# Patient Record
Sex: MALE | Race: BLACK OR AFRICAN AMERICAN | NOT HISPANIC OR LATINO | Employment: FULL TIME | URBAN - METROPOLITAN AREA
[De-identification: names, ages, dates, MRNs, and addresses within clinical notes are randomized per-mention and may not be internally consistent; named-entity substitution may affect disease eponyms.]

---

## 2017-01-16 ENCOUNTER — HOSPITAL ENCOUNTER (OUTPATIENT)
Facility: HOSPITAL | Age: 52
Discharge: HOME | End: 2017-01-17
Attending: EMERGENCY MEDICINE | Admitting: HOSPITALIST
Payer: COMMERCIAL

## 2017-01-16 DIAGNOSIS — I50.43 ACUTE ON CHRONIC COMBINED SYSTOLIC AND DIASTOLIC CONGESTIVE HEART FAILURE: Primary | ICD-10-CM

## 2017-01-16 PROBLEM — I34.0 NON-RHEUMATIC MITRAL REGURGITATION: Status: ACTIVE | Noted: 2017-01-16

## 2017-01-16 LAB
ALBUMIN SERPL BCP-MCNC: 3.5 G/DL
ALP SERPL-CCNC: 90 U/L
ALT SERPL W/O P-5'-P-CCNC: 83 U/L
ANION GAP SERPL CALC-SCNC: 3 MMOL/L
ANION GAP SERPL CALC-SCNC: 5 MMOL/L
AST SERPL-CCNC: 77 U/L
BASOPHILS # BLD AUTO: 0.06 K/UL
BASOPHILS NFR BLD: 1.2 %
BILIRUB SERPL-MCNC: 0.7 MG/DL
BNP SERPL-MCNC: 556 PG/ML
BUN SERPL-MCNC: 19 MG/DL
BUN SERPL-MCNC: 20 MG/DL
CALCIUM SERPL-MCNC: 8.2 MG/DL
CALCIUM SERPL-MCNC: 9 MG/DL
CHLORIDE SERPL-SCNC: 110 MMOL/L
CHLORIDE SERPL-SCNC: 113 MMOL/L
CO2 SERPL-SCNC: 20 MMOL/L
CO2 SERPL-SCNC: 25 MMOL/L
CREAT SERPL-MCNC: 1.3 MG/DL
CREAT SERPL-MCNC: 1.4 MG/DL
DIFFERENTIAL METHOD: ABNORMAL
EOSINOPHIL # BLD AUTO: 0.3 K/UL
EOSINOPHIL NFR BLD: 5.8 %
ERYTHROCYTE [DISTWIDTH] IN BLOOD BY AUTOMATED COUNT: 13.8 %
EST. GFR  (AFRICAN AMERICAN): >60 ML/MIN/1.73 M^2
EST. GFR  (AFRICAN AMERICAN): >60 ML/MIN/1.73 M^2
EST. GFR  (NON AFRICAN AMERICAN): 57.4 ML/MIN/1.73 M^2
EST. GFR  (NON AFRICAN AMERICAN): >60 ML/MIN/1.73 M^2
GLUCOSE SERPL-MCNC: 145 MG/DL
GLUCOSE SERPL-MCNC: 152 MG/DL
HCT VFR BLD AUTO: 39.1 %
HGB BLD-MCNC: 13.5 G/DL
LYMPHOCYTES # BLD AUTO: 0.9 K/UL
LYMPHOCYTES NFR BLD: 16.5 %
MCH RBC QN AUTO: 30.6 PG
MCHC RBC AUTO-ENTMCNC: 34.5 %
MCV RBC AUTO: 89 FL
MONOCYTES # BLD AUTO: 0.4 K/UL
MONOCYTES NFR BLD: 8.3 %
NEUTROPHILS # BLD AUTO: 3.6 K/UL
NEUTROPHILS NFR BLD: 68 %
PLATELET # BLD AUTO: 158 K/UL
PMV BLD AUTO: 11.4 FL
POCT GLUCOSE: 136 MG/DL (ref 70–110)
POCT GLUCOSE: 151 MG/DL (ref 70–110)
POTASSIUM SERPL-SCNC: 5.6 MMOL/L
POTASSIUM SERPL-SCNC: 5.9 MMOL/L
PROT SERPL-MCNC: 6.8 G/DL
RBC # BLD AUTO: 4.41 M/UL
SODIUM SERPL-SCNC: 136 MMOL/L
SODIUM SERPL-SCNC: 140 MMOL/L
TROPONIN I SERPL DL<=0.01 NG/ML-MCNC: 0.02 NG/ML
WBC # BLD AUTO: 5.21 K/UL

## 2017-01-16 PROCEDURE — 80053 COMPREHEN METABOLIC PANEL: CPT

## 2017-01-16 PROCEDURE — 93005 ELECTROCARDIOGRAM TRACING: CPT

## 2017-01-16 PROCEDURE — 63600175 PHARM REV CODE 636 W HCPCS: Performed by: EMERGENCY MEDICINE

## 2017-01-16 PROCEDURE — 99285 EMERGENCY DEPT VISIT HI MDM: CPT | Mod: 25

## 2017-01-16 PROCEDURE — 82962 GLUCOSE BLOOD TEST: CPT

## 2017-01-16 PROCEDURE — G0378 HOSPITAL OBSERVATION PER HR: HCPCS

## 2017-01-16 PROCEDURE — 99220 PR INITIAL OBSERVATION CARE,LEVL III: CPT | Mod: ,,, | Performed by: HOSPITALIST

## 2017-01-16 PROCEDURE — 96374 THER/PROPH/DIAG INJ IV PUSH: CPT

## 2017-01-16 PROCEDURE — 84484 ASSAY OF TROPONIN QUANT: CPT

## 2017-01-16 PROCEDURE — 85025 COMPLETE CBC W/AUTO DIFF WBC: CPT

## 2017-01-16 PROCEDURE — 83880 ASSAY OF NATRIURETIC PEPTIDE: CPT

## 2017-01-16 PROCEDURE — 36415 COLL VENOUS BLD VENIPUNCTURE: CPT

## 2017-01-16 PROCEDURE — 83036 HEMOGLOBIN GLYCOSYLATED A1C: CPT

## 2017-01-16 PROCEDURE — 80048 BASIC METABOLIC PNL TOTAL CA: CPT

## 2017-01-16 PROCEDURE — 93010 ELECTROCARDIOGRAM REPORT: CPT | Mod: ,,, | Performed by: INTERNAL MEDICINE

## 2017-01-16 PROCEDURE — 25000003 PHARM REV CODE 250: Performed by: HOSPITALIST

## 2017-01-16 PROCEDURE — 99284 EMERGENCY DEPT VISIT MOD MDM: CPT | Mod: ,,, | Performed by: EMERGENCY MEDICINE

## 2017-01-16 RX ORDER — IBUPROFEN 200 MG
24 TABLET ORAL
Status: DISCONTINUED | OUTPATIENT
Start: 2017-01-16 | End: 2017-01-17 | Stop reason: HOSPADM

## 2017-01-16 RX ORDER — FUROSEMIDE 10 MG/ML
40 INJECTION INTRAMUSCULAR; INTRAVENOUS 2 TIMES DAILY
Status: DISCONTINUED | OUTPATIENT
Start: 2017-01-17 | End: 2017-01-17 | Stop reason: HOSPADM

## 2017-01-16 RX ORDER — INSULIN ASPART 100 [IU]/ML
0-5 INJECTION, SOLUTION INTRAVENOUS; SUBCUTANEOUS
Status: DISCONTINUED | OUTPATIENT
Start: 2017-01-16 | End: 2017-01-17 | Stop reason: HOSPADM

## 2017-01-16 RX ORDER — FOLIC ACID 1 MG/1
1 TABLET ORAL DAILY
Status: DISCONTINUED | OUTPATIENT
Start: 2017-01-17 | End: 2017-01-17 | Stop reason: HOSPADM

## 2017-01-16 RX ORDER — SIMVASTATIN 10 MG/1
10 TABLET, FILM COATED ORAL NIGHTLY
Status: DISCONTINUED | OUTPATIENT
Start: 2017-01-16 | End: 2017-01-17 | Stop reason: HOSPADM

## 2017-01-16 RX ORDER — IBUPROFEN 200 MG
16 TABLET ORAL
Status: DISCONTINUED | OUTPATIENT
Start: 2017-01-16 | End: 2017-01-17 | Stop reason: HOSPADM

## 2017-01-16 RX ORDER — GLUCAGON 1 MG
1 KIT INJECTION
Status: DISCONTINUED | OUTPATIENT
Start: 2017-01-16 | End: 2017-01-17 | Stop reason: HOSPADM

## 2017-01-16 RX ORDER — LEVOTHYROXINE SODIUM 75 UG/1
75 TABLET ORAL
Status: DISCONTINUED | OUTPATIENT
Start: 2017-01-17 | End: 2017-01-16

## 2017-01-16 RX ORDER — FUROSEMIDE 10 MG/ML
80 INJECTION INTRAMUSCULAR; INTRAVENOUS
Status: COMPLETED | OUTPATIENT
Start: 2017-01-16 | End: 2017-01-16

## 2017-01-16 RX ORDER — METOPROLOL SUCCINATE 50 MG/1
50 TABLET, EXTENDED RELEASE ORAL DAILY
Status: DISCONTINUED | OUTPATIENT
Start: 2017-01-17 | End: 2017-01-17 | Stop reason: HOSPADM

## 2017-01-16 RX ADMIN — SODIUM POLYSTYRENE SULFONATE 15 G: 15 SUSPENSION ORAL; RECTAL at 08:01

## 2017-01-16 RX ADMIN — FUROSEMIDE 80 MG: 10 INJECTION, SOLUTION INTRAMUSCULAR; INTRAVENOUS at 02:01

## 2017-01-16 NOTE — IP AVS SNAPSHOT
[facility]  [address]  [facility] [address]  Phone: [phone]           Patient Discharge Instructions     Our goal is to set you up for success. This packet includes information on your condition, medications, and your home care. It will help you to care for yourself so you don't get sicker and need to go back to the hospital.     Please ask your nurse if you have any questions.        There are many details to remember when preparing to leave the hospital. Here is what you will need to do:    1. Take your medicine. If you are prescribed medications, review your Medication List in the following pages. You may have new medications to  at the pharmacy and others that you'll need to stop taking. Review the instructions for how and when to take your medications. Talk with your doctor or nurses if you are unsure of what to do.     2. Go to your follow-up appointments. Specific follow-up information is listed in the following pages. Your may be contacted by a transition nurse or clinical provider about future appointments. Be sure we have all of the phone numbers to reach you, if needed. Please contact your provider's office if you are unable to make an appointment.     3. Watch for warning signs. Your doctor or nurse will give you detailed warning signs to watch for and when to call for assistance. These instructions may also include educational information about your condition. If you experience any of warning signs to your health, call your doctor.               [facility]  Unless otherwise directed by your provider, please contact [facility], our nurse care line that is available for 24/7 assistance.     [phone] (toll-free)    Registered nurses in the [facility] provide clinical advisement, health education, appointment booking, and other advisory services.                    ** Verify the list of medication(s) below is accurate and up  to date. Carry this with you in case of emergency. If your medications have changed, please notify your healthcare provider.             Medication List      START taking these medications        Additional Info                      furosemide 40 MG tablet   Commonly known as:  LASIX   Quantity:  30 tablet   Refills:  3   Dose:  40 mg    Instructions:  Take 1 tablet (40 mg total) by mouth once daily.     Begin Date    AM    Noon    PM    Bedtime         CHANGE how you take these medications        Additional Info                      losartan 25 MG tablet   Commonly known as:  COZAAR   Quantity:  30 tablet   Refills:  3   Dose:  25 mg   What changed:    - medication strength  - how much to take    Instructions:  Take 1 tablet (25 mg total) by mouth once daily.     Begin Date    AM    Noon    PM    Bedtime         CONTINUE taking these medications        Additional Info                      cyanocobalamin 1,000 mcg/mL injection   Quantity:  18 mL   Refills:  0    Instructions:  Inject 1 mL (1000 mcg total) into the muscle once daily on 9/7, 9/8, and 9/9. Then inject 1 mL into the muscle weekly on 9/16, 9/23, 9/30. Then inject 1 mL into the muscle once a month starting on 10/7.     Begin Date    AM    Noon    PM    Bedtime       folic acid 1 MG tablet   Commonly known as:  FOLVITE   Quantity:  30 tablet   Refills:  11   Dose:  1 mg    Last time this was given:  1 mg on 1/17/2017 10:07 AM   Instructions:  Take 1 tablet (1 mg total) by mouth once daily.     Begin Date    AM    Noon    PM    Bedtime       METFORMIN ORAL   Refills:  0    Instructions:  Take by mouth.     Begin Date    AM    Noon    PM    Bedtime       metoprolol succinate 50 MG 24 hr tablet   Commonly known as:  TOPROL XL   Quantity:  30 tablet   Refills:  0   Dose:  50 mg    Last time this was given:  50 mg on 1/17/2017 10:06 AM   Instructions:  Take 1 tablet (50 mg total) by mouth once daily.     Begin Date    AM    Noon    PM    Bedtime        simvastatin 10 MG tablet   Commonly known as:  ZOCOR   Quantity:  30 tablet   Refills:  11   Dose:  10 mg    Last time this was given:  10 mg on 1/17/2017 12:33 AM   Instructions:  Take 1 tablet (10 mg total) by mouth every evening.     Begin Date    AM    Noon    PM    Bedtime       SYNTHROID ORAL   Refills:  0    Last time this was given:  125 mcg on 1/17/2017  6:40 AM   Instructions:  Take by mouth.     Begin Date    AM    Noon    PM    Bedtime            Where to Get Your Medications      These medications were sent to [facility]/pharmacy #[id number] - [place] - [address]  [address] [facility] [address]     Phone:  [phone]     furosemide 40 MG tablet    losartan 25 MG tablet                  Please bring to all follow up appointments:    1. A copy of your discharge instructions.  2. All medicines you are currently taking in their original bottles.  3. Identification and insurance card.    Please arrive 15 minutes ahead of scheduled appointment time.    Please call 24 hours in advance if you must reschedule your appointment and/or time.        Follow-up Information     Follow up with [facility] On 1/23/2017.    Why:   Post/ 11:15am  [facility] / will fax discharge summary to [phone]     Contact information:    [address] [id number] [phone]        Referrals     Future Orders    Ambulatory Referral to Cardiology         Discharge Instructions     Future Orders    Diet general     Questions:    Total calories:      Fat restriction, if any:      Protein restriction, if any:      Na restriction, if any:      Fluid restriction:      Additional restrictions:          Primary Diagnosis     Your primary diagnosis was:  Heart Failure      Admission Information     Date & Time Provider Department CSN    1/16/2017 11:06 AM Lisandro Herndon MD [facility] [id number]      Care Providers     Provider Role Specialty Primary  "office phone    Lisandro Herndon MD Attending Provider Hospitalist [phone]    Milton Lopez MD Team Attending  Hospitalist [phone]    Lisandro Herndon MD Team Attending  Hospitalist [phone]      Your Vitals Were     BP Pulse Temp Resp Height Weight    110/79 (BP Location: Right arm, Patient Position: Lying, BP Method: Automatic) 79 97.8 °F (36.6 °C) (Oral) 18 5' 11" (1.803 m) 79.4 kg (175 lb)    SpO2 BMI             98% 24.41 kg/m2         Recent Lab Values        1/16/2017 1/17/2017                        8:25 PM  4:58 AM          A1C 6.4 (H) 6.2          Comment for A1C at  8:25 PM on 1/16/2017:  According to ADA guidelines, hemoglobin A1C <7.0% represents  optimal control in non-pregnant diabetic patients.  Different  metrics may apply to specific populations.   Standards of Medical Care in Diabetes - 2016.  For the purpose of screening for the presence of diabetes:  <5.7%     Consistent with the absence of diabetes  5.7-6.4%  Consistent with increasing risk for diabetes   (prediabetes)  >or=6.5%  Consistent with diabetes  Currently no consensus exists for use of hemoglobin A1C  for diagnosis of diabetes for children.      Comment for A1C at  4:58 AM on 1/17/2017:  According to ADA guidelines, hemoglobin A1C <7.0% represents  optimal control in non-pregnant diabetic patients.  Different  metrics may apply to specific populations.   Standards of Medical Care in Diabetes - 2016.  For the purpose of screening for the presence of diabetes:  <5.7%     Consistent with the absence of diabetes  5.7-6.4%  Consistent with increasing risk for diabetes   (prediabetes)  >or=6.5%  Consistent with diabetes  Currently no consensus exists for use of hemoglobin A1C  for diagnosis of diabetes for children.        Allergies as of 1/17/2017     No Known Allergies      Advance Directives     An advance directive is a document which, in the event you are no longer able to make decisions for yourself, tells your healthcare " team what kind of treatment you do or do not want to receive, or who you would like to make those decisions for you.  If you do not currently have an advance directive, [facility] encourages you to create one.  For more information call:  [phone]), [phone] ([phone]),  or log on to [email or web address].        Smoking Cessation     If you would like to quit smoking:   You may be eligible for free services if you are a [place] resident and started smoking cigarettes before September 1, 1988.  Call the Smoking Cessation [employer] (SCT) toll free at (720) 584-6023 or (399) 496-7995.   Call 9-794-QUIT-NOW if you do not meet the above criteria.            Language Assistance Services     ATTENTION: Language assistance services are available, free of charge. Please call [phone].      ATENCIÓN: Si habla español, tiene a simon disposición servicios gratuitos de asistencia lingüística. Llame al [phone].     [facility] Ý: N?u b?n nói Ti?ng Vi?t, có các d?ch v? h? tr? ngôn ng? mi?n phí dành cho b?n. G?i s? [phone].        Heart Failure Education       Heart Failure: Being Active  You have a condition called heart failure. Being active doesnt mean that you have to wear yourself out. Even a little movement each day helps to strengthen your heart. If you cant get out to exercise, you can do simple stretching and strengthening exercises at home. These are good ways to keep you well-conditioned and prevent you and your heart from becoming excessively weak.    Ideas to get you started  · Add a little movement to things you do now. Walk to mail letters. Park your car at the far end of the parking lot and walk to the store. Walk up a flight of stairs instead of taking the elevator.  · Choose activities you enjoy. You might walk, swim, or ride an exercise bike. Things like gardening and washing the car count, too. Other possibilities include: washing dishes, walking the dog, walking around the  mall, and doing aerobic activities with friends.  · Join a group exercise program at a [facility], a senior center, or a community center. Or look into a hospital cardiac rehabilitation program. Ask your doctor if you qualify.  Tips to keep you going  · Get up and get dressed each day. Go to a coffee shop and read a newspaper or go somewhere that you'll be in the presence of other active people. Youll feel more like being active.  · Make a plan. Choose one or more activities that you enjoy and that you can easily do. Then plan to do at least one each day. You might write your plan on a calendar.  · Go with a friend or a group if you like company. This can help you feel supported and stay motivated, too.  · Plan social events that you enjoy. This will keep you mentally engaged as well as physically motivated to do things you find pleasure in.  For your safety  · Talk with your healthcare provider before starting an exercise program.  · Exercise indoors when its too hot or too cold outside, or when the air quality is poor. Try walking at a shopping mall.  · Wear socks and sturdy shoes to maintain your balance and prevent falls.  · Start slowly. Do a few minutes several times a day at first. Increase your time and speed little by little.  · Stop and rest whenever you feel tired or get short of breath.  · Dont push yourself on days when you dont feel well.  © 8330-5577 [employer]. [address] [place] [address], [place] [address]. All rights reserved. This information is not intended as a substitute for professional medical care. Always follow your healthcare professional's instructions.              Heart Failure: Evaluating Your Heart  You have a condition called heart failure. To evaluate your condition, your doctor will examine you, ask questions, and do some tests. Along with looking for signs of heart failure, the doctor looks for any other health problems that may have led to heart failure. The  results of your evaluation will help your doctor form a treatment plan.  Health history and physical exam  Your visit will start with a health history. Tell the doctor about any symptoms youve noticed and about all medicines you take. Then youll have a physical exam. This includes listening to your heartbeat and breathing. Youll also be checked for swelling (edema) in your legs and neck. When you have fluid buildup or fluid in the lungs, it may be called congestive heart failure.  Diagnosing heart failure     During an echocardiogram, sound waves bounce off the heart. These are converted into a picture on the screen.   The following may be done to help your doctor form a diagnosis:  · X-rays show the size and shape of your heart. These pictures can also show fluid in your lungs.  · An electrocardiogram (ECG or EKG) shows the pattern of your heartbeat. Small pads (electrodes) are placed on your chest, arms, and legs. Wires connect the pads to the ECG machine, which records your hearts electrical signals. This can give the doctor information about heart function.  · An echocardiogram uses ultrasound waves to show the structure and movement of your heart muscle. This shows how well the heart pumps. It also shows the thickness of the heart walls, and if the heart is enlarged. It is one of the most useful, non-invasive tests as it provides information about the heart's general function. This helps your doctor make treatment decisions.  · Lab tests evaluate small amounts of blood or urine for signs of problems. A BNP lab test can help diagnose and evaluate heart failure. BNP stands for B-type natriuretic peptide. The ventricles secrete more BNP when heart failure worsens. Lab tests can also provide information about metabolic dysfunction or heart dysfunction.  Your treatment plan  Based on the results of your evaluation and tests, your doctor will develop a treatment plan. This plan is designed to relieve some of your  heart failure symptoms and help make you more comfortable. Your treatment plan may include:  · Medicine to help your heart work better and improve your quality of life  · Changes in what you eat and drink to help prevent fluid from backing up in your body  · Daily monitoring of your weight and heart failure symptoms to see how well your treatment plan is working  · Exercise to help you stay healthy  · Help with quitting smoking  · Emotional and psychological support to help adjust to the changes  · Referrals to other specialists to make sure you are being treated comprehensively  © 3384-1019 [employer]. [address] [place] [address], [place] [address]. All rights reserved. This information is not intended as a substitute for professional medical care. Always follow your healthcare professional's instructions.              Heart Failure: Making Changes to Your Diet  You have a condition called heart failure. When you have heart failure, excess fluid is more likely to build up in your body because your heart isn't working well. This makes the heart work harder to pump blood. Fluid buildup causes symptoms such as shortness of breath and swelling (edema). This is often referred to as congestive heart failure or CHF. Controlling the amount of salt (sodium) you eat may help stop fluid from building up. Your doctor may also tell you to reduce the amount of fluid you drink.  Reading food labels    Your healthcare provider will tell you how much sodium you can eat each day. Read food labels to keep track. Keep in mind that certain foods are high in salt. These include canned, frozen, and processed foods. Check the amount of sodium in each serving. Watch out for high-sodium ingredients. These include MSG (monosodium glutamate), baking soda, and sodium phosphate.   Eating less salt  Give yourself time to get used to eating less salt. It may take a little while. Here are some tips to help:  · Take the saltshaker off the  table. Replace it with salt-free herb mixes and spices.  · Eat fresh or plain frozen vegetables. These have much less salt than canned vegetables.  · Choose low-sodium snacks like sodium-free pretzels, crackers, or air-popped popcorn.  · Dont add salt to your food when youre cooking. Instead, season your foods with pepper, lemon, garlic, or onion.  · When you eat out, ask that your food be cooked without added salt.  · Avoid eating fried foods as these often have a great deal of salt.  If youre told to limit fluids  You may need to limit how much fluid you have to help prevent swelling. This includes anything that is liquid at room temperature, such as ice cream and soup. If your doctor tells you to limit fluid, try these tips:  · Measure drinks in a measuring cup before you drink them. This will help you meet daily goals.  · Chill drinks to make them more refreshing.  · Suck on frozen lemon wedges to quench thirst.  · Only drink when youre thirsty.  · Chew sugarless gum or suck on hard candy to keep your mouth moist.  · Weigh yourself daily to know if your body's fluid content is rising.  My sodium goal  Your healthcare provider may give you a sodium goal to meet each day. This includes sodium found in food as well as salt that you add. My goal is to eat no more than ___________ mg of sodium per day.     When to call your doctor  Call your doctor right away if you have any symptoms of worsening heart failure. These can include:  · Sudden weight gain  · Increased swelling of your legs or ankles  · Trouble breathing when youre resting or at night  · Increase in the number of pillows you have to sleep on  · Chest pain, pressure, discomfort, or pain in the jaw, neck, or back   © 2743-7439 [employer]. [address] [place] [address], [place] [address]. All rights reserved. This information is not intended as a substitute for professional medical care. Always follow your healthcare professional's  instructions.              Heart Failure: Medicines to Help Your Heart    You have a condition called heart failure (also known as congestive heart failure, or CHF). Your doctor will likely prescribe medicines for heart failure and any underlying health problems you have. Most heart failure patients take one or more types of medicinen. Your healthcare provider will work to find the combination of medicines that works best for you.  Heart failure medicines  Here are the most common heart failure medicines:  · ACE inhibitors lower blood pressure and decrease strain on the heart. This makes it easier for the heart to pump. Angiotensin receptor blockers have similar effects. These are prescribed for some patients instead of ACE inhibitors.  · Beta-blockers relieve stress on the heart. They also improve symptoms. They may also improve the heart's pumping action over time.  · Diuretics (also called water pills) help rid your body of excess water. This can help rid your body of swelling (edema). Having less fluid to pump means your heart doesnt have to work as hard. Some diuretics make your body lose a mineral called potassium. Your doctor will tell you if you need to take supplements or eat more foods high in potassium.  · Digoxin helps your heart pump with more strength. This helps your heart pump more blood with each beat. So, more oxygen-rich blood travels to the rest of the body.  · Aldosterone antagonists help alter hormones and decrease strain on the heart.  · Hydralazine and nitrates are two separate medicines used together to treat heart failure. They may come in one combination pill. They lower blood pressure and decrease how hard the heart has to pump.  Medicines for related conditions  Controlling other heart problems helps keep heart failure under control, too. Depending on other heart problems you have, medicines may be prescribed to:  · Lower blood pressure (antihypertensives).  · Lower cholesterol  levels (statins).  · Prevent blood clots (anticoagulants or aspirin).  · Keep the heartbeat steady (antiarrhythmics).  © 3209-4352 [employer]. [address] [place] [address], [place] [address]. All rights reserved. This information is not intended as a substitute for professional medical care. Always follow your healthcare professional's instructions.              Heart Failure: Procedures That May Help    The heart is a muscle that pumps oxygen-rich blood to all parts of the body. When you have heart failure, the heart is not able to pump as well as it should. Blood and fluid may back up into the lungs (congestive heart failure), and some parts of the body dont get enough oxygen-rich blood to work normally. These problems lead to the symptoms of heart failure.     Certain procedures may help the heart pump better in some cases of heart failure. Some procedures are done to treat health problems that may have caused the heart failure such as coronary artery disease or heart rhythm problems. For more serious heart failure, other options are available.  Treating artery and valve problems  If you have coronary artery disease or valve disease, procedures may be done to improve blood flow. This helps the heart pump better, which can improve heart failure symptoms. First, your doctor may do a cardiac catheterization to help detect clogged blood vessels or valve damage. During this procedure, a  thin tube (catheter) in inserted into a blood vessel and guided to the heart. There a dye is injected and a special type of X-ray (angiogram) is taken of the blood vessels. Procedures to open a blocked artery or fix damaged valves can also be done using catheterization.  · Angioplasty uses a balloon-tipped instrument at the end of the catheter. The balloon is inflated to widen the narrowed artery. In many cases, a stent is expanded to further support the narrowed artery. A stent is a metal mesh tube.  · Valve surgery repairs  or replacement of faulty valves can also be done during catheterization so blood can flow properly through the chambers of the heart.  Bypass surgery is another option to help treat blocked arteries. It uses a healthy blood vessel from elsewhere in the body. The healthy blood vessel is attached above and below the blocked area so that blood can flow around the blocked artery.  Treating heart rhythm problems  A device may be placed in the chest to help a weak heart maintain a healthy, heartbeat so the heart can pump more effectively:  · Pacemaker. A pacemaker is an implanted device that regulates your heartbeat electronically. It monitors your heart's rhythm and generates a painless electric impulse that helps the heart beat in a regular rhythm. A pacemaker is programmed to meet your specific heart rhythm needs.  · Biventricular pacing/cardiac resynchronization therapy. A type of pacemaker that paces both pumping chambers of the heart at the same time to coordinate contractions and to improve the heart's function. Some people with heart failure are candidates for this therapy.  · Implantable cardioverter defibrillator. A device similar to a pacemaker that senses when the heart is beating too fast and delivers an electrical shock to convert the fast rhythm to a normal rhythm. This can be a life saving device.  In severe cases  In more serious cases of heart failure when other treatments no longer work, other options may include:  · Ventricular assist devices (VADs). These are mechanical devices used to take over the pumping function for one or both of the heart's ventricles, or pumping chambers. A VAD may be necessary when heart failure progresses to the point that medicines and other treatments no longer help. In some cases, a VAD may be used as a bridge to transplant.  · Heart transplant. This is replacing the diseased heart with a healthy one from a donor. This is an option for a few people who are very sick. A  heart transplant is very serious and not an option for all patients. Your doctor can tell you more.  © 6898-6074 [employer]. [address] [place] [address], [place] [address]. All rights reserved. This information is not intended as a substitute for professional medical care. Always follow your healthcare professional's instructions.              Heart Failure: Tracking Your Weight  You have a condition called heart failure. When you have heart failure, a sudden weight gain or a steady rise in weight is a warning sign that your body is retaining too much water and salt. This could mean your heart failure is getting worse. If left untreated, it can cause problems for your lungs and result in shortness of breath. Weighing yourself each day is the best way to know if youre retaining water. If your weight goes up quickly, call your doctor. You will be given instructions on how to get rid of the excess water. You will likely need medicines and to avoid salt. This will help your heart work better.  Call your doctor if you gain more than 2 pounds in 1 day, more than 5 pounds in 1 week, or whatever weight gain you were told to report by your doctor. This is often a sign of worsening heart failure and needs to be evaluated and treated. Your doctor will tell you what to do next.   Tips for weighing yourself    · Weigh yourself at the same time each morning, wearing the same clothes. Weigh yourself after urinating and before eating.  · Use the same scale each day. Make sure the numbers are easy to read. Put the scale on a flat, hard surface -- not on a rug or carpet.  · Do not stop weighing yourself. If you forget one day, weigh again the next morning.  How to use your weight chart  · Keep your weight chart near the scale. Write your weight on the chart as soon as you get off the scale.  · Fill in the month and the start date on the chart. Then write down your weight each day. Your chart will look like this:    · If  you miss a day, leave the space blank. Weigh yourself the next day and write your weight in the next space.  · Take your weight chart with you when you go to see your doctor.  © 1504-2031 [employer]. [address] [place] [address], [place] [address]. All rights reserved. This information is not intended as a substitute for professional medical care. Always follow your healthcare professional's instructions.              Heart Failure: Warning Signs of a Flare-Up  You have a condition called heart failure. Once you have heart failure, flare-ups can happen. Below are signs that can mean your heart failure is getting worse. If you notice any of these warning signs, call your healthcare provider.  Swelling    · Your feet, ankles, or lower legs get puffier.  · You notice skin changes on your lower legs.  · Your shoes feel too tight.  · Your clothes are tighter in the waist.  · You have trouble getting rings on or off your fingers.  Shortness of breath  · You have to breathe harder even when youre doing your normal activities or when youre resting.  · You are short of breath walking up stairs or even short distances.  · You wake up at night short of breath or coughing.  · You need to use more pillows or sit up to sleep.  · You wake up tired or restless.  Other warning signs  · You feel weaker, dizzy, or more tired.  · You have chest pain or changes in your heartbeat.  · You have a cough that wont go away.  · You cant remember things or dont feel like eating.  Tracking your weight  Gaining weight is often the first warning sign that heart failure is getting worse. Gaining even a few pounds can be a sign that your body is retaining excess water and salt. Weighing yourself each day in the morning after you urinate and before you eat, is the best way to know if you're retaining water. Get a scale that is easy to read and make sure you wear the same clothes and use the same scale every time you weigh. Your  healthcare provider will show you how to track your weight. Call your doctor if you gain more than 2 pounds in 1 day, 5 pounds in 1 week, or whatever weight gain you were told to report by your doctor. This is often a sign of worsening heart failure and needs to be evaluated and treated before it compromises your breathing. Your doctor will tell you what to do next.    © 1518-9634 [employer]. [address] [place] [address], [place] [address]. All rights reserved. This information is not intended as a substitute for professional medical care. Always follow your healthcare professional's instructions.              Diabetes Discharge Instructions                                   [facility] Sign-Up     Activating your [facility] account is as easy as 1-2-3!     1) Visit [email or web address], select Sign Up Now, enter this activation code and your date of birth, then select Next.  [email or web address]  Expires: 3/3/2017  4:12 PM      2) Create a username and password to use when you visit [facility] in the future and select a security question in case you lose your password and select Next.    3) Enter your e-mail address and click Sign Up!    Additional Information  If you have questions, please e-mail [email or web address] or call [phone] to talk to our [facility] staff. Remember, [facility] is NOT to be used for urgent needs. For medical emergencies, dial 911.          [facility] complies with applicable Federal civil rights laws and does not discriminate on the basis of race, color, national origin, age, disability, or sex.

## 2017-01-16 NOTE — PROVIDER PROGRESS NOTES - EMERGENCY DEPT.
Encounter Date: 1/16/2017    ED Physician Progress Notes       SCRIBE NOTE: I, Soo Matos, am scribing for, and in the presence of,  Dr. Rodriguez.  Physician Statement: I, Dr. Rodriguez, personally performed the services described in this documentation as scribed by Soo Matos in my presence, and it is both accurate and complete.      EKG - STEMI Decision  Initial Reading: No STEMI present.

## 2017-01-16 NOTE — CONSULTS
"Cardiology Consult Note    Requesting Provider: Artemio Rodriguez MD  Admission Date:  1/16/2017  Hospital Day:  0    Reason for Consult: SOB    HPI:   Ashish Mckeon is a 51 y.o. male with a history significant for cardiomyopathy with LVEF 40%, moderate to severe MR, HTN, DM2, and hypothyroidism who presents with progressive JEROME associated with orthopnea, PND, and cough for 2-3 weeks. He was seen in the ED in 6/2016 for cough and echo showed EF 40% and MR. Cardiology was consulted and he had no signs or symptoms of volume overload. He was on an ACE-I for HTN to which his cough was attributed. He was changed to ARB and discharged with cardiology f/u which never happened.    ROS:    Constitution: Negative for fatigue. Negative for fever or chills. Negative for weight loss or gain.   HENT: Negative for sore throat or headaches. Negative for rhinorrhea.  Eyes: Negative for blurred or double vision.   Cardiovascular: Negative for chest pain. Positive for dyspnea on exertion, orthopnea, and PND. Negative for irregular heartbeat, leg swelling, near-syncope.   Pulmonary: Negative for SOB. Negative for cough.   Gastrointestinal: Negative for abdominal pain. Negative for nausea/ vomiting. Negative for diarrhea.   : Negative for dysuria.   Neurological: Negative for focal weakness or sensory changes  Psychiatric/Behavioral: Negative for anxiety or depression.  Skin: Negative for rash or unusual lesions. Negative for jaundice.  Musculoskeletal: Negative for joint pain or swelling. Negative for muscle weakness.    PMH:     Past Medical History   Diagnosis Date    Diabetes mellitus type II     Essential hypertension, benign     Hyperlipidemia     Hypothyroidism     Undiagnosed cardiac murmurs      Past Surgical History   Procedure Laterality Date    Thyroid surgery      Colon surgery       "lower intestines removed"        Social History:     Social History   Substance Use Topics    Smoking status: Current Some Day Smoker " "    Types: Cigars    Smokeless tobacco: Not on file      Comment: cigars "every other week or so"    Alcohol use 3.5 oz/week     7 Standard drinks or equivalent per week        Family History:     Family History   Problem Relation Age of Onset    Cancer Father        Allergies:     Review of patient's allergies indicates:  No Known Allergies     Medications:     No current facility-administered medications on file prior to encounter.      Current Outpatient Prescriptions on File Prior to Encounter   Medication Sig    LEVOTHYROXINE SODIUM (SYNTHROID ORAL) Take by mouth.    losartan (COZAAR) 50 MG tablet Take 1 tablet (50 mg total) by mouth once daily.    METFORMIN HCL (METFORMIN ORAL) Take by mouth.    metoprolol succinate (TOPROL XL) 50 MG 24 hr tablet Take 1 tablet (50 mg total) by mouth once daily.    cyanocobalamin 1,000 mcg/mL injection Inject 1 mL (1000 mcg total) into the muscle once daily on 9/7, 9/8, and 9/9. Then inject 1 mL into the muscle weekly on 9/16, 9/23, 9/30. Then inject 1 mL into the muscle once a month starting on 10/7.    folic acid (FOLVITE) 1 MG tablet Take 1 tablet (1 mg total) by mouth once daily.    simvastatin (ZOCOR) 10 MG tablet Take 1 tablet (10 mg total) by mouth every evening.        Physical Exam:     Vitals:  Temp:  [97.8 °F (36.6 °C)]   Pulse:  [87]   Resp:  [18]   BP: (115)/(72)   SpO2:  [97 %]  I/O's:  No intake or output data in the 24 hours ending 01/16/17 1509   General: NAD, conversant  HEENT: Sclera anicteric EOMI, OP clear  Neck: JVD to midneck, +HJR  CV: RRR, split S2, 3/6 HSM murmur at apex  Pulm: Bibasilar rales  GI: Abdomen soft, NTND, +BS  Extremities: 1+ BLE edema, warm with palpable pulses  Skin: No ecchymosis, erythema, or ulcers  Psych: AOx3, appropriate affect  Neuro: CNII-XII intact, strength 5/5 throughout    Labs:       Recent Labs  Lab 01/16/17  1143      K 5.6*   *   CO2 20*   BUN 19   CREATININE 1.3   ANIONGAP 3*       Recent Labs  Lab " 01/16/17  1143   AST 77*   ALT 83*   ALKPHOS 90   BILITOT 0.7   ALBUMIN 3.5       Recent Labs  Lab 01/16/17  1143   CALCIUM 8.2*     Lab Results   Component Value Date     (H) 01/16/2017     (H) 06/09/2016     Lab Results   Component Value Date    CHOL 152 09/05/2013    HDL 22 (L) 09/05/2013    TRIG 403 (H) 09/05/2013     No results found for: HGBA1C  Lab Results   Component Value Date    TSH 15.233 (H) 03/11/2014      Recent Labs  Lab 01/16/17  1143   WBC 5.21   HGB 13.5*   HCT 39.1*      GRAN 68.0  3.6     No results for input(s): PTT, INR in the last 168 hours.  No results for input(s): PHART, PON0GBI, PO2ART, OFG0TUQ, G6PSFLQR in the last 168 hours.  No results for input(s): LACTATE in the last 168 hours.    Recent Labs  Lab 01/16/17  1143   TROPONINI 0.020           Micro:  Blood Cultures: No results found for: LABBLOO  Urine Cultures:   Lab Results   Component Value Date    LABURIN No significant growth 09/06/2013     Pertinent Imaging/Tests:  Echo 6/9/16:  1 - Mildly to moderately depressed left ventricular systolic function (EF 40-45%).     2 - Severe left atrial enlargement.     3 - Moderately depressed right ventricular systolic function .     4 - Pulmonary hypertension. The estimated PA systolic pressure is 66 mmHg.     5 - Trivial aortic regurgitation.     6 - The mitral valve is thickened with evidence of anterior leaflet prolapse.     7 - Moderate to severe mitral regurgitation.     8 - Moderate tricuspid regurgitation.     9 - Intermediate central venous pressure.     10 - Cannot rule out mitral and aortic valve vegetation ( see text).     EKG: NSR, possible anterolateral infarct - no singificant change from prior    CXR: Mild cardiomegaly and pulmonary vascular congestion.  No significant air space consolidation or pleural effusion identified     Assessment & Recommendations:    51 y.o. male with PMH of cardiomyopathy with LVEF 40%, moderate to severe MR, HTN, DM2, and  hypothyroidism who presents with acute decompensated heart failure with mild volume overload.    - admit to IM, observation status  - diuresis with Lasix IV 40 mg BID, strict I/Os, daily weights, low Na/fluid restricted diet  - cont Toprol XL and Losartan  - check TSH/fT4  - obtain 2D echo with CFD to reassess LV function and severity of MR    Greysno Alejandre MD  Cardiology Fellow, PGY-6

## 2017-01-16 NOTE — ED PROVIDER NOTES
"Encounter Date: 1/16/2017    SCRIBE #1 NOTE: I, Soo Matos, am scribing for, and in the presence of, Dr. Rodriguez.       History     Chief Complaint   Patient presents with    URI     wheezing sound through nose at night, dry cough for 1 week, hx ?leaky valve     Review of patient's allergies indicates:  No Known Allergies  HPI Comments: Time seen by provider: 11:31 AM    This is a 51 y.o. Male with history of HTN, HLD, DM type II, hypothyroidism who presents with complaint of cough for a couple of weeks. He reports associated chest heaviness and mild SOB. Symptoms worsen with deep breaths and when laying flat at night. He denies leg swelling or tightness of socks. The chest pressure doesn't worse with exertion, but he does become fatigued quickly. He reports history of heart valve regurgitation. He is on lopressor and metoprolol.     The history is provided by the patient.     Past Medical History   Diagnosis Date    Diabetes mellitus type II     Essential hypertension, benign     Hyperlipidemia     Hypothyroidism     Undiagnosed cardiac murmurs      No past medical history pertinent negatives.  Past Surgical History   Procedure Laterality Date    Thyroid surgery      Colon surgery       "lower intestines removed"     Family History   Problem Relation Age of Onset    Cancer Father      Social History   Substance Use Topics    Smoking status: Current Some Day Smoker     Types: Cigars    Smokeless tobacco: None      Comment: cigars "every other week or so"    Alcohol use 3.5 oz/week     7 Standard drinks or equivalent per week     Review of Systems   Constitutional: Positive for fatigue. Negative for chills and fever.   HENT: Negative for trouble swallowing.    Eyes: Negative for visual disturbance.   Respiratory: Positive for cough, shortness of breath (mild) and wheezing.    Cardiovascular: Positive for chest pain (heaviness). Negative for leg swelling.   Gastrointestinal: Negative for vomiting. "   Genitourinary: Negative for hematuria.   Musculoskeletal: Negative for neck stiffness.   Skin: Negative for rash.   Neurological: Negative for syncope.       Physical Exam   Initial Vitals   BP Pulse Resp Temp SpO2   01/16/17 0847 01/16/17 0847 01/16/17 0847 01/16/17 0847 01/16/17 0847   115/72 87 18 97.8 °F (36.6 °C) 97 %     Physical Exam    Nursing note and vitals reviewed.  Constitutional: He appears well-developed and well-nourished. He is not diaphoretic. No distress.   HENT:   Head: Normocephalic and atraumatic.   Mouth/Throat: Oropharynx is clear and moist.   Eyes: Conjunctivae are normal.   Neck: Normal range of motion. Neck supple.   Cardiovascular: Normal rate and regular rhythm.   Pulmonary/Chest:   Coarse breath sounds at both bases   Abdominal: Soft. He exhibits no distension. There is no tenderness.   Musculoskeletal: Normal range of motion. He exhibits no edema.   Neurological: He is alert and oriented to person, place, and time. He has normal strength. No cranial nerve deficit or sensory deficit.   Skin: Skin is warm and dry. No rash noted.         ED Course   Procedures  Labs Reviewed   CBC W/ AUTO DIFFERENTIAL - Abnormal; Notable for the following:        Result Value    RBC 4.41 (*)     Hemoglobin 13.5 (*)     Hematocrit 39.1 (*)     Lymph # 0.9 (*)     Lymph% 16.5 (*)     All other components within normal limits   COMPREHENSIVE METABOLIC PANEL - Abnormal; Notable for the following:     Potassium 5.6 (*)     Chloride 113 (*)     CO2 20 (*)     Glucose 145 (*)     Calcium 8.2 (*)     AST 77 (*)     ALT 83 (*)     Anion Gap 3 (*)     All other components within normal limits   B-TYPE NATRIURETIC PEPTIDE - Abnormal; Notable for the following:      (*)     All other components within normal limits   TROPONIN I     EKG Readings: (Independently Interpreted)   Normal sinus rhythm, first degree AV block, poor R-wave progression in the precordial leads. This is similar to previous EKG done in  June 2016. No acute ischemia noted today.       X-Rays:   Independently Interpreted Readings:   Chest X-Ray: Fluid overload.     Medical Decision Making:   History:   Old Medical Records: I decided to obtain old medical records.  Old Records Summarized: other records.       <> Summary of Records: History of CHF with EF of 40-55%. He has multiple visits to the ED for cough and SOB that are similar to today.  Initial Assessment:   Patient with cough symptoms for a couple of weeks and chest heaviness when he takes a deep breath. Also worse at night when he lays down. He describes it somewhat as a chest pull, but symptoms could be interpreted as fluid overload as well. Don't think this is ACS. Will do a CHF workup, including x-ray, to see if signs of fluid overload or if this is just how he describes a viral URI.    3:51 PM  Cardiology was consulted and saw the patient. He will be admitted to internal medicine for CHF.  Independently Interpreted Test(s):   I have ordered and independently interpreted X-rays - see prior notes.  I have ordered and independently interpreted EKG Reading(s) - see prior notes  Clinical Tests:   Lab Tests: Ordered and Reviewed  Radiological Study: Ordered and Reviewed  Medical Tests: Ordered and Reviewed  Other:   I have discussed this case with another health care provider.            Scribe Attestation:   Scribe #1: I performed the above scribed service and the documentation accurately describes the services I performed. I attest to the accuracy of the note.    Attending Attestation:           Physician Attestation for Scribe:  Physician Attestation Statement for Scribe #1: I, Dr. Rodriguez, reviewed documentation, as scribed by Soo Matos in my presence, and it is both accurate and complete.                 ED Course     Clinical Impression:   The encounter diagnosis was Acute on chronic combined systolic and diastolic congestive heart failure.    Disposition:   Disposition: Admitted        Artemio Rodriguez MD  01/17/17 3404

## 2017-01-16 NOTE — ED TRIAGE NOTES
"Pt reports cough with "chest heaviness" x "a few weeks".   Cough is dry, no productive sputum, worse at night when lying down. Reports SOB with walking/talking, getting "tired real quick".   Last BM this AM, loose, denies any blood in stool.    Denies any subjective fever. Denies sick contacts. Denies N/V. Denies lightheadedness.    Ambulatory to room without assist.  "

## 2017-01-16 NOTE — IP AVS SNAPSHOT
[address]  [place] [address]  Phone: [phone]           I have received a copy of my After Visit Summary and discharge instructions from [facility].    INSTRUCTIONS RECEIVED AND UNDERSTOOD BY:                     Patient/Patient Representative: ________________________________________________________________     Date/Time: ________________________________________________________________                     Instructions Given By: ________________________________________________________________     Date/Time: ________________________________________________________________

## 2017-01-17 VITALS
BODY MASS INDEX: 24.5 KG/M2 | WEIGHT: 175 LBS | OXYGEN SATURATION: 98 % | HEIGHT: 71 IN | TEMPERATURE: 98 F | SYSTOLIC BLOOD PRESSURE: 110 MMHG | HEART RATE: 79 BPM | DIASTOLIC BLOOD PRESSURE: 79 MMHG | RESPIRATION RATE: 18 BRPM

## 2017-01-17 PROBLEM — D63.8 ANEMIA OF CHRONIC DISEASE: Status: ACTIVE | Noted: 2017-01-17

## 2017-01-17 LAB
ALBUMIN SERPL BCP-MCNC: 3.2 G/DL
ALP SERPL-CCNC: 82 U/L
ALT SERPL W/O P-5'-P-CCNC: 69 U/L
ANION GAP SERPL CALC-SCNC: 5 MMOL/L
AORTIC VALVE REGURGITATION: ABNORMAL
AST SERPL-CCNC: 45 U/L
BASOPHILS # BLD AUTO: 0.02 K/UL
BASOPHILS NFR BLD: 0.4 %
BILIRUB SERPL-MCNC: 1.1 MG/DL
BUN SERPL-MCNC: 17 MG/DL
CALCIUM SERPL-MCNC: 8 MG/DL
CHLORIDE SERPL-SCNC: 111 MMOL/L
CO2 SERPL-SCNC: 24 MMOL/L
CREAT SERPL-MCNC: 1.3 MG/DL
DIASTOLIC DYSFUNCTION: YES
DIFFERENTIAL METHOD: ABNORMAL
EOSINOPHIL # BLD AUTO: 0.4 K/UL
EOSINOPHIL NFR BLD: 8.1 %
ERYTHROCYTE [DISTWIDTH] IN BLOOD BY AUTOMATED COUNT: 13.9 %
EST. GFR  (AFRICAN AMERICAN): >60 ML/MIN/1.73 M^2
EST. GFR  (NON AFRICAN AMERICAN): >60 ML/MIN/1.73 M^2
ESTIMATED AVG GLUCOSE: 131 MG/DL
ESTIMATED AVG GLUCOSE: 137 MG/DL
ESTIMATED PA SYSTOLIC PRESSURE: 30
GLUCOSE SERPL-MCNC: 156 MG/DL
HBA1C MFR BLD HPLC: 6.2 %
HBA1C MFR BLD HPLC: 6.4 %
HCT VFR BLD AUTO: 37.3 %
HGB BLD-MCNC: 12.8 G/DL
LYMPHOCYTES # BLD AUTO: 0.7 K/UL
LYMPHOCYTES NFR BLD: 14.3 %
MAGNESIUM SERPL-MCNC: 1.9 MG/DL
MCH RBC QN AUTO: 30.2 PG
MCHC RBC AUTO-ENTMCNC: 34.3 %
MCV RBC AUTO: 88 FL
MITRAL VALVE REGURGITATION: ABNORMAL
MONOCYTES # BLD AUTO: 0.5 K/UL
MONOCYTES NFR BLD: 10.5 %
NEUTROPHILS # BLD AUTO: 3.4 K/UL
NEUTROPHILS NFR BLD: 66.5 %
PHOSPHATE SERPL-MCNC: 2.6 MG/DL
PLATELET # BLD AUTO: 133 K/UL
PMV BLD AUTO: 11.8 FL
POCT GLUCOSE: 152 MG/DL (ref 70–110)
POCT GLUCOSE: 179 MG/DL (ref 70–110)
POTASSIUM SERPL-SCNC: 3.9 MMOL/L
PROT SERPL-MCNC: 6 G/DL
RBC # BLD AUTO: 4.24 M/UL
RETIRED EF AND QEF - SEE NOTES: 35 (ref 55–65)
SODIUM SERPL-SCNC: 140 MMOL/L
T4 FREE SERPL-MCNC: 0.73 NG/DL
TRICUSPID VALVE REGURGITATION: ABNORMAL
TSH SERPL DL<=0.005 MIU/L-ACNC: 4.6 UIU/ML
WBC # BLD AUTO: 5.05 K/UL

## 2017-01-17 PROCEDURE — 84100 ASSAY OF PHOSPHORUS: CPT

## 2017-01-17 PROCEDURE — 82962 GLUCOSE BLOOD TEST: CPT

## 2017-01-17 PROCEDURE — 83735 ASSAY OF MAGNESIUM: CPT

## 2017-01-17 PROCEDURE — 25000003 PHARM REV CODE 250: Performed by: PHYSICIAN ASSISTANT

## 2017-01-17 PROCEDURE — 25000003 PHARM REV CODE 250: Performed by: HOSPITALIST

## 2017-01-17 PROCEDURE — 84439 ASSAY OF FREE THYROXINE: CPT

## 2017-01-17 PROCEDURE — 36415 COLL VENOUS BLD VENIPUNCTURE: CPT

## 2017-01-17 PROCEDURE — 80053 COMPREHEN METABOLIC PANEL: CPT

## 2017-01-17 PROCEDURE — 99217 PR OBSERVATION CARE DISCHARGE: CPT | Mod: ,,, | Performed by: HOSPITALIST

## 2017-01-17 PROCEDURE — 93306 TTE W/DOPPLER COMPLETE: CPT

## 2017-01-17 PROCEDURE — 63600175 PHARM REV CODE 636 W HCPCS: Performed by: HOSPITALIST

## 2017-01-17 PROCEDURE — 85025 COMPLETE CBC W/AUTO DIFF WBC: CPT

## 2017-01-17 PROCEDURE — 93306 TTE W/DOPPLER COMPLETE: CPT | Mod: 26,,, | Performed by: INTERNAL MEDICINE

## 2017-01-17 PROCEDURE — 83036 HEMOGLOBIN GLYCOSYLATED A1C: CPT

## 2017-01-17 PROCEDURE — 84443 ASSAY THYROID STIM HORMONE: CPT

## 2017-01-17 PROCEDURE — G0378 HOSPITAL OBSERVATION PER HR: HCPCS

## 2017-01-17 RX ORDER — LOSARTAN POTASSIUM 25 MG/1
25 TABLET ORAL DAILY
Qty: 30 TABLET | Refills: 3 | Status: ON HOLD | OUTPATIENT
Start: 2017-01-17 | End: 2018-07-21

## 2017-01-17 RX ORDER — FUROSEMIDE 40 MG/1
40 TABLET ORAL DAILY
Qty: 30 TABLET | Refills: 3 | Status: ON HOLD | OUTPATIENT
Start: 2017-01-17 | End: 2018-07-21

## 2017-01-17 RX ORDER — LISINOPRIL 5 MG/1
5 TABLET ORAL DAILY
Qty: 90 TABLET | Refills: 3 | Status: SHIPPED | OUTPATIENT
Start: 2017-01-17 | End: 2017-01-17 | Stop reason: HOSPADM

## 2017-01-17 RX ADMIN — FUROSEMIDE 40 MG: 10 INJECTION, SOLUTION INTRAVENOUS at 10:01

## 2017-01-17 RX ADMIN — SODIUM POLYSTYRENE SULFONATE 30 G: 15 SUSPENSION ORAL; RECTAL at 12:01

## 2017-01-17 RX ADMIN — FOLIC ACID 1 MG: 1 TABLET ORAL at 10:01

## 2017-01-17 RX ADMIN — METOPROLOL SUCCINATE 50 MG: 50 TABLET, EXTENDED RELEASE ORAL at 10:01

## 2017-01-17 RX ADMIN — SIMVASTATIN 10 MG: 10 TABLET, FILM COATED ORAL at 12:01

## 2017-01-17 RX ADMIN — LEVOTHYROXINE SODIUM 125 MCG: 25 TABLET ORAL at 06:01

## 2017-01-17 NOTE — PLAN OF CARE
[facility]  [address] / [place] [address]    Pharmacy  [facility]   Charly Castellanos    Payor: [employer] / Plan: [employer] HMOX / Product Type: HMO /        01/17/17 1401   Discharge Assessment   Assessment Type Discharge Planning Assessment   Confirmed/corrected address and phone number on facesheet? Yes   Assessment information obtained from? Patient   Expected Length of Stay (days) 2   Communicated expected length of stay with patient/caregiver yes   Prior to hospitilization cognitive status: Alert/Oriented   Prior to hospitalization functional status: Independent   Current cognitive status: Alert/Oriented   Current Functional Status: Independent   Arrived From home or self-care   Lives With spouse   Able to Return to Prior Arrangements yes   Is patient able to care for self after discharge? Yes   Who are your caregiver(s) and their phone number(s)? Laura Beaver  significant other [phone]   Patient's perception of discharge disposition home or selfcare   Patient currently receives home health services? No   Patient currently receives any other outside agency services? No   Equipment Currently Used at Home none   Does the patient have transportation to healthcare appointments? Yes   Transportation Available family or friend will provide   On Dialysis? No   Discharge Plan A Home with family   Patient/Family In Agreement With Plan yes

## 2017-01-17 NOTE — DISCHARGE SUMMARY
[facility]  Discharge Summary      Admit Date: 1/16/2017    Discharge Date and Time: 1/17/17    Attending Physician: Lisandro Herndon MD     Reason for Admission: Acute systolic and diastolic heart failure    Procedures Performed: * No surgery found *    Hospital Course   Mr. Ashish Mckeon is a 51 y.o. with history of HTN, HLD, DM type II, hypothyroidism who presents with complaint of cough for a couple of weeks. He reports associated chest heaviness and mild SOB. Symptoms worsen with deep breaths and when laying flat at night. He denies leg swelling or tightness of socks. The chest pressure doesn't worse with exertion, but he does become fatigued quickly. He reports history of heart valve regurgitation. He is on lopressor and Losartan.     # Acute on chronic combined systolic and diastolic heart failure   - proBNP: 556  - repeat TTE with color flow doppler   - Lasix IV BID 40mg, sending home on lasix 40 mg daily  - intake and output  - daily weights  - CXR: pulm edema   - cont beta blocker  - hold ARB due to hyperkalemia; will send home on losartan 25 mg daily; patient likely needs to be on hydralazine and imdur, however patient's BP will not tolerate that currently; needs to be re-evaluated when he sees cardiology      Diabetes mellitus type 2  - POCT q 4hrs  - Insulin sliding scale  - Hemoglobin A1c- 6.2      Essential HTN  Cont Toprol XL  - hold ARB due to hyperkalemia  - BP well controlled       Hyperkalemia- resolved  Most likely iatrogenic from ARB use  - IV Lasix given in the ER  - repeat BMP      Hypothyroidism  Repeat TSH  No dosage documented, will start at last dose of 125mcg in the morning      Anemia of chronic disease  Thrombocytopenia  - monitor       DVT/GI ppx   - lovenox SQ  - Cardiac diet with low salt           HIGH RISK CONDITION(S):  CHF exacerbation      Discharge Disposition- today pending 2d echo results; will need cardiology follow up;        Consults:  cardiology    Significant Diagnostic Studies:   2d echo with CFD:    CONCLUSIONS     1 - Trivial aortic regurgitation.     2 - Moderately depressed left ventricular systolic function (EF 35-40%).     3 - Quantitatively measured LV function is 37%.     4 - Left ventricular diastolic dysfunction.     5 - Moderate mitral regurgitation.     6 - Biatrial enlargement.     7 - Right ventricular enlargement with hypertrophy, with moderately depressed systolic function.     8 - Mild pulmonic regurgitation.     9 - The estimated PA systolic pressure is 30 mmHg.     10 - Trivial tricuspid regurgitation.     11 - Intermediate central venous pressure.     Final Diagnoses:    Principal Problem: Acute on chronic combined systolic and diastolic congestive heart failure   Secondary Diagnoses: Hyperkalemia    Discharged Condition: good    Disposition: Home or Self Care    Follow Up/Patient Instructions:     Medications:  Reconciled Home Medications:   Current Discharge Medication List      START taking these medications    Details   furosemide (LASIX) 40 MG tablet Take 1 tablet (40 mg total) by mouth once daily.  Qty: 30 tablet, Refills: 3         CONTINUE these medications which have CHANGED    Details   losartan (COZAAR) 25 MG tablet Take 1 tablet (25 mg total) by mouth once daily.  Qty: 30 tablet, Refills: 3         CONTINUE these medications which have NOT CHANGED    Details   LEVOTHYROXINE SODIUM (SYNTHROID ORAL) Take by mouth.      METFORMIN HCL (METFORMIN ORAL) Take by mouth.      metoprolol succinate (TOPROL XL) 50 MG 24 hr tablet Take 1 tablet (50 mg total) by mouth once daily.  Qty: 30 tablet, Refills: 0      cyanocobalamin 1,000 mcg/mL injection Inject 1 mL (1000 mcg total) into the muscle once daily on 9/7, 9/8, and 9/9. Then inject 1 mL into the muscle weekly on 9/16, 9/23, 9/30. Then inject 1 mL into the muscle once a month starting on 10/7.  Qty: 18 mL, Refills: 0      folic acid (FOLVITE) 1 MG tablet Take 1 tablet (1  mg total) by mouth once daily.  Qty: 30 tablet, Refills: 11      simvastatin (ZOCOR) 10 MG tablet Take 1 tablet (10 mg total) by mouth every evening.  Qty: 30 tablet, Refills: 11             Discharge Procedure Orders  Ambulatory Referral to Cardiology   Referral Priority: Routine Referral Type: Consultation   Referral Reason: Specialty Services Required    Requested Specialty: Cardiology    Number of Visits Requested: 1      Diet general       Follow-up Information     Follow up with [facility].    Why:  Dr. Mast    Contact information:    [address] [place] [address] [phone]

## 2017-01-17 NOTE — PROGRESS NOTES
Progress Note  Hospital Medicine    Admit Date: 1/16/2017    SUBJECTIVE:     Follow-up For: Acute on chronic combined systolic and diastolic congestive heart failure      HPI/Interval history (See H&P for complete P,F,SHx) : patient states that he is doing much better today after getting lasix; SOB has resolved; pending 2d echo; needs cardiology follow up;     Review of Systems:  Constitutional- Negative for Fever, Negative for weakness  Neuro- Negative for Confusion, Negative for hallucinations  CV- Negative for Chest Pain, Negative for palpitations  Resp- Negative for Cough, Negative for SOB  GI- Negative for nausea, nomiting, Negative for diarrhea  Extrem- Negative for Pain, Negative for Swelling    OBJECTIVE:       Intake/Output Summary (Last 24 hours) at 01/17/17 1503  Last data filed at 01/17/17 1000   Gross per 24 hour   Intake              120 ml   Output              450 ml   Net             -330 ml       Vital Signs Range (Last 24H):  Temp:  [96.8 °F (36 °C)-98.5 °F (36.9 °C)]   Pulse:  [72-84]   Resp:  [16-18]   BP: (110-131)/(76-86)   SpO2:  [96 %-99 %]     Physical Exam:  General: Well developed, well nourished male in NAD  HEENT: Conjunctiva clear; Oropharynx clear  Neck: No JVD noted, Supple  CV: Normal S1, S2 with no murmurs,gallops,rubs  Resp: Lungs CTA Bilaterally, Unlabored  Abdomen: NTND, BS normoactive x4 quads, soft  Extrem: No cyanosis, clubbing, edema.  Skin: No rashes, lesions, ulcers  Neuro: motor strength in tact, CN 2-12 intact  PSYCH: Oriented x3    Medications:  Medication list was reviewed and changes noted under Assessment/Plan.    Laboratory:  Recent Labs      01/16/17   1143  01/17/17   0458   WBC  5.21  5.05   RBC  4.41*  4.24*   HGB  13.5*  12.8*   HCT  39.1*  37.3*   PLT  158  133*   MCV  89  88   MCH  30.6  30.2   MCHC  34.5  34.3       Recent Labs      01/16/17   2211  01/17/17   0458   GLU  152*  156*   NA  140  140   K  5.9*  3.9   CL  110  111*   CO2  25  24   BUN  20  17    CREATININE  1.4  1.3   MG   --   1.9   PHOS   --   2.6*       Diagnostic Results:    CXR-     Mild cardiomegaly and pulmonary vascular congestion.  No significant air space consolidation or pleural effusion identified    ASSESSMENT/PLAN:     Active Hospital Problems    Anemia of chronic disease      *Acute on chronic combined systolic and diastolic congestive heart failure      Non-rheumatic mitral regurgitation      Thrombocytopenia      Hypothyroidism      Diabetes mellitus, type II        Plan:     # Acute on chronic combined systolic and diastolic heart failure   - proBNP: 556  - repeat TTE with color flow doppler   - Lasix IV BID 40mg, sending home on lasix 40 mg daily  - intake and output  - daily weights  - CXR: pulm edema   - cont beta blocker  - hold ARB due to hyperkalemia; will send home on lisinopril 5 mg daily      Diabetes mellitus type 2  - POCT q 4hrs  - Insulin sliding scale  - Hemoglobin A1c- 6.2     Essential HTN  Cont Toprol XL  - hold ARB due to hyperkalemia  - BP well controlled      Hyperkalemia- resolved  Most likely iatrogenic from ARB use  - IV Lasix given in the ER  - repeat BMP     Hypothyroidism  Repeat TSH  No dosage documented, will start at last dose of 125mcg in the morning     Anemia of chronic disease  Thrombocytopenia  - monitor      DVT/GI ppx   - lovenox SQ  - Cardiac diet with low salt         HIGH RISK CONDITION(S):  CHF exacerbation     Discharge Disposition- today pending 2d echo results; will need cardiology follow up;     Time spent in care of patient (Greater than 1/2 spent in coordinating and counseling patient care):  30 minutes

## 2017-01-17 NOTE — PLAN OF CARE
Problem: Patient Care Overview  Goal: Plan of Care Review  Pt with no ls/s of hypo or hyperglycemia this hospital stay. Pt with no falls or injuries this hospital stay.

## 2017-01-17 NOTE — PLAN OF CARE
01/17/17 1627   Final Note   Assessment Type Final Discharge Note   Discharge Disposition Home   Discharge planning education complete? Yes   Hospital Follow Up  Appt(s) scheduled? Yes   Discharge/Hospital Encounter Summary to (non-[facility]) PCP Yes   Referral to / orders for Home Health Complete? No   Any social issues identified prior to discharge? No   Did you assess the readiness or willingness of the family or caregiver to support self management of care? Yes   Right Care Referral Info   Post Acute Recommendation No Care     Follow-up With  Details  Why  Contact Info   [facility]  On 1/23/2017  Dr. Post/ 11:15am [facility] follow up / will fax discharge summary to [phone]   [address] [place] [address]  [phone]

## 2017-01-17 NOTE — H&P
"HISTORY & PHYSICAL  Hospital Medicine    Team: [facility]    PRESENTING HISTORY     Chief Complaint/Reason for Admission:  CHF exacerbation     History of Present Illness:  Mr. Ashish Mckeon is a 51 y.o.  with history of HTN, HLD, DM type II, hypothyroidism who presents with complaint of cough for a couple of weeks. He reports associated chest heaviness and mild SOB. Symptoms worsen with deep breaths and when laying flat at night. He denies leg swelling or tightness of socks. The chest pressure doesn't worse with exertion, but he does become fatigued quickly. He reports history of heart valve regurgitation. He is on lopressor and Losartan.   Review of Systems:  Constitutional: Positive for fatigue. Negative for chills and fever.   HENT: Negative for trouble swallowing.   Eyes: Negative for visual disturbance.   Respiratory: Positive for cough, shortness of breath (mild) and wheezing.   Cardiovascular: Positive for chest pain (heaviness). Negative for leg swelling.   Gastrointestinal: Negative for vomiting.   Genitourinary: Negative for hematuria.   Musculoskeletal: Negative for neck stiffness.   Skin: Negative for rash.   Neurological: Negative for syncope.      PAST HISTORY:     Past Medical History   Diagnosis Date    Diabetes mellitus type II     Essential hypertension, benign     Hyperlipidemia     Hypothyroidism     Undiagnosed cardiac murmurs        Past Surgical History   Procedure Laterality Date    Thyroid surgery      Colon surgery       "lower intestines removed"       Family History   Problem Relation Age of Onset    Cancer Father        Social History     Social History    Marital status:      Spouse name: N/A    Number of children: N/A    Years of education: N/A     Social History Main Topics    Smoking status: Current Some Day Smoker     Types: Cigars    Smokeless tobacco: None      Comment: cigars "every other week or so"    Alcohol use 3.5 oz/week     7 Standard drinks or " equivalent per week    Drug use: Yes     Special: Marijuana    Sexual activity: Yes     Partners: Female     Other Topics Concern    None     Social History Narrative       MEDICATIONS & ALLERGIES:     No current facility-administered medications on file prior to encounter.      Current Outpatient Prescriptions on File Prior to Encounter   Medication Sig Dispense Refill    LEVOTHYROXINE SODIUM (SYNTHROID ORAL) Take by mouth.      losartan (COZAAR) 50 MG tablet Take 1 tablet (50 mg total) by mouth once daily. 30 tablet 0    METFORMIN HCL (METFORMIN ORAL) Take by mouth.      metoprolol succinate (TOPROL XL) 50 MG 24 hr tablet Take 1 tablet (50 mg total) by mouth once daily. 30 tablet 0    cyanocobalamin 1,000 mcg/mL injection Inject 1 mL (1000 mcg total) into the muscle once daily on 9/7, 9/8, and 9/9. Then inject 1 mL into the muscle weekly on 9/16, 9/23, 9/30. Then inject 1 mL into the muscle once a month starting on 10/7. 18 mL 0    folic acid (FOLVITE) 1 MG tablet Take 1 tablet (1 mg total) by mouth once daily. 30 tablet 11    simvastatin (ZOCOR) 10 MG tablet Take 1 tablet (10 mg total) by mouth every evening. 30 tablet 11   Physical exam:   General: NAD, conversant  HEENT: Sclera anicteric EOMI, OP clear  Neck: JVD to midneck, +HJR  CV: RRR, split S2, 3/6 HSM murmur at apex  Pulm: Bibasilar rales  GI: Abdomen soft, NTND, +BS  Extremities: 1+ BLE edema, warm with palpable pulses  Skin: No ecchymosis, erythema, or ulcers  Psych: AOx3, appropriate affect  Neuro: CNII-XII intact, strength 5/5 throughout    Review of patient's allergies indicates:  No Known Allergies    OBJECTIVE:     Vital Signs:  Temp:  [97.7 °F (36.5 °C)-97.8 °F (36.6 °C)] 97.7 °F (36.5 °C)  Pulse:  [83-87] 83  Resp:  [18] 18  SpO2:  [97 %-99 %] 99 %  BP: (115-127)/(72-86) 127/86  Body mass index is 31.52 kg/(m^2).          Laboratory  Lab Results   Component Value Date    WBC 5.21 01/16/2017    HGB 13.5 (L) 01/16/2017    HCT 39.1 (L)  01/16/2017    MCV 89 01/16/2017     01/16/2017       Recent Labs  Lab 01/16/17  1143   *      K 5.6*   *   CO2 20*   BUN 19   CREATININE 1.3   CALCIUM 8.2*     No results found for: INR, PROTIME  No results found for: HGBA1C  No results for input(s): POCTGLUCOSE in the last 72 hours.    Diagnostic Results:  CXR: pulmonary edema     ASSESSMENT & PLAN:     Current Problems List:  Active Hospital Problems    Diagnosis  POA    Acute on chronic combined systolic and diastolic congestive heart failure [I50.43]  Yes    Non-rheumatic mitral regurgitation [I34.0]  Yes      Resolved Hospital Problems    Diagnosis Date Resolved POA   No resolved problems to display.   CHF Exacerbation  - proBNP: 556  - repeat TTE with color flow doppler   - Lasix IV BID 40mg   - intake and output  - daily weights  - CXR: pulm edema   - cont beta blocker  - hold ARB due to hyperkalemia    Diabetes mellitus  - POCT q 4hrs  - Insulin sliding scale  - Hemoglobin A1c    HTN  Cont Toprol XL  - hold ARB due to hyperkalemia  - BP well controlled     Hyperkalemia  Most likely iatrogenic from ARB use  - IV Lasix given in the ER  - repeat BMP    Hypothyroidism  Repeat TSH  No dosage documented, will start at last dose of 125mcg in the morning     DVT/GI ppx   - lovenox SQ  - Cardiac diet with low salt       HIGH RISK CONDITION(S):  CHF exacerbation     Problem Assessment & Treatment Plan:            Signing Physician:  Milton Lopez MD

## 2017-01-17 NOTE — PROGRESS NOTES
Order to d/c home today. Telemetry d/c. Saline lock removed. Discharge instructions given to pt. Pt verbalizes understanding. Pt discharged from OBS unit ambulating. All personal belongings taken home by pt.

## 2018-07-16 ENCOUNTER — HOSPITAL ENCOUNTER (INPATIENT)
Facility: HOSPITAL | Age: 53
LOS: 5 days | Discharge: HOME | DRG: 286 | End: 2018-07-21
Attending: EMERGENCY MEDICINE | Admitting: HOSPITALIST
Payer: MEDICAID

## 2018-07-16 DIAGNOSIS — E11.29 TYPE 2 DIABETES MELLITUS WITH MICROALBUMINURIA, WITHOUT LONG-TERM CURRENT USE OF INSULIN: Chronic | ICD-10-CM

## 2018-07-16 DIAGNOSIS — I47.29 NSVT (NONSUSTAINED VENTRICULAR TACHYCARDIA): ICD-10-CM

## 2018-07-16 DIAGNOSIS — I34.0 NON-RHEUMATIC MITRAL REGURGITATION: ICD-10-CM

## 2018-07-16 DIAGNOSIS — D63.8 ANEMIA OF CHRONIC DISEASE: ICD-10-CM

## 2018-07-16 DIAGNOSIS — R06.02 SOB (SHORTNESS OF BREATH): ICD-10-CM

## 2018-07-16 DIAGNOSIS — I50.9 ACUTE ON CHRONIC CONGESTIVE HEART FAILURE, UNSPECIFIED HEART FAILURE TYPE: Primary | ICD-10-CM

## 2018-07-16 DIAGNOSIS — R80.8 OTHER PROTEINURIA: ICD-10-CM

## 2018-07-16 DIAGNOSIS — D53.1 MEGALOBLASTIC ANEMIA DUE TO VITAMIN B12 DEFICIENCY: ICD-10-CM

## 2018-07-16 DIAGNOSIS — I50.20 HEART FAILURE WITH REDUCED EJECTION FRACTION: ICD-10-CM

## 2018-07-16 DIAGNOSIS — N17.9 AKI (ACUTE KIDNEY INJURY): ICD-10-CM

## 2018-07-16 DIAGNOSIS — F19.90 SUBSTANCE USE DISORDER: ICD-10-CM

## 2018-07-16 DIAGNOSIS — I10 ESSENTIAL (PRIMARY) HYPERTENSION: ICD-10-CM

## 2018-07-16 DIAGNOSIS — R80.9 TYPE 2 DIABETES MELLITUS WITH MICROALBUMINURIA, WITHOUT LONG-TERM CURRENT USE OF INSULIN: Chronic | ICD-10-CM

## 2018-07-16 DIAGNOSIS — R06.02 SHORTNESS OF BREATH: ICD-10-CM

## 2018-07-16 DIAGNOSIS — E89.0 H/O THYROIDECTOMY: ICD-10-CM

## 2018-07-16 DIAGNOSIS — I10 ESSENTIAL HYPERTENSION: ICD-10-CM

## 2018-07-16 DIAGNOSIS — I50.43 ACUTE ON CHRONIC COMBINED SYSTOLIC AND DIASTOLIC CONGESTIVE HEART FAILURE: ICD-10-CM

## 2018-07-16 DIAGNOSIS — E89.0 POSTOPERATIVE HYPOTHYROIDISM: Chronic | ICD-10-CM

## 2018-07-16 LAB
ALBUMIN SERPL BCP-MCNC: 2.9 G/DL
ALP SERPL-CCNC: 150 U/L
ALT SERPL W/O P-5'-P-CCNC: 74 U/L
ANION GAP SERPL CALC-SCNC: 8 MMOL/L
AST SERPL-CCNC: 61 U/L
BASOPHILS # BLD AUTO: 0.06 K/UL
BASOPHILS NFR BLD: 1.1 %
BILIRUB SERPL-MCNC: 0.9 MG/DL
BNP SERPL-MCNC: 2790 PG/ML
BUN SERPL-MCNC: 33 MG/DL
CALCIUM SERPL-MCNC: 8.3 MG/DL
CHLORIDE SERPL-SCNC: 112 MMOL/L
CO2 SERPL-SCNC: 16 MMOL/L
CREAT SERPL-MCNC: 2.3 MG/DL
DIFFERENTIAL METHOD: ABNORMAL
EOSINOPHIL # BLD AUTO: 0.2 K/UL
EOSINOPHIL NFR BLD: 4.6 %
ERYTHROCYTE [DISTWIDTH] IN BLOOD BY AUTOMATED COUNT: 15 %
EST. GFR  (AFRICAN AMERICAN): 35.9 ML/MIN/1.73 M^2
EST. GFR  (NON AFRICAN AMERICAN): 31 ML/MIN/1.73 M^2
ESTIMATED AVG GLUCOSE: 206 MG/DL
GLUCOSE SERPL-MCNC: 337 MG/DL
HBA1C MFR BLD HPLC: 8.8 %
HCT VFR BLD AUTO: 41.8 %
HGB BLD-MCNC: 13.6 G/DL
IMM GRANULOCYTES # BLD AUTO: 0.01 K/UL
IMM GRANULOCYTES NFR BLD AUTO: 0.2 %
INR PPP: 1.2
LYMPHOCYTES # BLD AUTO: 0.6 K/UL
LYMPHOCYTES NFR BLD: 11.6 %
MAGNESIUM SERPL-MCNC: 1.8 MG/DL
MAGNESIUM SERPL-MCNC: 2 MG/DL
MCH RBC QN AUTO: 27.4 PG
MCHC RBC AUTO-ENTMCNC: 32.5 G/DL
MCV RBC AUTO: 84 FL
MONOCYTES # BLD AUTO: 0.8 K/UL
MONOCYTES NFR BLD: 14.5 %
NEUTROPHILS # BLD AUTO: 3.6 K/UL
NEUTROPHILS NFR BLD: 68 %
NRBC BLD-RTO: 0 /100 WBC
PLATELET # BLD AUTO: 155 K/UL
PMV BLD AUTO: 12.3 FL
POCT GLUCOSE: 231 MG/DL (ref 70–110)
POTASSIUM SERPL-SCNC: 5.2 MMOL/L
PROT SERPL-MCNC: 6.4 G/DL
PROTHROMBIN TIME: 11.9 SEC
RBC # BLD AUTO: 4.97 M/UL
SODIUM SERPL-SCNC: 136 MMOL/L
TROPONIN I SERPL DL<=0.01 NG/ML-MCNC: 0.04 NG/ML
TROPONIN I SERPL DL<=0.01 NG/ML-MCNC: 0.05 NG/ML
TSH SERPL DL<=0.005 MIU/L-ACNC: 3.93 UIU/ML
WBC # BLD AUTO: 5.25 K/UL

## 2018-07-16 PROCEDURE — 83036 HEMOGLOBIN GLYCOSYLATED A1C: CPT

## 2018-07-16 PROCEDURE — 99223 1ST HOSP IP/OBS HIGH 75: CPT | Mod: ,,, | Performed by: NURSE PRACTITIONER

## 2018-07-16 PROCEDURE — 63600175 PHARM REV CODE 636 W HCPCS: Performed by: EMERGENCY MEDICINE

## 2018-07-16 PROCEDURE — 84443 ASSAY THYROID STIM HORMONE: CPT

## 2018-07-16 PROCEDURE — A4216 STERILE WATER/SALINE, 10 ML: HCPCS | Performed by: NURSE PRACTITIONER

## 2018-07-16 PROCEDURE — 36415 COLL VENOUS BLD VENIPUNCTURE: CPT

## 2018-07-16 PROCEDURE — 99285 EMERGENCY DEPT VISIT HI MDM: CPT | Mod: ,,, | Performed by: EMERGENCY MEDICINE

## 2018-07-16 PROCEDURE — 84484 ASSAY OF TROPONIN QUANT: CPT

## 2018-07-16 PROCEDURE — 83735 ASSAY OF MAGNESIUM: CPT | Mod: 91

## 2018-07-16 PROCEDURE — 85025 COMPLETE CBC W/AUTO DIFF WBC: CPT

## 2018-07-16 PROCEDURE — 25000003 PHARM REV CODE 250: Performed by: NURSE PRACTITIONER

## 2018-07-16 PROCEDURE — 80053 COMPREHEN METABOLIC PANEL: CPT

## 2018-07-16 PROCEDURE — 25000003 PHARM REV CODE 250: Performed by: EMERGENCY MEDICINE

## 2018-07-16 PROCEDURE — 63600175 PHARM REV CODE 636 W HCPCS: Performed by: NURSE PRACTITIONER

## 2018-07-16 PROCEDURE — 99233 SBSQ HOSP IP/OBS HIGH 50: CPT | Mod: ,,, | Performed by: INTERNAL MEDICINE

## 2018-07-16 PROCEDURE — 85610 PROTHROMBIN TIME: CPT

## 2018-07-16 PROCEDURE — 83880 ASSAY OF NATRIURETIC PEPTIDE: CPT

## 2018-07-16 PROCEDURE — 96374 THER/PROPH/DIAG INJ IV PUSH: CPT

## 2018-07-16 PROCEDURE — 99285 EMERGENCY DEPT VISIT HI MDM: CPT | Mod: 25

## 2018-07-16 PROCEDURE — 93005 ELECTROCARDIOGRAM TRACING: CPT

## 2018-07-16 PROCEDURE — 84484 ASSAY OF TROPONIN QUANT: CPT | Mod: 91

## 2018-07-16 PROCEDURE — 20600001 HC STEP DOWN PRIVATE ROOM

## 2018-07-16 PROCEDURE — 93010 ELECTROCARDIOGRAM REPORT: CPT | Mod: ,,, | Performed by: INTERNAL MEDICINE

## 2018-07-16 PROCEDURE — 83735 ASSAY OF MAGNESIUM: CPT

## 2018-07-16 RX ORDER — SODIUM CHLORIDE 0.9 % (FLUSH) 0.9 %
3 SYRINGE (ML) INJECTION EVERY 8 HOURS
Status: DISCONTINUED | OUTPATIENT
Start: 2018-07-16 | End: 2018-07-21 | Stop reason: HOSPADM

## 2018-07-16 RX ORDER — INSULIN ASPART 100 [IU]/ML
1-10 INJECTION, SOLUTION INTRAVENOUS; SUBCUTANEOUS
Status: DISCONTINUED | OUTPATIENT
Start: 2018-07-16 | End: 2018-07-21 | Stop reason: HOSPADM

## 2018-07-16 RX ORDER — NAPROXEN SODIUM 220 MG
220 TABLET ORAL
Status: ON HOLD | COMMUNITY
End: 2018-07-21 | Stop reason: HOSPADM

## 2018-07-16 RX ORDER — FUROSEMIDE 10 MG/ML
80 INJECTION INTRAMUSCULAR; INTRAVENOUS
Status: COMPLETED | OUTPATIENT
Start: 2018-07-16 | End: 2018-07-16

## 2018-07-16 RX ORDER — AMOXICILLIN 250 MG
1 CAPSULE ORAL 2 TIMES DAILY
Status: DISCONTINUED | OUTPATIENT
Start: 2018-07-16 | End: 2018-07-21 | Stop reason: HOSPADM

## 2018-07-16 RX ORDER — ASPIRIN 325 MG
325 TABLET ORAL
Status: COMPLETED | OUTPATIENT
Start: 2018-07-16 | End: 2018-07-16

## 2018-07-16 RX ORDER — METOPROLOL SUCCINATE 50 MG/1
50 TABLET, EXTENDED RELEASE ORAL DAILY
Status: DISCONTINUED | OUTPATIENT
Start: 2018-07-17 | End: 2018-07-18

## 2018-07-16 RX ORDER — IBUPROFEN 200 MG
24 TABLET ORAL
Status: DISCONTINUED | OUTPATIENT
Start: 2018-07-16 | End: 2018-07-21 | Stop reason: HOSPADM

## 2018-07-16 RX ORDER — GLUCAGON 1 MG
1 KIT INJECTION
Status: DISCONTINUED | OUTPATIENT
Start: 2018-07-16 | End: 2018-07-21 | Stop reason: HOSPADM

## 2018-07-16 RX ORDER — FUROSEMIDE 10 MG/ML
80 INJECTION INTRAMUSCULAR; INTRAVENOUS 3 TIMES DAILY
Status: DISCONTINUED | OUTPATIENT
Start: 2018-07-16 | End: 2018-07-19

## 2018-07-16 RX ORDER — IBUPROFEN 200 MG
16 TABLET ORAL
Status: DISCONTINUED | OUTPATIENT
Start: 2018-07-16 | End: 2018-07-21 | Stop reason: HOSPADM

## 2018-07-16 RX ADMIN — SODIUM CHLORIDE, PRESERVATIVE FREE 3 ML: 5 INJECTION INTRAVENOUS at 09:07

## 2018-07-16 RX ADMIN — FUROSEMIDE 80 MG: 10 INJECTION, SOLUTION INTRAMUSCULAR; INTRAVENOUS at 12:07

## 2018-07-16 RX ADMIN — ASPIRIN 325 MG ORAL TABLET 325 MG: 325 PILL ORAL at 01:07

## 2018-07-16 RX ADMIN — INSULIN ASPART 2 UNITS: 100 INJECTION, SOLUTION INTRAVENOUS; SUBCUTANEOUS at 09:07

## 2018-07-16 RX ADMIN — FUROSEMIDE 80 MG: 10 INJECTION, SOLUTION INTRAMUSCULAR; INTRAVENOUS at 09:07

## 2018-07-16 RX ADMIN — STANDARDIZED SENNA CONCENTRATE AND DOCUSATE SODIUM 1 TABLET: 8.6; 5 TABLET, FILM COATED ORAL at 09:07

## 2018-07-16 RX ADMIN — FUROSEMIDE 80 MG: 10 INJECTION, SOLUTION INTRAMUSCULAR; INTRAVENOUS at 04:07

## 2018-07-16 NOTE — ASSESSMENT & PLAN NOTE
53 y.o M with a PMhx of HFrEF (35-40%), T2DM, HTN, and hypothyroidism that presents today with a 1 month history of JEROME, orthopnea and PND with associated worsening BL LE edema over the weekend. Clinical, imaging and lab findings consistent with ADHF. Patient currently hemodynamically stable without evidence of cardiogenic shock. Suggest admission for IM-C/K with telemetry for repeat echo and IV diuresis. Patient with known moderate MR that may be worsening and contributing to right sided failure / overload. Elevated LFTs likely 2/2 to hepatic congestion, KEYANNA likely cardiorenal and suspect improvement with volume optimization. Mildly elevated troponin likely in setting of ADHF and renal dysfunction.    Plan  - Admit to IM-C / J for IV diuresis  - Repeat 2D echo w/ CFD  - Would refer to HTS upon discharge for outpatient follow-up and if EF < 35%, would refer to EP for outpatient ICD placement  - Hold ARB in setting of KEYANNA on CKD    Patient seen and discussed with Dr. Sweeney

## 2018-07-16 NOTE — SUBJECTIVE & OBJECTIVE
"Past Medical History:   Diagnosis Date    Diabetes mellitus type II     Essential hypertension, benign     Hyperlipidemia     Hypothyroidism     Undiagnosed cardiac murmurs        Past Surgical History:   Procedure Laterality Date    COLON SURGERY      "lower intestines removed"    THYROID SURGERY         Review of patient's allergies indicates:  No Known Allergies    No current facility-administered medications on file prior to encounter.      Current Outpatient Prescriptions on File Prior to Encounter   Medication Sig    LEVOTHYROXINE SODIUM (SYNTHROID ORAL) Take by mouth.    losartan (COZAAR) 25 MG tablet Take 1 tablet (25 mg total) by mouth once daily.    METFORMIN HCL (METFORMIN ORAL) Take by mouth.    metoprolol succinate (TOPROL XL) 50 MG 24 hr tablet Take 1 tablet (50 mg total) by mouth once daily.    cyanocobalamin 1,000 mcg/mL injection Inject 1 mL (1000 mcg total) into the muscle once daily on 9/7, 9/8, and 9/9. Then inject 1 mL into the muscle weekly on 9/16, 9/23, 9/30. Then inject 1 mL into the muscle once a month starting on 10/7.    folic acid (FOLVITE) 1 MG tablet Take 1 tablet (1 mg total) by mouth once daily.    furosemide (LASIX) 40 MG tablet Take 1 tablet (40 mg total) by mouth once daily.    simvastatin (ZOCOR) 10 MG tablet Take 1 tablet (10 mg total) by mouth every evening.     Family History     Problem Relation (Age of Onset)    Cancer Father        Social History Main Topics    Smoking status: Current Some Day Smoker     Types: Cigars    Smokeless tobacco: Not on file      Comment: cigars "every other week or so"    Alcohol use 3.6 oz/week     6 Cans of beer per week    Drug use: Yes     Types: Marijuana    Sexual activity: Yes     Partners: Female     Review of Systems   Constitution: Negative for chills and fever.   HENT: Negative for hoarse voice and sore throat.    Cardiovascular: Positive for dyspnea on exertion, leg swelling, orthopnea and paroxysmal nocturnal " dyspnea. Negative for chest pain, claudication, cyanosis, irregular heartbeat, near-syncope, palpitations and syncope.   Respiratory: Negative for cough, hemoptysis and shortness of breath.    Musculoskeletal: Negative for back pain, joint pain and joint swelling.   Gastrointestinal: Negative for abdominal pain, constipation, diarrhea, hematochezia, melena, nausea and vomiting.   Genitourinary: Negative for dysuria, hematuria and incomplete emptying.   Neurological: Negative for dizziness, headaches and light-headedness.   Psychiatric/Behavioral: Negative for altered mental status, depression and suicidal ideas. The patient is not nervous/anxious.      Objective:     Vital Signs (Most Recent):  Temp: 97.8 °F (36.6 °C) (07/16/18 1051)  Pulse: 79 (07/16/18 1327)  Resp: (!) 24 (07/16/18 1228)  BP: (!) 123/94 (07/16/18 1327)  SpO2: 99 % (07/16/18 1327) Vital Signs (24h Range):  Temp:  [97.8 °F (36.6 °C)] 97.8 °F (36.6 °C)  Pulse:  [76-86] 79  Resp:  [20-24] 24  SpO2:  [99 %-100 %] 99 %  BP: (105-125)/(75-94) 123/94     Weight: 81.2 kg (179 lb 0.2 oz)  Body mass index is 24.97 kg/m².    SpO2: 99 %       No intake or output data in the 24 hours ending 07/16/18 1435    Lines/Drains/Airways     Peripheral Intravenous Line                 Peripheral IV - Single Lumen 07/16/18 1137 Left Forearm less than 1 day                Physical Exam   Constitutional: He is oriented to person, place, and time. He appears well-developed and well-nourished. No distress.   HENT:   Head: Normocephalic and atraumatic.   Mouth/Throat: Oropharynx is clear and moist. No oropharyngeal exudate.   Eyes: Conjunctivae and EOM are normal. Pupils are equal, round, and reactive to light. Right eye exhibits no discharge. Left eye exhibits no discharge. No scleral icterus.   Neck: Normal range of motion. Neck supple. JVD present. No tracheal deviation present. No thyromegaly present.   Cardiovascular: Normal rate, regular rhythm and intact distal pulses.   Exam reveals gallop (+S3, S4). Exam reveals no friction rub.    Murmur (Systolic murmur in apex) heard.  Pulmonary/Chest: Effort normal. No respiratory distress. He has no wheezes. He has rales (Diffuse rales throughout BL lung fields).   Abdominal: Soft. Bowel sounds are normal. He exhibits no distension and no mass. There is no tenderness. There is no rebound and no guarding.   Musculoskeletal: Normal range of motion. He exhibits edema (2+ BL LE edema, warm and well perfused).   Lymphadenopathy:     He has no cervical adenopathy.   Neurological: He is alert and oriented to person, place, and time. No cranial nerve deficit.   Skin: Skin is warm and dry. He is not diaphoretic.   Psychiatric: He has a normal mood and affect. His behavior is normal. Judgment and thought content normal.   Nursing note and vitals reviewed.      Significant Labs:   CMP   Recent Labs  Lab 07/16/18  1137      K 5.2*   *   CO2 16*   *   BUN 33*   CREATININE 2.3*   CALCIUM 8.3*   PROT 6.4   ALBUMIN 2.9*   BILITOT 0.9   ALKPHOS 150*   AST 61*   ALT 74*   ANIONGAP 8   ESTGFRAFRICA 35.9*   EGFRNONAA 31.0*   , INR   Recent Labs  Lab 07/16/18  1137   INR 1.2    and Troponin   Recent Labs  Lab 07/16/18  1137   TROPONINI 0.047*       Significant Imaging: EKG: NSR with PVCs and X-Ray: CXR:  1. Coarse interstitial attenuation, suggesting congestive change/early edema, no convincing large focal consolidation.  There may be a small left pleural effusion.  2. Nodular focus projected over the right upper lung zone, could reflect granuloma although nonspecific.  Correlation advised.  Consider PA and lateral radiograph for further evaluation.

## 2018-07-16 NOTE — PROVIDER PROGRESS NOTES - EMERGENCY DEPT.
Encounter Date: 7/16/2018    ED Physician Progress Notes         EKG - STEMI Decision  Initial Reading: No STEMI present.  Response: No Action Needed.

## 2018-07-16 NOTE — PLAN OF CARE
Problem: Patient Care Overview  Goal: Plan of Care Review  Outcome: Ongoing (interventions implemented as appropriate)  Plan of care reviewed and updated. Pt AA+O. Pt's pain is managed with the medication ordered at this time. Pt's VS are as charted.  No falls this shift. Pt is oriented to room and call system. Will continue to [facility].

## 2018-07-16 NOTE — HPI
53 y.o M with a PMhx of HFrEF (35-40%), T2DM, HTN, and hypothyroidism that presents today with a 1 month history of JEROME, orthopnea and PND with associated worsening BL LE edema over the weekend.    Patient was initially diagnosed with HFrEF in 2015 and most recently presented with ADHF in Jan 2017 and required 1 day of IV Lasix with improvement of sx. He reports his sx have been getting worse over the last month and reports he has not been following a heart failure diet but has been compliant with his medications.     He denies chest pain, palpitations, lightheadedness, syncope.   Does not follow regularly with a cardiologist.

## 2018-07-16 NOTE — ED NOTES
Patient identifiers verified and correct for Ashish Mckeon.    LOC: The patient is awake, alert and aware of environment with an appropriate affect, the patient is oriented x 3 and speaking appropriately.  APPEARANCE: Patient resting comfortably and in no acute distress, patient is clean and well groomed, patient's clothing is properly fastened.  SKIN: The skin is warm and dry, color consistent with ethnicity, patient has normal skin turgor and moist mucus membranes, skin intact, no breakdown or bruising noted.  MUSCULOSKELETAL: Patient moving all extremities spontaneously, no obvious swelling or deformities noted.  RESPIRATORY: Airway is open and patent, respirations are spontaneous, patient has a normal effort and increased rate, no accessory muscle use noted, bilateral breath sounds clear  CARDIAC: Patient has a normal rate, 3+ pitting edema to BLE, capillary refill < 3 seconds.  ABDOMEN: Soft and non tender to palpation, no distention noted, active bowel sounds present in all four quadrants.  NEUROLOGIC: PERRL, 3mm bilaterally, eyes open spontaneously, behavior appropriate to situation, follows commands, facial expression symmetrical, bilateral hand grasp equal and even, purposeful motor response noted, normal sensation in all extremities when touched with a finger.

## 2018-07-16 NOTE — ED NOTES
"Patient reporting increased SOB for 3 weeks. Patient reports it's worse at night, and this past week, "it just stays." Patient reports increased swelling to BLE for the past week. Reports he feels more tired.  Patient reports hx of chf. Denies chest pain.  Reports productive cough with clear sputum.   "

## 2018-07-16 NOTE — ED PROVIDER NOTES
"Encounter Date: 7/16/2018       History     Chief Complaint   Patient presents with    Shortness of Breath     hx chf     HPI   Patient is a 53 y.o. male with past medical history of CHF (last EF 35-40% in 1/2017), DM, HTN, HLD, hypothyroidism presenting with SOB. Patient reports that for the last 3 weeks he has noticed worsening orthopnea, SOB with exertion and swelling of bilateral lower extremities. He also reports a productive cough during this time. Symptoms have gradually been worsening over the last 3 weeks. Patient denies any associated chest pain, hemoptysis, dominal pain, nausea/vomiting during this time. Reports he has been taking all his medications as prescribed including lasix 40mg daily. Denies any recent changes in diet, drug use, tobacco use. Drinks about a 6 pack of beer weekly.     Review of patient's allergies indicates:  No Known Allergies  Past Medical History:   Diagnosis Date    Diabetes mellitus type II     Essential hypertension, benign     Hyperlipidemia     Hypothyroidism     Undiagnosed cardiac murmurs      Past Surgical History:   Procedure Laterality Date    COLON SURGERY      "lower intestines removed"    THYROID SURGERY       Family History   Problem Relation Age of Onset    Cancer Father      Social History   Substance Use Topics    Smoking status: Current Some Day Smoker     Types: Cigars    Smokeless tobacco: Not on file      Comment: cigars "every other week or so"    Alcohol use 3.6 oz/week     6 Cans of beer per week     Review of Systems   Constitutional: Negative for fatigue, fever and unexpected weight change.   HENT: Negative for rhinorrhea, sinus pain and sore throat.    Respiratory: Positive for cough and shortness of breath. Negative for chest tightness.    Cardiovascular: Positive for leg swelling. Negative for chest pain and palpitations.   Gastrointestinal: Positive for constipation. Negative for abdominal pain and nausea.   Genitourinary: Negative for " dysuria.   Musculoskeletal: Negative for back pain, gait problem and neck pain.   Skin: Negative for pallor, rash and wound.   Neurological: Negative for seizures, syncope and weakness.   Hematological: Does not bruise/bleed easily.   All other systems reviewed and are negative.      Physical Exam     Initial Vitals [07/16/18 1051]   BP Pulse Resp Temp SpO2   105/75 86 20 97.8 °F (36.6 °C) 99 %      MAP       --         Physical Exam    Nursing note and vitals reviewed.  Constitutional: He appears well-developed and well-nourished. No distress.   HENT:   Head: Normocephalic and atraumatic.   Mouth/Throat: Oropharynx is clear and moist.   Eyes: Conjunctivae and EOM are normal. Pupils are equal, round, and reactive to light.   Neck: Normal range of motion. Neck supple.   Cardiovascular: Normal rate, regular rhythm, normal heart sounds and intact distal pulses.   Pulmonary/Chest: No respiratory distress. He has decreased breath sounds in the right lower field and the left lower field. He has no wheezes. He has rhonchi in the right lower field and the left lower field.   Abdominal: Soft. Normal appearance and bowel sounds are normal. He exhibits no distension. There is no tenderness. There is no rigidity and no guarding.   Vertical midline incision, well healed   Musculoskeletal: Normal range of motion.   Bilateral 2+ pitting edema extending to knees   Neurological: He is alert and oriented to person, place, and time. He has normal strength.         ED Course   Procedures  Labs Reviewed   CBC W/ AUTO DIFFERENTIAL - Abnormal; Notable for the following:        Result Value    Hemoglobin 13.6 (*)     RDW 15.0 (*)     Lymph # 0.6 (*)     Lymph% 11.6 (*)     All other components within normal limits   COMPREHENSIVE METABOLIC PANEL - Abnormal; Notable for the following:     Potassium 5.2 (*)     Chloride 112 (*)     CO2 16 (*)     Glucose 337 (*)     BUN, Bld 33 (*)     Creatinine 2.3 (*)     Calcium 8.3 (*)     Albumin 2.9  (*)     Alkaline Phosphatase 150 (*)     AST 61 (*)     ALT 74 (*)     eGFR if  35.9 (*)     eGFR if non  31.0 (*)     All other components within normal limits   TROPONIN I - Abnormal; Notable for the following:     Troponin I 0.047 (*)     All other components within normal limits   B-TYPE NATRIURETIC PEPTIDE - Abnormal; Notable for the following:     BNP 2,790 (*)     All other components within normal limits   PROTIME-INR   TSH   MAGNESIUM     EKG Readings: (Independently Interpreted)   Initial Reading: No STEMI. Previous EKG: Compared with most recent EKG Previous EKG Date: 1/16/2017. Rhythm: Normal Sinus Rhythm. Heart Rate: 84 bpm.   No changes when compared to prior exam       Imaging Results          X-Ray Chest AP Portable (Final result)  Result time 07/16/18 12:52:09    Final result by Chacorta Belcher MD (07/16/18 12:52:09)                 Impression:      1. Coarse interstitial attenuation, suggesting congestive change/early edema, no convincing large focal consolidation.  There may be a small left pleural effusion.  2. Nodular focus projected over the right upper lung zone, could reflect granuloma although nonspecific.  Correlation advised.  Consider PA and lateral radiograph for further evaluation.      Electronically signed by: Chacorta Belcher MD  Date:    07/16/2018  Time:    12:52             Narrative:    EXAMINATION:  XR CHEST AP PORTABLE    CLINICAL HISTORY:  CHF;    TECHNIQUE:  Single frontal view of the chest was performed.    COMPARISON:  01/16/2017    FINDINGS:  The cardiomediastinal silhouette is enlarged, similar in appearance to the previous examination..  There there is slight obscuration of the left costophrenic angle, could reflect mild effusion..  The trachea is midline.  The lungs are symmetrically expanded bilaterally with increased interstitial and parenchymal attenuation bilaterally, suggesting congestive change/early edema.  There is a rounded  calcific appearing focus projected over the right upper lung zone, could reflect granuloma or nonspecific nodule, not confidently identified on the previous exam, could reflect artifact..  No large focal consolidation seen.  There is no pneumothorax.  The osseous structures are remarkable for degenerative change noting dextroscoliotic curvature of the midthoracic spine.  There is remote left rib injury..                              X-Rays:   Independently Interpreted Readings:   Chest X-Ray: Cardiomegaly present.  Increased vascular markings consistent with CHF are present.     Medical Decision Making:   Initial Assessment:   HO2 MDM  Patient is a 53 y.o. Male with history of CHF (last EF 35-40%), hypothyroidism, HLD, HTN presenting with 3 weeks of worsening orthopnea, SOB with exertion, productive cough and bilateral lower extremity edema. VSS on arrival, blood pressures low 100s/60s. Exam significant for decreased breath sounds with intermittent crackles in bilateral lower lobes, 2+ pitting edema.   Bedside ultrasound demonstrates grossly reduced EF w/ no RV dilation or septal bowing, 2-3 B-lines per field on bilateral lung views.   Differential Diagnosis:   Differential includes but is not not limited to CHF exacerbation, cardiogenic shock, hypothyroidism, ACS, dependent edema, bronchitis, pneumonia, pneumothorax. I have considered but do not suspect PE at this time due to nature of symptoms/worsening SOB with exertion and lying flat which is more consistent with CHF. Will order CBC, CMP, troponin, BNP, CXR and TSH and give 80mg IV Lasix x1. Dispo pending results, anticipate patient will require admission for diuresis.   Kyra Lozano MD  [facility], -2  12:05 PM, 7/16/18  ED Management:  \[facility] [place] [facility]\"" Update  Labs pertinent for elevated troponin 0.047, BNP 2,790 , Cr 2.3 (last baseline on file 1.3) and LFTs slightly elevated as shown above. Patient was given 325mg ASA x1. Per discussion  with cardiology as shown below, patient will be admitted to inpatient medicine for CHF exacerbation requiring diuresis. Attending is Dr. Bassett. Discussed plan of care with patient, he verbalized understanding.   Kyra Lozano MD  [facility], HO-2  1:46 PM, 7/16/18      Other:   I have discussed this case with another health care provider.       <> Summary of the Discussion: Discussed case with cardiology fellow on call due to concern for potential development of cardiogenic shock. They will come to the ED to evaluate patient, believe that he is stable at this time and recommend admission to medicine inpatient.     Additional MDM:   Heart Score:    History:          Slightly suspicious.  ECG:             Normal  Age:               45-65 years  Risk factors: >= 3 risk factors or history of atherosclerotic disease  Troponin:       >2x normal limit  Final Score: 5               Attending Attestation:   Physician Attestation Statement for Resident:  As the supervising MD   Physician Attestation Statement: I have personally seen and examined this patient.   I agree with the above history. -:   As the supervising MD I agree with the above PE.    As the supervising MD I agree with the above treatment, course, plan, and disposition.  I have reviewed and agree with the residents interpretation of the following: lab data, x-rays and EKG.  I have reviewed the following: old records at this facility.            Attending ED Notes:   53y M with history of CHF significantly worsened symptoms. Does report compliance with medications. Clinically volume overloaded. Diuresis initiated in the ED. Lab work concerning for acute decompensated heart failure. Cardiology consulted. Patient stable for admission to floor.              Clinical Impression:   The primary encounter diagnosis was Acute on chronic congestive heart failure, unspecified heart failure type. Diagnoses of SOB (shortness of breath),  Shortness of breath, KEYANNA (acute kidney injury), and Heart failure with reduced ejection fraction were also pertinent to this visit.                             Kyra Lozano MD  Resident  07/16/18 3269       Erika Baker MD  07/16/18 [id number]

## 2018-07-16 NOTE — CONSULTS
"[facility]  Cardiology  Consult Note    Patient Name: Ashish Mckeon  MRN: [id number]  Admission Date: 7/16/2018  Hospital Length of Stay: 0 days  Code Status: Prior   Attending Provider: Erika Baker MD   Consulting Provider: Jessica Henson MD  Primary Care Physician: [facility]  Principal Problem:<principal problem not specified>    Patient information was obtained from patient and past medical records.     Inpatient consult to Cardiology  Consult performed by: JESSICA HENSON  Consult ordered by: AURE ROSALES  Reason for consult: ADHF, concern for cardiogenic shock        Subjective:     Chief Complaint:  ADHF     HPI:   53 y.o M with a PMhx of HFrEF (35-40%), T2DM, HTN, and hypothyroidism that presents today with a 1 month history of JEROME, orthopnea and PND with associated worsening BL LE edema over the weekend.    Patient was initially diagnosed with HFrEF in 2015 and most recently presented with ADHF in Jan 2017 and required 1 day of IV Lasix with improvement of sx. He reports his sx have been getting worse over the last month and reports he has not been following a heart failure diet but has been compliant with his medications.     He denies chest pain, palpitations, lightheadedness, syncope.   Does not follow regularly with a cardiologist.     Past Medical History:   Diagnosis Date    Diabetes mellitus type II     Essential hypertension, benign     Hyperlipidemia     Hypothyroidism     Undiagnosed cardiac murmurs        Past Surgical History:   Procedure Laterality Date    COLON SURGERY      "lower intestines removed"    THYROID SURGERY         Review of patient's allergies indicates:  No Known Allergies    No current facility-administered medications on file prior to encounter.      Current Outpatient Prescriptions on File Prior to Encounter   Medication Sig    LEVOTHYROXINE SODIUM (SYNTHROID ORAL) Take by mouth.    losartan (COZAAR) 25 MG tablet Take 1 " "tablet (25 mg total) by mouth once daily.    METFORMIN HCL (METFORMIN ORAL) Take by mouth.    metoprolol succinate (TOPROL XL) 50 MG 24 hr tablet Take 1 tablet (50 mg total) by mouth once daily.    cyanocobalamin 1,000 mcg/mL injection Inject 1 mL (1000 mcg total) into the muscle once daily on 9/7, 9/8, and 9/9. Then inject 1 mL into the muscle weekly on 9/16, 9/23, 9/30. Then inject 1 mL into the muscle once a month starting on 10/7.    folic acid (FOLVITE) 1 MG tablet Take 1 tablet (1 mg total) by mouth once daily.    furosemide (LASIX) 40 MG tablet Take 1 tablet (40 mg total) by mouth once daily.    simvastatin (ZOCOR) 10 MG tablet Take 1 tablet (10 mg total) by mouth every evening.     Family History     Problem Relation (Age of Onset)    Cancer Father        Social History Main Topics    Smoking status: Current Some Day Smoker     Types: Cigars    Smokeless tobacco: Not on file      Comment: cigars "every other week or so"    Alcohol use 3.6 oz/week     6 Cans of beer per week    Drug use: Yes     Types: Marijuana    Sexual activity: Yes     Partners: Female     Review of Systems   Constitution: Negative for chills and fever.   HENT: Negative for hoarse voice and sore throat.    Cardiovascular: Positive for dyspnea on exertion, leg swelling, orthopnea and paroxysmal nocturnal dyspnea. Negative for chest pain, claudication, cyanosis, irregular heartbeat, near-syncope, palpitations and syncope.   Respiratory: Negative for cough, hemoptysis and shortness of breath.    Musculoskeletal: Negative for back pain, joint pain and joint swelling.   Gastrointestinal: Negative for abdominal pain, constipation, diarrhea, hematochezia, melena, nausea and vomiting.   Genitourinary: Negative for dysuria, hematuria and incomplete emptying.   Neurological: Negative for dizziness, headaches and light-headedness.   Psychiatric/Behavioral: Negative for altered mental status, depression and suicidal ideas. The patient is " not nervous/anxious.      Objective:     Vital Signs (Most Recent):  Temp: 97.8 °F (36.6 °C) (07/16/18 1051)  Pulse: 79 (07/16/18 1327)  Resp: (!) 24 (07/16/18 1228)  BP: (!) 123/94 (07/16/18 1327)  SpO2: 99 % (07/16/18 1327) Vital Signs (24h Range):  Temp:  [97.8 °F (36.6 °C)] 97.8 °F (36.6 °C)  Pulse:  [76-86] 79  Resp:  [20-24] 24  SpO2:  [99 %-100 %] 99 %  BP: (105-125)/(75-94) 123/94     Weight: 81.2 kg (179 lb 0.2 oz)  Body mass index is 24.97 kg/m².    SpO2: 99 %       No intake or output data in the 24 hours ending 07/16/18 1435    Lines/Drains/Airways     Peripheral Intravenous Line                 Peripheral IV - Single Lumen 07/16/18 1137 Left Forearm less than 1 day                Physical Exam   Constitutional: He is oriented to person, place, and time. He appears well-developed and well-nourished. No distress.   HENT:   Head: Normocephalic and atraumatic.   Mouth/Throat: Oropharynx is clear and moist. No oropharyngeal exudate.   Eyes: Conjunctivae and EOM are normal. Pupils are equal, round, and reactive to light. Right eye exhibits no discharge. Left eye exhibits no discharge. No scleral icterus.   Neck: Normal range of motion. Neck supple. JVD present. No tracheal deviation present. No thyromegaly present.   Cardiovascular: Normal rate, regular rhythm and intact distal pulses.  Exam reveals gallop (+S3, S4). Exam reveals no friction rub.    Murmur (Systolic murmur in apex) heard.  Pulmonary/Chest: Effort normal. No respiratory distress. He has no wheezes. He has rales (Diffuse rales throughout BL lung fields).   Abdominal: Soft. Bowel sounds are normal. He exhibits no distension and no mass. There is no tenderness. There is no rebound and no guarding.   Musculoskeletal: Normal range of motion. He exhibits edema (2+ BL LE edema, warm and well perfused).   Lymphadenopathy:     He has no cervical adenopathy.   Neurological: He is alert and oriented to person, place, and time. No cranial nerve deficit.    Skin: Skin is warm and dry. He is not diaphoretic.   Psychiatric: He has a normal mood and affect. His behavior is normal. Judgment and thought content normal.   Nursing note and vitals reviewed.      Significant Labs:   CMP   Recent Labs  Lab 07/16/18  1137      K 5.2*   *   CO2 16*   *   BUN 33*   CREATININE 2.3*   CALCIUM 8.3*   PROT 6.4   ALBUMIN 2.9*   BILITOT 0.9   ALKPHOS 150*   AST 61*   ALT 74*   ANIONGAP 8   ESTGFRAFRICA 35.9*   EGFRNONAA 31.0*   , INR   Recent Labs  Lab 07/16/18  1137   INR 1.2    and Troponin   Recent Labs  Lab 07/16/18  1137   TROPONINI 0.047*       Significant Imaging: EKG: NSR with PVCs and X-Ray: CXR:  1. Coarse interstitial attenuation, suggesting congestive change/early edema, no convincing large focal consolidation.  There may be a small left pleural effusion.  2. Nodular focus projected over the right upper lung zone, could reflect granuloma although nonspecific.  Correlation advised.  Consider PA and lateral radiograph for further evaluation.    Assessment and Plan:     Acute on chronic congestive heart failure    53 y.o M with a PMhx of HFrEF (35-40%), T2DM, HTN, and hypothyroidism that presents today with a 1 month history of JEROME, orthopnea and PND with associated worsening BL LE edema over the weekend. Clinical, imaging and lab findings consistent with ADHF. Patient currently hemodynamically stable without evidence of cardiogenic shock. Suggest admission for IM-C/K with telemetry for repeat echo and IV diuresis. Patient with known moderate MR that may be worsening and contributing to right sided failure / overload. Elevated LFTs likely 2/2 to hepatic congestion, KEYANNA likely cardiorenal and suspect improvement with volume optimization. Mildly elevated troponin likely in setting of ADHF and renal dysfunction.    Plan  - Admit to IM-C / J for IV diuresis  - Repeat 2D echo w/ CFD  - Would refer to [facility] upon discharge for outpatient follow-up and if EF < 35%,  would refer to EP for outpatient ICD placement  - Hold ARB in setting of KEYANNA on CKD    Patient seen and discussed with Dr. Sweeney            VTE Risk Mitigation     None          Thank you for your consult. I will sign off. Please contact us if you have any additional questions.    Carlos Hyatt MD  Cardiology   [facility]

## 2018-07-17 ENCOUNTER — DOCUMENTATION ONLY (OUTPATIENT)
Dept: CARDIOLOGY | Facility: CLINIC | Age: 53
End: 2018-07-17

## 2018-07-17 PROBLEM — N17.9 AKI (ACUTE KIDNEY INJURY): Status: ACTIVE | Noted: 2018-07-17

## 2018-07-17 PROBLEM — I10 ESSENTIAL HYPERTENSION: Status: ACTIVE | Noted: 2018-07-17

## 2018-07-17 PROBLEM — F19.90 SUBSTANCE USE DISORDER: Status: ACTIVE | Noted: 2018-07-17

## 2018-07-17 LAB
ALBUMIN SERPL BCP-MCNC: 2.7 G/DL
ALP SERPL-CCNC: 146 U/L
ALT SERPL W/O P-5'-P-CCNC: 69 U/L
ANION GAP SERPL CALC-SCNC: 9 MMOL/L
AORTIC VALVE REGURGITATION: ABNORMAL
AST SERPL-CCNC: 46 U/L
BILIRUB SERPL-MCNC: 1 MG/DL
BUN SERPL-MCNC: 33 MG/DL
CALCIUM SERPL-MCNC: 8.4 MG/DL
CHLORIDE SERPL-SCNC: 107 MMOL/L
CHOLEST SERPL-MCNC: 116 MG/DL
CHOLEST/HDLC SERPL: 4.6 {RATIO}
CO2 SERPL-SCNC: 22 MMOL/L
CREAT SERPL-MCNC: 1.8 MG/DL
EST. GFR  (AFRICAN AMERICAN): 48.3 ML/MIN/1.73 M^2
EST. GFR  (NON AFRICAN AMERICAN): 41.7 ML/MIN/1.73 M^2
ESTIMATED PA SYSTOLIC PRESSURE: 62.17
GLOBAL PERICARDIAL EFFUSION: ABNORMAL
GLUCOSE SERPL-MCNC: 183 MG/DL
HDLC SERPL-MCNC: 25 MG/DL
HDLC SERPL: 21.6 %
LDLC SERPL CALC-MCNC: 75.4 MG/DL
MITRAL VALVE MOBILITY: ABNORMAL
MITRAL VALVE REGURGITATION: ABNORMAL
NONHDLC SERPL-MCNC: 91 MG/DL
POCT GLUCOSE: 176 MG/DL (ref 70–110)
POCT GLUCOSE: 206 MG/DL (ref 70–110)
POCT GLUCOSE: 221 MG/DL (ref 70–110)
POCT GLUCOSE: 238 MG/DL (ref 70–110)
POTASSIUM SERPL-SCNC: 4 MMOL/L
PROT SERPL-MCNC: 6 G/DL
RETIRED EF AND QEF - SEE NOTES: 25 (ref 55–65)
SODIUM SERPL-SCNC: 138 MMOL/L
TRICUSPID VALVE REGURGITATION: ABNORMAL
TRIGL SERPL-MCNC: 78 MG/DL
TROPONIN I SERPL DL<=0.01 NG/ML-MCNC: 0.04 NG/ML
TROPONIN I SERPL DL<=0.01 NG/ML-MCNC: 0.06 NG/ML

## 2018-07-17 PROCEDURE — 63600175 PHARM REV CODE 636 W HCPCS: Performed by: NURSE PRACTITIONER

## 2018-07-17 PROCEDURE — 93306 TTE W/DOPPLER COMPLETE: CPT | Mod: 26,,, | Performed by: INTERNAL MEDICINE

## 2018-07-17 PROCEDURE — 80053 COMPREHEN METABOLIC PANEL: CPT

## 2018-07-17 PROCEDURE — 25000003 PHARM REV CODE 250: Performed by: HOSPITALIST

## 2018-07-17 PROCEDURE — 63600175 PHARM REV CODE 636 W HCPCS: Performed by: HOSPITALIST

## 2018-07-17 PROCEDURE — 84484 ASSAY OF TROPONIN QUANT: CPT

## 2018-07-17 PROCEDURE — 20600001 HC STEP DOWN PRIVATE ROOM

## 2018-07-17 PROCEDURE — 80061 LIPID PANEL: CPT

## 2018-07-17 PROCEDURE — 36415 COLL VENOUS BLD VENIPUNCTURE: CPT

## 2018-07-17 PROCEDURE — A4216 STERILE WATER/SALINE, 10 ML: HCPCS | Performed by: NURSE PRACTITIONER

## 2018-07-17 PROCEDURE — 25000003 PHARM REV CODE 250: Performed by: NURSE PRACTITIONER

## 2018-07-17 PROCEDURE — C8929 TTE W OR WO FOL WCON,DOPPLER: HCPCS

## 2018-07-17 PROCEDURE — 99232 SBSQ HOSP IP/OBS MODERATE 35: CPT | Mod: ,,, | Performed by: NURSE PRACTITIONER

## 2018-07-17 RX ORDER — SODIUM CHLORIDE 0.9 % (FLUSH) 0.9 %
3 SYRINGE (ML) INJECTION
Status: DISCONTINUED | OUTPATIENT
Start: 2018-07-17 | End: 2018-07-21 | Stop reason: HOSPADM

## 2018-07-17 RX ORDER — ACETAMINOPHEN 325 MG/1
650 TABLET ORAL EVERY 4 HOURS PRN
Status: DISCONTINUED | OUTPATIENT
Start: 2018-07-17 | End: 2018-07-21 | Stop reason: HOSPADM

## 2018-07-17 RX ORDER — ENOXAPARIN SODIUM 100 MG/ML
40 INJECTION SUBCUTANEOUS EVERY 24 HOURS
Status: DISCONTINUED | OUTPATIENT
Start: 2018-07-17 | End: 2018-07-21 | Stop reason: HOSPADM

## 2018-07-17 RX ORDER — BISACODYL 10 MG
10 SUPPOSITORY, RECTAL RECTAL DAILY PRN
Status: DISCONTINUED | OUTPATIENT
Start: 2018-07-17 | End: 2018-07-21 | Stop reason: HOSPADM

## 2018-07-17 RX ORDER — RAMELTEON 8 MG/1
8 TABLET ORAL NIGHTLY PRN
Status: DISCONTINUED | OUTPATIENT
Start: 2018-07-17 | End: 2018-07-21 | Stop reason: HOSPADM

## 2018-07-17 RX ORDER — HYDRALAZINE HYDROCHLORIDE 10 MG/1
10 TABLET, FILM COATED ORAL EVERY 8 HOURS
Status: DISCONTINUED | OUTPATIENT
Start: 2018-07-17 | End: 2018-07-21

## 2018-07-17 RX ORDER — POLYETHYLENE GLYCOL 3350 17 G/17G
17 POWDER, FOR SOLUTION ORAL 2 TIMES DAILY PRN
Status: DISCONTINUED | OUTPATIENT
Start: 2018-07-17 | End: 2018-07-21 | Stop reason: HOSPADM

## 2018-07-17 RX ORDER — ONDANSETRON 8 MG/1
8 TABLET, ORALLY DISINTEGRATING ORAL EVERY 8 HOURS PRN
Status: DISCONTINUED | OUTPATIENT
Start: 2018-07-17 | End: 2018-07-21 | Stop reason: HOSPADM

## 2018-07-17 RX ORDER — TRAMADOL HYDROCHLORIDE 50 MG/1
50 TABLET ORAL EVERY 6 HOURS PRN
Status: DISCONTINUED | OUTPATIENT
Start: 2018-07-17 | End: 2018-07-21 | Stop reason: HOSPADM

## 2018-07-17 RX ORDER — ONDANSETRON 2 MG/ML
4 INJECTION INTRAMUSCULAR; INTRAVENOUS EVERY 8 HOURS PRN
Status: DISCONTINUED | OUTPATIENT
Start: 2018-07-17 | End: 2018-07-21 | Stop reason: HOSPADM

## 2018-07-17 RX ORDER — ENOXAPARIN SODIUM 100 MG/ML
30 INJECTION SUBCUTANEOUS EVERY 24 HOURS
Status: DISCONTINUED | OUTPATIENT
Start: 2018-07-17 | End: 2018-07-17

## 2018-07-17 RX ADMIN — SODIUM CHLORIDE, PRESERVATIVE FREE 3 ML: 5 INJECTION INTRAVENOUS at 02:07

## 2018-07-17 RX ADMIN — ENOXAPARIN SODIUM 40 MG: 100 INJECTION SUBCUTANEOUS at 05:07

## 2018-07-17 RX ADMIN — HYDRALAZINE HYDROCHLORIDE 10 MG: 10 TABLET, FILM COATED ORAL at 10:07

## 2018-07-17 RX ADMIN — FUROSEMIDE 80 MG: 10 INJECTION, SOLUTION INTRAMUSCULAR; INTRAVENOUS at 07:07

## 2018-07-17 RX ADMIN — LEVOTHYROXINE SODIUM 175 MCG: 100 TABLET ORAL at 10:07

## 2018-07-17 RX ADMIN — STANDARDIZED SENNA CONCENTRATE AND DOCUSATE SODIUM 1 TABLET: 8.6; 5 TABLET, FILM COATED ORAL at 08:07

## 2018-07-17 RX ADMIN — STANDARDIZED SENNA CONCENTRATE AND DOCUSATE SODIUM 1 TABLET: 8.6; 5 TABLET, FILM COATED ORAL at 07:07

## 2018-07-17 RX ADMIN — INSULIN ASPART 2 UNITS: 100 INJECTION, SOLUTION INTRAVENOUS; SUBCUTANEOUS at 09:07

## 2018-07-17 RX ADMIN — METOPROLOL SUCCINATE 50 MG: 50 TABLET, EXTENDED RELEASE ORAL at 08:07

## 2018-07-17 RX ADMIN — MAGNESIUM SULFATE HEPTAHYDRATE 1 G: 500 INJECTION, SOLUTION INTRAMUSCULAR; INTRAVENOUS at 08:07

## 2018-07-17 RX ADMIN — INSULIN ASPART 4 UNITS: 100 INJECTION, SOLUTION INTRAVENOUS; SUBCUTANEOUS at 12:07

## 2018-07-17 RX ADMIN — FUROSEMIDE 80 MG: 10 INJECTION, SOLUTION INTRAMUSCULAR; INTRAVENOUS at 02:07

## 2018-07-17 RX ADMIN — INSULIN ASPART 4 UNITS: 100 INJECTION, SOLUTION INTRAVENOUS; SUBCUTANEOUS at 05:07

## 2018-07-17 RX ADMIN — FUROSEMIDE 80 MG: 10 INJECTION, SOLUTION INTRAMUSCULAR; INTRAVENOUS at 08:07

## 2018-07-17 RX ADMIN — SODIUM CHLORIDE, PRESERVATIVE FREE 3 ML: 5 INJECTION INTRAVENOUS at 06:07

## 2018-07-17 RX ADMIN — INSULIN ASPART 2 UNITS: 100 INJECTION, SOLUTION INTRAVENOUS; SUBCUTANEOUS at 08:07

## 2018-07-17 NOTE — HPI
Mr. Mckeon is a 53 year old male with past medical history of HFrEF (35-40% unknown type/ thinks he had stress test in distant past), Type II DM on oral hypoglycemics, HTN, bowel resection d/t obstruction, and surgical thyroidectomy at [facility] for h/o hyperthyroidism s/p iodine/ radiation, and intermittent drinker/ intermittent THC usage.  He presented to ED with 3 week history of progressive shortness of breath, exertional dyspnea, worsening PND and several days of increasing lower extremity edema.  He does not follow a salt restricted diet, however he does take his medications as prescribed although he ran out of losartan recently. He denies chest pain, lightheadedness, dizziness, and palpitations.  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 He follows at [facility] and has not seen a cardiologist that he can recall.     In ED: CBC stable, chemistry with SCr of 2.3 and elevated alk phos/AST/ALT, troponin positive at 0.037, BNP ~2800, HgA1C 8.8/206, TSH 3.929, EKG with SR with 1st degree AVD and occasional PVCs, and CXR with possible early edema and small left plerual effusion. The patient was admitted to the Hospital Medicine Service for further evaluation and management.

## 2018-07-17 NOTE — ASSESSMENT & PLAN NOTE
- presents with acute systolic heart failure, BNP 2790 (previous 556) last known EF 1/17/18 echo EF 35%, RAP 8, Mod MR, + DD, + wma, GHK, PAP 30, cxr with pulm edema, ECG SR with first degree AVB.  - does not follow low sodium diet, takes meds as prescribed

## 2018-07-17 NOTE — CONSULTS
[facility]  Adult Nutrition  Education Note    Diet Education: Low Sodium  Time Spent: 15 min  Learners: Pt      Nutrition Education provided with handouts: Heart Failure Nutrition Therapy, Meal Planning Using the Plate Method      Comments: Provided and explained handout detailing common high sodium foods and strategies for reducing sodium intake including label reading, choosing reduced sodium foods, and cooking tips. Advised pt to monitor carbohydrate portion sizes and to pair high carbohydrate foods with foods high in protein/fiber to aid in BG control. Pt voiced understanding. All questions and concerns answered.      Follow-Up: 1x weekly    Please re-consult as needed.

## 2018-07-17 NOTE — H&P
"[facility] Hospital Medicine  History & Physical    Patient Name: Ashish Mckeon  MRN: [id number]  Admission Date: 7/16/2018  Attending Physician: Artemio Bassett MD   Primary Care Provider: [facility] Medicine Team: [facility] MED [facility] Kristisa Alem Russell NP     Patient information was obtained from patient, past medical records and ER records.     Subjective:     Principal Problem:Acute on chronic combined systolic and diastolic congestive heart failure    Chief Complaint:   Chief Complaint   Patient presents with    Shortness of Breath     hx chf        HPI: 54 y/o M with a PMhx of HFrEF (35-40% unknown type/ thinks he had stress test in past but cannot recall where or when and no notes in care everywhere tab), Type II DM oral hypoglycemics, HTN, bowel resection d/t obstruction, and surgical hypothyroidism from removal of his thyroid at [facility] for h/o hyperthyroidism s/p iodine/ radiation, intermittent drinker/ intermittent THC usage.  Patient presents to ED with 3 week h/o progressive sob, exertional dyspnea, worsening PND and several days of increasing lower extremity edema.  He does not follow a salt restricted diet, however he does take his meds as prescribed.  He follows at [facility] and has not seen a cardiologist that he can recall.   He denies chest pain, lightheadedness, dizziness, and palpitations.  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   He does admit to running out of his losartan several days ago.    Admitting to hospital medicine for further evaluation and treatment of systolic heart failure exacerbation.  He does not have insurance.      Past Medical History:   Diagnosis Date    Diabetes mellitus type II     Essential hypertension, benign     Hyperlipidemia     Hypothyroidism     Undiagnosed cardiac murmurs        Past Surgical History:   Procedure Laterality Date    COLON SURGERY      "lower " "intestines removed"    THYROID SURGERY         Review of patient's allergies indicates:   Allergen Reactions    Lisinopril Other (See Comments)     acei cough         No current facility-administered medications on file prior to encounter.      Current Outpatient Prescriptions on File Prior to Encounter   Medication Sig    furosemide (LASIX) 40 MG tablet Take 1 tablet (40 mg total) by mouth once daily.    LEVOTHYROXINE SODIUM (SYNTHROID ORAL) Take 175 mcg by mouth daily 2 hours after breakfast.     losartan (COZAAR) 25 MG tablet Take 1 tablet (25 mg total) by mouth once daily.    METFORMIN HCL (METFORMIN ORAL) Take 500 mg by mouth 2 (two) times daily. Patient taking daily    metoprolol succinate (TOPROL XL) 50 MG 24 hr tablet Take 1 tablet (50 mg total) by mouth once daily.    folic acid (FOLVITE) 1 MG tablet Take 1 tablet (1 mg total) by mouth once daily.    simvastatin (ZOCOR) 10 MG tablet Take 1 tablet (10 mg total) by mouth every evening.    [DISCONTINUED] cyanocobalamin 1,000 mcg/mL injection Inject 1 mL (1000 mcg total) into the muscle once daily on 9/7, 9/8, and 9/9. Then inject 1 mL into the muscle weekly on 9/16, 9/23, 9/30. Then inject 1 mL into the muscle once a month starting on 10/7.     Family History     Problem Relation (Age of Onset)    Cancer Father        Social History Main Topics    Smoking status: Current Some Day Smoker     Types: Cigars    Smokeless tobacco: Not on file      Comment: cigars "every other week or so"    Alcohol use 3.6 oz/week     6 Cans of beer per week    Drug use: Yes     Types: Marijuana    Sexual activity: Yes     Partners: Female     Review of Systems   Constitutional: Positive for activity change, appetite change and unexpected weight change. Negative for chills, fatigue and fever.   HENT: Negative for congestion, rhinorrhea, sinus pressure, sore throat and trouble swallowing.    Eyes: Negative for visual disturbance.   Respiratory: Positive for cough and " shortness of breath. Negative for chest tightness and wheezing.    Cardiovascular: Positive for leg swelling. Negative for chest pain and palpitations.   Gastrointestinal: Positive for abdominal distention and constipation. Negative for abdominal pain, blood in stool, diarrhea, nausea and vomiting.   Endocrine: Negative for cold intolerance and heat intolerance.   Genitourinary: Negative for dysuria, frequency, hematuria and urgency.   Musculoskeletal: Negative for arthralgias, back pain, myalgias and neck pain.   Skin: Negative for color change, pallor and rash.   Allergic/Immunologic: Negative for immunocompromised state.   Neurological: Negative for dizziness, tremors, syncope, weakness, light-headedness, numbness and headaches.   Hematological: Does not bruise/bleed easily.   Psychiatric/Behavioral: Negative for agitation, confusion and sleep disturbance. The patient is not nervous/anxious.      Objective:     Vital Signs (Most Recent):  Temp: 96 °F (35.6 °C) (07/16/18 1547)  Pulse: 81 (07/16/18 1917)  Resp: (!) 21 (07/16/18 1800)  BP: (!) 149/115 (07/16/18 1800)  SpO2: (!) 92 % (07/16/18 1800) Vital Signs (24h Range):  Temp:  [96 °F (35.6 °C)-97.8 °F (36.6 °C)] 96 °F (35.6 °C)  Pulse:  [67-86] 81  Resp:  [15-24] 21  SpO2:  [92 %-100 %] 92 %  BP: (105-149)/() 149/115     Weight: 81.2 kg (179 lb 0.2 oz)  Body mass index is 29.79 kg/m².    Physical Exam   Constitutional: He is oriented to person, place, and time. He appears well-developed and well-nourished. No distress.   HENT:   Head: Normocephalic and atraumatic.   Right Ear: External ear normal.   Left Ear: External ear normal.   Nose: Nose normal.   Eyes: Conjunctivae and EOM are normal. No scleral icterus.   Neck: Normal range of motion. Neck supple. Hepatojugular reflux and JVD (20cm) present. No tracheal deviation present. No thyromegaly present.   Cardiovascular: Normal rate, regular rhythm and intact distal pulses.  Exam reveals gallop and S3. Exam  reveals no friction rub.    Murmur heard.   No systolic murmur is present    Diastolic (mitral inflow murmer best heard anterior apex) murmur is present   Pulmonary/Chest: Effort normal and breath sounds normal. No respiratory distress. He has no wheezes. He has no rales.   Abdominal: Soft. Bowel sounds are normal. He exhibits no distension and no mass. There is no tenderness. There is no rebound and no guarding.   Musculoskeletal: Normal range of motion. He exhibits +3 pitting edema ble up to mid thigh. He exhibits no tenderness.   Neurological: He is alert and oriented to person, place, and time. No cranial nerve deficit or sensory deficit. He exhibits normal muscle tone. Coordination normal.   Skin: Skin is warm and dry. No rash noted. No erythema.   Psychiatric: He has a normal mood and affect. His behavior is normal. Judgment and thought content normal.   Nursing note and vitals reviewed.        CRANIAL NERVES     CN III, IV, VI   Extraocular motions are normal.        Significant Labs:   CBC:   Recent Labs  Lab 07/16/18  1137   WBC 5.25   HGB 13.6*   HCT 41.8        CMP:   Recent Labs  Lab 07/16/18  1137      K 5.2*   *   CO2 16*   *   BUN 33*   CREATININE 2.3*   CALCIUM 8.3*   PROT 6.4   ALBUMIN 2.9*   BILITOT 0.9   ALKPHOS 150*   AST 61*   ALT 74*   ANIONGAP 8   EGFRNONAA 31.0*     Cardiac Markers:   Recent Labs  Lab 07/16/18  1137   BNP 2,790*     Troponin:   Recent Labs  Lab 07/16/18  1137   TROPONINI 0.047*     TSH:   Recent Labs  Lab 07/16/18  1137   TSH 3.929       Significant Imaging: CXR: I have reviewed all pertinent results/findings within the past 24 hours and my personal findings are:  see below       Chest xray:  1. Coarse interstitial attenuation, suggesting congestive change/early edema, no convincing large focal consolidation.  There may be a small left pleural effusion.  2. Nodular focus projected over the right upper lung zone, could reflect granuloma although  nonspecific.  Correlation advised.  Consider PA and lateral radiograph for further evaluation.    Assessment/Plan:     * Acute on chronic combined systolic and diastolic congestive heart failure    - presents with acute systolic heart failure, BNP 2790 (previous 556) last known EF 1/17/18 echo EF 35%, RAP 8, Mod MR, + DD, + wma, GHK, PAP 30, cxr with pulm edema, ECG SR with first degree AVB.  - does not follow low sodium diet, takes meds as prescribed  - hold losartan, which he had run out of recently, as he has KEYANNA with volume overload, use hydralazine po for afterload reduction as b/p allows.  - physical exam findings c/w volume overload  - does not have insurance and follows with [facility], if able try to get in with Tara Kirby at [facility] former Heart transplant PA with [facility]          Hypothyroidism    - surgical hypothyroidism, on synthroid for life.  - Tsh 3.9          Diabetes mellitus, type II    - diabetes not controlled  -hgba1c 8.8/206  - SSI/ Mod scale in house  - diabetic low sodium diet          Non-rheumatic mitral regurgitation    - probably functional MR, will recheck echo               VTE Risk Mitigation         Ordered     Place [place] hose  Until discontinued      07/16/18 1537     Place sequential compression device  Until discontinued      07/16/18 1537     IP VTE HIGH RISK PATIENT  Once      07/16/18 1537     Place sequential compression device  Until discontinued      07/16/18 1505             Kristi Russell NP  Department of Hospital Medicine   [facility]  Spectra:  [phone]  Pager: [phone]

## 2018-07-17 NOTE — HOSPITAL COURSE
Mr. Mckeon was admitted 7/16 due to Systolic heart failure exacerbation. 2D echo with EF 25-30%, ROBERTO, RV enlargement with moderately depressed systolic function, pulmHTN with PASP 62, trivial AR, MV mildly sclerotic with evidence of tethered posterior leaflet, severe MR, moderate TR, small pericardial effusion, global hypokinesis, and RAP 8. MR and EF have worsened since January 2017 study. Home toprol continued and increased, no ACEi/ARB due to KEYANNA vs KEYANNA on CKD (baseline appears 1.3-1.5); renal US and urine studies c/w medical renal disease. Diuresis initiated with IVP lasix, weight down 25 lbs, transitioned to PO 7/20, 7/21 BNP still elevated at 1294 with v waves noted in jugular, will send home on lasix bid for several more days to lose another 3-5 lbs, once he's down to 150 idania, he was instructed to decrease to daily. Troponin trend essentially flat .042 >> 0.047 >. 0.037 >> 0.055 with no c/o chest pain, PET stress 7/19 to risk stratify, negative for stress induced perfusion defects, however coronary flow reserve is also signficantly reduced at 1.34, findings possibly consistent with multivessel CAD. Interventional Cardiology consulted, underwent LHC 7/20 with findings of normal coronaries. Also noted over hospital course episodes of NSVT, asymptomatic, EP consulted, recommend optimization of medical therapy and repeat 2D echo in 3 months to determine candidacy for ICD, life vest placed upon discharge.    Disposition plans: home with family,  outpt follow up with [facility] as this is his current pcp, however encouraged to see appt with [facility] if possible for heart failure management and need for ICD/ transplant monitoring.  He will need a 3 month 2D echo with CFD, with whomever he follows up.

## 2018-07-17 NOTE — NURSING TRANSFER
Nursing Transfer Note      7/17/2018     Transfer From: echo    Transfer via wheelchair    Transfer with cardiac monitoring    Transported by transport tech

## 2018-07-17 NOTE — PROGRESS NOTES
Patient identified by 2 identifiers. Denies previous reactions to blood transfusions and allergies reviewed.  Procedure explained & consent obtained.  18 g IV in place to Rt AC, flushed w/ 10cc NS pre & post contrast administration.  3cc Optison administered, echo images obtained.  Pt tolerated procedure well.  .

## 2018-07-17 NOTE — ASSESSMENT & PLAN NOTE
-presents with acute systolic heart failure, BNP 2790 (previous 556), CXR with pulm edema, ECG SR with first degree AVB  -last known EF 1/17/17 echo EF 35%, RAP 8, moderate MR, + DD, +WMA, GHK and PAP 30  -repeat 2D echo with EF 25-30%, ROBERTO, RV enlargement with moderately depressed systolic function, pulmHTN with PASP 62, trivial AR, MV mildly sclerotic with evidence of tethered posterior leaflet, severe MR, moderate TR, small pericardial effusion, global hypokinesis, and RAP 8; MR and EF have worsened since January 2017 study   -continue home toprol   -no ACEi/ARB due to KEYANNA vs KEYANNA on CKD (baseline appears 1.3-1.5)  -hydralazine as tolerated for afterload reduction >>monitor and escalate therapy as needed   -diuresis initiated with IVP lasix, currently net negative 1.2 liters >> continue   -troponin trend essentially flat .042 >> 0.047 >. 0.037 >> 0.055 and he denies chest pain, will plan for PET stress this admission to risk stratify  -continue tele monitoring  -strict I&Os and daily weights  -outpt follow up with [facility] at discharge

## 2018-07-17 NOTE — SUBJECTIVE & OBJECTIVE
"Past Medical History:   Diagnosis Date    Diabetes mellitus type II     Essential hypertension, benign     Hyperlipidemia     Hypothyroidism     Undiagnosed cardiac murmurs        Past Surgical History:   Procedure Laterality Date    COLON SURGERY      "lower intestines removed"    THYROID SURGERY         Review of patient's allergies indicates:   Allergen Reactions    Lisinopril Other (See Comments)     acei cough         No current facility-administered medications on file prior to encounter.      Current Outpatient Prescriptions on File Prior to Encounter   Medication Sig    furosemide (LASIX) 40 MG tablet Take 1 tablet (40 mg total) by mouth once daily.    LEVOTHYROXINE SODIUM (SYNTHROID ORAL) Take 175 mcg by mouth daily 2 hours after breakfast.     losartan (COZAAR) 25 MG tablet Take 1 tablet (25 mg total) by mouth once daily.    METFORMIN HCL (METFORMIN ORAL) Take 500 mg by mouth 2 (two) times daily. Patient taking daily    metoprolol succinate (TOPROL XL) 50 MG 24 hr tablet Take 1 tablet (50 mg total) by mouth once daily.    folic acid (FOLVITE) 1 MG tablet Take 1 tablet (1 mg total) by mouth once daily.    simvastatin (ZOCOR) 10 MG tablet Take 1 tablet (10 mg total) by mouth every evening.    [DISCONTINUED] cyanocobalamin 1,000 mcg/mL injection Inject 1 mL (1000 mcg total) into the muscle once daily on 9/7, 9/8, and 9/9. Then inject 1 mL into the muscle weekly on 9/16, 9/23, 9/30. Then inject 1 mL into the muscle once a month starting on 10/7.     Family History     Problem Relation (Age of Onset)    Cancer Father        Social History Main Topics    Smoking status: Current Some Day Smoker     Types: Cigars    Smokeless tobacco: Not on file      Comment: cigars "every other week or so"    Alcohol use 3.6 oz/week     6 Cans of beer per week    Drug use: Yes     Types: Marijuana    Sexual activity: Yes     Partners: Female     Review of Systems   Constitutional: Positive for activity " change, appetite change and unexpected weight change. Negative for chills, fatigue and fever.   HENT: Negative for congestion, rhinorrhea, sinus pressure, sore throat and trouble swallowing.    Eyes: Negative for visual disturbance.   Respiratory: Positive for cough and shortness of breath. Negative for chest tightness and wheezing.    Cardiovascular: Positive for leg swelling. Negative for chest pain and palpitations.   Gastrointestinal: Positive for abdominal distention and constipation. Negative for abdominal pain, blood in stool, diarrhea, nausea and vomiting.   Endocrine: Negative for cold intolerance and heat intolerance.   Genitourinary: Negative for dysuria, frequency, hematuria and urgency.   Musculoskeletal: Negative for arthralgias, back pain, myalgias and neck pain.   Skin: Negative for color change, pallor and rash.   Allergic/Immunologic: Negative for immunocompromised state.   Neurological: Negative for dizziness, tremors, syncope, weakness, light-headedness, numbness and headaches.   Hematological: Does not bruise/bleed easily.   Psychiatric/Behavioral: Negative for agitation, confusion and sleep disturbance. The patient is not nervous/anxious.      Objective:     Vital Signs (Most Recent):  Temp: 96 °F (35.6 °C) (07/16/18 1547)  Pulse: 81 (07/16/18 1917)  Resp: (!) 21 (07/16/18 1800)  BP: (!) 149/115 (07/16/18 1800)  SpO2: (!) 92 % (07/16/18 1800) Vital Signs (24h Range):  Temp:  [96 °F (35.6 °C)-97.8 °F (36.6 °C)] 96 °F (35.6 °C)  Pulse:  [67-86] 81  Resp:  [15-24] 21  SpO2:  [92 %-100 %] 92 %  BP: (105-149)/() 149/115     Weight: 81.2 kg (179 lb 0.2 oz)  Body mass index is 29.79 kg/m².    Physical Exam   Constitutional: He is oriented to person, place, and time. He appears well-developed and well-nourished. No distress.   HENT:   Head: Normocephalic and atraumatic.   Right Ear: External ear normal.   Left Ear: External ear normal.   Nose: Nose normal.   Eyes: Conjunctivae and EOM are normal.  No scleral icterus.   Neck: Normal range of motion. Neck supple. Hepatojugular reflux and JVD (20cm) present. No tracheal deviation present. No thyromegaly present.   Cardiovascular: Normal rate, regular rhythm and intact distal pulses.  Exam reveals gallop and S3. Exam reveals no friction rub.    Murmur heard.   No systolic murmur is present    Diastolic (mitral inflow murmer best heard anterior apex) murmur is present   Pulmonary/Chest: Effort normal and breath sounds normal. No respiratory distress. He has no wheezes. He has no rales.   Abdominal: Soft. Bowel sounds are normal. He exhibits no distension and no mass. There is no tenderness. There is no rebound and no guarding.   Musculoskeletal: Normal range of motion. He exhibits edema. He exhibits no tenderness.   Neurological: He is alert and oriented to person, place, and time. No cranial nerve deficit or sensory deficit. He exhibits normal muscle tone. Coordination normal.   Skin: Skin is warm and dry. No rash noted. No erythema.   Psychiatric: He has a normal mood and affect. His behavior is normal. Judgment and thought content normal.   Nursing note and vitals reviewed.        CRANIAL NERVES     CN III, IV, VI   Extraocular motions are normal.        Significant Labs:   CBC:   Recent Labs  Lab 07/16/18  1137   WBC 5.25   HGB 13.6*   HCT 41.8        CMP:   Recent Labs  Lab 07/16/18  1137      K 5.2*   *   CO2 16*   *   BUN 33*   CREATININE 2.3*   CALCIUM 8.3*   PROT 6.4   ALBUMIN 2.9*   BILITOT 0.9   ALKPHOS 150*   AST 61*   ALT 74*   ANIONGAP 8   EGFRNONAA 31.0*     Cardiac Markers:   Recent Labs  Lab 07/16/18  1137   BNP 2,790*     Troponin:   Recent Labs  Lab 07/16/18  1137   TROPONINI 0.047*     TSH:   Recent Labs  Lab 07/16/18  1137   TSH 3.929       Significant Imaging: CXR: I have reviewed all pertinent results/findings within the past 24 hours and my personal findings are:  see below       Chest xray:  1. Coarse interstitial  attenuation, suggesting congestive change/early edema, no convincing large focal consolidation.  There may be a small left pleural effusion.  2. Nodular focus projected over the right upper lung zone, could reflect granuloma although nonspecific.  Correlation advised.  Consider PA and lateral radiograph for further evaluation.

## 2018-07-17 NOTE — ASSESSMENT & PLAN NOTE
-presents with acute systolic heart failure, BNP 2790 (previous 556), CXR with pulm edema, ECG SR with first degree AVB  -last known EF 1/17/17 echo EF 35%, RAP 8, moderate MR, + DD, +WMA, GHK and PAP 30  -repeat 2D echo with EF 25-30%, ROBERTO, RV enlargement with moderately depressed systolic function, pulmHTN with PASP 62, trivial AR, MV mildly sclerotic with evidence of tethered posterior leaflet, severe MR, moderate TR, small pericardial effusion, global hypokinesis, and RAP 8; MR and EF have worsened since January 2017 study   -continue home toprol   -no ACEi/ARB due to KEYANNA vs KEYANNA on CKD (baseline appears 1.3-1.5)  -hydralazine as tolerated for afterload reduction >>monitor and escalate therapy as needed   -diuresis initiated with IVP lasix >> continue   -currently net negative 1.6 liters (terriley in accurate), weight down 17 lbs   -troponin trend essentially flat .042 >> 0.047 >. 0.037 >> 0.055 and he denies chest pain, will plan for PET stress this admission to risk stratify  -episodes of NSVT on tele without hemodynamic compromise, continue to monitor, consider EP consult for AICD after PET stress   -continue tele monitoring  -strict I&Os and daily weights  -outpt follow up with [facility] at discharge

## 2018-07-17 NOTE — PROGRESS NOTES
[facility] Hospital Medicine  Progress Note    Patient Name: Ashish Mckeon  MRN: [id number]  Patient Class: IP- Inpatient   Admission Date: 7/16/2018  Length of Stay: 1 days  Attending Physician: Dana Skelton MD  Primary Care Provider: [facility] Medicine Team: [facility] [place] Stanley NP    Subjective:     Principal Problem:Acute on chronic combined systolic and diastolic congestive heart failure    HPI:  Mr. Mckeon is a 53 year old male with past medical history of HFrEF (35-40% unknown type/ thinks he had stress test in distant past), Type II DM on oral hypoglycemics, HTN, bowel resection d/t obstruction, and surgical thyroidectomy at [facility] for h/o hyperthyroidism s/p iodine/ radiation, and intermittent drinker/ intermittent THC usage.  He presented to ED with 3 week history of progressive shortness of breath, exertional dyspnea, worsening PND and several days of increasing lower extremity edema.  He does not follow a salt restricted diet, however he does take his medications as prescribed although he ran out of losartan recently. He denies chest pain, lightheadedness, dizziness, and palpitations.  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 He follows at [facility] and has not seen a cardiologist that he can recall.     In ED: CBC stable, chemistry with SCr of 2.3 and elevated alk phos/AST/ALT, troponin positive at 0.037, BNP ~2800, HgA1C 8.8/206, TSH 3.929, EKG with SR with 1st degree AVD and occasional PVCs, and CXR with possible early edema and small left plerual effusion. The patient was admitted to the Hospital Medicine Service for further evaluation and management.     Hospital Course:  Mr. Mckeon was admitted 7/16 due to heart failure exacerbation. 2D echo with EF 25-30%, ROBERTO, RV enlargement with moderately depressed systolic function, pulmHTN with PASP 62, trivial AR, MV mildly sclerotic with evidence  of tethered posterior leaflet, severe MR, moderate TR, small pericardial effusion, global hypokinesis, and RAP 8. MR and EF have worsened since January 2017 study.   Home toprol continued, no ACEi/ARB due to KEYANNA vs KEYANNA on CKD (baseline appears 1.3-1.5). Diuresis initiated with IVP lasix, currently net negative 1.2 liters. Troponin trend essentially flat .042 >> 0.047 >. 0.037 >> 0.055 and he denies chest pain, will plan for PET stress this admission to risk stratify.    Disposition plans: home with family, outpt follow up with [facility] at [place].    Interval History: Resting on bedside, family in room. He reports feeling slightly better than at time of admission. He denies needs or complaints, understands need for diuresis and stress test. He denies chest pain, palpitations, or shortness of breath. Denies any acute events or distress overnight.     Review of Systems   Constitutional: Positive for activity change, appetite change and unexpected weight change. Negative for chills, fatigue and fever.   HENT: Negative for congestion, rhinorrhea, sinus pain, sinus pressure, sneezing, sore throat and trouble swallowing.    Respiratory: Positive for cough and shortness of breath (improved). Negative for chest tightness and wheezing.    Cardiovascular: Positive for leg swelling (slight improvement ). Negative for chest pain and palpitations.   Gastrointestinal: Positive for abdominal distention (slight improvement ) and constipation. Negative for abdominal pain, diarrhea, nausea and vomiting.   Genitourinary: Negative for decreased urine volume, dysuria, frequency, hematuria and urgency.   Musculoskeletal: Negative for arthralgias, back pain, myalgias and neck pain.   Neurological: Negative for dizziness, syncope, weakness, light-headedness, numbness and headaches.     Objective:     Vital Signs (Most Recent):  Temp: 97.5 °F (36.4 °C) (07/16/18 2030)  Pulse: 76 (07/17/18 1513)  Resp: (!) 22 (07/17/18 1300)  BP: (!)  120/92 (07/17/18 1300)  SpO2: (!) 94 % (07/17/18 1300) Vital Signs (24h Range):  Temp:  [97.5 °F (36.4 °C)] 97.5 °F (36.4 °C)  Pulse:  [67-85] 76  Resp:  [15-24] 22  SpO2:  [91 %-96 %] 94 %  BP: (101-149)/() 120/92     Weight: 76 kg (167 lb 8.8 oz)  Body mass index is 27.88 kg/m².    Intake/Output Summary (Last 24 hours) at 07/17/18 1631  Last data filed at 07/17/18 1500   Gross per 24 hour   Intake             1140 ml   Output             1650 ml   Net             -510 ml      Physical Exam   Constitutional: He is oriented to person, place, and time. He appears well-developed and well-nourished. No distress.   HENT:   Head: Normocephalic and atraumatic.   Mouth/Throat: No oropharyngeal exudate.   Eyes: Pupils are equal, round, and reactive to light. No scleral icterus.   Neck: Normal range of motion. Neck supple. Hepatojugular reflux and JVD (20cm) present. No tracheal deviation present. No thyromegaly present.   Cardiovascular: Normal rate, regular rhythm and intact distal pulses.  Exam reveals gallop and S3. Exam reveals no friction rub.    Murmur heard.   No systolic murmur is present    Diastolic (mitral inflow murmer best heard anterior apex) murmur is present   Pulmonary/Chest: Effort normal and breath sounds normal. No respiratory distress. He has no wheezes. He has no rales.   Abdominal: Soft. Bowel sounds are normal. He exhibits distension. There is no tenderness.   Musculoskeletal: Normal range of motion. He exhibits edema. He exhibits no tenderness.   Neurological: He is alert and oriented to person, place, and time. Coordination normal.   Skin: Skin is warm and dry. Capillary refill takes 2 to 3 seconds. No rash noted. He is not diaphoretic. No erythema.   Psychiatric: He has a normal mood and affect. His behavior is normal. Judgment and thought content normal.   Nursing note and vitals reviewed.    Significant Labs:   CBC:   Recent Labs  Lab 07/16/18  1137   WBC 5.25   HGB 13.6*   HCT 41.8   PLT  155     CMP:   Recent Labs  Lab 07/16/18  1137 07/17/18  0529    138   K 5.2* 4.0   * 107   CO2 16* 22*   * 183*   BUN 33* 33*   CREATININE 2.3* 1.8*   CALCIUM 8.3* 8.4*   PROT 6.4 6.0   ALBUMIN 2.9* 2.7*   BILITOT 0.9 1.0   ALKPHOS 150* 146*   AST 61* 46*   ALT 74* 69*   ANIONGAP 8 9   EGFRNONAA 31.0* 41.7*     Cardiac Markers:   Recent Labs  Lab 07/16/18  1137   BNP 2,790*     Magnesium:   Recent Labs  Lab 07/16/18  1137 07/16/18  2020   MG 2.0 1.8     TSH:   Recent Labs  Lab 07/16/18  1137   TSH 3.929     All pertinent labs within the past 24 hours have been reviewed.    Significant Imaging: I have reviewed all pertinent imaging results/findings within the past 24 hours.    Assessment/Plan:      * Acute on chronic combined systolic and diastolic congestive heart failure    -presents with acute systolic heart failure, BNP 2790 (previous 556), CXR with pulm edema, ECG SR with first degree AVB  -last known EF 1/17/17 echo EF 35%, RAP 8, moderate MR, + DD, +WMA, GHK and PAP 30  -repeat 2D echo with EF 25-30%, ROBERTO, RV enlargement with moderately depressed systolic function, pulmHTN with PASP 62, trivial AR, MV mildly sclerotic with evidence of tethered posterior leaflet, severe MR, moderate TR, small pericardial effusion, global hypokinesis, and RAP 8; MR and EF have worsened since January 2017 study   -continue home toprol   -no ACEi/ARB due to KEYANNA vs KEYANNA on CKD (baseline appears 1.3-1.5)  -hydralazine as tolerated for afterload reduction >>monitor and escalate therapy as needed   -diuresis initiated with IVP lasix, currently net negative 1.2 liters >> continue   -troponin trend essentially flat .042 >> 0.047 >. 0.037 >> 0.055 and he denies chest pain, will plan for PET stress this admission to risk stratify  -episode of NSVT on tele overnight without hemodynamic compromise, continue to monitor, consider EP consult for AICD after PET stress   -continue tele monitoring  -strict I&Os and daily  weights  -outpt follow up with [facility] at discharge         KEYANNA (acute kidney injury)    -admission SCr 2.3 >> now 1.8  -baseline from chart review, although data limited appears 1.3-1.5  -monitor with diuresis   -if does not improve will send urine studies and obtain renal US         Essential hypertension    -BP stable  -monitor with toprol and hydralazine  -adjust therapies as needed        Non-rheumatic mitral regurgitation    -see above for echo results          Diabetes mellitus, type II    -diabetes not controlled  -HgA1C 8.8/206  -SSI/ Mod scale in house  -diabetic low sodium diet  -monitor         Hypothyroidism    -surgical hypothyroidism, on synthroid for life  -TSH 3.9        Megaloblastic anemia due to vitamin B12 deficiency    -CBC stable   -monitor         Substance use disorder    -encouraged cessation from ETOH and THC          VTE Risk Mitigation         Ordered     enoxaparin injection 40 mg  Daily      07/17/18 1016     IP VTE HIGH RISK PATIENT  Once      07/17/18 0904     Place sequential compression device  Until discontinued      07/16/18 9252          Robyn Stanley, DNP, AG-ACNP, BC  Department of Hospital Medicine  [facility]  Pager [phone]  [facility] [id number]

## 2018-07-17 NOTE — NURSING TRANSFER
Nursing Transfer Note      7/17/2018     Transfer To: echo    Transfer via wheelchair    Transfer with cardiac monitoring    Transported by transport tech

## 2018-07-17 NOTE — PLAN OF CARE
Problem: Patient Care Overview  Goal: Plan of Care Review  Outcome: Ongoing (interventions implemented as appropriate)  Pt remained stable throughout the day. No acute distress noted. Pt remained free from injury. VSS. Pt denies any chest pain or SOB. Lasix IVP TID. 2d echo done today. Pt understands plan of care. Will continue to monitor.

## 2018-07-17 NOTE — NURSING
Notified MD Messer of pt having 7 beats of V tach.  Pt asymptomatic.  Denies chest pain, SOB, palpitations.  Ordered to give 1 gm IV magnesium. Will continue to monitor.

## 2018-07-17 NOTE — SUBJECTIVE & OBJECTIVE
Interval History: Resting on bedside, family in room. He reports feeling slightly better than at time of admission. He denies needs or complaints, understands need for diuresis and stress test. He denies chest pain, palpitations, or shortness of breath. Denies any acute events or distress overnight.     Review of Systems   Constitutional: Positive for activity change, appetite change and unexpected weight change. Negative for chills, fatigue and fever.   HENT: Negative for congestion, rhinorrhea, sinus pain, sinus pressure, sneezing, sore throat and trouble swallowing.    Respiratory: Positive for cough and shortness of breath (improved). Negative for chest tightness and wheezing.    Cardiovascular: Positive for leg swelling (slight improvement ). Negative for chest pain and palpitations.   Gastrointestinal: Positive for abdominal distention (slight improvement ) and constipation. Negative for abdominal pain, diarrhea, nausea and vomiting.   Genitourinary: Negative for decreased urine volume, dysuria, frequency, hematuria and urgency.   Musculoskeletal: Negative for arthralgias, back pain, myalgias and neck pain.   Neurological: Negative for dizziness, syncope, weakness, light-headedness, numbness and headaches.     Objective:     Vital Signs (Most Recent):  Temp: 97.5 °F (36.4 °C) (07/16/18 2030)  Pulse: 76 (07/17/18 1513)  Resp: (!) 22 (07/17/18 1300)  BP: (!) 120/92 (07/17/18 1300)  SpO2: (!) 94 % (07/17/18 1300) Vital Signs (24h Range):  Temp:  [97.5 °F (36.4 °C)] 97.5 °F (36.4 °C)  Pulse:  [67-85] 76  Resp:  [15-24] 22  SpO2:  [91 %-96 %] 94 %  BP: (101-149)/() 120/92     Weight: 76 kg (167 lb 8.8 oz)  Body mass index is 27.88 kg/m².    Intake/Output Summary (Last 24 hours) at 07/17/18 1631  Last data filed at 07/17/18 1500   Gross per 24 hour   Intake             1140 ml   Output             1650 ml   Net             -510 ml      Physical Exam   Constitutional: He is oriented to person, place, and time.  He appears well-developed and well-nourished. No distress.   HENT:   Head: Normocephalic and atraumatic.   Mouth/Throat: No oropharyngeal exudate.   Eyes: Pupils are equal, round, and reactive to light. No scleral icterus.   Neck: Normal range of motion. Neck supple. Hepatojugular reflux and JVD (20cm) present. No tracheal deviation present. No thyromegaly present.   Cardiovascular: Normal rate, regular rhythm and intact distal pulses.  Exam reveals gallop and S3. Exam reveals no friction rub.    Murmur heard.   No systolic murmur is present    Diastolic (mitral inflow murmer best heard anterior apex) murmur is present   Pulmonary/Chest: Effort normal and breath sounds normal. No respiratory distress. He has no wheezes. He has no rales.   Abdominal: Soft. Bowel sounds are normal. He exhibits distension. There is no tenderness.   Musculoskeletal: Normal range of motion. He exhibits edema. He exhibits no tenderness.   Neurological: He is alert and oriented to person, place, and time. Coordination normal.   Skin: Skin is warm and dry. Capillary refill takes 2 to 3 seconds. No rash noted. He is not diaphoretic. No erythema.   Psychiatric: He has a normal mood and affect. His behavior is normal. Judgment and thought content normal.   Nursing note and vitals reviewed.    Significant Labs:   CBC:   Recent Labs  Lab 07/16/18  1137   WBC 5.25   HGB 13.6*   HCT 41.8        CMP:   Recent Labs  Lab 07/16/18  1137 07/17/18  0529    138   K 5.2* 4.0   * 107   CO2 16* 22*   * 183*   BUN 33* 33*   CREATININE 2.3* 1.8*   CALCIUM 8.3* 8.4*   PROT 6.4 6.0   ALBUMIN 2.9* 2.7*   BILITOT 0.9 1.0   ALKPHOS 150* 146*   AST 61* 46*   ALT 74* 69*   ANIONGAP 8 9   EGFRNONAA 31.0* 41.7*     Cardiac Markers:   Recent Labs  Lab 07/16/18  1137   BNP 2,790*     Magnesium:   Recent Labs  Lab 07/16/18  1137 07/16/18  2020   MG 2.0 1.8     TSH:   Recent Labs  Lab 07/16/18  1137   TSH 3.929     All pertinent labs within the  past 24 hours have been reviewed.    Significant Imaging: I have reviewed all pertinent imaging results/findings within the past 24 hours.

## 2018-07-17 NOTE — ASSESSMENT & PLAN NOTE
-admission SCr 2.3 >> now 1.8  -baseline from chart review, although data limited appears 1.3-1.5  -monitor with diuresis   -if does not improve will send urine studies and obtain renal US

## 2018-07-17 NOTE — ASSESSMENT & PLAN NOTE
-diabetes not controlled  -HgA1C 8.8/206  -SSI/ Mod scale in house  -diabetic low sodium diet  -monitor

## 2018-07-17 NOTE — PLAN OF CARE
07/17/18 0937   Discharge Assessment   Assessment Type Discharge Planning Assessment   Confirmed/corrected address and phone number on facesheet? No   Assessment information obtained from? Medical Record   Expected Length of Stay (days) 2   Communicated expected length of stay with patient/caregiver no   Prior to hospitilization cognitive status: Alert/Oriented   Prior to hospitalization functional status: Independent   Current cognitive status: Alert/Oriented   Current Functional Status: Independent   Lives With spouse   Able to Return to Prior Arrangements unable to determine at this time (comments)   Is patient able to care for self after discharge? Unable to determine at this time (comments)   Readmission Within The Last 30 Days no previous admission in last 30 days   Patient currently being followed by outpatient case management? No   Equipment Currently Used at Home none   Do you have any problems affording any of your prescribed medications? TBD   Discharge Plan A Home with family   Admitted with CHF. Pt off the floor for testing when CM rounded for DC needs assessment. Info obtained from EMR. Lives with wife and is independent in his ADLS.  No DC needs anticipated.

## 2018-07-17 NOTE — ASSESSMENT & PLAN NOTE
- presents with acute systolic heart failure, BNP 2790 (previous 556) last known EF 1/17/18 echo EF 35%, RAP 8, Mod MR, + DD, + wma, GHK, PAP 30, cxr with pulm edema, ECG SR with first degree AVB.  - does not follow low sodium diet, takes meds as prescribed  - physical exam findings c/w volume overload  - does not have insurance and follows with [facility], if able try to get in with Tara Kirby at [facility] former Heart transplant PA with [facility]

## 2018-07-17 NOTE — ASSESSMENT & PLAN NOTE
- diabetes not controlled  -hgba1c 8.8/206  - SSI/ Mod scale in house  - diabetic low sodium diet

## 2018-07-18 PROBLEM — I47.29 NSVT (NONSUSTAINED VENTRICULAR TACHYCARDIA): Status: ACTIVE | Noted: 2018-07-18

## 2018-07-18 LAB
ALBUMIN SERPL BCP-MCNC: 2.6 G/DL
ALP SERPL-CCNC: 141 U/L
ALT SERPL W/O P-5'-P-CCNC: 60 U/L
ANION GAP SERPL CALC-SCNC: 10 MMOL/L
AST SERPL-CCNC: 35 U/L
BASOPHILS # BLD AUTO: 0.08 K/UL
BASOPHILS NFR BLD: 1.9 %
BILIRUB SERPL-MCNC: 1.3 MG/DL
BUN SERPL-MCNC: 38 MG/DL
CALCIUM SERPL-MCNC: 8.7 MG/DL
CHLORIDE SERPL-SCNC: 102 MMOL/L
CO2 SERPL-SCNC: 25 MMOL/L
CREAT SERPL-MCNC: 1.8 MG/DL
DIFFERENTIAL METHOD: ABNORMAL
EOSINOPHIL # BLD AUTO: 0.4 K/UL
EOSINOPHIL NFR BLD: 9.3 %
ERYTHROCYTE [DISTWIDTH] IN BLOOD BY AUTOMATED COUNT: 14.8 %
EST. GFR  (AFRICAN AMERICAN): 48.3 ML/MIN/1.73 M^2
EST. GFR  (NON AFRICAN AMERICAN): 41.7 ML/MIN/1.73 M^2
GLUCOSE SERPL-MCNC: 145 MG/DL
HCT VFR BLD AUTO: 41.3 %
HGB BLD-MCNC: 13.8 G/DL
IMM GRANULOCYTES # BLD AUTO: 0.01 K/UL
IMM GRANULOCYTES NFR BLD AUTO: 0.2 %
LYMPHOCYTES # BLD AUTO: 0.7 K/UL
LYMPHOCYTES NFR BLD: 17.1 %
MAGNESIUM SERPL-MCNC: 1.6 MG/DL
MCH RBC QN AUTO: 27.4 PG
MCHC RBC AUTO-ENTMCNC: 33.4 G/DL
MCV RBC AUTO: 82 FL
MONOCYTES # BLD AUTO: 0.6 K/UL
MONOCYTES NFR BLD: 13.4 %
NEUTROPHILS # BLD AUTO: 2.5 K/UL
NEUTROPHILS NFR BLD: 58.1 %
NRBC BLD-RTO: 0 /100 WBC
PLATELET # BLD AUTO: 149 K/UL
PMV BLD AUTO: 12.7 FL
POCT GLUCOSE: 147 MG/DL (ref 70–110)
POCT GLUCOSE: 206 MG/DL (ref 70–110)
POCT GLUCOSE: 209 MG/DL (ref 70–110)
POCT GLUCOSE: 210 MG/DL (ref 70–110)
POTASSIUM SERPL-SCNC: 4.2 MMOL/L
PROT SERPL-MCNC: 6.1 G/DL
RBC # BLD AUTO: 5.04 M/UL
SODIUM SERPL-SCNC: 137 MMOL/L
WBC # BLD AUTO: 4.32 K/UL

## 2018-07-18 PROCEDURE — 63600175 PHARM REV CODE 636 W HCPCS: Performed by: NURSE PRACTITIONER

## 2018-07-18 PROCEDURE — 85025 COMPLETE CBC W/AUTO DIFF WBC: CPT

## 2018-07-18 PROCEDURE — 20600001 HC STEP DOWN PRIVATE ROOM

## 2018-07-18 PROCEDURE — 25000003 PHARM REV CODE 250: Performed by: NURSE PRACTITIONER

## 2018-07-18 PROCEDURE — 83735 ASSAY OF MAGNESIUM: CPT

## 2018-07-18 PROCEDURE — 99232 SBSQ HOSP IP/OBS MODERATE 35: CPT | Mod: ,,, | Performed by: NURSE PRACTITIONER

## 2018-07-18 PROCEDURE — 80053 COMPREHEN METABOLIC PANEL: CPT

## 2018-07-18 PROCEDURE — A4216 STERILE WATER/SALINE, 10 ML: HCPCS | Performed by: NURSE PRACTITIONER

## 2018-07-18 PROCEDURE — 36415 COLL VENOUS BLD VENIPUNCTURE: CPT

## 2018-07-18 RX ORDER — MAGNESIUM SULFATE HEPTAHYDRATE 40 MG/ML
2 INJECTION, SOLUTION INTRAVENOUS
Status: COMPLETED | OUTPATIENT
Start: 2018-07-18 | End: 2018-07-18

## 2018-07-18 RX ORDER — METOPROLOL TARTRATE 25 MG/1
50 TABLET, FILM COATED ORAL 3 TIMES DAILY
Status: DISCONTINUED | OUTPATIENT
Start: 2018-07-18 | End: 2018-07-21

## 2018-07-18 RX ADMIN — FUROSEMIDE 80 MG: 10 INJECTION, SOLUTION INTRAMUSCULAR; INTRAVENOUS at 08:07

## 2018-07-18 RX ADMIN — HYDRALAZINE HYDROCHLORIDE 10 MG: 10 TABLET, FILM COATED ORAL at 08:07

## 2018-07-18 RX ADMIN — LEVOTHYROXINE SODIUM 175 MCG: 100 TABLET ORAL at 08:07

## 2018-07-18 RX ADMIN — INSULIN ASPART 4 UNITS: 100 INJECTION, SOLUTION INTRAVENOUS; SUBCUTANEOUS at 12:07

## 2018-07-18 RX ADMIN — INSULIN ASPART 4 UNITS: 100 INJECTION, SOLUTION INTRAVENOUS; SUBCUTANEOUS at 05:07

## 2018-07-18 RX ADMIN — HYPROMELLOSE 2910 1 DROP: 5 SOLUTION OPHTHALMIC at 12:07

## 2018-07-18 RX ADMIN — INSULIN ASPART 2 UNITS: 100 INJECTION, SOLUTION INTRAVENOUS; SUBCUTANEOUS at 08:07

## 2018-07-18 RX ADMIN — FUROSEMIDE 80 MG: 10 INJECTION, SOLUTION INTRAMUSCULAR; INTRAVENOUS at 02:07

## 2018-07-18 RX ADMIN — ENOXAPARIN SODIUM 40 MG: 100 INJECTION SUBCUTANEOUS at 05:07

## 2018-07-18 RX ADMIN — INSULIN DETEMIR 5 UNITS: 100 INJECTION, SOLUTION SUBCUTANEOUS at 08:07

## 2018-07-18 RX ADMIN — HYPROMELLOSE 2910 1 DROP: 5 SOLUTION OPHTHALMIC at 02:07

## 2018-07-18 RX ADMIN — HYDRALAZINE HYDROCHLORIDE 10 MG: 10 TABLET, FILM COATED ORAL at 05:07

## 2018-07-18 RX ADMIN — MAGNESIUM SULFATE IN WATER 2 G: 40 INJECTION, SOLUTION INTRAVENOUS at 12:07

## 2018-07-18 RX ADMIN — SODIUM CHLORIDE, PRESERVATIVE FREE 3 ML: 5 INJECTION INTRAVENOUS at 02:07

## 2018-07-18 RX ADMIN — METOPROLOL SUCCINATE 50 MG: 50 TABLET, EXTENDED RELEASE ORAL at 08:07

## 2018-07-18 RX ADMIN — MAGNESIUM SULFATE IN WATER 2 G: 40 INJECTION, SOLUTION INTRAVENOUS at 10:07

## 2018-07-18 RX ADMIN — METOPROLOL TARTRATE 50 MG: 25 TABLET ORAL at 08:07

## 2018-07-18 RX ADMIN — STANDARDIZED SENNA CONCENTRATE AND DOCUSATE SODIUM 1 TABLET: 8.6; 5 TABLET, FILM COATED ORAL at 08:07

## 2018-07-18 RX ADMIN — HYDRALAZINE HYDROCHLORIDE 10 MG: 10 TABLET, FILM COATED ORAL at 02:07

## 2018-07-18 NOTE — ASSESSMENT & PLAN NOTE
-diabetes not controlled  -HgA1C 8.8/206  -SSI/ Mod scale in house  -initiating detemir 5 units qHS  -diabetic low sodium diet  -monitor

## 2018-07-18 NOTE — SUBJECTIVE & OBJECTIVE
Interval History: Ambulating in room, reports feeling much better, was able to sleep last night without shortness of breath. He denies chest pain, palpitations, or shortness of breath. Denies any acute events or distress overnight.     Review of Systems   Constitutional: Positive for activity change, appetite change and unexpected weight change. Negative for chills, fatigue and fever.   HENT: Negative for congestion, rhinorrhea, sinus pain, sinus pressure, sneezing, sore throat and trouble swallowing.    Respiratory: Positive for cough and shortness of breath (improved). Negative for chest tightness and wheezing.    Cardiovascular: Positive for leg swelling (improving ). Negative for chest pain and palpitations.   Gastrointestinal: Positive for abdominal distention (much improved). Negative for abdominal pain, constipation, diarrhea, nausea and vomiting.   Genitourinary: Negative for decreased urine volume, dysuria, frequency, hematuria and urgency.   Musculoskeletal: Negative for arthralgias, back pain, myalgias and neck pain.   Neurological: Negative for dizziness, syncope, weakness, light-headedness, numbness and headaches.     Objective:     Vital Signs (Most Recent):  Temp: 98.2 °F (36.8 °C) (07/17/18 2015)  Pulse: 75 (07/18/18 1100)  Resp: 13 (07/18/18 1100)  BP: 122/89 (07/18/18 1100)  SpO2: 98 % (07/18/18 1100) Vital Signs (24h Range):  Temp:  [98.2 °F (36.8 °C)] 98.2 °F (36.8 °C)  Pulse:  [57-94] 75  Resp:  [13-26] 13  SpO2:  [91 %-98 %] 98 %  BP: (111-125)/(77-96) 122/89     Weight: 73.7 kg (162 lb 7.7 oz)  Body mass index is 27.04 kg/m².    Intake/Output Summary (Last 24 hours) at 07/18/18 1504  Last data filed at 07/18/18 0600   Gross per 24 hour   Intake              720 ml   Output             1825 ml   Net            -1105 ml      Physical Exam   Constitutional: He is oriented to person, place, and time. He appears well-developed and well-nourished. No distress.   HENT:   Head: Normocephalic and  atraumatic.   Mouth/Throat: No oropharyngeal exudate.   Eyes: Pupils are equal, round, and reactive to light. No scleral icterus.   Neck: Normal range of motion. Neck supple. Hepatojugular reflux and JVD (20cm) present. No tracheal deviation present. No thyromegaly present.   Cardiovascular: Normal rate, regular rhythm and intact distal pulses.  Exam reveals gallop and S3. Exam reveals no friction rub.    Murmur heard.   No systolic murmur is present    Diastolic (mitral inflow murmer best heard anterior apex) murmur is present   Pulmonary/Chest: Effort normal and breath sounds normal. No respiratory distress. He has no wheezes. He has no rales.   Abdominal: Soft. Bowel sounds are normal. He exhibits distension (improving ). There is no tenderness.   Musculoskeletal: Normal range of motion. He exhibits edema (1+ pitting edema from feet to knees, much improved since admission). He exhibits no tenderness.   Neurological: He is alert and oriented to person, place, and time. Coordination normal.   Skin: Skin is warm and dry. Capillary refill takes 2 to 3 seconds. No rash noted. He is not diaphoretic. No erythema.   Psychiatric: He has a normal mood and affect. His behavior is normal. Judgment and thought content normal.   Nursing note and vitals reviewed.    Significant Labs:   CBC:   Recent Labs  Lab 07/18/18  0536   WBC 4.32   HGB 13.8*   HCT 41.3   *     CMP:   Recent Labs  Lab 07/17/18  0529 07/18/18  0536    137   K 4.0 4.2    102   CO2 22* 25   * 145*   BUN 33* 38*   CREATININE 1.8* 1.8*   CALCIUM 8.4* 8.7   PROT 6.0 6.1   ALBUMIN 2.7* 2.6*   BILITOT 1.0 1.3*   ALKPHOS 146* 141*   AST 46* 35   ALT 69* 60*   ANIONGAP 9 10   EGFRNONAA 41.7* 41.7*     Magnesium:   Recent Labs  Lab 07/16/18 2020 07/18/18  0536   MG 1.8 1.6     All pertinent labs within the past 24 hours have been reviewed.    Significant Imaging: I have reviewed all pertinent imaging results/findings within the past 24  hours.

## 2018-07-18 NOTE — PLAN OF CARE
Problem: Patient Care Overview  Goal: Plan of Care Review  Outcome: Ongoing (interventions implemented as appropriate)  Patient remained stable. VS stable. Blood sugar in the low 200's, received insulin. Mg level was 1.6 and he received 4G of Mg. Going to be NPO tonight for PET stress in the AM. Understands plan of care. Pt has 1500 fluid restriction.

## 2018-07-18 NOTE — PROGRESS NOTES
[facility] Hospital Medicine  Progress Note    Patient Name: Ashish Mckeon  MRN: [id number]  Patient Class: IP- Inpatient   Admission Date: 7/16/2018  Length of Stay: 2 days  Attending Physician: Dana Skelton MD  Primary Care Provider: [facility] Medicine Team: [facility] MED OMAR Stanley NP    Subjective:     Principal Problem:Acute on chronic combined systolic and diastolic congestive heart failure    HPI:  Mr. Mckeon is a 53 year old male with past medical history of HFrEF (35-40% unknown type/ thinks he had stress test in distant past), Type II DM on oral hypoglycemics, HTN, bowel resection d/t obstruction, and surgical thyroidectomy at [facility] for h/o hyperthyroidism s/p iodine/ radiation, and intermittent drinker/ intermittent THC usage.  He presented to ED with 3 week history of progressive shortness of breath, exertional dyspnea, worsening PND and several days of increasing lower extremity edema.  He does not follow a salt restricted diet, however he does take his medications as prescribed although he ran out of losartan recently. He denies chest pain, lightheadedness, dizziness, and palpitations.  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 He follows at [facility] and has not seen a cardiologist that he can recall.     In ED: CBC stable, chemistry with SCr of 2.3 and elevated alk phos/AST/ALT, troponin positive at 0.037, BNP ~2800, HgA1C 8.8/206, TSH 3.929, EKG with SR with 1st degree AVD and occasional PVCs, and CXR with possible early edema and small left plerual effusion. The patient was admitted to the Hospital Medicine Service for further evaluation and management.     Hospital Course:  Mr. Mckeon was admitted 7/16 due to heart failure exacerbation. 2D echo with EF 25-30%, ROBERTO, RV enlargement with moderately depressed systolic function, pulmHTN with PASP 62, trivial AR, MV mildly sclerotic with evidence  of tethered posterior leaflet, severe MR, moderate TR, small pericardial effusion, global hypokinesis, and RAP 8. MR and EF have worsened since January 2017 study. Home toprol continued, no ACEi/ARB due to KEYANNA vs KEYANNA on CKD (baseline appears 1.3-1.5). Diuresis initiated with IVP lasix, currently net negative 1.6 liters (likely inaccurate), weight down 17 lbs. Troponin trend essentially flat .042 >> 0.047 >. 0.037 >> 0.055 and he denies chest pain, will plan for PET stress this admission to risk stratify. Having episodes of NSVT, after PET stress will likely need EP consult to assess for ICD candidacy.     Disposition plans: home with family, outpt follow up with [facility] [place].    Interval History: Ambulating in room, reports feeling much better, was able to sleep last night without shortness of breath. He denies chest pain, palpitations, or shortness of breath. Denies any acute events or distress overnight.     Review of Systems   Constitutional: Positive for activity change, appetite change and unexpected weight change. Negative for chills, fatigue and fever.   HENT: Negative for congestion, rhinorrhea, sinus pain, sinus pressure, sneezing, sore throat and trouble swallowing.    Respiratory: Positive for cough and shortness of breath (improved). Negative for chest tightness and wheezing.    Cardiovascular: Positive for leg swelling (improving ). Negative for chest pain and palpitations.   Gastrointestinal: Positive for abdominal distention (much improved). Negative for abdominal pain, constipation, diarrhea, nausea and vomiting.   Genitourinary: Negative for decreased urine volume, dysuria, frequency, hematuria and urgency.   Musculoskeletal: Negative for arthralgias, back pain, myalgias and neck pain.   Neurological: Negative for dizziness, syncope, weakness, light-headedness, numbness and headaches.     Objective:     Vital Signs (Most Recent):  Temp: 98.2 °F (36.8 °C) (07/17/18 2015)  Pulse: 75  (07/18/18 1100)  Resp: 13 (07/18/18 1100)  BP: 122/89 (07/18/18 1100)  SpO2: 98 % (07/18/18 1100) Vital Signs (24h Range):  Temp:  [98.2 °F (36.8 °C)] 98.2 °F (36.8 °C)  Pulse:  [57-94] 75  Resp:  [13-26] 13  SpO2:  [91 %-98 %] 98 %  BP: (111-125)/(77-96) 122/89     Weight: 73.7 kg (162 lb 7.7 oz)  Body mass index is 27.04 kg/m².    Intake/Output Summary (Last 24 hours) at 07/18/18 1504  Last data filed at 07/18/18 0600   Gross per 24 hour   Intake              720 ml   Output             1825 ml   Net            -1105 ml      Physical Exam   Constitutional: He is oriented to person, place, and time. He appears well-developed and well-nourished. No distress.   HENT:   Head: Normocephalic and atraumatic.   Mouth/Throat: No oropharyngeal exudate.   Eyes: Pupils are equal, round, and reactive to light. No scleral icterus.   Neck: Normal range of motion. Neck supple. Hepatojugular reflux and JVD (20cm) present. No tracheal deviation present. No thyromegaly present.   Cardiovascular: Normal rate, regular rhythm and intact distal pulses.  Exam reveals gallop and S3. Exam reveals no friction rub.    Murmur heard.   No systolic murmur is present    Diastolic (mitral inflow murmer best heard anterior apex) murmur is present   Pulmonary/Chest: Effort normal and breath sounds normal. No respiratory distress. He has no wheezes. He has no rales.   Abdominal: Soft. Bowel sounds are normal. He exhibits distension (improving ). There is no tenderness.   Musculoskeletal: Normal range of motion. He exhibits edema (1+ pitting edema from feet to knees, much improved since admission). He exhibits no tenderness.   Neurological: He is alert and oriented to person, place, and time. Coordination normal.   Skin: Skin is warm and dry. Capillary refill takes 2 to 3 seconds. No rash noted. He is not diaphoretic. No erythema.   Psychiatric: He has a normal mood and affect. His behavior is normal. Judgment and thought content normal.   Nursing  note and vitals reviewed.    Significant Labs:   CBC:   Recent Labs  Lab 07/18/18  0536   WBC 4.32   HGB 13.8*   HCT 41.3   *     CMP:   Recent Labs  Lab 07/17/18  0529 07/18/18  0536    137   K 4.0 4.2    102   CO2 22* 25   * 145*   BUN 33* 38*   CREATININE 1.8* 1.8*   CALCIUM 8.4* 8.7   PROT 6.0 6.1   ALBUMIN 2.7* 2.6*   BILITOT 1.0 1.3*   ALKPHOS 146* 141*   AST 46* 35   ALT 69* 60*   ANIONGAP 9 10   EGFRNONAA 41.7* 41.7*     Magnesium:   Recent Labs  Lab 07/16/18 2020 07/18/18  0536   MG 1.8 1.6     All pertinent labs within the past 24 hours have been reviewed.    Significant Imaging: I have reviewed all pertinent imaging results/findings within the past 24 hours.    Assessment/Plan:      * Acute on chronic combined systolic and diastolic congestive heart failure    -presents with acute systolic heart failure, BNP 2790 (previous 556), CXR with pulm edema, ECG SR with first degree AVB  -last known EF 1/17/17 echo EF 35%, RAP 8, moderate MR, + DD, +WMA, GHK and PAP 30  -repeat 2D echo with EF 25-30%, ROBERTO, RV enlargement with moderately depressed systolic function, pulmHTN with PASP 62, trivial AR, MV mildly sclerotic with evidence of tethered posterior leaflet, severe MR, moderate TR, small pericardial effusion, global hypokinesis, and RAP 8; MR and EF have worsened since January 2017 study   -continue home toprol   -no ACEi/ARB due to KEYANNA vs KEYANNA on CKD (baseline appears 1.3-1.5)  -hydralazine as tolerated for afterload reduction >>monitor and escalate therapy as needed   -diuresis initiated with IVP lasix >> continue   -currently net negative 1.6 liters (miky in accurate), weight down 17 lbs   -troponin trend essentially flat .042 >> 0.047 >. 0.037 >> 0.055 and he denies chest pain, will plan for PET stress this admission to risk stratify  -episodes of NSVT on tele without hemodynamic compromise, continue to monitor, consider EP consult for AICD after PET stress   -continue tele  monitoring  -strict I&Os and daily weights  -outpt follow up with [facility] at discharge         NSVT (nonsustained ventricular tachycardia)    -see above  -continue tele monitoring         KEYANNA (acute kidney injury)    -admission SCr 2.3 >> now 1.8  -baseline from chart review, although data limited appears 1.3-1.5  -monitor with diuresis   -if does not improve will send urine studies and obtain renal US         Essential hypertension    -BP stable  -monitor with toprol and hydralazine  -adjust therapies as needed        Non-rheumatic mitral regurgitation    -see above for echo results        Diabetes mellitus, type II    -diabetes not controlled  -HgA1C 8.8/206  -SSI/ Mod scale in house  -initiating detemir 5 units qHS  -diabetic low sodium diet  -monitor         Hypothyroidism    -surgical hypothyroidism, on synthroid for life  -TSH 3.9        Megaloblastic anemia due to vitamin B12 deficiency    -CBC stable   -monitor         Substance use disorder    -encouraged cessation from ETOH and THC          VTE Risk Mitigation         Ordered     enoxaparin injection 40 mg  Daily      07/17/18 1016     IP VTE HIGH RISK PATIENT  Once      07/17/18 0904     Place sequential compression device  Until discontinued      07/16/18 1537          Robyn Stanley, DNP, AG-ACNP, BC  Department of Hospital Medicine  [facility]  Pager [phone]  [place] [id number]

## 2018-07-18 NOTE — PROGRESS NOTES
11 beat run of VT. Pt asymptomatic, denies any chest pain or palpitations. VSS. Notified Merissa Stanley NP. No further orders given.

## 2018-07-19 ENCOUNTER — CLINICAL SUPPORT (OUTPATIENT)
Dept: CARDIOLOGY | Facility: CLINIC | Age: 53
DRG: 286 | End: 2018-07-19
Attending: EMERGENCY MEDICINE
Payer: MEDICAID

## 2018-07-19 LAB
ALBUMIN SERPL BCP-MCNC: 2.9 G/DL
ALP SERPL-CCNC: 144 U/L
ALT SERPL W/O P-5'-P-CCNC: 50 U/L
ANION GAP SERPL CALC-SCNC: 7 MMOL/L
AST SERPL-CCNC: 29 U/L
BASOPHILS # BLD AUTO: 0.08 K/UL
BASOPHILS NFR BLD: 1.8 %
BILIRUB SERPL-MCNC: 1.4 MG/DL
BUN SERPL-MCNC: 41 MG/DL
CALCIUM SERPL-MCNC: 9.1 MG/DL
CHLORIDE SERPL-SCNC: 95 MMOL/L
CO2 SERPL-SCNC: 32 MMOL/L
CREAT SERPL-MCNC: 2 MG/DL
DIASTOLIC DYSFUNCTION: NO
DIFFERENTIAL METHOD: ABNORMAL
EOSINOPHIL # BLD AUTO: 0.5 K/UL
EOSINOPHIL NFR BLD: 10.4 %
ERYTHROCYTE [DISTWIDTH] IN BLOOD BY AUTOMATED COUNT: 14.6 %
EST. GFR  (AFRICAN AMERICAN): 42.5 ML/MIN/1.73 M^2
EST. GFR  (NON AFRICAN AMERICAN): 36.7 ML/MIN/1.73 M^2
GLUCOSE SERPL-MCNC: 105 MG/DL
HCT VFR BLD AUTO: 43.7 %
HGB BLD-MCNC: 14.4 G/DL
IMM GRANULOCYTES # BLD AUTO: 0.01 K/UL
IMM GRANULOCYTES NFR BLD AUTO: 0.2 %
LYMPHOCYTES # BLD AUTO: 0.7 K/UL
LYMPHOCYTES NFR BLD: 16.1 %
MAGNESIUM SERPL-MCNC: 2.2 MG/DL
MCH RBC QN AUTO: 27 PG
MCHC RBC AUTO-ENTMCNC: 33 G/DL
MCV RBC AUTO: 82 FL
MONOCYTES # BLD AUTO: 0.7 K/UL
MONOCYTES NFR BLD: 16.6 %
NEUTROPHILS # BLD AUTO: 2.4 K/UL
NEUTROPHILS NFR BLD: 54.9 %
NRBC BLD-RTO: 0 /100 WBC
PLATELET # BLD AUTO: 156 K/UL
PMV BLD AUTO: 11.9 FL
POCT GLUCOSE: 275 MG/DL (ref 70–110)
POCT GLUCOSE: 82 MG/DL (ref 70–110)
POCT GLUCOSE: 88 MG/DL (ref 70–110)
POTASSIUM SERPL-SCNC: 4.9 MMOL/L
PROT SERPL-MCNC: 6.5 G/DL
RBC # BLD AUTO: 5.33 M/UL
SODIUM SERPL-SCNC: 134 MMOL/L
WBC # BLD AUTO: 4.41 K/UL

## 2018-07-19 PROCEDURE — A9555 RB82 RUBIDIUM: HCPCS

## 2018-07-19 PROCEDURE — 63600175 PHARM REV CODE 636 W HCPCS: Performed by: NURSE PRACTITIONER

## 2018-07-19 PROCEDURE — 78492 MYOCRD IMG PET MLT RST&STRS: CPT | Mod: ,,, | Performed by: INTERNAL MEDICINE

## 2018-07-19 PROCEDURE — 99232 SBSQ HOSP IP/OBS MODERATE 35: CPT | Mod: ,,, | Performed by: NURSE PRACTITIONER

## 2018-07-19 PROCEDURE — 25000003 PHARM REV CODE 250: Performed by: NURSE PRACTITIONER

## 2018-07-19 PROCEDURE — 85025 COMPLETE CBC W/AUTO DIFF WBC: CPT

## 2018-07-19 PROCEDURE — 93018 CV STRESS TEST I&R ONLY: CPT | Mod: ,,, | Performed by: INTERNAL MEDICINE

## 2018-07-19 PROCEDURE — 20600001 HC STEP DOWN PRIVATE ROOM

## 2018-07-19 PROCEDURE — 93016 CV STRESS TEST SUPVJ ONLY: CPT | Mod: ,,, | Performed by: INTERNAL MEDICINE

## 2018-07-19 PROCEDURE — A4216 STERILE WATER/SALINE, 10 ML: HCPCS | Performed by: NURSE PRACTITIONER

## 2018-07-19 PROCEDURE — 83735 ASSAY OF MAGNESIUM: CPT

## 2018-07-19 PROCEDURE — 80053 COMPREHEN METABOLIC PANEL: CPT

## 2018-07-19 PROCEDURE — 36415 COLL VENOUS BLD VENIPUNCTURE: CPT

## 2018-07-19 RX ORDER — FUROSEMIDE 40 MG/1
40 TABLET ORAL DAILY
Status: DISCONTINUED | OUTPATIENT
Start: 2018-07-20 | End: 2018-07-21 | Stop reason: HOSPADM

## 2018-07-19 RX ADMIN — METOPROLOL TARTRATE 50 MG: 25 TABLET ORAL at 04:07

## 2018-07-19 RX ADMIN — LEVOTHYROXINE SODIUM 175 MCG: 100 TABLET ORAL at 09:07

## 2018-07-19 RX ADMIN — INSULIN DETEMIR 5 UNITS: 100 INJECTION, SOLUTION SUBCUTANEOUS at 08:07

## 2018-07-19 RX ADMIN — METOPROLOL TARTRATE 50 MG: 25 TABLET ORAL at 08:07

## 2018-07-19 RX ADMIN — HYDRALAZINE HYDROCHLORIDE 10 MG: 10 TABLET, FILM COATED ORAL at 05:07

## 2018-07-19 RX ADMIN — METOPROLOL TARTRATE 50 MG: 25 TABLET ORAL at 09:07

## 2018-07-19 RX ADMIN — SODIUM CHLORIDE, PRESERVATIVE FREE 3 ML: 5 INJECTION INTRAVENOUS at 04:07

## 2018-07-19 RX ADMIN — STANDARDIZED SENNA CONCENTRATE AND DOCUSATE SODIUM 1 TABLET: 8.6; 5 TABLET, FILM COATED ORAL at 09:07

## 2018-07-19 RX ADMIN — HYDRALAZINE HYDROCHLORIDE 10 MG: 10 TABLET, FILM COATED ORAL at 08:07

## 2018-07-19 RX ADMIN — ENOXAPARIN SODIUM 40 MG: 100 INJECTION SUBCUTANEOUS at 06:07

## 2018-07-19 RX ADMIN — INSULIN ASPART 3 UNITS: 100 INJECTION, SOLUTION INTRAVENOUS; SUBCUTANEOUS at 08:07

## 2018-07-19 NOTE — HOSPITAL COURSE
In ED, CXR showed mild left sided pleural effusion. BNP 2790, last  on 1/16/17. EKG showed NSR with 1st degree AVB. Trop highest measurement was 0.055. He was IV diuresed with good response. Net negative 2.2L since admission. ECHO 7/17/18 showed EF 25-30% with ROBERTO and severe MR.     PET stress showed no evidence of a discrete hemodynamically significant coronary stenosis on relative perfusion images. However, there is resting flow heterogeneity that does not improve after Dipyridamole in addition to reduced coronary flow reserve. Absolute myocardial stress flow is severely reduced in all quadrants   with a whole heart stress flow of 0.64 cc/min/gm . Coronary flow reserve is also signficantly reduced at 1.34. These findings may be consistent with multivessel coronary artery disease. There is abnormal wall motion at rest showing moderate global hypokinesis of the left ventricle. There is abnormal wall motion at stress showing moderate global hypokinesis of the left ventricle.     On telemetry, patient was having NSVT with highest run at 14 beats. He was asymptomatic during these episodes. EP consulted for evaluated of NSVT. Currently he feels back to his baseline after diuresis.

## 2018-07-19 NOTE — NURSING TRANSFER
Nursing Transfer Note      7/19/2018     Transfer To: Cardiac Pet Stress Test    Transfer via wheelchair    Transfer with cardiac monitoring    Transported by Annette Meredith    Medicines sent: No    Chart send with patient: No

## 2018-07-19 NOTE — ASSESSMENT & PLAN NOTE
-admission SCr 2.3 >> now 1.8-2.0  -baseline from chart review, although data limited appears 1.3-1.5  -monitor with diuresis   -if does not improve will send urine studies and obtain renal US

## 2018-07-19 NOTE — PROGRESS NOTES
Plan of care discussed with patient. Patient is free of fall/trauma/injury. Denies CP, SOB, or pain/discomfort. NPO at midnight for PET stress test.  IV lasix given per MAR.  All questions addressed. Will continue to monitor.

## 2018-07-19 NOTE — HPI
"Mr. Mckeon is a 53 year old male with a PMH significant for DMII (non-insulin), Hyperthyroidism (thyroidectomy), HTN, alcohol and Marijuana abuse, chronic systolic and diastolic CHF that presents with 3 weeks of dyspnea. No known prior history of CAD. Had a stress test "years ago" but reportedly was negative. 1/17/17 an ECHO showed EF 35-40% with moderate MR that was presumed to be non-ischemic. He has been managed by primary care for the past 2 years with cozaar, metoprolol but has not been followed by a cardiologist. Approximately 3 weeks ago he began having dyspnea on exertion that has become progressively worse. At baseline he works as a . He then developed new onset orthopnea, paroxysmal nocturnal dyspnea, and lower extremity edema. He then was unable to walk to work from his care without having to stop to rest. Ran out of cozaar 1 week prior to presentation. Denies syncope, palpitations, chest pain, cough, fever, chills, dysuria. No known family history of CHF or CAD. Notes he had an "arrhythmia" as a child but never underwent work up. Notes compliance with other medications. Drinks 6 beers a week. Uses marijuana about once a month. Denies smoking history.       "

## 2018-07-19 NOTE — ASSESSMENT & PLAN NOTE
-Asymptomatic runs of NSVT in setting of new onset reduced EF. No known prior history of CAD but PET stress test suggests possibility of 3 vessel disease.   -Recommend coronary angiogram to help differentiate ischemic vs. Non-ischemic cardiomyopathy  - ICD implantation not recommended at this time  - If angiogram is not significant and does not require revascularization, consider LifeVest prior to discharge and optimization of heart failure medications.   - Consider addition of Coreg prior to discharge for CHF, PVCs and NSVT.   - Recommend follow up ECHO in 3 months  - Final recommendation to follow tomorrow. Continue to monitor on telemetry. EP will continue to follow.

## 2018-07-19 NOTE — PROGRESS NOTES
[facility] Hospital Medicine  Progress Note    Patient Name: Ashish Mckeon  MRN: [id number]  Patient Class: IP- Inpatient   Admission Date: 7/16/2018  Length of Stay: 3 days  Attending Physician: Dana Skelton MD  Primary Care Provider: [facility] Medicine Team: [facility]OSP MED OMAR Stanley NP    Subjective:     Principal Problem:Acute on chronic combined systolic and diastolic congestive heart failure    HPI:  Mr. Mckeon is a 53 year old male with past medical history of HFrEF (35-40% unknown type/ thinks he had stress test in distant past), Type II DM on oral hypoglycemics, HTN, bowel resection d/t obstruction, and surgical thyroidectomy at [facility] for h/o hyperthyroidism s/p iodine/ radiation, and intermittent drinker/ intermittent THC usage.  He presented to ED with 3 week history of progressive shortness of breath, exertional dyspnea, worsening PND and several days of increasing lower extremity edema.  He does not follow a salt restricted diet, however he does take his medications as prescribed although he ran out of losartan recently. He denies chest pain, lightheadedness, dizziness, and palpitations.  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 He follows at [facility] and has not seen a cardiologist that he can recall.     In ED: CBC stable, chemistry with SCr of 2.3 and elevated alk phos/AST/ALT, troponin positive at 0.037, BNP ~2800, HgA1C 8.8/206, TSH 3.929, EKG with SR with 1st degree AVD and occasional PVCs, and CXR with possible early edema and small left plerual effusion. The patient was admitted to the Hospital Medicine Service for further evaluation and management.     Hospital Course:  Mr. Mckeon was admitted 7/16 due to heart failure exacerbation. 2D echo with EF 25-30%, ROBERTO, RV enlargement with moderately depressed systolic function, pulmHTN with PASP 62, trivial AR, MV mildly sclerotic with evidence  "of tethered posterior leaflet, severe MR, moderate TR, small pericardial effusion, global hypokinesis, and RAP 8. MR and EF have worsened since January 2017 study. Home toprol continued, no ACEi/ARB due to KEYANNA vs KEYANNA on CKD (baseline appears 1.3-1.5). Diuresis initiated with IVP lasix, I&Os have been inaccurate, weight down 25 lbs. Troponin trend essentially flat .042 >> 0.047 >. 0.037 >> 0.055 and he denies chest pain, PET stress 7/19  this admission to risk stratify, negative for stress induced perfusion defects . Having episodes of NSVT, asymptomatic, EP consulted, pending evaluation and recommendations.     Disposition plans: home with family, outpt follow up with [facility] [place].    Interval History: Resting in bed, has returned from stress test. Denies needs or complaints, reports admission symptoms "are gone". He denies chest pain, palpitations, or shortness of breath. Denies any acute events or distress overnight.     Review of Systems   Constitutional: Negative for activity change, appetite change, chills, fatigue, fever and unexpected weight change.   HENT: Negative for congestion, rhinorrhea, sinus pain, sinus pressure, sneezing, sore throat and trouble swallowing.    Respiratory: Negative for cough, chest tightness, shortness of breath and wheezing.    Cardiovascular: Positive for leg swelling (nearly resolved). Negative for chest pain and palpitations.   Gastrointestinal: Negative for abdominal distention, abdominal pain, constipation, diarrhea, nausea and vomiting.   Genitourinary: Negative for decreased urine volume, dysuria, frequency, hematuria and urgency.   Musculoskeletal: Negative for arthralgias, back pain, myalgias and neck pain.   Neurological: Negative for dizziness, syncope, weakness, light-headedness, numbness and headaches.     Objective:     Vital Signs (Most Recent):  Temp: 98 °F (36.7 °C) (07/19/18 1200)  Pulse: 71 (07/19/18 1200)  Resp: 16 (07/19/18 1200)  BP: 104/71 " (07/19/18 1200)  SpO2: (!) 92 % (07/19/18 1200) Vital Signs (24h Range):  Temp:  [98 °F (36.7 °C)] 98 °F (36.7 °C)  Pulse:  [69-85] 71  Resp:  [16-18] 16  SpO2:  [92 %-97 %] 92 %  BP: (104-137)/() 104/71     Weight: 70.1 kg (154 lb 8.7 oz)  Body mass index is 25.72 kg/m².    Intake/Output Summary (Last 24 hours) at 07/19/18 1350  Last data filed at 07/19/18 0600   Gross per 24 hour   Intake              670 ml   Output             1550 ml   Net             -880 ml      Physical Exam   Constitutional: He is oriented to person, place, and time. He appears well-developed and well-nourished. No distress.   HENT:   Head: Normocephalic and atraumatic.   Mouth/Throat: No oropharyngeal exudate.   Eyes: Pupils are equal, round, and reactive to light. No scleral icterus.   Neck: Normal range of motion. Neck supple. Hepatojugular reflux present. No JVD present. No tracheal deviation present. No thyromegaly present.   Cardiovascular: Normal rate, regular rhythm and intact distal pulses.  Exam reveals gallop and S3. Exam reveals no friction rub.    Murmur heard.   No systolic murmur is present    Diastolic (mitral inflow murmer best heard anterior apex) murmur is present   Pulmonary/Chest: Effort normal and breath sounds normal. No respiratory distress. He has no wheezes. He has no rales.   Abdominal: Soft. Bowel sounds are normal. He exhibits no distension. There is no tenderness.   Musculoskeletal: Normal range of motion. He exhibits edema (1+ pitting edema from feet to knees, much improved since admission). He exhibits no tenderness.   Neurological: He is alert and oriented to person, place, and time. Coordination normal.   Skin: Skin is warm and dry. Capillary refill takes 2 to 3 seconds. No rash noted. He is not diaphoretic. No erythema.   Psychiatric: He has a normal mood and affect. His behavior is normal. Judgment and thought content normal.   Nursing note and vitals reviewed.    Significant Labs:   CBC:     Recent  Labs  Lab 07/18/18  0536 07/19/18  0538   WBC 4.32 4.41   HGB 13.8* 14.4   HCT 41.3 43.7   * 156     CMP:     Recent Labs  Lab 07/18/18  0536 07/19/18  0538    134*   K 4.2 4.9    95   CO2 25 32*   * 105   BUN 38* 41*   CREATININE 1.8* 2.0*   CALCIUM 8.7 9.1   PROT 6.1 6.5   ALBUMIN 2.6* 2.9*   BILITOT 1.3* 1.4*   ALKPHOS 141* 144*   AST 35 29   ALT 60* 50*   ANIONGAP 10 7*   EGFRNONAA 41.7* 36.7*     Magnesium:     Recent Labs  Lab 07/18/18  0536 07/19/18  0538   MG 1.6 2.2     All pertinent labs within the past 24 hours have been reviewed.    Significant Imaging: I have reviewed all pertinent imaging results/findings within the past 24 hours.    Assessment/Plan:      * Acute on chronic combined systolic and diastolic congestive heart failure    -presents with acute systolic heart failure, BNP 2790 (previous 556), CXR with pulm edema, ECG SR with first degree AVB  -last known EF 1/17/17 echo EF 35%, RAP 8, moderate MR, + DD, +WMA, GHK and PAP 30  -repeat 2D echo with EF 25-30%, ROBERTO, RV enlargement with moderately depressed systolic function, pulmHTN with PASP 62, trivial AR, MV mildly sclerotic with evidence of tethered posterior leaflet, severe MR, moderate TR, small pericardial effusion, global hypokinesis, and RAP 8; MR and EF have worsened since January 2017 study   -continue home toprol   -no ACEi/ARB due to KEYANNA vs KEYANNA on CKD (baseline appears 1.3-1.5)  -hydralazine as tolerated for afterload reduction >>monitor and escalate therapy as needed   -diuresis initiated with IVP lasix >> to PO 7/19  -I&Os are inaccurate, weight down 25 lbs   -troponin trend essentially flat .042 >> 0.047 >. 0.037 >> 0.055 and he denies chest pain   -Pet stress 7/19 negative for stress induced perfusion defects  -episodes of NSVT on tele without hemodynamic compromise, continue to monitor, EP consulted, pending evaluation and recs  -continue tele monitoring  -strict I&Os and daily weights  -outpt follow up  with [facility] at discharge         NSVT (nonsustained ventricular tachycardia)    -see above  -continue tele monitoring         KEYANNA (acute kidney injury)    -admission SCr 2.3 >> now 1.8-2.0  -baseline from chart review, although data limited appears 1.3-1.5  -monitor with diuresis   -if does not improve will send urine studies and obtain renal US         Essential hypertension    -BP stable  -monitor with toprol and hydralazine  -adjust therapies as needed        Non-rheumatic mitral regurgitation    -see above for echo results        Diabetes mellitus, type II    -diabetes not controlled  -HgA1C 8.8/206  -SSI/ Mod scale in house  -initiating detemir 5 units qHS  -diabetic low sodium diet  -monitor         Hypothyroidism    -surgical hypothyroidism, on synthroid for life  -TSH 3.9        Megaloblastic anemia due to vitamin B12 deficiency    -CBC stable   -monitor         Substance use disorder    -encouraged cessation from ETOH and THC          VTE Risk Mitigation         Ordered     enoxaparin injection 40 mg  Daily      07/17/18 1016     IP VTE HIGH RISK PATIENT  Once      07/17/18 0904     Place sequential compression device  Until discontinued      07/16/18 2191          Robyn Stanley, DNP, AG-ACNP, BC  Department of Hospital Medicine  [facility]  Pager [phone]  [facility] [id number]

## 2018-07-19 NOTE — CONSULTS
"[facility]  Cardiac Electrophysiology  Consult Note    Admission Date: 7/16/2018  Code Status: Full Code   Attending Provider: aDna Skelton MD  Consulting Provider: Miguelito Hansen MD  Principal Problem:Acute on chronic combined systolic and diastolic congestive heart failure    Inpatient consult to Electrophysiology  Consult performed by: MIGUELITO HANSEN  Consult ordered by: SAMMIE DE LA TORRE  Reason for consult: NSVT        Subjective:     Chief Complaint:  dypsnea      HPI:   Mr. Mckeon is a 53 year old male with a PMH significant for DMII (non-insulin), Hyperthyroidism (thyroidectomy), HTN, alcohol and Marijuana abuse, chronic systolic and diastolic CHF that presents with 3 weeks of dyspnea. No known prior history of CAD. Had a stress test "years ago" but reportedly was negative. 1/17/17 an ECHO showed EF 35-40% with moderate MR that was presumed to be non-ischemic. He has been managed by primary care for the past 2 years with cozaar, metoprolol but has not been followed by a cardiologist. Approximately 3 weeks ago he began having dyspnea on exertion that has become progressively worse. At baseline he works as a . He then developed new onset orthopnea, paroxysmal nocturnal dyspnea, and lower extremity edema. He then was unable to walk to work from his care without having to stop to rest. Ran out of cozaar 1 week prior to presentation. Denies syncope, palpitations, chest pain, cough, fever, chills, dysuria. No known family history of CHF or CAD. Notes he had an "arrhythmia" as a child but never underwent work up. Notes compliance with other medications. Drinks 6 beers a week. Uses marijuana about once a month. Denies smoking history.         Past Medical History:   Diagnosis Date    Diabetes mellitus type II     Essential hypertension, benign     Hyperlipidemia     Hypothyroidism     Undiagnosed cardiac murmurs        Past Surgical History:   Procedure Laterality Date    COLON " "SURGERY      "lower intestines removed"    THYROID SURGERY         Review of patient's allergies indicates:   Allergen Reactions    Lisinopril Other (See Comments)     acei cough         No current facility-administered medications on file prior to encounter.      Current Outpatient Prescriptions on File Prior to Encounter   Medication Sig    furosemide (LASIX) 40 MG tablet Take 1 tablet (40 mg total) by mouth once daily.    LEVOTHYROXINE SODIUM (SYNTHROID ORAL) Take 175 mcg by mouth daily 2 hours after breakfast.     losartan (COZAAR) 25 MG tablet Take 1 tablet (25 mg total) by mouth once daily.    METFORMIN HCL (METFORMIN ORAL) Take 500 mg by mouth 2 (two) times daily. Patient taking daily    metoprolol succinate (TOPROL XL) 50 MG 24 hr tablet Take 1 tablet (50 mg total) by mouth once daily.    folic acid (FOLVITE) 1 MG tablet Take 1 tablet (1 mg total) by mouth once daily.    simvastatin (ZOCOR) 10 MG tablet Take 1 tablet (10 mg total) by mouth every evening.     Family History     Problem Relation (Age of Onset)    Cancer Father        Social History Main Topics    Smoking status: Current Some Day Smoker     Types: Cigars    Smokeless tobacco: Not on file      Comment: cigars "every other week or so"    Alcohol use 3.6 oz/week     6 Cans of beer per week    Drug use: Yes     Types: Marijuana    Sexual activity: Yes     Partners: Female     Review of Systems   Constitution: Negative for chills.   HENT: Negative for congestion.    Eyes: Negative for blurred vision.   Cardiovascular: Positive for leg swelling, orthopnea and paroxysmal nocturnal dyspnea. Negative for chest pain, near-syncope, palpitations and syncope.   Respiratory: Positive for shortness of breath. Negative for cough.    Endocrine: Negative for polyuria.   Skin: Negative for itching and rash.   Musculoskeletal: Negative for back pain.   Gastrointestinal: Negative for abdominal pain, constipation, diarrhea, nausea and vomiting. "   Genitourinary: Negative for dysuria.   Neurological: Negative for dizziness, headaches and light-headedness.   Psychiatric/Behavioral: Negative for altered mental status.     Objective:     Vital Signs (Most Recent):  Temp: 98.7 °F (37.1 °C) (07/19/18 1619)  Pulse: 71 (07/19/18 1619)  Resp: 18 (07/19/18 1619)  BP: 99/66 (07/19/18 1619)  SpO2: 95 % (07/19/18 1619) Vital Signs (24h Range):  Temp:  [98 °F (36.7 °C)-98.7 °F (37.1 °C)] 98.7 °F (37.1 °C)  Pulse:  [69-85] 71  Resp:  [16-18] 18  SpO2:  [92 %-97 %] 95 %  BP: ()/(66-88) 99/66       Weight: 70.1 kg (154 lb 8.7 oz)  Body mass index is 25.72 kg/m².    SpO2: 95 %  O2 Device (Oxygen Therapy): room air    Physical Exam   Constitutional: He is oriented to person, place, and time. He appears well-developed and well-nourished.   HENT:   Head: Normocephalic and atraumatic.   Eyes: Conjunctivae are normal. Pupils are equal, round, and reactive to light.   Neck: Normal range of motion. Neck supple.   Cardiovascular: Normal rate, regular rhythm, S1 normal, S2 normal and normal heart sounds.  Exam reveals no gallop and no friction rub.    No murmur heard.  Pulses:       Radial pulses are 2+ on the right side, and 2+ on the left side.   Pulmonary/Chest: Effort normal and breath sounds normal. No respiratory distress. He has no wheezes. He has no rales. He exhibits no tenderness.   Abdominal: Soft. Bowel sounds are normal. He exhibits no distension and no mass. There is no tenderness. There is no rebound and no guarding.   Musculoskeletal: He exhibits no edema.   Neurological: He is alert and oriented to person, place, and time.   Skin: Skin is warm and dry.   Psychiatric: He has a normal mood and affect.       Significant Labs:   CMP:   Recent Labs  Lab 07/18/18  0536 07/19/18  0538    134*   K 4.2 4.9    95   CO2 25 32*   * 105   BUN 38* 41*   CREATININE 1.8* 2.0*   CALCIUM 8.7 9.1   PROT 6.1 6.5   ALBUMIN 2.6* 2.9*   BILITOT 1.3* 1.4*   ALKPHOS  141* 144*   AST 35 29   ALT 60* 50*   ANIONGAP 10 7*   ESTGFRAFRICA 48.3* 42.5*   EGFRNONAA 41.7* 36.7*   , CBC:   Recent Labs  Lab 07/18/18  0536 07/19/18  0538   WBC 4.32 4.41   HGB 13.8* 14.4   HCT 41.3 43.7   * 156   , INR: No results for input(s): INR, PROTIME in the last 48 hours., Lipid Panel No results for input(s): CHOL, HDL, LDLCALC, TRIG, CHOLHDL in the last 48 hours., Troponin No results for input(s): TROPONINI in the last 48 hours. and All pertinent lab results from the last 24 hours have been reviewed.    Significant Imaging: Echocardiogram:   2D echo with color flow doppler:   Results for orders placed or performed during the hospital encounter of 07/16/18   2D echo with color flow doppler   Result Value Ref Range    EF 25 (A) 55 - 65    Mitral Valve Regurgitation SEVERE (A)     Aortic Valve Regurgitation TRIVIAL     Est. PA Systolic Pressure 62.17 (A)     Pericardial Effusion SMALL (A)     Mitral Valve Mobility TETHERED POSTERIOR LEAFLET     Tricuspid Valve Regurgitation MODERATE (A)    , EKG: NSR with 1st degree AVB,   Stress Test: 1. There is no evidence of a discrete hemodynamically significant coronary stenosis on relative perfusion images.  2. Although no clinically significant stress defect is seen, there is resting flow heterogeneity that does not improve after Dipyridamole in addition to reduced coronary flow reserve. Absolute myocardial stress flow is severely reduced in all quadrants   with a whole heart stress flow of 0.64 cc/min/gm . Coronary flow reserve is also signficantly reduced at 1.34. These findings may be consistent with multivessel coronary artery disease.  3. There is abnormal wall motion at rest showing moderate global hypokinesis of the left ventricle. There is abnormal wall motion at stress showing moderate global hypokinesis of the left ventricle.   4. There is resting LV dysfunction with a reduced ejection fraction of 34 %. There is stress induced LV dysfunction  with a reduced ejection fraction of 42 %.  (normal is >= 51%)  5. The ventricular volumes are normal at rest and stress.   6. The extracardiac distribution of radioactivity is normal.   7. There was no previous study available to compare.             Assessment and Plan:     NSVT (nonsustained ventricular tachycardia)    -Asymptomatic runs of NSVT in setting of new onset reduced EF. No known prior history of CAD but PET stress test suggests possibility of 3 vessel disease.   -Recommend coronary angiogram to help differentiate ischemic vs. Non-ischemic cardiomyopathy  - ICD implantation not recommended at this time  - If angiogram is not significant and does not require revascularization, consider LifeVest prior to discharge and optimization of heart failure medications.   - Consider addition of Coreg prior to discharge for CHF, PVCs and NSVT.   - Recommend follow up ECHO in 3 months  - Final recommendation to follow tomorrow. Continue to monitor on telemetry. EP will continue to follow.               Thank you for your consult. I will follow-up with patient. Please contact us if you have any additional questions.    Patient seen and discussed with Dr. Jagjit Quach MD.     Miguelito Winston MD  Cardiac Electrophysiology  [facility]

## 2018-07-19 NOTE — NURSING
Patient removed telemetry and VISI and took a shower and did not cover PIV dressing.  Dressing needs to be changed.  Educated patient to inform staff of request to take a shower prior to taking a shower so that we may obtain approval.  BS = 82.  Patient verbalized understanding of all instructions.

## 2018-07-19 NOTE — SUBJECTIVE & OBJECTIVE
"Interval History: Resting in bed, has returned from stress test. Denies needs or complaints, reports admission symptoms "are gone". He denies chest pain, palpitations, or shortness of breath. Denies any acute events or distress overnight.     Review of Systems   Constitutional: Negative for activity change, appetite change, chills, fatigue, fever and unexpected weight change.   HENT: Negative for congestion, rhinorrhea, sinus pain, sinus pressure, sneezing, sore throat and trouble swallowing.    Respiratory: Negative for cough, chest tightness, shortness of breath and wheezing.    Cardiovascular: Positive for leg swelling (nearly resolved). Negative for chest pain and palpitations.   Gastrointestinal: Negative for abdominal distention, abdominal pain, constipation, diarrhea, nausea and vomiting.   Genitourinary: Negative for decreased urine volume, dysuria, frequency, hematuria and urgency.   Musculoskeletal: Negative for arthralgias, back pain, myalgias and neck pain.   Neurological: Negative for dizziness, syncope, weakness, light-headedness, numbness and headaches.     Objective:     Vital Signs (Most Recent):  Temp: 98 °F (36.7 °C) (07/19/18 1200)  Pulse: 71 (07/19/18 1200)  Resp: 16 (07/19/18 1200)  BP: 104/71 (07/19/18 1200)  SpO2: (!) 92 % (07/19/18 1200) Vital Signs (24h Range):  Temp:  [98 °F (36.7 °C)] 98 °F (36.7 °C)  Pulse:  [69-85] 71  Resp:  [16-18] 16  SpO2:  [92 %-97 %] 92 %  BP: (104-137)/() 104/71     Weight: 70.1 kg (154 lb 8.7 oz)  Body mass index is 25.72 kg/m².    Intake/Output Summary (Last 24 hours) at 07/19/18 1350  Last data filed at 07/19/18 0600   Gross per 24 hour   Intake              670 ml   Output             1550 ml   Net             -880 ml      Physical Exam   Constitutional: He is oriented to person, place, and time. He appears well-developed and well-nourished. No distress.   HENT:   Head: Normocephalic and atraumatic.   Mouth/Throat: No oropharyngeal exudate.   Eyes: " Pupils are equal, round, and reactive to light. No scleral icterus.   Neck: Normal range of motion. Neck supple. Hepatojugular reflux present. No JVD present. No tracheal deviation present. No thyromegaly present.   Cardiovascular: Normal rate, regular rhythm and intact distal pulses.  Exam reveals gallop and S3. Exam reveals no friction rub.    Murmur heard.   No systolic murmur is present    Diastolic (mitral inflow murmer best heard anterior apex) murmur is present   Pulmonary/Chest: Effort normal and breath sounds normal. No respiratory distress. He has no wheezes. He has no rales.   Abdominal: Soft. Bowel sounds are normal. He exhibits no distension. There is no tenderness.   Musculoskeletal: Normal range of motion. He exhibits edema (1+ pitting edema from feet to knees, much improved since admission). He exhibits no tenderness.   Neurological: He is alert and oriented to person, place, and time. Coordination normal.   Skin: Skin is warm and dry. Capillary refill takes 2 to 3 seconds. No rash noted. He is not diaphoretic. No erythema.   Psychiatric: He has a normal mood and affect. His behavior is normal. Judgment and thought content normal.   Nursing note and vitals reviewed.    Significant Labs:   CBC:     Recent Labs  Lab 07/18/18  0536 07/19/18  0538   WBC 4.32 4.41   HGB 13.8* 14.4   HCT 41.3 43.7   * 156     CMP:     Recent Labs  Lab 07/18/18  0536 07/19/18  0538    134*   K 4.2 4.9    95   CO2 25 32*   * 105   BUN 38* 41*   CREATININE 1.8* 2.0*   CALCIUM 8.7 9.1   PROT 6.1 6.5   ALBUMIN 2.6* 2.9*   BILITOT 1.3* 1.4*   ALKPHOS 141* 144*   AST 35 29   ALT 60* 50*   ANIONGAP 10 7*   EGFRNONAA 41.7* 36.7*     Magnesium:     Recent Labs  Lab 07/18/18  0536 07/19/18  0538   MG 1.6 2.2     All pertinent labs within the past 24 hours have been reviewed.    Significant Imaging: I have reviewed all pertinent imaging results/findings within the past 24 hours.

## 2018-07-19 NOTE — NURSING
Patient consumed dinner meal prior to blood glucose monitoring.  Educated patient to call prior to eating all meals.  Patient verbalized understanding of all instructions.

## 2018-07-19 NOTE — ASSESSMENT & PLAN NOTE
-presents with acute systolic heart failure, BNP 2790 (previous 556), CXR with pulm edema, ECG SR with first degree AVB  -last known EF 1/17/17 echo EF 35%, RAP 8, moderate MR, + DD, +WMA, GHK and PAP 30  -repeat 2D echo with EF 25-30%, ROBERTO, RV enlargement with moderately depressed systolic function, pulmHTN with PASP 62, trivial AR, MV mildly sclerotic with evidence of tethered posterior leaflet, severe MR, moderate TR, small pericardial effusion, global hypokinesis, and RAP 8; MR and EF have worsened since January 2017 study   -continue home toprol   -no ACEi/ARB due to KEYANNA vs KEYANNA on CKD (baseline appears 1.3-1.5)  -hydralazine as tolerated for afterload reduction >>monitor and escalate therapy as needed   -diuresis initiated with IVP lasix >> to PO 7/19  -I&Os are inaccurate, weight down 25 lbs   -troponin trend essentially flat .042 >> 0.047 >. 0.037 >> 0.055 and he denies chest pain   -Pet stress 7/19 negative for stress induced perfusion defects  -episodes of NSVT on tele without hemodynamic compromise, continue to monitor, EP consulted, pending evaluation and recs  -continue tele monitoring  -strict I&Os and daily weights  -outpt follow up with [facility] at discharge

## 2018-07-19 NOTE — SUBJECTIVE & OBJECTIVE
"Past Medical History:   Diagnosis Date    Diabetes mellitus type II     Essential hypertension, benign     Hyperlipidemia     Hypothyroidism     Undiagnosed cardiac murmurs        Past Surgical History:   Procedure Laterality Date    COLON SURGERY      "lower intestines removed"    THYROID SURGERY         Review of patient's allergies indicates:   Allergen Reactions    Lisinopril Other (See Comments)     acei cough         No current facility-administered medications on file prior to encounter.      Current Outpatient Prescriptions on File Prior to Encounter   Medication Sig    furosemide (LASIX) 40 MG tablet Take 1 tablet (40 mg total) by mouth once daily.    LEVOTHYROXINE SODIUM (SYNTHROID ORAL) Take 175 mcg by mouth daily 2 hours after breakfast.     losartan (COZAAR) 25 MG tablet Take 1 tablet (25 mg total) by mouth once daily.    METFORMIN HCL (METFORMIN ORAL) Take 500 mg by mouth 2 (two) times daily. Patient taking daily    metoprolol succinate (TOPROL XL) 50 MG 24 hr tablet Take 1 tablet (50 mg total) by mouth once daily.    folic acid (FOLVITE) 1 MG tablet Take 1 tablet (1 mg total) by mouth once daily.    simvastatin (ZOCOR) 10 MG tablet Take 1 tablet (10 mg total) by mouth every evening.     Family History     Problem Relation (Age of Onset)    Cancer Father        Social History Main Topics    Smoking status: Current Some Day Smoker     Types: Cigars    Smokeless tobacco: Not on file      Comment: cigars "every other week or so"    Alcohol use 3.6 oz/week     6 Cans of beer per week    Drug use: Yes     Types: Marijuana    Sexual activity: Yes     Partners: Female     Review of Systems   Constitution: Negative for chills.   HENT: Negative for congestion.    Eyes: Negative for blurred vision.   Cardiovascular: Positive for leg swelling, orthopnea and paroxysmal nocturnal dyspnea. Negative for chest pain, near-syncope, palpitations and syncope.   Respiratory: Positive for shortness of " breath. Negative for cough.    Endocrine: Negative for polyuria.   Skin: Negative for itching and rash.   Musculoskeletal: Negative for back pain.   Gastrointestinal: Negative for abdominal pain, constipation, diarrhea, nausea and vomiting.   Genitourinary: Negative for dysuria.   Neurological: Negative for dizziness, headaches and light-headedness.   Psychiatric/Behavioral: Negative for altered mental status.     Objective:     Vital Signs (Most Recent):  Temp: 98.7 °F (37.1 °C) (07/19/18 1619)  Pulse: 71 (07/19/18 1619)  Resp: 18 (07/19/18 1619)  BP: 99/66 (07/19/18 1619)  SpO2: 95 % (07/19/18 1619) Vital Signs (24h Range):  Temp:  [98 °F (36.7 °C)-98.7 °F (37.1 °C)] 98.7 °F (37.1 °C)  Pulse:  [69-85] 71  Resp:  [16-18] 18  SpO2:  [92 %-97 %] 95 %  BP: ()/(66-88) 99/66       Weight: 70.1 kg (154 lb 8.7 oz)  Body mass index is 25.72 kg/m².    SpO2: 95 %  O2 Device (Oxygen Therapy): room air    Physical Exam   Constitutional: He is oriented to person, place, and time. He appears well-developed and well-nourished.   HENT:   Head: Normocephalic and atraumatic.   Eyes: Conjunctivae are normal. Pupils are equal, round, and reactive to light.   Neck: Normal range of motion. Neck supple.   Cardiovascular: Normal rate, regular rhythm, S1 normal, S2 normal and normal heart sounds.  Exam reveals no gallop and no friction rub.    No murmur heard.  Pulses:       Radial pulses are 2+ on the right side, and 2+ on the left side.   Pulmonary/Chest: Effort normal and breath sounds normal. No respiratory distress. He has no wheezes. He has no rales. He exhibits no tenderness.   Abdominal: Soft. Bowel sounds are normal. He exhibits no distension and no mass. There is no tenderness. There is no rebound and no guarding.   Musculoskeletal: He exhibits no edema.   Neurological: He is alert and oriented to person, place, and time.   Skin: Skin is warm and dry.   Psychiatric: He has a normal mood and affect.       Significant Labs:    CMP:   Recent Labs  Lab 07/18/18  0536 07/19/18  0538    134*   K 4.2 4.9    95   CO2 25 32*   * 105   BUN 38* 41*   CREATININE 1.8* 2.0*   CALCIUM 8.7 9.1   PROT 6.1 6.5   ALBUMIN 2.6* 2.9*   BILITOT 1.3* 1.4*   ALKPHOS 141* 144*   AST 35 29   ALT 60* 50*   ANIONGAP 10 7*   ESTGFRAFRICA 48.3* 42.5*   EGFRNONAA 41.7* 36.7*   , CBC:   Recent Labs  Lab 07/18/18  0536 07/19/18  0538   WBC 4.32 4.41   HGB 13.8* 14.4   HCT 41.3 43.7   * 156   , INR: No results for input(s): INR, PROTIME in the last 48 hours., Lipid Panel No results for input(s): CHOL, HDL, LDLCALC, TRIG, CHOLHDL in the last 48 hours., Troponin No results for input(s): TROPONINI in the last 48 hours. and All pertinent lab results from the last 24 hours have been reviewed.    Significant Imaging: Echocardiogram:   2D echo with color flow doppler:   Results for orders placed or performed during the hospital encounter of 07/16/18   2D echo with color flow doppler   Result Value Ref Range    EF 25 (A) 55 - 65    Mitral Valve Regurgitation SEVERE (A)     Aortic Valve Regurgitation TRIVIAL     Est. PA Systolic Pressure 62.17 (A)     Pericardial Effusion SMALL (A)     Mitral Valve Mobility TETHERED POSTERIOR LEAFLET     Tricuspid Valve Regurgitation MODERATE (A)    , EKG: NSR with 1st degree AVB,   Stress Test: 1. There is no evidence of a discrete hemodynamically significant coronary stenosis on relative perfusion images.  2. Although no clinically significant stress defect is seen, there is resting flow heterogeneity that does not improve after Dipyridamole in addition to reduced coronary flow reserve. Absolute myocardial stress flow is severely reduced in all quadrants   with a whole heart stress flow of 0.64 cc/min/gm . Coronary flow reserve is also signficantly reduced at 1.34. These findings may be consistent with multivessel coronary artery disease.  3. There is abnormal wall motion at rest showing moderate global hypokinesis  of the left ventricle. There is abnormal wall motion at stress showing moderate global hypokinesis of the left ventricle.   4. There is resting LV dysfunction with a reduced ejection fraction of 34 %. There is stress induced LV dysfunction with a reduced ejection fraction of 42 %.  (normal is >= 51%)  5. The ventricular volumes are normal at rest and stress.   6. The extracardiac distribution of radioactivity is normal.   7. There was no previous study available to compare.

## 2018-07-20 PROBLEM — I10 ESSENTIAL (PRIMARY) HYPERTENSION: Status: ACTIVE | Noted: 2018-07-20

## 2018-07-20 PROBLEM — I50.9 ACUTE ON CHRONIC CONGESTIVE HEART FAILURE: Status: ACTIVE | Noted: 2018-07-20

## 2018-07-20 LAB
ABO + RH BLD: NORMAL
ALBUMIN SERPL BCP-MCNC: 2.5 G/DL
ALP SERPL-CCNC: 131 U/L
ALT SERPL W/O P-5'-P-CCNC: 35 U/L
ANION GAP SERPL CALC-SCNC: 9 MMOL/L
AST SERPL-CCNC: 21 U/L
BASOPHILS # BLD AUTO: 0.08 K/UL
BASOPHILS NFR BLD: 1.9 %
BILIRUB SERPL-MCNC: 0.7 MG/DL
BILIRUB UR QL STRIP: NEGATIVE
BLD GP AB SCN CELLS X3 SERPL QL: NORMAL
BUN SERPL-MCNC: 36 MG/DL
CALCIUM SERPL-MCNC: 8.2 MG/DL
CHLORIDE SERPL-SCNC: 100 MMOL/L
CHLORIDE UR-SCNC: 93 MMOL/L
CLARITY UR REFRACT.AUTO: CLEAR
CO2 SERPL-SCNC: 25 MMOL/L
COLOR UR AUTO: YELLOW
CREAT SERPL-MCNC: 1.6 MG/DL
CREAT UR-MCNC: 43 MG/DL
CREAT UR-MCNC: 43 MG/DL
DIFFERENTIAL METHOD: ABNORMAL
EOSINOPHIL # BLD AUTO: 0.4 K/UL
EOSINOPHIL NFR BLD: 10.6 %
EOSINOPHIL URNS QL WRIGHT STN: NORMAL
ERYTHROCYTE [DISTWIDTH] IN BLOOD BY AUTOMATED COUNT: 14.6 %
EST. GFR  (AFRICAN AMERICAN): 55.6 ML/MIN/1.73 M^2
EST. GFR  (NON AFRICAN AMERICAN): 48.1 ML/MIN/1.73 M^2
GLUCOSE SERPL-MCNC: 220 MG/DL
GLUCOSE UR QL STRIP: NEGATIVE
HCT VFR BLD AUTO: 40.6 %
HGB BLD-MCNC: 13.3 G/DL
HGB UR QL STRIP: NEGATIVE
IMM GRANULOCYTES # BLD AUTO: 0.01 K/UL
IMM GRANULOCYTES NFR BLD AUTO: 0.2 %
KETONES UR QL STRIP: NEGATIVE
LEUKOCYTE ESTERASE UR QL STRIP: NEGATIVE
LYMPHOCYTES # BLD AUTO: 0.8 K/UL
LYMPHOCYTES NFR BLD: 18 %
MAGNESIUM SERPL-MCNC: 2.2 MG/DL
MCH RBC QN AUTO: 26.9 PG
MCHC RBC AUTO-ENTMCNC: 32.8 G/DL
MCV RBC AUTO: 82 FL
MONOCYTES # BLD AUTO: 0.8 K/UL
MONOCYTES NFR BLD: 18.2 %
NEUTROPHILS # BLD AUTO: 2.1 K/UL
NEUTROPHILS NFR BLD: 51.1 %
NITRITE UR QL STRIP: NEGATIVE
NRBC BLD-RTO: 0 /100 WBC
PH UR STRIP: 6 [PH] (ref 5–8)
PLATELET # BLD AUTO: 165 K/UL
PMV BLD AUTO: 12.5 FL
POCT GLUCOSE: 172 MG/DL (ref 70–110)
POCT GLUCOSE: 176 MG/DL (ref 70–110)
POCT GLUCOSE: 245 MG/DL (ref 70–110)
POTASSIUM SERPL-SCNC: 4.9 MMOL/L
POTASSIUM UR-SCNC: 27 MMOL/L
PROT SERPL-MCNC: 5.9 G/DL
PROT UR QL STRIP: NEGATIVE
PROT UR-MCNC: 21 MG/DL
PROT/CREAT RATIO, UR: 0.49
RBC # BLD AUTO: 4.95 M/UL
SODIUM SERPL-SCNC: 134 MMOL/L
SODIUM UR-SCNC: 88 MMOL/L
SP GR UR STRIP: 1.01 (ref 1–1.03)
URN SPEC COLLECT METH UR: NORMAL
UROBILINOGEN UR STRIP-ACNC: NEGATIVE EU/DL
UUN UR-MCNC: 336 MG/DL
WBC # BLD AUTO: 4.17 K/UL

## 2018-07-20 PROCEDURE — 25000003 PHARM REV CODE 250: Performed by: NURSE PRACTITIONER

## 2018-07-20 PROCEDURE — 99232 SBSQ HOSP IP/OBS MODERATE 35: CPT | Mod: 25,,, | Performed by: INTERNAL MEDICINE

## 2018-07-20 PROCEDURE — 85025 COMPLETE CBC W/AUTO DIFF WBC: CPT

## 2018-07-20 PROCEDURE — 99152 MOD SED SAME PHYS/QHP 5/>YRS: CPT

## 2018-07-20 PROCEDURE — 93458 L HRT ARTERY/VENTRICLE ANGIO: CPT | Mod: 26,,, | Performed by: INTERNAL MEDICINE

## 2018-07-20 PROCEDURE — 4A023N7 MEASUREMENT OF CARDIAC SAMPLING AND PRESSURE, LEFT HEART, PERCUTANEOUS APPROACH: ICD-10-PCS | Performed by: INTERNAL MEDICINE

## 2018-07-20 PROCEDURE — 25000003 PHARM REV CODE 250

## 2018-07-20 PROCEDURE — 86850 RBC ANTIBODY SCREEN: CPT

## 2018-07-20 PROCEDURE — 94761 N-INVAS EAR/PLS OXIMETRY MLT: CPT

## 2018-07-20 PROCEDURE — 99232 SBSQ HOSP IP/OBS MODERATE 35: CPT | Mod: ,,, | Performed by: NURSE PRACTITIONER

## 2018-07-20 PROCEDURE — 27000221 HC OXYGEN, UP TO 24 HOURS

## 2018-07-20 PROCEDURE — 84300 ASSAY OF URINE SODIUM: CPT

## 2018-07-20 PROCEDURE — 84156 ASSAY OF PROTEIN URINE: CPT

## 2018-07-20 PROCEDURE — 84133 ASSAY OF URINE POTASSIUM: CPT

## 2018-07-20 PROCEDURE — 99152 MOD SED SAME PHYS/QHP 5/>YRS: CPT | Mod: ,,, | Performed by: INTERNAL MEDICINE

## 2018-07-20 PROCEDURE — 83735 ASSAY OF MAGNESIUM: CPT

## 2018-07-20 PROCEDURE — 82436 ASSAY OF URINE CHLORIDE: CPT

## 2018-07-20 PROCEDURE — 84165 PROTEIN E-PHORESIS SERUM: CPT

## 2018-07-20 PROCEDURE — 20600001 HC STEP DOWN PRIVATE ROOM

## 2018-07-20 PROCEDURE — 81003 URINALYSIS AUTO W/O SCOPE: CPT

## 2018-07-20 PROCEDURE — 63600175 PHARM REV CODE 636 W HCPCS

## 2018-07-20 PROCEDURE — 93458 L HRT ARTERY/VENTRICLE ANGIO: CPT

## 2018-07-20 PROCEDURE — B2111ZZ FLUOROSCOPY OF MULTIPLE CORONARY ARTERIES USING LOW OSMOLAR CONTRAST: ICD-10-PCS | Performed by: INTERNAL MEDICINE

## 2018-07-20 PROCEDURE — 63600175 PHARM REV CODE 636 W HCPCS: Performed by: NURSE PRACTITIONER

## 2018-07-20 PROCEDURE — 25000003 PHARM REV CODE 250: Performed by: STUDENT IN AN ORGANIZED HEALTH CARE EDUCATION/TRAINING PROGRAM

## 2018-07-20 PROCEDURE — 84540 ASSAY OF URINE/UREA-N: CPT

## 2018-07-20 PROCEDURE — 87205 SMEAR GRAM STAIN: CPT

## 2018-07-20 PROCEDURE — 84165 PROTEIN E-PHORESIS SERUM: CPT | Mod: 26,,, | Performed by: PATHOLOGY

## 2018-07-20 PROCEDURE — A4216 STERILE WATER/SALINE, 10 ML: HCPCS | Performed by: NURSE PRACTITIONER

## 2018-07-20 PROCEDURE — 36415 COLL VENOUS BLD VENIPUNCTURE: CPT

## 2018-07-20 PROCEDURE — 80053 COMPREHEN METABOLIC PANEL: CPT

## 2018-07-20 RX ORDER — DIPHENHYDRAMINE HCL 50 MG
50 CAPSULE ORAL ONCE
Status: COMPLETED | OUTPATIENT
Start: 2018-07-20 | End: 2018-07-20

## 2018-07-20 RX ORDER — SODIUM CHLORIDE 9 MG/ML
3 INJECTION, SOLUTION INTRAVENOUS CONTINUOUS
Status: DISCONTINUED | OUTPATIENT
Start: 2018-07-20 | End: 2018-07-20

## 2018-07-20 RX ADMIN — HYDRALAZINE HYDROCHLORIDE 10 MG: 10 TABLET, FILM COATED ORAL at 05:07

## 2018-07-20 RX ADMIN — DIPHENHYDRAMINE HYDROCHLORIDE 50 MG: 50 CAPSULE ORAL at 12:07

## 2018-07-20 RX ADMIN — FUROSEMIDE 40 MG: 40 TABLET ORAL at 09:07

## 2018-07-20 RX ADMIN — SODIUM CHLORIDE 1.5 ML/KG/HR: 0.9 INJECTION, SOLUTION INTRAVENOUS at 03:07

## 2018-07-20 RX ADMIN — METOPROLOL TARTRATE 50 MG: 25 TABLET ORAL at 03:07

## 2018-07-20 RX ADMIN — LEVOTHYROXINE SODIUM 175 MCG: 100 TABLET ORAL at 09:07

## 2018-07-20 RX ADMIN — ENOXAPARIN SODIUM 40 MG: 100 INJECTION SUBCUTANEOUS at 04:07

## 2018-07-20 RX ADMIN — STANDARDIZED SENNA CONCENTRATE AND DOCUSATE SODIUM 1 TABLET: 8.6; 5 TABLET, FILM COATED ORAL at 09:07

## 2018-07-20 RX ADMIN — INSULIN ASPART 2 UNITS: 100 INJECTION, SOLUTION INTRAVENOUS; SUBCUTANEOUS at 04:07

## 2018-07-20 RX ADMIN — SODIUM CHLORIDE, PRESERVATIVE FREE 3 ML: 5 INJECTION INTRAVENOUS at 06:07

## 2018-07-20 RX ADMIN — INSULIN DETEMIR 5 UNITS: 100 INJECTION, SOLUTION SUBCUTANEOUS at 09:07

## 2018-07-20 RX ADMIN — METOPROLOL TARTRATE 50 MG: 25 TABLET ORAL at 09:07

## 2018-07-20 RX ADMIN — SODIUM CHLORIDE 3 ML/KG/HR: 0.9 INJECTION, SOLUTION INTRAVENOUS at 12:07

## 2018-07-20 RX ADMIN — INSULIN ASPART 2 UNITS: 100 INJECTION, SOLUTION INTRAVENOUS; SUBCUTANEOUS at 09:07

## 2018-07-20 NOTE — ASSESSMENT & PLAN NOTE
-presents with acute systolic heart failure, BNP 2790 (previous 556), CXR with pulm edema, ECG SR with first degree AVB  -last known EF 1/17/17 echo EF 35%, RAP 8, moderate MR, + DD, +WMA, GHK and PAP 30  -repeat 2D echo with EF 25-30%, ROBERTO, RV enlargement with moderately depressed systolic function, pulmHTN with PASP 62, trivial AR, MV mildly sclerotic with evidence of tethered posterior leaflet, severe MR, moderate TR, small pericardial effusion, global hypokinesis, and RAP 8; MR and EF have worsened since January 2017 study   -continue home toprol   -hydralazine as tolerated for afterload reduction >>monitor and escalate therapy as needed   -no ACEi/ARB due to KEYANNA vs KEYANNA on CKD (baseline appears 1.3-1.5)  -diuresis initiated with IVP lasix >> to PO 7/20, monitor for toleration and dose adjustment needs  -I&Os are inaccurate, weight down 25 lbs   -troponin trend essentially flat .042 >> 0.047 >. 0.037 >> 0.055 and he denies chest pain   -Pet stress 7/19 negative for stress induced perfusion defects however coronary flow reserve signficantly reduced at 1.34, may be consistent with multivessel CAD  -Interventional Cardiology consulted, underwent LHC 7/20 with findings of normal coronaries  -episodes of NSVT on tele without hemodynamic compromise, continue to monitor  -EP consulted recommend optimization of medical therapy and repeat 2D echo in 3 months to determine candidacy for ICD, consider life vest on discharge   -Lifevest Rep notified, pending approval to get vest  -continue tele monitoring  -strict I&Os and daily weights  -outpt follow up with [facility] at discharge

## 2018-07-20 NOTE — PLAN OF CARE
Problem: Patient Care Overview  Goal: Plan of Care Review  Outcome: Ongoing (interventions implemented as appropriate)  Reviewed plan of care with patient.  Patient is NPO for [facility] scheduled for 1400 hours.  US Retroperitoneal complete kidney ordered.  Patient clipped/prepped for procedure.  Urinalysis collected, pending results.  Blood glucose monitoring will be completed 4 times daily before meals and at bedtime.  Patient has remained free of falls/trauma/injury by using appropriate lighting, nonskid socks, and by keeping area free of debris.  Patient verbalized understanding of all instructions.

## 2018-07-20 NOTE — ASSESSMENT & PLAN NOTE
Cardiomyopathy with reduced EF and diastolic dysfunction. Responding to diuresis, but interval worsening LV function since last year, with progression of MR. Has not had ischemic workup to date. A coronary angiography is reasonable to rule out disease.    - plan for coronary angiography today  - careful use of contrast given renal insufficiency, recommend nephrology evaluation given lack of improvement to date  - needs to have optimized HF regimen (ARB, aldactone, cardio-protective BB)  - Target SBP <130  - counseled on medication compliance, and dietary complaince

## 2018-07-20 NOTE — PROGRESS NOTES
07/20/18 1500 07/20/18 1502   Vital Signs   Temp 96.4 °F (35.8 °C)  (Returned from Cath Lab, 15 min x 1) --    Temp src Oral --    Pulse 67 --    Heart Rate Source Intermittent --    Resp 16 --    SpO2 (!) 85 % (!) 92 %   Pulse Oximetry Type Intermittent --    Flow (L/min) --  2   O2 Device (Oxygen Therapy) room air nasal cannula   /72 --    MAP (mmHg) 82 --    BP Location Left arm --    BP Method Automatic --    Patient Position Lying --    Placed patient on 2L nasal cannula.  Patient is sedated from [facility] and a mouth breather.   Will titrate oxygen.

## 2018-07-20 NOTE — NURSING
Cath lab @ [phone], called to inform patient procedure will be at approximately 1400 hours.  Orders for aspirin and plavis to follow.  Will administer Benadryl when team calls.  NaCl+ infusing at 210.9 ml/hr.  Patient shaved right/left groin and right radial sites.  Cath lab, OTIS Stanley, DAGO, and team informed patient ate at 0400 hours and consumed small portion of his breakfast.

## 2018-07-20 NOTE — NURSING
Patient eating breakfast.  Removed tray and told patient to not eat for the remainder of the day or until further notice.  OTIS Stanley NP, notified.

## 2018-07-20 NOTE — ASSESSMENT & PLAN NOTE
Asymptomatic runs of NSVT in setting of new onset reduced EF (25-30%). No known prior history of CAD but PET stress test suggests possibility of 3 vessel disease   Plan for [facility] today - if revascularization is warranted, that will be the initial course of action, followed by repeat 2D echo in 3 months  If there is no significant CAD requiring revascularization, the patient will requite optimization of medical therapy and repeat 2D echo in 3 months to determine candidacy for ICD  Consider life vest on discharge

## 2018-07-20 NOTE — PROGRESS NOTES
"[facility]  Cardiac Electrophysiology  Progress Note    Admission Date: 7/16/2018  Code Status: Full Code   Attending Physician: Dana Skelton MD   Expected Discharge Date: 7/23/2018  Principal Problem:Acute on chronic combined systolic and diastolic congestive heart failure    Subjective:     Interval History: patient reports feeling "like a new man" today. He denies chest pain, dyspnea, dizziness or palpitations. Reports no symptoms or concerns at this time.    Review of Systems   Constitution: Negative for chills, diaphoresis, fever and night sweats.   HENT: Negative.    Eyes: Negative for blurred vision and photophobia.   Cardiovascular: Negative for chest pain, claudication, cyanosis, dyspnea on exertion, irregular heartbeat, leg swelling, near-syncope, orthopnea, palpitations, paroxysmal nocturnal dyspnea and syncope.   Respiratory: Negative for cough, hemoptysis, shortness of breath and wheezing.    Skin: Negative for color change and rash.   Musculoskeletal: Negative for back pain and falls.   Gastrointestinal: Negative for abdominal pain, constipation, diarrhea, nausea and vomiting.   Genitourinary: Negative for dysuria and hematuria.   Neurological: Negative for focal weakness, numbness and seizures.   Psychiatric/Behavioral: Negative for altered mental status. The patient is not nervous/anxious.      Objective:     Vital Signs (Most Recent):  Temp: 97.3 °F (36.3 °C) (07/20/18 1211)  Pulse: 68 (07/20/18 1211)  Resp: 20 (07/20/18 1211)  BP: 97/67 (07/20/18 1211)  SpO2: (!) 93 % (07/20/18 1211) Vital Signs (24h Range):  Temp:  [97.3 °F (36.3 °C)-98.7 °F (37.1 °C)] 97.3 °F (36.3 °C)  Pulse:  [68-79] 68  Resp:  [16-21] 20  SpO2:  [93 %-96 %] 93 %  BP: ()/(66-78) 97/67     Weight: 70.3 kg (154 lb 15.7 oz)  Body mass index is 25.79 kg/m².     SpO2: (!) 93 %  O2 Device (Oxygen Therapy): room air    Physical Exam   Constitutional: He is oriented to person, place, and time. He appears " well-developed and well-nourished. No distress.   HENT:   Head: Normocephalic and atraumatic.   Eyes: EOM are normal. Pupils are equal, round, and reactive to light.   Neck: Normal range of motion. No JVD present.   Cardiovascular: Normal rate, regular rhythm and intact distal pulses.  Exam reveals no gallop and no friction rub.    Murmur (Grade II/VI systolic murmur loudest at the apex) heard.  Pulmonary/Chest: Effort normal and breath sounds normal. No respiratory distress. He has no wheezes. He has no rales.   Abdominal: Soft. Bowel sounds are normal. He exhibits no distension. There is no tenderness.   Musculoskeletal: Normal range of motion. He exhibits no edema.   Neurological: He is alert and oriented to person, place, and time.   Skin: Skin is warm. No rash noted. He is not diaphoretic.   Psychiatric: He has a normal mood and affect. His behavior is normal.       Significant Labs:     Recent Results (from the past 24 hour(s))   POCT glucose    Collection Time: 07/19/18  8:45 PM   Result Value Ref Range    POCT Glucose 275 (H) 70 - 110 mg/dL   Magnesium    Collection Time: 07/20/18  7:09 AM   Result Value Ref Range    Magnesium 2.2 1.6 - 2.6 mg/dL   CBC auto differential    Collection Time: 07/20/18  7:09 AM   Result Value Ref Range    WBC 4.17 3.90 - 12.70 K/uL    RBC 4.95 4.60 - 6.20 M/uL    Hemoglobin 13.3 (L) 14.0 - 18.0 g/dL    Hematocrit 40.6 40.0 - 54.0 %    MCV 82 82 - 98 fL    MCH 26.9 (L) 27.0 - 31.0 pg    MCHC 32.8 32.0 - 36.0 g/dL    RDW 14.6 (H) 11.5 - 14.5 %    Platelets 165 150 - 350 K/uL    MPV 12.5 9.2 - 12.9 fL    Immature Granulocytes 0.2 0.0 - 0.5 %    Gran # (ANC) 2.1 1.8 - 7.7 K/uL    Immature Grans (Abs) 0.01 0.00 - 0.04 K/uL    Lymph # 0.8 (L) 1.0 - 4.8 K/uL    Mono # 0.8 0.3 - 1.0 K/uL    Eos # 0.4 0.0 - 0.5 K/uL    Baso # 0.08 0.00 - 0.20 K/uL    nRBC 0 0 /100 WBC    Gran% 51.1 38.0 - 73.0 %    Lymph% 18.0 18.0 - 48.0 %    Mono% 18.2 (H) 4.0 - 15.0 %    Eosinophil% 10.6 (H) 0.0 - 8.0  %    Basophil% 1.9 0.0 - 1.9 %    Differential Method Automated    Comprehensive metabolic panel    Collection Time: 07/20/18  7:09 AM   Result Value Ref Range    Sodium 134 (L) 136 - 145 mmol/L    Potassium 4.9 3.5 - 5.1 mmol/L    Chloride 100 95 - 110 mmol/L    CO2 25 23 - 29 mmol/L    Glucose 220 (H) 70 - 110 mg/dL    BUN, Bld 36 (H) 6 - 20 mg/dL    Creatinine 1.6 (H) 0.5 - 1.4 mg/dL    Calcium 8.2 (L) 8.7 - 10.5 mg/dL    Total Protein 5.9 (L) 6.0 - 8.4 g/dL    Albumin 2.5 (L) 3.5 - 5.2 g/dL    Total Bilirubin 0.7 0.1 - 1.0 mg/dL    Alkaline Phosphatase 131 55 - 135 U/L    AST 21 10 - 40 U/L    ALT 35 10 - 44 U/L    Anion Gap 9 8 - 16 mmol/L    eGFR if African American 55.6 (A) >60 mL/min/1.73 m^2    eGFR if non  48.1 (A) >60 mL/min/1.73 m^2   Protein electrophoresis, serum    Collection Time: 07/20/18 10:32 AM   Result Value Ref Range    Protein, Serum 7.1 6.0 - 8.4 g/dL   POCT glucose    Collection Time: 07/20/18 12:16 PM   Result Value Ref Range    POCT Glucose 172 (H) 70 - 110 mg/dL   Type & Screen    Collection Time: 07/20/18 12:29 PM   Result Value Ref Range    Group & Rh A POS     Indirect Kimi NEG      Assessment and Plan:     NSVT (nonsustained ventricular tachycardia)    Asymptomatic runs of NSVT in setting of new onset reduced EF (25-30%). No known prior history of CAD but PET stress test suggests possibility of 3 vessel disease   Plan for [facility] today - if revascularization is warranted, that will be the initial course of action, followed by repeat 2D echo in 3 months  If there is no significant CAD requiring revascularization, the patient will requite optimization of medical therapy and repeat 2D echo in 3 months to determine candidacy for ICD  Consider life vest on discharge            Eddie Sheets MD  Cardiac Electrophysiology  [facility]

## 2018-07-20 NOTE — PROGRESS NOTES
[facility] Hospital Medicine  Progress Note    Patient Name: Ashish Mckeon  MRN: [id number]  Patient Class: IP- Inpatient   Admission Date: 7/16/2018  Length of Stay: 4 days  Attending Physician: Dana Skelton MD  Primary Care Provider: [facility] Medicine Team: [facility] HOSP MED OMAR Stanley NP    Subjective:     Principal Problem:Acute on chronic combined systolic and diastolic congestive heart failure    HPI:  Mr. Mckeon is a 53 year old male with past medical history of HFrEF (35-40% unknown type/ thinks he had stress test in distant past), Type II DM on oral hypoglycemics, HTN, bowel resection d/t obstruction, and surgical thyroidectomy at [facility] for h/o hyperthyroidism s/p iodine/ radiation, and intermittent drinker/ intermittent THC usage.  He presented to ED with 3 week history of progressive shortness of breath, exertional dyspnea, worsening PND and several days of increasing lower extremity edema.  He does not follow a salt restricted diet, however he does take his medications as prescribed although he ran out of losartan recently. He denies chest pain, lightheadedness, dizziness, and palpitations.  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 He follows at [facility] and has not seen a cardiologist that he can recall.     In ED: CBC stable, chemistry with SCr of 2.3 and elevated alk phos/AST/ALT, troponin positive at 0.037, BNP ~2800, HgA1C 8.8/206, TSH 3.929, EKG with SR with 1st degree AVD and occasional PVCs, and CXR with possible early edema and small left plerual effusion. The patient was admitted to the Hospital Medicine Service for further evaluation and management.     Hospital Course:  Mr. Mckeon was admitted 7/16 due to heart failure exacerbation. 2D echo with EF 25-30%, ROBERTO, RV enlargement with moderately depressed systolic function, pulmHTN with PASP 62, trivial AR, MV mildly sclerotic with evidence  "of tethered posterior leaflet, severe MR, moderate TR, small pericardial effusion, global hypokinesis, and RAP 8. MR and EF have worsened since January 2017 study. Home toprol continued, no ACEi/ARB due to KEYANNA vs KEYANNA on CKD (baseline appears 1.3-1.5); renal US and urine studies pending. Diuresis initiated with IVP lasix, I&Os have been inaccurate, weight down 25 lbs, transitioned to PO 7/20. Troponin trend essentially flat .042 >> 0.047 >. 0.037 >> 0.055 and he denies chest pain, PET stress 7/19  this admission to risk stratify, negative for stress induced perfusion defects, however coronary flow reserve is also signficantly reduced at 1.34,  findings possibly consistent with multivessel CAD. Interventional Cardiology consulted, underwent [facility] 7/20 with findings of normal coronaries. Also noted over hospital course episodes of NSVT, asymptomatic, EP consulted, recommend optimization of medical therapy and repeat 2D echo in 3 months to determine candidacy for ICD, consider life vest on discharge ([employer] Rep notified, pending approval to get vest)    Disposition plans: home with family potentially in AM if remains euvolemic on PO lasix and life vest is arranged, outpt follow up with [facility] for further monitoring and management with 3 month 2D echo.    Interval History: Resting in bed, has returned from [facility] a bit drowsy.  Denies needs or complaints, reports admission symptoms "are gone". He denies chest pain, palpitations, or shortness of breath. Denies any acute events or distress overnight.     Review of Systems   Constitutional: Negative for activity change, appetite change, chills, fatigue, fever and unexpected weight change.   HENT: Negative for congestion, rhinorrhea, sinus pain, sinus pressure, sneezing, sore throat and trouble swallowing.    Respiratory: Negative for cough, chest tightness, shortness of breath and wheezing.    Cardiovascular: Negative for chest pain, palpitations and leg " swelling.   Gastrointestinal: Negative for abdominal distention, abdominal pain, constipation, diarrhea, nausea and vomiting.   Genitourinary: Negative for decreased urine volume, dysuria, frequency, hematuria and urgency.   Musculoskeletal: Negative for arthralgias, back pain, myalgias and neck pain.   Neurological: Negative for dizziness, syncope, weakness, light-headedness, numbness and headaches.     Objective:     Vital Signs (Most Recent):  Temp: 98.1 °F (36.7 °C) (07/20/18 1615)  Pulse: 72 (07/20/18 1645)  Resp: 16 (07/20/18 1645)  BP: 101/72 (30 min x 2) (07/20/18 1645)  SpO2: (!) 92 % (07/20/18 1645) Vital Signs (24h Range):  Temp:  [96.4 °F (35.8 °C)-98.1 °F (36.7 °C)] 98.1 °F (36.7 °C)  Pulse:  [67-79] 72  Resp:  [15-21] 16  SpO2:  [84 %-99 %] 92 %  BP: ()/(67-80) 101/72     Weight: 70.3 kg (154 lb 15.7 oz)  Body mass index is 25.79 kg/m².    Intake/Output Summary (Last 24 hours) at 07/20/18 1704  Last data filed at 07/20/18 1308   Gross per 24 hour   Intake              600 ml   Output              525 ml   Net               75 ml      Physical Exam   Constitutional: He is oriented to person, place, and time. He appears well-developed and well-nourished. No distress.   HENT:   Head: Normocephalic and atraumatic.   Mouth/Throat: No oropharyngeal exudate.   Eyes: Pupils are equal, round, and reactive to light. No scleral icterus.   Neck: Normal range of motion. Neck supple. Hepatojugular reflux present. No JVD present. No tracheal deviation present. No thyromegaly present.   Cardiovascular: Normal rate, regular rhythm and intact distal pulses.  Exam reveals gallop and S3. Exam reveals no friction rub.    Murmur heard.   No systolic murmur is present    Diastolic (mitral inflow murmer best heard anterior apex) murmur is present   Pulmonary/Chest: Effort normal and breath sounds normal. No respiratory distress. He has no wheezes. He has no rales.   Abdominal: Soft. Bowel sounds are normal. He exhibits  no distension. There is no tenderness.   Musculoskeletal: Normal range of motion. He exhibits no edema or tenderness.   Neurological: He is alert and oriented to person, place, and time. Coordination normal.   Skin: Skin is warm and dry. Capillary refill takes 2 to 3 seconds. No rash noted. He is not diaphoretic. No erythema.   Psychiatric: He has a normal mood and affect. His behavior is normal. Judgment and thought content normal.   Nursing note and vitals reviewed.    Significant Labs:   CBC:     Recent Labs  Lab 07/19/18  0538 07/20/18  0709   WBC 4.41 4.17   HGB 14.4 13.3*   HCT 43.7 40.6    165     CMP:     Recent Labs  Lab 07/19/18 0538 07/20/18  0709   * 134*   K 4.9 4.9   CL 95 100   CO2 32* 25    220*   BUN 41* 36*   CREATININE 2.0* 1.6*   CALCIUM 9.1 8.2*   PROT 6.5 5.9*   ALBUMIN 2.9* 2.5*   BILITOT 1.4* 0.7   ALKPHOS 144* 131   AST 29 21   ALT 50* 35   ANIONGAP 7* 9   EGFRNONAA 36.7* 48.1*     Magnesium:     Recent Labs  Lab 07/19/18 0538 07/20/18  0709   MG 2.2 2.2     All pertinent labs within the past 24 hours have been reviewed.    Significant Imaging: I have reviewed all pertinent imaging results/findings within the past 24 hours.    Assessment/Plan:      * Acute on chronic combined systolic and diastolic congestive heart failure    -presents with acute systolic heart failure, BNP 2790 (previous 556), CXR with pulm edema, ECG SR with first degree AVB  -last known EF 1/17/17 echo EF 35%, RAP 8, moderate MR, + DD, +WMA, GHK and PAP 30  -repeat 2D echo with EF 25-30%, ROBERTO, RV enlargement with moderately depressed systolic function, pulmHTN with PASP 62, trivial AR, MV mildly sclerotic with evidence of tethered posterior leaflet, severe MR, moderate TR, small pericardial effusion, global hypokinesis, and RAP 8; MR and EF have worsened since January 2017 study   -continue home toprol   -hydralazine as tolerated for afterload reduction >>monitor and escalate therapy as needed   -no  ACEi/ARB due to KEYANNA vs KEYANNA on CKD (baseline appears 1.3-1.5)  -diuresis initiated with IVP lasix >> to PO 7/20, monitor for toleration and dose adjustment needs  -I&Os are inaccurate, weight down 25 lbs   -troponin trend essentially flat .042 >> 0.047 >. 0.037 >> 0.055 and he denies chest pain   -Pet stress 7/19 negative for stress induced perfusion defects however coronary flow reserve signficantly reduced at 1.34, may be consistent with multivessel CAD  -Interventional Cardiology consulted, underwent [facility] 7/20 with findings of normal coronaries  -episodes of NSVT on tele without hemodynamic compromise, continue to monitor  -EP consulted recommend optimization of medical therapy and repeat 2D echo in 3 months to determine candidacy for ICD, consider life vest on discharge   -[employer] Rep notified, pending approval to get vest  -continue tele monitoring  -strict I&Os and daily weights  -outpt follow up with [facility] at discharge         NSVT (nonsustained ventricular tachycardia)    -see above  -continue tele monitoring         KEYANNA (acute kidney injury)    -admission SCr 2.3 >> now 1.6-2.0  -baseline from chart review, although data limited appears 1.3-1.5  -renal US and urine studies pending  -continue to monitor, elevation in AM expected due to [facility] today         Essential hypertension    -BP stable  -monitor with toprol and hydralazine  -adjust therapies as needed        Non-rheumatic mitral regurgitation    -see above for echo results        Diabetes mellitus, type II    -diabetes not controlled  -HgA1C 8.8/206  -SSI/ Mod scale in house  -initiating detemir 5 units qHS >> escalate as appropriate   -diabetic low sodium diet  -transition from metformin at DC to more appropriate PO medication versus insulin therapies   -monitor         Hypothyroidism    -surgical hypothyroidism, on synthroid for life  -TSH 3.9        Megaloblastic anemia due to vitamin B12 deficiency    -CBC stable   -monitor          Substance use disorder    -encouraged cessation from ETOH and THC          VTE Risk Mitigation         Ordered     enoxaparin injection 40 mg  Daily      07/17/18 1016     IP VTE HIGH RISK PATIENT  Once      07/17/18 0904     Place sequential compression device  Until discontinued      07/16/18 1534          Robyn Stanley, WU, AG-ACNP, BC  Department of Hospital Medicine  [facility]  Pager [phone]  [facility] [id number]

## 2018-07-20 NOTE — SUBJECTIVE & OBJECTIVE
"Past Medical History:   Diagnosis Date    Diabetes mellitus type II     Essential hypertension, benign     Hyperlipidemia     Hypothyroidism     Undiagnosed cardiac murmurs        Past Surgical History:   Procedure Laterality Date    COLON SURGERY      "lower intestines removed"    THYROID SURGERY         Review of patient's allergies indicates:   Allergen Reactions    Lisinopril Other (See Comments)     acei cough         PTA Medications   Medication Sig    furosemide (LASIX) 40 MG tablet Take 1 tablet (40 mg total) by mouth once daily.    LEVOTHYROXINE SODIUM (SYNTHROID ORAL) Take 175 mcg by mouth daily 2 hours after breakfast.     losartan (COZAAR) 25 MG tablet Take 1 tablet (25 mg total) by mouth once daily.    METFORMIN HCL (METFORMIN ORAL) Take 500 mg by mouth 2 (two) times daily. Patient taking daily    metoprolol succinate (TOPROL XL) 50 MG 24 hr tablet Take 1 tablet (50 mg total) by mouth once daily.    naproxen sodium (ANAPROX) 220 MG tablet Take 220 mg by mouth as needed.    folic acid (FOLVITE) 1 MG tablet Take 1 tablet (1 mg total) by mouth once daily.    simvastatin (ZOCOR) 10 MG tablet Take 1 tablet (10 mg total) by mouth every evening.     Family History     Problem Relation (Age of Onset)    Cancer Father        Social History Main Topics    Smoking status: Current Some Day Smoker     Types: Cigars    Smokeless tobacco: Not on file      Comment: cigars "every other week or so"    Alcohol use 3.6 oz/week     6 Cans of beer per week    Drug use: Yes     Types: Marijuana    Sexual activity: Yes     Partners: Female     Review of Systems   Constitution: Negative for chills, fever, weakness and weight gain.   HENT: Negative for congestion.    Eyes: Negative for visual disturbance.   Cardiovascular: Positive for dyspnea on exertion, leg swelling and orthopnea. Negative for chest pain, claudication, palpitations and syncope.   Respiratory: Positive for shortness of breath. Negative for " cough and snoring.    Hematologic/Lymphatic: Does not bruise/bleed easily.   Skin: Negative for rash.   Musculoskeletal: Negative for muscle cramps and myalgias.   Gastrointestinal: Negative for bloating, abdominal pain, constipation, diarrhea and melena.   Genitourinary: Negative for bladder incontinence.   Neurological: Negative for excessive daytime sleepiness and focal weakness.   Psychiatric/Behavioral: Negative for depression and suicidal ideas.     Objective:     Vital Signs (Most Recent):  Temp: 98.7 °F (37.1 °C) (07/19/18 1619)  Pulse: 74 (07/20/18 0500)  Resp: 20 (07/20/18 0500)  BP: 106/77 (07/20/18 0500)  SpO2: 96 % (07/20/18 0500) Vital Signs (24h Range):  Temp:  [98 °F (36.7 °C)-98.7 °F (37.1 °C)] 98.7 °F (37.1 °C)  Pulse:  [68-79] 74  Resp:  [16-21] 20  SpO2:  [92 %-97 %] 96 %  BP: ()/(66-78) 106/77     Weight: 70.1 kg (154 lb 8.7 oz)  Body mass index is 25.72 kg/m².    SpO2: 96 %  O2 Device (Oxygen Therapy): room air      Intake/Output Summary (Last 24 hours) at 07/20/18 0554  Last data filed at 07/19/18 2200   Gross per 24 hour   Intake             1080 ml   Output             1100 ml   Net              -20 ml       Lines/Drains/Airways     Peripheral Intravenous Line                 Peripheral IV - Single Lumen 07/16/18 1137 Left Forearm 3 days                Physical Exam   Constitutional: He is oriented to person, place, and time. He appears well-developed and well-nourished. No distress.   HENT:   Head: Normocephalic and atraumatic.   Mouth/Throat: Oropharynx is clear and moist.   Eyes: Conjunctivae and EOM are normal. Pupils are equal, round, and reactive to light. No scleral icterus.   Neck: Normal range of motion. Neck supple. No JVD present.   Cardiovascular: Normal rate, regular rhythm and intact distal pulses.  PMI is not displaced.    Murmur heard.   Low-pitched blowing plateau holosystolic murmur is present  at the lower left sternal border, apex  Pulses:       Radial pulses are 2+  on the right side, and 2+ on the left side.        Femoral pulses are 2+ on the right side, and 2+ on the left side.       Dorsalis pedis pulses are 2+ on the right side, and 2+ on the left side.        Posterior tibial pulses are 2+ on the right side, and 2+ on the left side.   Good Hilario's test on the right.   Pulmonary/Chest: Effort normal. No respiratory distress. He has rales.   Symmetrical expansion   Abdominal: Soft. Bowel sounds are normal. There is no hepatosplenomegaly. There is no tenderness.   Musculoskeletal: Normal range of motion. He exhibits edema.   Neurological: He is alert and oriented to person, place, and time. No cranial nerve deficit.   Skin: Skin is warm and dry. No rash noted. He is not diaphoretic.   Psychiatric: He has a normal mood and affect. Judgment and thought content normal.       Significant Labs:   BMP:   Recent Labs  Lab 07/19/18  0538      *   K 4.9   CL 95   CO2 32*   BUN 41*   CREATININE 2.0*   CALCIUM 9.1   MG 2.2   , CBC   Recent Labs  Lab 07/19/18  0538   WBC 4.41   HGB 14.4   HCT 43.7        Trop 0.04 - 0.05    Significant Imaging: Echocardiogram:   2D echo with color flow doppler:   Results for orders placed or performed during the hospital encounter of 07/16/18   2D echo with color flow doppler   Result Value Ref Range    EF 25 (A) 55 - 65    Mitral Valve Regurgitation SEVERE (A)     Aortic Valve Regurgitation TRIVIAL     Est. PA Systolic Pressure 62.17 (A)     Pericardial Effusion SMALL (A)     Mitral Valve Mobility TETHERED POSTERIOR LEAFLET     Tricuspid Valve Regurgitation MODERATE (A)         Stress Test:   1. There is no evidence of a discrete hemodynamically significant coronary stenosis on relative perfusion images.  2. Although no clinically significant stress defect is seen, there is resting flow heterogeneity that does not improve after Dipyridamole in addition to reduced coronary flow reserve. Absolute myocardial stress flow is severely  reduced in all quadrants   with a whole heart stress flow of 0.64 cc/min/gm . Coronary flow reserve is also signficantly reduced at 1.34. These findings may be consistent with multivessel coronary artery disease.  3. There is abnormal wall motion at rest showing moderate global hypokinesis of the left ventricle. There is abnormal wall motion at stress showing moderate global hypokinesis of the left ventricle.   4. There is resting LV dysfunction with a reduced ejection fraction of 34 %. There is stress induced LV dysfunction with a reduced ejection fraction of 42 %.  (normal is >= 51%)  5. The ventricular volumes are normal at rest and stress.   6. The extracardiac distribution of radioactivity is normal.   7. There was no previous study available to compare.    EKG: Low voltage limb leads, 1st degree heart block, 84bpm, R. Axis deviation, poor r-wave progression in V3, PVC.

## 2018-07-20 NOTE — ASSESSMENT & PLAN NOTE
Poorly controlled (HgbA1c = 8.8) up from 6.4.    - recommend diabetic education, optimization of oral regimen

## 2018-07-20 NOTE — HPI
We are consulted to evaluate the coronary disease on a 54 y/o M with a PMhx of combined systolic and diastolic HF (Echo 01/2017 with EF 35-40%, moderate MR), Type II DM, HTN, bowel resection d/t obstruction, surgical hypothyroidism folate/b12 deficiency.  Patient had complaints of 3 weeks of worsening JEROME, PND, and more recently lower extremity edema. He has not measured his weight, he does not fluid restrict, does not exercise, and has not refilled his losartan after running out a few days ago.      He denies chest pain, lightheadedness, dizziness, and palpitations. Currently he feels much better after diuresis, and states he is ready to leave, has limited insight into his medical conditions.

## 2018-07-20 NOTE — ASSESSMENT & PLAN NOTE
-admission SCr 2.3 >> now 1.6-2.0  -baseline from chart review, although data limited appears 1.3-1.5  -renal US and urine studies pending  -continue to monitor, elevation in AM expected due to C today

## 2018-07-20 NOTE — SUBJECTIVE & OBJECTIVE
"Interval History: patient reports feeling "like a new man" today. He denies chest pain, dyspnea, dizziness or palpitations. Reports no symptoms or concerns at this time.    Review of Systems   Constitution: Negative for chills, diaphoresis, fever and night sweats.   HENT: Negative.    Eyes: Negative for blurred vision and photophobia.   Cardiovascular: Negative for chest pain, claudication, cyanosis, dyspnea on exertion, irregular heartbeat, leg swelling, near-syncope, orthopnea, palpitations, paroxysmal nocturnal dyspnea and syncope.   Respiratory: Negative for cough, hemoptysis, shortness of breath and wheezing.    Skin: Negative for color change and rash.   Musculoskeletal: Negative for back pain and falls.   Gastrointestinal: Negative for abdominal pain, constipation, diarrhea, nausea and vomiting.   Genitourinary: Negative for dysuria and hematuria.   Neurological: Negative for focal weakness, numbness and seizures.   Psychiatric/Behavioral: Negative for altered mental status. The patient is not nervous/anxious.      Objective:     Vital Signs (Most Recent):  Temp: 97.3 °F (36.3 °C) (07/20/18 1211)  Pulse: 68 (07/20/18 1211)  Resp: 20 (07/20/18 1211)  BP: 97/67 (07/20/18 1211)  SpO2: (!) 93 % (07/20/18 1211) Vital Signs (24h Range):  Temp:  [97.3 °F (36.3 °C)-98.7 °F (37.1 °C)] 97.3 °F (36.3 °C)  Pulse:  [68-79] 68  Resp:  [16-21] 20  SpO2:  [93 %-96 %] 93 %  BP: ()/(66-78) 97/67     Weight: 70.3 kg (154 lb 15.7 oz)  Body mass index is 25.79 kg/m².     SpO2: (!) 93 %  O2 Device (Oxygen Therapy): room air    Physical Exam   Constitutional: He is oriented to person, place, and time. He appears well-developed and well-nourished. No distress.   HENT:   Head: Normocephalic and atraumatic.   Eyes: EOM are normal. Pupils are equal, round, and reactive to light.   Neck: Normal range of motion. No JVD present.   Cardiovascular: Normal rate, regular rhythm and intact distal pulses.  Exam reveals no gallop and no " friction rub.    Murmur (Grade II/VI systolic murmur loudest at the apex) heard.  Pulmonary/Chest: Effort normal and breath sounds normal. No respiratory distress. He has no wheezes. He has no rales.   Abdominal: Soft. Bowel sounds are normal. He exhibits no distension. There is no tenderness.   Musculoskeletal: Normal range of motion. He exhibits no edema.   Neurological: He is alert and oriented to person, place, and time.   Skin: Skin is warm. No rash noted. He is not diaphoretic.   Psychiatric: He has a normal mood and affect. His behavior is normal.       Significant Labs:     Recent Results (from the past 24 hour(s))   POCT glucose    Collection Time: 07/19/18  8:45 PM   Result Value Ref Range    POCT Glucose 275 (H) 70 - 110 mg/dL   Magnesium    Collection Time: 07/20/18  7:09 AM   Result Value Ref Range    Magnesium 2.2 1.6 - 2.6 mg/dL   CBC auto differential    Collection Time: 07/20/18  7:09 AM   Result Value Ref Range    WBC 4.17 3.90 - 12.70 K/uL    RBC 4.95 4.60 - 6.20 M/uL    Hemoglobin 13.3 (L) 14.0 - 18.0 g/dL    Hematocrit 40.6 40.0 - 54.0 %    MCV 82 82 - 98 fL    MCH 26.9 (L) 27.0 - 31.0 pg    MCHC 32.8 32.0 - 36.0 g/dL    RDW 14.6 (H) 11.5 - 14.5 %    Platelets 165 150 - 350 K/uL    MPV 12.5 9.2 - 12.9 fL    Immature Granulocytes 0.2 0.0 - 0.5 %    Gran # (ANC) 2.1 1.8 - 7.7 K/uL    Immature Grans (Abs) 0.01 0.00 - 0.04 K/uL    Lymph # 0.8 (L) 1.0 - 4.8 K/uL    Mono # 0.8 0.3 - 1.0 K/uL    Eos # 0.4 0.0 - 0.5 K/uL    Baso # 0.08 0.00 - 0.20 K/uL    nRBC 0 0 /100 WBC    Gran% 51.1 38.0 - 73.0 %    Lymph% 18.0 18.0 - 48.0 %    Mono% 18.2 (H) 4.0 - 15.0 %    Eosinophil% 10.6 (H) 0.0 - 8.0 %    Basophil% 1.9 0.0 - 1.9 %    Differential Method Automated    Comprehensive metabolic panel    Collection Time: 07/20/18  7:09 AM   Result Value Ref Range    Sodium 134 (L) 136 - 145 mmol/L    Potassium 4.9 3.5 - 5.1 mmol/L    Chloride 100 95 - 110 mmol/L    CO2 25 23 - 29 mmol/L    Glucose 220 (H) 70 - 110  mg/dL    BUN, Bld 36 (H) 6 - 20 mg/dL    Creatinine 1.6 (H) 0.5 - 1.4 mg/dL    Calcium 8.2 (L) 8.7 - 10.5 mg/dL    Total Protein 5.9 (L) 6.0 - 8.4 g/dL    Albumin 2.5 (L) 3.5 - 5.2 g/dL    Total Bilirubin 0.7 0.1 - 1.0 mg/dL    Alkaline Phosphatase 131 55 - 135 U/L    AST 21 10 - 40 U/L    ALT 35 10 - 44 U/L    Anion Gap 9 8 - 16 mmol/L    eGFR if African American 55.6 (A) >60 mL/min/1.73 m^2    eGFR if non  48.1 (A) >60 mL/min/1.73 m^2   Protein electrophoresis, serum    Collection Time: 07/20/18 10:32 AM   Result Value Ref Range    Protein, Serum 7.1 6.0 - 8.4 g/dL   POCT glucose    Collection Time: 07/20/18 12:16 PM   Result Value Ref Range    POCT Glucose 172 (H) 70 - 110 mg/dL   Type & Screen    Collection Time: 07/20/18 12:29 PM   Result Value Ref Range    Group & Rh A POS     Indirect Kimi NEG

## 2018-07-20 NOTE — NURSING
Patient educated to stop eating for the remainder of the day until procedure is completed or further notice.

## 2018-07-20 NOTE — SUBJECTIVE & OBJECTIVE
"Interval History: Resting in bed, has returned from [facility] a bit drowsy.  Denies needs or complaints, reports admission symptoms "are gone". He denies chest pain, palpitations, or shortness of breath. Denies any acute events or distress overnight.     Review of Systems   Constitutional: Negative for activity change, appetite change, chills, fatigue, fever and unexpected weight change.   HENT: Negative for congestion, rhinorrhea, sinus pain, sinus pressure, sneezing, sore throat and trouble swallowing.    Respiratory: Negative for cough, chest tightness, shortness of breath and wheezing.    Cardiovascular: Negative for chest pain, palpitations and leg swelling.   Gastrointestinal: Negative for abdominal distention, abdominal pain, constipation, diarrhea, nausea and vomiting.   Genitourinary: Negative for decreased urine volume, dysuria, frequency, hematuria and urgency.   Musculoskeletal: Negative for arthralgias, back pain, myalgias and neck pain.   Neurological: Negative for dizziness, syncope, weakness, light-headedness, numbness and headaches.     Objective:     Vital Signs (Most Recent):  Temp: 98.1 °F (36.7 °C) (07/20/18 1615)  Pulse: 72 (07/20/18 1645)  Resp: 16 (07/20/18 1645)  BP: 101/72 (30 min x 2) (07/20/18 1645)  SpO2: (!) 92 % (07/20/18 1645) Vital Signs (24h Range):  Temp:  [96.4 °F (35.8 °C)-98.1 °F (36.7 °C)] 98.1 °F (36.7 °C)  Pulse:  [67-79] 72  Resp:  [15-21] 16  SpO2:  [84 %-99 %] 92 %  BP: ()/(67-80) 101/72     Weight: 70.3 kg (154 lb 15.7 oz)  Body mass index is 25.79 kg/m².    Intake/Output Summary (Last 24 hours) at 07/20/18 1704  Last data filed at 07/20/18 1308   Gross per 24 hour   Intake              600 ml   Output              525 ml   Net               75 ml      Physical Exam   Constitutional: He is oriented to person, place, and time. He appears well-developed and well-nourished. No distress.   HENT:   Head: Normocephalic and atraumatic.   Mouth/Throat: No oropharyngeal exudate. "   Eyes: Pupils are equal, round, and reactive to light. No scleral icterus.   Neck: Normal range of motion. Neck supple. Hepatojugular reflux present. No JVD present. No tracheal deviation present. No thyromegaly present.   Cardiovascular: Normal rate, regular rhythm and intact distal pulses.  Exam reveals gallop and S3. Exam reveals no friction rub.    Murmur heard.   No systolic murmur is present    Diastolic (mitral inflow murmer best heard anterior apex) murmur is present   Pulmonary/Chest: Effort normal and breath sounds normal. No respiratory distress. He has no wheezes. He has no rales.   Abdominal: Soft. Bowel sounds are normal. He exhibits no distension. There is no tenderness.   Musculoskeletal: Normal range of motion. He exhibits no edema or tenderness.   Neurological: He is alert and oriented to person, place, and time. Coordination normal.   Skin: Skin is warm and dry. Capillary refill takes 2 to 3 seconds. No rash noted. He is not diaphoretic. No erythema.   Psychiatric: He has a normal mood and affect. His behavior is normal. Judgment and thought content normal.   Nursing note and vitals reviewed.    Significant Labs:   CBC:     Recent Labs  Lab 07/19/18  0538 07/20/18  0709   WBC 4.41 4.17   HGB 14.4 13.3*   HCT 43.7 40.6    165     CMP:     Recent Labs  Lab 07/19/18  0538 07/20/18  0709   * 134*   K 4.9 4.9   CL 95 100   CO2 32* 25    220*   BUN 41* 36*   CREATININE 2.0* 1.6*   CALCIUM 9.1 8.2*   PROT 6.5 5.9*   ALBUMIN 2.9* 2.5*   BILITOT 1.4* 0.7   ALKPHOS 144* 131   AST 29 21   ALT 50* 35   ANIONGAP 7* 9   EGFRNONAA 36.7* 48.1*     Magnesium:     Recent Labs  Lab 07/19/18  0538 07/20/18  0709   MG 2.2 2.2     All pertinent labs within the past 24 hours have been reviewed.    Significant Imaging: I have reviewed all pertinent imaging results/findings within the past 24 hours.

## 2018-07-20 NOTE — NURSING
"Patient is alert and oriented x 4.  Patient states, "I feel more alert and I feel much better."  SPO2 = 95% on room air.  VASC Band is intact.  No signs of bleeding, swelling, or discoloration noted.   "

## 2018-07-20 NOTE — ASSESSMENT & PLAN NOTE
-diabetes not controlled  -HgA1C 8.8/206  -SSI/ Mod scale in house  -initiating detemir 5 units qHS >> escalate as appropriate   -diabetic low sodium diet  -transition from metformin at DC to more appropriate PO medication versus insulin therapies   -monitor

## 2018-07-20 NOTE — NURSING
VSS.  Withdrew 2 ml of air from Vasc Band.  Site is intact.  No signs of bleeding, swelling, or discoloration.

## 2018-07-20 NOTE — NURSING
Patient is more alert and oriented.  NaCl+ Bolus @ 105.5 ml/hr x 4 hours.  Stop NaCl+ gtt at 1930 hours.

## 2018-07-20 NOTE — NURSING
Patient is sedated, but arousable.  Patient is a mouth breather and SPO2 drops without oxygen to 83-87%.  Patient denies chest pain, and SOB.  Right VASC Band intact, syringe attached.  No signs of bleeding, swelling, or discoloration noted.

## 2018-07-20 NOTE — PLAN OF CARE
Problem: Patient Care Overview  Goal: Plan of Care Review  Plan of care discussed with patient. Patient is free of fall/trauma/injury. Denies CP, SOB, or pain/discomfort.  All questions addressed. Will continue to monitor.

## 2018-07-20 NOTE — PROCEDURES
"    Post Cath Note  Referring Physician: Dana Skelton MD  Procedure: Coronary angiography (Left)       Access: Right radial    LVEDP 20 mmHg    Normal coronaries, high LVEDP      See full report for further details    Intervention:     Closure device: Radial band    Post Cath Exam:   BP 97/67 (BP Location: Left arm, Patient Position: Lying)   Pulse 68   Temp 97.3 °F (36.3 °C) (Oral)   Resp 20   Ht 5' 5" (1.651 m)   Wt 70.3 kg (154 lb 15.7 oz)   SpO2 (!) 93%   BMI 25.79 kg/m²   No unusual pain, hematoma, thrill or bruit at vascular access site.  Distal pulse present without signs of ischemia.    Recommendations:   - Routine post-cath care  - IVF at 1.5cc/kg for 4 hrs  - Continue medical management, Risk factor reduction, Follow-up with outpatient cardiologist      Signed:  Delmy Brooks MD  Cardiology Fellow  Pager [phone]          "

## 2018-07-21 VITALS
RESPIRATION RATE: 16 BRPM | DIASTOLIC BLOOD PRESSURE: 65 MMHG | HEIGHT: 65 IN | SYSTOLIC BLOOD PRESSURE: 95 MMHG | WEIGHT: 155 LBS | OXYGEN SATURATION: 94 % | TEMPERATURE: 97 F | HEART RATE: 67 BPM | BODY MASS INDEX: 25.83 KG/M2

## 2018-07-21 PROBLEM — E89.0 H/O THYROIDECTOMY: Status: ACTIVE | Noted: 2018-07-21

## 2018-07-21 PROBLEM — R80.8 OTHER PROTEINURIA: Status: ACTIVE | Noted: 2018-07-21

## 2018-07-21 LAB
ALBUMIN SERPL BCP-MCNC: 2.9 G/DL
ALP SERPL-CCNC: 132 U/L
ALT SERPL W/O P-5'-P-CCNC: 35 U/L
ANION GAP SERPL CALC-SCNC: 7 MMOL/L
AST SERPL-CCNC: 24 U/L
BASOPHILS # BLD AUTO: 0.07 K/UL
BASOPHILS NFR BLD: 1.8 %
BILIRUB SERPL-MCNC: 0.8 MG/DL
BNP SERPL-MCNC: 1294 PG/ML
BUN SERPL-MCNC: 32 MG/DL
CALCIUM SERPL-MCNC: 8.4 MG/DL
CHLORIDE SERPL-SCNC: 101 MMOL/L
CO2 SERPL-SCNC: 26 MMOL/L
CREAT SERPL-MCNC: 1.7 MG/DL
DIFFERENTIAL METHOD: ABNORMAL
EOSINOPHIL # BLD AUTO: 0.5 K/UL
EOSINOPHIL NFR BLD: 11.8 %
ERYTHROCYTE [DISTWIDTH] IN BLOOD BY AUTOMATED COUNT: 14.8 %
EST. GFR  (AFRICAN AMERICAN): 51.7 ML/MIN/1.73 M^2
EST. GFR  (NON AFRICAN AMERICAN): 44.7 ML/MIN/1.73 M^2
GLUCOSE SERPL-MCNC: 114 MG/DL
HCT VFR BLD AUTO: 45.1 %
HGB BLD-MCNC: 14.1 G/DL
IMM GRANULOCYTES # BLD AUTO: 0.01 K/UL
IMM GRANULOCYTES NFR BLD AUTO: 0.3 %
LYMPHOCYTES # BLD AUTO: 0.8 K/UL
LYMPHOCYTES NFR BLD: 20.7 %
MAGNESIUM SERPL-MCNC: 2.2 MG/DL
MCH RBC QN AUTO: 26.3 PG
MCHC RBC AUTO-ENTMCNC: 31.3 G/DL
MCV RBC AUTO: 84 FL
MONOCYTES # BLD AUTO: 0.7 K/UL
MONOCYTES NFR BLD: 18.9 %
NEUTROPHILS # BLD AUTO: 1.8 K/UL
NEUTROPHILS NFR BLD: 46.5 %
NRBC BLD-RTO: 0 /100 WBC
PLATELET # BLD AUTO: 168 K/UL
PMV BLD AUTO: 12.3 FL
POTASSIUM SERPL-SCNC: 4.6 MMOL/L
PROT SERPL-MCNC: 6.6 G/DL
RBC # BLD AUTO: 5.37 M/UL
SODIUM SERPL-SCNC: 134 MMOL/L
WBC # BLD AUTO: 3.91 K/UL

## 2018-07-21 PROCEDURE — A4216 STERILE WATER/SALINE, 10 ML: HCPCS | Performed by: NURSE PRACTITIONER

## 2018-07-21 PROCEDURE — 36415 COLL VENOUS BLD VENIPUNCTURE: CPT

## 2018-07-21 PROCEDURE — 83880 ASSAY OF NATRIURETIC PEPTIDE: CPT

## 2018-07-21 PROCEDURE — 99239 HOSP IP/OBS DSCHRG MGMT >30: CPT | Mod: ,,, | Performed by: NURSE PRACTITIONER

## 2018-07-21 PROCEDURE — 25000003 PHARM REV CODE 250: Performed by: NURSE PRACTITIONER

## 2018-07-21 PROCEDURE — 99233 SBSQ HOSP IP/OBS HIGH 50: CPT | Mod: ,,, | Performed by: INTERNAL MEDICINE

## 2018-07-21 PROCEDURE — 83735 ASSAY OF MAGNESIUM: CPT

## 2018-07-21 PROCEDURE — 85025 COMPLETE CBC W/AUTO DIFF WBC: CPT

## 2018-07-21 PROCEDURE — 80053 COMPREHEN METABOLIC PANEL: CPT

## 2018-07-21 RX ORDER — FUROSEMIDE 40 MG/1
40 TABLET ORAL 2 TIMES DAILY
Qty: 180 TABLET | Refills: 0 | Status: ON HOLD | OUTPATIENT
Start: 2018-07-21 | End: 2018-12-24 | Stop reason: SDUPTHER

## 2018-07-21 RX ORDER — FUROSEMIDE 40 MG/1
40 TABLET ORAL DAILY
Qty: 90 TABLET | Refills: 0 | Status: SHIPPED | OUTPATIENT
Start: 2018-07-21 | End: 2018-07-21

## 2018-07-21 RX ORDER — GLYBURIDE 5 MG/1
10 TABLET ORAL
Qty: 180 TABLET | Refills: 0 | Status: ON HOLD | OUTPATIENT
Start: 2018-07-21 | End: 2018-11-30 | Stop reason: HOSPADM

## 2018-07-21 RX ORDER — LOSARTAN POTASSIUM 25 MG/1
25 TABLET ORAL DAILY
Status: DISCONTINUED | OUTPATIENT
Start: 2018-07-21 | End: 2018-07-21 | Stop reason: HOSPADM

## 2018-07-21 RX ORDER — METOPROLOL SUCCINATE 100 MG/1
TABLET, EXTENDED RELEASE ORAL
Qty: 60 TABLET | Refills: 11 | Status: ON HOLD | OUTPATIENT
Start: 2018-07-21 | End: 2018-11-30 | Stop reason: HOSPADM

## 2018-07-21 RX ORDER — LOSARTAN POTASSIUM 25 MG/1
25 TABLET ORAL DAILY
Qty: 90 TABLET | Refills: 0 | Status: ON HOLD | OUTPATIENT
Start: 2018-07-21 | End: 2018-11-30 | Stop reason: HOSPADM

## 2018-07-21 RX ORDER — METOPROLOL SUCCINATE 50 MG/1
50 TABLET, EXTENDED RELEASE ORAL DAILY
Status: DISCONTINUED | OUTPATIENT
Start: 2018-07-22 | End: 2018-07-21 | Stop reason: HOSPADM

## 2018-07-21 RX ORDER — ACETAMINOPHEN 325 MG/1
650 TABLET ORAL
Refills: 0 | COMMUNITY
Start: 2018-07-21

## 2018-07-21 RX ORDER — SIMVASTATIN 10 MG/1
10 TABLET, FILM COATED ORAL NIGHTLY
Qty: 90 TABLET | Refills: 0 | Status: ON HOLD | OUTPATIENT
Start: 2018-07-21 | End: 2018-11-30 | Stop reason: HOSPADM

## 2018-07-21 RX ADMIN — FUROSEMIDE 40 MG: 40 TABLET ORAL at 08:07

## 2018-07-21 RX ADMIN — METOPROLOL TARTRATE 50 MG: 25 TABLET ORAL at 08:07

## 2018-07-21 RX ADMIN — SODIUM CHLORIDE, PRESERVATIVE FREE 3 ML: 5 INJECTION INTRAVENOUS at 06:07

## 2018-07-21 RX ADMIN — LEVOTHYROXINE SODIUM 175 MCG: 100 TABLET ORAL at 10:07

## 2018-07-21 RX ADMIN — STANDARDIZED SENNA CONCENTRATE AND DOCUSATE SODIUM 1 TABLET: 8.6; 5 TABLET, FILM COATED ORAL at 08:07

## 2018-07-21 RX ADMIN — LOSARTAN POTASSIUM 25 MG: 25 TABLET, FILM COATED ORAL at 10:07

## 2018-07-21 NOTE — SUBJECTIVE & OBJECTIVE
Interval History:     HF noted on previous echo, current echo shows functional MR that is severe.  Telemetry: NSR 60's-70's  8beat NSVT     Review of Systems   Constitution: Negative for chills, diaphoresis, fever and night sweats.   HENT: Negative.    Eyes: Negative for blurred vision and photophobia.   Cardiovascular: Negative for chest pain, claudication, cyanosis, dyspnea on exertion, irregular heartbeat, leg swelling, near-syncope, orthopnea, palpitations, paroxysmal nocturnal dyspnea and syncope.   Respiratory: Negative for cough, hemoptysis, shortness of breath and wheezing.    Skin: Negative for color change and rash.   Musculoskeletal: Negative for back pain and falls.   Gastrointestinal: Negative for abdominal pain, constipation, diarrhea, nausea and vomiting.   Genitourinary: Negative for dysuria and hematuria.   Neurological: Negative for focal weakness, numbness and seizures.   Psychiatric/Behavioral: Negative for altered mental status. The patient is not nervous/anxious.      Objective:     Vital Signs (Most Recent):  Temp: 97.4 °F (36.3 °C) (07/21/18 0800)  Pulse: 71 (07/21/18 0800)  Resp: 16 (07/21/18 0800)  BP: 102/74 (07/21/18 0800)  SpO2: 99 % (07/21/18 0800) Vital Signs (24h Range):  Temp:  [96.4 °F (35.8 °C)-98.2 °F (36.8 °C)] 97.4 °F (36.3 °C)  Pulse:  [65-78] 71  Resp:  [15-20] 16  SpO2:  [84 %-99 %] 99 %  BP: ()/(67-86) 102/74     Weight: 70.3 kg (154 lb 15.7 oz)  Body mass index is 25.79 kg/m².     SpO2: 99 %  O2 Device (Oxygen Therapy): room air    Physical Exam   Constitutional: He is oriented to person, place, and time. He appears well-developed and well-nourished. No distress.   HENT:   Head: Normocephalic and atraumatic.   Eyes: EOM are normal. Pupils are equal, round, and reactive to light.   Neck: Normal range of motion. No JVD present.   Cardiovascular: Normal rate, regular rhythm and intact distal pulses.  Exam reveals no gallop and no friction rub.    Murmur (Grade II/VI  systolic murmur loudest at the apex) heard.  Pulmonary/Chest: Effort normal and breath sounds normal. No respiratory distress. He has no wheezes. He has no rales.   Abdominal: Soft. Bowel sounds are normal. He exhibits no distension. There is no tenderness.   Musculoskeletal: Normal range of motion. He exhibits no edema.   Neurological: He is alert and oriented to person, place, and time.   Skin: Skin is warm. No rash noted. He is not diaphoretic.   Psychiatric: He has a normal mood and affect. His behavior is normal.       Significant Labs:     Recent Results (from the past 24 hour(s))   POCT glucose    Collection Time: 07/20/18 12:16 PM   Result Value Ref Range    POCT Glucose 172 (H) 70 - 110 mg/dL   Type & Screen    Collection Time: 07/20/18 12:29 PM   Result Value Ref Range    Group & Rh A POS     Indirect Kimi NEG    Urinalysis    Collection Time: 07/20/18 12:30 PM   Result Value Ref Range    Specimen UA Urine, Clean Catch     Color, UA Yellow Yellow, Straw, Shaista    Appearance, UA Clear Clear    pH, UA 6.0 5.0 - 8.0    Specific Gravity, UA 1.010 1.005 - 1.030    Protein, UA Negative Negative    Glucose, UA Negative Negative    Ketones, UA Negative Negative    Bilirubin (UA) Negative Negative    Occult Blood UA Negative Negative    Nitrite, UA Negative Negative    Urobilinogen, UA Negative <2.0 EU/dL    Leukocytes, UA Negative Negative   Escamilla's Stain, Urine Random    Collection Time: 07/20/18 12:30 PM   Result Value Ref Range    Escamilla's Stain, Ur No eosinophils seen No eosinophils seen   Protein / creatinine ratio, urine    Collection Time: 07/20/18 12:31 PM   Result Value Ref Range    Protein, Urine Random 21 (H) 0 - 15 mg/dL    Creatinine, Random Ur 43.0 23.0 - 375.0 mg/dL    Prot/Creat Ratio, Ur 0.49 (H) 0.00 - 0.20   Chloride, urine, random    Collection Time: 07/20/18 12:31 PM   Result Value Ref Range    Chloride, Rand Ur 93 25 - 200 mmol/L   Creatinine, urine, random    Collection Time: 07/20/18  12:31 PM   Result Value Ref Range    Creatinine, Random Ur 43.0 23.0 - 375.0 mg/dL   Sodium, urine, random    Collection Time: 07/20/18 12:31 PM   Result Value Ref Range    Sodium Urine Random 88 20 - 250 mmol/L   Urea nitrogen, urine, random    Collection Time: 07/20/18 12:31 PM   Result Value Ref Range    Urine Urea Nitrogen, Random 336 140 - 1050 mg/dL   Potassium, urine, random    Collection Time: 07/20/18 12:31 PM   Result Value Ref Range    Potassium Urine Random 27 15 - 95 mmol/L   POCT glucose    Collection Time: 07/20/18  4:46 PM   Result Value Ref Range    POCT Glucose 176 (H) 70 - 110 mg/dL   POCT glucose    Collection Time: 07/20/18  9:25 PM   Result Value Ref Range    POCT Glucose 245 (H) 70 - 110 mg/dL   Comprehensive metabolic panel    Collection Time: 07/21/18  6:57 AM   Result Value Ref Range    Sodium 134 (L) 136 - 145 mmol/L    Potassium 4.6 3.5 - 5.1 mmol/L    Chloride 101 95 - 110 mmol/L    CO2 26 23 - 29 mmol/L    Glucose 114 (H) 70 - 110 mg/dL    BUN, Bld 32 (H) 6 - 20 mg/dL    Creatinine 1.7 (H) 0.5 - 1.4 mg/dL    Calcium 8.4 (L) 8.7 - 10.5 mg/dL    Total Protein 6.6 6.0 - 8.4 g/dL    Albumin 2.9 (L) 3.5 - 5.2 g/dL    Total Bilirubin 0.8 0.1 - 1.0 mg/dL    Alkaline Phosphatase 132 55 - 135 U/L    AST 24 10 - 40 U/L    ALT 35 10 - 44 U/L    Anion Gap 7 (L) 8 - 16 mmol/L    eGFR if African American 51.7 (A) >60 mL/min/1.73 m^2    eGFR if non  44.7 (A) >60 mL/min/1.73 m^2   Magnesium    Collection Time: 07/21/18  6:57 AM   Result Value Ref Range    Magnesium 2.2 1.6 - 2.6 mg/dL   CBC auto differential    Collection Time: 07/21/18  6:57 AM   Result Value Ref Range    WBC 3.91 3.90 - 12.70 K/uL    RBC 5.37 4.60 - 6.20 M/uL    Hemoglobin 14.1 14.0 - 18.0 g/dL    Hematocrit 45.1 40.0 - 54.0 %    MCV 84 82 - 98 fL    MCH 26.3 (L) 27.0 - 31.0 pg    MCHC 31.3 (L) 32.0 - 36.0 g/dL    RDW 14.8 (H) 11.5 - 14.5 %    Platelets 168 150 - 350 K/uL    MPV 12.3 9.2 - 12.9 fL    Immature  Granulocytes 0.3 0.0 - 0.5 %    Gran # (ANC) 1.8 1.8 - 7.7 K/uL    Immature Grans (Abs) 0.01 0.00 - 0.04 K/uL    Lymph # 0.8 (L) 1.0 - 4.8 K/uL    Mono # 0.7 0.3 - 1.0 K/uL    Eos # 0.5 0.0 - 0.5 K/uL    Baso # 0.07 0.00 - 0.20 K/uL    nRBC 0 0 /100 WBC    Gran% 46.5 38.0 - 73.0 %    Lymph% 20.7 18.0 - 48.0 %    Mono% 18.9 (H) 4.0 - 15.0 %    Eosinophil% 11.8 (H) 0.0 - 8.0 %    Basophil% 1.8 0.0 - 1.9 %    Differential Method Automated    Brain natriuretic peptide    Collection Time: 07/21/18  6:57 AM   Result Value Ref Range    BNP 1,294 (H) 0 - 99 pg/mL

## 2018-07-21 NOTE — PROGRESS NOTES
[facility]  Cardiac Electrophysiology  Progress Note    Admission Date: 7/16/2018  Code Status: Full Code   Attending Physician: Dana Skelton MD   Expected Discharge Date: 7/21/2018  Principal Problem:Acute on chronic combined systolic and diastolic congestive heart failure    Subjective:     Interval History:     HF noted on previous echo, current echo shows functional MR that is severe.  Telemetry: NSR 60's-70's  8beat NSVT     Review of Systems   Constitution: Negative for chills, diaphoresis, fever and night sweats.   HENT: Negative.    Eyes: Negative for blurred vision and photophobia.   Cardiovascular: Negative for chest pain, claudication, cyanosis, dyspnea on exertion, irregular heartbeat, leg swelling, near-syncope, orthopnea, palpitations, paroxysmal nocturnal dyspnea and syncope.   Respiratory: Negative for cough, hemoptysis, shortness of breath and wheezing.    Skin: Negative for color change and rash.   Musculoskeletal: Negative for back pain and falls.   Gastrointestinal: Negative for abdominal pain, constipation, diarrhea, nausea and vomiting.   Genitourinary: Negative for dysuria and hematuria.   Neurological: Negative for focal weakness, numbness and seizures.   Psychiatric/Behavioral: Negative for altered mental status. The patient is not nervous/anxious.      Objective:     Vital Signs (Most Recent):  Temp: 97.4 °F (36.3 °C) (07/21/18 0800)  Pulse: 71 (07/21/18 0800)  Resp: 16 (07/21/18 0800)  BP: 102/74 (07/21/18 0800)  SpO2: 99 % (07/21/18 0800) Vital Signs (24h Range):  Temp:  [96.4 °F (35.8 °C)-98.2 °F (36.8 °C)] 97.4 °F (36.3 °C)  Pulse:  [65-78] 71  Resp:  [15-20] 16  SpO2:  [84 %-99 %] 99 %  BP: ()/(67-86) 102/74     Weight: 70.3 kg (154 lb 15.7 oz)  Body mass index is 25.79 kg/m².     SpO2: 99 %  O2 Device (Oxygen Therapy): room air    Physical Exam   Constitutional: He is oriented to person, place, and time. He appears well-developed and well-nourished. No  distress.   HENT:   Head: Normocephalic and atraumatic.   Eyes: EOM are normal. Pupils are equal, round, and reactive to light.   Neck: Normal range of motion. No JVD present.   Cardiovascular: Normal rate, regular rhythm and intact distal pulses.  Exam reveals no gallop and no friction rub.    Murmur (Grade II/VI systolic murmur loudest at the apex) heard.  Pulmonary/Chest: Effort normal and breath sounds normal. No respiratory distress. He has no wheezes. He has no rales.   Abdominal: Soft. Bowel sounds are normal. He exhibits no distension. There is no tenderness.   Musculoskeletal: Normal range of motion. He exhibits no edema.   Neurological: He is alert and oriented to person, place, and time.   Skin: Skin is warm. No rash noted. He is not diaphoretic.   Psychiatric: He has a normal mood and affect. His behavior is normal.       Significant Labs:     Recent Results (from the past 24 hour(s))   POCT glucose    Collection Time: 07/20/18 12:16 PM   Result Value Ref Range    POCT Glucose 172 (H) 70 - 110 mg/dL   Type & Screen    Collection Time: 07/20/18 12:29 PM   Result Value Ref Range    Group & Rh A POS     Indirect Kimi NEG    Urinalysis    Collection Time: 07/20/18 12:30 PM   Result Value Ref Range    Specimen UA Urine, Clean Catch     Color, UA Yellow Yellow, Straw, Shaista    Appearance, UA Clear Clear    pH, UA 6.0 5.0 - 8.0    Specific Gravity, UA 1.010 1.005 - 1.030    Protein, UA Negative Negative    Glucose, UA Negative Negative    Ketones, UA Negative Negative    Bilirubin (UA) Negative Negative    Occult Blood UA Negative Negative    Nitrite, UA Negative Negative    Urobilinogen, UA Negative <2.0 EU/dL    Leukocytes, UA Negative Negative   Escamilla's Stain, Urine Random    Collection Time: 07/20/18 12:30 PM   Result Value Ref Range    Escamilla's Stain, Ur No eosinophils seen No eosinophils seen   Protein / creatinine ratio, urine    Collection Time: 07/20/18 12:31 PM   Result Value Ref Range    Protein,  Urine Random 21 (H) 0 - 15 mg/dL    Creatinine, Random Ur 43.0 23.0 - 375.0 mg/dL    Prot/Creat Ratio, Ur 0.49 (H) 0.00 - 0.20   Chloride, urine, random    Collection Time: 07/20/18 12:31 PM   Result Value Ref Range    Chloride, Rand Ur 93 25 - 200 mmol/L   Creatinine, urine, random    Collection Time: 07/20/18 12:31 PM   Result Value Ref Range    Creatinine, Random Ur 43.0 23.0 - 375.0 mg/dL   Sodium, urine, random    Collection Time: 07/20/18 12:31 PM   Result Value Ref Range    Sodium Urine Random 88 20 - 250 mmol/L   Urea nitrogen, urine, random    Collection Time: 07/20/18 12:31 PM   Result Value Ref Range    Urine Urea Nitrogen, Random 336 140 - 1050 mg/dL   Potassium, urine, random    Collection Time: 07/20/18 12:31 PM   Result Value Ref Range    Potassium Urine Random 27 15 - 95 mmol/L   POCT glucose    Collection Time: 07/20/18  4:46 PM   Result Value Ref Range    POCT Glucose 176 (H) 70 - 110 mg/dL   POCT glucose    Collection Time: 07/20/18  9:25 PM   Result Value Ref Range    POCT Glucose 245 (H) 70 - 110 mg/dL   Comprehensive metabolic panel    Collection Time: 07/21/18  6:57 AM   Result Value Ref Range    Sodium 134 (L) 136 - 145 mmol/L    Potassium 4.6 3.5 - 5.1 mmol/L    Chloride 101 95 - 110 mmol/L    CO2 26 23 - 29 mmol/L    Glucose 114 (H) 70 - 110 mg/dL    BUN, Bld 32 (H) 6 - 20 mg/dL    Creatinine 1.7 (H) 0.5 - 1.4 mg/dL    Calcium 8.4 (L) 8.7 - 10.5 mg/dL    Total Protein 6.6 6.0 - 8.4 g/dL    Albumin 2.9 (L) 3.5 - 5.2 g/dL    Total Bilirubin 0.8 0.1 - 1.0 mg/dL    Alkaline Phosphatase 132 55 - 135 U/L    AST 24 10 - 40 U/L    ALT 35 10 - 44 U/L    Anion Gap 7 (L) 8 - 16 mmol/L    eGFR if African American 51.7 (A) >60 mL/min/1.73 m^2    eGFR if non  44.7 (A) >60 mL/min/1.73 m^2   Magnesium    Collection Time: 07/21/18  6:57 AM   Result Value Ref Range    Magnesium 2.2 1.6 - 2.6 mg/dL   CBC auto differential    Collection Time: 07/21/18  6:57 AM   Result Value Ref Range    WBC  3.91 3.90 - 12.70 K/uL    RBC 5.37 4.60 - 6.20 M/uL    Hemoglobin 14.1 14.0 - 18.0 g/dL    Hematocrit 45.1 40.0 - 54.0 %    MCV 84 82 - 98 fL    MCH 26.3 (L) 27.0 - 31.0 pg    MCHC 31.3 (L) 32.0 - 36.0 g/dL    RDW 14.8 (H) 11.5 - 14.5 %    Platelets 168 150 - 350 K/uL    MPV 12.3 9.2 - 12.9 fL    Immature Granulocytes 0.3 0.0 - 0.5 %    Gran # (ANC) 1.8 1.8 - 7.7 K/uL    Immature Grans (Abs) 0.01 0.00 - 0.04 K/uL    Lymph # 0.8 (L) 1.0 - 4.8 K/uL    Mono # 0.7 0.3 - 1.0 K/uL    Eos # 0.5 0.0 - 0.5 K/uL    Baso # 0.07 0.00 - 0.20 K/uL    nRBC 0 0 /100 WBC    Gran% 46.5 38.0 - 73.0 %    Lymph% 20.7 18.0 - 48.0 %    Mono% 18.9 (H) 4.0 - 15.0 %    Eosinophil% 11.8 (H) 0.0 - 8.0 %    Basophil% 1.8 0.0 - 1.9 %    Differential Method Automated    Brain natriuretic peptide    Collection Time: 07/21/18  6:57 AM   Result Value Ref Range    BNP 1,294 (H) 0 - 99 pg/mL     Assessment and Plan:     NSVT (nonsustained ventricular tachycardia)    Asymptomatic runs of NSVT in setting of  reduced EF (25-30%). Cardiac Cath with non-obstructive CAD.  Recommend maximizing HF therapy with toprol, ACE/ARB, and Aldactone, thereafter echocardiogram in 3 months and if EF <35 refer to EP clinic then we can proceed with ICD placement.  Currently recommend changing to Toprol 150mg daily, once renal function stabilizes restart ARB and thereafter can add aldactone. Alternatively can replace ARB with   Hydralazine and isosorbide dinitrate TID. Needs follow up in cardiology clinic for uptitration of HF therapy, and reassess EF in 3 months after maximizing HF therapy.     His MR is functional and may improve with HF therapy  Agree with [facility] on discharge      Please ensure that he has Outpatient Cardiology Follow up, if he prefers he can follow up with me in the general cardiology clinic             Randy Aguero MD  Cardiac Electrophysiology  [facility]

## 2018-07-21 NOTE — ASSESSMENT & PLAN NOTE
Asymptomatic runs of NSVT in setting of  reduced EF (25-30%). Cardiac Cath with non-obstructive CAD.  Recommend maximizing HF therapy with toprol, ACE/ARB, and Aldactone, thereafter echocardiogram in 3 months and if EF <35 refer to EP clinic then we can proceed with ICD placement.  Currently recommend changing to Toprol 150mg daily, once renal function stabilizes restart ARB and thereafter can add aldactone. Alternatively can replace ARB with   Hydralazine and isosorbide dinitrate TID. Needs follow up in cardiology clinic for uptitration of HF therapy, and reassess EF in 3 months after maximizing HF therapy.     His MR is functional and may improve with HF therapy  Agree with [facility] on discharge      Please ensure that he has Outpatient Cardiology Follow up, if he prefers he can follow up with me in the general cardiology clinic

## 2018-07-23 LAB
ALBUMIN SERPL ELPH-MCNC: 3.6 G/DL
ALPHA1 GLOB SERPL ELPH-MCNC: 0.38 G/DL
ALPHA2 GLOB SERPL ELPH-MCNC: 0.65 G/DL
B-GLOBULIN SERPL ELPH-MCNC: 0.99 G/DL
GAMMA GLOB SERPL ELPH-MCNC: 1.48 G/DL
PATHOLOGIST INTERPRETATION SPE: NORMAL
PROT SERPL-MCNC: 7.1 G/DL

## 2018-07-23 NOTE — DISCHARGE SUMMARY
[facility] Hospital Medicine  Discharge Summary      Patient Name: Ashish Mckeon  MRN: [id number]  Admission Date: 7/16/2018  Hospital Length of Stay: 5 days  Discharge Date and Time: 7/21/2018  2:28 PM  Attending Physician: Dr. Dana Skelton   Discharging Provider: Kristi Russell NP  Primary Care Provider: [facility] Medicine Team: [facility] MED J Kristi Russell NP    HPI:   Mr. Mckeon is a 53 year old male with past medical history of HFrEF (35-40% unknown type/ thinks he had stress test in distant past), Type II DM on oral hypoglycemics, HTN, bowel resection d/t obstruction, and surgical thyroidectomy at [facility] for h/o hyperthyroidism s/p iodine/ radiation, and intermittent drinker/ intermittent THC usage.  He presented to ED with 3 week history of progressive shortness of breath, exertional dyspnea, worsening PND and several days of increasing lower extremity edema.  He does not follow a salt restricted diet, however he does take his medications as prescribed although he ran out of losartan recently. He denies chest pain, lightheadedness, dizziness, and palpitations.  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 He follows at [facility] and has not seen a cardiologist that he can recall.     In ED: CBC stable, chemistry with SCr of 2.3 and elevated alk phos/AST/ALT, troponin positive at 0.037, BNP ~2800, HgA1C 8.8/206, TSH 3.929, EKG with SR with 1st degree AVD and occasional PVCs, and CXR with possible early edema and small left plerual effusion. The patient was admitted to the Hospital Medicine Service for further evaluation and management.     Procedure(s) (LRB):  Coronary angiography (Left)      Hospital Course:   Mr. Mckeon was admitted 7/16 due to Systolic heart failure exacerbation. 2D echo with EF 25-30%, ROBERTO, RV enlargement with moderately depressed systolic function, pulmHTN with PASP 62, trivial AR, MV  mildly sclerotic with evidence of tethered posterior leaflet, severe MR, moderate TR, small pericardial effusion, global hypokinesis, and RAP 8. MR and EF have worsened since January 2017 study. Home toprol continued and increased, no ACEi/ARB due to KEYANNA vs KEYANNA on CKD (baseline appears 1.3-1.5); renal US and urine studies c/w medical renal disease. Diuresis initiated with IVP lasix, weight down 25 lbs, transitioned to PO 7/20, 7/21 BNP still elevated at 1294 with v waves noted in jugular, will send home on lasix bid for several more days to lose another 3-5 lbs, once he's down to 150 idania, he was instructed to decrease to daily. Troponin trend essentially flat .042 >> 0.047 >. 0.037 >> 0.055 with no c/o chest pain, PET stress 7/19 to risk stratify, negative for stress induced perfusion defects, however coronary flow reserve is also signficantly reduced at 1.34, findings possibly consistent with multivessel CAD. Interventional Cardiology consulted, underwent LHC 7/20 with findings of normal coronaries. Also noted over hospital course episodes of NSVT, asymptomatic, EP consulted, recommend optimization of medical therapy and repeat 2D echo in 3 months to determine candidacy for ICD, life vest placed upon discharge.    Disposition plans: home with family,  outpt follow up with [facility] as this is his current pcp, however encouraged to see appt with [facility] if possible for heart failure management and need for ICD/ transplant monitoring.  He will need a 3 month 2D echo with CFD, with whomever he follows up.     Consults:   Consults         Status Ordering Provider     Inpatient consult to Electrophysiology  Once     Provider:  (Not yet assigned)    Completed SAMMIE DE LA TORRE     Inpatient consult to Interventional Cardiology  Once     Provider:  (Not yet assigned)    Completed SAMMIE DE LA TORRE     Inpatient consult to Registered Dietitian/Nutritionist  Once     Provider:  (Not  yet assigned)    Completed SALVADOR ABBOTT      Review of Systems   Constitutional: Negative for activity change, appetite change, chills, fatigue, fever and unexpected weight change.   HENT: Negative for congestion, rhinorrhea, sinus pain, sinus pressure, sneezing, sore throat and trouble swallowing.    Respiratory: Negative for cough, chest tightness, shortness of breath and wheezing.    Cardiovascular: Negative for chest pain, palpitations and leg swelling.   Gastrointestinal: Negative for abdominal distention, abdominal pain, constipation, diarrhea, nausea and vomiting.   Genitourinary: Negative for decreased urine volume, dysuria, frequency, hematuria and urgency.   Musculoskeletal: Negative for arthralgias, back pain, myalgias and neck pain.   Neurological: Negative for dizziness, syncope, weakness, light-headedness, numbness and headaches.     Physical Exam   Constitutional: He is oriented to person, place, and time. He appears well-developed and well-nourished. No distress.   HENT:   Head: Normocephalic and atraumatic.   Mouth/Throat: No oropharyngeal exudate.   Eyes: Conjunctiva clear. No scleral icterus.   Neck: Normal range of motion. Neck supple. V waves present. No JVD present.   Cardiovascular: Normal rate, regular rhythm and intact distal pulses.  Exam reveals gallop and S3. Exam reveals no friction rub.    Murmur heard.   No systolic murmur is present    Diastolic (mitral inflow murmer best heard anterior apex) murmur is present   Pulmonary/Chest: Effort normal and breath sounds normal. No respiratory distress. He has no wheezes. He has no rales.   Abdominal: Soft. Bowel sounds are normal. He exhibits no distension. There is no tenderness.   Musculoskeletal: Normal range of motion. He exhibits no edema or tenderness.   Neurological: He is alert and oriented to person, place, and time. Coordination normal.   Skin: Skin is warm and dry. Capillary refill takes < 3 seconds. No rash noted. He is not  diaphoretic. No erythema.   Psychiatric: He has a normal mood and affect. His behavior is normal. Judgment and thought content normal.     * Acute on chronic combined systolic and diastolic congestive heart failure    - presents with acute systolic heart failure, BNP 2790 (previous 556), CXR with pulm edema, ECG SR with first degree AVB  -last known EF 1/17/17 echo EF 35%, RAP 8, moderate MR, + DD, +WMA, GHK and PAP 30  -repeat 2D echo with EF 25-30%, ROBERTO, RV enlargement with moderately depressed systolic function, pulmHTN with PASP 62, trivial AR, MV mildly sclerotic with evidence of tethered posterior leaflet, severe MR, moderate TR, small pericardial effusion, global hypokinesis, and RAP 8; MR and EF have worsened since January 2017 study   -continue home toprol increased to gdmt  -hydralazine temporarily substituted his losartan for afterload reduction in setting of KEYANNA   -no ACEi/ARB due to KEYANNA vs KEYANNA on CKD (baseline appears 1.3-1.5)  - CR 1.6-1.7 prior to discharge so he was transitioned back to his losartan at discharge with f/u with [facility]  -diuresis initiated with IVP lasix >> to PO 7/20, BNP on 7/21 1294 still elevated with V waves on exam, he feels at his baseline and is able to be without sob, however he was instructed to take bid for next several days to get down another 3-5 lbs.  Then decrease to once daily  -I&Os inaccurate, weight down 25 lbs   -troponin trend essentially flat .042 >> 0.047 >. 0.037 >> 0.055 and he denies chest pain   - Pet stress 7/19 negative for stress induced perfusion defects however coronary flow reserve signficantly reduced at 1.34, may be consistent with multivessel CAD  - Interventional Cardiology consulted, underwent LHC 7/20 with findings of normal coronaries  - episodes of NSVT on tele without hemodynamic compromise  - EP consulted recommend optimization of medical therapy and repeat 2D echo in 3 months to determine candidacy for ICD, life vest placed on  discharge  - strict I&Os and daily weights  - outpt follow up with [facility] at discharge         Hypothyroidism    -surgical hypothyroidism, on synthroid for life  -TSH 3.9        Diabetes mellitus, type II    -diabetes not controlled  -HgA1C 8.8/206  -SSI/ Mod scale in house  -initiating detemir 5 units qHS >> he needs more education on insulin administration not to mention he's not had another trial of po diabetes meds with proper diet.    -diabetic low sodium diet  -transition from metformin at DC to glyburide 10mg as he said he's not ready for insulin just yet   -monitor         H/O thyroidectomy    - synthroid for life          Other proteinuria    - resume arb and control diabetes  - f/u with nephrologist          NSVT (nonsustained ventricular tachycardia)    -see above  -continue tele monitoring   - s/p lifevest        Substance use disorder    -encouraged cessation from ETOH and THC        Essential hypertension    - BP stable 's  -adjusting therapies as tolerated        KEYANNA (acute kidney injury)    - admission SCr 2.3 >> now 1.6-2.0  - baseline from chart review, although data limited appears 1.3-1.5  - renal US and urine studies c/w medical renal disease and proteinuria  - needs f/u with PCP and nephrologist         Non-rheumatic mitral regurgitation    -see above for echo results  - tethering appears to be functional MR, will continue to diurese to see improvement        Megaloblastic anemia due to vitamin B12 deficiency    -CBC stable   -monitor           Final Active Diagnoses:    Diagnosis Date Noted POA    PRINCIPAL PROBLEM:  Acute on chronic combined systolic and diastolic congestive heart failure [I50.43] 01/16/2017 Yes    Hypothyroidism [E03.9]  Yes     Chronic    Diabetes mellitus, type II [E11.9]  Yes     Chronic    Other proteinuria [R80.8] 07/21/2018 Yes    H/O thyroidectomy [E89.0] 07/21/2018 Yes    NSVT (nonsustained ventricular tachycardia) [I47.2] 07/18/2018 Yes     KEYANNA (acute kidney injury) [N17.9] 07/17/2018 Yes    Essential hypertension [I10] 07/17/2018 Unknown    Substance use disorder [F19.90] 07/17/2018 Yes    Non-rheumatic mitral regurgitation [I34.0] 01/16/2017 Yes    Megaloblastic anemia due to vitamin B12 deficiency [D53.1] 09/06/2013 Yes      Problems Resolved During this Admission:    Diagnosis Date Noted Date Resolved POA       Discharged Condition: good    Disposition: Home or Self Care    Follow Up:  Follow-up Information     [facility].    Specialties:  Behavioral Health, Psychiatry  Why:  Needs to be scheduled, needs endocrine, nephrology and cardiology f/u   Contact information:  [address] [place] [address]  [phone]             [facility].    Why:  ask for appt with Tara Kirby.   Contact information:  [address] [facility] [address]  [phone]                 Patient Instructions:     Diet diabetic     Diet Cardiac     Notify your health care provider if you experience any of the following:  temperature >100.4     Notify your health care provider if you experience any of the following:  persistent nausea and vomiting or diarrhea     Notify your health care provider if you experience any of the following:  severe uncontrolled pain     Notify your health care provider if you experience any of the following:  redness, tenderness, or signs of infection (pain, swelling, redness, odor or green/yellow discharge around incision site)     Notify your health care provider if you experience any of the following:  difficulty breathing or increased cough     Notify your health care provider if you experience any of the following:  severe persistent headache     Notify your health care provider if you experience any of the following:  worsening rash     Notify your health care provider if you experience any of the following:  persistent dizziness, light-headedness, or visual disturbances     Notify your health care  provider if you experience any of the following:  increased confusion or weakness     Activity as tolerated         Significant Diagnostic Studies: Labs: All labs within the past 24 hours have been reviewed    Pending Diagnostic Studies:     None         Medications:  Reconciled Home Medications:      Medication List      START taking these medications    acetaminophen 325 MG tablet  Commonly known as:  TYLENOL  Take 2 tablets (650 mg total) by mouth every 6 to 8 hours as needed.     glyBURIDE 5 MG tablet  Commonly known as:  DIABETA  Take 2 tablets (10 mg total) by mouth daily with breakfast.        CHANGE how you take these medications    furosemide 40 MG tablet  Commonly known as:  LASIX  Take 1 tablet (40 mg total) by mouth 2 (two) times daily.  What changed:  when to take this     metoprolol succinate 100 MG 24 hr tablet  Commonly known as:  TOPROL-XL  150mg daily, take one and one half tab daily  What changed:  · medication strength  · how much to take  · how to take this  · when to take this  · additional instructions        CONTINUE taking these medications    losartan 25 MG tablet  Commonly known as:  COZAAR  Take 1 tablet (25 mg total) by mouth once daily.     simvastatin 10 MG tablet  Commonly known as:  ZOCOR  Take 1 tablet (10 mg total) by mouth every evening.     SYNTHROID ORAL  Take 175 mcg by mouth daily 2 hours after breakfast.        STOP taking these medications    folic acid 1 MG tablet  Commonly known as:  FOLVITE     METFORMIN ORAL     naproxen sodium 220 MG tablet  Commonly known as:  ANAPROX            Indwelling Lines/Drains at time of discharge:   Lines/Drains/Airways          No matching active lines, drains, or airways          Time spent on the discharge of patient: 40 minutes  Patient was seen and examined on the date of discharge and determined to be suitable for discharge.         Kristi Russell NP  Department of Hospital Medicine  [facility]  Spectra:   [phone]  Pager: [phone]

## 2018-07-23 NOTE — PLAN OF CARE
FU scheduled with DOC on 7/25/18 at 0930. Called and left message on pt's phone. Faxed records to DOC.

## 2018-07-23 NOTE — ASSESSMENT & PLAN NOTE
- presents with acute systolic heart failure, BNP 2790 (previous 556), CXR with pulm edema, ECG SR with first degree AVB  -last known EF 1/17/17 echo EF 35%, RAP 8, moderate MR, + DD, +WMA, GHK and PAP 30  -repeat 2D echo with EF 25-30%, ROBERTO, RV enlargement with moderately depressed systolic function, pulmHTN with PASP 62, trivial AR, MV mildly sclerotic with evidence of tethered posterior leaflet, severe MR, moderate TR, small pericardial effusion, global hypokinesis, and RAP 8; MR and EF have worsened since January 2017 study   -continue home toprol increased to gdmt  -hydralazine temporarily substituted his losartan for afterload reduction in setting of KEYANNA   -no ACEi/ARB due to KEYANNA vs KEYANNA on CKD (baseline appears 1.3-1.5)  - CR 1.6-1.7 prior to discharge so he was transitioned back to his losartan at discharge with f/u with [facility]  -diuresis initiated with IVP lasix >> to PO 7/20, BNP on 7/21 1294 still elevated with V waves on exam, he feels at his baseline and is able to be without sob, however he was instructed to take bid for next several days to get down another 3-5 lbs.  Then decrease to once daily  -I&Os inaccurate, weight down 25 lbs   -troponin trend essentially flat .042 >> 0.047 >. 0.037 >> 0.055 and he denies chest pain   - Pet stress 7/19 negative for stress induced perfusion defects however coronary flow reserve signficantly reduced at 1.34, may be consistent with multivessel CAD  - Interventional Cardiology consulted, underwent LHC 7/20 with findings of normal coronaries  - episodes of NSVT on tele without hemodynamic compromise  - EP consulted recommend optimization of medical therapy and repeat 2D echo in 3 months to determine candidacy for ICD, life vest placed on discharge  - strict I&Os and daily weights  - outpt follow up with [facility] at discharge

## 2018-07-23 NOTE — ASSESSMENT & PLAN NOTE
-see above for echo results  - tethering appears to be functional MR, will continue to diurese to see improvement

## 2018-07-23 NOTE — ASSESSMENT & PLAN NOTE
- admission SCr 2.3 >> now 1.6-2.0  - baseline from chart review, although data limited appears 1.3-1.5  - renal US and urine studies c/w medical renal disease and proteinuria  - needs f/u with PCP and nephrologist

## 2018-07-23 NOTE — ASSESSMENT & PLAN NOTE
-diabetes not controlled  -HgA1C 8.8/206  -SSI/ Mod scale in house  -initiating detemir 5 units qHS >> he needs more education on insulin administration not to mention he's not had another trial of po diabetes meds with proper diet.    -diabetic low sodium diet  -transition from metformin at DC to glyburide 10mg as he said he's not ready for insulin just yet   -monitor

## 2018-07-24 ENCOUNTER — PATIENT OUTREACH (OUTPATIENT)
Dept: ADMINISTRATIVE | Facility: CLINIC | Age: 53
End: 2018-07-24

## 2018-07-24 NOTE — PATIENT INSTRUCTIONS

## 2018-11-12 ENCOUNTER — HOSPITAL ENCOUNTER (EMERGENCY)
Facility: HOSPITAL | Age: 53
Discharge: HOME | End: 2018-11-12
Attending: EMERGENCY MEDICINE

## 2018-11-12 VITALS
BODY MASS INDEX: 26.63 KG/M2 | HEART RATE: 92 BPM | RESPIRATION RATE: 18 BRPM | WEIGHT: 156 LBS | TEMPERATURE: 98 F | HEIGHT: 64 IN | DIASTOLIC BLOOD PRESSURE: 73 MMHG | SYSTOLIC BLOOD PRESSURE: 105 MMHG

## 2018-11-12 DIAGNOSIS — J32.9 SINUSITIS, UNSPECIFIED CHRONICITY, UNSPECIFIED LOCATION: Primary | ICD-10-CM

## 2018-11-12 PROCEDURE — 99284 EMERGENCY DEPT VISIT MOD MDM: CPT

## 2018-11-12 PROCEDURE — 99284 EMERGENCY DEPT VISIT MOD MDM: CPT | Mod: ,,, | Performed by: EMERGENCY MEDICINE

## 2018-11-12 RX ORDER — AZITHROMYCIN 250 MG/1
250 TABLET, FILM COATED ORAL DAILY
Qty: 6 TABLET | Refills: 0 | Status: SHIPPED | OUTPATIENT
Start: 2018-11-12 | End: 2018-11-12 | Stop reason: SDUPTHER

## 2018-11-12 RX ORDER — AZITHROMYCIN 250 MG/1
250 TABLET, FILM COATED ORAL DAILY
Qty: 6 TABLET | Refills: 0 | Status: ON HOLD | OUTPATIENT
Start: 2018-11-12 | End: 2018-11-30 | Stop reason: HOSPADM

## 2018-11-12 RX ORDER — HYDROCODONE BITARTRATE AND HOMATROPINE METHYLBROMIDE ORAL SOLUTION 5; 1.5 MG/5ML; MG/5ML
5 LIQUID ORAL EVERY 4 HOURS PRN
Qty: 120 ML | Refills: 0 | Status: ON HOLD | OUTPATIENT
Start: 2018-11-12 | End: 2018-12-23

## 2018-11-13 NOTE — ED PROVIDER NOTES
"Encounter Date: 11/12/2018    SCRIBE #1 NOTE: I, Viviane Norman, am scribing for, and in the presence of,  Dr. Valencia. I have scribed the entire note.       History     Chief Complaint   Patient presents with    Nasal Congestion     pt reports runny nose, congestion, and coughing x 2 weeks     Time seen by provider: 9:56 PM    This is a 53 y.o. male with medical conditions including undiagnosed cardiac murmur, CHF, DM, HLD, an hypothyroidism, who presents with complaint of nasal congestion associated with profuse cough and rhinorrhea with green mucous for approximately 3 weeks. Patient reports using a number of OTC medications over the past few weeks which did not alleviate sx. Patient reports sx worsen at night. Patient also reports recent hospitalization for fluid retention secondary to CHF. Patient denies recent sick contacts. Patient also denies fever, chills, n/v/d, bowel or urinary incontinents, sore throat, difficulty swallowing, extremity numbness, or extremity weakness.      The history is provided by the patient.     Review of patient's allergies indicates:   Allergen Reactions    Lisinopril Other (See Comments)     acei cough       Past Medical History:   Diagnosis Date    Diabetes mellitus type II     Essential hypertension, benign     Hyperlipidemia     Hypothyroidism     Undiagnosed cardiac murmurs      Past Surgical History:   Procedure Laterality Date    COLON SURGERY      "lower intestines removed"    THYROID SURGERY       Family History   Problem Relation Age of Onset    Cancer Father      Social History     Tobacco Use    Smoking status: Current Some Day Smoker     Types: Cigars    Tobacco comment: cigars "every other week or so"   Substance Use Topics    Alcohol use: Yes     Alcohol/week: 3.6 oz     Types: 6 Cans of beer per week    Drug use: Yes     Types: Marijuana     Review of Systems   Constitutional: Negative for chills and fever.   HENT: Positive for congestion and " rhinorrhea. Negative for facial swelling, sore throat and trouble swallowing.    Eyes: Negative for visual disturbance.   Respiratory: Positive for cough. Negative for shortness of breath.    Cardiovascular: Negative for chest pain.   Gastrointestinal: Negative for abdominal pain, nausea and vomiting.   Genitourinary: Negative for dysuria and flank pain.   Musculoskeletal: Negative for back pain and gait problem.   Neurological: Negative for weakness and numbness.   Psychiatric/Behavioral: Negative for behavioral problems and confusion.       Physical Exam     Initial Vitals [11/12/18 2122]   BP Pulse Resp Temp SpO2   105/73 92 18 97.7 °F (36.5 °C) --      MAP       --         Physical Exam    Nursing note and vitals reviewed.  HENT:   Head: Normocephalic and atraumatic.   Mouth/Throat: Oropharynx is clear and moist. No oropharyngeal exudate, posterior oropharyngeal edema or posterior oropharyngeal erythema.   Nasal congestion   Neck: Neck supple.   Cardiovascular: Intact distal pulses. Exam reveals no gallop and no friction rub.    2/6 murmur. Split s2   Pulmonary/Chest: Breath sounds normal. No respiratory distress.   Abdominal: Soft. He exhibits no distension. There is no tenderness.   Musculoskeletal: Normal range of motion. He exhibits no edema.   Neurological: He is alert and oriented to person, place, and time. He has normal strength. No cranial nerve deficit or sensory deficit.   Skin: Skin is warm and dry.         ED Course   Procedures  Labs Reviewed - No data to display       Imaging Results    None          Medical Decision Making:   History:   Old Medical Records: I decided to obtain old medical records.  Initial Assessment:   53 y.o.male with 3 week hx of congestion and sinusitis sx not getting better with OTC medications. Will give z pack and hycodan cough syrup for sx control.            Scribe Attestation:   Scribe #1: I performed the above scribed service and the documentation accurately describes  the services I performed. I attest to the accuracy of the note.               Clinical Impression:   The encounter diagnosis was Sinusitis, unspecified chronicity, unspecified location.      Disposition:   Disposition: Discharged  Condition: Stable                        Junior Valencia MD  11/13/18 0344

## 2018-11-26 ENCOUNTER — HOSPITAL ENCOUNTER (INPATIENT)
Facility: HOSPITAL | Age: 53
LOS: 4 days | Discharge: HOME | DRG: 280 | End: 2018-11-30
Attending: EMERGENCY MEDICINE | Admitting: INTERNAL MEDICINE

## 2018-11-26 DIAGNOSIS — I50.43 ACUTE ON CHRONIC COMBINED SYSTOLIC AND DIASTOLIC CONGESTIVE HEART FAILURE: ICD-10-CM

## 2018-11-26 DIAGNOSIS — N17.9 AKI (ACUTE KIDNEY INJURY): ICD-10-CM

## 2018-11-26 DIAGNOSIS — I50.9 CHF EXACERBATION: Primary | ICD-10-CM

## 2018-11-26 DIAGNOSIS — I47.29 NSVT (NONSUSTAINED VENTRICULAR TACHYCARDIA): ICD-10-CM

## 2018-11-26 DIAGNOSIS — R91.8 LUNG MASS: ICD-10-CM

## 2018-11-26 DIAGNOSIS — E87.5 HYPERKALEMIA: ICD-10-CM

## 2018-11-26 DIAGNOSIS — I50.9 CHF (CONGESTIVE HEART FAILURE): ICD-10-CM

## 2018-11-26 LAB
ALBUMIN SERPL BCP-MCNC: 3.2 G/DL
ALP SERPL-CCNC: 172 U/L
ALT SERPL W/O P-5'-P-CCNC: 128 U/L
ANION GAP SERPL CALC-SCNC: 14 MMOL/L
ANION GAP SERPL CALC-SCNC: 9 MMOL/L
ASCENDING AORTA: 3.19 CM
AST SERPL-CCNC: 158 U/L
AV INDEX (PROSTH): 0.69
AV MEAN GRADIENT: 1.05 MMHG
AV PEAK GRADIENT: 1.96 MMHG
AV VALVE AREA: 2.23 CM2
BACTERIA #/AREA URNS AUTO: ABNORMAL /HPF
BASOPHILS # BLD AUTO: 0.11 K/UL
BASOPHILS NFR BLD: 2.1 %
BILIRUB SERPL-MCNC: 1.7 MG/DL
BILIRUB UR QL STRIP: NEGATIVE
BNP SERPL-MCNC: 2276 PG/ML
BSA FOR ECHO PROCEDURE: 1.81 M2
BUN SERPL-MCNC: 44 MG/DL (ref 6–30)
BUN SERPL-MCNC: 50 MG/DL
BUN SERPL-MCNC: 50 MG/DL
CALCIUM SERPL-MCNC: 8.6 MG/DL
CALCIUM SERPL-MCNC: 9 MG/DL
CCP AB SER IA-ACNC: <0.5 U/ML
CHLORIDE SERPL-SCNC: 102 MMOL/L
CHLORIDE SERPL-SCNC: 99 MMOL/L
CHLORIDE SERPL-SCNC: 99 MMOL/L (ref 95–110)
CHOLEST SERPL-MCNC: 130 MG/DL
CHOLEST/HDLC SERPL: 4.8 {RATIO}
CLARITY UR REFRACT.AUTO: CLEAR
CO2 SERPL-SCNC: 21 MMOL/L
CO2 SERPL-SCNC: 28 MMOL/L
COLOR UR AUTO: YELLOW
CREAT SERPL-MCNC: 2.5 MG/DL (ref 0.5–1.4)
CREAT SERPL-MCNC: 2.7 MG/DL
CREAT SERPL-MCNC: 2.8 MG/DL
CRP SERPL-MCNC: 17.8 MG/L
CV ECHO LV RWT: 0.22 CM
DIFFERENTIAL METHOD: ABNORMAL
DOP CALC AO PEAK VEL: 0.7 M/S
DOP CALC AO VTI: 10.86 CM
DOP CALC LVOT AREA: 3.23 CM2
DOP CALC LVOT DIAMETER: 2.03 CM
DOP CALC LVOT STROKE VOLUME: 24.23 CM3
DOP CALCLVOT PEAK VEL VTI: 7.49 CM
E WAVE DECELERATION TIME: 125.38 MSEC
E/A RATIO: 4.89
E/E' RATIO: 19.57
ECHO LV POSTERIOR WALL: 0.65 CM (ref 0.6–1.1)
EOSINOPHIL # BLD AUTO: 0.2 K/UL
EOSINOPHIL NFR BLD: 4 %
ERYTHROCYTE [DISTWIDTH] IN BLOOD BY AUTOMATED COUNT: 15.9 %
ERYTHROCYTE [SEDIMENTATION RATE] IN BLOOD BY WESTERGREN METHOD: <2 MM/HR
EST. GFR  (AFRICAN AMERICAN): 28.3 ML/MIN/1.73 M^2
EST. GFR  (AFRICAN AMERICAN): 29.6 ML/MIN/1.73 M^2
EST. GFR  (NON AFRICAN AMERICAN): 24.5 ML/MIN/1.73 M^2
EST. GFR  (NON AFRICAN AMERICAN): 25.6 ML/MIN/1.73 M^2
ESTIMATED AVG GLUCOSE: 169 MG/DL
FRACTIONAL SHORTENING: 21 % (ref 28–44)
GLUCOSE SERPL-MCNC: 162 MG/DL
GLUCOSE SERPL-MCNC: 185 MG/DL (ref 70–110)
GLUCOSE SERPL-MCNC: 195 MG/DL
GLUCOSE UR QL STRIP: NEGATIVE
HBA1C MFR BLD HPLC: 7.5 %
HCT VFR BLD AUTO: 45.7 %
HCT VFR BLD CALC: 48 %PCV (ref 36–54)
HDLC SERPL-MCNC: 27 MG/DL
HDLC SERPL: 20.8 %
HGB BLD-MCNC: 14.6 G/DL
HGB UR QL STRIP: NEGATIVE
HYALINE CASTS UR QL AUTO: 15 /LPF
IMM GRANULOCYTES # BLD AUTO: 0.01 K/UL
IMM GRANULOCYTES NFR BLD AUTO: 0.2 %
INTERVENTRICULAR SEPTUM: 0.82 CM (ref 0.6–1.1)
KETONES UR QL STRIP: NEGATIVE
LA MAJOR: 7.31 CM
LA MINOR: 6.4 CM
LA WIDTH: 5.62 CM
LDLC SERPL CALC-MCNC: 82 MG/DL
LEFT ATRIUM SIZE: 4.66 CM
LEFT ATRIUM VOLUME INDEX: 83.9 ML/M2
LEFT ATRIUM VOLUME: 151.93 CM3
LEFT INTERNAL DIMENSION IN SYSTOLE: 4.55 CM (ref 2.1–4)
LEFT VENTRICLE DIASTOLIC VOLUME INDEX: 91.83 ML/M2
LEFT VENTRICLE DIASTOLIC VOLUME: 166.22 ML
LEFT VENTRICLE MASS INDEX: 87 G/M2
LEFT VENTRICLE SYSTOLIC VOLUME INDEX: 52.5 ML/M2
LEFT VENTRICLE SYSTOLIC VOLUME: 94.98 ML
LEFT VENTRICULAR INTERNAL DIMENSION IN DIASTOLE: 5.79 CM (ref 3.5–6)
LEFT VENTRICULAR MASS: 157.48 G
LEUKOCYTE ESTERASE UR QL STRIP: NEGATIVE
LIPASE SERPL-CCNC: 68 U/L
LV LATERAL E/E' RATIO: 17.13
LV SEPTAL E/E' RATIO: 22.83
LYMPHOCYTES # BLD AUTO: 0.9 K/UL
LYMPHOCYTES NFR BLD: 16.8 %
MCH RBC QN AUTO: 26.4 PG
MCHC RBC AUTO-ENTMCNC: 31.9 G/DL
MCV RBC AUTO: 83 FL
MICROSCOPIC COMMENT: ABNORMAL
MONOCYTES # BLD AUTO: 0.9 K/UL
MONOCYTES NFR BLD: 17.7 %
MV PEAK A VEL: 0.28 M/S
MV PEAK E VEL: 1.37 M/S
NEUTROPHILS # BLD AUTO: 3.1 K/UL
NEUTROPHILS NFR BLD: 59.2 %
NITRITE UR QL STRIP: NEGATIVE
NONHDLC SERPL-MCNC: 103 MG/DL
NRBC BLD-RTO: 0 /100 WBC
PH UR STRIP: 6 [PH] (ref 5–8)
PISA TR MAX VEL: 3.04 M/S
PLATELET # BLD AUTO: 168 K/UL
PMV BLD AUTO: 12.3 FL
POC IONIZED CALCIUM: 1.01 MMOL/L (ref 1.06–1.42)
POC TCO2 (MEASURED): 28 MMOL/L (ref 23–29)
POCT GLUCOSE: 171 MG/DL (ref 70–110)
POCT GLUCOSE: 275 MG/DL (ref 70–110)
POTASSIUM BLD-SCNC: 4.1 MMOL/L (ref 3.5–5.1)
POTASSIUM SERPL-SCNC: 4.2 MMOL/L
POTASSIUM SERPL-SCNC: 5.4 MMOL/L
PROT SERPL-MCNC: 7.6 G/DL
PROT UR QL STRIP: ABNORMAL
RA MAJOR: 6.42 CM
RA PRESSURE: 15 MMHG
RA WIDTH: 4.46 CM
RBC # BLD AUTO: 5.54 M/UL
RBC #/AREA URNS AUTO: 0 /HPF (ref 0–4)
RHEUMATOID FACT SERPL-ACNC: <10 IU/ML
RIGHT VENTRICULAR END-DIASTOLIC DIMENSION: 5.45 CM
SAMPLE: ABNORMAL
SINUS: 2.82 CM
SODIUM BLD-SCNC: 139 MMOL/L (ref 136–145)
SODIUM SERPL-SCNC: 136 MMOL/L
SODIUM SERPL-SCNC: 137 MMOL/L
SP GR UR STRIP: 1.01 (ref 1–1.03)
STJ: 2.44 CM
TDI LATERAL: 0.08
TDI SEPTAL: 0.06
TDI: 0.07
TR MAX PG: 36.97 MMHG
TRICUSPID ANNULAR PLANE SYSTOLIC EXCURSION: 1.37 CM
TRIGL SERPL-MCNC: 105 MG/DL
TROPONIN I SERPL DL<=0.01 NG/ML-MCNC: 0.06 NG/ML
TROPONIN I SERPL DL<=0.01 NG/ML-MCNC: 0.08 NG/ML
TSH SERPL DL<=0.005 MIU/L-ACNC: 2.66 UIU/ML
TV REST PULMONARY ARTERY PRESSURE: 51.97 MMHG
URN SPEC COLLECT METH UR: ABNORMAL
WBC # BLD AUTO: 5.24 K/UL
WBC #/AREA URNS AUTO: 1 /HPF (ref 0–5)

## 2018-11-26 PROCEDURE — 80053 COMPREHEN METABOLIC PANEL: CPT

## 2018-11-26 PROCEDURE — 25000003 PHARM REV CODE 250: Performed by: PHYSICIAN ASSISTANT

## 2018-11-26 PROCEDURE — 20600001 HC STEP DOWN PRIVATE ROOM

## 2018-11-26 PROCEDURE — 87340 HEPATITIS B SURFACE AG IA: CPT

## 2018-11-26 PROCEDURE — 83880 ASSAY OF NATRIURETIC PEPTIDE: CPT

## 2018-11-26 PROCEDURE — 86200 CCP ANTIBODY: CPT

## 2018-11-26 PROCEDURE — 96376 TX/PRO/DX INJ SAME DRUG ADON: CPT

## 2018-11-26 PROCEDURE — 84443 ASSAY THYROID STIM HORMONE: CPT

## 2018-11-26 PROCEDURE — 81001 URINALYSIS AUTO W/SCOPE: CPT

## 2018-11-26 PROCEDURE — 96375 TX/PRO/DX INJ NEW DRUG ADDON: CPT

## 2018-11-26 PROCEDURE — 86803 HEPATITIS C AB TEST: CPT

## 2018-11-26 PROCEDURE — 80048 BASIC METABOLIC PNL TOTAL CA: CPT

## 2018-11-26 PROCEDURE — 83690 ASSAY OF LIPASE: CPT

## 2018-11-26 PROCEDURE — 93005 ELECTROCARDIOGRAM TRACING: CPT

## 2018-11-26 PROCEDURE — 63600175 PHARM REV CODE 636 W HCPCS: Performed by: PHYSICIAN ASSISTANT

## 2018-11-26 PROCEDURE — 99285 EMERGENCY DEPT VISIT HI MDM: CPT | Mod: ,,, | Performed by: EMERGENCY MEDICINE

## 2018-11-26 PROCEDURE — 25000003 PHARM REV CODE 250: Performed by: INTERNAL MEDICINE

## 2018-11-26 PROCEDURE — 63600175 PHARM REV CODE 636 W HCPCS: Performed by: INTERNAL MEDICINE

## 2018-11-26 PROCEDURE — 93010 ELECTROCARDIOGRAM REPORT: CPT | Mod: ,,, | Performed by: INTERNAL MEDICINE

## 2018-11-26 PROCEDURE — 84484 ASSAY OF TROPONIN QUANT: CPT

## 2018-11-26 PROCEDURE — 96372 THER/PROPH/DIAG INJ SC/IM: CPT | Mod: 59

## 2018-11-26 PROCEDURE — 36415 COLL VENOUS BLD VENIPUNCTURE: CPT

## 2018-11-26 PROCEDURE — 84484 ASSAY OF TROPONIN QUANT: CPT | Mod: 91

## 2018-11-26 PROCEDURE — 85652 RBC SED RATE AUTOMATED: CPT

## 2018-11-26 PROCEDURE — 83036 HEMOGLOBIN GLYCOSYLATED A1C: CPT

## 2018-11-26 PROCEDURE — 25000003 PHARM REV CODE 250: Performed by: EMERGENCY MEDICINE

## 2018-11-26 PROCEDURE — 86431 RHEUMATOID FACTOR QUANT: CPT

## 2018-11-26 PROCEDURE — 80061 LIPID PANEL: CPT

## 2018-11-26 PROCEDURE — 99223 1ST HOSP IP/OBS HIGH 75: CPT | Mod: ,,, | Performed by: INTERNAL MEDICINE

## 2018-11-26 PROCEDURE — 82962 GLUCOSE BLOOD TEST: CPT

## 2018-11-26 PROCEDURE — 99285 EMERGENCY DEPT VISIT HI MDM: CPT | Mod: 25

## 2018-11-26 PROCEDURE — 96374 THER/PROPH/DIAG INJ IV PUSH: CPT

## 2018-11-26 PROCEDURE — 85025 COMPLETE CBC W/AUTO DIFF WBC: CPT

## 2018-11-26 PROCEDURE — 86140 C-REACTIVE PROTEIN: CPT

## 2018-11-26 RX ORDER — SODIUM CHLORIDE 0.9 % (FLUSH) 0.9 %
5 SYRINGE (ML) INJECTION
Status: DISCONTINUED | OUTPATIENT
Start: 2018-11-26 | End: 2018-11-30 | Stop reason: HOSPADM

## 2018-11-26 RX ORDER — IBUPROFEN 200 MG
24 TABLET ORAL
Status: DISCONTINUED | OUTPATIENT
Start: 2018-11-26 | End: 2018-11-30 | Stop reason: HOSPADM

## 2018-11-26 RX ORDER — CALCIUM CARBONATE 200(500)MG
500 TABLET,CHEWABLE ORAL DAILY PRN
Status: DISCONTINUED | OUTPATIENT
Start: 2018-11-26 | End: 2018-11-26

## 2018-11-26 RX ORDER — GLUCAGON 1 MG
1 KIT INJECTION
Status: DISCONTINUED | OUTPATIENT
Start: 2018-11-26 | End: 2018-11-30 | Stop reason: HOSPADM

## 2018-11-26 RX ORDER — FUROSEMIDE 10 MG/ML
20 INJECTION INTRAMUSCULAR; INTRAVENOUS
Status: DISCONTINUED | OUTPATIENT
Start: 2018-11-26 | End: 2018-11-26

## 2018-11-26 RX ORDER — INSULIN ASPART 100 [IU]/ML
0-5 INJECTION, SOLUTION INTRAVENOUS; SUBCUTANEOUS
Status: DISCONTINUED | OUTPATIENT
Start: 2018-11-26 | End: 2018-11-30 | Stop reason: HOSPADM

## 2018-11-26 RX ORDER — HEPARIN SODIUM 5000 [USP'U]/ML
5000 INJECTION, SOLUTION INTRAVENOUS; SUBCUTANEOUS EVERY 8 HOURS
Status: DISCONTINUED | OUTPATIENT
Start: 2018-11-26 | End: 2018-11-30 | Stop reason: HOSPADM

## 2018-11-26 RX ORDER — IBUPROFEN 200 MG
16 TABLET ORAL
Status: DISCONTINUED | OUTPATIENT
Start: 2018-11-26 | End: 2018-11-30 | Stop reason: HOSPADM

## 2018-11-26 RX ORDER — ONDANSETRON 2 MG/ML
4 INJECTION INTRAMUSCULAR; INTRAVENOUS EVERY 8 HOURS PRN
Status: DISCONTINUED | OUTPATIENT
Start: 2018-11-26 | End: 2018-11-30 | Stop reason: HOSPADM

## 2018-11-26 RX ORDER — ACETAMINOPHEN 325 MG/1
650 TABLET ORAL EVERY 8 HOURS PRN
Status: DISCONTINUED | OUTPATIENT
Start: 2018-11-26 | End: 2018-11-30 | Stop reason: HOSPADM

## 2018-11-26 RX ORDER — SIMVASTATIN 10 MG/1
10 TABLET, FILM COATED ORAL NIGHTLY
Status: DISCONTINUED | OUTPATIENT
Start: 2018-11-26 | End: 2018-11-26

## 2018-11-26 RX ORDER — PANTOPRAZOLE SODIUM 40 MG/1
40 TABLET, DELAYED RELEASE ORAL DAILY
Status: DISCONTINUED | OUTPATIENT
Start: 2018-11-26 | End: 2018-11-30 | Stop reason: HOSPADM

## 2018-11-26 RX ORDER — FUROSEMIDE 10 MG/ML
60 INJECTION INTRAMUSCULAR; INTRAVENOUS
Status: COMPLETED | OUTPATIENT
Start: 2018-11-26 | End: 2018-11-26

## 2018-11-26 RX ORDER — ASPIRIN 325 MG
325 TABLET ORAL
Status: COMPLETED | OUTPATIENT
Start: 2018-11-26 | End: 2018-11-26

## 2018-11-26 RX ORDER — CALCIUM CARBONATE 200(500)MG
500 TABLET,CHEWABLE ORAL 3 TIMES DAILY PRN
Status: DISCONTINUED | OUTPATIENT
Start: 2018-11-26 | End: 2018-11-30 | Stop reason: HOSPADM

## 2018-11-26 RX ORDER — ATORVASTATIN CALCIUM 20 MG/1
80 TABLET, FILM COATED ORAL NIGHTLY
Status: DISCONTINUED | OUTPATIENT
Start: 2018-11-26 | End: 2018-11-30 | Stop reason: HOSPADM

## 2018-11-26 RX ORDER — ACETAMINOPHEN 325 MG/1
650 TABLET ORAL EVERY 4 HOURS PRN
Status: DISCONTINUED | OUTPATIENT
Start: 2018-11-26 | End: 2018-11-30 | Stop reason: HOSPADM

## 2018-11-26 RX ORDER — NAPROXEN SODIUM 220 MG/1
81 TABLET, FILM COATED ORAL DAILY
Status: DISCONTINUED | OUTPATIENT
Start: 2018-11-27 | End: 2018-11-30 | Stop reason: HOSPADM

## 2018-11-26 RX ORDER — LOSARTAN POTASSIUM 25 MG/1
25 TABLET ORAL DAILY
Status: DISCONTINUED | OUTPATIENT
Start: 2018-11-26 | End: 2018-11-26

## 2018-11-26 RX ADMIN — CALCIUM CARBONATE (ANTACID) CHEW TAB 500 MG 500 MG: 500 CHEW TAB at 11:11

## 2018-11-26 RX ADMIN — METOPROLOL SUCCINATE 150 MG: 100 TABLET, EXTENDED RELEASE ORAL at 02:11

## 2018-11-26 RX ADMIN — LEVOTHYROXINE SODIUM 175 MCG: 25 TABLET ORAL at 02:11

## 2018-11-26 RX ADMIN — ATORVASTATIN CALCIUM 80 MG: 20 TABLET, FILM COATED ORAL at 09:11

## 2018-11-26 RX ADMIN — FUROSEMIDE 60 MG: 10 INJECTION, SOLUTION INTRAMUSCULAR; INTRAVENOUS at 11:11

## 2018-11-26 RX ADMIN — ALUMINUM HYDROXIDE, MAGNESIUM HYDROXIDE, AND SIMETHICONE 50 ML: 200; 200; 20 SUSPENSION ORAL at 09:11

## 2018-11-26 RX ADMIN — INSULIN ASPART 1 UNITS: 100 INJECTION, SOLUTION INTRAVENOUS; SUBCUTANEOUS at 11:11

## 2018-11-26 RX ADMIN — HEPARIN SODIUM 5000 UNITS: 5000 INJECTION, SOLUTION INTRAVENOUS; SUBCUTANEOUS at 09:11

## 2018-11-26 RX ADMIN — HUMAN ALBUMIN MICROSPHERES AND PERFLUTREN 3 ML: 10; .22 INJECTION, SOLUTION INTRAVENOUS at 04:11

## 2018-11-26 RX ADMIN — LOSARTAN POTASSIUM 25 MG: 25 TABLET, FILM COATED ORAL at 02:11

## 2018-11-26 RX ADMIN — FUROSEMIDE 10 MG/HR: 10 INJECTION, SOLUTION INTRAMUSCULAR; INTRAVENOUS at 03:11

## 2018-11-26 RX ADMIN — PANTOPRAZOLE SODIUM 40 MG: 40 TABLET, DELAYED RELEASE ORAL at 11:11

## 2018-11-26 RX ADMIN — HEPARIN SODIUM 5000 UNITS: 5000 INJECTION, SOLUTION INTRAVENOUS; SUBCUTANEOUS at 02:11

## 2018-11-26 RX ADMIN — ASPIRIN 325 MG ORAL TABLET 325 MG: 325 PILL ORAL at 11:11

## 2018-11-26 NOTE — ED NOTES
Lab called regarding a partially hemolyzed BMP resulting in a critical K+ 7.1.  Medicine team and ED MD notified.  ISTAT and repeat BMP ordered.

## 2018-11-26 NOTE — HPI
Mr. Mckeon is a 53 year old with NICM (LVEF 25%), severe MR, HTN who presents to ER with abdominal burning and pain. He was admitted in July with ADHF, diuresed and discharged on GDMT. He has not followed up with a cardiologist but maintains that he has taken his meds. He stated that he had some gumbo during thanksgiving and was thus a little less compliant. He does not weigh himself every day. He presented on 11/26 with ADHF and was placed on lasix drip. Cardiology has been reconsulted to re-evaluate his fluid status. He states he is breathing better and his LE edema has improved significantly.

## 2018-11-26 NOTE — ED PROVIDER NOTES
"Encounter Date: 11/26/2018       History     Chief Complaint   Patient presents with    Abdominal Pain     think i got acid reflux, strong cardiac hx     Mr Mckeon is a 54 yo  male patient with PMHx of CHF, DM, HLD, an hypothyroidism that presents to the ED with epigastric burning, nausea and reflux symptoms x 3 days. Pt is currently asymptomatic. Pt localizes the pain to his epigastric region and describes it as "burning". Currently pain is 0/10. Denies radiation. Symptoms are exacerbated when he lies flat at night. Pt denies any fevers, chills, body aches, vomiting, diarrhea, or headaches. Pt reports some chronic shortness of breath on exertion but attributes it to his CHF.           Review of patient's allergies indicates:   Allergen Reactions    Lisinopril Other (See Comments)     acei cough       Past Medical History:   Diagnosis Date    Diabetes mellitus type II     Essential hypertension, benign     Hyperlipidemia     Hypothyroidism     Undiagnosed cardiac murmurs      Past Surgical History:   Procedure Laterality Date    COLON SURGERY      "lower intestines removed"    THYROID SURGERY       Family History   Problem Relation Age of Onset    Cancer Father      Social History     Tobacco Use    Smoking status: Current Some Day Smoker     Types: Cigars    Tobacco comment: cigars "every other week or so"   Substance Use Topics    Alcohol use: Yes     Alcohol/week: 3.6 oz     Types: 6 Cans of beer per week    Drug use: Yes     Types: Marijuana     Review of Systems   Constitutional: Negative for chills and fever.   HENT: Negative for congestion, rhinorrhea, sinus pressure, sinus pain and sore throat.    Respiratory: Positive for cough and shortness of breath (on exertion). Negative for chest tightness and wheezing.    Cardiovascular: Negative for chest pain, palpitations and leg swelling.   Gastrointestinal: Positive for abdominal pain (epigastric) and nausea. Negative for constipation, " diarrhea and vomiting.   Genitourinary: Negative for difficulty urinating, dysuria, flank pain and frequency.   Musculoskeletal: Negative for arthralgias, back pain, myalgias, neck pain and neck stiffness.   Skin: Negative for pallor and rash.   Neurological: Negative for dizziness, syncope, weakness, light-headedness, numbness and headaches.       Physical Exam     Initial Vitals   BP Pulse Resp Temp SpO2   11/26/18 0919 11/26/18 0919 11/26/18 0919 11/26/18 0919 11/26/18 0941   134/87 92 20 98.4 °F (36.9 °C) 100 %      MAP       --                Physical Exam    Constitutional: He appears well-developed and well-nourished. He is not diaphoretic. He is cooperative. He does not appear ill. No distress.   HENT:   Head: Normocephalic and atraumatic.   Eyes: Conjunctivae, EOM and lids are normal.   Neck: Normal range of motion. Neck supple. JVD present.   Cardiovascular: Normal rate, regular rhythm and normal pulses. Exam reveals no gallop and no friction rub.    Murmur heard.  2+ edema BLE   Pulmonary/Chest: Effort normal. No tachypnea. No respiratory distress. He has no wheezes. He has no rhonchi. He has no rales.   Abdominal: Soft. He exhibits no distension. There is no tenderness. There is no rigidity, no rebound, no guarding and no CVA tenderness.   Musculoskeletal: Normal range of motion.   Neurological: He is alert and oriented to person, place, and time.   Skin: Skin is warm, dry and intact. No rash noted.   Psychiatric: He has a normal mood and affect. His speech is normal and behavior is normal.         ED Course   Procedures  Labs Reviewed   COMPREHENSIVE METABOLIC PANEL - Abnormal; Notable for the following components:       Result Value    Potassium 5.4 (*)     CO2 21 (*)     Glucose 162 (*)     BUN, Bld 50 (*)     Creatinine 2.8 (*)     Albumin 3.2 (*)     Total Bilirubin 1.7 (*)     Alkaline Phosphatase 172 (*)      (*)      (*)     eGFR if  28.3 (*)     eGFR if non   American 24.5 (*)     All other components within normal limits   CBC W/ AUTO DIFFERENTIAL - Abnormal; Notable for the following components:    MCH 26.4 (*)     MCHC 31.9 (*)     RDW 15.9 (*)     Lymph # 0.9 (*)     Lymph% 16.8 (*)     Mono% 17.7 (*)     Basophil% 2.1 (*)     All other components within normal limits   B-TYPE NATRIURETIC PEPTIDE - Abnormal; Notable for the following components:    BNP 2,276 (*)     All other components within normal limits   LIPASE - Abnormal; Notable for the following components:    Lipase 68 (*)     All other components within normal limits   TROPONIN I - Abnormal; Notable for the following components:    Troponin I 0.080 (*)     All other components within normal limits   URINALYSIS, REFLEX TO URINE CULTURE - Abnormal; Notable for the following components:    Protein, UA 1+ (*)     All other components within normal limits    Narrative:     Preferred Collection Type->Urine, Clean Catch   TROPONIN I - Abnormal; Notable for the following components:    Troponin I 0.061 (*)     All other components within normal limits   URINALYSIS MICROSCOPIC - Abnormal; Notable for the following components:    Hyaline Casts, UA 15 (*)     All other components within normal limits    Narrative:     Preferred Collection Type->Urine, Clean Catch   LIPID PANEL - Abnormal; Notable for the following components:    HDL 27 (*)     All other components within normal limits    Narrative:     ADD-ON LIPID #[id number]; TSH #[id number] PER KYRA SUE MD 14:11    11/26/2018    POCT GLUCOSE - Abnormal; Notable for the following components:    POCT Glucose 171 (*)     All other components within normal limits   TSH   LIPID PANEL   BASIC METABOLIC PANEL   HEMOGLOBIN A1C   TSH     EKG Readings: (Independently Interpreted)   NSR rate 83. Prolonged MI (1st degree AV block). Few PVCs noted. No STEMI.       Imaging Results          CT Chest Without Contrast (In process)  Result time 11/26/18 14:24:32                X-Ray Chest PA And Lateral (Final result)  Result time 11/26/18 10:13:25    Final result by Greyson Lindsey MD (11/26/18 10:13:25)                 Impression:      1. Nodular opacity in the right upper lung zone, having developed since the 2017 examination and more conspicuous on this study than on 07/16/2018.  Further evaluation by means of thoracic CT may be warranted.  2. Continued demonstration of cardiomegaly.      Electronically signed by: Greyson Lindsey MD  Date:    11/26/2018  Time:    10:13             Narrative:    EXAMINATION:  XR CHEST PA AND LATERAL    TECHNIQUE:  Two views of the chest were obtained, with PA and lateral projections submitted.    COMPARISON:  Comparison is made to the most recent prior chest radiograph, dated 07/16/2018, as well as to the examinations of 01/16/2017 and 06/09/2016.    FINDINGS:  There is a fairly well circumscribed nodular opacity in the right upper lung zone seen on the PA projection, projected in the 2nd anterior interspace and measuring 1.6 cm in diameter.  This is not convincingly demonstrated on the lateral projection.  It was not seen on the 2016 or 2017 examinations, and is more conspicuous on the current study than was the case on 07/16/2018.  I am not convinced that it is calcified, and the possibility of neoplasm cannot be excluded.  Further more definitive evaluation by means of thoracic CT may be warranted depending on the clinical situation.    The heart is enlarged, though the degree of cardiomegaly and the appearance of the cardiomediastinal silhouette demonstrate no significant detrimental change since the examinations referenced above.  Pulmonary vascularity is normal, without interval engorgement.  Other than the nodular opacity referenced above, the lung zones appear essentially clear, and are free of significant airspace consolidation or volume loss.  No pleural fluid of any substantial volume is seen on either side.  No pneumothorax.                                  Medical Decision Making:   History:   Old Medical Records: I decided to obtain old medical records.  Initial Assessment:   Mr Mckeon is a 54 yo  male patient with PMHx of CHF, DM, HLD, an hypothyroidism that presents to the ED with epigastric burning, nausea and reflux symptoms x 3 days.  Differential Diagnosis:   Reflux  CHF exacerbation  ACS    Clinical Tests:   Lab Tests: Ordered and Reviewed  Radiological Study: Ordered and Reviewed  Medical Tests: Ordered and Reviewed  ED Management:  Pt is hemodynamically stable and in no apparent pain or distress. Troponin elevated at 0.080. BNP 2,276 Cardiology consulted and will evaluate patient in ED. Cardiology believes that the elevated troponin is most likely due to patient's CHF and CKD and does not feel the need to trend troponin. Recommends 60 mg Lasix IV and will evaluate bedside. Cardiology recommends admitting patient to IMC or IMJ for further management and evaluation of CHF exacerbation. Pt admitted to inpatient hospital medicine.                      Clinical Impression:   Diagnoses of Epigastric pain, Cough, CHF exacerbation, and CHF (congestive heart failure) were pertinent to this visit.      Disposition:   Disposition: Admitted  Condition: Stable                        Corey Marrufo PA-C  11/26/18 1411

## 2018-11-26 NOTE — ED NOTES
LOC: The patient is awake, alert, and aware of environment. The patient is oriented x 3 and speaking appropriately.   APPEARANCE: No acute distress noted.   PSYCHOSOCIAL: Patient is calm and cooperative.   SKIN: The skin is warm, dry.   RESPIRATORY: Airway is open and patent. Bilateral chest rise and fall. Respirations are spontaneous, even and unlabored. Normal effort and rate noted. No accessory muscle use noted.   CARDIAC: Patient has a normal rate and rhythm. Mid sternal burning in chest for 1 week. Pt reports sob with the pain.   ABDOMEN: Soft and non tender to palpation. No distention noted.   URINARY:  Voids independently.   NEUROLOGIC: Eyes open spontaneously. Speech clear. Tolerating saliva secretions well. Able to follow commands, demonstrating ability to actively and appropriately communicate within context of current conversation. Symmetrical facial muscles. Moving all extremities well. Movement is purposeful.   MUSCULOSKELETAL: No obvious deformities noted.

## 2018-11-26 NOTE — ED NOTES
"Patient resting in stretcher and is in no acute distress at this time. Patient states that his "stomach feels calm right now and not in pain." Respirations even and unlabored. Patient updated on plan of care and voices no needs at this time. Stretcher low and locked with both side rails up and call bell within patient's reach.   Patient instructed to collect urine sample but he states that he does not have the urge to urinate at this time.  "

## 2018-11-26 NOTE — ED NOTES
Bed: [facility]  Expected date:   Expected time:   Means of arrival:   Comments:  Room 15

## 2018-11-26 NOTE — CONSULTS
"[facility]  Cardiology  Consult Note    Patient Name: Ashish Mckeon  MRN: [id number]  Admission Date: 11/26/2018  Hospital Length of Stay: 0 days  Code Status: Prior   Attending Provider: Charan Greenfield MD   Consulting Provider: Stalin Gamble MD  Primary Care Physician: [facility]  Principal Problem:<principal problem not specified>    Patient information was obtained from patient.     Inpatient consult to Cardiology  Consult performed by: Stalin Gamble MD  Consult ordered by: Corey Marrufo PA-C        Subjective:     Chief Complaint:  Abdominal bloating     HPI:   Mr. Mckeon is a 53 year old with NICM (LVEF 25%), severe MR, HTN who presents to ER with abdominal burning and pain. He was admitted in July with ADHF, diuresed and discharged on GDMT. He has not followed up with a cardiologist but maintains that he has taken his meds. Tries to stick to low sodium diet. Has had more abdominal bloating of late, does get SOB with exertion but no CP. Cardiology consulted for elevated troponin. On my visit, he has no complaints.    Past Medical History:   Diagnosis Date    Diabetes mellitus type II     Essential hypertension, benign     Hyperlipidemia     Hypothyroidism     Undiagnosed cardiac murmurs        Past Surgical History:   Procedure Laterality Date    COLON SURGERY      "lower intestines removed"    THYROID SURGERY         Review of patient's allergies indicates:   Allergen Reactions    Lisinopril Other (See Comments)     acei cough         No current facility-administered medications on file prior to encounter.      Current Outpatient Medications on File Prior to Encounter   Medication Sig    azithromycin (Z-JOSÉ MIGUEL) 250 MG tablet Take 1 tablet (250 mg total) by mouth once daily. Take first 2 tablets together, then 1 every day until finished.    furosemide (LASIX) 40 MG tablet Take 1 tablet (40 mg total) by mouth 2 (two) times daily.    glyBURIDE " "(DIABETA) 5 MG tablet Take 2 tablets (10 mg total) by mouth daily with breakfast.    LEVOTHYROXINE SODIUM (SYNTHROID ORAL) Take 175 mcg by mouth daily 2 hours after breakfast.     losartan (COZAAR) 25 MG tablet Take 1 tablet (25 mg total) by mouth once daily.    acetaminophen (TYLENOL) 325 MG tablet Take 2 tablets (650 mg total) by mouth every 6 to 8 hours as needed.    hydrocodone-homatropine 5-1.5 mg/5 ml (HYCODAN) 5-1.5 mg/5 mL Syrp Take 5 mLs by mouth every 4 (four) hours as needed (cough).    metoprolol succinate (TOPROL-XL) 100 MG 24 hr tablet 150mg daily, take one and one half tab daily    simvastatin (ZOCOR) 10 MG tablet Take 1 tablet (10 mg total) by mouth every evening.     Family History     Problem Relation (Age of Onset)    Cancer Father        Tobacco Use    Smoking status: Current Some Day Smoker     Types: Cigars    Tobacco comment: cigars "every other week or so"   Substance and Sexual Activity    Alcohol use: Yes     Alcohol/week: 3.6 oz     Types: 6 Cans of beer per week    Drug use: Yes     Types: Marijuana    Sexual activity: Yes     Partners: Female     Review of Systems   Constitution: Negative for chills, fever and malaise/fatigue.   Cardiovascular: Positive for dyspnea on exertion and orthopnea. Negative for chest pain and paroxysmal nocturnal dyspnea.   Respiratory: Positive for cough and shortness of breath. Negative for wheezing.    Gastrointestinal: Positive for abdominal pain. Negative for constipation, diarrhea, melena and nausea.   Genitourinary: Negative for dysuria and hematuria.     Objective:     Vital Signs (Most Recent):  Temp: 98.4 °F (36.9 °C) (11/26/18 0919)  Pulse: 80 (11/26/18 1019)  Resp: 17 (11/26/18 1019)  BP: (!) 134/92 (11/26/18 1020)  SpO2: 96 % (11/26/18 1019) Vital Signs (24h Range):  Temp:  [98.4 °F (36.9 °C)] 98.4 °F (36.9 °C)  Pulse:  [80-92] 80  Resp:  [17-21] 17  SpO2:  [96 %-100 %] 96 %  BP: (132-141)/(87-98) 134/92     Weight: 72.6 kg (160 " lb)  Body mass index is 27.46 kg/m².    SpO2: 96 %       No intake or output data in the 24 hours ending 11/26/18 1126    Lines/Drains/Airways     Peripheral Intravenous Line                 Peripheral IV - Single Lumen 11/26/18 0933 Right Antecubital less than 1 day                Physical Exam  Gen: no acute distress, alert & oriented x 3  Neck: 14cm JVD, no carotid bruits  CV: regular rate and rhythm, normal S1/2, HSM 3/6 at apex  Resp: clear to auscultation bilaterally, normal effort  GI: soft, nontender nondistended, normal bowel sounds  Ext: warm well perfused, +1 edema, no clubbing or cyanosis    Significant Labs:   All pertinent lab results from the last 24 hours have been reviewed. and   Recent Lab Results       11/26/18  0934        Immature Granulocytes 0.2     Immature Grans (Abs) 0.01  Comment:  Mild elevation in immature granulocytes is non specific and   can be seen in a variety of conditions including stress response,   acute inflammation, trauma and pregnancy. Correlation with other   laboratory and clinical findings is essential.       Albumin 3.2     Alkaline Phosphatase 172          Anion Gap 14       Comment:  *Result may be interfered by visible hemolysis     Baso # 0.11     Basophil% 2.1     Total Bilirubin 1.7  Comment:  For infants and newborns, interpretation of results should be based  on gestational age, weight and in agreement with clinical  observations.  Premature Infant recommended reference ranges:  Up to 24 hours.............<8.0 mg/dL  Up to 48 hours............<12.0 mg/dL  3-5 days..................<15.0 mg/dL  6-29 days.................<15.0 mg/dL       BNP 2,276  Comment:  Values of less than 100 pg/ml are consistent with non-CHF populations.     BUN, Bld 50     Calcium 9.0     Chloride 102     CO2 21     Creatinine 2.8     Differential Method Automated     eGFR if  28.3     eGFR if non  24.5  Comment:  Calculation used to obtain the  estimated glomerular filtration  rate (eGFR) is the CKD-EPI equation.        Eos # 0.2     Eosinophil% 4.0     Glucose 162     Gran # (ANC) 3.1     Gran% 59.2     Hematocrit 45.7     Hemoglobin 14.6     Lipase 68     Lymph # 0.9     Lymph% 16.8     MCH 26.4     MCHC 31.9     MCV 83     Mono # 0.9     Mono% 17.7     MPV 12.3     nRBC 0     Platelets 168     Potassium 5.4     Total Protein 7.6     RBC 5.54     RDW 15.9     Sodium 137     Troponin I 0.080  Comment:  The reference interval for Troponin I represents the 99th percentile   cutoff   for our facility and is consistent with 3rd generation assay   performance.       WBC 5.24           Significant Imaging: Echocardiogram:   2D echo with color flow doppler:   Results for orders placed or performed during the hospital encounter of 07/16/18   2D echo with color flow doppler   Result Value Ref Range    QEF 25 (A) 55 - 65    Mitral Valve Regurgitation SEVERE (A)     Aortic Valve Regurgitation TRIVIAL     Est. PA Systolic Pressure 62.17 (A)     Pericardial Effusion SMALL (A)     Mitral Valve Mobility TETHERED POSTERIOR LEAFLET     Tricuspid Valve Regurgitation MODERATE (A)      EKG - NSR with low voltage and PVC    PET -- normal    LHC -- nonobstructive    Assessment and Plan:     Acute on chronic combined systolic and diastolic congestive heart failure    53 year old man with NICM, severe MR and HTN who presents with ADHF and evidence of multi-organ system dysfunction. SImilar presentation in July. He is hemodynamically stable but has evidence of volume overload on exam. Likely 2/2 noncompliance and lack of follow up.    --admit to comanagement team  --diurese with lasix IV gtt 10mg/hr  --continue BB, hold ARB during kidney injury  --optimize GDMT, ?entresto once KEYANNA resolves  --will need repeat TTE prior to DC and f/u with EP for ?ICD  --needs cardiology follow up at DC and needs aggressive CHF education         VTE Risk Mitigation (From admission, onward)    None           Thank you for your consult. I will follow-up with patient. Please contact us if you have any additional questions.    Stalin Gamble MD  Cardiology   [facility]

## 2018-11-26 NOTE — SUBJECTIVE & OBJECTIVE
"Past Medical History:   Diagnosis Date    Diabetes mellitus type II     Essential hypertension, benign     Hyperlipidemia     Hypothyroidism     Undiagnosed cardiac murmurs        Past Surgical History:   Procedure Laterality Date    COLON SURGERY      "lower intestines removed"    THYROID SURGERY         Review of patient's allergies indicates:   Allergen Reactions    Lisinopril Other (See Comments)     acei cough         No current facility-administered medications on file prior to encounter.      Current Outpatient Medications on File Prior to Encounter   Medication Sig    azithromycin (Z-JOSÉ MIGUEL) 250 MG tablet Take 1 tablet (250 mg total) by mouth once daily. Take first 2 tablets together, then 1 every day until finished.    furosemide (LASIX) 40 MG tablet Take 1 tablet (40 mg total) by mouth 2 (two) times daily.    glyBURIDE (DIABETA) 5 MG tablet Take 2 tablets (10 mg total) by mouth daily with breakfast.    LEVOTHYROXINE SODIUM (SYNTHROID ORAL) Take 175 mcg by mouth daily 2 hours after breakfast.     losartan (COZAAR) 25 MG tablet Take 1 tablet (25 mg total) by mouth once daily.    acetaminophen (TYLENOL) 325 MG tablet Take 2 tablets (650 mg total) by mouth every 6 to 8 hours as needed.    hydrocodone-homatropine 5-1.5 mg/5 ml (HYCODAN) 5-1.5 mg/5 mL Syrp Take 5 mLs by mouth every 4 (four) hours as needed (cough).    metoprolol succinate (TOPROL-XL) 100 MG 24 hr tablet 150mg daily, take one and one half tab daily    simvastatin (ZOCOR) 10 MG tablet Take 1 tablet (10 mg total) by mouth every evening.     Family History     Problem Relation (Age of Onset)    Cancer Father        Tobacco Use    Smoking status: Current Some Day Smoker     Types: Cigars    Tobacco comment: cigars "every other week or so"   Substance and Sexual Activity    Alcohol use: Yes     Alcohol/week: 3.6 oz     Types: 6 Cans of beer per week    Drug use: Yes     Types: Marijuana    Sexual activity: Yes     Partners: Female "     Review of Systems   Constitution: Negative for chills, fever and malaise/fatigue.   Cardiovascular: Positive for dyspnea on exertion and orthopnea. Negative for chest pain and paroxysmal nocturnal dyspnea.   Respiratory: Positive for cough and shortness of breath. Negative for wheezing.    Gastrointestinal: Positive for abdominal pain. Negative for constipation, diarrhea, melena and nausea.   Genitourinary: Negative for dysuria and hematuria.     Objective:     Vital Signs (Most Recent):  Temp: 98.4 °F (36.9 °C) (11/26/18 0919)  Pulse: 80 (11/26/18 1019)  Resp: 17 (11/26/18 1019)  BP: (!) 134/92 (11/26/18 1020)  SpO2: 96 % (11/26/18 1019) Vital Signs (24h Range):  Temp:  [98.4 °F (36.9 °C)] 98.4 °F (36.9 °C)  Pulse:  [80-92] 80  Resp:  [17-21] 17  SpO2:  [96 %-100 %] 96 %  BP: (132-141)/(87-98) 134/92     Weight: 72.6 kg (160 lb)  Body mass index is 27.46 kg/m².    SpO2: 96 %       No intake or output data in the 24 hours ending 11/26/18 1126    Lines/Drains/Airways     Peripheral Intravenous Line                 Peripheral IV - Single Lumen 11/26/18 0933 Right Antecubital less than 1 day                Physical Exam  Gen: no acute distress, alert & oriented x 3  Neck: 14cm JVD, no carotid bruits  CV: regular rate and rhythm, normal S1/2, HSM 3/6 at apex  Resp: clear to auscultation bilaterally, normal effort  GI: soft, nontender nondistended, normal bowel sounds  Ext: warm well perfused, +1 edema, no clubbing or cyanosis    Significant Labs:   All pertinent lab results from the last 24 hours have been reviewed. and   Recent Lab Results       11/26/18  0934        Immature Granulocytes 0.2     Immature Grans (Abs) 0.01  Comment:  Mild elevation in immature granulocytes is non specific and   can be seen in a variety of conditions including stress response,   acute inflammation, trauma and pregnancy. Correlation with other   laboratory and clinical findings is essential.       Albumin 3.2     Alkaline Phosphatase  172          Anion Gap 14       Comment:  *Result may be interfered by visible hemolysis     Baso # 0.11     Basophil% 2.1     Total Bilirubin 1.7  Comment:  For infants and newborns, interpretation of results should be based  on gestational age, weight and in agreement with clinical  observations.  Premature Infant recommended reference ranges:  Up to 24 hours.............<8.0 mg/dL  Up to 48 hours............<12.0 mg/dL  3-5 days..................<15.0 mg/dL  6-29 days.................<15.0 mg/dL       BNP 2,276  Comment:  Values of less than 100 pg/ml are consistent with non-CHF populations.     BUN, Bld 50     Calcium 9.0     Chloride 102     CO2 21     Creatinine 2.8     Differential Method Automated     eGFR if  28.3     eGFR if non  24.5  Comment:  Calculation used to obtain the estimated glomerular filtration  rate (eGFR) is the CKD-EPI equation.        Eos # 0.2     Eosinophil% 4.0     Glucose 162     Gran # (ANC) 3.1     Gran% 59.2     Hematocrit 45.7     Hemoglobin 14.6     Lipase 68     Lymph # 0.9     Lymph% 16.8     MCH 26.4     MCHC 31.9     MCV 83     Mono # 0.9     Mono% 17.7     MPV 12.3     nRBC 0     Platelets 168     Potassium 5.4     Total Protein 7.6     RBC 5.54     RDW 15.9     Sodium 137     Troponin I 0.080  Comment:  The reference interval for Troponin I represents the 99th percentile   cutoff   for our facility and is consistent with 3rd generation assay   performance.       WBC 5.24           Significant Imaging: Echocardiogram:   2D echo with color flow doppler:   Results for orders placed or performed during the hospital encounter of 07/16/18   2D echo with color flow doppler   Result Value Ref Range    QEF 25 (A) 55 - 65    Mitral Valve Regurgitation SEVERE (A)     Aortic Valve Regurgitation TRIVIAL     Est. PA Systolic Pressure 62.17 (A)     Pericardial Effusion SMALL (A)     Mitral Valve Mobility TETHERED POSTERIOR LEAFLET     Tricuspid  Valve Regurgitation MODERATE (A)      EKG - NSR with low voltage and PVC    PET -- normal    LHC -- nonobstructive

## 2018-11-26 NOTE — ED TRIAGE NOTES
Pt presents to the ED with midsternal burning in epigastric region for the past week. Pt reports sob with the pain but pt is always sob. Cardiac hx.

## 2018-11-26 NOTE — ASSESSMENT & PLAN NOTE
53 year old man with NICM, severe MR and HTN who presents with ADHF and evidence of multi-organ system dysfunction. SImilar presentation in July. He is hemodynamically stable but has evidence of volume overload on exam. Likely 2/2 noncompliance and lack of follow up.    --admit to comanagement team  --diurese with lasix IV gtt 10mg/hr  --continue BB, hold ARB during kidney injury  --optimize GDMT, ?entresto once KEYANNA resolves  --will need repeat TTE prior to DC and f/u with EP for ?ICD  --needs cardiology follow up at DC and needs aggressive CHF education

## 2018-11-26 NOTE — ED NOTES
Pt back from ECHO and spouse bedside.  Pt on telemetry in bed with bed locked, lowered and 2 side rails raised.  No complaints at this time and will continue to monitor

## 2018-11-26 NOTE — PROGRESS NOTES
Two patient identifiers verified. Consent obtained. No prior blood transfusion reaction noted. # 20 g IV in place to RAC. 3ml of Optison administered for 2D imaging, patient tolerated well. Imaging completed. IV flushed.

## 2018-11-27 LAB
ALBUMIN SERPL BCP-MCNC: 2.5 G/DL
ALP SERPL-CCNC: 136 U/L
ALT SERPL W/O P-5'-P-CCNC: 150 U/L
ANION GAP SERPL CALC-SCNC: 8 MMOL/L
AST SERPL-CCNC: 165 U/L
BASOPHILS # BLD AUTO: 0.04 K/UL
BASOPHILS NFR BLD: 0.8 %
BILIRUB SERPL-MCNC: 1.4 MG/DL
BUN SERPL-MCNC: 49 MG/DL
CALCIUM SERPL-MCNC: 8.7 MG/DL
CHLORIDE SERPL-SCNC: 101 MMOL/L
CO2 SERPL-SCNC: 28 MMOL/L
CREAT SERPL-MCNC: 2.4 MG/DL
DIFFERENTIAL METHOD: ABNORMAL
EOSINOPHIL # BLD AUTO: 0.2 K/UL
EOSINOPHIL NFR BLD: 4.5 %
ERYTHROCYTE [DISTWIDTH] IN BLOOD BY AUTOMATED COUNT: 15 %
EST. GFR  (AFRICAN AMERICAN): 34.1 ML/MIN/1.73 M^2
EST. GFR  (NON AFRICAN AMERICAN): 29.5 ML/MIN/1.73 M^2
GLUCOSE SERPL-MCNC: 173 MG/DL
HBV SURFACE AG SERPL QL IA: NEGATIVE
HCT VFR BLD AUTO: 40.6 %
HCV AB SERPL QL IA: NEGATIVE
HGB BLD-MCNC: 13.6 G/DL
IMM GRANULOCYTES # BLD AUTO: 0.01 K/UL
IMM GRANULOCYTES NFR BLD AUTO: 0.2 %
LYMPHOCYTES # BLD AUTO: 0.5 K/UL
LYMPHOCYTES NFR BLD: 10.7 %
MAGNESIUM SERPL-MCNC: 1.8 MG/DL
MCH RBC QN AUTO: 26.6 PG
MCHC RBC AUTO-ENTMCNC: 33.5 G/DL
MCV RBC AUTO: 79 FL
MONOCYTES # BLD AUTO: 0.8 K/UL
MONOCYTES NFR BLD: 16.2 %
NEUTROPHILS # BLD AUTO: 3.3 K/UL
NEUTROPHILS NFR BLD: 67.6 %
NRBC BLD-RTO: 0 /100 WBC
PLATELET # BLD AUTO: 152 K/UL
PMV BLD AUTO: 12.3 FL
POCT GLUCOSE: 137 MG/DL (ref 70–110)
POCT GLUCOSE: 185 MG/DL (ref 70–110)
POCT GLUCOSE: 185 MG/DL (ref 70–110)
POTASSIUM SERPL-SCNC: 3.8 MMOL/L
PROT SERPL-MCNC: 6 G/DL
RBC # BLD AUTO: 5.12 M/UL
SODIUM SERPL-SCNC: 137 MMOL/L
WBC # BLD AUTO: 4.94 K/UL

## 2018-11-27 PROCEDURE — 83735 ASSAY OF MAGNESIUM: CPT

## 2018-11-27 PROCEDURE — 25000003 PHARM REV CODE 250: Performed by: INTERNAL MEDICINE

## 2018-11-27 PROCEDURE — 25000003 PHARM REV CODE 250: Performed by: HOSPITALIST

## 2018-11-27 PROCEDURE — 63600175 PHARM REV CODE 636 W HCPCS: Performed by: INTERNAL MEDICINE

## 2018-11-27 PROCEDURE — 99233 SBSQ HOSP IP/OBS HIGH 50: CPT | Mod: ,,, | Performed by: HOSPITALIST

## 2018-11-27 PROCEDURE — 80053 COMPREHEN METABOLIC PANEL: CPT

## 2018-11-27 PROCEDURE — 36415 COLL VENOUS BLD VENIPUNCTURE: CPT

## 2018-11-27 PROCEDURE — 25000003 PHARM REV CODE 250: Performed by: PHYSICIAN ASSISTANT

## 2018-11-27 PROCEDURE — 20600001 HC STEP DOWN PRIVATE ROOM

## 2018-11-27 PROCEDURE — 85025 COMPLETE CBC W/AUTO DIFF WBC: CPT

## 2018-11-27 PROCEDURE — 99233 SBSQ HOSP IP/OBS HIGH 50: CPT | Mod: ,,, | Performed by: INTERNAL MEDICINE

## 2018-11-27 RX ADMIN — LEVOTHYROXINE SODIUM 175 MCG: 25 TABLET ORAL at 09:11

## 2018-11-27 RX ADMIN — ASPIRIN 81 MG CHEWABLE TABLET 81 MG: 81 TABLET CHEWABLE at 09:11

## 2018-11-27 RX ADMIN — HEPARIN SODIUM 5000 UNITS: 5000 INJECTION, SOLUTION INTRAVENOUS; SUBCUTANEOUS at 05:11

## 2018-11-27 RX ADMIN — METOPROLOL SUCCINATE 150 MG: 100 TABLET, EXTENDED RELEASE ORAL at 09:11

## 2018-11-27 RX ADMIN — ATORVASTATIN CALCIUM 80 MG: 20 TABLET, FILM COATED ORAL at 08:11

## 2018-11-27 RX ADMIN — FUROSEMIDE 10 MG/HR: 10 INJECTION, SOLUTION INTRAMUSCULAR; INTRAVENOUS at 09:11

## 2018-11-27 RX ADMIN — POTASSIUM BICARBONATE 50 MEQ: 25 TABLET, EFFERVESCENT ORAL at 04:11

## 2018-11-27 RX ADMIN — HEPARIN SODIUM 5000 UNITS: 5000 INJECTION, SOLUTION INTRAVENOUS; SUBCUTANEOUS at 09:11

## 2018-11-27 RX ADMIN — HEPARIN SODIUM 5000 UNITS: 5000 INJECTION, SOLUTION INTRAVENOUS; SUBCUTANEOUS at 02:11

## 2018-11-27 RX ADMIN — PANTOPRAZOLE SODIUM 40 MG: 40 TABLET, DELAYED RELEASE ORAL at 09:11

## 2018-11-27 NOTE — ASSESSMENT & PLAN NOTE
CT in 3 months, outpatient pulmonaology follow up in 3 months  No indication at this time for immediate tissue sampling given the precarious location of the nodule  Pt also denies any s/s of malignancy

## 2018-11-27 NOTE — PLAN OF CARE
Problem: Patient Care Overview  Goal: Individualization & Mutuality  Outcome: Ongoing (interventions implemented as appropriate)  Plan of care discussed with patient. Patient is free of fall/trauma/injury. Denies CP, SOB, or pain/discomfort. Lasix gtt continued per order. K replaced per order. NPO at midnight for Abd US in AM. All questions addressed. Will continue to monitor.

## 2018-11-27 NOTE — SUBJECTIVE & OBJECTIVE
"Past Medical History:   Diagnosis Date    Diabetes mellitus type II     Essential hypertension, benign     Hyperlipidemia     Hypothyroidism     Undiagnosed cardiac murmurs        Past Surgical History:   Procedure Laterality Date    COLON SURGERY      "lower intestines removed"    THYROID SURGERY         Review of patient's allergies indicates:   Allergen Reactions    Lisinopril Other (See Comments)     acei cough         Family History     Problem Relation (Age of Onset)    Cancer Father        Tobacco Use    Smoking status: Current Some Day Smoker     Types: Cigars    Tobacco comment: cigars "every other week or so"   Substance and Sexual Activity    Alcohol use: Yes     Alcohol/week: 3.6 oz     Types: 6 Cans of beer per week    Drug use: Yes     Types: Marijuana    Sexual activity: Yes     Partners: Female         Review of Systems   Constitutional: Negative for appetite change, chills, fatigue, fever and unexpected weight change.   HENT: Negative for sneezing.    Respiratory: Positive for cough. Negative for chest tightness and shortness of breath.    Cardiovascular: Negative for chest pain and leg swelling.   Gastrointestinal: Negative for abdominal distention, abdominal pain, nausea and vomiting.   Musculoskeletal: Negative for back pain.   Neurological: Negative for weakness, light-headedness and headaches.   Psychiatric/Behavioral: Negative for agitation and behavioral problems.     Objective:     Vital Signs (Most Recent):  Temp: 98.4 °F (36.9 °C) (11/27/18 0758)  Pulse: 76 (11/27/18 0758)  Resp: 17 (11/27/18 0758)  BP: 100/73 (11/27/18 0758)  SpO2: 96 % (11/27/18 0758) Vital Signs (24h Range):  Temp:  [97.4 °F (36.3 °C)-98.5 °F (36.9 °C)] 98.4 °F (36.9 °C)  Pulse:  [60-92] 76  Resp:  [16-21] 17  SpO2:  [92 %-100 %] 96 %  BP: (100-141)/(73-98) 100/73     Weight: 75.8 kg (167 lb 1.7 oz)  Body mass index is 23.31 kg/m².      Intake/Output Summary (Last 24 hours) at 11/27/2018 0908  Last data " filed at 11/27/2018 0600  Gross per 24 hour   Intake 530 ml   Output 2150 ml   Net -1620 ml       Physical Exam   Constitutional: He is oriented to person, place, and time. He appears well-developed and well-nourished. He is active and cooperative. No distress.   HENT:   Head: Normocephalic and atraumatic.   Cardiovascular: Normal rate and regular rhythm.   Murmur heard.   Systolic murmur is present with a grade of 3/6.  Pulmonary/Chest: Effort normal and breath sounds normal. No respiratory distress. He has no wheezes.   Abdominal: Soft. Bowel sounds are normal.   Musculoskeletal: He exhibits no edema or tenderness.   Neurological: He is alert and oriented to person, place, and time.   Skin: Skin is warm. He is not diaphoretic.   Psychiatric: He has a normal mood and affect. His behavior is normal.   Nursing note and vitals reviewed.      Vents:       Lines/Drains/Airways     Peripheral Intravenous Line                 Peripheral IV - Single Lumen 11/26/18 0933 Right Antecubital less than 1 day                Significant Labs:    CBC/Anemia Profile:  Recent Labs   Lab 11/26/18  0934 11/26/18  1518 11/27/18  0424   WBC 5.24  --  4.94   HGB 14.6  --  13.6*   HCT 45.7 48 40.6     --  152   MCV 83  --  79*   RDW 15.9*  --  15.0*        Chemistries:  Recent Labs   Lab 11/26/18  0934 11/26/18  1511 11/27/18  0424    136 137   K 5.4* 4.2 3.8    99 101   CO2 21* 28 28   BUN 50* 50* 49*   CREATININE 2.8* 2.7* 2.4*   CALCIUM 9.0 8.6* 8.7   ALBUMIN 3.2*  --  2.5*   PROT 7.6  --  6.0   BILITOT 1.7*  --  1.4*   ALKPHOS 172*  --  136*   *  --  150*   *  --  165*   MG  --   --  1.8       Recent Lab Results       11/27/18  0424   11/26/18  2307   11/26/18  2124   11/26/18  1612   11/26/18  1518        Immature Granulocytes 0.2             Immature Grans (Abs) 0.01  Comment:  Mild elevation in immature granulocytes is non specific and   can be seen in a variety of conditions including stress  response,   acute inflammation, trauma and pregnancy. Correlation with other   laboratory and clinical findings is essential.               Albumin 2.5             Alkaline Phosphatase 136                          Anion Gap 8             Ascending aorta       3.19       Ao peak shelia       0.70       Ao VTI       10.86       Appearance, UA                            AV valve area       2.23       AV mean gradient       1.05       AV peak gradient       1.96       Bacteria, UA               Baso # 0.04             Basophil% 0.8             Bilirubin (UA)               Total Bilirubin 1.4  Comment:  For infants and newborns, interpretation of results should be based  on gestational age, weight and in agreement with clinical  observations.  Premature Infant recommended reference ranges:  Up to 24 hours.............<8.0 mg/dL  Up to 48 hours............<12.0 mg/dL  3-5 days..................<15.0 mg/dL  6-29 days.................<15.0 mg/dL               BNP               BSA       1.81       BUN, Bld 49             Calcium 8.7             CCP Antibodies     <0.5         Chloride 101             Cholesterol               CO2 28             Color, UA               Creatinine 2.4             CRP     17.8         Left Ventricle Relative Wall Thickness       0.22       Differential Method Automated             AV index (prosthetic)       0.69       E/A ratio       4.89       E/E' ratio       19.57       eGFR if  34.1             eGFR if non  29.5  Comment:  Calculation used to obtain the estimated glomerular filtration  rate (eGFR) is the CKD-EPI equation.                Eos # 0.2             Eosinophil% 4.5             Estimated Avg Glucose               E wave decelartion time       125.38       FS       21       Glucose 173             Glucose, UA               Gran # (ANC) 3.3             Gran% 67.6             HDL               HDL/Chol Ratio               Hematocrit 40.6              Hemoglobin 13.6             Hemoglobin A1C               Hyaline Casts, UA               IVS       0.82       Ketones, UA               LA WIDTH       5.62       Left Atrium Major Axis       7.31       Left Atrium Minor Axis       6.40       LA size       4.66       LA volume       151.93       LA Volume Index       83.9       LVOT area       3.23       LDL Cholesterol               Leukocytes, UA               Lipase               LV LATERAL E/E' RATIO       17.13       LV SEPTAL E/E' RATIO       22.83       LV Diastolic Volume       166.22       LV Diastolic Volume Index       91.83       LVIDD       5.79       LVIDS       4.55       LV mass       157.48       LV Mass Index       87.0       LVOT diameter       2.03       LVOT stroke volume       24.23       LVOT peak VTI       7.49       LV Systolic Volume       94.98       LV Systolic Volume Index       52.5       Lymph # 0.5             Lymph% 10.7             Magnesium 1.8             MCH 26.6             MCHC 33.5             MCV 79             Mean e'       0.07       Microscopic Comment               Mono # 0.8             Mono% 16.2             MPV 12.3             MV Peak A Demarcus       0.28       MV Peak E [place]arcus       1.37       Nitrite, UA               Non-HDL Cholesterol               nRBC 0             Occult Blood UA               pH, UA               Platelets 152             POC BUN         44     POC Chloride         99     POC Creatinine         2.5     POC Glucose         185     POC Hematocrit         48     POC Ionized Calcium         1.01     POC Potassium         4.1     POC Sodium         139     POC TCO2 (MEASURED)         28     POCT Glucose   275           Potassium 3.8             Total Protein 6.0             Protein, UA               PW       0.65       Right Atrial Pressure (from IVC)       15       RA Major Axis       6.42       RA Width       4.46       RBC 5.12             RBC, UA               RDW 15.0             Rheumatoid  Factor     <10.0         RVDD       5.45       Sample         BESS     Sed Rate     <2         Sinus       2.82       Sodium 137             Specific [place], UA               Specimen UA               STJ       2.44       TAPSE       1.37       TDI SEPTAL       0.06       TDI LATERAL       0.08       Total Cholesterol/HDL Ratio               Triglycerides               Triscuspid Valve Regurgitation Peak Gradient       36.97       TR Max [place]       3.04       Troponin I               TSH               TV rest pulmonary artery pressure       51.97       WBC, UA               WBC 4.94                              11/26/18  1511   11/26/18  1337   11/26/18  1325   11/26/18  1243   11/26/18  0934        Immature Granulocytes         0.2     Immature Grans (Abs)         0.01  Comment:  Mild elevation in immature granulocytes is non specific and   can be seen in a variety of conditions including stress response,   acute inflammation, trauma and pregnancy. Correlation with other   laboratory and clinical findings is essential.       Albumin         3.2     Alkaline Phosphatase         172     ALT         128     Anion Gap 9       14     Ascending aorta               Ao peak shelia               Ao VTI               Appearance, UA       Clear       AST         158  Comment:  *Result may be interfered by visible hemolysis     AV valve area               AV mean gradient               AV peak gradient               Bacteria, UA       Rare       Baso #         0.11     Basophil%         2.1     Bilirubin (UA)       Negative       Total Bilirubin         1.7  Comment:  For infants and newborns, interpretation of results should be based  on gestational age, weight and in agreement with clinical  observations.  Premature Infant recommended reference ranges:  Up to 24 hours.............<8.0 mg/dL  Up to 48 hours............<12.0 mg/dL  3-5 days..................<15.0 mg/dL  6-29 days.................<15.0 mg/dL       BNP          2,276  Comment:  Values of less than 100 pg/ml are consistent with non-CHF populations.     BSA               BUN, Bld 50       50     Calcium 8.6       9.0     CCP Antibodies               Chloride 99       102     Cholesterol         130  Comment:  The National Cholesterol Education Program (NCEP) has set the  following guidelines (reference ranges) for Cholesterol:  Optimal.....................<200 mg/dL  Borderline High.............200-239 mg/dL  High........................> or = 240 mg/dL       CO2 28       21     Color, UA       Yellow       Creatinine 2.7       2.8     CRP               Left Ventricle Relative Wall Thickness               Differential Method         Automated     AV index (prosthetic)               E/A ratio               E/E' ratio               eGFR if  29.6       28.3     eGFR if non  25.6  Comment:  Calculation used to obtain the estimated glomerular filtration  rate (eGFR) is the CKD-EPI equation.          24.5  Comment:  Calculation used to obtain the estimated glomerular filtration  rate (eGFR) is the CKD-EPI equation.        Eos #         0.2     Eosinophil%         4.0     Estimated Avg Glucose 169             E wave decelartion time               FS               Glucose 195       162     Glucose, UA       Negative       Gran # (ANC)         3.1     Gran%         59.2     HDL         27  Comment:  The National Cholesterol Education Program (NCEP) has set the  following guidelines (reference values) for HDL Cholesterol:  Low...............<40 mg/dL  Optimal...........>60 mg/dL       HDL/Chol Ratio         20.8     Hematocrit         45.7     Hemoglobin         14.6     Hemoglobin A1C 7.5  Comment:  ADA Screening Guidelines:  5.7-6.4%  Consistent with prediabetes  >or=6.5%  Consistent with diabetes  High levels of fetal hemoglobin interfere with the HbA1C  assay. Heterozygous hemoglobin variants (HbS, HgC, etc)do  not significantly interfere with this  assay.   However, presence of multiple variants may affect accuracy.               Hyaline Casts, UA       15       IVS               Ketones, UA       Negative       LA WIDTH               Left Atrium Major Axis               Left Atrium Minor Axis               LA size               LA volume               LA Volume Index               LVOT area               LDL Cholesterol         82.0  Comment:  The National Cholesterol Education Program (NCEP) has set the  following guidelines (reference values) for LDL Cholesterol:  Optimal.......................<130 mg/dL  Borderline High...............130-159 mg/dL  High..........................160-189 mg/dL  Very High.....................>190 mg/dL       Leukocytes, UA       Negative       Lipase         68     LV LATERAL E/E' RATIO               LV SEPTAL E/E' RATIO               LV Diastolic Volume               LV Diastolic Volume Index               LVIDD               LVIDS               LV mass               LV Mass Index               LVOT diameter               LVOT stroke volume               LVOT peak VTI               LV Systolic Volume               LV Systolic Volume Index               Lymph #         0.9     Lymph%         16.8     Magnesium               MCH         26.4     MCHC         31.9     MCV         83     Mean e'               Microscopic Comment       SEE COMMENT  Comment:  Other formed elements not mentioned in the report are not   present in the microscopic examination.          Mono #         0.9     Mono%         17.7     MPV         12.3     MV Peak A Demarcus               MV Peak E Demarcus               Nitrite, UA       Negative       Non-HDL Cholesterol         103  Comment:  Risk category and Non-HDL cholesterol goals:  Coronary heart disease (CHD)or equivalent (10-year risk of CHD >20%):  Non-HDL cholesterol goal     <130 mg/dL  Two or more CHD risk factors and 10-year risk of CHD <= 20%:  Non-HDL cholesterol goal     <160 mg/dL  0 to 1 CHD  risk factor:  Non-HDL cholesterol goal     <190 mg/dL       nRBC         0     Occult Blood UA       Negative       pH, UA       6.0       Platelets         168     POC BUN               POC Chloride               POC Creatinine               POC Glucose               POC Hematocrit               POC Ionized Calcium               POC Potassium               POC Sodium               POC TCO2 (MEASURED)               POCT Glucose     171         Potassium 4.2       5.4     Total Protein         7.6     Protein, UA       1+  Comment:  Recommend a 24 hour urine protein or a urine   protein/creatinine ratio if globulin induced proteinuria is  clinically suspected.         PW               Right Atrial Pressure (from IVC)               RA Major Axis               RA Width               RBC         5.54     RBC, UA       0       RDW         15.9     Rheumatoid Factor               RVDD               Sample               Sed Rate               Sinus               Sodium 136       137     Specific [place], UA       1.010       Specimen UA       Urine, Clean Catch       STJ               TAPSE               TDI SEPTAL               TDI LATERAL               Total Cholesterol/HDL Ratio         4.8     Triglycerides         105  Comment:  The National Cholesterol Education Program (NCEP) has set the  following guidelines (reference values) for triglycerides:  Normal......................<150 mg/dL  Borderline High.............150-199 mg/dL  High........................200-499 mg/dL       Triscuspid Valve Regurgitation Peak Gradient               TR Max Demarcus               Troponin I   0.061  Comment:  The reference interval for Troponin I represents the 99th percentile   cutoff   for our facility and is consistent with 3rd generation assay   performance.       0.080  Comment:  The reference interval for Troponin I represents the 99th percentile   cutoff   for our facility and is consistent with 3rd generation assay    performance.       TSH         2.657     TV rest pulmonary artery pressure               WBC, UA       1       WBC         5.24                        All pertinent labs within the past 24 hours have been reviewed.    Significant Imaging:   I have reviewed all pertinent imaging results/findings within the past 24 hours.

## 2018-11-27 NOTE — H&P
[facility] Medicine                                                         History and Physical       Team: [facility] R Charan Greenfield MD     Admit Date: 11/26/2018   HOD: 0       KARINA:  11/29/2018    Primary care Physician: [facility]    Principal Problem: Acute on chronic combined systolic and diastolic congestive heart failure     Patient information was obtained from patient, spouse/SO and ER records.      Code status: Full Code      HPI:       Mr Ashish Mckeon is a 53 y.o. AA gentleman with prior hx of NICM and HFrEF with EF 25-30%, severe MR, DM type 2, HLD and hypothyroidism presenting with progressively worsening dyspnea for the past few days with associated orthopnea, PND, epigastric burning sensation, nausea, LE swelling, weight gain and chest pressure. Denies any fevers, chills, cough, sputum, pleurisy, chest pain, abdominal pain, emesis, constipation,diarreha, rashes. He is now sleeping with 2 pillows since 1 week ago. She does have worsening SOB on excertion. Has prior hx of CHF exacerbation, and feels similarly. Did endorse night sweats. He is not compliant with low sodium diet or fluid restriction.     Last echo done in 7/2018 noted EF of 25-30%, pulmonary HTN PASP 62 mmHg, severe mitral regurgitation. PET scan stress during that time was negative for ischemia.  Cath 7/20 noted non-obstructive CAD.     In the ED patient was HD stable saturating well on RA and afebrile. K 5.4, CO2 21, Cr 2.8, BUN 50, , , , TB 1.7, Alb 3.2, AG corrected 16.5. CBC wnl. BNP 2276. Trop mildly elevated. CXR noting cardiomegly, mild hilar fullness and nodular density. Cardiology consulted for elevated troponin. Hospital medicine consulted for admission.       Review of Systems:  Pain Scale: 2 /10   Constitutional: no fever or chills  Respiratory: no cough but  "shortness of breath  Cardiovascular: chest pressure or palpitations, orthopnea, PND   Gastrointestinal:  nausea or vomiting,  abdominal pain or change in bowel habits  Genitourinary: no hematuria or dysuria  Integument/Breast: no rash or pruritis  Hematologic/Lymphatic: no easy bruising or lymphadenopathy  Musculoskeletal: no arthralgias or myalgias, LE swelling   Neurological: no seizures or tremors  Behavioral/Psych: no depression or anxiety      PAST HISTORY:     Past Medical History:   Diagnosis Date    Diabetes mellitus type II     Essential hypertension, benign     Hyperlipidemia     Hypothyroidism     Undiagnosed cardiac murmurs        Past Surgical History:   Procedure Laterality Date    COLON SURGERY      "lower intestines removed"    THYROID SURGERY         Family History   Problem Relation Age of Onset    Cancer Father        Social History     Socioeconomic History    Marital status:      Spouse name: None    Number of children: None    Years of education: None    Highest education level: None   Social Needs    Financial resource strain: None    Food insecurity - worry: None    Food insecurity - inability: None    Transportation needs - medical: None    Transportation needs - non-medical: None   Occupational History    None   Tobacco Use    Smoking status: Current Some Day Smoker     Types: Cigars    Tobacco comment: cigars "every other week or so"   Substance and Sexual Activity    Alcohol use: Yes     Alcohol/week: 3.6 oz     Types: 6 Cans of beer per week    Drug use: Yes     Types: Marijuana    Sexual activity: Yes     Partners: Female   Other Topics Concern    None   Social History Narrative    None         MEDICATIONS and ALLERGIES:   Reviewed    No current facility-administered medications on file prior to encounter.      Current Outpatient Medications on File Prior to Encounter   Medication Sig Dispense Refill    azithromycin (Z-JOSÉ MIGUEL) 250 MG tablet Take 1 tablet " (250 mg total) by mouth once daily. Take first 2 tablets together, then 1 every day until finished. 6 tablet 0    furosemide (LASIX) 40 MG tablet Take 1 tablet (40 mg total) by mouth 2 (two) times daily. 180 tablet 0    glyBURIDE (DIABETA) 5 MG tablet Take 2 tablets (10 mg total) by mouth daily with breakfast. 180 tablet 0    LEVOTHYROXINE SODIUM (SYNTHROID ORAL) Take 175 mcg by mouth daily 2 hours after breakfast.       losartan (COZAAR) 25 MG tablet Take 1 tablet (25 mg total) by mouth once daily. 90 tablet 0    acetaminophen (TYLENOL) 325 MG tablet Take 2 tablets (650 mg total) by mouth every 6 to 8 hours as needed.  0    hydrocodone-homatropine 5-1.5 mg/5 ml (HYCODAN) 5-1.5 mg/5 mL Syrp Take 5 mLs by mouth every 4 (four) hours as needed (cough). 120 mL 0    metoprolol succinate (TOPROL-XL) 100 MG 24 hr tablet 150mg daily, take one and one half tab daily 60 tablet 11    simvastatin (ZOCOR) 10 MG tablet Take 1 tablet (10 mg total) by mouth every evening. 90 tablet 0        Review of patient's allergies indicates:   Allergen Reactions    Lisinopril Other (See Comments)     acei cough             PHYSICAL EXAM:     Temp:  [97.9 °F (36.6 °C)-98.5 °F (36.9 °C)]   Pulse:  [71-92]   Resp:  [17-21]   BP: (107-141)/(75-98)   SpO2:  [92 %-100 %]   Body mass index is 27.46 kg/m².     Intake/Output Summary (Last 24 hours) at 11/26/2018 8707  Last data filed at 11/26/2018 1500  Gross per 24 hour   Intake --   Output 650 ml   Net -650 ml     General appearance: no distress, non toxic   Mental status: Alert and oriented x 3  HEENT:  conjunctivae/corneas clear, PERRL, scleral anicterics   Neck: supple, thyroid not enlarged, no nodes felt, no supraclavicular nodes, JVD present  Pulm:   normal respiratory effort, rales noted on bilateral lower lung filed  Card: RRR, S1, S2 normal, S3 present, holosystolic murmur noted, no click  Abd: soft NABS, ND, mild epigastric tenderness, BS present; no masses, no organomegaly  Ext:  +1 LE edema noted, nodules noted on b/l fingers (bishop's nodes), leukonychia    Pulses: 2+, symmetric  Skin: color, texture, turgor normal. No rashes or lesions, no clubbing noted  Neuro: Cranial Nerves grossly intact, no focal numbness or weakness, normal strength and tone       LABS and IMAGING:       Recent Results (from the past 24 hour(s))   Comprehensive metabolic panel    Collection Time: 11/26/18  9:34 AM   Result Value Ref Range    Sodium 137 136 - 145 mmol/L    Potassium 5.4 (H) 3.5 - 5.1 mmol/L    Chloride 102 95 - 110 mmol/L    CO2 21 (L) 23 - 29 mmol/L    Glucose 162 (H) 70 - 110 mg/dL    BUN, Bld 50 (H) 6 - 20 mg/dL    Creatinine 2.8 (H) 0.5 - 1.4 mg/dL    Calcium 9.0 8.7 - 10.5 mg/dL    Total Protein 7.6 6.0 - 8.4 g/dL    Albumin 3.2 (L) 3.5 - 5.2 g/dL    Total Bilirubin 1.7 (H) 0.1 - 1.0 mg/dL    Alkaline Phosphatase 172 (H) 55 - 135 U/L     (H) 10 - 40 U/L     (H) 10 - 44 U/L    Anion Gap 14 8 - 16 mmol/L    eGFR if African American 28.3 (A) >60 mL/min/1.73 m^2    eGFR if non African American 24.5 (A) >60 mL/min/1.73 m^2   CBC auto differential    Collection Time: 11/26/18  9:34 AM   Result Value Ref Range    WBC 5.24 3.90 - 12.70 K/uL    RBC 5.54 4.60 - 6.20 M/uL    Hemoglobin 14.6 14.0 - 18.0 g/dL    Hematocrit 45.7 40.0 - 54.0 %    MCV 83 82 - 98 fL    MCH 26.4 (L) 27.0 - 31.0 pg    MCHC 31.9 (L) 32.0 - 36.0 g/dL    RDW 15.9 (H) 11.5 - 14.5 %    Platelets 168 150 - 350 K/uL    MPV 12.3 9.2 - 12.9 fL    Immature Granulocytes 0.2 0.0 - 0.5 %    Gran # (ANC) 3.1 1.8 - 7.7 K/uL    Immature Grans (Abs) 0.01 0.00 - 0.04 K/uL    Lymph # 0.9 (L) 1.0 - 4.8 K/uL    Mono # 0.9 0.3 - 1.0 K/uL    Eos # 0.2 0.0 - 0.5 K/uL    Baso # 0.11 0.00 - 0.20 K/uL    nRBC 0 0 /100 WBC    Gran% 59.2 38.0 - 73.0 %    Lymph% 16.8 (L) 18.0 - 48.0 %    Mono% 17.7 (H) 4.0 - 15.0 %    Eosinophil% 4.0 0.0 - 8.0 %    Basophil% 2.1 (H) 0.0 - 1.9 %    Differential Method Automated    Brain natriuretic peptide     Collection Time: 11/26/18  9:34 AM   Result Value Ref Range    BNP 2,276 (H) 0 - 99 pg/mL   Lipase    Collection Time: 11/26/18  9:34 AM   Result Value Ref Range    Lipase 68 (H) 4 - 60 U/L   Troponin I    Collection Time: 11/26/18  9:34 AM   Result Value Ref Range    Troponin I 0.080 (H) 0.000 - 0.026 ng/mL   Lipid panel    Collection Time: 11/26/18  9:34 AM   Result Value Ref Range    Cholesterol 130 120 - 199 mg/dL    Triglycerides 105 30 - 150 mg/dL    HDL 27 (L) 40 - 75 mg/dL    LDL Cholesterol 82.0 63.0 - 159.0 mg/dL    HDL/Chol Ratio 20.8 20.0 - 50.0 %    Total Cholesterol/HDL Ratio 4.8 2.0 - 5.0    Non-HDL Cholesterol 103 mg/dL   TSH    Collection Time: 11/26/18  9:34 AM   Result Value Ref Range    TSH 2.657 0.400 - 4.000 uIU/mL   Urinalysis, Reflex to Urine Culture Urine, Clean Catch    Collection Time: 11/26/18 12:43 PM   Result Value Ref Range    Specimen UA Urine, Clean Catch     Color, UA Yellow Yellow, Straw, Shaista    Appearance, UA Clear Clear    pH, UA 6.0 5.0 - 8.0    Specific Gravity, UA 1.010 1.005 - 1.030    Protein, UA 1+ (A) Negative    Glucose, UA Negative Negative    Ketones, UA Negative Negative    Bilirubin (UA) Negative Negative    Occult Blood UA Negative Negative    Nitrite, UA Negative Negative    Leukocytes, UA Negative Negative   Urinalysis Microscopic    Collection Time: 11/26/18 12:43 PM   Result Value Ref Range    RBC, UA 0 0 - 4 /hpf    WBC, UA 1 0 - 5 /hpf    Bacteria, UA Rare None-Occ /hpf    Hyaline Casts, UA 15 (A) 0-1/lpf /lpf    Microscopic Comment SEE COMMENT    POCT glucose    Collection Time: 11/26/18  1:25 PM   Result Value Ref Range    POCT Glucose 171 (H) 70 - 110 mg/dL   Troponin I    Collection Time: 11/26/18  1:37 PM   Result Value Ref Range    Troponin I 0.061 (H) 0.000 - 0.026 ng/mL   Hemoglobin A1c    Collection Time: 11/26/18  3:11 PM   Result Value Ref Range    Hemoglobin A1C 7.5 (H) 4.0 - 5.6 %    Estimated Avg Glucose 169 (H) 68 - 131 mg/dL   Basic metabolic  panel    Collection Time: 11/26/18  3:11 PM   Result Value Ref Range    Sodium 136 136 - 145 mmol/L    Potassium 4.2 3.5 - 5.1 mmol/L    Chloride 99 95 - 110 mmol/L    CO2 28 23 - 29 mmol/L    Glucose 195 (H) 70 - 110 mg/dL    BUN, Bld 50 (H) 6 - 20 mg/dL    Creatinine 2.7 (H) 0.5 - 1.4 mg/dL    Calcium 8.6 (L) 8.7 - 10.5 mg/dL    Anion Gap 9 8 - 16 mmol/L    eGFR if African American 29.6 (A) >60 mL/min/1.73 m^2    eGFR if non African American 25.6 (A) >60 mL/min/1.73 m^2   ISTAT PROCEDURE    Collection Time: 11/26/18  3:18 PM   Result Value Ref Range    POC Glucose 185 (H) 70 - 110 mg/dL    POC BUN 44 (H) 6 - 30 mg/dL    POC Creatinine 2.5 (H) 0.5 - 1.4 mg/dL    POC Sodium 139 136 - 145 mmol/L    POC Potassium 4.1 3.5 - 5.1 mmol/L    POC Chloride 99 95 - 110 mmol/L    POC TCO2 (MEASURED) 28 23 - 29 mmol/L    POC Ionized Calcium 1.01 (L) 1.06 - 1.42 mmol/L    POC Hematocrit 48 36 - 54 %PCV    Sample BESS    Transthoracic echo (TTE) complete (Cupid Only)    Collection Time: 11/26/18  4:12 PM   Result Value Ref Range    Ascending aorta 3.19 cm    STJ 2.44 cm    AV mean gradient 1.05 mmHg    Ao peak shelia 0.70 m/s    Ao VTI 10.86 cm    IVS 0.82 0.6 - 1.1 cm    LA size 4.66 cm    Left Atrium Major Axis 7.31 cm    LVIDD 5.79 3.5 - 6.0 cm    LVIDS 4.55 (A) 2.1 - 4.0 cm    LVOT diameter 2.03 cm    LVOT peak VTI 7.49 cm    PW 0.65 0.6 - 1.1 cm    MV Peak A Shelia 0.28 m/s    E wave decelartion time 125.38 msec    MV Peak E Shelia 1.37 m/s    Right Atrium Major Axis 6.42 cm    Right Atrium Width 4.46 cm    RVDD 5.45 cm    Sinus 2.82 cm    Tricuspid annular plane systolic excursion 1.37 cm    TR Max Shelia 3.04 m/s    TDI LATERAL 0.08     TDI SEPTAL 0.06     LA WIDTH 5.62 cm    LV Diastolic Volume 166.22 mL    LV Systolic Volume 94.98 mL    LV LATERAL E/E' RATIO 17.13     LV SEPTAL E/E' RATIO 22.83     FS 21 %    LV mass 157.48 g    Left Ventricle Relative Wall Thickness 0.22 cm    AV valve area 2.23 cm2    AV index (prosthetic) 0.69      E/A ratio 4.89     TDI 0.07     LVOT area 3.23 cm2    LVOT stroke volume 24.23 cm3    AV peak gradient 1.96 mmHg    E/E' ratio 19.57     Triscuspid Valve Regurgitation Peak Gradient 36.97 mmHg    BSA 1.81 m2    LV Systolic Volume Index 52.5 mL/m2    LV Diastolic Volume Index 91.83 mL/m2    LV Mass Index 87.0 g/m2       Recent Labs   Lab 11/26/18  1325   POCTGLUCOSE 171*       Active Hospital Problems    Diagnosis  POA    CHF exacerbation [I50.9]  Yes    Acute on chronic combined systolic and diastolic congestive heart failure [I50.43]  Yes      Resolved Hospital Problems   No resolved problems to display.           ASSESSMENT and PLAN:     Problems List:  Active Hospital Problems    Diagnosis  POA    CHF exacerbation [I50.9]  Yes    Acute on chronic combined systolic and diastolic congestive heart failure [I50.43]  Yes      Resolved Hospital Problems   No resolved problems to display.       Acute on chronic combined systolic and diastolic heart failure  Elevated Trop - Type 2 MI   53 yom with non ischemic cardiomyopathy (unknown etiology, graves induced?) with reduced EF of 25% presenting with HF symptoms. Elevated BNP and CXR with volume overload. Suspecting secondary to non compliance to life style modifications. Remains HD stable, afebrile and saturating well on RA. Given one time dose IV 60 mg lasix in ED. Cardiology was consulted by ER. Troponin peaked @ .080. EKG noting 1st deg AVB and low voltage QRS.  - Continue Lasix drip @ 10 mg/hr as recommended by cardiology   - Continue home metoprolol 150 mg daily  - Holding ARB due to KEYANNA  - TTE pending  - Strict I/O, daily weights standing  - Monitor kidney function and electrolytes   - Fluid 1500 mL and Sodium restriction   - Consider cardiac MRI to r/o amyloidosis, sarcoid etc   - Need records about his graves disease from [facility] [place]   - EP for ICD on discharge  - Follow up with cardiology clinic and CHF education on d/c  - Consider HTS tomorrow     Acute  Kidney Injury on CKD stage III suspecting CRS   - Baseline around 1.5 - 1.7   - Suspecting CRS  - Continue lasix gtt  - Holding ARB  - Strict I/O  - Avoid contrast and nephrotoxic agents for now    Pulmonary Nodules with mediastinal LAD  - Seen on CXR, ordered CT noting 2 solid nodules, largest in RUL 1.8 cm diameter   - Mediastinal lymphadenopathy noted  - No cervical nodes or virchow's nodes noted, no clubbing  - Pulmonary consulted for assistance   - May need bronch when euvoluemic with mediastinal node biopsy    Hx of arthritis  - Significant deformities on bilateral fingers with episodic swelling endorsed   - Will get RF, CCP, ESR, CRP, Hep B/C    Hypothyroidism   - Continue home synthroid  - Had hx of hyperthyroidism, unsure if this was his cause of CHF    DM type 2  - Holding oral meds  - Low dose SSI and accu-checks    HLD  - Continue high intensity statin         Code Status- FULL  Prophylaxis- Hep TID    Post-acute care- pending clinical condition  Discharge plan and follow up - d/c home once medically stable      Charan Greenfield MD  Hospital Medicine Staff  Pager [phone]

## 2018-11-27 NOTE — CONSULTS
[facility]  Pulmonology  Consult Note    Patient Name: Ashish Mckeon  MRN: [id number]  Admission Date: 11/26/2018  Hospital Length of Stay: 1 days  Code Status: Full Code  Attending Physician: Milton Lopez MD  Primary Care Provider: [facility]   Principal Problem: Acute on chronic combined systolic and diastolic congestive heart failure    Consults  Subjective:     HPI:  Mr Ashish Mckeon is a 53 y.o. AA gentleman with prior hx of NICM and HFrEF with EF 25-30%, severe MR, DM type 2, HLD and hypothyroidism presenting with progressively worsening dyspnea for the past few days with associated orthopnea, PND, epigastric burning sensation, nausea, LE swelling, weight gain and chest pressure. Denies any fevers, chills, cough, sputum, pleurisy, chest pain, abdominal pain, emesis, constipation,diarreha, rashes. He is now sleeping with 2 pillows since 1 week ago. She does have worsening SOB on excertion. Has prior hx of CHF exacerbation, and feels similarly. Did endorse night sweats. He is not compliant with low sodium diet or fluid restriction.   Last echo done in 7/2018 noted EF of 25-30%, pulmonary HTN PASP 62 mmHg, severe mitral regurgitation. PET scan stress during that time was negative for ischemia.  Cath 7/20 noted non-obstructive CAD.   In the ED patient was HD stable saturating well on RA and afebrile. K 5.4, CO2 21, Cr 2.8, BUN 50, , , , TB 1.7, Alb 3.2, AG corrected 16.5. CBC wnl. BNP 2276. Trop mildly elevated. CXR noting cardiomegly, mild hilar fullness and nodular density. Cardiology consulted for elevated troponin. Hospital medicine consulted for admission.     On CXR, pt shown to have nodular densities in lungs. Further CT showed a well-demarcated nodule in the posterior segment of the right upper lobe measuring 1.8 x 1.5 x 1.6 cm and an additional 0.6 cm solid nodule at the medial basal segment of the right lower lobe. Of note, the patient  "denies a smoking history, though his wife does smoke. He denies CP, hemoptysis, weight loss, change in appetite, fevers, chills. Endorses mild weight gain of recent. He denies any pulmonary history or personal history of cancer. He notes his father had throat cancer but was a smoker.     Past Medical History:   Diagnosis Date    Diabetes mellitus type II     Essential hypertension, benign     Hyperlipidemia     Hypothyroidism     Undiagnosed cardiac murmurs        Past Surgical History:   Procedure Laterality Date    COLON SURGERY      "lower intestines removed"    THYROID SURGERY         Review of patient's allergies indicates:   Allergen Reactions    Lisinopril Other (See Comments)     acei cough         Family History     Problem Relation (Age of Onset)    Cancer Father        Tobacco Use    Smoking status: Current Some Day Smoker     Types: Cigars    Tobacco comment: cigars "every other week or so"   Substance and Sexual Activity    Alcohol use: Yes     Alcohol/week: 3.6 oz     Types: 6 Cans of beer per week    Drug use: Yes     Types: Marijuana    Sexual activity: Yes     Partners: Female         Review of Systems   Constitutional: Negative for appetite change, chills, fatigue, fever and unexpected weight change.   HENT: Negative for sneezing.    Respiratory: Positive for cough. Negative for chest tightness and shortness of breath.    Cardiovascular: Negative for chest pain and leg swelling.   Gastrointestinal: Negative for abdominal distention, abdominal pain, nausea and vomiting.   Musculoskeletal: Negative for back pain.   Neurological: Negative for weakness, light-headedness and headaches.   Psychiatric/Behavioral: Negative for agitation and behavioral problems.     Objective:     Vital Signs (Most Recent):  Temp: 98.4 °F (36.9 °C) (11/27/18 0758)  Pulse: 76 (11/27/18 0758)  Resp: 17 (11/27/18 0758)  BP: 100/73 (11/27/18 0758)  SpO2: 96 % (11/27/18 0758) Vital Signs (24h Range):  Temp:  [97.4 " °F (36.3 °C)-98.5 °F (36.9 °C)] 98.4 °F (36.9 °C)  Pulse:  [60-92] 76  Resp:  [16-21] 17  SpO2:  [92 %-100 %] 96 %  BP: (100-141)/(73-98) 100/73     Weight: 75.8 kg (167 lb 1.7 oz)  Body mass index is 23.31 kg/m².      Intake/Output Summary (Last 24 hours) at 11/27/2018 0908  Last data filed at 11/27/2018 0600  Gross per 24 hour   Intake 530 ml   Output 2150 ml   Net -1620 ml       Physical Exam   Constitutional: He is oriented to person, place, and time. He appears well-developed and well-nourished. He is active and cooperative. No distress.   HENT:   Head: Normocephalic and atraumatic.   Cardiovascular: Normal rate and regular rhythm.   Murmur heard.   Systolic murmur is present with a grade of 3/6.  Pulmonary/Chest: Effort normal and breath sounds normal. No respiratory distress. He has no wheezes.   Abdominal: Soft. Bowel sounds are normal.   Musculoskeletal: He exhibits no edema or tenderness.   Neurological: He is alert and oriented to person, place, and time.   Skin: Skin is warm. He is not diaphoretic.   Psychiatric: He has a normal mood and affect. His behavior is normal.   Nursing note and vitals reviewed.      Vents:       Lines/Drains/Airways     Peripheral Intravenous Line                 Peripheral IV - Single Lumen 11/26/18 0933 Right Antecubital less than 1 day                Significant Labs:    CBC/Anemia Profile:  Recent Labs   Lab 11/26/18  0934 11/26/18  1518 11/27/18  0424   WBC 5.24  --  4.94   HGB 14.6  --  13.6*   HCT 45.7 48 40.6     --  152   MCV 83  --  79*   RDW 15.9*  --  15.0*        Chemistries:  Recent Labs   Lab 11/26/18  0934 11/26/18  1511 11/27/18  0424    136 137   K 5.4* 4.2 3.8    99 101   CO2 21* 28 28   BUN 50* 50* 49*   CREATININE 2.8* 2.7* 2.4*   CALCIUM 9.0 8.6* 8.7   ALBUMIN 3.2*  --  2.5*   PROT 7.6  --  6.0   BILITOT 1.7*  --  1.4*   ALKPHOS 172*  --  136*   *  --  150*   *  --  165*   MG  --   --  1.8       Recent Lab Results        11/27/18  0424   11/26/18  2307   11/26/18  2124   11/26/18  1612   11/26/18  1518        Immature Granulocytes 0.2             Immature Grans (Abs) 0.01  Comment:  Mild elevation in immature granulocytes is non specific and   can be seen in a variety of conditions including stress response,   acute inflammation, trauma and pregnancy. Correlation with other   laboratory and clinical findings is essential.               Albumin 2.5             Alkaline Phosphatase 136                          Anion Gap 8             Ascending aorta       3.19       Ao peak shelia       0.70       Ao VTI       10.86       Appearance, UA                            AV valve area       2.23       AV mean gradient       1.05       AV peak gradient       1.96       Bacteria, UA               Baso # 0.04             Basophil% 0.8             Bilirubin (UA)               Total Bilirubin 1.4  Comment:  For infants and newborns, interpretation of results should be based  on gestational age, weight and in agreement with clinical  observations.  Premature Infant recommended reference ranges:  Up to 24 hours.............<8.0 mg/dL  Up to 48 hours............<12.0 mg/dL  3-5 days..................<15.0 mg/dL  6-29 days.................<15.0 mg/dL               BNP               BSA       1.81       BUN, Bld 49             Calcium 8.7             CCP Antibodies     <0.5         Chloride 101             Cholesterol               CO2 28             Color, UA               Creatinine 2.4             CRP     17.8         Left Ventricle Relative Wall Thickness       0.22       Differential Method Automated             AV index (prosthetic)       0.69       E/A ratio       4.89       E/E' ratio       19.57       eGFR if  34.1             eGFR if non  29.5  Comment:  Calculation used to obtain the estimated glomerular filtration  rate (eGFR) is the CKD-EPI equation.                Eos # 0.2              Eosinophil% 4.5             Estimated Avg Glucose               E wave decelartion time       125.38       FS       21       Glucose 173             Glucose, UA               Gran # (ANC) 3.3             Gran% 67.6             HDL               HDL/Chol Ratio               Hematocrit 40.6             Hemoglobin 13.6             Hemoglobin A1C               Hyaline Casts, UA               IVS       0.82       Ketones, UA               LA WIDTH       5.62       Left Atrium Major Axis       7.31       Left Atrium Minor Axis       6.40       LA size       4.66       LA volume       151.93       LA Volume Index       83.9       LVOT area       3.23       LDL Cholesterol               Leukocytes, UA               Lipase               LV LATERAL E/E' RATIO       17.13       LV SEPTAL E/E' RATIO       22.83       LV Diastolic Volume       166.22       LV Diastolic Volume Index       91.83       LVIDD       5.79       LVIDS       4.55       LV mass       157.48       LV Mass Index       87.0       LVOT diameter       2.03       LVOT stroke volume       24.23       LVOT peak VTI       7.49       LV Systolic Volume       94.98       LV Systolic Volume Index       52.5       Lymph # 0.5             Lymph% 10.7             Magnesium 1.8             MCH 26.6             MCHC 33.5             MCV 79             Mean e'       0.07       Microscopic Comment               Mono # 0.8             Mono% 16.2             MPV 12.3             MV Peak A Demarcus       0.28       MV Peak E Demarcus       1.37       Nitrite, UA               Non-HDL Cholesterol               nRBC 0             Occult Blood UA               pH, UA               Platelets 152             POC BUN         44     POC Chloride         99     POC Creatinine         2.5     POC Glucose         185     POC Hematocrit         48     POC Ionized Calcium         1.01     POC Potassium         4.1     POC Sodium         139     POC TCO2 (MEASURED)         28     POCT Glucose   275            Potassium 3.8             Total Protein 6.0             Protein, UA               PW       0.65       Right Atrial Pressure (from IVC)       15       RA Major Axis       6.42       RA Width       4.46       RBC 5.12             RBC, UA               RDW 15.0             Rheumatoid Factor     <10.0         RVDD       5.45       Sample         BESS     Sed Rate     <2         Sinus       2.82       Sodium 137             Specific [place], UA               Specimen UA               STJ       2.44       TAPSE       1.37       TDI SEPTAL       0.06       TDI LATERAL       0.08       Total Cholesterol/HDL Ratio               Triglycerides               Triscuspid Valve Regurgitation Peak Gradient       36.97       TR Max [place]lia       3.04       Troponin I               TSH               TV rest pulmonary artery pressure       51.97       WBC, UA               WBC 4.94                              11/26/18  1511   11/26/18  1337   11/26/18  1325   11/26/18  1243   11/26/18  0934        Immature Granulocytes         0.2     Immature Grans (Abs)         0.01  Comment:  Mild elevation in immature granulocytes is non specific and   can be seen in a variety of conditions including stress response,   acute inflammation, trauma and pregnancy. Correlation with other   laboratory and clinical findings is essential.       Albumin         3.2     Alkaline Phosphatase         172     ALT         128     Anion Gap 9       14     Ascending aorta               Ao peak shelia               Ao VTI               Appearance, UA       Clear       AST         158  Comment:  *Result may be interfered by visible hemolysis     AV valve area               AV mean gradient               AV peak gradient               Bacteria, UA       Rare       Baso #         0.11     Basophil%         2.1     Bilirubin (UA)       Negative       Total Bilirubin         1.7  Comment:  For infants and newborns, interpretation of results should be based  on  gestational age, weight and in agreement with clinical  observations.  Premature Infant recommended reference ranges:  Up to 24 hours.............<8.0 mg/dL  Up to 48 hours............<12.0 mg/dL  3-5 days..................<15.0 mg/dL  6-29 days.................<15.0 mg/dL       BNP         2,276  Comment:  Values of less than 100 pg/ml are consistent with non-CHF populations.     BSA               BUN, Bld 50       50     Calcium 8.6       9.0     CCP Antibodies               Chloride 99       102     Cholesterol         130  Comment:  The National Cholesterol Education Program (NCEP) has set the  following guidelines (reference ranges) for Cholesterol:  Optimal.....................<200 mg/dL  Borderline High.............200-239 mg/dL  High........................> or = 240 mg/dL       CO2 28       21     Color, UA       Yellow       Creatinine 2.7       2.8     CRP               Left Ventricle Relative Wall Thickness               Differential Method         Automated     AV index (prosthetic)               E/A ratio               E/E' ratio               eGFR if  29.6       28.3     eGFR if non  25.6  Comment:  Calculation used to obtain the estimated glomerular filtration  rate (eGFR) is the CKD-EPI equation.          24.5  Comment:  Calculation used to obtain the estimated glomerular filtration  rate (eGFR) is the CKD-EPI equation.        Eos #         0.2     Eosinophil%         4.0     Estimated Avg Glucose 169             E wave decelartion time               FS               Glucose 195       162     Glucose, UA       Negative       Gran # (ANC)         3.1     Gran%         59.2     HDL         27  Comment:  The National Cholesterol Education Program (NCEP) has set the  following guidelines (reference values) for HDL Cholesterol:  Low...............<40 mg/dL  Optimal...........>60 mg/dL       HDL/Chol Ratio         20.8     Hematocrit         45.7     Hemoglobin         14.6      Hemoglobin A1C 7.5  Comment:  ADA Screening Guidelines:  5.7-6.4%  Consistent with prediabetes  >or=6.5%  Consistent with diabetes  High levels of fetal hemoglobin interfere with the HbA1C  assay. Heterozygous hemoglobin variants (HbS, HgC, etc)do  not significantly interfere with this assay.   However, presence of multiple variants may affect accuracy.               Hyaline Casts, UA       15       IVS               Ketones, UA       Negative       LA WIDTH               Left Atrium Major Axis               Left Atrium Minor Axis               LA size               LA volume               LA Volume Index               LVOT area               LDL Cholesterol         82.0  Comment:  The National Cholesterol Education Program (NCEP) has set the  following guidelines (reference values) for LDL Cholesterol:  Optimal.......................<130 mg/dL  Borderline High...............130-159 mg/dL  High..........................160-189 mg/dL  Very High.....................>190 mg/dL       Leukocytes, UA       Negative       Lipase         68     LV LATERAL E/E' RATIO               LV SEPTAL E/E' RATIO               LV Diastolic Volume               LV Diastolic Volume Index               LVIDD               LVIDS               LV mass               LV Mass Index               LVOT diameter               LVOT stroke volume               LVOT peak VTI               LV Systolic Volume               LV Systolic Volume Index               Lymph #         0.9     Lymph%         16.8     Magnesium               MCH         26.4     MCHC         31.9     MCV         83     Mean e'               Microscopic Comment       SEE COMMENT  Comment:  Other formed elements not mentioned in the report are not   present in the microscopic examination.          Mono #         0.9     Mono%         17.7     MPV         12.3     MV Peak A Demarcus               MV Peak E Demarcus               Nitrite, UA       Negative       Non-HDL Cholesterol          103  Comment:  Risk category and Non-HDL cholesterol goals:  Coronary heart disease (CHD)or equivalent (10-year risk of CHD >20%):  Non-HDL cholesterol goal     <130 mg/dL  Two or more CHD risk factors and 10-year risk of CHD <= 20%:  Non-HDL cholesterol goal     <160 mg/dL  0 to 1 CHD risk factor:  Non-HDL cholesterol goal     <190 mg/dL       nRBC         0     Occult Blood UA       Negative       pH, UA       6.0       Platelets         168     POC BUN               POC Chloride               POC Creatinine               POC Glucose               POC Hematocrit               POC Ionized Calcium               POC Potassium               POC Sodium               POC TCO2 (MEASURED)               POCT Glucose     171         Potassium 4.2       5.4     Total Protein         7.6     Protein, UA       1+  Comment:  Recommend a 24 hour urine protein or a urine   protein/creatinine ratio if globulin induced proteinuria is  clinically suspected.         PW               Right Atrial Pressure (from IVC)               RA Major Axis               RA Width               RBC         5.54     RBC, UA       0       RDW         15.9     Rheumatoid Factor               RVDD               Sample               Sed Rate               Sinus               Sodium 136       137     Specific [place], UA       1.010       Specimen UA       Urine, Clean Catch       STJ               TAPSE               TDI SEPTAL               TDI LATERAL               Total Cholesterol/HDL Ratio         4.8     Triglycerides         105  Comment:  The National Cholesterol Education Program (NCEP) has set the  following guidelines (reference values) for triglycerides:  Normal......................<150 mg/dL  Borderline High.............150-199 mg/dL  High........................200-499 mg/dL       Triscuspid Valve Regurgitation Peak Gradient               TR Max Demarcus               Troponin I   0.061  Comment:  The reference interval for Troponin I  represents the 99th percentile   cutoff   for our facility and is consistent with 3rd generation assay   performance.       0.080  Comment:  The reference interval for Troponin I represents the 99th percentile   cutoff   for our facility and is consistent with 3rd generation assay   performance.       TSH         2.657     TV rest pulmonary artery pressure               WBC, UA       1       WBC         5.24                        All pertinent labs within the past 24 hours have been reviewed.    Significant Imaging:   I have reviewed all pertinent imaging results/findings within the past 24 hours.    Assessment/Plan:     right upper lobe mass    CT in 3 months, outpatient pulmonaology follow up in 3 months  No indication at this time for immediate tissue sampling given the precarious location of the nodule  Pt also denies any s/s of malignancy           Thank you for your consult.      Pravin Connor MD  Pulmonology  [facility]

## 2018-11-27 NOTE — HPI
Mr Ashish Mckeon is a 53 y.o. AA gentleman with prior hx of NICM and HFrEF with EF 25-30%, severe MR, DM type 2, HLD and hypothyroidism presenting with progressively worsening dyspnea for the past few days with associated orthopnea, PND, epigastric burning sensation, nausea, LE swelling, weight gain and chest pressure. Denies any fevers, chills, cough, sputum, pleurisy, chest pain, abdominal pain, emesis, constipation,diarreha, rashes. He is now sleeping with 2 pillows since 1 week ago. She does have worsening SOB on excertion. Has prior hx of CHF exacerbation, and feels similarly. Did endorse night sweats. He is not compliant with low sodium diet or fluid restriction.   Last echo done in 7/2018 noted EF of 25-30%, pulmonary HTN PASP 62 mmHg, severe mitral regurgitation. PET scan stress during that time was negative for ischemia.  Cath 7/20 noted non-obstructive CAD.   In the ED patient was HD stable saturating well on RA and afebrile. K 5.4, CO2 21, Cr 2.8, BUN 50, , , , TB 1.7, Alb 3.2, AG corrected 16.5. CBC wnl. BNP 2276. Trop mildly elevated. CXR noting cardiomegly, mild hilar fullness and nodular density. Cardiology consulted for elevated troponin. Hospital medicine consulted for admission.     On CXR, pt shown to have nodular densities in lungs. Further CT showed a well-demarcated nodule in the posterior segment of the right upper lobe measuring 1.8 x 1.5 x 1.6 cm and an additional 0.6 cm solid nodule at the medial basal segment of the right lower lobe. Of note, the patient denies a smoking history, though his wife does smoke. He denies CP, hemoptysis, weight loss, change in appetite, fevers, chills. Endorses mild weight gain of recent. He denies any pulmonary history or personal history of cancer. He notes his father had throat cancer but was a smoker.

## 2018-11-27 NOTE — PLAN OF CARE
Problem: Patient Care Overview  Goal: Plan of Care Review  Outcome: Ongoing (interventions implemented as appropriate)  POC reviewed with pt. VS stable. Lasix gtt infusing at ordered rate. CBG monitoring ACHS with SSI administered as ordered. Pulmonology consulted. CXR and CT chest noted right lung nodules and bilateral pleural effusion. TTE performed; EF 25%. Pt remains free from falls, trauma, injury; pt tolerating POC well. Will continue to monitor.

## 2018-11-27 NOTE — PROGRESS NOTES
[facility] Hospital Medicine                                                         History and Physical       Team: [facility] HOSP MED R Milton Lopez MD     Admit Date: 11/26/2018   HOD: 1       KARINA: 11/29/2018 11/29/2018    Primary care Physician: [facility]    Principal Problem: Acute on chronic combined systolic and diastolic congestive heart failure     Patient information was obtained from patient, spouse/SO and ER records.      Code status: Full Code      HPI:       Mr Ashish Mckeon is a 53 y.o. AA gentleman with prior hx of NICM and HFrEF with EF 25-30%, severe MR, DM type 2, HLD and hypothyroidism presenting with progressively worsening dyspnea for the past few days with associated orthopnea, PND, epigastric burning sensation, nausea, LE swelling, weight gain and chest pressure. Denies any fevers, chills, cough, sputum, pleurisy, chest pain, abdominal pain, emesis, constipation,diarreha, rashes. He is now sleeping with 2 pillows since 1 week ago. She does have worsening SOB on excertion. Has prior hx of CHF exacerbation, and feels similarly. Did endorse night sweats. He is not compliant with low sodium diet or fluid restriction.     Last echo done in 7/2018 noted EF of 25-30%, pulmonary HTN PASP 62 mmHg, severe mitral regurgitation. PET scan stress during that time was negative for ischemia.  Cath 7/20 noted non-obstructive CAD.     In the ED patient was HD stable saturating well on RA and afebrile. K 5.4, CO2 21, Cr 2.8, BUN 50, , , , TB 1.7, Alb 3.2, AG corrected 16.5. CBC wnl. BNP 2276. Trop mildly elevated. CXR noting cardiomegly, mild hilar fullness and nodular density. Cardiology consulted for elevated troponin. Hospital medicine consulted for admission.       Review of Systems:  Pain Scale: 2 /10   Constitutional: no fever or chills  Respiratory: no cough  "but shortness of breath  Cardiovascular: chest pressure or palpitations, orthopnea, PND   Gastrointestinal:  nausea or vomiting,  abdominal pain or change in bowel habits  Genitourinary: no hematuria or dysuria  Integument/Breast: no rash or pruritis  Hematologic/Lymphatic: no easy bruising or lymphadenopathy  Musculoskeletal: no arthralgias or myalgias, LE swelling   Neurological: no seizures or tremors  Behavioral/Psych: no depression or anxiety      PAST HISTORY:     Past Medical History:   Diagnosis Date    Diabetes mellitus type II     Essential hypertension, benign     Hyperlipidemia     Hypothyroidism     Undiagnosed cardiac murmurs        Past Surgical History:   Procedure Laterality Date    COLON SURGERY      "lower intestines removed"    THYROID SURGERY         Family History   Problem Relation Age of Onset    Cancer Father        Social History     Socioeconomic History    Marital status:      Spouse name: None    Number of children: None    Years of education: None    Highest education level: None   Social Needs    Financial resource strain: None    Food insecurity - worry: None    Food insecurity - inability: None    Transportation needs - medical: None    Transportation needs - non-medical: None   Occupational History    None   Tobacco Use    Smoking status: Current Some Day Smoker     Types: Cigars    Tobacco comment: cigars "every other week or so"   Substance and Sexual Activity    Alcohol use: Yes     Alcohol/week: 3.6 oz     Types: 6 Cans of beer per week    Drug use: Yes     Types: Marijuana    Sexual activity: Yes     Partners: Female   Other Topics Concern    None   Social History Narrative    None         MEDICATIONS and ALLERGIES:   Reviewed    No current facility-administered medications on file prior to encounter.      Current Outpatient Medications on File Prior to Encounter   Medication Sig Dispense Refill    azithromycin (Z-JOSÉ MIGUEL) 250 MG tablet Take 1 " tablet (250 mg total) by mouth once daily. Take first 2 tablets together, then 1 every day until finished. 6 tablet 0    furosemide (LASIX) 40 MG tablet Take 1 tablet (40 mg total) by mouth 2 (two) times daily. 180 tablet 0    glyBURIDE (DIABETA) 5 MG tablet Take 2 tablets (10 mg total) by mouth daily with breakfast. 180 tablet 0    LEVOTHYROXINE SODIUM (SYNTHROID ORAL) Take 175 mcg by mouth daily 2 hours after breakfast.       losartan (COZAAR) 25 MG tablet Take 1 tablet (25 mg total) by mouth once daily. 90 tablet 0    acetaminophen (TYLENOL) 325 MG tablet Take 2 tablets (650 mg total) by mouth every 6 to 8 hours as needed.  0    hydrocodone-homatropine 5-1.5 mg/5 ml (HYCODAN) 5-1.5 mg/5 mL Syrp Take 5 mLs by mouth every 4 (four) hours as needed (cough). 120 mL 0    metoprolol succinate (TOPROL-XL) 100 MG 24 hr tablet 150mg daily, take one and one half tab daily 60 tablet 11    simvastatin (ZOCOR) 10 MG tablet Take 1 tablet (10 mg total) by mouth every evening. 90 tablet 0        Review of patient's allergies indicates:   Allergen Reactions    Lisinopril Other (See Comments)     acei cough             PHYSICAL EXAM:     Temp:  [97.2 °F (36.2 °C)-98.5 °F (36.9 °C)]   Pulse:  [60-81]   Resp:  [16-18]   BP: (100-121)/(67-87)   SpO2:  [94 %-97 %]   Body mass index is 23.31 kg/m².     Intake/Output Summary (Last 24 hours) at 11/27/2018 1705  Last data filed at 11/27/2018 1400  Gross per 24 hour   Intake 1250 ml   Output 2250 ml   Net -1000 ml     General appearance: no distress, non toxic   Mental status: Alert and oriented x 3  HEENT:  conjunctivae/corneas clear, PERRL, scleral anicterics   Neck: supple, thyroid not enlarged, no nodes felt, no supraclavicular nodes, JVD present  Pulm:   normal respiratory effort, rales noted on bilateral lower lung filed  Card: RRR, S1, S2 normal, S3 present, holosystolic murmur noted, no click  Abd: soft NABS, ND, mild epigastric tenderness, BS present; no masses, no  organomegaly  Ext: +1 LE edema noted, nodules noted on b/l fingers (bishop's nodes), leukonychia    Pulses: 2+, symmetric  Skin: color, texture, turgor normal. No rashes or lesions, no clubbing noted  Neuro: Cranial Nerves grossly intact, no focal numbness or weakness, normal strength and tone       LABS and IMAGING:       Recent Results (from the past 24 hour(s))   Rheumatoid factor    Collection Time: 11/26/18  9:24 PM   Result Value Ref Range    Rheumatoid Factor <10.0 0.0 - 15.0 IU/mL   Sedimentation rate, manual    Collection Time: 11/26/18  9:24 PM   Result Value Ref Range    Sed Rate <2 0 - 23 mm/Hr   Cyclic citrul peptide antibody, IgG    Collection Time: 11/26/18  9:24 PM   Result Value Ref Range    CCP Antibodies <0.5 <5.0 U/mL   Hepatitis B surface antigen    Collection Time: 11/26/18  9:24 PM   Result Value Ref Range    Hepatitis B Surface Ag Negative    Hepatitis C antibody    Collection Time: 11/26/18  9:24 PM   Result Value Ref Range    Hepatitis C Ab Negative    C-reactive protein    Collection Time: 11/26/18  9:24 PM   Result Value Ref Range    CRP 17.8 (H) 0.0 - 8.2 mg/L   POCT glucose    Collection Time: 11/26/18 11:07 PM   Result Value Ref Range    POCT Glucose 275 (H) 70 - 110 mg/dL   Comprehensive Metabolic Panel (CMP)    Collection Time: 11/27/18  4:24 AM   Result Value Ref Range    Sodium 137 136 - 145 mmol/L    Potassium 3.8 3.5 - 5.1 mmol/L    Chloride 101 95 - 110 mmol/L    CO2 28 23 - 29 mmol/L    Glucose 173 (H) 70 - 110 mg/dL    BUN, Bld 49 (H) 6 - 20 mg/dL    Creatinine 2.4 (H) 0.5 - 1.4 mg/dL    Calcium 8.7 8.7 - 10.5 mg/dL    Total Protein 6.0 6.0 - 8.4 g/dL    Albumin 2.5 (L) 3.5 - 5.2 g/dL    Total Bilirubin 1.4 (H) 0.1 - 1.0 mg/dL    Alkaline Phosphatase 136 (H) 55 - 135 U/L     (H) 10 - 40 U/L     (H) 10 - 44 U/L    Anion Gap 8 8 - 16 mmol/L    eGFR if African American 34.1 (A) >60 mL/min/1.73 m^2    eGFR if non African American 29.5 (A) >60 mL/min/1.73 m^2    Magnesium    Collection Time: 11/27/18  4:24 AM   Result Value Ref Range    Magnesium 1.8 1.6 - 2.6 mg/dL   CBC with Automated Differential    Collection Time: 11/27/18  4:24 AM   Result Value Ref Range    WBC 4.94 3.90 - 12.70 K/uL    RBC 5.12 4.60 - 6.20 M/uL    Hemoglobin 13.6 (L) 14.0 - 18.0 g/dL    Hematocrit 40.6 40.0 - 54.0 %    MCV 79 (L) 82 - 98 fL    MCH 26.6 (L) 27.0 - 31.0 pg    MCHC 33.5 32.0 - 36.0 g/dL    RDW 15.0 (H) 11.5 - 14.5 %    Platelets 152 150 - 350 K/uL    MPV 12.3 9.2 - 12.9 fL    Immature Granulocytes 0.2 0.0 - 0.5 %    Gran # (ANC) 3.3 1.8 - 7.7 K/uL    Immature Grans (Abs) 0.01 0.00 - 0.04 K/uL    Lymph # 0.5 (L) 1.0 - 4.8 K/uL    Mono # 0.8 0.3 - 1.0 K/uL    Eos # 0.2 0.0 - 0.5 K/uL    Baso # 0.04 0.00 - 0.20 K/uL    nRBC 0 0 /100 WBC    Gran% 67.6 38.0 - 73.0 %    Lymph% 10.7 (L) 18.0 - 48.0 %    Mono% 16.2 (H) 4.0 - 15.0 %    Eosinophil% 4.5 0.0 - 8.0 %    Basophil% 0.8 0.0 - 1.9 %    Differential Method Automated    POCT glucose    Collection Time: 11/27/18 11:54 AM   Result Value Ref Range    POCT Glucose 185 (H) 70 - 110 mg/dL       Recent Labs   Lab 11/26/18  1325 11/26/18  2307 11/27/18  1154   POCTGLUCOSE 171* 275* 185*       Active Hospital Problems    Diagnosis  POA    *Acute on chronic combined systolic and diastolic congestive heart failure [I50.43]  Yes    CHF exacerbation [I50.9]  Yes    right upper lobe mass [R91.8]  Yes      Resolved Hospital Problems   No resolved problems to display.           ASSESSMENT and PLAN:     Problems List:  Active Hospital Problems    Diagnosis  POA    *Acute on chronic combined systolic and diastolic congestive heart failure [I50.43]  Yes    CHF exacerbation [I50.9]  Yes    right upper lobe mass [R91.8]  Yes      Resolved Hospital Problems   No resolved problems to display.       Acute on chronic combined systolic and diastolic heart failure  Elevated Trop - Type 2 MI   53 yom with non ischemic cardiomyopathy (unknown etiology,  graves induced?) with reduced EF of 25% presenting with HF symptoms. Elevated BNP and CXR with volume overload. Suspecting secondary to non compliance to life style modifications. Remains HD stable, afebrile and saturating well on RA. Given one time dose IV 60 mg lasix in ED. Cardiology was consulted by ER. Troponin peaked @ .080. EKG noting 1st deg AVB and low voltage QRS.  - Continue Lasix drip @ 10 mg/hr as recommended by cardiology   - Continue home metoprolol 150 mg daily  - Holding ARB due to KEYANNA  - TTE pending  - Strict I/O, daily weights standing  - Monitor kidney function and electrolytes   - Fluid 1500 mL and Sodium restriction   - Consider cardiac MRI to r/o amyloidosis, sarcoid etc   - Need records about his graves disease from [facility]   - EP for ICD on discharge  - Follow up with cardiology clinic and CHF education on d/c  - HTS consulted for diuresis guidance     Acute Kidney Injury on CKD stage III suspecting CRS   - Baseline around 1.5 - 1.7, now 2.4  - Suspecting CRS  - Continue lasix gtt  - Holding ARB  - Strict I/O  - Avoid contrast and nephrotoxic agents for now    Pulmonary Nodules with mediastinal LAD  - Seen on CXR, ordered CT noting 2 solid nodules, largest in RUL 1.8 cm diameter   - Mediastinal lymphadenopathy noted  - No cervical nodes or virchow's nodes noted, no clubbing  - Pulmonary consulted, recommended repeat CT Scan in 3 months as outpatient  - no alarm symptoms such as hemoptysis, weight loss    Hx of arthritis  - Significant deformities on bilateral fingers with episodic swelling endorsed   - CCP: <0.5  - Rheum factor: <10.0  - CRP: 17  - Sed Rate: <0.2  - Hep B/C: negative       Hypothyroidism   - Continue home synthroid  - Had hx of hyperthyroidism, unsure if this was his cause of CHF    DM type 2  - Holding oral meds  - Low dose SSI and accu-checks    HLD  - Continue high intensity statin         Code Status- FULL  Prophylaxis- Hep TID    Post-acute care- pending clinical  condition  Discharge plan and follow up - pending cardiology recs     Milton Astorga MD  Staff Hospitalist  Department of Hospital Medicine  [facility]   [phone]

## 2018-11-27 NOTE — PROGRESS NOTES
Pt had 20 beat VT episode. AWAIS Cardoso notified. Pt resting in bed and denies symptoms. K 3.8, Mg 1.8, lasix gtt at 10mg/h. No further orders at this time. Will monitor.

## 2018-11-27 NOTE — NURSING TRANSFER
Nursing Transfer Note      11/26/2018     Transfer From: ED    Transfer via stretcher    Transfer with cardiac monitoring    Transported by transport    Medicines sent: lasix gtt    Chart send with patient: Yes    Patient reassessed at: 1915, 11/26/18    Upon arrival to floor: cardiac monitor applied, patient oriented to room, call bell in reach and bed in lowest position

## 2018-11-28 LAB
ALBUMIN SERPL BCP-MCNC: 2.7 G/DL
ALP SERPL-CCNC: 160 U/L
ALT SERPL W/O P-5'-P-CCNC: 208 U/L
ANION GAP SERPL CALC-SCNC: 9 MMOL/L
AST SERPL-CCNC: 198 U/L
BASOPHILS # BLD AUTO: 0.04 K/UL
BASOPHILS NFR BLD: 0.9 %
BILIRUB SERPL-MCNC: 1.4 MG/DL
BUN SERPL-MCNC: 45 MG/DL
CALCIUM SERPL-MCNC: 9.1 MG/DL
CHLORIDE SERPL-SCNC: 99 MMOL/L
CO2 SERPL-SCNC: 33 MMOL/L
CREAT SERPL-MCNC: 2.4 MG/DL
DIFFERENTIAL METHOD: ABNORMAL
EOSINOPHIL # BLD AUTO: 0.3 K/UL
EOSINOPHIL NFR BLD: 7.6 %
ERYTHROCYTE [DISTWIDTH] IN BLOOD BY AUTOMATED COUNT: 15.2 %
EST. GFR  (AFRICAN AMERICAN): 34.1 ML/MIN/1.73 M^2
EST. GFR  (NON AFRICAN AMERICAN): 29.5 ML/MIN/1.73 M^2
GLUCOSE SERPL-MCNC: 152 MG/DL
HCT VFR BLD AUTO: 42.7 %
HGB BLD-MCNC: 13.8 G/DL
IMM GRANULOCYTES # BLD AUTO: 0.01 K/UL
IMM GRANULOCYTES NFR BLD AUTO: 0.2 %
LYMPHOCYTES # BLD AUTO: 0.6 K/UL
LYMPHOCYTES NFR BLD: 13.2 %
MAGNESIUM SERPL-MCNC: 1.9 MG/DL
MCH RBC QN AUTO: 26 PG
MCHC RBC AUTO-ENTMCNC: 32.3 G/DL
MCV RBC AUTO: 80 FL
MONOCYTES # BLD AUTO: 0.8 K/UL
MONOCYTES NFR BLD: 16.8 %
NEUTROPHILS # BLD AUTO: 2.7 K/UL
NEUTROPHILS NFR BLD: 61.3 %
NRBC BLD-RTO: 0 /100 WBC
PLATELET # BLD AUTO: 147 K/UL
PMV BLD AUTO: 12.5 FL
POCT GLUCOSE: 144 MG/DL (ref 70–110)
POCT GLUCOSE: 159 MG/DL (ref 70–110)
POCT GLUCOSE: 172 MG/DL (ref 70–110)
POCT GLUCOSE: 214 MG/DL (ref 70–110)
POTASSIUM SERPL-SCNC: 4.3 MMOL/L
PROT SERPL-MCNC: 6.6 G/DL
RBC # BLD AUTO: 5.31 M/UL
SODIUM SERPL-SCNC: 141 MMOL/L
WBC # BLD AUTO: 4.46 K/UL

## 2018-11-28 PROCEDURE — 25000003 PHARM REV CODE 250: Performed by: PHYSICIAN ASSISTANT

## 2018-11-28 PROCEDURE — 63600175 PHARM REV CODE 636 W HCPCS: Performed by: INTERNAL MEDICINE

## 2018-11-28 PROCEDURE — 36415 COLL VENOUS BLD VENIPUNCTURE: CPT

## 2018-11-28 PROCEDURE — 83735 ASSAY OF MAGNESIUM: CPT

## 2018-11-28 PROCEDURE — 20600001 HC STEP DOWN PRIVATE ROOM

## 2018-11-28 PROCEDURE — 25000003 PHARM REV CODE 250: Performed by: INTERNAL MEDICINE

## 2018-11-28 PROCEDURE — 80053 COMPREHEN METABOLIC PANEL: CPT

## 2018-11-28 PROCEDURE — 85025 COMPLETE CBC W/AUTO DIFF WBC: CPT

## 2018-11-28 PROCEDURE — 99233 SBSQ HOSP IP/OBS HIGH 50: CPT | Mod: ,,, | Performed by: HOSPITALIST

## 2018-11-28 RX ADMIN — PANTOPRAZOLE SODIUM 40 MG: 40 TABLET, DELAYED RELEASE ORAL at 09:11

## 2018-11-28 RX ADMIN — ATORVASTATIN CALCIUM 80 MG: 20 TABLET, FILM COATED ORAL at 08:11

## 2018-11-28 RX ADMIN — LEVOTHYROXINE SODIUM 175 MCG: 25 TABLET ORAL at 09:11

## 2018-11-28 RX ADMIN — HEPARIN SODIUM 5000 UNITS: 5000 INJECTION, SOLUTION INTRAVENOUS; SUBCUTANEOUS at 05:11

## 2018-11-28 RX ADMIN — METOPROLOL SUCCINATE 150 MG: 100 TABLET, EXTENDED RELEASE ORAL at 09:11

## 2018-11-28 RX ADMIN — HEPARIN SODIUM 5000 UNITS: 5000 INJECTION, SOLUTION INTRAVENOUS; SUBCUTANEOUS at 08:11

## 2018-11-28 RX ADMIN — ASPIRIN 81 MG CHEWABLE TABLET 81 MG: 81 TABLET CHEWABLE at 09:11

## 2018-11-28 RX ADMIN — HEPARIN SODIUM 5000 UNITS: 5000 INJECTION, SOLUTION INTRAVENOUS; SUBCUTANEOUS at 03:11

## 2018-11-28 RX ADMIN — FUROSEMIDE 10 MG/HR: 10 INJECTION, SOLUTION INTRAMUSCULAR; INTRAVENOUS at 04:11

## 2018-11-28 NOTE — NURSING TRANSFER
Nursing Transfer Note      11/28/2018     Transfer To: U/S for Liver    Transfer via stretcher    Transfer with cardiac monitoring    Transported by [employer]    Medicines sent: Lasix 10 mg/hr    Chart send with patient: No

## 2018-11-28 NOTE — NURSING
Patient returned from [facility]/S.  Patient's diet is in system and he is eating lunch meal.

## 2018-11-28 NOTE — PLAN OF CARE
Problem: Patient Care Overview  Goal: Plan of Care Review  Outcome: Ongoing (interventions implemented as appropriate)  Reviewed plan of care with patient.  Lasix gtt @ 10 mg/hr, may transfer to PO lasix 11/29/18, depending on BUN/Cr+.  Patient remained NPO for U/S Liver, completed.  Plan is PET Scan, ECHOcardiogram, possible placement of Pacemaker or Defibrillator per team.  Blood glucose monitoring will be completed 4 times daily before meals and at bedtime, SSI PRN.  Patient has remain free of falls/trauma/injury by using appropriate lighting, nonskid socks, by keeping area free of debris, and frequent rounding by staff.  Patient is independent and ambulatory.  Patient verbalized understanding of all instructions.

## 2018-11-28 NOTE — PLAN OF CARE
Patient is a 53 year old male admitted from home through the ER 11/26/2018 with CHF.  Patient had TARAN, Echo and CT done, Pulmonary consult done.  Patient will need OP Pet Scan and Pulmonology Appointment after discharge for lung nodule found on CT.  Patient is expected to discharge home with no needs +/- 11/29/2018.    Patient lives with S.O., Laura, who will drive him home and obtain anything he needs after discharge.  Patient is pending medicaid, will schedule follow up appointments through [facility].  Will continue to follow for needs.    PCP  [facility]  [address] / [place] [address]  [phone]      [id number] - [place] - [address]  [address]  [place] [address]  Phone: [phone] Fax: [phone]      Extended Emergency Contact Information  Primary Emergency Contact: Laura Beaver  Address: [address] [place] [address] [facility]  Home Phone: [phone]  Relation: Significant other       11/27/18 1530   Discharge Assessment   Assessment Type Discharge Planning Assessment   Confirmed/corrected address and phone number on facesheet? Yes   Expected Length of Stay (days) 4   Prior to hospitilization cognitive status: Alert/Oriented   Prior to hospitalization functional status: Independent   Current cognitive status: Alert/Oriented   Current Functional Status: Independent   Lives With significant other   Able to Return to Prior Arrangements yes   Is patient able to care for self after discharge? Yes   Who are your caregiver(s) and their phone number(s)? S.O.   Patient's perception of discharge disposition home or selfcare   Equipment Currently Used at Home none   Do you have any problems affording any of your prescribed medications? No   Is the patient taking medications as prescribed? yes   Does the patient have transportation home? Yes   Transportation Available family or friend  will provide   Discharge Plan A Home

## 2018-11-28 NOTE — NURSING
Soheila, Called Radiology, [phone], to follow up with Liver [facility], will send for patient x 1 hour.

## 2018-11-28 NOTE — PLAN OF CARE
Problem: Patient Care Overview  Goal: Plan of Care Review  Outcome: Ongoing (interventions implemented as appropriate)  POC reviewed with pt. VS stable. Lasix gtt infusing at ordered rate. Liver u/s ordered for elevated bilirubin.  Pt remains free from falls, trauma, injury; pt tolerating POC well. Will continue to monitor.

## 2018-11-28 NOTE — PROGRESS NOTES
[facility] Hospital Medicine                                                         History and Physical       [place]: [facility] R Milton Lopez MD     Admit Date: 11/26/2018   HOD: 2       KARINA: 12/3/2018 11/29/2018    Primary care Physician: [facility]    Principal Problem: Acute on chronic combined systolic and diastolic congestive heart failure     Patient information was obtained from patient, spouse/SO and ER records.      Code status: Full Code      HPI:       Mr Ashish Mckeon is a 53 y.o. AA gentleman with prior hx of NICM and HFrEF with EF 25-30%, severe MR, DM type 2, HLD and hypothyroidism presenting with progressively worsening dyspnea for the past few days with associated orthopnea, PND, epigastric burning sensation, nausea, LE swelling, weight gain and chest pressure. Denies any fevers, chills, cough, sputum, pleurisy, chest pain, abdominal pain, emesis, constipation,diarreha, rashes. He is now sleeping with 2 pillows since 1 week ago. She does have worsening SOB on excertion. Has prior hx of CHF exacerbation, and feels similarly. Did endorse night sweats. He is not compliant with low sodium diet or fluid restriction.     Last echo done in 7/2018 noted EF of 25-30%, pulmonary HTN PASP 62 mmHg, severe mitral regurgitation. PET scan stress during that time was negative for ischemia.  Cath 7/20 noted non-obstructive CAD.     In the ED patient was HD stable saturating well on RA and afebrile. K 5.4, CO2 21, Cr 2.8, BUN 50, , , , TB 1.7, Alb 3.2, AG corrected 16.5. CBC wnl. BNP 2276. Trop mildly elevated. CXR noting cardiomegly, mild hilar fullness and nodular density. Cardiology consulted for elevated troponin. Hospital medicine consulted for admission.     Interval HPI: no acute events overnight. Patient saturating well on room air. Still on lasix gtt,  "will repeat TTE tomorrow and consult HPS for diuresis guidance. Patient is negative 5.4 liters since admission.       Review of Systems:  Pain Scale: 2 /10   Constitutional: no fever or chills  Respiratory: no cough but shortness of breath  Cardiovascular: chest pressure or palpitations, orthopnea, PND   Gastrointestinal:  nausea or vomiting,  abdominal pain or change in bowel habits  Genitourinary: no hematuria or dysuria  Integument/Breast: no rash or pruritis  Hematologic/Lymphatic: no easy bruising or lymphadenopathy  Musculoskeletal: no arthralgias or myalgias, LE swelling   Neurological: no seizures or tremors  Behavioral/Psych: no depression or anxiety      PAST HISTORY:     Past Medical History:   Diagnosis Date    Diabetes mellitus type II     Essential hypertension, benign     Hyperlipidemia     Hypothyroidism     Undiagnosed cardiac murmurs        Past Surgical History:   Procedure Laterality Date    COLON SURGERY      "lower intestines removed"    THYROID SURGERY         Family History   Problem Relation Age of Onset    Cancer Father        Social History     Socioeconomic History    Marital status:      Spouse name: None    Number of children: None    Years of education: None    Highest education level: None   Social Needs    Financial resource strain: None    Food insecurity - worry: None    Food insecurity - inability: None    Transportation needs - medical: None    Transportation needs - non-medical: None   Occupational History    None   Tobacco Use    Smoking status: Current Some Day Smoker     Types: Cigars    Tobacco comment: cigars "every other week or so"   Substance and Sexual Activity    Alcohol use: Yes     Alcohol/week: 3.6 oz     Types: 6 Cans of beer per week    Drug use: Yes     Types: Marijuana    Sexual activity: Yes     Partners: Female   Other Topics Concern    None   Social History Narrative    None         MEDICATIONS and ALLERGIES:   Reviewed    No " current facility-administered medications on file prior to encounter.      Current Outpatient Medications on File Prior to Encounter   Medication Sig Dispense Refill    azithromycin (Z-JOSÉ MIGUEL) 250 MG tablet Take 1 tablet (250 mg total) by mouth once daily. Take first 2 tablets together, then 1 every day until finished. 6 tablet 0    furosemide (LASIX) 40 MG tablet Take 1 tablet (40 mg total) by mouth 2 (two) times daily. 180 tablet 0    glyBURIDE (DIABETA) 5 MG tablet Take 2 tablets (10 mg total) by mouth daily with breakfast. 180 tablet 0    LEVOTHYROXINE SODIUM (SYNTHROID ORAL) Take 175 mcg by mouth daily 2 hours after breakfast.       losartan (COZAAR) 25 MG tablet Take 1 tablet (25 mg total) by mouth once daily. 90 tablet 0    acetaminophen (TYLENOL) 325 MG tablet Take 2 tablets (650 mg total) by mouth every 6 to 8 hours as needed.  0    hydrocodone-homatropine 5-1.5 mg/5 ml (HYCODAN) 5-1.5 mg/5 mL Syrp Take 5 mLs by mouth every 4 (four) hours as needed (cough). 120 mL 0    metoprolol succinate (TOPROL-XL) 100 MG 24 hr tablet 150mg daily, take one and one half tab daily 60 tablet 11    simvastatin (ZOCOR) 10 MG tablet Take 1 tablet (10 mg total) by mouth every evening. 90 tablet 0        Review of patient's allergies indicates:   Allergen Reactions    Lisinopril Other (See Comments)     acei cough             PHYSICAL EXAM:     Temp:  [97.5 °F (36.4 °C)-98.5 °F (36.9 °C)]   Pulse:  [68-81]   Resp:  [16-18]   BP: ()/(60-74)   SpO2:  [92 %-97 %]   Body mass index is 22.2 kg/m².     Intake/Output Summary (Last 24 hours) at 11/28/2018 1601  Last data filed at 11/28/2018 1503  Gross per 24 hour   Intake 1548.16 ml   Output 5275 ml   Net -3726.84 ml     General appearance: no distress, non toxic   Mental status: Alert and oriented x 3  HEENT:  conjunctivae/corneas clear, PERRL, scleral anicterics   Neck: supple, thyroid not enlarged, no nodes felt, no supraclavicular nodes, JVD present  Pulm:   normal  respiratory effort, rales noted on bilateral lower lung filed  Card: RRR, S1, S2 normal, S3 present, holosystolic murmur noted, no click  Abd: soft NABS, ND, mild epigastric tenderness, BS present; no masses, no organomegaly  Ext: +1 LE edema noted, nodules noted on b/l fingers (bishop's nodes), leukonychia    Pulses: 2+, symmetric  Skin: color, texture, turgor normal. No rashes or lesions, no clubbing noted  Neuro: Cranial Nerves grossly intact, no focal numbness or weakness, normal strength and tone       LABS and IMAGING:       Recent Results (from the past 24 hour(s))   POCT glucose    Collection Time: 11/27/18  4:44 PM   Result Value Ref Range    POCT Glucose 137 (H) 70 - 110 mg/dL   POCT glucose    Collection Time: 11/27/18  8:29 PM   Result Value Ref Range    POCT Glucose 185 (H) 70 - 110 mg/dL   Comprehensive Metabolic Panel (CMP)    Collection Time: 11/28/18  4:35 AM   Result Value Ref Range    Sodium 141 136 - 145 mmol/L    Potassium 4.3 3.5 - 5.1 mmol/L    Chloride 99 95 - 110 mmol/L    CO2 33 (H) 23 - 29 mmol/L    Glucose 152 (H) 70 - 110 mg/dL    BUN, Bld 45 (H) 6 - 20 mg/dL    Creatinine 2.4 (H) 0.5 - 1.4 mg/dL    Calcium 9.1 8.7 - 10.5 mg/dL    Total Protein 6.6 6.0 - 8.4 g/dL    Albumin 2.7 (L) 3.5 - 5.2 g/dL    Total Bilirubin 1.4 (H) 0.1 - 1.0 mg/dL    Alkaline Phosphatase 160 (H) 55 - 135 U/L     (H) 10 - 40 U/L     (H) 10 - 44 U/L    Anion Gap 9 8 - 16 mmol/L    eGFR if African American 34.1 (A) >60 mL/min/1.73 m^2    eGFR if non African American 29.5 (A) >60 mL/min/1.73 m^2   Magnesium    Collection Time: 11/28/18  4:35 AM   Result Value Ref Range    Magnesium 1.9 1.6 - 2.6 mg/dL   CBC with Automated Differential    Collection Time: 11/28/18  4:35 AM   Result Value Ref Range    WBC 4.46 3.90 - 12.70 K/uL    RBC 5.31 4.60 - 6.20 M/uL    Hemoglobin 13.8 (L) 14.0 - 18.0 g/dL    Hematocrit 42.7 40.0 - 54.0 %    MCV 80 (L) 82 - 98 fL    MCH 26.0 (L) 27.0 - 31.0 pg    MCHC 32.3 32.0 -  36.0 g/dL    RDW 15.2 (H) 11.5 - 14.5 %    Platelets 147 (L) 150 - 350 K/uL    MPV 12.5 9.2 - 12.9 fL    Immature Granulocytes 0.2 0.0 - 0.5 %    Gran # (ANC) 2.7 1.8 - 7.7 K/uL    Immature Grans (Abs) 0.01 0.00 - 0.04 K/uL    Lymph # 0.6 (L) 1.0 - 4.8 K/uL    Mono # 0.8 0.3 - 1.0 K/uL    Eos # 0.3 0.0 - 0.5 K/uL    Baso # 0.04 0.00 - 0.20 K/uL    nRBC 0 0 /100 WBC    Gran% 61.3 38.0 - 73.0 %    Lymph% 13.2 (L) 18.0 - 48.0 %    Mono% 16.8 (H) 4.0 - 15.0 %    Eosinophil% 7.6 0.0 - 8.0 %    Basophil% 0.9 0.0 - 1.9 %    Differential Method Automated    POCT glucose    Collection Time: 11/28/18  8:23 AM   Result Value Ref Range    POCT Glucose 172 (H) 70 - 110 mg/dL   POCT glucose    Collection Time: 11/28/18 11:54 AM   Result Value Ref Range    POCT Glucose 214 (H) 70 - 110 mg/dL       Recent Labs   Lab 11/26/18  2307 11/27/18  1154 11/27/18  1644 11/27/18  2029 11/28/18  0823 11/28/18  1154   POCTGLUCOSE 275* 185* 137* 185* 172* 214*       Active Hospital Problems    Diagnosis  POA    *Acute on chronic combined systolic and diastolic congestive heart failure [I50.43]  Yes    CHF exacerbation [I50.9]  Yes    right upper lobe mass [R91.8]  Yes      Resolved Hospital Problems   No resolved problems to display.           ASSESSMENT and PLAN:     Problems List:  Active Hospital Problems    Diagnosis  POA    *Acute on chronic combined systolic and diastolic congestive heart failure [I50.43]  Yes    CHF exacerbation [I50.9]  Yes    right upper lobe mass [R91.8]  Yes      Resolved Hospital Problems   No resolved problems to display.       Acute on chronic combined systolic and diastolic heart failure  Elevated Trop - Type 2 MI   52 y/o male with non ischemic cardiomyopathy (unknown etiology, graves induced?) with reduced EF of 25% presenting with HF symptoms. Elevated BNP and CXR with volume overload. Suspecting secondary to non compliance to life style modifications. Remains HD stable, afebrile and saturating well on  RA. Given one time dose IV 60 mg lasix in ED. Cardiology was consulted by ER. Troponin peaked @ .080. EKG noting 1st deg AVB and low voltage QRS.  - Continue Lasix drip @ 10 mg/hr as recommended by cardiology   - Continue home metoprolol 150 mg daily  - Holding ARB due to KEYANNA  - TTE pending  - Strict I/O, daily weights standing  - Monitor kidney function and electrolytes   - Fluid 1500 mL and Sodium restriction   - Consider cardiac MRI to r/o amyloidosis, sarcoid etc   - Need records about his graves disease from [facility]   - EP for ICD on discharge   - Follow up with cardiology clinic and CHF education on d/c  - HTS consulted for diuresis guidance     Acute Kidney Injury on CKD stage III suspecting CRS   - Baseline around 1.5 - 1.7, now 2.2  - Suspecting CRS  - Continue lasix gtt  - Holding ARB  - Strict I/O  - Avoid contrast and nephrotoxic agents for now    Pulmonary Nodules with mediastinal LAD  - Seen on CXR, ordered CT noting 2 solid nodules, largest in RUL 1.8 cm diameter   - Mediastinal lymphadenopathy noted  - No cervical nodes or virchow's nodes noted, no clubbing  - Pulmonary consulted, recommended repeat CT Scan in 3 months as outpatient  - no alarm symptoms such as hemoptysis, weight loss    Hx of arthritis  - Significant deformities on bilateral fingers with episodic swelling endorsed   - CCP: <0.5  - Rheum factor: <10.0  - CRP: 17  - Sed Rate: <0.2  - Hep B/C: negative       Hypothyroidism   - Continue home synthroid  - Had hx of hyperthyroidism, unsure if this was his cause of CHF    DM type 2  - Holding oral meds  - Low dose SSI and accu-checks    HLD  - Continue high intensity statin         Code Status- FULL  Prophylaxis- Hep TID    Post-acute care- pending clinical condition  Discharge plan and follow up - pending cardiology recs     Milton Astorga MD  Staff Hospitalist  Department of Hospital Medicine  [facility]   [phone]

## 2018-11-29 LAB
ALBUMIN SERPL BCP-MCNC: 3 G/DL
ALP SERPL-CCNC: 163 U/L
ALT SERPL W/O P-5'-P-CCNC: 159 U/L
ANION GAP SERPL CALC-SCNC: 13 MMOL/L
AST SERPL-CCNC: 106 U/L
BASOPHILS # BLD AUTO: 0.07 K/UL
BASOPHILS NFR BLD: 1.6 %
BILIRUB SERPL-MCNC: 1.2 MG/DL
BUN SERPL-MCNC: 44 MG/DL
CALCIUM SERPL-MCNC: 9.2 MG/DL
CHLORIDE SERPL-SCNC: 94 MMOL/L
CO2 SERPL-SCNC: 30 MMOL/L
CREAT SERPL-MCNC: 2.4 MG/DL
DIFFERENTIAL METHOD: ABNORMAL
EOSINOPHIL # BLD AUTO: 0.4 K/UL
EOSINOPHIL NFR BLD: 8.1 %
ERYTHROCYTE [DISTWIDTH] IN BLOOD BY AUTOMATED COUNT: 15 %
EST. GFR  (AFRICAN AMERICAN): 34.1 ML/MIN/1.73 M^2
EST. GFR  (NON AFRICAN AMERICAN): 29.5 ML/MIN/1.73 M^2
GLUCOSE SERPL-MCNC: 211 MG/DL
HCT VFR BLD AUTO: 44.1 %
HGB BLD-MCNC: 14.6 G/DL
IMM GRANULOCYTES # BLD AUTO: 0.01 K/UL
IMM GRANULOCYTES NFR BLD AUTO: 0.2 %
LYMPHOCYTES # BLD AUTO: 0.8 K/UL
LYMPHOCYTES NFR BLD: 16.9 %
MAGNESIUM SERPL-MCNC: 1.8 MG/DL
MCH RBC QN AUTO: 26.6 PG
MCHC RBC AUTO-ENTMCNC: 33.1 G/DL
MCV RBC AUTO: 81 FL
MONOCYTES # BLD AUTO: 0.9 K/UL
MONOCYTES NFR BLD: 19.1 %
NEUTROPHILS # BLD AUTO: 2.4 K/UL
NEUTROPHILS NFR BLD: 54.1 %
NRBC BLD-RTO: 0 /100 WBC
PLATELET # BLD AUTO: 157 K/UL
PMV BLD AUTO: 12.2 FL
POCT GLUCOSE: 190 MG/DL (ref 70–110)
POCT GLUCOSE: 202 MG/DL (ref 70–110)
POCT GLUCOSE: 226 MG/DL (ref 70–110)
POCT GLUCOSE: 227 MG/DL (ref 70–110)
POTASSIUM SERPL-SCNC: 4.1 MMOL/L
PROT SERPL-MCNC: 7.2 G/DL
RBC # BLD AUTO: 5.48 M/UL
SODIUM SERPL-SCNC: 137 MMOL/L
WBC # BLD AUTO: 4.45 K/UL

## 2018-11-29 PROCEDURE — 25000003 PHARM REV CODE 250: Performed by: HOSPITALIST

## 2018-11-29 PROCEDURE — 85025 COMPLETE CBC W/AUTO DIFF WBC: CPT

## 2018-11-29 PROCEDURE — 25000003 PHARM REV CODE 250: Performed by: PHYSICIAN ASSISTANT

## 2018-11-29 PROCEDURE — 63600175 PHARM REV CODE 636 W HCPCS: Performed by: INTERNAL MEDICINE

## 2018-11-29 PROCEDURE — 83735 ASSAY OF MAGNESIUM: CPT

## 2018-11-29 PROCEDURE — 80053 COMPREHEN METABOLIC PANEL: CPT

## 2018-11-29 PROCEDURE — 25000003 PHARM REV CODE 250: Performed by: INTERNAL MEDICINE

## 2018-11-29 PROCEDURE — 99233 SBSQ HOSP IP/OBS HIGH 50: CPT | Mod: ,,, | Performed by: HOSPITALIST

## 2018-11-29 PROCEDURE — 20600001 HC STEP DOWN PRIVATE ROOM

## 2018-11-29 PROCEDURE — 36415 COLL VENOUS BLD VENIPUNCTURE: CPT

## 2018-11-29 RX ORDER — FUROSEMIDE 40 MG/1
40 TABLET ORAL 2 TIMES DAILY
Status: DISCONTINUED | OUTPATIENT
Start: 2018-11-29 | End: 2018-11-30 | Stop reason: HOSPADM

## 2018-11-29 RX ADMIN — FUROSEMIDE 10 MG/HR: 10 INJECTION, SOLUTION INTRAMUSCULAR; INTRAVENOUS at 01:11

## 2018-11-29 RX ADMIN — HEPARIN SODIUM 5000 UNITS: 5000 INJECTION, SOLUTION INTRAVENOUS; SUBCUTANEOUS at 01:11

## 2018-11-29 RX ADMIN — ASPIRIN 81 MG CHEWABLE TABLET 81 MG: 81 TABLET CHEWABLE at 08:11

## 2018-11-29 RX ADMIN — INSULIN ASPART 2 UNITS: 100 INJECTION, SOLUTION INTRAVENOUS; SUBCUTANEOUS at 08:11

## 2018-11-29 RX ADMIN — LEVOTHYROXINE SODIUM 175 MCG: 25 TABLET ORAL at 09:11

## 2018-11-29 RX ADMIN — HEPARIN SODIUM 5000 UNITS: 5000 INJECTION, SOLUTION INTRAVENOUS; SUBCUTANEOUS at 08:11

## 2018-11-29 RX ADMIN — ATORVASTATIN CALCIUM 80 MG: 20 TABLET, FILM COATED ORAL at 08:11

## 2018-11-29 RX ADMIN — INSULIN ASPART 2 UNITS: 100 INJECTION, SOLUTION INTRAVENOUS; SUBCUTANEOUS at 05:11

## 2018-11-29 RX ADMIN — PANTOPRAZOLE SODIUM 40 MG: 40 TABLET, DELAYED RELEASE ORAL at 08:11

## 2018-11-29 RX ADMIN — HEPARIN SODIUM 5000 UNITS: 5000 INJECTION, SOLUTION INTRAVENOUS; SUBCUTANEOUS at 05:11

## 2018-11-29 RX ADMIN — METOPROLOL SUCCINATE 150 MG: 100 TABLET, EXTENDED RELEASE ORAL at 08:11

## 2018-11-29 RX ADMIN — FUROSEMIDE 40 MG: 40 TABLET ORAL at 05:11

## 2018-11-29 NOTE — PLAN OF CARE
Problem: Patient Care Overview  Goal: Plan of Care Review  Outcome: Ongoing (interventions implemented as appropriate)  POC reviewed with pt and he verbalized understanding. VSS and pt denies presence of pain. Pt continues on Lasix gtt and educated on fluid restriction and monitoring intake/output.  before bed with no PRN insulin administered. Fall bundle implemented and pt has remained free of falls and injuries. Pt in no apparent sign of distress, will continue to monitor.

## 2018-11-29 NOTE — PROGRESS NOTES
[facility] Hospital Medicine                                                         History and Physical       Team: [facility] R Milton Lopez MD     Admit Date: 11/26/2018   HOD: 3       KARINA: 11/30/2018 11/29/2018    Primary care Physician: [facility]    Principal Problem: Acute on chronic combined systolic and diastolic congestive heart failure     Patient information was obtained from patient, spouse/SO and ER records.      Code status: Full Code      HPI:       Mr Ashish Mckeon is a 53 y.o. AA gentleman with prior hx of NICM and HFrEF with EF 25-30%, severe MR, DM type 2, HLD and hypothyroidism presenting with progressively worsening dyspnea for the past few days with associated orthopnea, PND, epigastric burning sensation, nausea, LE swelling, weight gain and chest pressure. Denies any fevers, chills, cough, sputum, pleurisy, chest pain, abdominal pain, emesis, constipation,diarreha, rashes. He is now sleeping with 2 pillows since 1 week ago. She does have worsening SOB on excertion. Has prior hx of CHF exacerbation, and feels similarly. Did endorse night sweats. He is not compliant with low sodium diet or fluid restriction.     Last echo done in 7/2018 noted EF of 25-30%, pulmonary HTN PASP 62 mmHg, severe mitral regurgitation. PET scan stress during that time was negative for ischemia.  Cath 7/20 noted non-obstructive CAD.     In the ED patient was HD stable saturating well on RA and afebrile. K 5.4, CO2 21, Cr 2.8, BUN 50, , , , TB 1.7, Alb 3.2, AG corrected 16.5. CBC wnl. BNP 2276. Trop mildly elevated. CXR noting cardiomegly, mild hilar fullness and nodular density. Cardiology consulted for elevated troponin. Hospital medicine consulted for admission.     Interval HPI: no acute events overnight. Patient saturating well on room air. Still on lasix gtt,  "will repeat TTE tomorrow and consult HPS for diuresis guidance. Patient is negative 5.4 liters since admission.       Review of Systems:  Pain Scale: 2 /10   Constitutional: no fever or chills  Respiratory: no cough but shortness of breath  Cardiovascular: chest pressure or palpitations, orthopnea, PND   Gastrointestinal:  nausea or vomiting,  abdominal pain or change in bowel habits  Genitourinary: no hematuria or dysuria  Integument/Breast: no rash or pruritis  Hematologic/Lymphatic: no easy bruising or lymphadenopathy  Musculoskeletal: no arthralgias or myalgias, LE swelling   Neurological: no seizures or tremors  Behavioral/Psych: no depression or anxiety      PAST HISTORY:     Past Medical History:   Diagnosis Date    Diabetes mellitus type II     Essential hypertension, benign     Hyperlipidemia     Hypothyroidism     Undiagnosed cardiac murmurs        Past Surgical History:   Procedure Laterality Date    COLON SURGERY      "lower intestines removed"    THYROID SURGERY         Family History   Problem Relation Age of Onset    Cancer Father        Social History     Socioeconomic History    Marital status:      Spouse name: None    Number of children: None    Years of education: None    Highest education level: None   Social Needs    Financial resource strain: None    Food insecurity - worry: None    Food insecurity - inability: None    Transportation needs - medical: None    Transportation needs - non-medical: None   Occupational History    None   Tobacco Use    Smoking status: Current Some Day Smoker     Types: Cigars    Tobacco comment: cigars "every other week or so"   Substance and Sexual Activity    Alcohol use: Yes     Alcohol/week: 3.6 oz     Types: 6 Cans of beer per week    Drug use: Yes     Types: Marijuana    Sexual activity: Yes     Partners: Female   Other Topics Concern    None   Social History Narrative    None         MEDICATIONS and ALLERGIES:   Reviewed    No " current facility-administered medications on file prior to encounter.      Current Outpatient Medications on File Prior to Encounter   Medication Sig Dispense Refill    azithromycin (Z-JOSÉ MIGUEL) 250 MG tablet Take 1 tablet (250 mg total) by mouth once daily. Take first 2 tablets together, then 1 every day until finished. 6 tablet 0    furosemide (LASIX) 40 MG tablet Take 1 tablet (40 mg total) by mouth 2 (two) times daily. 180 tablet 0    glyBURIDE (DIABETA) 5 MG tablet Take 2 tablets (10 mg total) by mouth daily with breakfast. 180 tablet 0    LEVOTHYROXINE SODIUM (SYNTHROID ORAL) Take 175 mcg by mouth daily 2 hours after breakfast.       losartan (COZAAR) 25 MG tablet Take 1 tablet (25 mg total) by mouth once daily. 90 tablet 0    acetaminophen (TYLENOL) 325 MG tablet Take 2 tablets (650 mg total) by mouth every 6 to 8 hours as needed.  0    hydrocodone-homatropine 5-1.5 mg/5 ml (HYCODAN) 5-1.5 mg/5 mL Syrp Take 5 mLs by mouth every 4 (four) hours as needed (cough). 120 mL 0    metoprolol succinate (TOPROL-XL) 100 MG 24 hr tablet 150mg daily, take one and one half tab daily 60 tablet 11    simvastatin (ZOCOR) 10 MG tablet Take 1 tablet (10 mg total) by mouth every evening. 90 tablet 0        Review of patient's allergies indicates:   Allergen Reactions    Lisinopril Other (See Comments)     acei cough             PHYSICAL EXAM:     Temp:  [96.1 °F (35.6 °C)-98.3 °F (36.8 °C)]   Pulse:  [59-89]   Resp:  [17-18]   BP: ()/(55-72)   SpO2:  [92 %-98 %]   Body mass index is 21.03 kg/m².     Intake/Output Summary (Last 24 hours) at 11/29/2018 1355  Last data filed at 11/29/2018 1130  Gross per 24 hour   Intake 947.5 ml   Output 3075 ml   Net -2127.5 ml     General appearance: no distress, non toxic   Mental status: Alert and oriented x 3  HEENT:  conjunctivae/corneas clear, PERRL, scleral anicterics   Neck: supple, thyroid not enlarged, no nodes felt, no supraclavicular nodes, JVD present  Pulm:   normal  respiratory effort, rales noted on bilateral lower lung filed  Card: RRR, S1, S2 normal, S3 present, holosystolic murmur noted, no click  Abd: soft NABS, ND, mild epigastric tenderness, BS present; no masses, no organomegaly  Ext: +1 LE edema noted, nodules noted on b/l fingers (bishop's nodes), leukonychia    Pulses: 2+, symmetric  Skin: color, texture, turgor normal. No rashes or lesions, no clubbing noted  Neuro: Cranial Nerves grossly intact, no focal numbness or weakness, normal strength and tone       LABS and IMAGING:       Recent Results (from the past 24 hour(s))   POCT glucose    Collection Time: 11/28/18  4:40 PM   Result Value Ref Range    POCT Glucose 144 (H) 70 - 110 mg/dL   POCT glucose    Collection Time: 11/28/18  8:25 PM   Result Value Ref Range    POCT Glucose 159 (H) 70 - 110 mg/dL   Comprehensive Metabolic Panel (CMP)    Collection Time: 11/29/18  5:12 AM   Result Value Ref Range    Sodium 137 136 - 145 mmol/L    Potassium 4.1 3.5 - 5.1 mmol/L    Chloride 94 (L) 95 - 110 mmol/L    CO2 30 (H) 23 - 29 mmol/L    Glucose 211 (H) 70 - 110 mg/dL    BUN, Bld 44 (H) 6 - 20 mg/dL    Creatinine 2.4 (H) 0.5 - 1.4 mg/dL    Calcium 9.2 8.7 - 10.5 mg/dL    Total Protein 7.2 6.0 - 8.4 g/dL    Albumin 3.0 (L) 3.5 - 5.2 g/dL    Total Bilirubin 1.2 (H) 0.1 - 1.0 mg/dL    Alkaline Phosphatase 163 (H) 55 - 135 U/L     (H) 10 - 40 U/L     (H) 10 - 44 U/L    Anion Gap 13 8 - 16 mmol/L    eGFR if African American 34.1 (A) >60 mL/min/1.73 m^2    eGFR if non African American 29.5 (A) >60 mL/min/1.73 m^2   Magnesium    Collection Time: 11/29/18  5:12 AM   Result Value Ref Range    Magnesium 1.8 1.6 - 2.6 mg/dL   CBC with Automated Differential    Collection Time: 11/29/18  5:12 AM   Result Value Ref Range    WBC 4.45 3.90 - 12.70 K/uL    RBC 5.48 4.60 - 6.20 M/uL    Hemoglobin 14.6 14.0 - 18.0 g/dL    Hematocrit 44.1 40.0 - 54.0 %    MCV 81 (L) 82 - 98 fL    MCH 26.6 (L) 27.0 - 31.0 pg    MCHC 33.1 32.0 -  36.0 g/dL    RDW 15.0 (H) 11.5 - 14.5 %    Platelets 157 150 - 350 K/uL    MPV 12.2 9.2 - 12.9 fL    Immature Granulocytes 0.2 0.0 - 0.5 %    Gran # (ANC) 2.4 1.8 - 7.7 K/uL    Immature Grans (Abs) 0.01 0.00 - 0.04 K/uL    Lymph # 0.8 (L) 1.0 - 4.8 K/uL    Mono # 0.9 0.3 - 1.0 K/uL    Eos # 0.4 0.0 - 0.5 K/uL    Baso # 0.07 0.00 - 0.20 K/uL    nRBC 0 0 /100 WBC    Gran% 54.1 38.0 - 73.0 %    Lymph% 16.9 (L) 18.0 - 48.0 %    Mono% 19.1 (H) 4.0 - 15.0 %    Eosinophil% 8.1 (H) 0.0 - 8.0 %    Basophil% 1.6 0.0 - 1.9 %    Differential Method Automated    POCT glucose    Collection Time: 11/29/18  7:57 AM   Result Value Ref Range    POCT Glucose 227 (H) 70 - 110 mg/dL   POCT glucose    Collection Time: 11/29/18 11:45 AM   Result Value Ref Range    POCT Glucose 190 (H) 70 - 110 mg/dL       Recent Labs   Lab 11/28/18  0823 11/28/18  1154 11/28/18  1640 11/28/18  2025 11/29/18  0757 11/29/18  1145   POCTGLUCOSE 172* 214* 144* 159* 227* 190*       Active Hospital Problems    Diagnosis  POA    *Acute on chronic combined systolic and diastolic congestive heart failure [I50.43]  Yes    CHF exacerbation [I50.9]  Yes    right upper lobe mass [R91.8]  Yes      Resolved Hospital Problems   No resolved problems to display.           ASSESSMENT and PLAN:     Problems List:  Active Hospital Problems    Diagnosis  POA    *Acute on chronic combined systolic and diastolic congestive heart failure [I50.43]  Yes    CHF exacerbation [I50.9]  Yes    right upper lobe mass [R91.8]  Yes      Resolved Hospital Problems   No resolved problems to display.       Acute on chronic combined systolic and diastolic heart failure  Elevated Trop - Type 2 MI   54 y/o male with non ischemic cardiomyopathy (unknown etiology, graves induced?) with reduced EF of 25% presenting with HF symptoms. Elevated BNP and CXR with volume overload. Suspecting secondary to non compliance to life style modifications. Remains HD stable, afebrile and saturating well on  RA. Given one time dose IV 60 mg lasix in ED. Cardiology was consulted by ER. Troponin peaked @ .080. EKG noting 1st deg AVB and low voltage QRS.  - dc Lasix drip, resume Oral lasix at home dose, 40mg BID  - Continue home metoprolol 150 mg daily  - Holding ARB due to KEYANNA  - TTE pending  - Strict I/O, daily weights standing  - Monitor kidney function and electrolytes   - Fluid 1500 mL and Sodium restriction   - Consider cardiac MRI to r/o amyloidosis, sarcoid etc   - Need records about his graves disease from [facility]   - EP for ICD on discharge   - Follow up with cardiology clinic and CHF education on d/c  - HTS consulted for diuresis guidance     Acute Kidney Injury on CKD stage III suspecting CRS   - Baseline around 1.5 - 1.7, now 2.4  - Suspecting CRS  - switch lasix gtt to lasix oral.   - Holding ARB  - Strict I/O  - Avoid contrast and nephrotoxic agents for now    Pulmonary Nodules with mediastinal LAD  - Seen on CXR, ordered CT noting 2 solid nodules, largest in RUL 1.8 cm diameter   - Mediastinal lymphadenopathy noted  - No cervical nodes or virchow's nodes noted, no clubbing  - Pulmonary consulted, recommended repeat CT Scan in 3 months as outpatient  - no alarm symptoms such as hemoptysis, weight loss    Hx of arthritis  - Significant deformities on bilateral fingers with episodic swelling endorsed   - CCP: <0.5  - Rheum factor: <10.0  - CRP: 17  - Sed Rate: <0.2  - Hep B/C: negative     Hypothyroidism   - Continue home synthroid  - Had hx of hyperthyroidism, unsure if this was his cause of CHF    DM type 2  - Holding oral meds  - Low dose SSI and accu-checks    HLD  - Continue high intensity statin       Code Status- FULL  Prophylaxis- Hep TID    Post-acute care- pending clinical condition  Discharge plan and follow up - pending cardiology recs     Milton Astorga MD  Staff Hospitalist  Department of Hospital Medicine  [facility]   [phone]

## 2018-11-29 NOTE — PLAN OF CARE
Problem: Patient Care Overview  Goal: Plan of Care Review  Outcome: Ongoing (interventions implemented as appropriate)  Transitioned to oral Lasix, will receive today at 18:00, infusion discontinued. Continue to monitor intake, output and daily weight. Possible discharge to home on Friday, Aicd as out pt. POC discussed with pt, verbalize understanding. Will continue to monitor.

## 2018-11-29 NOTE — CONSULTS
"[facility]  Cardiology  Consult Note    Patient Name: Ashish Mckeon  MRN: [id number]  Admission Date: 11/26/2018  Hospital Length of Stay: 3 days  Code Status: Full Code   Attending Provider: Milton Lopez MD   Consulting Provider: Patricia Kim MD  Primary Care Physician: [facility]  Principal Problem:Acute on chronic combined systolic and diastolic congestive heart failure    Patient information was obtained from patient, past medical records and ER records.     Inpatient consult to Cardiology  Consult performed by: Patricia Kim MD  Consult ordered by: Milton Lopez MD  Reason for consult: heart failure        Subjective:     Chief Complaint:  Volume Status     HPI:   Mr. Mckeon is a 53 year old with NICM (LVEF 25%), severe MR, HTN who presents to ER with abdominal burning and pain. He was admitted in July with ADHF, diuresed and discharged on GDMT. He has not followed up with a cardiologist but maintains that he has taken his meds. He stated that he had some gumbo during thanksgiving and was thus a little less compliant. He does not weigh himself every day. He presented on 11/26 with ADHF and was placed on lasix drip. Cardiology has been reconsulted to re-evaluate his fluid status. He states he is breathing better and his LE edema has improved significantly.    Past Medical History:   Diagnosis Date    Diabetes mellitus type II     Essential hypertension, benign     Hyperlipidemia     Hypothyroidism     Undiagnosed cardiac murmurs        Past Surgical History:   Procedure Laterality Date    COLON SURGERY      "lower intestines removed"    THYROID SURGERY         Review of patient's allergies indicates:   Allergen Reactions    Lisinopril Other (See Comments)     acei cough         No current facility-administered medications on file prior to encounter.      Current Outpatient Medications on File Prior to Encounter   Medication Sig    azithromycin (Z-JOSÉ MIGUEL) 250 MG " "tablet Take 1 tablet (250 mg total) by mouth once daily. Take first 2 tablets together, then 1 every day until finished.    furosemide (LASIX) 40 MG tablet Take 1 tablet (40 mg total) by mouth 2 (two) times daily.    glyBURIDE (DIABETA) 5 MG tablet Take 2 tablets (10 mg total) by mouth daily with breakfast.    LEVOTHYROXINE SODIUM (SYNTHROID ORAL) Take 175 mcg by mouth daily 2 hours after breakfast.     losartan (COZAAR) 25 MG tablet Take 1 tablet (25 mg total) by mouth once daily.    acetaminophen (TYLENOL) 325 MG tablet Take 2 tablets (650 mg total) by mouth every 6 to 8 hours as needed.    hydrocodone-homatropine 5-1.5 mg/5 ml (HYCODAN) 5-1.5 mg/5 mL Syrp Take 5 mLs by mouth every 4 (four) hours as needed (cough).    metoprolol succinate (TOPROL-XL) 100 MG 24 hr tablet 150mg daily, take one and one half tab daily    simvastatin (ZOCOR) 10 MG tablet Take 1 tablet (10 mg total) by mouth every evening.     Family History     Problem Relation (Age of Onset)    Cancer Father        Tobacco Use    Smoking status: Current Some Day Smoker     Types: Cigars    Tobacco comment: cigars "every other week or so"   Substance and Sexual Activity    Alcohol use: Yes     Alcohol/week: 3.6 oz     Types: 6 Cans of beer per week    Drug use: Yes     Types: Marijuana    Sexual activity: Yes     Partners: Female     Review of Systems   Constitution: Negative for chills, fever and malaise/fatigue.   HENT: Negative.    Cardiovascular: Negative for chest pain, dyspnea on exertion, irregular heartbeat, leg swelling, orthopnea, palpitations and paroxysmal nocturnal dyspnea.   Respiratory: Negative for shortness of breath and wheezing.    Skin: Negative for color change and rash.   Musculoskeletal: Negative for arthritis, back pain and myalgias.   Gastrointestinal: Negative for abdominal pain, constipation, diarrhea and nausea.   Neurological: Negative for dizziness, numbness and paresthesias.   Psychiatric/Behavioral: " Negative.      Objective:     Vital Signs (Most Recent):  Temp: 96.1 °F (35.6 °C) (11/29/18 0801)  Pulse: 75 (11/29/18 0801)  Resp: 17 (11/29/18 0801)  BP: 117/68 (11/29/18 0801)  SpO2: 97 % (11/29/18 0801) Vital Signs (24h Range):  Temp:  [96.1 °F (35.6 °C)-98.3 °F (36.8 °C)] 96.1 °F (35.6 °C)  Pulse:  [69-89] 75  Resp:  [17-18] 17  SpO2:  [92 %-98 %] 97 %  BP: ()/(55-74) 117/68     Weight: 68.4 kg (150 lb 12.7 oz)  Body mass index is 21.03 kg/m².    SpO2: 97 %  O2 Device (Oxygen Therapy): room air      Intake/Output Summary (Last 24 hours) at 11/29/2018 1032  Last data filed at 11/29/2018 0800  Gross per 24 hour   Intake 1640 ml   Output 4075 ml   Net -2435 ml       Lines/Drains/Airways     Peripheral Intravenous Line                 Peripheral IV - Single Lumen 11/26/18 0933 Right Antecubital 3 days                Physical Exam   Constitutional: Vital signs are normal. He appears well-developed and well-nourished. He is cooperative.  Non-toxic appearance. No distress.   HENT:   Head: Normocephalic and atraumatic.   Neck: No hepatojugular reflux and no JVD present. Carotid bruit is not present.   Cardiovascular: Normal rate, regular rhythm, S1 normal, S2 normal and normal heart sounds. Exam reveals no gallop.   No murmur heard.  Pulses:       Dorsalis pedis pulses are 2+ on the right side, and 2+ on the left side.        Posterior tibial pulses are 2+ on the right side, and 2+ on the left side.   No lower extremity edema   Pulmonary/Chest: Effort normal and breath sounds normal. No respiratory distress. He has no decreased breath sounds. He has no wheezes. He has no rhonchi. He has no rales.   Abdominal: Soft. Normal appearance and bowel sounds are normal. He exhibits no distension and no mass. There is no tenderness.   Neurological: He is alert.   Skin: Skin is warm, dry and intact.       Significant Labs:   CMP   Recent Labs   Lab 11/28/18  0435 11/29/18  0512    137   K 4.3 4.1   CL 99 94*   CO2 33*  30*   * 211*   BUN 45* 44*   CREATININE 2.4* 2.4*   CALCIUM 9.1 9.2   PROT 6.6 7.2   ALBUMIN 2.7* 3.0*   BILITOT 1.4* 1.2*   ALKPHOS 160* 163*   * 106*   * 159*   ANIONGAP 9 13   ESTGFRAFRICA 34.1* 34.1*   EGFRNONAA 29.5* 29.5*    and CBC   Recent Labs   Lab 11/28/18  0435 11/29/18  0512   WBC 4.46 4.45   HGB 13.8* 14.6   HCT 42.7 44.1   * 157       Significant Imaging:     EKG (11/26/2018) - NSR with 1st degree AV block, and PVC. Low voltage QRS of limb leads    Assessment and Plan:     * Acute on chronic combined systolic and diastolic congestive heart failure    53 year old man with NICM, severe MR and HTN who presents with ADHF and evidence of multi-organ system dysfunction. SImilar presentation in July. He is hemodynamically stable but has evidence of volume overload on exam. Likely 2/2 noncompliance and lack of follow up.    --patient is euvolemic at this point  --discontinue lasix drip and start home dose lasix 40 PO BID. Will ensure he is euvolemic over the next 24 hours, and if so, he can be discharged tomorrow.  --patient does not weigh himself every day. He stated he will get a scale. Educated on lasix adjustment as an outpatient based on weight.  --Still with KEYANNA at this point. Would follow up as an outpatient. When EF improved, restart losartan, or if able to get entresto, would start entresto.  --will need ICD as an outpatient as EF has been <25% for many months. Will need EP consult as an outpatient.  --will need outpatient cardiology follow up. He does not have a cardiologist at home. With his lower EF, he could be a good candidate to follow with [facility]. Please set up with [facility] as an outpatient.         VTE Risk Mitigation (From admission, onward)        Ordered     heparin (porcine) injection 5,000 Units  Every 8 hours      11/26/18 1332     IP VTE HIGH RISK PATIENT  Once      11/26/18 1332          Thank you for your consult. I will follow-up with patient. Please contact us  if you have any additional questions.    Patricia Kim MD  Cardiology   [facility]

## 2018-11-29 NOTE — ASSESSMENT & PLAN NOTE
53 year old man with NICM, severe MR and HTN who presents with ADHF and evidence of multi-organ system dysfunction. SImilar presentation in July. He is hemodynamically stable but has evidence of volume overload on exam. Likely 2/2 noncompliance and lack of follow up.    --patient is euvolemic at this point  --discontinue lasix drip and start home dose lasix 40 PO BID. Will ensure he is euvolemic over the next 24 hours, and if so, he can be discharged tomorrow.  --patient does not weigh himself every day. He stated he will get a scale. Educated on lasix adjustment as an outpatient based on weight.  --Still with KEYANNA at this point. Would follow up as an outpatient. When EF improved, restart losartan, or if able to get entresto, would start entresto.  --will need ICD as an outpatient as EF has been <25% for many months. Will need EP consult as an outpatient.  --will need outpatient cardiology follow up. He does not have a cardiologist at home. With his lower EF, he could be a good candidate to follow with [facility]. Please set up with [facility] as an outpatient.

## 2018-11-29 NOTE — SUBJECTIVE & OBJECTIVE
"Past Medical History:   Diagnosis Date    Diabetes mellitus type II     Essential hypertension, benign     Hyperlipidemia     Hypothyroidism     Undiagnosed cardiac murmurs        Past Surgical History:   Procedure Laterality Date    COLON SURGERY      "lower intestines removed"    THYROID SURGERY         Review of patient's allergies indicates:   Allergen Reactions    Lisinopril Other (See Comments)     acei cough         No current facility-administered medications on file prior to encounter.      Current Outpatient Medications on File Prior to Encounter   Medication Sig    azithromycin (Z-JOSÉ MIGUEL) 250 MG tablet Take 1 tablet (250 mg total) by mouth once daily. Take first 2 tablets together, then 1 every day until finished.    furosemide (LASIX) 40 MG tablet Take 1 tablet (40 mg total) by mouth 2 (two) times daily.    glyBURIDE (DIABETA) 5 MG tablet Take 2 tablets (10 mg total) by mouth daily with breakfast.    LEVOTHYROXINE SODIUM (SYNTHROID ORAL) Take 175 mcg by mouth daily 2 hours after breakfast.     losartan (COZAAR) 25 MG tablet Take 1 tablet (25 mg total) by mouth once daily.    acetaminophen (TYLENOL) 325 MG tablet Take 2 tablets (650 mg total) by mouth every 6 to 8 hours as needed.    hydrocodone-homatropine 5-1.5 mg/5 ml (HYCODAN) 5-1.5 mg/5 mL Syrp Take 5 mLs by mouth every 4 (four) hours as needed (cough).    metoprolol succinate (TOPROL-XL) 100 MG 24 hr tablet 150mg daily, take one and one half tab daily    simvastatin (ZOCOR) 10 MG tablet Take 1 tablet (10 mg total) by mouth every evening.     Family History     Problem Relation (Age of Onset)    Cancer Father        Tobacco Use    Smoking status: Current Some Day Smoker     Types: Cigars    Tobacco comment: cigars "every other week or so"   Substance and Sexual Activity    Alcohol use: Yes     Alcohol/week: 3.6 oz     Types: 6 Cans of beer per week    Drug use: Yes     Types: Marijuana    Sexual activity: Yes     Partners: Female "     Review of Systems   Constitution: Negative for chills, fever and malaise/fatigue.   HENT: Negative.    Cardiovascular: Negative for chest pain, dyspnea on exertion, irregular heartbeat, leg swelling, orthopnea, palpitations and paroxysmal nocturnal dyspnea.   Respiratory: Negative for shortness of breath and wheezing.    Skin: Negative for color change and rash.   Musculoskeletal: Negative for arthritis, back pain and myalgias.   Gastrointestinal: Negative for abdominal pain, constipation, diarrhea and nausea.   Neurological: Negative for dizziness, numbness and paresthesias.   Psychiatric/Behavioral: Negative.      Objective:     Vital Signs (Most Recent):  Temp: 96.1 °F (35.6 °C) (11/29/18 0801)  Pulse: 75 (11/29/18 0801)  Resp: 17 (11/29/18 0801)  BP: 117/68 (11/29/18 0801)  SpO2: 97 % (11/29/18 0801) Vital Signs (24h Range):  Temp:  [96.1 °F (35.6 °C)-98.3 °F (36.8 °C)] 96.1 °F (35.6 °C)  Pulse:  [69-89] 75  Resp:  [17-18] 17  SpO2:  [92 %-98 %] 97 %  BP: ()/(55-74) 117/68     Weight: 68.4 kg (150 lb 12.7 oz)  Body mass index is 21.03 kg/m².    SpO2: 97 %  O2 Device (Oxygen Therapy): room air      Intake/Output Summary (Last 24 hours) at 11/29/2018 1032  Last data filed at 11/29/2018 0800  Gross per 24 hour   Intake 1640 ml   Output 4075 ml   Net -2435 ml       Lines/Drains/Airways     Peripheral Intravenous Line                 Peripheral IV - Single Lumen 11/26/18 0933 Right Antecubital 3 days                Physical Exam   Constitutional: Vital signs are normal. He appears well-developed and well-nourished. He is cooperative.  Non-toxic appearance. No distress.   HENT:   Head: Normocephalic and atraumatic.   Neck: No hepatojugular reflux and no JVD present. Carotid bruit is not present.   Cardiovascular: Normal rate, regular rhythm, S1 normal, S2 normal and normal heart sounds. Exam reveals no gallop.   No murmur heard.  Pulses:       Dorsalis pedis pulses are 2+ on the right side, and 2+ on the  left side.        Posterior tibial pulses are 2+ on the right side, and 2+ on the left side.   No lower extremity edema   Pulmonary/Chest: Effort normal and breath sounds normal. No respiratory distress. He has no decreased breath sounds. He has no wheezes. He has no rhonchi. He has no rales.   Abdominal: Soft. Normal appearance and bowel sounds are normal. He exhibits no distension and no mass. There is no tenderness.   Neurological: He is alert.   Skin: Skin is warm, dry and intact.       Significant Labs:   CMP   Recent Labs   Lab 11/28/18 0435 11/29/18  0512    137   K 4.3 4.1   CL 99 94*   CO2 33* 30*   * 211*   BUN 45* 44*   CREATININE 2.4* 2.4*   CALCIUM 9.1 9.2   PROT 6.6 7.2   ALBUMIN 2.7* 3.0*   BILITOT 1.4* 1.2*   ALKPHOS 160* 163*   * 106*   * 159*   ANIONGAP 9 13   ESTGFRAFRICA 34.1* 34.1*   EGFRNONAA 29.5* 29.5*    and CBC   Recent Labs   Lab 11/28/18 0435 11/29/18  0512   WBC 4.46 4.45   HGB 13.8* 14.6   HCT 42.7 44.1   * 157       Significant Imaging:     EKG (11/26/2018) - NSR with 1st degree AV block, and PVC. Low voltage QRS of limb leads

## 2018-11-29 NOTE — PHYSICIAN QUERY
PT Name: Ashish Mckeon  MR #: [id number]     Physician Query Form - Documentation Clarification      CDS/: Snehal Melendez               Contact information:[email or web address]    This form is a permanent document in the medical record.     Query Date: November 29, 2018    By submitting this query, we are merely seeking further clarification of documentation. Please utilize your independent clinical judgment when addressing the question(s) below.    The Medical record reflects the following:    Supporting Clinical Findings Location in Medical Record   53 year old man with history of NICM with CHF and EF of 25% with severe MR presenting with orthopnea and worsening JEROME and admitted for management of decompensated heart failure.    Metoprolol p.o.   Pulmonology cn 11-27              Mar 11-26                                                                                  Doctor, Please specify diagnosis or diagnoses associated with above clinical findings.    Provider Use Only      [  x   ]  Dilated cardiomyopathy      [     ]  Other:please specify ____________________                                                                                                                    Clinically Undetermined

## 2018-11-29 NOTE — CONSULTS
Please see consult note dated 11/27/2018 by Dr. Connor.    Bandar Mckeon MD  Internal Medicine PGY-3  [phone]

## 2018-11-30 VITALS
TEMPERATURE: 98 F | HEIGHT: 71 IN | OXYGEN SATURATION: 98 % | DIASTOLIC BLOOD PRESSURE: 57 MMHG | BODY MASS INDEX: 21.32 KG/M2 | WEIGHT: 152.31 LBS | HEART RATE: 78 BPM | SYSTOLIC BLOOD PRESSURE: 100 MMHG | RESPIRATION RATE: 18 BRPM

## 2018-11-30 DIAGNOSIS — R06.02 SOB (SHORTNESS OF BREATH): Primary | ICD-10-CM

## 2018-11-30 LAB
ALBUMIN SERPL BCP-MCNC: 2.9 G/DL
ALP SERPL-CCNC: 182 U/L
ALT SERPL W/O P-5'-P-CCNC: 123 U/L
ANION GAP SERPL CALC-SCNC: 10 MMOL/L
AST SERPL-CCNC: 70 U/L
BASOPHILS # BLD AUTO: 0.07 K/UL
BASOPHILS NFR BLD: 1.6 %
BILIRUB SERPL-MCNC: 0.9 MG/DL
BUN SERPL-MCNC: 50 MG/DL
CALCIUM SERPL-MCNC: 8.6 MG/DL
CHLORIDE SERPL-SCNC: 96 MMOL/L
CO2 SERPL-SCNC: 29 MMOL/L
CREAT SERPL-MCNC: 2.3 MG/DL
DIFFERENTIAL METHOD: ABNORMAL
EOSINOPHIL # BLD AUTO: 0.3 K/UL
EOSINOPHIL NFR BLD: 7 %
ERYTHROCYTE [DISTWIDTH] IN BLOOD BY AUTOMATED COUNT: 15.1 %
EST. GFR  (AFRICAN AMERICAN): 35.9 ML/MIN/1.73 M^2
EST. GFR  (NON AFRICAN AMERICAN): 31 ML/MIN/1.73 M^2
GLUCOSE SERPL-MCNC: 265 MG/DL
HCT VFR BLD AUTO: 42.8 %
HGB BLD-MCNC: 14.2 G/DL
IMM GRANULOCYTES # BLD AUTO: 0.01 K/UL
IMM GRANULOCYTES NFR BLD AUTO: 0.2 %
LYMPHOCYTES # BLD AUTO: 0.6 K/UL
LYMPHOCYTES NFR BLD: 13.6 %
MAGNESIUM SERPL-MCNC: 1.6 MG/DL
MCH RBC QN AUTO: 26.2 PG
MCHC RBC AUTO-ENTMCNC: 33.2 G/DL
MCV RBC AUTO: 79 FL
MONOCYTES # BLD AUTO: 0.8 K/UL
MONOCYTES NFR BLD: 19 %
NEUTROPHILS # BLD AUTO: 2.6 K/UL
NEUTROPHILS NFR BLD: 58.6 %
NRBC BLD-RTO: 0 /100 WBC
PLATELET # BLD AUTO: 161 K/UL
PMV BLD AUTO: 12 FL
POCT GLUCOSE: 183 MG/DL (ref 70–110)
POCT GLUCOSE: 265 MG/DL (ref 70–110)
POTASSIUM SERPL-SCNC: 4.3 MMOL/L
PROT SERPL-MCNC: 7.3 G/DL
RBC # BLD AUTO: 5.43 M/UL
SODIUM SERPL-SCNC: 135 MMOL/L
WBC # BLD AUTO: 4.42 K/UL

## 2018-11-30 PROCEDURE — 25000003 PHARM REV CODE 250: Performed by: INTERNAL MEDICINE

## 2018-11-30 PROCEDURE — 80053 COMPREHEN METABOLIC PANEL: CPT

## 2018-11-30 PROCEDURE — 63600175 PHARM REV CODE 636 W HCPCS: Performed by: INTERNAL MEDICINE

## 2018-11-30 PROCEDURE — 36415 COLL VENOUS BLD VENIPUNCTURE: CPT

## 2018-11-30 PROCEDURE — 99238 HOSP IP/OBS DSCHRG MGMT 30/<: CPT | Mod: ,,, | Performed by: HOSPITALIST

## 2018-11-30 PROCEDURE — 85025 COMPLETE CBC W/AUTO DIFF WBC: CPT

## 2018-11-30 PROCEDURE — 25000003 PHARM REV CODE 250: Performed by: PHYSICIAN ASSISTANT

## 2018-11-30 PROCEDURE — 25000003 PHARM REV CODE 250: Performed by: HOSPITALIST

## 2018-11-30 PROCEDURE — 83735 ASSAY OF MAGNESIUM: CPT

## 2018-11-30 RX ORDER — ATORVASTATIN CALCIUM 80 MG/1
80 TABLET, FILM COATED ORAL NIGHTLY
Qty: 90 TABLET | Refills: 3 | Status: SHIPPED | OUTPATIENT
Start: 2018-11-30 | End: 2019-08-20

## 2018-11-30 RX ORDER — METOPROLOL SUCCINATE 50 MG/1
150 TABLET, EXTENDED RELEASE ORAL DAILY
Qty: 90 TABLET | Refills: 11 | Status: ON HOLD | OUTPATIENT
Start: 2018-12-01 | End: 2018-12-23

## 2018-11-30 RX ORDER — NAPROXEN SODIUM 220 MG/1
81 TABLET, FILM COATED ORAL DAILY
Qty: 360 TABLET | Refills: 0 | Status: SHIPPED | OUTPATIENT
Start: 2018-12-01 | End: 2020-01-01

## 2018-11-30 RX ORDER — PANTOPRAZOLE SODIUM 40 MG/1
40 TABLET, DELAYED RELEASE ORAL DAILY
Qty: 30 TABLET | Refills: 11 | Status: ON HOLD | OUTPATIENT
Start: 2018-12-01 | End: 2018-12-24 | Stop reason: SDUPTHER

## 2018-11-30 RX ADMIN — LEVOTHYROXINE SODIUM 175 MCG: 25 TABLET ORAL at 12:11

## 2018-11-30 RX ADMIN — METOPROLOL SUCCINATE 150 MG: 100 TABLET, EXTENDED RELEASE ORAL at 08:11

## 2018-11-30 RX ADMIN — FUROSEMIDE 40 MG: 40 TABLET ORAL at 08:11

## 2018-11-30 RX ADMIN — PANTOPRAZOLE SODIUM 40 MG: 40 TABLET, DELAYED RELEASE ORAL at 08:11

## 2018-11-30 RX ADMIN — HEPARIN SODIUM 5000 UNITS: 5000 INJECTION, SOLUTION INTRAVENOUS; SUBCUTANEOUS at 04:11

## 2018-11-30 RX ADMIN — ASPIRIN 81 MG CHEWABLE TABLET 81 MG: 81 TABLET CHEWABLE at 08:11

## 2018-11-30 RX ADMIN — INSULIN ASPART 3 UNITS: 100 INJECTION, SOLUTION INTRAVENOUS; SUBCUTANEOUS at 08:11

## 2018-11-30 NOTE — NURSING
Discharged to home:tele d/c'ed , sl d/c'ed with catheter intact, instructions re: dietary restrictions, meds and f/u given, verbalize understanding. Instructed to  new RX ar [facility] on [address],  Verbalize understanding. Sitting at bedside awaiting transport, accompanied by spouse. No distress noted.

## 2018-11-30 NOTE — DISCHARGE SUMMARY
Discharge Summary  Hospital Medicine    Attending Provider on Discharge: Milton Lopez MD  Hospital Medicine Team: [facility] HOSP MED R  Date of Admission:  11/26/2018     Date of Discharge:  11/30/2018  Code status: Full Code    Active Hospital Problems    Diagnosis  POA    *Acute on chronic combined systolic and diastolic congestive heart failure [I50.43]  Yes    CHF exacerbation [I50.9]  Yes    right upper lobe mass [R91.8]  Yes      Resolved Hospital Problems   No resolved problems to display.        HPI  53 y.o. AA gentleman with prior hx of NICM and HFrEF with EF 25-30%, severe MR, DM type 2, HLD and hypothyroidism presenting with progressively worsening dyspnea for the past few days with associated orthopnea, PND, epigastric burning sensation, nausea, LE swelling, weight gain and chest pressure. Denies any fevers, chills, cough, sputum, pleurisy, chest pain, abdominal pain, emesis, constipation,diarreha, rashes. He is now sleeping with 2 pillows since 1 week ago. She does have worsening SOB on excertion. Has prior hx of CHF exacerbation, and feels similarly. Did endorse night sweats. He is not compliant with low sodium diet or fluid restriction.      Last echo done in 7/2018 noted EF of 25-30%, pulmonary HTN PASP 62 mmHg, severe mitral regurgitation. PET scan stress during that time was negative for ischemia.  Cath 7/20 noted non-obstructive CAD.      In the ED patient was HD stable saturating well on RA and afebrile. K 5.4, CO2 21, Cr 2.8, BUN 50, , , , TB 1.7, Alb 3.2, AG corrected 16.5. CBC wnl. BNP 2276. Trop mildly elevated. CXR noting cardiomegly, mild hilar fullness and nodular density. Cardiology consulted for elevated troponin. Hospital medicine consulted for admission.       Hospital Course  Acute on chronic combined systolic and diastolic heart failure  Elevated Trop - Type 2 MI   54 y/o male with non ischemic cardiomyopathy (unknown etiology, graves induced?) with reduced EF  of 25% presenting with HF symptoms. Elevated BNP and CXR with volume overload. Suspecting secondary to non compliance to life style modifications. Remains HD stable, afebrile and saturating well on RA. Given one time dose IV 60 mg lasix in ED. Cardiology was consulted by ER. Troponin peaked @ .080. EKG noting 1st deg AVB and low voltage QRS.  - dc Lasix drip, resume Oral lasix at home dose, 40mg BID  - Continue home metoprolol 150 mg daily  - Holding ARB due to KEYANNA  - Strict I/O, daily weights standing  - Monitor kidney function and electrolytes   - Fluid 1500 mL and Sodium restriction   - Consider cardiac MRI to r/o amyloidosis, sarcoid etc as outpatient  - Need records about his graves disease from [facility]   - EP for ICD referral placed  - Follow up with cardiology clinic and CHF education on d/c     Acute Kidney Injury on CKD stage III suspecting CRS   - Baseline around 1.5 - 1.7, now 2.3  - holding ARB and glyburide   - Suspecting CRS  - switch lasix gtt to lasix oral.   - Holding ARB  - Strict I/O  - Avoid contrast and nephrotoxic agents for now     Pulmonary Nodules with mediastinal LAD  - Seen on CXR, ordered CT noting 2 solid nodules, largest in RUL 1.8 cm diameter   - Mediastinal lymphadenopathy noted  - No cervical nodes or virchow's nodes noted, no clubbing  - Pulmonary consulted, will follow as outpatient for possible PET/CT  - no alarm symptoms such as hemoptysis, weight loss     Hx of arthritis  - Significant deformities on bilateral fingers with episodic swelling endorsed   - CCP: <0.5  - Rheum factor: <10.0  - CRP: 17  - Sed Rate: <0.2  - Hep B/C: negative      Hypothyroidism   - Continue home synthroid  - Had hx of hyperthyroidism, unsure if this was his cause of CHF     DM type 2  - Holding oral meds due to Creatinine clearance of <50  - Low dose SSI and accu-checks     HLD  - Continue high intensity statin     Recent Labs   Lab 11/28/18  0435 11/29/18  0512 11/30/18  0611   WBC 4.46 4.45 4.42    HGB 13.8* 14.6 14.2   HCT 42.7 44.1 42.8   * 157 161     Recent Labs   Lab 11/26/18  0934  11/28/18  0435 11/29/18  0512 11/30/18  0611      < > 141 137 135*   K 5.4*   < > 4.3 4.1 4.3      < > 99 94* 96   CO2 21*   < > 33* 30* 29   BUN 50*   < > 45* 44* 50*   CREATININE 2.8*   < > 2.4* 2.4* 2.3*   *   < > 152* 211* 265*   CALCIUM 9.0   < > 9.1 9.2 8.6*   MG  --    < > 1.9 1.8 1.6   LIPASE 68*  --   --   --   --     < > = values in this interval not displayed.     Recent Labs   Lab 11/28/18  0435 11/29/18  0512 11/30/18  0611   ALKPHOS 160* 163* 182*   * 159* 123*   * 106* 70*   ALBUMIN 2.7* 3.0* 2.9*   PROT 6.6 7.2 7.3   BILITOT 1.4* 1.2* 0.9      Recent Labs   Lab 11/28/18 2025 11/29/18  0757 11/29/18  1145 11/29/18  1719 11/29/18 2016 11/30/18  0822   POCTGLUCOSE 159* 227* 190* 202* 226* 265*     No results for input(s): CPK, CPKMB, MB, TROPONINI in the last 72 hours.    No results for input(s): LACTATE in the last 72 hours.     Procedures: none     Consultants: Pulmonology, Cardiology     Current Discharge Medication List      START taking these medications    Details   aspirin 81 MG Chew Take 1 tablet (81 mg total) by mouth once daily.  Qty: 360 tablet, Refills: 0      atorvastatin (LIPITOR) 80 MG tablet Take 1 tablet (80 mg total) by mouth every evening.  Qty: 90 tablet, Refills: 3      pantoprazole (PROTONIX) 40 MG tablet Take 1 tablet (40 mg total) by mouth once daily.  Qty: 30 tablet, Refills: 11         CONTINUE these medications which have CHANGED    Details   metoprolol succinate (TOPROL-XL) 50 MG 24 hr tablet Take 3 tablets (150 mg total) by mouth once daily.  Qty: 90 tablet, Refills: 11         CONTINUE these medications which have NOT CHANGED    Details   furosemide (LASIX) 40 MG tablet Take 1 tablet (40 mg total) by mouth 2 (two) times daily.  Qty: 180 tablet, Refills: 0      LEVOTHYROXINE SODIUM (SYNTHROID ORAL) Take 175 mcg by mouth daily 2 hours after  breakfast.       acetaminophen (TYLENOL) 325 MG tablet Take 2 tablets (650 mg total) by mouth every 6 to 8 hours as needed.  Refills: 0      hydrocodone-homatropine 5-1.5 mg/5 ml (HYCODAN) 5-1.5 mg/5 mL Syrp Take 5 mLs by mouth every 4 (four) hours as needed (cough).  Qty: 120 mL, Refills: 0         STOP taking these medications       azithromycin (Z-JOSÉ MIGUEL) 250 MG tablet Comments:   Reason for Stopping:         glyBURIDE (DIABETA) 5 MG tablet Comments:   Reason for Stopping:         losartan (COZAAR) 25 MG tablet Comments:   Reason for Stopping:         simvastatin (ZOCOR) 10 MG tablet Comments:   Reason for Stopping:               Discharge Diet:cardiac diet with Normal Fluid intake of 1500 - 2000 mL per day    Activity: activity as tolerated    Discharge Condition: Stable    Disposition: Home or Self Care    Tests pending at the time of discharge: none      Time spent  on the discharge of the patient including review of hospital course with the patient. reviewing discharge medications and arranging follow-up care 35 minutes     Discharge examination Patient was seen and examined on the date of discharge and determined to be suitable for discharge.    Discharge plan and follow up:  Follow up with Pulmonology, CHF Clinic and PCP     Future Appointments   Date Time Provider Department [place]   12/6/2018  1:00 PM [facility]   12/6/2018  1:15 PM [facility] FUN[facility]   12/6/2018  1:40 PM SIX, MINUTE WALK [facility]   12/6/2018  2:30 PM Rocio Ren MD [facility]       Provider    Milton Astorga MD  Staff Hospitalist  Department of [facility] Medicine  [facility]   [phone]

## 2018-11-30 NOTE — PLAN OF CARE
Problem: Patient Care Overview  Goal: Plan of Care Review  Outcome: Ongoing (interventions implemented as appropriate)  POC reviewed with pt and he verbalized understanding. VSS and pt denies presence of pain. Lasix gtt DC'ed and pt transitioned to PO. Pt educated on fluid restriction and monitoring intake/output. BG checked AC/HS. Fall bundle implemented and pt has remained free of falls and injuries. Pt in no apparent sign of distress, will continue to monitor.

## 2018-12-22 ENCOUNTER — HOSPITAL ENCOUNTER (INPATIENT)
Facility: HOSPITAL | Age: 53
LOS: 2 days | Discharge: HOME | DRG: 637 | End: 2018-12-24
Attending: EMERGENCY MEDICINE | Admitting: HOSPITALIST

## 2018-12-22 DIAGNOSIS — N17.9 AKI (ACUTE KIDNEY INJURY): ICD-10-CM

## 2018-12-22 DIAGNOSIS — I50.9 CHF (CONGESTIVE HEART FAILURE): ICD-10-CM

## 2018-12-22 DIAGNOSIS — R00.0 TACHYCARDIA: ICD-10-CM

## 2018-12-22 DIAGNOSIS — R73.9 HYPERGLYCEMIA: Primary | ICD-10-CM

## 2018-12-22 DIAGNOSIS — H53.8 BLURRED VISION: ICD-10-CM

## 2018-12-22 PROBLEM — I50.43 ACUTE ON CHRONIC COMBINED SYSTOLIC AND DIASTOLIC CONGESTIVE HEART FAILURE: Chronic | Status: ACTIVE | Noted: 2017-01-16

## 2018-12-22 PROBLEM — E78.5 HYPERLIPIDEMIA: Status: ACTIVE | Noted: 2018-12-22

## 2018-12-22 PROBLEM — R91.8 PULMONARY NODULES: Status: ACTIVE | Noted: 2018-12-22

## 2018-12-22 LAB
ALBUMIN SERPL BCP-MCNC: 3.2 G/DL
ALP SERPL-CCNC: 199 U/L
ALT SERPL W/O P-5'-P-CCNC: 44 U/L
ANION GAP SERPL CALC-SCNC: 11 MMOL/L
AST SERPL-CCNC: 37 U/L
B-OH-BUTYR BLD STRIP-SCNC: 0.1 MMOL/L
BACTERIA #/AREA URNS AUTO: ABNORMAL /HPF
BASOPHILS # BLD AUTO: 0.05 K/UL
BASOPHILS NFR BLD: 1 %
BILIRUB SERPL-MCNC: 2.3 MG/DL
BILIRUB UR QL STRIP: NEGATIVE
BNP SERPL-MCNC: 2679 PG/ML
BUN SERPL-MCNC: 39 MG/DL
CALCIUM SERPL-MCNC: 9.2 MG/DL
CHLORIDE SERPL-SCNC: 100 MMOL/L
CLARITY UR REFRACT.AUTO: CLEAR
CO2 SERPL-SCNC: 22 MMOL/L
COLOR UR AUTO: YELLOW
CREAT SERPL-MCNC: 2.7 MG/DL
DIFFERENTIAL METHOD: ABNORMAL
EOSINOPHIL # BLD AUTO: 0.2 K/UL
EOSINOPHIL NFR BLD: 3 %
ERYTHROCYTE [DISTWIDTH] IN BLOOD BY AUTOMATED COUNT: 15.4 %
EST. GFR  (AFRICAN AMERICAN): 29.6 ML/MIN/1.73 M^2
EST. GFR  (NON AFRICAN AMERICAN): 25.6 ML/MIN/1.73 M^2
GLUCOSE SERPL-MCNC: 510 MG/DL
GLUCOSE UR QL STRIP: ABNORMAL
HCT VFR BLD AUTO: 41 %
HGB BLD-MCNC: 13.1 G/DL
HGB UR QL STRIP: ABNORMAL
HYALINE CASTS UR QL AUTO: 6 /LPF
IMM GRANULOCYTES # BLD AUTO: 0.01 K/UL
IMM GRANULOCYTES NFR BLD AUTO: 0.2 %
KETONES UR QL STRIP: NEGATIVE
LEUKOCYTE ESTERASE UR QL STRIP: NEGATIVE
LYMPHOCYTES # BLD AUTO: 0.5 K/UL
LYMPHOCYTES NFR BLD: 10.7 %
MCH RBC QN AUTO: 25.1 PG
MCHC RBC AUTO-ENTMCNC: 32 G/DL
MCV RBC AUTO: 79 FL
MICROSCOPIC COMMENT: ABNORMAL
MONOCYTES # BLD AUTO: 0.5 K/UL
MONOCYTES NFR BLD: 10.1 %
NEUTROPHILS # BLD AUTO: 3.7 K/UL
NEUTROPHILS NFR BLD: 75 %
NITRITE UR QL STRIP: NEGATIVE
NRBC BLD-RTO: 0 /100 WBC
PH UR STRIP: 5 [PH] (ref 5–8)
PLATELET # BLD AUTO: 137 K/UL
PMV BLD AUTO: 13 FL
POCT GLUCOSE: 233 MG/DL (ref 70–110)
POCT GLUCOSE: 442 MG/DL (ref 70–110)
POCT GLUCOSE: 491 MG/DL (ref 70–110)
POCT GLUCOSE: 71 MG/DL (ref 70–110)
POTASSIUM SERPL-SCNC: 4.6 MMOL/L
PROT SERPL-MCNC: 7.3 G/DL
PROT UR QL STRIP: ABNORMAL
RBC # BLD AUTO: 5.21 M/UL
RBC #/AREA URNS AUTO: 3 /HPF (ref 0–4)
SODIUM SERPL-SCNC: 133 MMOL/L
SP GR UR STRIP: 1.01 (ref 1–1.03)
SQUAMOUS #/AREA URNS AUTO: 0 /HPF
TROPONIN I SERPL DL<=0.01 NG/ML-MCNC: 0.06 NG/ML
TROPONIN I SERPL DL<=0.01 NG/ML-MCNC: 0.06 NG/ML
TSH SERPL DL<=0.005 MIU/L-ACNC: 0.82 UIU/ML
URN SPEC COLLECT METH UR: ABNORMAL
WBC # BLD AUTO: 4.97 K/UL
WBC #/AREA URNS AUTO: 1 /HPF (ref 0–5)
YEAST UR QL AUTO: ABNORMAL

## 2018-12-22 PROCEDURE — 93005 ELECTROCARDIOGRAM TRACING: CPT

## 2018-12-22 PROCEDURE — 82962 GLUCOSE BLOOD TEST: CPT

## 2018-12-22 PROCEDURE — 80053 COMPREHEN METABOLIC PANEL: CPT

## 2018-12-22 PROCEDURE — 96375 TX/PRO/DX INJ NEW DRUG ADDON: CPT

## 2018-12-22 PROCEDURE — 25000003 PHARM REV CODE 250: Performed by: STUDENT IN AN ORGANIZED HEALTH CARE EDUCATION/TRAINING PROGRAM

## 2018-12-22 PROCEDURE — 11000001 HC ACUTE MED/SURG PRIVATE ROOM

## 2018-12-22 PROCEDURE — 82010 KETONE BODYS QUAN: CPT

## 2018-12-22 PROCEDURE — 84484 ASSAY OF TROPONIN QUANT: CPT | Mod: 91

## 2018-12-22 PROCEDURE — 84443 ASSAY THYROID STIM HORMONE: CPT

## 2018-12-22 PROCEDURE — 99285 EMERGENCY DEPT VISIT HI MDM: CPT | Mod: ,,, | Performed by: PHYSICIAN ASSISTANT

## 2018-12-22 PROCEDURE — 85025 COMPLETE CBC W/AUTO DIFF WBC: CPT

## 2018-12-22 PROCEDURE — 84484 ASSAY OF TROPONIN QUANT: CPT

## 2018-12-22 PROCEDURE — 81001 URINALYSIS AUTO W/SCOPE: CPT

## 2018-12-22 PROCEDURE — 63600175 PHARM REV CODE 636 W HCPCS: Performed by: HOSPITALIST

## 2018-12-22 PROCEDURE — 96374 THER/PROPH/DIAG INJ IV PUSH: CPT

## 2018-12-22 PROCEDURE — 63600175 PHARM REV CODE 636 W HCPCS: Performed by: PHYSICIAN ASSISTANT

## 2018-12-22 PROCEDURE — 63600175 PHARM REV CODE 636 W HCPCS: Performed by: STUDENT IN AN ORGANIZED HEALTH CARE EDUCATION/TRAINING PROGRAM

## 2018-12-22 PROCEDURE — 93010 ELECTROCARDIOGRAM REPORT: CPT | Mod: ,,, | Performed by: INTERNAL MEDICINE

## 2018-12-22 PROCEDURE — 83880 ASSAY OF NATRIURETIC PEPTIDE: CPT

## 2018-12-22 PROCEDURE — 96361 HYDRATE IV INFUSION ADD-ON: CPT

## 2018-12-22 PROCEDURE — 99285 EMERGENCY DEPT VISIT HI MDM: CPT | Mod: 25

## 2018-12-22 PROCEDURE — 25000003 PHARM REV CODE 250: Performed by: PHYSICIAN ASSISTANT

## 2018-12-22 RX ORDER — INSULIN ASPART 100 [IU]/ML
0-5 INJECTION, SOLUTION INTRAVENOUS; SUBCUTANEOUS
Status: DISCONTINUED | OUTPATIENT
Start: 2018-12-22 | End: 2018-12-24 | Stop reason: HOSPADM

## 2018-12-22 RX ORDER — IBUPROFEN 200 MG
16 TABLET ORAL
Status: DISCONTINUED | OUTPATIENT
Start: 2018-12-22 | End: 2018-12-24 | Stop reason: HOSPADM

## 2018-12-22 RX ORDER — ATORVASTATIN CALCIUM 20 MG/1
80 TABLET, FILM COATED ORAL NIGHTLY
Status: DISCONTINUED | OUTPATIENT
Start: 2018-12-22 | End: 2018-12-24 | Stop reason: HOSPADM

## 2018-12-22 RX ORDER — GLUCAGON 1 MG
1 KIT INJECTION
Status: DISCONTINUED | OUTPATIENT
Start: 2018-12-22 | End: 2018-12-24 | Stop reason: HOSPADM

## 2018-12-22 RX ORDER — AMOXICILLIN 250 MG
1 CAPSULE ORAL 2 TIMES DAILY PRN
Status: DISCONTINUED | OUTPATIENT
Start: 2018-12-22 | End: 2018-12-24 | Stop reason: HOSPADM

## 2018-12-22 RX ORDER — FUROSEMIDE 10 MG/ML
80 INJECTION INTRAMUSCULAR; INTRAVENOUS DAILY
Status: DISCONTINUED | OUTPATIENT
Start: 2018-12-23 | End: 2018-12-22

## 2018-12-22 RX ORDER — FUROSEMIDE 10 MG/ML
80 INJECTION INTRAMUSCULAR; INTRAVENOUS ONCE
Status: COMPLETED | OUTPATIENT
Start: 2018-12-22 | End: 2018-12-22

## 2018-12-22 RX ORDER — SODIUM CHLORIDE 0.9 % (FLUSH) 0.9 %
5 SYRINGE (ML) INJECTION
Status: DISCONTINUED | OUTPATIENT
Start: 2018-12-22 | End: 2018-12-24 | Stop reason: HOSPADM

## 2018-12-22 RX ORDER — FUROSEMIDE 10 MG/ML
80 INJECTION INTRAMUSCULAR; INTRAVENOUS 2 TIMES DAILY
Status: DISCONTINUED | OUTPATIENT
Start: 2018-12-23 | End: 2018-12-24

## 2018-12-22 RX ORDER — HEPARIN SODIUM 5000 [USP'U]/ML
5000 INJECTION, SOLUTION INTRAVENOUS; SUBCUTANEOUS EVERY 8 HOURS
Status: DISCONTINUED | OUTPATIENT
Start: 2018-12-22 | End: 2018-12-24 | Stop reason: HOSPADM

## 2018-12-22 RX ORDER — ACETAMINOPHEN 325 MG/1
650 TABLET ORAL EVERY 4 HOURS PRN
Status: DISCONTINUED | OUTPATIENT
Start: 2018-12-22 | End: 2018-12-24 | Stop reason: HOSPADM

## 2018-12-22 RX ORDER — NAPROXEN SODIUM 220 MG/1
81 TABLET, FILM COATED ORAL DAILY
Status: DISCONTINUED | OUTPATIENT
Start: 2018-12-23 | End: 2018-12-24 | Stop reason: HOSPADM

## 2018-12-22 RX ORDER — IBUPROFEN 200 MG
24 TABLET ORAL
Status: DISCONTINUED | OUTPATIENT
Start: 2018-12-22 | End: 2018-12-24 | Stop reason: HOSPADM

## 2018-12-22 RX ADMIN — SODIUM CHLORIDE 500 ML: 0.9 INJECTION, SOLUTION INTRAVENOUS at 03:12

## 2018-12-22 RX ADMIN — ATORVASTATIN CALCIUM 80 MG: 20 TABLET, FILM COATED ORAL at 09:12

## 2018-12-22 RX ADMIN — FUROSEMIDE 80 MG: 10 INJECTION, SOLUTION INTRAVENOUS at 06:12

## 2018-12-22 RX ADMIN — INSULIN HUMAN 8 UNITS: 100 INJECTION, SOLUTION PARENTERAL at 03:12

## 2018-12-22 RX ADMIN — HEPARIN SODIUM 5000 UNITS: 5000 INJECTION, SOLUTION INTRAVENOUS; SUBCUTANEOUS at 09:12

## 2018-12-22 NOTE — ED PROVIDER NOTES
"Encounter Date: 12/22/2018       History     Chief Complaint   Patient presents with    Blurred Vision     Blurred vision x 3 days.  Pt states that he has not had his "diabetic pills" in a few weeks     Mr Mckeon is a 54 yo  male patient with PMHx of CHF, DM, HTN, HLD, Hypothyroidism that presents to the ED with blurred vision for past week. Pt states that he has not taken any medications for his diabetes for the last few weeks since he was last discharged from [facility]. Pt also complaining of BLE swelling. Pt denies any chest pain, shortness of breath, cough, fevers, abdominal pain, N/V/D, urinary symptoms, headaches, numbness, weakness.           Review of patient's allergies indicates:   Allergen Reactions    Lisinopril Other (See Comments)     acei cough       Past Medical History:   Diagnosis Date    Diabetes mellitus type II     Essential hypertension, benign     Hyperlipidemia     Hypothyroidism     Undiagnosed cardiac murmurs      Past Surgical History:   Procedure Laterality Date    COLON SURGERY      "lower intestines removed"    THYROID SURGERY       Family History   Problem Relation Age of Onset    Cancer Father      Social History     Tobacco Use    Smoking status: Never Smoker    Smokeless tobacco: Never Used    Tobacco comment: cigars "every other week or so"   Substance Use Topics    Alcohol use: Yes     Alcohol/week: 3.6 oz     Types: 6 Cans of beer per week    Drug use: Yes     Types: Marijuana     Review of Systems   Constitutional: Negative for chills and fever.   HENT: Negative for congestion, rhinorrhea, sinus pressure, sinus pain and sore throat.    Eyes: Positive for visual disturbance. Negative for photophobia, pain, discharge, redness and itching.   Respiratory: Negative for cough and shortness of breath.    Cardiovascular: Positive for leg swelling. Negative for chest pain and palpitations.   Gastrointestinal: Negative for abdominal pain, diarrhea, nausea and " vomiting.   Genitourinary: Negative for dysuria, flank pain and frequency.   Musculoskeletal: Negative for arthralgias, back pain, myalgias, neck pain and neck stiffness.   Skin: Negative for rash.   Neurological: Negative for dizziness, syncope, facial asymmetry, speech difficulty, weakness, light-headedness and headaches.       Physical Exam     Initial Vitals [12/22/18 1421]   BP Pulse Resp Temp SpO2   124/83 100 20 97.6 °F (36.4 °C) 98 %      MAP       --         Physical Exam    Constitutional: He appears well-developed and well-nourished. He is cooperative.   HENT:   Head: Normocephalic and atraumatic.   Eyes: Conjunctivae, EOM and lids are normal. Pupils are equal, round, and reactive to light.   Neck: Normal range of motion. Neck supple. JVD present.   Cardiovascular: Normal rate. Exam reveals no gallop and no friction rub.    Murmur heard.  2+ pitting edema BLE   Pulmonary/Chest: Effort normal. No respiratory distress. He has no wheezes. He has no rhonchi. He has no rales.   Abdominal: Soft. Bowel sounds are normal. There is no tenderness.   Musculoskeletal: Normal range of motion.   Neurological: He is alert and oriented to person, place, and time. He has normal strength. No cranial nerve deficit or sensory deficit.   Skin: Skin is warm, dry and intact. No rash noted.         ED Course   Procedures  Labs Reviewed   CBC W/ AUTO DIFFERENTIAL - Abnormal; Notable for the following components:       Result Value    Hemoglobin 13.1 (*)     MCV 79 (*)     MCH 25.1 (*)     RDW 15.4 (*)     Platelets 137 (*)     MPV 13.0 (*)     Lymph # 0.5 (*)     Gran% 75.0 (*)     Lymph% 10.7 (*)     All other components within normal limits   B-TYPE NATRIURETIC PEPTIDE - Abnormal; Notable for the following components:    BNP 2,679 (*)     All other components within normal limits   TROPONIN I - Abnormal; Notable for the following components:    Troponin I 0.064 (*)     All other components within normal limits   URINALYSIS,  REFLEX TO URINE CULTURE - Abnormal; Notable for the following components:    Protein, UA 2+ (*)     Glucose, UA 3+ (*)     Occult Blood UA 1+ (*)     All other components within normal limits    Narrative:     Preferred Collection Type->Urine, Clean Catch  yellow and grey   COMPREHENSIVE METABOLIC PANEL - Abnormal; Notable for the following components:    Sodium 133 (*)     CO2 22 (*)     Glucose 510 (*)     BUN, Bld 39 (*)     Creatinine 2.7 (*)     Albumin 3.2 (*)     Total Bilirubin 2.3 (*)     Alkaline Phosphatase 199 (*)     eGFR if  29.6 (*)     eGFR if non  25.6 (*)     All other components within normal limits   URINALYSIS MICROSCOPIC - Abnormal; Notable for the following components:    Hyaline Casts, UA 6 (*)     All other components within normal limits    Narrative:     Preferred Collection Type->Urine, Clean Catch  yellow and grey   TROPONIN I - Abnormal; Notable for the following components:    Troponin I 0.060 (*)     All other components within normal limits   POCT GLUCOSE - Abnormal; Notable for the following components:    POCT Glucose 442 (*)     All other components within normal limits   POCT GLUCOSE - Abnormal; Notable for the following components:    POCT Glucose 491 (*)     All other components within normal limits   POCT GLUCOSE - Abnormal; Notable for the following components:    POCT Glucose 233 (*)     All other components within normal limits   BETA - HYDROXYBUTYRATE, SERUM   TSH   TSH    Narrative:     add on TSH #[id number] per Eddie Cerda MD @ 19:10  12/22/2018      EKG Readings: (Independently Interpreted)   Initial Reading: No STEMI. Previous EKG Date: 11/26/2018. Rhythm: Normal Sinus Rhythm. Heart Rate: 93. Ectopy: No Ectopy. Conduction: 1st Degree AV Block. ST Segments: Normal ST Segments. Axis: Normal. Q Waves: V1, V2, V3 and V4. Clinical Impression: Normal Sinus Rhythm and AV Block - 1st Degree Other Impression: low voltage limb leads,  diffuse flat t waves, ant q waves. No change 11/2018       Imaging Results          X-Ray Chest AP Portable (Final result)  Result time 12/22/18 16:00:36    Final result by Adolfo Almanzar MD (12/22/18 16:00:36)                 Impression:      No acute abnormality.    RIGHT-sided pulmonary nodule as demonstrated on CT of 11/26/2018.  Malignant etiology not excluded.    Mild cardiomegaly.      Electronically signed by: Adolfo Almanzar MD  Date:    12/22/2018  Time:    16:00             Narrative:    EXAMINATION:  XR CHEST AP PORTABLE    CLINICAL HISTORY:  Heart failure, unspecified    TECHNIQUE:  Single frontal view of the chest was performed.    COMPARISON:  None    FINDINGS:  Right nodules, potentially malignant.The lungs are clear, with normal appearance of pulmonary vasculature and no pleural effusion or pneumothorax.    The cardiac silhouette is normal in size. The hilar and mediastinal contours are unremarkable.    Bones are intact.                                 Medical Decision Making:   History:   Old Medical Records: I decided to obtain old medical records.  Initial Assessment:   Mr Mckeon is a 52 yo  male patient with PMHx of CHF, DM, HTN, HLD, Hypothyroidism that presents to the ED with blurred vision for past week. Pt states that he has not taken any medications for his diabetes for the last few weeks since he was last discharged from [facility]. Pt also complaining of BLE swelling.  Differential Diagnosis:   Hyperglycemia  CHF exacerbation  ACS  Electrolyte abnormality  Dehydration  Clinical Tests:   Lab Tests: Ordered and Reviewed  Radiological Study: Ordered and Reviewed  Medical Tests: Ordered and Reviewed  ED Management:  Pt hemodynamically stable on arrival to ED.  on arrival. Labs, CXR, 12 lead ordered. Pt given 500 ml NS and 8 units insulin. Glucose 233. BNP 2,679 Troponin 0.064. Creatinine 2.7. Plan is to admit patient to Hospital Medicine for IV diuresis and further management  of CHF and hyperglycemia. Plan discussed and explained to patient who is understanding and agreeable with plan. Pt admitted to Hospital Medicine.               Attending Attestation:     Physician Attestation Statement for NP/PA:   I have conducted a face to face encounter with this patient in addition to the NP/PA, due to Medical Complexity    Other NP/PA Attestation Additions:      Medical Decision Making: Whole body vol overload combined with renal insuff. Not currently under dm treatment. Will admit for diuresis and further eval. Unable to hydrate to bring down sugar and elevated bnp. Trop is chronically elevated. No signs of pulm edema. Cr is close to baseline.                     Clinical Impression:   The primary encounter diagnosis was Hyperglycemia. Diagnoses of Blurred vision, CHF (congestive heart failure), and KEYANNA (acute kidney injury) were also pertinent to this visit.      Disposition:   Disposition: Admitted  Condition: Stable                        Corey Marrufo PA-C  12/22/18 2004

## 2018-12-22 NOTE — ED NOTES
Ashish Mckeon, a 53 y.o. male presents to the ED via personal transportation with CC blurred vision and increased blood glucose.        Patient identifiers verified verbally with patient and correct for Ashish Mckeon.    LOC/ APPEARANCE: The patient is AAOx4. Pt is speaking appropriately, no slurred speech. Pt changed into hospital gown. Continuous cardiac monitor, cont pulse ox, and auto BP cuff applied to patient. Pt is clean and well groomed. No JVD visible. Pt updated on POC. Bed low and locked with side rails up x2, call bell in pt reach.  SKIN: Skin is warm dry and intact, and color is consistent with ethnicity. Capillary refill <3 seconds. No breakdown or brusing visible. Mucus membranes moist, acyanotic.  RESPIRATORY: Airway is open and patent. Respirations-spontaneous, unlabored, regular rate, equal bilaterally on inspiration and expiration. No accessory muscle use noted. Lungs clear to auscultation in all fields bilaterally anterior and posterior.   CARDIAC: Patient has regular heart rate.  No peripheral edema noted, and patient has no c/o chest pain. Peripheral pulses present equal and strong throughout.  ABDOMEN: Soft and non-tender to palpation with no distention noted. Normoactive bowel sounds x4 quadrants. Pt has no complaints of abnormal bowel movements, denies blood in stool. Pt reports normal appetite.   NEUROLOGIC: Eyes open spontaneously and facial expression symmetrical. Pt behavior appropriate to situation, and pt follows commands. Pt reports sensation present in all extremities when touched with a finger, denies any numbness or tingling. PERRLA  MUSCULOSKELETAL: Spontaneous movement noted to all extremities. Hand  equal and leg strength strong +5 bilaterally.   : No complaints of frequency, burning, urgency or blood in the urine. No complaints of incontinence.

## 2018-12-23 LAB
ALBUMIN SERPL BCP-MCNC: 2.6 G/DL
ALP SERPL-CCNC: 166 U/L
ALT SERPL W/O P-5'-P-CCNC: 33 U/L
ANION GAP SERPL CALC-SCNC: 6 MMOL/L
AST SERPL-CCNC: 27 U/L
BILIRUB DIRECT SERPL-MCNC: 1.5 MG/DL
BILIRUB SERPL-MCNC: 2.1 MG/DL
BUN SERPL-MCNC: 35 MG/DL
CALCIUM SERPL-MCNC: 9 MG/DL
CHLORIDE SERPL-SCNC: 103 MMOL/L
CO2 SERPL-SCNC: 26 MMOL/L
CREAT SERPL-MCNC: 2.2 MG/DL
ERYTHROCYTE [DISTWIDTH] IN BLOOD BY AUTOMATED COUNT: 15.3 %
EST. GFR  (AFRICAN AMERICAN): 37.9 ML/MIN/1.73 M^2
EST. GFR  (NON AFRICAN AMERICAN): 32.7 ML/MIN/1.73 M^2
GLUCOSE SERPL-MCNC: 266 MG/DL
HCT VFR BLD AUTO: 36.9 %
HGB BLD-MCNC: 12.3 G/DL
MAGNESIUM SERPL-MCNC: 1.5 MG/DL
MCH RBC QN AUTO: 25.4 PG
MCHC RBC AUTO-ENTMCNC: 33.3 G/DL
MCV RBC AUTO: 76 FL
PHOSPHATE SERPL-MCNC: 2.7 MG/DL
PLATELET # BLD AUTO: 115 K/UL
PMV BLD AUTO: 12.6 FL
POCT GLUCOSE: 224 MG/DL (ref 70–110)
POCT GLUCOSE: 257 MG/DL (ref 70–110)
POCT GLUCOSE: 271 MG/DL (ref 70–110)
POCT GLUCOSE: 306 MG/DL (ref 70–110)
POTASSIUM SERPL-SCNC: 4.2 MMOL/L
PROT SERPL-MCNC: 6 G/DL
RBC # BLD AUTO: 4.84 M/UL
SODIUM SERPL-SCNC: 135 MMOL/L
WBC # BLD AUTO: 4.64 K/UL

## 2018-12-23 PROCEDURE — 93005 ELECTROCARDIOGRAM TRACING: CPT

## 2018-12-23 PROCEDURE — 63600175 PHARM REV CODE 636 W HCPCS: Performed by: HOSPITALIST

## 2018-12-23 PROCEDURE — 83735 ASSAY OF MAGNESIUM: CPT

## 2018-12-23 PROCEDURE — 25000003 PHARM REV CODE 250: Performed by: STUDENT IN AN ORGANIZED HEALTH CARE EDUCATION/TRAINING PROGRAM

## 2018-12-23 PROCEDURE — 63600175 PHARM REV CODE 636 W HCPCS: Performed by: STUDENT IN AN ORGANIZED HEALTH CARE EDUCATION/TRAINING PROGRAM

## 2018-12-23 PROCEDURE — 11000001 HC ACUTE MED/SURG PRIVATE ROOM

## 2018-12-23 PROCEDURE — 80053 COMPREHEN METABOLIC PANEL: CPT

## 2018-12-23 PROCEDURE — 84100 ASSAY OF PHOSPHORUS: CPT

## 2018-12-23 PROCEDURE — 99223 1ST HOSP IP/OBS HIGH 75: CPT | Mod: ,,, | Performed by: HOSPITALIST

## 2018-12-23 PROCEDURE — 85027 COMPLETE CBC AUTOMATED: CPT

## 2018-12-23 PROCEDURE — 93010 ELECTROCARDIOGRAM REPORT: CPT | Mod: ,,, | Performed by: INTERNAL MEDICINE

## 2018-12-23 PROCEDURE — 82248 BILIRUBIN DIRECT: CPT

## 2018-12-23 PROCEDURE — 36415 COLL VENOUS BLD VENIPUNCTURE: CPT

## 2018-12-23 RX ORDER — LIDOCAINE HYDROCHLORIDE 20 MG/ML
15 SOLUTION OROPHARYNGEAL ONCE
Status: COMPLETED | OUTPATIENT
Start: 2018-12-23 | End: 2018-12-23

## 2018-12-23 RX ORDER — MAGNESIUM SULFATE HEPTAHYDRATE 40 MG/ML
2 INJECTION, SOLUTION INTRAVENOUS ONCE
Status: COMPLETED | OUTPATIENT
Start: 2018-12-23 | End: 2018-12-23

## 2018-12-23 RX ADMIN — INSULIN ASPART 1 UNITS: 100 INJECTION, SOLUTION INTRAVENOUS; SUBCUTANEOUS at 09:12

## 2018-12-23 RX ADMIN — INSULIN ASPART 3 UNITS: 100 INJECTION, SOLUTION INTRAVENOUS; SUBCUTANEOUS at 08:12

## 2018-12-23 RX ADMIN — LIDOCAINE HYDROCHLORIDE 15 ML: 20 SOLUTION OROPHARYNGEAL at 09:12

## 2018-12-23 RX ADMIN — HEPARIN SODIUM 5000 UNITS: 5000 INJECTION, SOLUTION INTRAVENOUS; SUBCUTANEOUS at 05:12

## 2018-12-23 RX ADMIN — FUROSEMIDE 80 MG: 10 INJECTION, SOLUTION INTRAVENOUS at 08:12

## 2018-12-23 RX ADMIN — ASPIRIN 81 MG CHEWABLE TABLET 81 MG: 81 TABLET CHEWABLE at 08:12

## 2018-12-23 RX ADMIN — FUROSEMIDE 80 MG: 10 INJECTION, SOLUTION INTRAVENOUS at 05:12

## 2018-12-23 RX ADMIN — HEPARIN SODIUM 5000 UNITS: 5000 INJECTION, SOLUTION INTRAVENOUS; SUBCUTANEOUS at 09:12

## 2018-12-23 RX ADMIN — INSULIN ASPART 4 UNITS: 100 INJECTION, SOLUTION INTRAVENOUS; SUBCUTANEOUS at 11:12

## 2018-12-23 RX ADMIN — MAGNESIUM SULFATE IN WATER 2 G: 40 INJECTION, SOLUTION INTRAVENOUS at 10:12

## 2018-12-23 RX ADMIN — ATORVASTATIN CALCIUM 80 MG: 20 TABLET, FILM COATED ORAL at 09:12

## 2018-12-23 RX ADMIN — INSULIN ASPART 2 UNITS: 100 INJECTION, SOLUTION INTRAVENOUS; SUBCUTANEOUS at 05:12

## 2018-12-23 RX ADMIN — LEVOTHYROXINE SODIUM 175 MCG: 25 TABLET ORAL at 05:12

## 2018-12-23 NOTE — ASSESSMENT & PLAN NOTE
- Pt with good visual acuity on exam with Snellen chart  - Likely secondary to hyperglycemia  - Continue to monitor

## 2018-12-23 NOTE — ED NOTES
Telemetry Verification   Patient placed on Telemetry Box  Verified by   Box # [id number]   Monitor Tech    Rate    Rhythm

## 2018-12-23 NOTE — CONSULTS
" [facility]  Adult Nutrition  Consult Note     SUMMARY       Recommendations  Recommendation/Intervention:   1. Continue diet order as tolerated   2. Encourage good po intake   3. Diabetic and Heart Failure Education provided.   4. Dietitian Following    Goals: Patient to meet >85% of EEN/EPN  Nutrition Goal Status: new  Communication of RD Recs: (POC)    Reason for Assessment  Reason For Assessment: consult  Diagnosis: cardiac disease(Acute on chronic combined systolic & diastolic CHF)  Relevant Medical History: CHF, T2DM, HTN, HLD, Hypothyroidism  General Information Comments: Consult received "diabetes and heart failure education and dietary recommendations" Patient tolerating Diabetic 2000 Calorie, Cardiac (Low Na/Chol), with 1.5L Fluid Restriction. Discussed Diabetic and Heart Failure diet recommendations, provided handouts.Reports no recent wt changes. Weight stable per chart. Good po intake PTA. Malnutrition not indicated at this time.  Nutrition Discharge Planning: Adequate nutrition     Nutrition Risk Screen  Nutrition Risk Screen: no indicators present    Nutrition/Diet History  Spiritual, Cultural Beliefs, Confucianist Practices, Values that Affect Care: other (see comments)(Mandaen)  Food Allergies: NKFA  Factors Affecting Nutritional Intake: None identified at this time    Anthropometrics  Temp: 98 °F (36.7 °C)  Height Method: Stated  Height: 5' 7" (170.2 cm)  Height (inches): 67 in  Weight Method: Standard Scale  Weight: 78 kg (171 lb 15.3 oz)  Weight (lb): 171.96 lb  Ideal Body Weight (IBW), Male: 148 lb  % Ideal Body Weight, Male (lb): 116.04 lb  BMI (Calculated): 27  BMI Grade: 25 - 29.9 - overweight     Lab/Procedures/Meds  Labs: Reviewed    (L) 12/23/2018    K 4.2 12/23/2018     12/23/2018    CO2 26 12/23/2018    BUN 35 (H) 12/23/2018    CREATININE 2.2 (H) 12/23/2018    CALCIUM 9.0 12/23/2018    PHOS 2.7 12/23/2018    MG 1.5 (L) 12/23/2018    ESTGFRAFRICA 37.9 (A) " 12/23/2018    ALBUMIN 2.6 (L) 12/23/2018      HGBA1C 7.5 (H) 11/26/2018     Meds: Reviewed  Scheduled Meds:   aspirin  81 mg Oral Daily    atorvastatin  80 mg Oral QHS    furosemide  80 mg Intravenous BID    heparin (porcine)  5,000 Units Subcutaneous Q8H    levothyroxine  175 mcg Oral Before breakfast    magnesium sulfate IVPB  2 g Intravenous Once     Estimated/Assessed Needs  Weight Used For Calorie Calculations: 78 kg (171 lb 15.3 oz)  Energy Calorie Requirements (kcal): 1950  Energy Need Method: Kcal/kg(25 kcal/kg)  Protein Requirements: 78(1.0 gm/kg)  Weight Used For Protein Calculations: 78 kg (171 lb 15.3 oz)  Fluid Requirements (mL): 1mL/kcal or per MD  Estimated Fluid Requirement Method: RDA Method(1mL/kcal or per MD)  RDA Method (mL): 1950     Nutrition Prescription Ordered  Current Diet Order: Diabetic 2000 Calorie, Cardiac (Low Na/Chol)   Nutrition Order Comments: Fluid 1500mL    Evaluation of Received Nutrient/Fluid Intake in last 24h  I/O: Noted  Comments: LBM 12/22/18  % Intake of Estimated Energy Needs: %  % Meal Intake: %    Nutrition Risk  Level of Risk/Frequency of Follow-up: low(1x week)     Assessment and Plan  No Nutrition Dx at this time.  Will continue to monitor.    Monitor and Evaluation  Food and Nutrient Intake: food and beverage intake, energy intake  Food and Nutrient Adminstration: diet order  Anthropometric Measurements: weight change, weight  Biochemical Data, Medical Tests and Procedures: electrolyte and renal panel, gastrointestinal profile, glucose/endocrine profile, inflammatory profile, lipid profile  Nutrition-Focused Physical Findings: overall appearance     Nutrition Follow-Up  RD Follow-up?: Yes

## 2018-12-23 NOTE — ASSESSMENT & PLAN NOTE
- Pt with elevated sCr of 2.7 on admission. Uncertain of pt's true baseline, but sCr of 1.6 in 7/2018.  - May be cardiorenal in setting of hypervolemia  - Will see if improves with diuresis  - Avoid nephrotoxic medications  - Monitor renal function

## 2018-12-23 NOTE — ASSESSMENT & PLAN NOTE
- HbA1c 7.5% on 11/26/2018, indicating suboptimal long-term glycemic control  - Pt states he has not been on any diabetes medications in approximately 1 year  - s/p 8u regular insulin in ED for BG>500, with adequate response  - Low dose SSI given renal dysfunction  - Will adjust insulin to scheduled

## 2018-12-23 NOTE — ASSESSMENT & PLAN NOTE
- HbA1c 7.5% on 11/26/2018, indicating suboptimal long-term glycemic control  - Pt states he has not been on any diabetes medications in approximately 1 year  - s/p 8u regular insulin in ED for BG>500, with adequate response  - Low dose SSI given renal dysfunction  - Will adjust insulin to scheduled  - Pt will need an oral regimen upon discharge

## 2018-12-23 NOTE — HPI
"Ashish Mckeon is a 52 yo male with a hx of combined systolic-diastolic heart failure, HTN, HLD, T2DM and hypothyroidisim, presenting with a 2-week hx of blurry vision.  Pt states that approximately 2 weeks ago he began to notice that his vision was blurry.  Pt states that he does not have a hx of any eye problems and does not wear corrective lenses/eyeglasses.  He states his visual acuity to near objects is unchanged, but things at a distance are now blurry.  He denies any aggravating/alleviating factors, and states the blurry vision is largely unchanged since he first noticed it.  He denies any dizziness/lightheadedness, photophobia, eye trauma or pain, focal weakness or numbness.  He states his eyes have been "redder" recently.    In the ED, the pt was noted to have a BGL of 491, so he was given 8 units of regular insulin and 500cc NS bolus.  As he was found to be hypervolemic on exam, pt was admitted for management of ADHF and hyperglycemia.    Of note, pt has had multiple admissions this year for management of heart failure.  He reports that he does not take his medications as prescribed and has not followed up as an outpatient.  "

## 2018-12-23 NOTE — HOSPITAL COURSE
Pt admitted to hospital medicine on 12/22 for acute decompensated heart failure and hyperglycemia.  He was also found to have an KEYANNA determined to be cardiorenal in etiology.  In the ED, pt found to be hyperglycemic with BGL of 491, so given 8 units of regular insulin with improvement in his blood glucose.  He was diuresed with IV Lasix with improvement in his volume status.  On 12/24, patient requested discharge.  He was educated on the importance of medication adherence and understood that he should talk with his doctor before discontinuing any medications.  He was discharged on lasix 80 BID, PRN lasix for weight gain, Toprol XL 50 daily, and glipizide.  He was advised to follow up with his PCP.

## 2018-12-23 NOTE — ASSESSMENT & PLAN NOTE
Pt is a 54 yo male with a hx of combined systolic-diastolic heart failure, T2DM, CKD4, presenting with a 1-week hx of blurry vision, found to be hyperglycemic in the ED.  On exam, pt clinically hypervolemic with rales, JVD and bilateral lower extremity edema.  BNP 2679.    - TTE on 11/26/2018 with EF 25% and grade III diastolic dysfunction  - Pt prescribed Lasix 40mg BID at home, but has been taking once daily  - Pt s/p 80mg IV Lasix x1 in ED  - Strict I/Os, daily weights, diabetic/cardiac diet with fluid restriction  - Continue Lasix 80mg IV BID  - Resume beta blocker when deemed safe  - Consider starting Entresto and Aldactone this admission

## 2018-12-23 NOTE — SUBJECTIVE & OBJECTIVE
Interval History: No acute events overnight.  Pt reports improvement in his lower extremity edema and blurry vision this morning.  He states he has an appointment with his optometrist scheduled for 12/31/2018.    Review of Systems   Constitutional: Negative for chills and fever.   HENT: Negative for trouble swallowing and voice change.    Eyes: Positive for visual disturbance (blurry vision, improving). Negative for photophobia, pain and discharge.   Respiratory: Positive for cough. Negative for shortness of breath.    Cardiovascular: Positive for leg swelling. Negative for chest pain and palpitations.   Gastrointestinal: Negative for abdominal pain, blood in stool, constipation, diarrhea, nausea and vomiting.   Genitourinary: Negative for dysuria and hematuria.   Musculoskeletal: Negative for gait problem.   Skin: Negative for color change and wound.   Neurological: Negative for dizziness, weakness, light-headedness, numbness and headaches.     Objective:     Vital Signs (Most Recent):  Temp: 98.1 °F (36.7 °C) (12/23/18 1216)  Pulse: 89 (12/23/18 1216)  Resp: 18 (12/23/18 1216)  BP: 121/81 (12/23/18 1216)  SpO2: 97 % (12/23/18 1216) Vital Signs (24h Range):  Temp:  [97.1 °F (36.2 °C)-98.1 °F (36.7 °C)] 98.1 °F (36.7 °C)  Pulse:  [] 89  Resp:  [14-24] 18  SpO2:  [92 %-100 %] 97 %  BP: (116-141)/(63-94) 121/81     Weight: 78 kg (171 lb 15.3 oz)  Body mass index is 26.93 kg/m².    Intake/Output Summary (Last 24 hours) at 12/23/2018 1413  Last data filed at 12/23/2018 1100  Gross per 24 hour   Intake 1580 ml   Output 1175 ml   Net 405 ml      Physical Exam   Constitutional: He is oriented to person, place, and time. No distress.   HENT:   Head: Normocephalic and atraumatic.   Eyes: EOM are normal. Pupils are equal, round, and reactive to light. Right conjunctiva is injected. Left conjunctiva is injected.   Neck: Normal range of motion. Neck supple. JVD present.   Cardiovascular: Normal rate, regular rhythm and  normal heart sounds.   Pulmonary/Chest: Effort normal. No respiratory distress. He has rales in the right lower field and the left lower field.   Abdominal: Soft. Bowel sounds are normal. There is no tenderness.   Musculoskeletal: He exhibits edema (1+ pitting edema to level of knee bilaterally). He exhibits no tenderness.   Neurological: He is alert and oriented to person, place, and time. No cranial nerve deficit.   Skin: Skin is warm and dry. He is not diaphoretic.   Psychiatric: He has a normal mood and affect.       Significant Labs:   CBC:   Recent Labs   Lab 12/22/18  1518 12/23/18  0728   WBC 4.97 4.64   HGB 13.1* 12.3*   HCT 41.0 36.9*   * 115*     CMP:   Recent Labs   Lab 12/22/18  1518 12/23/18  0728   * 135*   K 4.6 4.2    103   CO2 22* 26   * 266*   BUN 39* 35*   CREATININE 2.7* 2.2*   CALCIUM 9.2 9.0   PROT 7.3 6.0   ALBUMIN 3.2* 2.6*   BILITOT 2.3* 2.1*   ALKPHOS 199* 166*   AST 37 27   ALT 44 33   ANIONGAP 11 6*   EGFRNONAA 25.6* 32.7*     Magnesium:   Recent Labs   Lab 12/23/18  0728   MG 1.5*     Troponin:   Recent Labs   Lab 12/22/18  1518 12/22/18  1804   TROPONINI 0.064* 0.060*     TSH:   Recent Labs   Lab 12/22/18  1518   TSH 0.823     All pertinent labs within the past 24 hours have been reviewed.    Significant Imaging: I have reviewed all pertinent imaging results/findings within the past 24 hours.

## 2018-12-23 NOTE — ASSESSMENT & PLAN NOTE
- Pt with good visual acuity on exam with Snellen chart  - Likely secondary to hyperglycemia  - Pt reports improving  - Pt states he has optometry appt on 12/31

## 2018-12-23 NOTE — ASSESSMENT & PLAN NOTE
- Pt with large pulmonary nodules and mediastinal lymphadenopathy seen on CT chest from last admission, seen again on CXR this admission  - Pt denies smoking hx, but states wife is long-time smoker  - Pt will need follow up with pulmonology as outpatient

## 2018-12-23 NOTE — SUBJECTIVE & OBJECTIVE
"Past Medical History:   Diagnosis Date    Diabetes mellitus type II     Essential hypertension, benign     Hyperlipidemia     Hypothyroidism     Undiagnosed cardiac murmurs        Past Surgical History:   Procedure Laterality Date    COLON SURGERY      "lower intestines removed"    THYROID SURGERY         Review of patient's allergies indicates:   Allergen Reactions    Lisinopril Other (See Comments)     acei cough         No current facility-administered medications on file prior to encounter.      Current Outpatient Medications on File Prior to Encounter   Medication Sig    aspirin 81 MG Chew Take 1 tablet (81 mg total) by mouth once daily.    furosemide (LASIX) 40 MG tablet Take 1 tablet (40 mg total) by mouth 2 (two) times daily.    LEVOTHYROXINE SODIUM (SYNTHROID ORAL) Take 175 mcg by mouth daily 2 hours after breakfast.     metoprolol succinate (TOPROL-XL) 50 MG 24 hr tablet Take 3 tablets (150 mg total) by mouth once daily.    acetaminophen (TYLENOL) 325 MG tablet Take 2 tablets (650 mg total) by mouth every 6 to 8 hours as needed.    atorvastatin (LIPITOR) 80 MG tablet Take 1 tablet (80 mg total) by mouth every evening.    hydrocodone-homatropine 5-1.5 mg/5 ml (HYCODAN) 5-1.5 mg/5 mL Syrp Take 5 mLs by mouth every 4 (four) hours as needed (cough).    pantoprazole (PROTONIX) 40 MG tablet Take 1 tablet (40 mg total) by mouth once daily.     Family History     Problem Relation (Age of Onset)    Cancer Father        Tobacco Use    Smoking status: Never Smoker    Smokeless tobacco: Never Used    Tobacco comment: cigars "every other week or so"   Substance and Sexual Activity    Alcohol use: Yes     Alcohol/week: 3.6 oz     Types: 6 Cans of beer per week    Drug use: Yes     Types: Marijuana    Sexual activity: Yes     Partners: Female     Review of Systems   Constitutional: Negative for chills and fever.   HENT: Negative for trouble swallowing and voice change.    Eyes: Positive for visual " disturbance. Negative for photophobia, pain and discharge.   Respiratory: Positive for cough. Negative for shortness of breath.    Cardiovascular: Positive for leg swelling. Negative for chest pain and palpitations.   Gastrointestinal: Positive for vomiting. Negative for abdominal pain, blood in stool, constipation, diarrhea and nausea.   Genitourinary: Negative for dysuria and hematuria.   Musculoskeletal: Negative for gait problem.   Skin: Negative for color change and wound.   Neurological: Negative for dizziness, weakness, light-headedness, numbness and headaches.     Objective:     Vital Signs (Most Recent):  Temp: 97.6 °F (36.4 °C) (12/22/18 1421)  Pulse: 91 (12/22/18 1827)  Resp: 20 (12/22/18 1827)  BP: 136/87 (12/22/18 1816)  SpO2: (!) 94 % (12/22/18 1827) Vital Signs (24h Range):  Temp:  [97.6 °F (36.4 °C)] 97.6 °F (36.4 °C)  Pulse:  [] 91  Resp:  [17-24] 20  SpO2:  [92 %-98 %] 94 %  BP: (121-141)/(83-94) 136/87     Weight: 79.4 kg (175 lb 0.7 oz)  Body mass index is 25.12 kg/m².    Physical Exam   Constitutional: He is oriented to person, place, and time. No distress.   HENT:   Head: Normocephalic and atraumatic.   Eyes: EOM are normal. Pupils are equal, round, and reactive to light. Right conjunctiva is injected. Left conjunctiva is injected.   Neck: Normal range of motion. Neck supple. JVD present.   Cardiovascular: Normal rate, regular rhythm and normal heart sounds.   Pulmonary/Chest: Effort normal. No respiratory distress. He has rales.   Abdominal: Soft. Bowel sounds are normal. There is no tenderness.   Musculoskeletal: He exhibits edema (3+ pitting edema to level of knee bilaterally). He exhibits no tenderness.   Neurological: He is alert and oriented to person, place, and time. No cranial nerve deficit.   Skin: Skin is warm and dry. He is not diaphoretic.   Psychiatric: He has a normal mood and affect.         CRANIAL NERVES     CN II   Visual fields full to confrontation.   Visual acuity:  normal    CN III, IV, VI   Pupils are equal, round, and reactive to light.  Extraocular motions are normal.        Significant Labs:   A1C:   Recent Labs   Lab 07/16/18  1738 11/26/18  1511   HGBA1C 8.8* 7.5*     CBC:   Recent Labs   Lab 12/22/18  1518   WBC 4.97   HGB 13.1*   HCT 41.0   *     CMP:   Recent Labs   Lab 12/22/18  1518   *   K 4.6      CO2 22*   *   BUN 39*   CREATININE 2.7*   CALCIUM 9.2   PROT 7.3   ALBUMIN 3.2*   BILITOT 2.3*   ALKPHOS 199*   AST 37   ALT 44   ANIONGAP 11   EGFRNONAA 25.6*     POCT Glucose:   Recent Labs   Lab 12/22/18  1424 12/22/18  1534 12/22/18  1656   POCTGLUCOSE 442* 491* 233*     Troponin:   Recent Labs   Lab 12/22/18  1518 12/22/18  1804   TROPONINI 0.064* 0.060*     TSH:   Recent Labs   Lab 12/22/18  1518   TSH 0.823     Urine Studies:   Recent Labs   Lab 12/22/18  1519   COLORU Yellow   APPEARANCEUA Clear   PHUR 5.0   SPECGRAV 1.015   PROTEINUA 2+*   GLUCUA 3+*   KETONESU Negative   BILIRUBINUA Negative   OCCULTUA 1+*   NITRITE Negative   LEUKOCYTESUR Negative   RBCUA 3   WBCUA 1   BACTERIA Occasional   SQUAMEPITHEL 0   HYALINECASTS 6*     All pertinent labs within the past 24 hours have been reviewed.    Significant Imaging: I have reviewed all pertinent imaging results/findings within the past 24 hours.     Imaging Results          X-Ray Chest AP Portable (Final result)  Result time 12/22/18 16:00:36    Final result by Adolfo Almanzar MD (12/22/18 16:00:36)                 Impression:      No acute abnormality.    RIGHT-sided pulmonary nodule as demonstrated on CT of 11/26/2018.  Malignant etiology not excluded.    Mild cardiomegaly.      Electronically signed by: Adolfo Almanzar MD  Date:    12/22/2018  Time:    16:00             Narrative:    EXAMINATION:  XR CHEST AP PORTABLE    CLINICAL HISTORY:  Heart failure, unspecified    TECHNIQUE:  Single frontal view of the chest was performed.    COMPARISON:  None    FINDINGS:  Right nodules,  potentially malignant.The lungs are clear, with normal appearance of pulmonary vasculature and no pleural effusion or pneumothorax.    The cardiac silhouette is normal in size. The hilar and mediastinal contours are unremarkable.    Bones are intact.

## 2018-12-23 NOTE — ASSESSMENT & PLAN NOTE
Pt is a 52 yo male with a hx of combined systolic-diastolic heart failure, T2DM, CKD4, presenting with a 1-week hx of blurry vision, found to be hyperglycemic in the ED.  On exam, pt clinically hypervolemic with rales, JVD and bilateral lower extremity edema.  BNP 2679.    - TTE on 11/26/2018 with EF 25% and grade III diastolic dysfunction  - Pt prescribed Lasix 40mg BID at home, but has been taking once daily  - Pt s/p 80mg IV Lasix x1 in ED  - Strict I/Os, daily weights, diabetic/cardiac diet with fluid restriction  - Continue Lasix 80mg IV BID  - Resume beta blocker when deemed safe  - Pt not on GDMT. Consider starting Entresto or Aldactone this admission.

## 2018-12-23 NOTE — PLAN OF CARE
Problem: Adult Inpatient Plan of Care  Goal: Plan of Care Review  Outcome: Ongoing (interventions implemented as appropriate)  Pt AAO X 4 able to express needs. Denies pain/discomfort.  Amb independently around room.  Cont of B& B; utilizes urinal. Daily weights.  VSS throughout shift.  Accu checks WDL; no insulin needed.  No acute events over night.  Call light within reach.  Bed locked and in lowest position.  Will monitor.

## 2018-12-23 NOTE — PROGRESS NOTES
"[facility] Hospital Medicine  Progress Note    Patient Name: Ashish Mckeon  MRN: [id number]  Patient Class: IP- Inpatient   Admission Date: 12/22/2018  Length of Stay: 1 days  Attending Physician: Sierra Brandt MD  Primary Care Provider: [facility] Hospital Medicine Team: [facility] 1 Eddie Cerda MD    Subjective:     Principal Problem:Acute on chronic combined systolic and diastolic congestive heart failure    HPI:  Ashish Mckeon is a 52 yo male with a hx of combined systolic-diastolic heart failure, HTN, HLD, T2DM and hypothyroidisim, presenting with a 2-week hx of blurry vision.  Pt states that approximately 2 weeks ago he began to notice that his vision was blurry.  Pt states that he does not have a hx of any eye problems and does not wear corrective lenses/eyeglasses.  He states his visual acuity to near objects is unchanged, but things at a distance are now blurry.  He denies any aggravating/alleviating factors, and states the blurry vision is largely unchanged since he first noticed it.  He denies any dizziness/lightheadedness, photophobia, eye trauma or pain, focal weakness or numbness.  He states his eyes have been "redder" recently.    In the ED, the pt was noted to have a BGL of 491, so he was given 8 units of regular insulin and 500cc NS bolus.  As he was found to be hypervolemic on exam, pt was admitted for management of ADHF and hyperglycemia.    Of note, pt has had multiple admissions this year for management of heart failure.  He reports that he does not take his medications as prescribed and has not followed up as an outpatient.    Hospital Course:  Pt admitted to hospital medicine on 12/22 for acute decompensated heart failure and hyperglycemia.  He was also found to have an KEYANNA.  In the ED, pt found to be hyperglycemic with BGL of 491, so given 8 units of regular insulin with improvement in his blood glucose.  He was diuresed with IV Lasix with " improvement in his volume status.    Interval History: No acute events overnight.  Pt reports improvement in his lower extremity edema and blurry vision this morning.  He states he has an appointment with his optometrist scheduled for 12/31/2018.    Review of Systems   Constitutional: Negative for chills and fever.   HENT: Negative for trouble swallowing and voice change.    Eyes: Positive for visual disturbance (blurry vision, improving). Negative for photophobia, pain and discharge.   Respiratory: Positive for cough. Negative for shortness of breath.    Cardiovascular: Positive for leg swelling. Negative for chest pain and palpitations.   Gastrointestinal: Negative for abdominal pain, blood in stool, constipation, diarrhea, nausea and vomiting.   Genitourinary: Negative for dysuria and hematuria.   Musculoskeletal: Negative for gait problem.   Skin: Negative for color change and wound.   Neurological: Negative for dizziness, weakness, light-headedness, numbness and headaches.     Objective:     Vital Signs (Most Recent):  Temp: 98.1 °F (36.7 °C) (12/23/18 1216)  Pulse: 89 (12/23/18 1216)  Resp: 18 (12/23/18 1216)  BP: 121/81 (12/23/18 1216)  SpO2: 97 % (12/23/18 1216) Vital Signs (24h Range):  Temp:  [97.1 °F (36.2 °C)-98.1 °F (36.7 °C)] 98.1 °F (36.7 °C)  Pulse:  [] 89  Resp:  [14-24] 18  SpO2:  [92 %-100 %] 97 %  BP: (116-141)/(63-94) 121/81     Weight: 78 kg (171 lb 15.3 oz)  Body mass index is 26.93 kg/m².    Intake/Output Summary (Last 24 hours) at 12/23/2018 1413  Last data filed at 12/23/2018 1100  Gross per 24 hour   Intake 1580 ml   Output 1175 ml   Net 405 ml      Physical Exam   Constitutional: He is oriented to person, place, and time. No distress.   HENT:   Head: Normocephalic and atraumatic.   Eyes: EOM are normal. Pupils are equal, round, and reactive to light. Right conjunctiva is injected. Left conjunctiva is injected.   Neck: Normal range of motion. Neck supple. JVD present.    Cardiovascular: Normal rate, regular rhythm and normal heart sounds.   Pulmonary/Chest: Effort normal. No respiratory distress. He has rales in the right lower field and the left lower field.   Abdominal: Soft. Bowel sounds are normal. There is no tenderness.   Musculoskeletal: He exhibits edema (1+ pitting edema to level of knee bilaterally). He exhibits no tenderness.   Neurological: He is alert and oriented to person, place, and time. No cranial nerve deficit.   Skin: Skin is warm and dry. He is not diaphoretic.   Psychiatric: He has a normal mood and affect.       Significant Labs:   CBC:   Recent Labs   Lab 12/22/18  1518 12/23/18  0728   WBC 4.97 4.64   HGB 13.1* 12.3*   HCT 41.0 36.9*   * 115*     CMP:   Recent Labs   Lab 12/22/18  1518 12/23/18  0728   * 135*   K 4.6 4.2    103   CO2 22* 26   * 266*   BUN 39* 35*   CREATININE 2.7* 2.2*   CALCIUM 9.2 9.0   PROT 7.3 6.0   ALBUMIN 3.2* 2.6*   BILITOT 2.3* 2.1*   ALKPHOS 199* 166*   AST 37 27   ALT 44 33   ANIONGAP 11 6*   EGFRNONAA 25.6* 32.7*     Magnesium:   Recent Labs   Lab 12/23/18  0728   MG 1.5*     Troponin:   Recent Labs   Lab 12/22/18  1518 12/22/18  1804   TROPONINI 0.064* 0.060*     TSH:   Recent Labs   Lab 12/22/18  1518   TSH 0.823     All pertinent labs within the past 24 hours have been reviewed.    Significant Imaging: I have reviewed all pertinent imaging results/findings within the past 24 hours.    Assessment/Plan:      * Acute on chronic combined systolic and diastolic congestive heart failure    Pt is a 52 yo male with a hx of combined systolic-diastolic heart failure, T2DM, CKD4, presenting with a 1-week hx of blurry vision, found to be hyperglycemic in the ED.  On exam, pt clinically hypervolemic with rales, JVD and bilateral lower extremity edema.  BNP 2679.    - TTE on 11/26/2018 with EF 25% and grade III diastolic dysfunction  - Pt prescribed Lasix 40mg BID at home, but has been taking once daily  - Pt s/p  80mg IV Lasix x1 in ED  - Strict I/Os, daily weights, diabetic/cardiac diet with fluid restriction  - Continue Lasix 80mg IV BID  - Resume beta blocker when deemed safe  - Pt not on GDMT. Consider starting Entresto or Aldactone this admission.     Hyperlipidemia    - Continue home atorvastatin 80mg po daily  - Continue home ASA 81mg po daily       Blurry vision, bilateral    - Pt with good visual acuity on exam with Snellen chart  - Likely secondary to hyperglycemia  - Pt reports improving  - Pt states he has optometry appt on 12/31     Pulmonary nodules    - Pt with large pulmonary nodules and mediastinal lymphadenopathy seen on CT chest from last admission, seen again on CXR this admission  - Pt denies smoking hx, but states wife is long-time smoker  - Pt will need follow up with pulmonology as outpatient       Hyperglycemia    - See Diabetes mellitus, type II     KEYANNA (acute kidney injury)    - Pt with elevated sCr of 2.7 on admission. Uncertain of pt's true baseline, but sCr of 1.6 in 7/2018.  - May be cardiorenal in setting of hypervolemia  - sCr improved on 12/23 following diuresis  - Avoid nephrotoxic medications  - Monitor renal function       Diabetes mellitus, type II    - HbA1c 7.5% on 11/26/2018, indicating suboptimal long-term glycemic control  - Pt states he has not been on any diabetes medications in approximately 1 year  - s/p 8u regular insulin in ED for BG>500, with adequate response  - Low dose SSI given renal dysfunction  - Will adjust insulin to scheduled  - Pt will need an oral regimen upon discharge       Hypothyroidism    - TSH wnl on admission  - Continue home Synthroid 175mcg po qAM       VTE Risk Mitigation (From admission, onward)        Ordered     heparin (porcine) injection 5,000 Units  Every 8 hours      12/22/18 1905     Place sequential compression device  Until discontinued      12/22/18 1905     IP VTE HIGH RISK PATIENT  Once      12/22/18 1905              Eddie Cerda,  MD  Department of Hospital Medicine   [facility]

## 2018-12-23 NOTE — PHARMACY MED REC
"Admission Medication Reconciliation - Pharmacy Consult Note    The home medication history was taken by Vicki Galloway, Pharmacy Technician. Based on information gathered and subsequent review by the clinical pharmacist, the items below may need attention.    You may go to "Admission" then "Reconcile Home Medications" tabs to review and/or act upon these items.    No issues noted with the medication reconciliation.    Please address this information as you see fit.  Feel free to contact us if you have any questions or require assistance.    Sierra Merritt, PharmD  PGY-2 Internal Medicine Pharmacy Resident  [id number]                .    .          "

## 2018-12-23 NOTE — ASSESSMENT & PLAN NOTE
- Pt with elevated sCr of 2.7 on admission. Uncertain of pt's true baseline, but sCr of 1.6 in 7/2018.  - May be cardiorenal in setting of hypervolemia  - sCr improved on 12/23 following diuresis  - Avoid nephrotoxic medications  - Monitor renal function

## 2018-12-23 NOTE — H&P
"[facility] Hospital Medicine  History & Physical    Patient Name: Ashish Mckeon  MRN: [id number]  Admission Date: 12/22/2018  Attending Physician: Sierra Brandt MD   Primary Care Provider: [facility] Hospital Medicine Team: [facility] H[facility] MED 1 Eddie Cerda MD     Patient information was obtained from patient, past medical records and ER records.     Subjective:     Principal Problem:Acute on chronic combined systolic and diastolic congestive heart failure    Chief Complaint:   Chief Complaint   Patient presents with    Blurred Vision     Blurred vision x 3 days.  Pt states that he has not had his "diabetic pills" in a few weeks        HPI: Ashish Mckeon is a 52 yo male with a hx of combined systolic-diastolic heart failure, HTN, HLD, T2DM and hypothyroidisim, presenting with a 2-week hx of blurry vision.  Pt states that approximately 2 weeks ago he began to notice that his vision was blurry.  Pt states that he does not have a hx of any eye problems and does not wear corrective lenses/eyeglasses.  He states his visual acuity to near objects is unchanged, but things at a distance are now blurry.  He denies any aggravating/alleviating factors, and states the blurry vision is largely unchanged since he first noticed it.  He denies any dizziness/lightheadedness, photophobia, eye trauma or pain, focal weakness or numbness.  He states his eyes have been "redder" recently.    In the ED, the pt was noted to have a BGL of 491, so he was given 8 units of regular insulin and 500cc NS bolus.  As he was found to be hypervolemic on exam, pt was admitted for management of ADHF and hyperglycemia.    Of note, pt has had multiple admissions this year for management of heart failure.  He reports that he does not take his medications as prescribed and has not followed up as an outpatient.    Past Medical History:   Diagnosis Date    Diabetes mellitus type II     Essential hypertension, " "benign     Hyperlipidemia     Hypothyroidism     Undiagnosed cardiac murmurs        Past Surgical History:   Procedure Laterality Date    COLON SURGERY      "lower intestines removed"    THYROID SURGERY         Review of patient's allergies indicates:   Allergen Reactions    Lisinopril Other (See Comments)     acei cough         No current facility-administered medications on file prior to encounter.      Current Outpatient Medications on File Prior to Encounter   Medication Sig    aspirin 81 MG Chew Take 1 tablet (81 mg total) by mouth once daily.    furosemide (LASIX) 40 MG tablet Take 1 tablet (40 mg total) by mouth 2 (two) times daily.    LEVOTHYROXINE SODIUM (SYNTHROID ORAL) Take 175 mcg by mouth daily 2 hours after breakfast.     metoprolol succinate (TOPROL-XL) 50 MG 24 hr tablet Take 3 tablets (150 mg total) by mouth once daily.    acetaminophen (TYLENOL) 325 MG tablet Take 2 tablets (650 mg total) by mouth every 6 to 8 hours as needed.    atorvastatin (LIPITOR) 80 MG tablet Take 1 tablet (80 mg total) by mouth every evening.    hydrocodone-homatropine 5-1.5 mg/5 ml (HYCODAN) 5-1.5 mg/5 mL Syrp Take 5 mLs by mouth every 4 (four) hours as needed (cough).    pantoprazole (PROTONIX) 40 MG tablet Take 1 tablet (40 mg total) by mouth once daily.     Family History     Problem Relation (Age of Onset)    Cancer Father        Tobacco Use    Smoking status: Never Smoker    Smokeless tobacco: Never Used    Tobacco comment: cigars "every other week or so"   Substance and Sexual Activity    Alcohol use: Yes     Alcohol/week: 3.6 oz     Types: 6 Cans of beer per week    Drug use: Yes     Types: Marijuana    Sexual activity: Yes     Partners: Female     Review of Systems   Constitutional: Negative for chills and fever.   HENT: Negative for trouble swallowing and voice change.    Eyes: Positive for visual disturbance. Negative for photophobia, pain and discharge.   Respiratory: Positive for cough. " Negative for shortness of breath.    Cardiovascular: Positive for leg swelling. Negative for chest pain and palpitations.   Gastrointestinal: Positive for vomiting. Negative for abdominal pain, blood in stool, constipation, diarrhea and nausea.   Genitourinary: Negative for dysuria and hematuria.   Musculoskeletal: Negative for gait problem.   Skin: Negative for color change and wound.   Neurological: Negative for dizziness, weakness, light-headedness, numbness and headaches.     Objective:     Vital Signs (Most Recent):  Temp: 97.6 °F (36.4 °C) (12/22/18 1421)  Pulse: 91 (12/22/18 1827)  Resp: 20 (12/22/18 1827)  BP: 136/87 (12/22/18 1816)  SpO2: (!) 94 % (12/22/18 1827) Vital Signs (24h Range):  Temp:  [97.6 °F (36.4 °C)] 97.6 °F (36.4 °C)  Pulse:  [] 91  Resp:  [17-24] 20  SpO2:  [92 %-98 %] 94 %  BP: (121-141)/(83-94) 136/87     Weight: 79.4 kg (175 lb 0.7 oz)  Body mass index is 25.12 kg/m².    Physical Exam   Constitutional: He is oriented to person, place, and time. No distress.   HENT:   Head: Normocephalic and atraumatic.   Eyes: EOM are normal. Pupils are equal, round, and reactive to light. Right conjunctiva is injected. Left conjunctiva is injected.   Neck: Normal range of motion. Neck supple. JVD present.   Cardiovascular: Normal rate, regular rhythm and normal heart sounds.   Pulmonary/Chest: Effort normal. No respiratory distress. He has rales.   Abdominal: Soft. Bowel sounds are normal. There is no tenderness.   Musculoskeletal: He exhibits edema (3+ pitting edema to level of knee bilaterally). He exhibits no tenderness.   Neurological: He is alert and oriented to person, place, and time. No cranial nerve deficit.   Skin: Skin is warm and dry. He is not diaphoretic.   Psychiatric: He has a normal mood and affect.         CRANIAL NERVES     CN II   Visual fields full to confrontation.   Visual acuity: normal    CN III, IV, VI   Pupils are equal, round, and reactive to light.  Extraocular motions  are normal.        Significant Labs:   A1C:   Recent Labs   Lab 07/16/18  1738 11/26/18  1511   HGBA1C 8.8* 7.5*     CBC:   Recent Labs   Lab 12/22/18  1518   WBC 4.97   HGB 13.1*   HCT 41.0   *     CMP:   Recent Labs   Lab 12/22/18  1518   *   K 4.6      CO2 22*   *   BUN 39*   CREATININE 2.7*   CALCIUM 9.2   PROT 7.3   ALBUMIN 3.2*   BILITOT 2.3*   ALKPHOS 199*   AST 37   ALT 44   ANIONGAP 11   EGFRNONAA 25.6*     POCT Glucose:   Recent Labs   Lab 12/22/18  1424 12/22/18  1534 12/22/18  1656   POCTGLUCOSE 442* 491* 233*     Troponin:   Recent Labs   Lab 12/22/18  1518 12/22/18  1804   TROPONINI 0.064* 0.060*     TSH:   Recent Labs   Lab 12/22/18  1518   TSH 0.823     Urine Studies:   Recent Labs   Lab 12/22/18  1519   COLORU Yellow   APPEARANCEUA Clear   PHUR 5.0   SPECGRAV 1.015   PROTEINUA 2+*   GLUCUA 3+*   KETONESU Negative   BILIRUBINUA Negative   OCCULTUA 1+*   NITRITE Negative   LEUKOCYTESUR Negative   RBCUA 3   WBCUA 1   BACTERIA Occasional   SQUAMEPITHEL 0   HYALINECASTS 6*     All pertinent labs within the past 24 hours have been reviewed.    Significant Imaging: I have reviewed all pertinent imaging results/findings within the past 24 hours.     Imaging Results          X-Ray Chest AP Portable (Final result)  Result time 12/22/18 16:00:36    Final result by Adolfo Almanzar MD (12/22/18 16:00:36)                 Impression:      No acute abnormality.    RIGHT-sided pulmonary nodule as demonstrated on CT of 11/26/2018.  Malignant etiology not excluded.    Mild cardiomegaly.      Electronically signed by: Adolfo Almanzar MD  Date:    12/22/2018  Time:    16:00             Narrative:    EXAMINATION:  XR CHEST AP PORTABLE    CLINICAL HISTORY:  Heart failure, unspecified    TECHNIQUE:  Single frontal view of the chest was performed.    COMPARISON:  None    FINDINGS:  Right nodules, potentially malignant.The lungs are clear, with normal appearance of pulmonary vasculature and no  pleural effusion or pneumothorax.    The cardiac silhouette is normal in size. The hilar and mediastinal contours are unremarkable.    Bones are intact.                              Assessment/Plan:     * Acute on chronic combined systolic and diastolic congestive heart failure    Pt is a 54 yo male with a hx of combined systolic-diastolic heart failure, T2DM, CKD4, presenting with a 1-week hx of blurry vision, found to be hyperglycemic in the ED.  On exam, pt clinically hypervolemic with rales, JVD and bilateral lower extremity edema.  BNP 2679.    - TTE on 11/26/2018 with EF 25% and grade III diastolic dysfunction  - Pt prescribed Lasix 40mg BID at home, but has been taking once daily  - Pt s/p 80mg IV Lasix x1 in ED  - Strict I/Os, daily weights, diabetic/cardiac diet with fluid restriction  - Continue Lasix 80mg IV BID  - Resume beta blocker when deemed safe  - Consider starting Entresto and Aldactone this admission     Hyperlipidemia    - Continue home atorvastatin 80mg po daily  - Continue home ASA 81mg po daily       Blurry vision, bilateral    - Pt with good visual acuity on exam with Snellen chart  - Likely secondary to hyperglycemia  - Continue to monitor       Pulmonary nodules    - Pt with large pulmonary nodules and mediastinal lymphadenopathy seen on CT chest from last admission, seen again on CXR this admission  - Pt denies smoking hx, but states wife is long-time smoker  - Pt will need follow up with pulmonology as outpatient       Hyperglycemia    - See Diabetes mellitus, type II     KEYANNA (acute kidney injury)    - Pt with elevated sCr of 2.7 on admission. Uncertain of pt's true baseline, but sCr of 1.6 in 7/2018.  - May be cardiorenal in setting of hypervolemia  - Will see if improves with diuresis  - Avoid nephrotoxic medications  - Monitor renal function       Diabetes mellitus, type II    - HbA1c 7.5% on 11/26/2018, indicating suboptimal long-term glycemic control  - Pt states he has not been on  any diabetes medications in approximately 1 year  - s/p 8u regular insulin in ED for BG>500, with adequate response  - Low dose SSI given renal dysfunction  - Will adjust insulin to scheduled       Hypothyroidism    - Continue home Synthroid 175mcg po qAM  - TSH pending         VTE Risk Mitigation (From admission, onward)        Ordered     heparin (porcine) injection 5,000 Units  Every 8 hours      12/22/18 1905     Place sequential compression device  Until discontinued      12/22/18 1905     IP VTE HIGH RISK PATIENT  Once      12/22/18 1905             Eddie Cerda MD  Department of Hospital Medicine   [facility]

## 2018-12-24 VITALS
BODY MASS INDEX: 26.26 KG/M2 | HEART RATE: 96 BPM | OXYGEN SATURATION: 98 % | SYSTOLIC BLOOD PRESSURE: 118 MMHG | RESPIRATION RATE: 20 BRPM | TEMPERATURE: 98 F | WEIGHT: 167.31 LBS | DIASTOLIC BLOOD PRESSURE: 84 MMHG | HEIGHT: 67 IN

## 2018-12-24 LAB
ALBUMIN SERPL BCP-MCNC: 2.5 G/DL
ALP SERPL-CCNC: 159 U/L
ALT SERPL W/O P-5'-P-CCNC: 29 U/L
ANION GAP SERPL CALC-SCNC: 9 MMOL/L
AST SERPL-CCNC: 26 U/L
BILIRUB SERPL-MCNC: 1.7 MG/DL
BUN SERPL-MCNC: 35 MG/DL
CALCIUM SERPL-MCNC: 8.5 MG/DL
CHLORIDE SERPL-SCNC: 100 MMOL/L
CO2 SERPL-SCNC: 25 MMOL/L
CREAT SERPL-MCNC: 2.1 MG/DL
ERYTHROCYTE [DISTWIDTH] IN BLOOD BY AUTOMATED COUNT: 15.4 %
EST. GFR  (AFRICAN AMERICAN): 40 ML/MIN/1.73 M^2
EST. GFR  (NON AFRICAN AMERICAN): 34.6 ML/MIN/1.73 M^2
GLUCOSE SERPL-MCNC: 310 MG/DL
HCT VFR BLD AUTO: 37.7 %
HGB BLD-MCNC: 12.4 G/DL
MAGNESIUM SERPL-MCNC: 1.6 MG/DL
MCH RBC QN AUTO: 25.2 PG
MCHC RBC AUTO-ENTMCNC: 32.9 G/DL
MCV RBC AUTO: 77 FL
PHOSPHATE SERPL-MCNC: 2.7 MG/DL
PLATELET # BLD AUTO: 120 K/UL
PMV BLD AUTO: 12.8 FL
POCT GLUCOSE: 269 MG/DL (ref 70–110)
POCT GLUCOSE: 320 MG/DL (ref 70–110)
POTASSIUM SERPL-SCNC: 4.3 MMOL/L
PROT SERPL-MCNC: 5.9 G/DL
RBC # BLD AUTO: 4.92 M/UL
SODIUM SERPL-SCNC: 134 MMOL/L
WBC # BLD AUTO: 3.21 K/UL

## 2018-12-24 PROCEDURE — 63600175 PHARM REV CODE 636 W HCPCS: Performed by: HOSPITALIST

## 2018-12-24 PROCEDURE — 85027 COMPLETE CBC AUTOMATED: CPT

## 2018-12-24 PROCEDURE — 36415 COLL VENOUS BLD VENIPUNCTURE: CPT

## 2018-12-24 PROCEDURE — 84100 ASSAY OF PHOSPHORUS: CPT

## 2018-12-24 PROCEDURE — 97161 PT EVAL LOW COMPLEX 20 MIN: CPT

## 2018-12-24 PROCEDURE — 97165 OT EVAL LOW COMPLEX 30 MIN: CPT

## 2018-12-24 PROCEDURE — 25000003 PHARM REV CODE 250: Performed by: STUDENT IN AN ORGANIZED HEALTH CARE EDUCATION/TRAINING PROGRAM

## 2018-12-24 PROCEDURE — 80053 COMPREHEN METABOLIC PANEL: CPT

## 2018-12-24 PROCEDURE — 83735 ASSAY OF MAGNESIUM: CPT

## 2018-12-24 PROCEDURE — 63600175 PHARM REV CODE 636 W HCPCS: Performed by: STUDENT IN AN ORGANIZED HEALTH CARE EDUCATION/TRAINING PROGRAM

## 2018-12-24 PROCEDURE — 99238 HOSP IP/OBS DSCHRG MGMT 30/<: CPT | Mod: ,,, | Performed by: HOSPITALIST

## 2018-12-24 RX ORDER — GLIPIZIDE 5 MG/1
5 TABLET, FILM COATED, EXTENDED RELEASE ORAL
Qty: 90 TABLET | Refills: 3 | Status: SHIPPED | OUTPATIENT
Start: 2018-12-24 | End: 2018-12-24 | Stop reason: SDUPTHER

## 2018-12-24 RX ORDER — INSULIN ASPART 100 [IU]/ML
2 INJECTION, SOLUTION INTRAVENOUS; SUBCUTANEOUS
Status: DISCONTINUED | OUTPATIENT
Start: 2018-12-24 | End: 2018-12-24 | Stop reason: HOSPADM

## 2018-12-24 RX ORDER — LEVOTHYROXINE SODIUM 175 UG/1
175 TABLET ORAL
Start: 2018-12-24

## 2018-12-24 RX ORDER — FUROSEMIDE 80 MG/1
80 TABLET ORAL 2 TIMES DAILY
Qty: 60 TABLET | Refills: 3 | Status: ON HOLD | OUTPATIENT
Start: 2018-12-24 | End: 2020-01-01 | Stop reason: HOSPADM

## 2018-12-24 RX ORDER — GLIPIZIDE 5 MG/1
5 TABLET, FILM COATED, EXTENDED RELEASE ORAL
Qty: 90 TABLET | Refills: 3 | Status: ON HOLD | OUTPATIENT
Start: 2018-12-24 | End: 2019-08-06

## 2018-12-24 RX ORDER — FUROSEMIDE 10 MG/ML
40 INJECTION INTRAMUSCULAR; INTRAVENOUS ONCE
Status: COMPLETED | OUTPATIENT
Start: 2018-12-24 | End: 2018-12-24

## 2018-12-24 RX ORDER — PANTOPRAZOLE SODIUM 40 MG/1
40 TABLET, DELAYED RELEASE ORAL DAILY PRN
Status: ON HOLD
Start: 2018-12-24 | End: 2019-08-05 | Stop reason: CLARIF

## 2018-12-24 RX ORDER — METOPROLOL SUCCINATE 50 MG/1
50 TABLET, EXTENDED RELEASE ORAL DAILY
Qty: 90 TABLET | Refills: 3 | Status: ON HOLD | OUTPATIENT
Start: 2018-12-24 | End: 2020-01-01 | Stop reason: HOSPADM

## 2018-12-24 RX ADMIN — INSULIN ASPART 3 UNITS: 100 INJECTION, SOLUTION INTRAVENOUS; SUBCUTANEOUS at 09:12

## 2018-12-24 RX ADMIN — ASPIRIN 81 MG CHEWABLE TABLET 81 MG: 81 TABLET CHEWABLE at 09:12

## 2018-12-24 RX ADMIN — LEVOTHYROXINE SODIUM 175 MCG: 25 TABLET ORAL at 06:12

## 2018-12-24 RX ADMIN — INSULIN ASPART 4 UNITS: 100 INJECTION, SOLUTION INTRAVENOUS; SUBCUTANEOUS at 12:12

## 2018-12-24 RX ADMIN — INSULIN ASPART 2 UNITS: 100 INJECTION, SOLUTION INTRAVENOUS; SUBCUTANEOUS at 12:12

## 2018-12-24 RX ADMIN — FUROSEMIDE 40 MG: 10 INJECTION, SOLUTION INTRAVENOUS at 12:12

## 2018-12-24 RX ADMIN — HEPARIN SODIUM 5000 UNITS: 5000 INJECTION, SOLUTION INTRAVENOUS; SUBCUTANEOUS at 02:12

## 2018-12-24 RX ADMIN — FUROSEMIDE 80 MG: 10 INJECTION, SOLUTION INTRAVENOUS at 09:12

## 2018-12-24 NOTE — PLAN OF CARE
Problem: Occupational Therapy Goal  Goal: Occupational Therapy Goal  Outcome: Outcome(s) achieved Date Met: 12/24/18  Eval and D/C - no needs.    LOS Lopez

## 2018-12-24 NOTE — PLAN OF CARE
Problem: Adult Inpatient Plan of Care  Goal: Plan of Care Review  Outcome: Ongoing (interventions implemented as appropriate)  Pt AAO X 4 able to express needs. Denies pain/discomfort.  Amb independently around room.  Cont of B& B; utilizes urinal. Daily weights.  VSS throughout shift.  Accu checks AC/HS; insulin provided as ordered.  No acute events over night.  Call light within reach.  Bed locked and in lowest position.  Will monitor.

## 2018-12-24 NOTE — PROGRESS NOTES
"[facility] Hospital Medicine  Progress Note    Patient Name: Ashish Mckeon  MRN: [id number]  Patient Class: IP- Inpatient   Admission Date: 12/22/2018  Length of Stay: 2 days  Attending Physician: Sierra Brandt MD  Primary Care Provider: [facility] Hospital Medicine Team: [facility] HOSP MED 1 Yoselin Siddiqi MD    Subjective:     Principal Problem:Acute on chronic combined systolic and diastolic congestive heart failure    HPI:  Ashish Mckeon is a 54 yo male with a hx of combined systolic-diastolic heart failure, HTN, HLD, T2DM and hypothyroidisim, presenting with a 2-week hx of blurry vision.  Pt states that approximately 2 weeks ago he began to notice that his vision was blurry.  Pt states that he does not have a hx of any eye problems and does not wear corrective lenses/eyeglasses.  He states his visual acuity to near objects is unchanged, but things at a distance are now blurry.  He denies any aggravating/alleviating factors, and states the blurry vision is largely unchanged since he first noticed it.  He denies any dizziness/lightheadedness, photophobia, eye trauma or pain, focal weakness or numbness.  He states his eyes have been "redder" recently.    In the ED, the pt was noted to have a BGL of 491, so he was given 8 units of regular insulin and 500cc NS bolus.  As he was found to be hypervolemic on exam, pt was admitted for management of ADHF and hyperglycemia.    Of note, pt has had multiple admissions this year for management of heart failure.  He reports that he does not take his medications as prescribed and has not followed up as an outpatient.    Hospital Course:  Pt admitted to hospital medicine on 12/22 for acute decompensated heart failure and hyperglycemia.  He was also found to have an KEYANNA determined to be cardiorenal in etiology.  In the ED, pt found to be hyperglycemic with BGL of 491, so given 8 units of regular insulin with improvement in his blood " glucose.  He was diuresed with IV Lasix with improvement in his volume status.  On 12/24, patient requested discharge.  He was educated on the importance of medication adherence and understood that he should talk with his doctor before discontinuing any medications.  He was discharged on lasix 80 BID, PRN lasix for weight gain, Toprol XL 50 daily, and glipizide.  He was advised to follow up with his PCP.      Interval History    Requested to go home today so he can enjoy the holidays with his family.  Feels his swelling has improved and understands importance of medication adherence.      ROS     Respiratory: no cough, no shortness of breath  Cardiovascular: no chest pain, no palpitations  Gastrointestinal: no nausea, no vomiting, no diarrhea, no constipation,  no abdominal pain    PEx  Temp:  [97.8 °F (36.6 °C)-98.6 °F (37 °C)]   Pulse:  []   Resp:  [12-20]   BP: (114-129)/(67-81)   SpO2:  [96 %-98 %]       Intake/Output Summary (Last 24 hours) at 12/24/2018 1202  Last data filed at 12/24/2018 0900  Gross per 24 hour   Intake 1500 ml   Output 3450 ml   Net -1950 ml         General Appearance: no acute distress   Heart: regular rate and rhythm  Respiratory: Normal respiratory effort, no crackles, breathing comfortably on RA. RLE trace edema, LLE 1+ pitting edema.  Abdomen: Soft, non-tender; bowel sounds active  Neurologic:  No focal numbness or weakness  Mental status: Alert, oriented x 4, affect appropriate             Assessment/Plan:      * Acute on chronic combined systolic and diastolic congestive heart failure    Pt is a 52 yo male with a hx of combined systolic-diastolic heart failure, T2DM, CKD4, presenting with a 1-week hx of blurry vision, found to be hyperglycemic in the ED.  On exam, pt clinically hypervolemic with rales, JVD and bilateral lower extremity edema.  BNP 2679.    - TTE on 11/26/2018 with EF 25% and grade III diastolic dysfunction  - Pt prescribed Lasix 40mg BID at home, but has been  taking once daily  - Strict I/Os, daily weights, diabetic/cardiac diet with fluid restriction  - Continue Lasix IV, then discharge today per patient request  - Toprol XL on discharge       Diabetes mellitus, type II    - HbA1c 7.5% on 11/26/2018, indicating suboptimal long-term glycemic control  - Pt states he has not been on any diabetes medications in approximately 1 year  - s/p 8u regular insulin in ED for BG>500, with adequate response  - Low dose SSI given renal dysfunction  - SubQ insulin for today.  - holding metformin on discharge, starting glipizide on discharge (bedside delivery to improve compliance)       Hyperlipidemia    - Continue home atorvastatin 80mg po daily  - Continue home ASA 81mg po daily       Blurry vision, bilateral    - Pt with good visual acuity on exam with Snellen chart  - Likely secondary to hyperglycemia  - Pt reports improving  - Pt states he has optometry appt on 12/31     Pulmonary nodules    - Pt with large pulmonary nodules and mediastinal lymphadenopathy seen on CT chest from last admission, seen again on CXR this admission  - Pt denies smoking hx, but states wife is long-time smoker  - Pt will need follow up with pulmonology as outpatient, no showed to his last pulm appointment       Hyperglycemia    - See Diabetes mellitus, type II     KEYANNA (acute kidney injury)    - Pt with elevated sCr of 2.7 on admission. Uncertain of pt's true baseline, but sCr of 1.6 in 7/2018.  - May be cardiorenal in setting of hypervolemia  - sCr improved on 12/23 following diuresis  - Avoid nephrotoxic medications  - Monitor renal function       Hypothyroidism    - TSH wnl on admission  - Continue home Synthroid 175mcg po qAM       VTE Risk Mitigation (From admission, onward)        Ordered     heparin (porcine) injection 5,000 Units  Every 8 hours      12/22/18 1905     Place sequential compression device  Until discontinued      12/22/18 1905     IP VTE HIGH RISK PATIENT  Once      12/22/18 1905               Yoselin Siddiqi MD  Department of Hospital Medicine   [facility]

## 2018-12-24 NOTE — PLAN OF CARE
See final note     12/24/18 1732   Discharge Assessment   Assessment Type Discharge Planning Assessment

## 2018-12-24 NOTE — ASSESSMENT & PLAN NOTE
- Pt with large pulmonary nodules and mediastinal lymphadenopathy seen on CT chest from last admission, seen again on CXR this admission  - Pt denies smoking hx, but states wife is long-time smoker  - Pt will need follow up with pulmonology as outpatient, no showed to his last pulm appointment

## 2018-12-24 NOTE — DISCHARGE SUMMARY
"[facility] Hospital Medicine  Discharge Summary      Patient Name: Ashish Mckeon  MRN: [id number]  Admission Date: 12/22/2018  Hospital Length of Stay: 2 days  Discharge Date and Time: 12/24/18  Attending Physician: Sierra Brandt MD   Discharging Provider: Yoselin Siddiqi MD  Primary Care Provider: [facility] Hospital Medicine Team: [facility] HOSP MED 1 Yoselin Siddiqi MD    HPI:   Ashish Mckeon is a 54 yo male with a hx of combined systolic-diastolic heart failure, HTN, HLD, T2DM and hypothyroidisim, presenting with a 2-week hx of blurry vision.  Pt states that approximately 2 weeks ago he began to notice that his vision was blurry.  Pt states that he does not have a hx of any eye problems and does not wear corrective lenses/eyeglasses.  He states his visual acuity to near objects is unchanged, but things at a distance are now blurry.  He denies any aggravating/alleviating factors, and states the blurry vision is largely unchanged since he first noticed it.  He denies any dizziness/lightheadedness, photophobia, eye trauma or pain, focal weakness or numbness.  He states his eyes have been "redder" recently.    In the ED, the pt was noted to have a BGL of 491, so he was given 8 units of regular insulin and 500cc NS bolus.  As he was found to be hypervolemic on exam, pt was admitted for management of ADHF and hyperglycemia.    Of note, pt has had multiple admissions this year for management of heart failure.  He reports that he does not take his medications as prescribed and has not followed up as an outpatient.    * No surgery found *      Hospital Course:   Pt admitted to hospital medicine on 12/22 for acute decompensated heart failure and hyperglycemia.  He was also found to have an KEYANNA determined to be cardiorenal in etiology.  In the ED, pt found to be hyperglycemic with BGL of 491, so given 8 units of regular insulin with improvement in his blood glucose.  He was diuresed " with IV Lasix with improvement in his volume status.  On 12/24, patient requested discharge.  He was educated on the importance of medication adherence and understood that he should talk with his doctor before discontinuing any medications.  He was discharged on lasix 80 BID, PRN lasix for weight gain, Toprol XL 50 daily, and glipizide.  He was advised to follow up with his PCP.       Consults:   Consults (From admission, onward)        Status Ordering Provider     Case Management/  Once     Provider:  (Not yet assigned)    Acknowledged FABI MAR     Inpatient consult to CardioMEMS Evaluation  Once     Provider:  (Not yet assigned)    Acknowledged FABI MAR     Inpatient consult to Registered Dietitian/Nutritionist  Once     Provider:  (Not yet assigned)    Completed FABI MAR          No new Assessment & Plan notes have been filed under this hospital service since the last note was generated.  Service: Hospital Medicine    Final Active Diagnoses:    Diagnosis Date Noted POA    PRINCIPAL PROBLEM:  Acute on chronic combined systolic and diastolic congestive heart failure [I50.43] 01/16/2017 Yes     Chronic    Diabetes mellitus, type II [E11.9]  Yes     Chronic    Hyperglycemia [R73.9] 12/22/2018 Unknown    Pulmonary nodules [R91.8] 12/22/2018 Unknown    Blurry vision, bilateral [H53.8] 12/22/2018 Unknown    Hyperlipidemia [E78.5] 12/22/2018 Unknown    KEYANNA (acute kidney injury) [N17.9] 07/17/2018 Yes    Hypothyroidism [E03.9]  Yes     Chronic      Problems Resolved During this Admission:       Discharged Condition: fair    Disposition:     Follow Up:  Follow-up Information     Please follow up.    Why:  January 3   At 11am  DR Ashish Garcia of [facility]               Patient Instructions:   No discharge procedures on file.        Pending Diagnostic Studies:     None         Medications:  Reconciled Home Medications:      Medication List      START taking  these medications    glipiZIDE 5 MG Tr24  Commonly known as:  GLUCOTROL  Take 1 tablet (5 mg total) by mouth daily with breakfast.     metoprolol succinate 50 MG 24 hr tablet  Commonly known as:  TOPROL-XL  Take 1 tablet (50 mg total) by mouth once daily.        CHANGE how you take these medications    furosemide 80 MG tablet  Commonly known as:  LASIX  Take 1 tablet (80 mg total) by mouth 2 (two) times daily. Take an extra 80mg daily as needed for weight gain of 3 lbs in 24 hrs  or 5 lbs/wk  What changed:    · medication strength  · how much to take  · additional instructions     levothyroxine 175 MCG tablet  Commonly known as:  SYNTHROID  Take 1 tablet (175 mcg total) by mouth before breakfast.  What changed:  when to take this     pantoprazole 40 MG tablet  Commonly known as:  PROTONIX  Take 1 tablet (40 mg total) by mouth daily as needed (heartburn).  What changed:    · when to take this  · reasons to take this        CONTINUE taking these medications    acetaminophen 325 MG tablet  Commonly known as:  TYLENOL  Take 2 tablets (650 mg total) by mouth every 6 to 8 hours as needed.     aspirin 81 MG Chew  Take 1 tablet (81 mg total) by mouth once daily.     atorvastatin 80 MG tablet  Commonly known as:  LIPITOR  Take 1 tablet (80 mg total) by mouth every evening.        STOP taking these medications    ALEVE ORAL            Indwelling Lines/Drains at time of discharge:   Lines/Drains/Airways          None          Time spent on the discharge of patient: 35 minutes  Patient was seen and examined on the date of discharge and determined to be suitable for discharge.         Yoselin Siddiqi MD  Department of Hospital Medicine  [facility]

## 2018-12-24 NOTE — ASSESSMENT & PLAN NOTE
- HbA1c 7.5% on 11/26/2018, indicating suboptimal long-term glycemic control  - Pt states he has not been on any diabetes medications in approximately 1 year  - s/p 8u regular insulin in ED for BG>500, with adequate response  - Low dose SSI given renal dysfunction  - SubQ insulin for today.  - holding metformin on discharge, starting glipizide on discharge (bedside delivery to improve compliance)

## 2018-12-24 NOTE — SUBJECTIVE & OBJECTIVE
Interval History    Requested to go home today so he can enjoy the holidays with his family.  Feels his swelling has improved and understands importance of medication adherence.      ROS     Respiratory: no cough, no shortness of breath  Cardiovascular: no chest pain, no palpitations  Gastrointestinal: no nausea, no vomiting, no diarrhea, no constipation,  no abdominal pain    PEx  Temp:  [97.8 °F (36.6 °C)-98.6 °F (37 °C)]   Pulse:  []   Resp:  [12-20]   BP: (114-129)/(67-81)   SpO2:  [96 %-98 %]       Intake/Output Summary (Last 24 hours) at 12/24/2018 1202  Last data filed at 12/24/2018 0900  Gross per 24 hour   Intake 1500 ml   Output 3450 ml   Net -1950 ml         General Appearance: no acute distress   Heart: regular rate and rhythm  Respiratory: Normal respiratory effort, no crackles, breathing comfortably on RA. RLE trace edema, LLE 1+ pitting edema.  Abdomen: Soft, non-tender; bowel sounds active  Neurologic:  No focal numbness or weakness  Mental status: Alert, oriented x 4, affect appropriate

## 2018-12-24 NOTE — PT/OT/SLP EVAL
"Occupational Therapy   Evaluation and Discharge Note    Name: Ashish Mckeon  MRN: [id number]  Admitting Diagnosis:  Acute on chronic combined systolic and diastolic congestive heart failure      Recommendations:     Discharge Recommendations: other (see comments)(home)  Discharge Equipment Recommendations:  none  Barriers to discharge:  None    History:     Occupational Profile:  Living Environment: Pt lives with wife in 1st floor apt. Pt reports (I) with ADLs and amb. Pt currently working as a .   Prior to admission, patients level of function was (I).  Equipment used at home: none.  DME owned (not currently used): none.  Upon discharge, patient will have assistance from wife.    Past Medical History:   Diagnosis Date    Diabetes mellitus type II     Essential hypertension, benign     Hyperlipidemia     Hypothyroidism     Undiagnosed cardiac murmurs        Past Surgical History:   Procedure Laterality Date    COLON SURGERY      "lower intestines removed"    THYROID SURGERY         Subjective     Pain/Comfort:  · Pain Rating 1: 0/10    Patients cultural, spiritual, Mandaeism conflicts given the current situation:      Objective:     Communicated with: RN prior to session.  Patient found call button in reach and   upon OT entry to room.    General Precautions: Standard,     Orthopedic Precautions:    Braces:       Occupational Performance:    Bed Mobility:    · Independent    Functional Mobility/Transfers:  Independent    Activities of Daily Living:  Independent    Physical Exam:  BUE AROM/MMT: WNL    AMPAC 6 Click ADL:  AMPAC Total Score: 24    Treatment & Education:  UE ROM/MMT  Bed mobility training / assessment  Functional mobility assessment  Sit/standing balance assessment  Educated on importance of sitting OOB in bedside chair to promote increased strength, endurance & breathing.  Discussed OT POC / Post-acute plan  Education:    Patient left supine with call button in reach    Assessment: " "    Ashish Mckeon is a 53 y.o. male with a medical diagnosis of Acute on chronic combined systolic and diastolic congestive heart failure. At this time, patient is functioning at their prior level of function and does not require further acute OT services.     Clinical Decision Makin.  OT Low:  "Pt evaluation falls under low complexity for evaluation coding due to performance deficits noted in 1-3 areas as stated above and 0 co-morbities affecting current functional status. Data obtained from problem focused assessments. No modifications or assistance was required for completion of evaluation. Only brief occupational profile and history review completed."     Plan:     During this hospitalization, patient does not require further acute OT services.  Please re-consult if situation changes.    · Plan of Care Reviewed with: patient    This Plan of care has been discussed with the patient who was involved in its development and understands and is in agreement with the identified goals and treatment plan    GOALS:   Multidisciplinary Problems     Occupational Therapy Goals     Not on file          Multidisciplinary Problems (Resolved)        Problem: Occupational Therapy Goal    Goal Priority Disciplines Outcome Interventions   Occupational Therapy Goal   (Resolved)     OT, PT/OT Outcome(s) achieved                    Time Tracking:     OT Date of Treatment: 18  OT Start Time: 851  OT Stop Time: 09  OT Total Time (min): 9 min    Billable Minutes:Evaluation 9    LOS Lopez  2018    "

## 2018-12-24 NOTE — PLAN OF CARE
appt made , see previous notes     12/24/18 1340   Discharge Assessment   Assessment Type Final Discharge Note

## 2018-12-24 NOTE — PLAN OF CARE
MD requests D of C appt with DR Ashish Mast.  [facility]     12/24/18 0846   Discharge Assessment   Assessment Type Discharge Planning Assessment       January 3   At 11am  [facility]

## 2018-12-24 NOTE — PT/OT/SLP EVAL
Physical Therapy Evaluation and Discharge Note    Patient Name:  Ashish Mckeon   MRN:  [id number]    Recommendations:     Discharge Recommendations:  (home)   Discharge Equipment Recommendations: none   Barriers to discharge: None    Assessment:     Ashish Mckeon is a 53 y.o. male admitted with a medical diagnosis of Acute on chronic combined systolic and diastolic congestive heart failure. .  At this time, patient is functioning at their prior level of function and does not require further acute PT services.     Recent Surgery: * No surgery found *      Plan:     During this hospitalization, patient does not require further acute PT services.  Please re-consult if situation changes.      Subjective     Chief Complaint: NA  Patient/Family Comments/goals: return home  Pain/Comfort:  ·   0/10    Patients cultural, spiritual, Moravian conflicts given the current situation:      Living Environment:  Pt lives with wife in 1st floor apt. Pt reports (I) with ADLs and amb. Pt currently working as a .   Prior to admission, patients level of function was (I).  Equipment used at home: none.  DME owned (not currently used): none.  Upon discharge, patient will have assistance from wife.    Objective:     Communicated with RN prior to session.  Patient found standing in bathroom upon PT entry to room found with: telemetry     General Precautions: Standard, fall   Orthopedic Precautions:N/A   Braces: N/A     Exams:  · Cognitive Exam:  Patient is oriented to Person, Place, Time and Situation  · Gross Motor Coordination:  WFL  · Postural Exam:  Patient presented with the following abnormalities:    · -       No postural abnormalities identified  · Sensation:    · -       Intact  light/touch B LE  · Skin Integrity/Edema:      · -       Skin integrity: Visible skin intact  · RLE ROM: WFL  · RLE Strength: WFL  · LLE ROM: WFL  · LLE Strength: WFL    Functional Mobility:  Transfers:     · Sit to Stand:  independence with no  "AD    Gait: ~175ft (I); no LOB or SOB    AM-PAC 6 CLICK MOBILITY  Total Score:24       Therapeutic Activities and Exercises:  Pt stood in room (I).  Pt educated on:  -role of PT/POC  -importance of OOB activity  -safety with mobility  -amb in hallway several times daily  Pt reports no further questions or concerns from a mobility standpoint.  Pt safe to amb in hallway (I).     AM-PAC 6 CLICK MOBILITY  Total Score:24     Patient left seated EOB with all lines intact, call button in reach and RN notified.    GOALS:   Multidisciplinary Problems     Physical Therapy Goals     Not on file          Multidisciplinary Problems (Resolved)        Problem: Physical Therapy Goal    Goal Priority Disciplines Outcome Goal Variances Interventions   Physical Therapy Goal   (Resolved)     PT, PT/OT Outcome(s) achieved                     History:     Past Medical History:   Diagnosis Date    Diabetes mellitus type II     Essential hypertension, benign     Hyperlipidemia     Hypothyroidism     Undiagnosed cardiac murmurs        Past Surgical History:   Procedure Laterality Date    COLON SURGERY      "lower intestines removed"    THYROID SURGERY         Clinical Decision Making:     Decision Making/ Complexity Score   On examination of body system using standardized tests and measures patient presents with 1-2 elements from any of the following: body structures and functions, activity limitations, and/or participation restrictions.  Leading to a clinical presentation that is considered stable and/or uncomplicated                              Clinical Decision Making  (Eval Complexity):  Low- [id number]     Time Tracking:     PT Received On: 12/24/18  PT Start Time: 0850     PT Stop Time: 0857  PT Total Time (min): 7 min     Billable Minutes: Evaluation 7      SCOTT REYNA, PT  12/24/2018  "

## 2018-12-24 NOTE — ASSESSMENT & PLAN NOTE
Pt is a 54 yo male with a hx of combined systolic-diastolic heart failure, T2DM, CKD4, presenting with a 1-week hx of blurry vision, found to be hyperglycemic in the ED.  On exam, pt clinically hypervolemic with rales, JVD and bilateral lower extremity edema.  BNP 2679.    - TTE on 11/26/2018 with EF 25% and grade III diastolic dysfunction  - Pt prescribed Lasix 40mg BID at home, but has been taking once daily  - Strict I/Os, daily weights, diabetic/cardiac diet with fluid restriction  - Continue Lasix IV, then discharge today per patient request  - Toprol XL on discharge

## 2018-12-24 NOTE — PLAN OF CARE
Problem: Physical Therapy Goal  Goal: Physical Therapy Goal  Outcome: Outcome(s) achieved Date Met: 12/24/18  Pt evaluation complete. Pt safe to d/c home from a mobility standpoint without needs for skilled PT at this time.    SCOTT REYNA, PT  12/24/2018

## 2019-08-05 ENCOUNTER — ANESTHESIA EVENT (OUTPATIENT)
Dept: SURGERY | Facility: HOSPITAL | Age: 54
DRG: 506 | End: 2019-08-05
Payer: MEDICAID

## 2019-08-05 ENCOUNTER — HOSPITAL ENCOUNTER (INPATIENT)
Facility: HOSPITAL | Age: 54
LOS: 3 days | Discharge: HOME | DRG: 506 | End: 2019-08-08
Attending: EMERGENCY MEDICINE | Admitting: EMERGENCY MEDICINE
Payer: MEDICAID

## 2019-08-05 ENCOUNTER — ANESTHESIA (OUTPATIENT)
Dept: SURGERY | Facility: HOSPITAL | Age: 54
DRG: 506 | End: 2019-08-05
Payer: MEDICAID

## 2019-08-05 DIAGNOSIS — M00.9 PYOGENIC ARTHRITIS OF LEFT WRIST, DUE TO UNSPECIFIED ORGANISM: ICD-10-CM

## 2019-08-05 DIAGNOSIS — R80.9 TYPE 2 DIABETES MELLITUS WITH MICROALBUMINURIA, WITHOUT LONG-TERM CURRENT USE OF INSULIN: Chronic | ICD-10-CM

## 2019-08-05 DIAGNOSIS — M10.9 GOUT OF LEFT WRIST, UNSPECIFIED CAUSE, UNSPECIFIED CHRONICITY: ICD-10-CM

## 2019-08-05 DIAGNOSIS — N17.9 AKI (ACUTE KIDNEY INJURY): ICD-10-CM

## 2019-08-05 DIAGNOSIS — Z01.818 PRE-OP EVALUATION: ICD-10-CM

## 2019-08-05 DIAGNOSIS — E87.5 HYPERKALEMIA: ICD-10-CM

## 2019-08-05 DIAGNOSIS — Z86.79 HISTORY OF HEART FAILURE: ICD-10-CM

## 2019-08-05 DIAGNOSIS — R88.8: ICD-10-CM

## 2019-08-05 DIAGNOSIS — M25.532 PAIN AND SWELLING OF LEFT WRIST: ICD-10-CM

## 2019-08-05 DIAGNOSIS — M25.432 PAIN AND SWELLING OF LEFT WRIST: ICD-10-CM

## 2019-08-05 DIAGNOSIS — M00.9 SEPTIC ARTHRITIS, DUE TO UNSPECIFIED ORGANISM, SEPTIC ARTHRITIS OF UNSPECIFIED LOCATION: Primary | ICD-10-CM

## 2019-08-05 DIAGNOSIS — E11.29 TYPE 2 DIABETES MELLITUS WITH MICROALBUMINURIA, WITHOUT LONG-TERM CURRENT USE OF INSULIN: Chronic | ICD-10-CM

## 2019-08-05 PROBLEM — R79.89 ELEVATED SERUM CREATININE: Status: ACTIVE | Noted: 2019-08-05

## 2019-08-05 LAB
ANION GAP SERPL CALC-SCNC: 11 MMOL/L (ref 8–16)
APPEARANCE FLD: NORMAL
BASOPHILS # BLD AUTO: 0.05 K/UL (ref 0–0.2)
BASOPHILS NFR BLD: 0.9 % (ref 0–1.9)
BILIRUB UR QL STRIP: NEGATIVE
BODY FLD TYPE: ABNORMAL
BODY FLD TYPE: NORMAL
BUN SERPL-MCNC: 71 MG/DL (ref 6–20)
CALCIUM SERPL-MCNC: 8.9 MG/DL (ref 8.7–10.5)
CHLORIDE SERPL-SCNC: 101 MMOL/L (ref 95–110)
CLARITY UR REFRACT.AUTO: CLEAR
CO2 SERPL-SCNC: 22 MMOL/L (ref 23–29)
COLOR FLD: NORMAL
COLOR UR AUTO: YELLOW
CREAT SERPL-MCNC: 2.8 MG/DL (ref 0.5–1.4)
CRP SERPL-MCNC: 2.1 MG/L (ref 0–8.2)
CRYSTALS FLD MICRO: POSITIVE
DIFFERENTIAL METHOD: ABNORMAL
EOSINOPHIL # BLD AUTO: 0.1 K/UL (ref 0–0.5)
EOSINOPHIL NFR BLD: 2.2 % (ref 0–8)
ERYTHROCYTE [DISTWIDTH] IN BLOOD BY AUTOMATED COUNT: 18.7 % (ref 11.5–14.5)
ERYTHROCYTE [SEDIMENTATION RATE] IN BLOOD BY WESTERGREN METHOD: 36 MM/HR (ref 0–23)
EST. GFR  (AFRICAN AMERICAN): 28.1 ML/MIN/1.73 M^2
EST. GFR  (NON AFRICAN AMERICAN): 24.3 ML/MIN/1.73 M^2
ESTIMATED AVG GLUCOSE: ABNORMAL MG/DL (ref 68–131)
GLUCOSE SERPL-MCNC: 237 MG/DL (ref 70–110)
GLUCOSE UR QL STRIP: NEGATIVE
GRAM STN SPEC: NORMAL
HBA1C MFR BLD HPLC: >14 % (ref 4–5.6)
HCT VFR BLD AUTO: 36.9 % (ref 40–54)
HGB BLD-MCNC: 11.3 G/DL (ref 14–18)
HGB UR QL STRIP: ABNORMAL
HYALINE CASTS UR QL AUTO: 1 /LPF
IMM GRANULOCYTES # BLD AUTO: 0.02 K/UL (ref 0–0.04)
IMM GRANULOCYTES NFR BLD AUTO: 0.4 % (ref 0–0.5)
INR PPP: 1.1 (ref 0.8–1.2)
KETONES UR QL STRIP: NEGATIVE
LEUKOCYTE ESTERASE UR QL STRIP: NEGATIVE
LYMPHOCYTES # BLD AUTO: 0.5 K/UL (ref 1–4.8)
LYMPHOCYTES NFR BLD: 8.9 % (ref 18–48)
LYMPHOCYTES NFR FLD MANUAL: 1 %
MAGNESIUM SERPL-MCNC: 2.2 MG/DL (ref 1.6–2.6)
MCH RBC QN AUTO: 23.8 PG (ref 27–31)
MCHC RBC AUTO-ENTMCNC: 30.6 G/DL (ref 32–36)
MCV RBC AUTO: 78 FL (ref 82–98)
MICROSCOPIC COMMENT: NORMAL
MONOCYTES # BLD AUTO: 0.9 K/UL (ref 0.3–1)
MONOCYTES NFR BLD: 16.3 % (ref 4–15)
MONOS+MACROS NFR FLD MANUAL: 10 %
NEUTROPHILS # BLD AUTO: 3.9 K/UL (ref 1.8–7.7)
NEUTROPHILS NFR BLD: 71.3 % (ref 38–73)
NEUTROPHILS NFR FLD MANUAL: 89 %
NITRITE UR QL STRIP: NEGATIVE
NRBC BLD-RTO: 0 /100 WBC
PATH INTERP FLD-IMP: NORMAL
PH UR STRIP: 5 [PH] (ref 5–8)
PLATELET # BLD AUTO: 105 K/UL (ref 150–350)
PMV BLD AUTO: ABNORMAL FL (ref 9.2–12.9)
POCT GLUCOSE: 219 MG/DL (ref 70–110)
POCT GLUCOSE: 226 MG/DL (ref 70–110)
POCT GLUCOSE: 260 MG/DL (ref 70–110)
POTASSIUM SERPL-SCNC: 5.4 MMOL/L (ref 3.5–5.1)
PROT UR QL STRIP: NEGATIVE
PROTHROMBIN TIME: 11.6 SEC (ref 9–12.5)
RBC # BLD AUTO: 4.75 M/UL (ref 4.6–6.2)
RBC #/AREA URNS AUTO: 4 /HPF (ref 0–4)
SODIUM SERPL-SCNC: 134 MMOL/L (ref 136–145)
SP GR UR STRIP: 1.01 (ref 1–1.03)
SQUAMOUS #/AREA URNS AUTO: 0 /HPF
URATE SERPL-MCNC: 9 MG/DL (ref 3.4–7)
URN SPEC COLLECT METH UR: ABNORMAL
WBC # BLD AUTO: 5.41 K/UL (ref 3.9–12.7)
WBC # FLD: NORMAL /CU MM

## 2019-08-05 PROCEDURE — 25040 ARTHRT RDCRPL/MIDCARPL JT: CPT | Mod: LT,,, | Performed by: ORTHOPAEDIC SURGERY

## 2019-08-05 PROCEDURE — D9220A PRA ANESTHESIA: Mod: ANES,,, | Performed by: ANESTHESIOLOGY

## 2019-08-05 PROCEDURE — 84550 ASSAY OF BLOOD/URIC ACID: CPT

## 2019-08-05 PROCEDURE — 63600175 PHARM REV CODE 636 W HCPCS: Performed by: STUDENT IN AN ORGANIZED HEALTH CARE EDUCATION/TRAINING PROGRAM

## 2019-08-05 PROCEDURE — 85652 RBC SED RATE AUTOMATED: CPT

## 2019-08-05 PROCEDURE — 99222 1ST HOSP IP/OBS MODERATE 55: CPT | Mod: ,,, | Performed by: HOSPITALIST

## 2019-08-05 PROCEDURE — 37000009 HC ANESTHESIA EA ADD 15 MINS: Performed by: ORTHOPAEDIC SURGERY

## 2019-08-05 PROCEDURE — D9220A PRA ANESTHESIA: Mod: CRNA,,, | Performed by: NURSE ANESTHETIST, CERTIFIED REGISTERED

## 2019-08-05 PROCEDURE — 63600175 PHARM REV CODE 636 W HCPCS: Performed by: NURSE ANESTHETIST, CERTIFIED REGISTERED

## 2019-08-05 PROCEDURE — 11000001 HC ACUTE MED/SURG PRIVATE ROOM

## 2019-08-05 PROCEDURE — 87070 CULTURE OTHR SPECIMN AEROBIC: CPT | Mod: 59

## 2019-08-05 PROCEDURE — D9220A PRA ANESTHESIA: ICD-10-PCS | Mod: CRNA,,, | Performed by: NURSE ANESTHETIST, CERTIFIED REGISTERED

## 2019-08-05 PROCEDURE — 87116 MYCOBACTERIA CULTURE: CPT

## 2019-08-05 PROCEDURE — 89060 EXAM SYNOVIAL FLUID CRYSTALS: CPT

## 2019-08-05 PROCEDURE — 63600175 PHARM REV CODE 636 W HCPCS: Performed by: ANESTHESIOLOGY

## 2019-08-05 PROCEDURE — 81001 URINALYSIS AUTO W/SCOPE: CPT

## 2019-08-05 PROCEDURE — 25000003 PHARM REV CODE 250: Performed by: STUDENT IN AN ORGANIZED HEALTH CARE EDUCATION/TRAINING PROGRAM

## 2019-08-05 PROCEDURE — 99285 EMERGENCY DEPT VISIT HI MDM: CPT | Mod: ,,, | Performed by: EMERGENCY MEDICINE

## 2019-08-05 PROCEDURE — 85610 PROTHROMBIN TIME: CPT

## 2019-08-05 PROCEDURE — 93005 ELECTROCARDIOGRAM TRACING: CPT

## 2019-08-05 PROCEDURE — 99285 EMERGENCY DEPT VISIT HI MDM: CPT

## 2019-08-05 PROCEDURE — 87206 SMEAR FLUORESCENT/ACID STAI: CPT

## 2019-08-05 PROCEDURE — 20605 DRAIN/INJ JOINT/BURSA W/O US: CPT | Mod: LT

## 2019-08-05 PROCEDURE — S5571 INSULIN DISPOS PEN 3 ML: HCPCS | Performed by: STUDENT IN AN ORGANIZED HEALTH CARE EDUCATION/TRAINING PROGRAM

## 2019-08-05 PROCEDURE — D9220A PRA ANESTHESIA: ICD-10-PCS | Mod: ANES,,, | Performed by: ANESTHESIOLOGY

## 2019-08-05 PROCEDURE — 71000044 HC DOSC ROUTINE RECOVERY FIRST HOUR: Performed by: ORTHOPAEDIC SURGERY

## 2019-08-05 PROCEDURE — 85025 COMPLETE CBC W/AUTO DIFF WBC: CPT

## 2019-08-05 PROCEDURE — 71000016 HC POSTOP RECOV ADDL HR: Performed by: ORTHOPAEDIC SURGERY

## 2019-08-05 PROCEDURE — 87040 BLOOD CULTURE FOR BACTERIA: CPT

## 2019-08-05 PROCEDURE — 87102 FUNGUS ISOLATION CULTURE: CPT

## 2019-08-05 PROCEDURE — 80048 BASIC METABOLIC PNL TOTAL CA: CPT

## 2019-08-05 PROCEDURE — 99222 PR INITIAL HOSPITAL CARE,LEVL II: ICD-10-PCS | Mod: ,,, | Performed by: HOSPITALIST

## 2019-08-05 PROCEDURE — 89051 BODY FLUID CELL COUNT: CPT

## 2019-08-05 PROCEDURE — 82962 GLUCOSE BLOOD TEST: CPT

## 2019-08-05 PROCEDURE — 25000003 PHARM REV CODE 250: Performed by: NURSE ANESTHETIST, CERTIFIED REGISTERED

## 2019-08-05 PROCEDURE — 87075 CULTR BACTERIA EXCEPT BLOOD: CPT | Mod: 59

## 2019-08-05 PROCEDURE — 63600175 PHARM REV CODE 636 W HCPCS: Performed by: ORTHOPAEDIC SURGERY

## 2019-08-05 PROCEDURE — 86140 C-REACTIVE PROTEIN: CPT

## 2019-08-05 PROCEDURE — 93010 EKG 12-LEAD: ICD-10-PCS | Mod: ,,, | Performed by: INTERNAL MEDICINE

## 2019-08-05 PROCEDURE — 99285 PR EMERGENCY DEPT VISIT,LEVEL V: ICD-10-PCS | Mod: ,,, | Performed by: EMERGENCY MEDICINE

## 2019-08-05 PROCEDURE — 25040 PR ARTHROTOMY RADIOCARPAL/ MIDCARPAL W EXPLORE, DRAIN, REMOVE FB: ICD-10-PCS | Mod: LT,,, | Performed by: ORTHOPAEDIC SURGERY

## 2019-08-05 PROCEDURE — 96374 THER/PROPH/DIAG INJ IV PUSH: CPT

## 2019-08-05 PROCEDURE — 87070 CULTURE OTHR SPECIMN AEROBIC: CPT

## 2019-08-05 PROCEDURE — A4216 STERILE WATER/SALINE, 10 ML: HCPCS | Performed by: NURSE ANESTHETIST, CERTIFIED REGISTERED

## 2019-08-05 PROCEDURE — 36000705 HC OR TIME LEV I EA ADD 15 MIN: Performed by: ORTHOPAEDIC SURGERY

## 2019-08-05 PROCEDURE — 25000003 PHARM REV CODE 250: Performed by: ORTHOPAEDIC SURGERY

## 2019-08-05 PROCEDURE — 82962 GLUCOSE BLOOD TEST: CPT | Performed by: ORTHOPAEDIC SURGERY

## 2019-08-05 PROCEDURE — 76942 PR U/S GUIDANCE FOR NEEDLE GUIDANCE: ICD-10-PCS | Mod: 26,,, | Performed by: ANESTHESIOLOGY

## 2019-08-05 PROCEDURE — 83036 HEMOGLOBIN GLYCOSYLATED A1C: CPT

## 2019-08-05 PROCEDURE — 87205 SMEAR GRAM STAIN: CPT

## 2019-08-05 PROCEDURE — 25000003 PHARM REV CODE 250: Performed by: EMERGENCY MEDICINE

## 2019-08-05 PROCEDURE — 87015 SPECIMEN INFECT AGNT CONCNTJ: CPT

## 2019-08-05 PROCEDURE — 87205 SMEAR GRAM STAIN: CPT | Mod: 59

## 2019-08-05 PROCEDURE — 87102 FUNGUS ISOLATION CULTURE: CPT | Mod: 59

## 2019-08-05 PROCEDURE — 37000008 HC ANESTHESIA 1ST 15 MINUTES: Performed by: ORTHOPAEDIC SURGERY

## 2019-08-05 PROCEDURE — 71000015 HC POSTOP RECOV 1ST HR: Performed by: ORTHOPAEDIC SURGERY

## 2019-08-05 PROCEDURE — 76942 ECHO GUIDE FOR BIOPSY: CPT | Mod: 26,,, | Performed by: ANESTHESIOLOGY

## 2019-08-05 PROCEDURE — 63600175 PHARM REV CODE 636 W HCPCS: Performed by: EMERGENCY MEDICINE

## 2019-08-05 PROCEDURE — 93010 ELECTROCARDIOGRAM REPORT: CPT | Mod: ,,, | Performed by: INTERNAL MEDICINE

## 2019-08-05 PROCEDURE — 87116 MYCOBACTERIA CULTURE: CPT | Mod: 59

## 2019-08-05 PROCEDURE — 87075 CULTR BACTERIA EXCEPT BLOOD: CPT

## 2019-08-05 PROCEDURE — 83735 ASSAY OF MAGNESIUM: CPT

## 2019-08-05 PROCEDURE — 36000704 HC OR TIME LEV I 1ST 15 MIN: Performed by: ORTHOPAEDIC SURGERY

## 2019-08-05 RX ORDER — ONDANSETRON 2 MG/ML
INJECTION INTRAMUSCULAR; INTRAVENOUS
Status: DISCONTINUED | OUTPATIENT
Start: 2019-08-05 | End: 2019-08-05

## 2019-08-05 RX ORDER — PHENYLEPHRINE HYDROCHLORIDE 10 MG/ML
INJECTION INTRAVENOUS
Status: DISCONTINUED | OUTPATIENT
Start: 2019-08-05 | End: 2019-08-05

## 2019-08-05 RX ORDER — VANCOMYCIN HCL IN 5 % DEXTROSE 1G/250ML
1000 PLASTIC BAG, INJECTION (ML) INTRAVENOUS ONCE
Status: COMPLETED | OUTPATIENT
Start: 2019-08-05 | End: 2019-08-05

## 2019-08-05 RX ORDER — ROPIVACAINE HYDROCHLORIDE 7.5 MG/ML
INJECTION, SOLUTION EPIDURAL; PERINEURAL
Status: COMPLETED | OUTPATIENT
Start: 2019-08-05 | End: 2019-08-05

## 2019-08-05 RX ORDER — ASPIRIN 81 MG/1
81 TABLET ORAL 2 TIMES DAILY
Status: DISCONTINUED | OUTPATIENT
Start: 2019-08-05 | End: 2019-08-07

## 2019-08-05 RX ORDER — INSULIN ASPART 100 [IU]/ML
3 INJECTION, SOLUTION INTRAVENOUS; SUBCUTANEOUS ONCE
Status: DISCONTINUED | OUTPATIENT
Start: 2019-08-05 | End: 2019-08-06

## 2019-08-05 RX ORDER — POLYETHYLENE GLYCOL 3350 17 G/17G
17 POWDER, FOR SOLUTION ORAL DAILY
Status: DISCONTINUED | OUTPATIENT
Start: 2019-08-06 | End: 2019-08-08 | Stop reason: HOSPADM

## 2019-08-05 RX ORDER — SODIUM CHLORIDE 0.9 % (FLUSH) 0.9 %
10 SYRINGE (ML) INJECTION
Status: DISCONTINUED | OUTPATIENT
Start: 2019-08-05 | End: 2019-08-08 | Stop reason: HOSPADM

## 2019-08-05 RX ORDER — HYDROCODONE BITARTRATE AND ACETAMINOPHEN 10; 325 MG/1; MG/1
1 TABLET ORAL
Status: COMPLETED | OUTPATIENT
Start: 2019-08-05 | End: 2019-08-05

## 2019-08-05 RX ORDER — HYDROMORPHONE HYDROCHLORIDE 1 MG/ML
0.2 INJECTION, SOLUTION INTRAMUSCULAR; INTRAVENOUS; SUBCUTANEOUS EVERY 5 MIN PRN
Status: DISCONTINUED | OUTPATIENT
Start: 2019-08-05 | End: 2019-08-05 | Stop reason: HOSPADM

## 2019-08-05 RX ORDER — PANTOPRAZOLE SODIUM 40 MG/1
40 TABLET, DELAYED RELEASE ORAL DAILY PRN
Status: DISCONTINUED | OUTPATIENT
Start: 2019-08-05 | End: 2019-08-08 | Stop reason: HOSPADM

## 2019-08-05 RX ORDER — NAPROXEN SODIUM 220 MG/1
81 TABLET, FILM COATED ORAL DAILY
Status: DISCONTINUED | OUTPATIENT
Start: 2019-08-05 | End: 2019-08-05

## 2019-08-05 RX ORDER — SODIUM CHLORIDE 9 MG/ML
INJECTION, SOLUTION INTRAVENOUS CONTINUOUS PRN
Status: DISCONTINUED | OUTPATIENT
Start: 2019-08-05 | End: 2019-08-05

## 2019-08-05 RX ORDER — ATORVASTATIN CALCIUM 20 MG/1
80 TABLET, FILM COATED ORAL NIGHTLY
Status: DISCONTINUED | OUTPATIENT
Start: 2019-08-05 | End: 2019-08-08 | Stop reason: HOSPADM

## 2019-08-05 RX ORDER — ROPIVACAINE HYDROCHLORIDE 7.5 MG/ML
INJECTION, SOLUTION EPIDURAL; PERINEURAL
Status: COMPLETED
Start: 2019-08-05 | End: 2019-08-05

## 2019-08-05 RX ORDER — MUPIROCIN 20 MG/G
OINTMENT TOPICAL 2 TIMES DAILY
Status: DISCONTINUED | OUTPATIENT
Start: 2019-08-05 | End: 2019-08-08 | Stop reason: HOSPADM

## 2019-08-05 RX ORDER — CEFAZOLIN SODIUM 1 G/3ML
INJECTION, POWDER, FOR SOLUTION INTRAMUSCULAR; INTRAVENOUS
Status: DISCONTINUED | OUTPATIENT
Start: 2019-08-05 | End: 2019-08-05

## 2019-08-05 RX ORDER — FENTANYL CITRATE 50 UG/ML
INJECTION, SOLUTION INTRAMUSCULAR; INTRAVENOUS
Status: DISCONTINUED | OUTPATIENT
Start: 2019-08-05 | End: 2019-08-05

## 2019-08-05 RX ORDER — SODIUM CHLORIDE 9 MG/ML
INJECTION, SOLUTION INTRAVENOUS CONTINUOUS
Status: DISCONTINUED | OUTPATIENT
Start: 2019-08-05 | End: 2019-08-05

## 2019-08-05 RX ORDER — IBUPROFEN 200 MG
16 TABLET ORAL
Status: DISCONTINUED | OUTPATIENT
Start: 2019-08-05 | End: 2019-08-08 | Stop reason: HOSPADM

## 2019-08-05 RX ORDER — METOPROLOL SUCCINATE 50 MG/1
50 TABLET, EXTENDED RELEASE ORAL DAILY
Status: DISCONTINUED | OUTPATIENT
Start: 2019-08-05 | End: 2019-08-08 | Stop reason: HOSPADM

## 2019-08-05 RX ORDER — IBUPROFEN 200 MG
24 TABLET ORAL
Status: DISCONTINUED | OUTPATIENT
Start: 2019-08-05 | End: 2019-08-08 | Stop reason: HOSPADM

## 2019-08-05 RX ORDER — DOCUSATE SODIUM 100 MG/1
100 CAPSULE, LIQUID FILLED ORAL 3 TIMES DAILY
Status: DISCONTINUED | OUTPATIENT
Start: 2019-08-05 | End: 2019-08-07

## 2019-08-05 RX ORDER — DEXMEDETOMIDINE HYDROCHLORIDE 100 UG/ML
INJECTION, SOLUTION INTRAVENOUS
Status: DISCONTINUED | OUTPATIENT
Start: 2019-08-05 | End: 2019-08-05

## 2019-08-05 RX ORDER — FUROSEMIDE 10 MG/ML
80 INJECTION INTRAMUSCULAR; INTRAVENOUS
Status: COMPLETED | OUTPATIENT
Start: 2019-08-05 | End: 2019-08-05

## 2019-08-05 RX ORDER — INSULIN ASPART 100 [IU]/ML
0-5 INJECTION, SOLUTION INTRAVENOUS; SUBCUTANEOUS EVERY 6 HOURS PRN
Status: DISCONTINUED | OUTPATIENT
Start: 2019-08-05 | End: 2019-08-08

## 2019-08-05 RX ORDER — BISACODYL 10 MG
10 SUPPOSITORY, RECTAL RECTAL DAILY PRN
Status: DISCONTINUED | OUTPATIENT
Start: 2019-08-05 | End: 2019-08-08 | Stop reason: HOSPADM

## 2019-08-05 RX ORDER — BACITRACIN ZINC 500 UNIT/G
OINTMENT (GRAM) TOPICAL
Status: DISCONTINUED | OUTPATIENT
Start: 2019-08-05 | End: 2019-08-05 | Stop reason: HOSPADM

## 2019-08-05 RX ORDER — GLUCAGON 1 MG
1 KIT INJECTION
Status: DISCONTINUED | OUTPATIENT
Start: 2019-08-05 | End: 2019-08-08 | Stop reason: HOSPADM

## 2019-08-05 RX ADMIN — DEXMEDETOMIDINE HYDROCHLORIDE 20 MCG: 100 INJECTION, SOLUTION, CONCENTRATE INTRAVENOUS at 06:08

## 2019-08-05 RX ADMIN — SODIUM CHLORIDE: 0.9 INJECTION, SOLUTION INTRAVENOUS at 05:08

## 2019-08-05 RX ADMIN — DEXMEDETOMIDINE HYDROCHLORIDE 15 MCG: 100 INJECTION, SOLUTION, CONCENTRATE INTRAVENOUS at 06:08

## 2019-08-05 RX ADMIN — ASPIRIN 81 MG: 81 TABLET, COATED ORAL at 10:08

## 2019-08-05 RX ADMIN — ONDANSETRON 4 MG: 2 INJECTION INTRAMUSCULAR; INTRAVENOUS at 07:08

## 2019-08-05 RX ADMIN — SODIUM CHLORIDE 500 ML: 0.9 INJECTION, SOLUTION INTRAVENOUS at 05:08

## 2019-08-05 RX ADMIN — ATORVASTATIN CALCIUM 80 MG: 20 TABLET, FILM COATED ORAL at 10:08

## 2019-08-05 RX ADMIN — FENTANYL CITRATE 50 MCG: 50 INJECTION, SOLUTION INTRAMUSCULAR; INTRAVENOUS at 06:08

## 2019-08-05 RX ADMIN — SODIUM CHLORIDE: 0.9 INJECTION, SOLUTION INTRAVENOUS at 06:08

## 2019-08-05 RX ADMIN — FUROSEMIDE 80 MG: 10 INJECTION, SOLUTION INTRAMUSCULAR; INTRAVENOUS at 12:08

## 2019-08-05 RX ADMIN — INSULIN ASPART 1 UNITS: 100 INJECTION, SOLUTION INTRAVENOUS; SUBCUTANEOUS at 10:08

## 2019-08-05 RX ADMIN — CEFAZOLIN 2 G: 330 INJECTION, POWDER, FOR SOLUTION INTRAMUSCULAR; INTRAVENOUS at 06:08

## 2019-08-05 RX ADMIN — ROPIVACAINE HYDROCHLORIDE 30 ML: 7.5 INJECTION, SOLUTION EPIDURAL; PERINEURAL at 06:08

## 2019-08-05 RX ADMIN — MUPIROCIN: 20 OINTMENT TOPICAL at 10:08

## 2019-08-05 RX ADMIN — VANCOMYCIN HYDROCHLORIDE 1000 MG: 1 INJECTION, POWDER, LYOPHILIZED, FOR SOLUTION INTRAVENOUS at 08:08

## 2019-08-05 RX ADMIN — INSULIN DETEMIR 10 UNITS: 100 INJECTION, SOLUTION SUBCUTANEOUS at 10:08

## 2019-08-05 RX ADMIN — DEXMEDETOMIDINE HYDROCHLORIDE 1 MCG/KG/HR: 100 INJECTION, SOLUTION, CONCENTRATE INTRAVENOUS at 06:08

## 2019-08-05 RX ADMIN — PHENYLEPHRINE HYDROCHLORIDE 100 MCG: 10 INJECTION INTRAVENOUS at 06:08

## 2019-08-05 RX ADMIN — DOCUSATE SODIUM 100 MG: 100 CAPSULE, LIQUID FILLED ORAL at 10:08

## 2019-08-05 RX ADMIN — HYDROCODONE BITARTRATE AND ACETAMINOPHEN 1 TABLET: 10; 325 TABLET ORAL at 10:08

## 2019-08-05 RX ADMIN — ASPIRIN 81 MG 81 MG: 81 TABLET ORAL at 03:08

## 2019-08-05 RX ADMIN — METOPROLOL SUCCINATE 50 MG: 50 TABLET, EXTENDED RELEASE ORAL at 03:08

## 2019-08-05 NOTE — HPI
53 yo M with HTN, HLD, DM, hypothyroidism, CHF (EF 25%, Grade III DD) presents with left wrist pain with no reported trauma. Reports increasing pain and swelling over the past 24 hours. Worsens by movement and palpitation. Pain is severe in quality. He is unable to move his fingers and wrist due to pain. He was helping to set up a party for his wife the evening prior to onset. He did not lift anything more than 10 pounds. No history of IVDA or gout. Denies fever and chills. Patient has never experienced this previously.     Last admitted on 12/24/18 for acute on chronic combined heart failure exacerbation and discharged on increased Lasix taking it BID 80mg, now taking 40mg TID after seeing pcp between visits. Taking Toprol XL 50 daily. Previous TTE showed EF 25% with severe left atrial enlargement. Limited to 1L of fluids per day. He is taking Glipizide Bid an insulin for elevated readings. On levothyroxine. He is here for surgery clearance with ortho who is planning for wash out. EKG on presentation with no ST changes. Patient is not having CP. Labs on admission demonstrate elevated sCr of 2.8 and glucose of 237.

## 2019-08-05 NOTE — SUBJECTIVE & OBJECTIVE
"Past Medical History:   Diagnosis Date    CHF (congestive heart failure)     Diabetes mellitus type II     Essential hypertension, benign     Hyperlipidemia     Hypothyroidism     Pain and swelling of left wrist 8/5/2019    Ashish Mckeon is a 54 y.o. male with PMH of  presenting with CHF, IDDM, Hypothyroidism, HTN, HLD presenting with worsening L wrist pain for 1 day. Orthopedic surgery was consulted for septic joint r/o. Pt has been afebrile and has no elevated WBC. ESR 36, normal CRP. Xray shows no signs of trauma and pt has no hx of gout or septic arthritis. Due to atraumatic wrist pain and swelling w/out identifia    Undiagnosed cardiac murmurs        Past Surgical History:   Procedure Laterality Date    COLON SURGERY      "lower intestines removed"    THYROID SURGERY         Review of patient's allergies indicates:   Allergen Reactions    Lisinopril Other (See Comments)     acei cough         No current facility-administered medications on file prior to encounter.      Current Outpatient Medications on File Prior to Encounter   Medication Sig    acetaminophen (TYLENOL) 325 MG tablet Take 2 tablets (650 mg total) by mouth every 6 to 8 hours as needed.    aspirin 81 MG Chew Take 1 tablet (81 mg total) by mouth once daily.    atorvastatin (LIPITOR) 80 MG tablet Take 1 tablet (80 mg total) by mouth every evening. (Patient taking differently: Take 80 mg by mouth once daily. )    furosemide (LASIX) 80 MG tablet Take 1 tablet (80 mg total) by mouth 2 (two) times daily. Take an extra 80mg daily as needed for weight gain of 3 lbs in 24 hrs  or 5 lbs/wk    glipiZIDE (GLUCOTROL) 5 MG TR24 Take 1 tablet (5 mg total) by mouth daily with breakfast.    levothyroxine (SYNTHROID) 175 MCG tablet Take 1 tablet (175 mcg total) by mouth before breakfast.    metoprolol succinate (TOPROL-XL) 50 MG 24 hr tablet Take 1 tablet (50 mg total) by mouth once daily.    pantoprazole (PROTONIX) 40 MG tablet Take 1 tablet (40 " "mg total) by mouth daily as needed (heartburn).     Family History     Problem Relation (Age of Onset)    Cancer Father        Tobacco Use    Smoking status: Never Smoker    Smokeless tobacco: Never Used    Tobacco comment: cigars "every other week or so"   Substance and Sexual Activity    Alcohol use: Not Currently     Alcohol/week: 3.6 oz     Types: 6 Cans of beer per week    Drug use: Yes     Types: Marijuana    Sexual activity: Yes     Partners: Female     Review of Systems   Constitutional: Positive for fatigue. Negative for appetite change, chills and fever.   HENT: Positive for sinus pressure and sinus pain. Negative for congestion and tinnitus.    Eyes: Negative for photophobia and visual disturbance.   Respiratory: Positive for shortness of breath (only with exertion). Negative for cough, chest tightness and wheezing.    Cardiovascular: Negative for chest pain, palpitations and leg swelling.   Gastrointestinal: Negative for abdominal pain, blood in stool, constipation, diarrhea, nausea and vomiting.   Genitourinary: Negative for difficulty urinating, dysuria, enuresis, frequency and hematuria.   Musculoskeletal: Positive for arthralgias. Negative for back pain and neck pain.   Allergic/Immunologic: Negative for environmental allergies and food allergies.   Neurological: Positive for dizziness (orthostatic). Negative for headaches.   Psychiatric/Behavioral: Negative for agitation and confusion.     Objective:     Vital Signs (Most Recent):  Temp: 97.7 °F (36.5 °C) (08/05/19 1437)  Pulse: 77 (08/05/19 1437)  Resp: 16 (08/05/19 1437)  BP: 103/73 (08/05/19 1437)  SpO2: 97 % (08/05/19 1437) Vital Signs (24h Range):  Temp:  [97.7 °F (36.5 °C)-98.3 °F (36.8 °C)] 97.7 °F (36.5 °C)  Pulse:  [70-83] 77  Resp:  [14-18] 16  SpO2:  [97 %-100 %] 97 %  BP: ()/(65-73) 103/73     Weight: 70.3 kg (155 lb)  Body mass index is 26.61 kg/m².    Physical Exam   Constitutional: He is oriented to person, place, and " time. He appears well-developed and well-nourished. No distress.   HENT:   Head: Normocephalic and atraumatic.   Eyes: EOM are normal. No scleral icterus.   Neck: Normal range of motion.   Cardiovascular: Normal rate and regular rhythm.   Murmur (Systolic and S3) heard.  Pulmonary/Chest: Effort normal and breath sounds normal. No respiratory distress.   Abdominal: Soft. Bowel sounds are normal. He exhibits no distension. There is no tenderness.   Musculoskeletal: He exhibits no edema or deformity.   Unable to move left arm. Resting by side under blanket.    Neurological: He is alert and oriented to person, place, and time.   Skin: Skin is warm and dry. No rash noted. He is not diaphoretic. No erythema.   Psychiatric: He has a normal mood and affect. His behavior is normal. Thought content normal.         CRANIAL NERVES     CN III, IV, VI   Extraocular motions are normal.        Significant Labs:   CBC:   Recent Labs   Lab 08/05/19  1038   WBC 5.41   HGB 11.3*   HCT 36.9*   *     CMP:   Recent Labs   Lab 08/05/19  1038   *   K 5.4*      CO2 22*   *   BUN 71*   CREATININE 2.8*   CALCIUM 8.9   ANIONGAP 11   EGFRNONAA 24.3*       Significant Imaging: L Wrist XR: Frontal view only suggests some slight irregularity of the cortex at the distal ulna adjacent to the ulnar styloid process.  It is difficult to completely exclude the possibility of an acute fracture in this area.  In addition, frontal view demonstrates a small ossific density adjacent to the interphalangeal joint of the thumb.  This appears to be well corticated and is not felt to be strongly suspicious for fracture.  Correlation with precise location of pain is necessary.  If clinically indicated, follow-up images in 10-14 days may be helpful for further evaluation.  CT scan may also be considered.

## 2019-08-05 NOTE — HPI
Ashish Mckeon is a 54 y.o. male with PMH of CHF, IDDM, Hypothyroidism, HTN, HLD presenting with worsening L wrist pain for 1 day. Pt endorses atraumatic L wrist pain and swelling that started last night. Denies overuse, cuts, or any identifying factors that led to this pain. Tried Aleve, lidocaine patch, wrist splint, and salt baths without relief. Pain primarily on activity and palpation over dorsolateral surface of wrist. Denies hx of gout, joint infections, or IVDU. Denies numbness or tingling, weakness, fevers/chills, N/V, chest pain, and SOB. Patient denies any head trauma or LOC. The patient denies prior hx of falls. Never had surgery or trauma to L wrist before. Doesn't take any home bloodthinners. Last ate something yesterday and last drank anything was 12oz of water at 8AM today.

## 2019-08-05 NOTE — CONSULTS
"[facility]  Orthopedics  Consult Note    Patient Name: Ashish Mckeon  MRN: [id number]  Admission Date: 8/5/2019  Hospital Length of Stay: 0 days  Attending Provider: Funmi Cano MD  Primary Care Provider: [facility]    Patient information was obtained from patient and ER records.     Inpatient consult to Orthopedic Surgery  Consult performed by: Deric Matos MD  Consult ordered by: Puneet James MD        Subjective:     Principal Problem:<principal problem not specified>    Chief Complaint:   Chief Complaint   Patient presents with    Wrist Pain     L wrist pain at the joint since Friday.  Denies fevers        HPI: Ashish Mckeon is a 54 y.o. male with PMH of CHF, IDDM, Hypothyroidism, HTN, HLD presenting with worsening L wrist pain for 1 day. Pt endorses atraumatic L wrist pain and swelling that started last night. Denies overuse, cuts, or any identifying factors that led to this pain. Tried Aleve, lidocaine patch, wrist splint, and salt baths without relief. Pain primarily on activity and palpation over dorsolateral surface of wrist. Denies hx of gout, joint infections, or IVDU. Denies numbness or tingling, weakness, fevers/chills, N/V, chest pain, and SOB. Patient denies any head trauma or LOC. The patient denies prior hx of falls. Never had surgery or trauma to L wrist before. Doesn't take any home bloodthinners. Last ate something yesterday and last drank anything was 12oz of water at 8AM today.       Past Medical History:   Diagnosis Date    CHF (congestive heart failure)     Diabetes mellitus type II     Essential hypertension, benign     Hyperlipidemia     Hypothyroidism     Undiagnosed cardiac murmurs        Past Surgical History:   Procedure Laterality Date    COLON SURGERY      "lower intestines removed"    THYROID SURGERY         Review of patient's allergies indicates:   Allergen Reactions    Lisinopril Other (See Comments)     acei " "cough         No current facility-administered medications for this encounter.      Current Outpatient Medications   Medication Sig    acetaminophen (TYLENOL) 325 MG tablet Take 2 tablets (650 mg total) by mouth every 6 to 8 hours as needed.    aspirin 81 MG Chew Take 1 tablet (81 mg total) by mouth once daily.    atorvastatin (LIPITOR) 80 MG tablet Take 1 tablet (80 mg total) by mouth every evening. (Patient taking differently: Take 80 mg by mouth once daily. )    furosemide (LASIX) 80 MG tablet Take 1 tablet (80 mg total) by mouth 2 (two) times daily. Take an extra 80mg daily as needed for weight gain of 3 lbs in 24 hrs  or 5 lbs/wk    glipiZIDE (GLUCOTROL) 5 MG TR24 Take 1 tablet (5 mg total) by mouth daily with breakfast.    levothyroxine (SYNTHROID) 175 MCG tablet Take 1 tablet (175 mcg total) by mouth before breakfast.    metoprolol succinate (TOPROL-XL) 50 MG 24 hr tablet Take 1 tablet (50 mg total) by mouth once daily.    pantoprazole (PROTONIX) 40 MG tablet Take 1 tablet (40 mg total) by mouth daily as needed (heartburn).     Family History     Problem Relation (Age of Onset)    Cancer Father        Tobacco Use    Smoking status: Never Smoker    Smokeless tobacco: Never Used    Tobacco comment: cigars "every other week or so"   Substance and Sexual Activity    Alcohol use: Not Currently     Alcohol/week: 3.6 oz     Types: 6 Cans of beer per week    Drug use: Yes     Types: Marijuana    Sexual activity: Yes     Partners: Female     Review of Systems   Constitution: Negative for chills and fever.   Eyes: Negative for visual disturbance.   Cardiovascular: Negative for chest pain and syncope.   Respiratory: Negative for cough and shortness of breath.    Skin: Negative for color change.   Musculoskeletal: Positive for joint pain and joint swelling. Negative for back pain, falls and myalgias.   Gastrointestinal: Negative for abdominal pain, nausea and vomiting.   Genitourinary: Negative for " "hematuria.   Neurological: Positive for weakness. Negative for headaches, light-headedness and numbness.     Objective:     Vital Signs (Most Recent):  Temp: 98.3 °F (36.8 °C) (08/05/19 0955)  Pulse: 70 (08/05/19 1215)  Resp: 14 (08/05/19 1215)  BP: 99/65 (08/05/19 1215)  SpO2: 99 % (08/05/19 1215) Vital Signs (24h Range):  Temp:  [98.3 °F (36.8 °C)] 98.3 °F (36.8 °C)  Pulse:  [70-83] 70  Resp:  [14-18] 14  SpO2:  [99 %-100 %] 99 %  BP: ()/(65) 99/65     Weight: 70.3 kg (155 lb)  Height: 5' 4" (162.6 cm)  Body mass index is 26.61 kg/m².      Ortho/SPM Exam   Gen:  No acute distress  CV:  Peripherally well-perfused.    Lungs:  Normal respiratory effort.  Head/Neck:  Normocephalic.  Atraumatic.    MSK:  LUE:  - Skin intact throughout, no abrasions or scars present  - Swelling over dorsolateral aspect of wrist   - No ecchymosis or erythema.  - TTP over dorsolateral aspect of wrist, nonTTP over medial joint line.  - AROM and PROM of the wrist limited to 40 deg flexion and extension 2/2 pain.  - AIN/PIN/Radial/Median/Ulnar Nerves assessed in isolation without deficit  - SILT throughout  - Compartments soft  - Radial & Ulnar arteries palpated 2+  - Capillary Refill <3s    All other joints (shoulder/elbow/wrist/hip/knee/ankle) were examined and had full ROM and were non-tender to palpation.    Significant Labs: All pertinent labs within the past 24 hours have been reviewed. Hb 11.3. Normal WBC. ESR 36. Normal CRP. Cr 2.8. A1c >14. Uric Acid 9.0.   Aspirate: + Gram stain, 91k WBCs (89% segs) w budding yeast, + Crystals.    Significant Imaging: X-Ray: I have reviewed all pertinent results/findings and my personal findings are:      Xray of L wrist: No acute fractures or dislocations. Mild degenerative joint disease. No signs of edema or gas.    Procedure Note: L Wrist aspiration  Patient was explained risks, benefits, and alternatives to treatment and verbalized consent to proceed. Time out was performed and patient " name, , site, and procedure were confirmed. Skin was sterilely prepared with alcohol solution. 18 gauge needle on a 60 cc syringe was used for aspiration through dorsolateral approach between 3rd and 4th dorsal compartments. 2 cc's of thick bloody joint fluid collected. Fluid and cultures were obtained and sent for analysis. Aspiration site was covered with a bandaid.      Assessment/Plan:     Septic joint of left wrist  Ashish Mckeon is a 54 y.o. male with PMH of PMH of  presenting with CHF (EF of 25% in 2018), IDDM (A1c 14), CKD (Cr 2.8 today) presenting with worsening L wrist pain for 1 day. Pt has been afebrile and has no elevated WBC. ESR 36, normal CRP. Aspirate results show 91k WBCs (89% segs), + Gram stain w/ Budding yeast, + CPPD Crystals.     - will admit to medicine for pre-operative optimization, discussed with resident and staff physician, patient will require no additional optimization prior to surgery  - Pre-op labs ordered, see above labs  - NPO now  - OR today for left wrist arthrotomy marked and consented  - AUGUSTINE ORTIZ  - Abx: none  - DVT PPx: none            Tripp Persaud M.D. PGY2  Orthopaedic Surgery

## 2019-08-05 NOTE — ASSESSMENT & PLAN NOTE
sCr is 2.8 on admission. May be related to prerenal KEYANNA secondary to volume depletion with over diuresis or chronic secondary to CHF and cardiorenal syndrome. Patient is urinating without difficulty on admission. Will continue to monitor.   - Trending CMP  - 500mL IV NS bolus in ED

## 2019-08-05 NOTE — HOSPITAL COURSE
8/5: Admitted for pre op and ortho washout for acute left arm pain.   8/6: Patient recovering well without issue. Afebrile. Discussing ABx with ID  08/07/2019: Reports return of movement and sensation of L arm. Continues to have L wrist pain today. Worsening Cr today, suspect volume-overload. IV furosemide 80mg X 1 given. Denies SOB or cough.   08/08/2019: Arm is still swollen but feeling much better. Discussed allopurinol medication and need for him to f/u promptly with PCP for DM control. D/C to home

## 2019-08-05 NOTE — ASSESSMENT & PLAN NOTE
Patient presenting with acute pain from left wrist to the left elbow. Samples with rare budding yeast. Patient afebrile on admission and WBC not concerning. Preop for othro wash out.   - Monitor CBC concerning infxn  - Blood Cx X 2  - Vital checks

## 2019-08-05 NOTE — ANESTHESIA PREPROCEDURE EVALUATION
[facility]  Anesthesia Pre-Operative Evaluation         Patient Name: Ashish Mckeon  YOB: 1965  MRN: [id number]    SUBJECTIVE:     Pre-operative evaluation for Procedure(s) (LRB):  INCISION AND DRAINAGE, UPPER EXTREMITY (Arthrotomy left wrist) Slider table, hand table. Ortho hand set. Cysto tubing 3L NS (Left)     08/05/2019    Ashish Mckeon is a 54 y.o. male w/ a significant PMHx of HTN, HLD, IDDM, CKD stage III, hypothyroidism, HFrEF (EF 25%) who presents to [facility] ED w/ left wrist pain which has been worsening over the past 24-48 hours. Aspirate in the ED w/ Orthopedics shows 91k WBCs (89% segs), + gram stain w/ budding yeast, + CPPD crystals.    Patient now presents for the above procedure(s).      LDA: None documented.    Prev airway: None documented.    Drips: None documented.    Patient Active Problem List   Diagnosis    Megaloblastic anemia due to vitamin B12 deficiency    Hypothyroidism    Diabetes mellitus, type II    Acute on chronic combined systolic and diastolic congestive heart failure    Non-rheumatic mitral regurgitation    Anemia of chronic disease    KEYANNA (acute kidney injury)    Essential hypertension    Substance use disorder    NSVT (nonsustained ventricular tachycardia)    Essential (primary) hypertension    Other proteinuria    H/O thyroidectomy    right upper lobe mass    Hyperglycemia    Pulmonary nodules    Blurry vision, bilateral    Hyperlipidemia    Septic joint of left wrist       Review of patient's allergies indicates:   Allergen Reactions    Lisinopril Other (See Comments)     acei cough         Current Outpatient Medications:    Current Facility-Administered Medications:     aspirin chewable tablet 81 mg, 81 mg, Oral, Daily, Debbie Galindo MD, 81 mg at 08/05/19 1554    atorvastatin tablet 80 mg, 80 mg, Oral, QHS, Debbie Galindo MD    dextrose 10% (D10W) Bolus, 12.5 g, Intravenous, PRN, Debbie Galindo MD    dextrose 10% (D10W) Bolus, 25 g,  Intravenous, PRN, Debbie Galindo MD    glucagon (human recombinant) injection 1 mg, 1 mg, Intramuscular, PRN, Debbie Galindo MD    glucose chewable tablet 16 g, 16 g, Oral, PRN, Debbie Galindo MD    glucose chewable tablet 24 g, 24 g, Oral, PRN, Debbie Galindo MD    insulin aspart U-100 pen 0-5 Units, 0-5 Units, Subcutaneous, Q6H PRN, Debbie Galindo MD    insulin aspart U-100 pen 3 Units, 3 Units, Subcutaneous, Once, Debbie Galindo MD    [START ON 8/6/2019] levothyroxine tablet 175 mcg, 175 mcg, Oral, Before breakfast, Debbie Galindo MD    metoprolol succinate (TOPROL-XL) 24 hr tablet 50 mg, 50 mg, Oral, Daily, Debbie Galindo MD, 50 mg at 08/05/19 1557    pantoprazole EC tablet 40 mg, 40 mg, Oral, Daily PRN, Debbie Galindo MD    sodium chloride 0.9% flush 10 mL, 10 mL, Intravenous, PRN, Debbie Galindo MD    Current Outpatient Medications:     acetaminophen (TYLENOL) 325 MG tablet, Take 2 tablets (650 mg total) by mouth every 6 to 8 hours as needed., Disp: , Rfl: 0    aspirin 81 MG Chew, Take 1 tablet (81 mg total) by mouth once daily., Disp: 360 tablet, Rfl: 0    atorvastatin (LIPITOR) 80 MG tablet, Take 1 tablet (80 mg total) by mouth every evening. (Patient taking differently: Take 80 mg by mouth once daily. ), Disp: 90 tablet, Rfl: 3    furosemide (LASIX) 80 MG tablet, Take 1 tablet (80 mg total) by mouth 2 (two) times daily. Take an extra 80mg daily as needed for weight gain of 3 lbs in 24 hrs  or 5 lbs/wk, Disp: 60 tablet, Rfl: 3    glipiZIDE (GLUCOTROL) 5 MG TR24, Take 1 tablet (5 mg total) by mouth daily with breakfast., Disp: 90 tablet, Rfl: 3    levothyroxine (SYNTHROID) 175 MCG tablet, Take 1 tablet (175 mcg total) by mouth before breakfast., Disp: , Rfl:     metoprolol succinate (TOPROL-XL) 50 MG 24 hr tablet, Take 1 tablet (50 mg total) by mouth once daily., Disp: 90 tablet, Rfl: 3    pantoprazole (PROTONIX) 40 MG tablet, Take 1 tablet (40 mg total) by mouth daily as needed (heartburn)., Disp: , Rfl:     Past Surgical History:  "  Procedure Laterality Date    COLON SURGERY      "lower intestines removed"    THYROID SURGERY         Social History     Socioeconomic History    Marital status:      Spouse name: Not on file    Number of children: Not on file    Years of education: Not on file    Highest education level: Not on file   Occupational History    Not on file   Social Needs    Financial resource strain: Not on file    Food insecurity:     Worry: Not on file     Inability: Not on file    Transportation needs:     Medical: Not on file     Non-medical: Not on file   Tobacco Use    Smoking status: Never Smoker    Smokeless tobacco: Never Used    Tobacco comment: cigars "every other week or so"   Substance and Sexual Activity    Alcohol use: Not Currently     Alcohol/week: 3.6 oz     Types: 6 Cans of beer per week    Drug use: Yes     Types: Marijuana    Sexual activity: Yes     Partners: Female   Lifestyle    Physical activity:     Days per week: Not on file     Minutes per session: Not on file    Stress: Not on file   Relationships    Social connections:     Talks on phone: Not on file     Gets together: Not on file     Attends Spiritism service: Not on file     Active member of club or organization: Not on file     Attends meetings of clubs or organizations: Not on file     Relationship status: Not on file   Other Topics Concern    Not on file   Social History Narrative    Not on file       OBJECTIVE:     Vital Signs Range (Last 24H):  Temp:  [36.5 °C (97.7 °F)-36.8 °C (98.3 °F)]   Pulse:  [70-83]   Resp:  [14-18]   BP: ()/(65-73)   SpO2:  [97 %-100 %]       Significant Labs:  Lab Results   Component Value Date    WBC 5.41 08/05/2019    HGB 11.3 (L) 08/05/2019    HCT 36.9 (L) 08/05/2019     (L) 08/05/2019    CHOL 130 11/26/2018    TRIG 105 11/26/2018    HDL 27 (L) 11/26/2018    ALT 29 12/24/2018    AST 26 12/24/2018     (L) 08/05/2019    K 5.4 (H) 08/05/2019     08/05/2019    " CREATININE 2.8 (H) 08/05/2019    BUN 71 (H) 08/05/2019    CO2 22 (L) 08/05/2019    TSH 0.823 12/22/2018    INR 1.1 08/05/2019    HGBA1C >14.0 (H) 08/05/2019       Diagnostic Studies: No relevant studies.    EKG (08/05/2019):  Vent. Rate : 080 BPM     Atrial Rate : 080 BPM  P-R Int : 262 ms          QRS Dur : 098 ms  QT Int : 402 ms       P-R-T Axes : 036 234 066 degrees  QTc Int : 463 ms    Sinus rhythm with 1st degree A-V block  Possible Left atrial enlargement  Low voltage QRS  Abnormal ECG    2D ECHO (11/26/2018):  CONCLUSIONS:  · Severe left atrial enlargement.  · Moderate left ventricular enlargement.  · Severely decreased left ventricular systolic function. The estimated ejection fraction is 25%  · Global hypokinetic wall motion.  · Grade III (severe) left ventricular diastolic dysfunction consistent with restrictive physiology.  · Elevated left atrial pressure.  · Moderately reduced right ventricular systolic function.  · Moderate right ventricular enlargement.  · Moderate-to-severe mitral regurgitation.  · Mild tricuspid regurgitation.  · The estimated PA systolic pressure is 51.97 mm Hg  · Elevated central venous pressure (15 mm Hg).  · Trivial Pericardial effusion.  · Mild atrial enlargement.    ASSESSMENT/PLAN:     Anesthesia Evaluation    I have reviewed the Patient Summary Reports.    I have reviewed the Nursing Notes.   I have reviewed the Medications.     Review of Systems  Anesthesia Hx:  No problems with previous Anesthesia  Denies Family Hx of Anesthesia complications.   Denies Personal Hx of Anesthesia complications.   Social:  Non-Smoker, No Alcohol Use    Hematology/Oncology:         -- Anemia: Denies Current/Recent Cancer   EENT/Dental:   denies chronic allergic rhinitis   Cardiovascular:   Denies Hypertension.  Denies MI.  Denies CAD.    Denies CABG/stent.  Denies Dysrhythmias.  CHF     hyperlipidemia JEROME NYHA Classification II ECG has been reviewed.    Pulmonary:   Denies COPD.  Denies  Asthma.  Denies Recent URI.  Denies Sleep Apnea.    Renal/:   Chronic Renal Disease, CRI    Hepatic/GI:   Denies GERD. Denies Liver Disease.    Musculoskeletal:   Arthritis   Joint Disease:  Infected Joint    Neurological:   Denies TIA. Denies CVA. Denies Seizures.    Endocrine:   Diabetes, poorly controlled, type 2, using insulin Hypothyroidism    Psych:   Denies Psychiatric History.          Physical Exam  General:  Well nourished    Airway/Jaw/Neck:  Airway Findings: Mouth Opening: Normal Tongue: Normal  General Airway Assessment: Adult  Mallampati: III  Improves to II with phonation.  TM Distance: Normal, at least 6 cm  Jaw/Neck Findings:  Neck ROM: Normal ROM      Dental:  Dental Findings: In tact   Chest/Lungs:  Chest/Lungs Findings: Clear to auscultation, Normal Respiratory Rate     Heart/Vascular:  Heart Findings: Rate: Normal  Rhythm: Regular Rhythm  Sounds: Normal     Abdomen:  Abdomen Findings:  Normal, Soft, Nontender     Musculoskeletal:  Musculoskeletal Findings: Normal   Skin:  Skin Findings: Normal    Mental Status:  Mental Status Findings:  Cooperative, Alert and Oriented         Anesthesia Plan  Type of Anesthesia, risks & benefits discussed:  Anesthesia Type:  general  Patient's Preference:   Intra-op Monitoring Plan: standard ASA monitors  Intra-op Monitoring Plan Comments:   Post Op Pain Control Plan: multimodal analgesia, IV/PO Opioids PRN and per primary service following discharge from PACU  Post Op Pain Control Plan Comments:   Induction:   IV  Beta Blocker:  Patient is not currently on a Beta-Blocker (No further documentation required).       Informed Consent: Patient understands risks and agrees with Anesthesia plan.  Questions answered. Anesthesia consent signed with patient.  ASA Score: 3     Day of Surgery Review of History & Physical:    H&P update referred to the surgeon.         Ready For Surgery From Anesthesia Perspective.

## 2019-08-05 NOTE — SIGNIFICANT EVENT
Patient is 54 y.o. M w/ HFrEF (EF 20%), DM presented w/ 2 days of acute onset L wrist pain. S/p joint aspiration, initial gram stain positive for rare budding yeast and many WBC. Patient denies CP, SOB. Denies h/o MI or CVA. Has exertional dyspnea after walking 1 block. FC NYHA II currently.     EKG without acute ST changes. Cr. 2.8.     his estimated risk for an adverse cardiac outcome (myocardial infarction, pulmonary edema, ventricular fibrillation, cardiac arrest, or complete heart block) with the proposed surgery based on functional capacity at the Revised Cardiac Risk Index [RCRI] - Mike Criteria is RCRI = >2 ; >11% risk.     Debbie Galindo MD  PGY-2, Internal Medicine

## 2019-08-05 NOTE — ED NOTES
Patient identifiers verified and correct for Ashish Mckeon.    LOC: The patient is awake and alert; oriented x 3 and speaking appropriately.  APPEARANCE: Patient resting comfortably, patient is clean and well groomed.  SKIN: warm and dry, normal skin turgor & moist mucus membranes, skin intact, no breakdown noted.  MUSCULOSKELETAL: swelling noted to top of left wrist with limited movement to left wrist, hand, and fingers.  RESPIRATORY: Airway is open and patent; respirations are spontaneous, normal effort and rate.  CARDIAC: Patient has a normal rate, no peripheral edema noted, capillary refill < 3 seconds; No complaints of chest pain.   ABDOMEN: Soft and non tender to palpation, no distention noted.

## 2019-08-05 NOTE — ASSESSMENT & PLAN NOTE
Ashish Mckeon is a 54 y.o. male with PMH of PMH of  presenting with CHF (EF of 25% in 2018), IDDM (A1c 14), CKD (Cr 2.8 today) presenting with worsening L wrist pain for 1 day. Pt has been afebrile and has no elevated WBC. ESR 36, normal CRP. Aspirate results show 91k WBCs (89% segs), + Gram stain w/ Budding yeast, + Crystals.   - will admit to medicine for pre-operative optimization, will require operative intervention when best optimized.  - Pre-op labs ordered  - NPO now, will wait until medically optimized   - OR today vs. Tomorrow for L wrist arthrotomy  - Pt was marked and consented for surgery  - NWB LUE  - Abx: none  - DVT PPx: none  - Will continue to follow

## 2019-08-05 NOTE — ANESTHESIA PROCEDURE NOTES
Peripheral Block    Patient location during procedure: OR    Reason for block: primary anesthetic   Diagnosis: left wrist pain   Start time: 8/5/2019 6:21 PM  Timeout: 8/5/2019 6:20 PM   End time: 8/5/2019 6:24 PM    Staffing  Authorizing Provider: Patricia Pelaez MD  Performing Provider: Leandro Soto MD    Preanesthetic Checklist  Completed: patient identified, site marked, surgical consent, pre-op evaluation, timeout performed, IV checked, risks and benefits discussed and monitors and equipment checked  Peripheral Block  Patient position: sitting  Prep: ChloraPrep  Patient monitoring: heart rate, cardiac monitor, continuous pulse ox, continuous capnometry and frequent blood pressure checks  Block type: infraclavicular  Laterality: left  Injection technique: single shot  Needle  Needle type: Stimuplex   Needle gauge: 21 G  Needle length: 4 in  Needle localization: ultrasound guidance and anatomical landmarks   -ultrasound image captured on disc.  Assessment  Injection assessment: negative aspiration and negative parasthesia  Paresthesia pain: none  Heart rate change: no  Slow fractionated injection: yes  Additional Notes  VSS.  [facility] RN monitoring vitals throughout procedure.  Patient tolerated procedure well.

## 2019-08-05 NOTE — ED TRIAGE NOTES
Left wrist pain beginning 2 days ago. Pt denies injury. Describes pain as sharp and stabbing radiating to elbow. Pt reports applying ice and epsom salt bath without relief.

## 2019-08-05 NOTE — SUBJECTIVE & OBJECTIVE
"Past Medical History:   Diagnosis Date    CHF (congestive heart failure)     Diabetes mellitus type II     Essential hypertension, benign     Hyperlipidemia     Hypothyroidism     Undiagnosed cardiac murmurs        Past Surgical History:   Procedure Laterality Date    COLON SURGERY      "lower intestines removed"    THYROID SURGERY         Review of patient's allergies indicates:   Allergen Reactions    Lisinopril Other (See Comments)     acei cough         No current facility-administered medications for this encounter.      Current Outpatient Medications   Medication Sig    acetaminophen (TYLENOL) 325 MG tablet Take 2 tablets (650 mg total) by mouth every 6 to 8 hours as needed.    aspirin 81 MG Chew Take 1 tablet (81 mg total) by mouth once daily.    atorvastatin (LIPITOR) 80 MG tablet Take 1 tablet (80 mg total) by mouth every evening. (Patient taking differently: Take 80 mg by mouth once daily. )    furosemide (LASIX) 80 MG tablet Take 1 tablet (80 mg total) by mouth 2 (two) times daily. Take an extra 80mg daily as needed for weight gain of 3 lbs in 24 hrs  or 5 lbs/wk    glipiZIDE (GLUCOTROL) 5 MG TR24 Take 1 tablet (5 mg total) by mouth daily with breakfast.    levothyroxine (SYNTHROID) 175 MCG tablet Take 1 tablet (175 mcg total) by mouth before breakfast.    metoprolol succinate (TOPROL-XL) 50 MG 24 hr tablet Take 1 tablet (50 mg total) by mouth once daily.    pantoprazole (PROTONIX) 40 MG tablet Take 1 tablet (40 mg total) by mouth daily as needed (heartburn).     Family History     Problem Relation (Age of Onset)    Cancer Father        Tobacco Use    Smoking status: Never Smoker    Smokeless tobacco: Never Used    Tobacco comment: cigars "every other week or so"   Substance and Sexual Activity    Alcohol use: Not Currently     Alcohol/week: 3.6 oz     Types: 6 Cans of beer per week    Drug use: Yes     Types: Marijuana    Sexual activity: Yes     Partners: Female     Review of " "Systems   Constitution: Negative for chills and fever.   Eyes: Negative for visual disturbance.   Cardiovascular: Negative for chest pain and syncope.   Respiratory: Negative for cough and shortness of breath.    Skin: Negative for color change.   Musculoskeletal: Positive for joint pain and joint swelling. Negative for back pain, falls and myalgias.   Gastrointestinal: Negative for abdominal pain, nausea and vomiting.   Genitourinary: Negative for hematuria.   Neurological: Positive for weakness. Negative for headaches, light-headedness and numbness.     Objective:     Vital Signs (Most Recent):  Temp: 98.3 °F (36.8 °C) (08/05/19 0955)  Pulse: 70 (08/05/19 1215)  Resp: 14 (08/05/19 1215)  BP: 99/65 (08/05/19 1215)  SpO2: 99 % (08/05/19 1215) Vital Signs (24h Range):  Temp:  [98.3 °F (36.8 °C)] 98.3 °F (36.8 °C)  Pulse:  [70-83] 70  Resp:  [14-18] 14  SpO2:  [99 %-100 %] 99 %  BP: ()/(65) 99/65     Weight: 70.3 kg (155 lb)  Height: 5' 4" (162.6 cm)  Body mass index is 26.61 kg/m².    No intake or output data in the 24 hours ending 08/05/19 1306    Ortho/SPM Exam   Gen:  No acute distress  CV:  Peripherally well-perfused.    Lungs:  Normal respiratory effort.  Head/Neck:  Normocephalic.  Atraumatic.    MSK:  LUE:  - Skin intact throughout, no abrasions or scars present  - Swelling over dorsolateral aspect of wrist   - No ecchymosis or erythema.  - TTP over dorsolateral aspect of wrist, nonTTP over medial joint line.  - AROM and PROM of the wrist limited to 40 deg flexion and extension 2/2 pain.  - AIN/PIN/Radial/Median/Ulnar Nerves assessed in isolation without deficit  - SILT throughout  - Compartments soft  - Radial & Ulnar arteries palpated 2+  - Capillary Refill <3s    All other joints (shoulder/elbow/wrist/hip/knee/ankle) were examined and had full ROM and were non-tender to palpation.    Significant Labs: All pertinent labs within the past 24 hours have been reviewed. Hb 11.3. Normal WBC. ESR 36. Normal " CRP. Cr 2.8. A1c >14. Uric Acid 9.0.   Aspirate: + Gram stain, 91k WBCs (89% segs) w budding yeast, + Crystals.    Significant Imaging: X-Ray: I have reviewed all pertinent results/findings and my personal findings are:      Xray of L wrist: No acute fractures or dislocations. Mild degenerative joint disease. No signs of edema or gas.    Procedure Note: L Wrist aspiration  Patient was explained risks, benefits, and alternatives to treatment and verbalized consent to proceed. Time out was performed and patient name, , site, and procedure were confirmed. Skin was sterilely prepared with alcohol solution. 18 gauge needle on a 60 cc syringe was used for aspiration through dorsolateral approach between 3rd and 4th dorsal compartments. 2 cc's of thick bloody joint fluid collected. Fluid and cultures were obtained and sent for analysis. Aspiration site was covered with a bandaid.

## 2019-08-05 NOTE — ASSESSMENT & PLAN NOTE
A1C >14, Glucose > 200 on admission. Taking glipizide BID with insulin. Not well controlled.   - Detemir 10 units QHS  - Glucose accuchecks

## 2019-08-05 NOTE — ED PROVIDER NOTES
"Encounter Date: 8/5/2019    SCRIBE #1 NOTE: I, Yamilet Roberto, am scribing for, and in the presence of,  Dr. James. I have scribed the entire note.       History     Chief Complaint   Patient presents with    Wrist Pain     L wrist pain at the joint since Friday.  Denies fevers     55 yo M with pmhx HTN, HLD, DM, hypothyroidism, CHF (EF 25%, Grade III DD) presents with a chief complaint of left wrist pain. Atraumatic. Reports increasing pain and swelling over the past 24 hours. Worsens by movement and palpitation. Pain is severe in quality. Patient did report helping to set up a party for his wife the evening prior to onset, but states he did not lift anything more than 10 pounds. Reports a strain to the left wrist years prior. No history of IVDA or gout. Patient is right hand dominant. No fever or chills. Patient denies any chest pain or shortness of breath.    The history is provided by the patient and medical records.     Review of patient's allergies indicates:   Allergen Reactions    Lisinopril Other (See Comments)     acei cough       Past Medical History:   Diagnosis Date    CHF (congestive heart failure)     Diabetes mellitus type II     Essential hypertension, benign     Hyperlipidemia     Hypothyroidism     Pain and swelling of left wrist 8/5/2019    Ashish Mckeon is a 54 y.o. male with PMH of  presenting with CHF, IDDM, Hypothyroidism, HTN, HLD presenting with worsening L wrist pain for 1 day. Orthopedic surgery was consulted for septic joint r/o. Pt has been afebrile and has no elevated WBC. ESR 36, normal CRP. Xray shows no signs of trauma and pt has no hx of gout or septic arthritis. Due to atraumatic wrist pain and swelling w/out identifia    Undiagnosed cardiac murmurs      Past Surgical History:   Procedure Laterality Date    COLON SURGERY      "lower intestines removed"    THYROID SURGERY       Family History   Problem Relation Age of Onset    Cancer Father      Social History " "    Tobacco Use    Smoking status: Never Smoker    Smokeless tobacco: Never Used    Tobacco comment: cigars "every other week or so"   Substance Use Topics    Alcohol use: Not Currently     Alcohol/week: 3.6 oz     Types: 6 Cans of beer per week    Drug use: Yes     Types: Marijuana     Review of Systems   Constitutional: Negative for chills and fever.   HENT: Negative for sore throat.    Respiratory: Negative for shortness of breath.    Cardiovascular: Negative for chest pain.   Gastrointestinal: Negative for nausea.   Genitourinary: Negative for dysuria.   Musculoskeletal: Negative for back pain.        +Left wrist pain and swelling   Skin: Negative for rash.   Neurological: Negative for weakness.   Hematological: Does not bruise/bleed easily.       Physical Exam     Initial Vitals [08/05/19 0955]   BP Pulse Resp Temp SpO2   110/65 83 18 98.3 °F (36.8 °C) 100 %      MAP       --         Physical Exam    Nursing note and vitals reviewed.  Constitutional: He appears well-developed and well-nourished. He is not diaphoretic. No distress.   HENT:   Head: Normocephalic and atraumatic.   Eyes: Conjunctivae and EOM are normal.   Neck: Normal range of motion. Neck supple. No JVD present.   Cardiovascular: Normal rate, regular rhythm, normal heart sounds and intact distal pulses. Exam reveals no gallop and no friction rub.    No murmur heard.  Pulmonary/Chest: Breath sounds normal. No respiratory distress. He has no wheezes. He has no rhonchi. He has no rales. He exhibits no tenderness.   Abdominal: Soft. Bowel sounds are normal. He exhibits no distension and no mass. There is no tenderness. There is no rebound and no guarding.   Musculoskeletal: He exhibits edema and tenderness.   Left wrist swollen along the radial aspect. Tenderness to palpation, warm. Pulses are intact. Unable to pronate without assistance. Left wrist passive flexion and extension limited to 15 degrees 2/2 pain   Lymphadenopathy:     He has no " cervical adenopathy.   Neurological: He is alert and oriented to person, place, and time. He has normal strength. No sensory deficit.   Skin: Skin is warm and dry. Capillary refill takes less than 2 seconds.   Psychiatric: He has a normal mood and affect.         ED Course   Procedures  Labs Reviewed   CBC W/ AUTO DIFFERENTIAL - Abnormal; Notable for the following components:       Result Value    Hemoglobin 11.3 (*)     Hematocrit 36.9 (*)     Mean Corpuscular Volume 78 (*)     Mean Corpuscular Hemoglobin 23.8 (*)     Mean Corpuscular Hemoglobin Conc 30.6 (*)     RDW 18.7 (*)     Platelets 105 (*)     Lymph # 0.5 (*)     Lymph% 8.9 (*)     Mono% 16.3 (*)     All other components within normal limits   BASIC METABOLIC PANEL - Abnormal; Notable for the following components:    Sodium 134 (*)     Potassium 5.4 (*)     CO2 22 (*)     Glucose 237 (*)     BUN, Bld 71 (*)     Creatinine 2.8 (*)     eGFR if  28.1 (*)     eGFR if non  24.3 (*)     All other components within normal limits   SEDIMENTATION RATE - Abnormal; Notable for the following components:    Sed Rate 36 (*)     All other components within normal limits   HEMOGLOBIN A1C - Abnormal; Notable for the following components:    Hemoglobin A1C >14.0 (*)     All other components within normal limits    Narrative:     ADD-ON PT-INR #[id number] PER ADELE MARSHALL MD 13:27  08/05/2019   ADD-ON GHGB #[id number] PER ADELE MARSHALL MD  13:28  08/05/2019   ADD-ON URIC #[id number] PER ADELE MARSHALL MD 13:34  08/05/2019    URIC ACID - Abnormal; Notable for the following components:    Uric Acid 9.0 (*)     All other components within normal limits    Narrative:     ADD-ON PT-INR #[id number] PER ADELE MARSHALL MD 13:27 08/05/2019   ADD-ON GHGB #[id number] PER ADELE MARSHALL MD  13:28  08/05/2019   ADD-ON URIC #[id number] PER ADELE MARSHALL MD 13:34  08/05/2019    GRAM STAIN   AFB CULTURE & SMEAR   CULTURE, FUNGUS   CULTURE, ANAEROBIC    CULTURE, AEROBIC  (SPECIFY SOURCE)   C-REACTIVE PROTEIN   WBC & DIFF,BODY FLUID   BODY FLUID CRYSTAL   PROTIME-INR    Narrative:     ADD-ON PT-INR #[id number] PER ADELE MARSHALL MD 13:27  08/05/2019   ADD-ON GHGB #[id number] PER ADELE MARSHALL MD  13:28  08/05/2019   ADD-ON URIC #[id number] PER ADELE MARSHALL MD 13:34  08/05/2019    URINALYSIS, REFLEX TO URINE CULTURE          Imaging Results          CT Wrist Without Contrast Left (No Result on File)                 X-Ray Wrist Complete Left (Final result)  Result time 08/05/19 11:02:18    Final result by Nitin Guerrero MD (08/05/19 11:02:18)                 Impression:      Frontal view only suggests some slight irregularity of the cortex at the distal ulna adjacent to the ulnar styloid process.  It is difficult to completely exclude the possibility of an acute fracture in this area.  In addition, frontal view demonstrates a small ossific density adjacent to the interphalangeal joint of the thumb.  This appears to be well corticated and is not felt to be strongly suspicious for fracture.  Correlation with precise location of pain is necessary.  If clinically indicated, follow-up images in 10-14 days may be helpful for further evaluation.  CT scan may also be considered.    This report was flagged in [employer] as abnormal.      Electronically signed by: Nitin Guerrero MD  Date:    08/05/2019  Time:    11:02             Narrative:    EXAMINATION:  XR WRIST COMPLETE 3 VIEWS LEFT    CLINICAL HISTORY:  Pain in left wrist    TECHNIQUE:  PA, lateral, and oblique views of the left wrist were performed.    COMPARISON:  None    FINDINGS:  Bones are well mineralized.  Alignment is satisfactory and joint spaces appear fairly well maintained.  As seen on the frontal view, there is a suggestion of some slight irregularity of the cortex of the distal ulna adjacent to the styloid process.  This is not seen on the other two views.  Also as appreciated on the frontal view only, there is an  ossific density adjacent to the interphalangeal joint of the thumb.  This appears well corticated and is not felt to be strongly suspicious for acute fracture.  No other evidence of fracture or dislocation is seen.  Atherosclerotic vascular calcifications are noted.                                 Medical Decision Making:   History:   Old Medical Records: I decided to obtain old medical records.  Initial Assessment:   53 yo M with pmhx HTN, HLD, DM, hypothyroidism, CHF (EF 25%, Grade III DD) presents with a chief complaint of left wrist pain.  Differential Diagnosis:   My initial differential diagnoses include but are not limited to: gout, effusion, pathologic fracture, septic joint, sprain, strain.  Independently Interpreted Test(s):   I have ordered and independently interpreted X-rays - see prior notes.  Clinical Tests:   Lab Tests: Ordered and Reviewed  Radiological Study: Ordered and Reviewed  ED Management:  Will obtain labs and x-ray. Will administer ice and analgesics.     Reassessment:  X-ray with incidental findings along ulnar styloid which is inconsistent with where the patient is symptomatic. Will obtain CT of the wrist for further evaluation.. Patient has KEYANNA with creatinine of 2.8, baseline 2.2. BUN elevated at 71. There is hyperkalemia at 5.4. CBC without leukocytosis. ESR elevated at 36. CRP normal at 2.1.  Will diurese with 80 of IV Lasix for KEYANNA and hyperkalemia. Will consult orthopedics for possible arthrocentesis.    Reassessment:  Arthrocentesis performed by Ortho.  Gram stain positive for many WBCs and yeast.  91,000 WBCs. Pt will require admission for washout.  According to Ortho, they recommend admission to Medicine given comorbidity of CHF.  They request medical clearance for OR.  They are uncertain on exact timing of washout at this time.  Patient currently NPO.  Despite septic arthritis, patient does not appear septic.  Vital signs reassuring.  Holding antibiotics per Ortho's  request.  Other:   I have discussed this case with another health care provider.       <> Summary of the Discussion: Orthopedics            Scribe Attestation:   Scribe #1: I performed the above scribed service and the documentation accurately describes the services I performed. I attest to the accuracy of the note.    Attending Attestation:           Physician Attestation for Scribe:      Comments: I, Dr. Puneet James, personally performed the services described in this documentation. All medical record entries made by the scribe were at my direction and in my presence.  I have reviewed the chart and agree that the record reflects my personal performance and is accurate and complete. Puneet James MD.  2:03 PM 08/05/2019                 Clinical Impression:       ICD-10-CM ICD-9-CM   1. Septic arthritis, due to unspecified organism, septic arthritis of unspecified location M00.9 711.00   2. Pain and swelling of left wrist M25.532 719.43    M25.432 719.03   3. KEYANNA (acute kidney injury) N17.9 584.9   4. Hyperkalemia E87.5 276.7         Disposition:   Disposition: Admitted                        Puneet James MD  08/05/19 1016

## 2019-08-05 NOTE — H&P
[facility] Hospital Medicine  History & Physical    Patient Name: Ashish Mckeon  MRN: [id number]  Admission Date: 8/5/2019  Attending Physician: Funmi Cano MD   Primary Care Provider: [facility] Medicine Team: [facility] MED 2 Constantino Barker MD     Patient information was obtained from patient and ER records.     Subjective:     Principal Problem:Septic joint of left wrist    Chief Complaint:   Chief Complaint   Patient presents with    Wrist Pain     L wrist pain at the joint since Friday.  Denies fevers        HPI: 55 yo M with HTN, HLD, DM, hypothyroidism, CHF (EF 25%, Grade III DD) presents with left wrist pain with no reported trauma. Reports increasing pain and swelling over the past 24 hours. Worsens by movement and palpitation. Pain is severe in quality. He is unable to move his fingers and wrist due to pain. He was helping to set up a party for his wife the evening prior to onset. He did not lift anything more than 10 pounds. No history of IVDA or gout. Denies fever and chills. Patient has never experienced this previously.     Last admitted on 12/24/18 for acute on chronic combined heart failure exacerbation and discharged on increased Lasix taking it BID 80mg, now taking 40mg TID after seeing pcp between visits. Taking Toprol XL 50 daily. Previous TTE showed EF 25% with severe left atrial enlargement. Limited to 1L of fluids per day. He is taking Glipizide Bid an insulin for elevated readings. On levothyroxine. He is here for surgery clearance with ortho who is planning for wash out. EKG on presentation with no ST changes. Patient is not having CP. Labs on admission demonstrate elevated sCr of 2.8 and glucose of 237.     Past Medical History:   Diagnosis Date    CHF (congestive heart failure)     Diabetes mellitus type II     Essential hypertension, benign     Hyperlipidemia     Hypothyroidism     Pain and swelling of left wrist  "8/5/2019    Ashish Mckeon is a 54 y.o. male with PMH of  presenting with CHF, IDDM, Hypothyroidism, HTN, HLD presenting with worsening L wrist pain for 1 day. Orthopedic surgery was consulted for septic joint r/o. Pt has been afebrile and has no elevated WBC. ESR 36, normal CRP. Xray shows no signs of trauma and pt has no hx of gout or septic arthritis. Due to atraumatic wrist pain and swelling w/out identifia    Undiagnosed cardiac murmurs        Past Surgical History:   Procedure Laterality Date    COLON SURGERY      "lower intestines removed"    THYROID SURGERY         Review of patient's allergies indicates:   Allergen Reactions    Lisinopril Other (See Comments)     acei cough         No current facility-administered medications on file prior to encounter.      Current Outpatient Medications on File Prior to Encounter   Medication Sig    acetaminophen (TYLENOL) 325 MG tablet Take 2 tablets (650 mg total) by mouth every 6 to 8 hours as needed.    aspirin 81 MG Chew Take 1 tablet (81 mg total) by mouth once daily.    atorvastatin (LIPITOR) 80 MG tablet Take 1 tablet (80 mg total) by mouth every evening. (Patient taking differently: Take 80 mg by mouth once daily. )    furosemide (LASIX) 80 MG tablet Take 1 tablet (80 mg total) by mouth 2 (two) times daily. Take an extra 80mg daily as needed for weight gain of 3 lbs in 24 hrs  or 5 lbs/wk    glipiZIDE (GLUCOTROL) 5 MG TR24 Take 1 tablet (5 mg total) by mouth daily with breakfast.    levothyroxine (SYNTHROID) 175 MCG tablet Take 1 tablet (175 mcg total) by mouth before breakfast.    metoprolol succinate (TOPROL-XL) 50 MG 24 hr tablet Take 1 tablet (50 mg total) by mouth once daily.    pantoprazole (PROTONIX) 40 MG tablet Take 1 tablet (40 mg total) by mouth daily as needed (heartburn).     Family History     Problem Relation (Age of Onset)    Cancer Father        Tobacco Use    Smoking status: Never Smoker    Smokeless tobacco: Never Used    Tobacco " "comment: cigars "every other week or so"   Substance and Sexual Activity    Alcohol use: Not Currently     Alcohol/week: 3.6 oz     Types: 6 Cans of beer per week    Drug use: Yes     Types: Marijuana    Sexual activity: Yes     Partners: Female     Review of Systems   Constitutional: Positive for fatigue. Negative for appetite change, chills and fever.   HENT: Positive for sinus pressure and sinus pain. Negative for congestion and tinnitus.    Eyes: Negative for photophobia and visual disturbance.   Respiratory: Positive for shortness of breath (only with exertion). Negative for cough, chest tightness and wheezing.    Cardiovascular: Negative for chest pain, palpitations and leg swelling.   Gastrointestinal: Negative for abdominal pain, blood in stool, constipation, diarrhea, nausea and vomiting.   Genitourinary: Negative for difficulty urinating, dysuria, enuresis, frequency and hematuria.   Musculoskeletal: Positive for arthralgias. Negative for back pain and neck pain.   Allergic/Immunologic: Negative for environmental allergies and food allergies.   Neurological: Positive for dizziness (orthostatic). Negative for headaches.   Psychiatric/Behavioral: Negative for agitation and confusion.     Objective:     Vital Signs (Most Recent):  Temp: 97.7 °F (36.5 °C) (08/05/19 1437)  Pulse: 77 (08/05/19 1437)  Resp: 16 (08/05/19 1437)  BP: 103/73 (08/05/19 1437)  SpO2: 97 % (08/05/19 1437) Vital Signs (24h Range):  Temp:  [97.7 °F (36.5 °C)-98.3 °F (36.8 °C)] 97.7 °F (36.5 °C)  Pulse:  [70-83] 77  Resp:  [14-18] 16  SpO2:  [97 %-100 %] 97 %  BP: ()/(65-73) 103/73     Weight: 70.3 kg (155 lb)  Body mass index is 26.61 kg/m².    Physical Exam   Constitutional: He is oriented to person, place, and time. He appears well-developed and well-nourished. No distress.   HENT:   Head: Normocephalic and atraumatic.   Eyes: EOM are normal. No scleral icterus.   Neck: Normal range of motion.   Cardiovascular: Normal rate and " regular rhythm.   Murmur (Systolic and S3) heard.  Pulmonary/Chest: Effort normal and breath sounds normal. No respiratory distress.   Abdominal: Soft. Bowel sounds are normal. He exhibits no distension. There is no tenderness.   Musculoskeletal: He exhibits no edema or deformity.   Unable to move left arm. Resting by side under blanket.    Neurological: He is alert and oriented to person, place, and time.   Skin: Skin is warm and dry. No rash noted. He is not diaphoretic. No erythema.   Psychiatric: He has a normal mood and affect. His behavior is normal. Thought content normal.         CRANIAL NERVES     CN III, IV, VI   Extraocular motions are normal.        Significant Labs:   CBC:   Recent Labs   Lab 08/05/19  1038   WBC 5.41   HGB 11.3*   HCT 36.9*   *     CMP:   Recent Labs   Lab 08/05/19  1038   *   K 5.4*      CO2 22*   *   BUN 71*   CREATININE 2.8*   CALCIUM 8.9   ANIONGAP 11   EGFRNONAA 24.3*       Significant Imaging: L Wrist XR: Frontal view only suggests some slight irregularity of the cortex at the distal ulna adjacent to the ulnar styloid process.  It is difficult to completely exclude the possibility of an acute fracture in this area.  In addition, frontal view demonstrates a small ossific density adjacent to the interphalangeal joint of the thumb.  This appears to be well corticated and is not felt to be strongly suspicious for fracture.  Correlation with precise location of pain is necessary.  If clinically indicated, follow-up images in 10-14 days may be helpful for further evaluation.  CT scan may also be considered.    Assessment/Plan:     * Septic joint of left wrist  Patient presenting with acute pain from left wrist to the left elbow. Samples with rare budding yeast. Patient afebrile on admission and WBC not concerning. Preop for othro wash out.   - Monitor CBC concerning infxn  - Blood Cx X 2  - Vital checks         Elevated serum creatinine  sCr is 2.8 on  admission. May be related to prerenal KEYANNA secondary to volume depletion with over diuresis or chronic secondary to CHF and cardiorenal syndrome. Patient is urinating without difficulty on admission. Will continue to monitor.   - Trending CMP  - 500mL IV NS bolus in ED      Acute on chronic combined systolic and diastolic congestive heart failure  EF 25% with severe left atrial enlargement on TTE in 11/2018. Denies leg swelling and SOB. No cough. He is not having CP. Taking Lasix 40mg TID and Toprol 50mg XL. He received Lasix 80mg once in ED. EKG not concerning for new abnormalities from previous study. Showed possible Left Atrial enlargement, consistent with TTE previously.   - Holding Lasix at this time with no concerns for volume overload and may be volume depleted.   - Continue Toprol home med     Diabetes mellitus, type II  A1C >14, Glucose > 200 on admission. Taking glipizide BID with insulin. Not well controlled.   - Detemir 10 units QHS  - Glucose accuchecks       Hypothyroidism  Continue home levothyroxine      VTE Risk Mitigation (From admission, onward)        Ordered     IP VTE HIGH RISK PATIENT  Once      08/05/19 1454     Place [place] hose  Until discontinued      08/05/19 1454             Constantino Barker MD  Department of Hospital Medicine   [facility]

## 2019-08-05 NOTE — ASSESSMENT & PLAN NOTE
EF 25% with severe left atrial enlargement on TTE in 11/2018. Denies leg swelling and SOB. No cough. He is not having CP. Taking Lasix 40mg TID and Toprol 50mg XL. He received Lasix 80mg once in ED. EKG not concerning for new abnormalities from previous study. Showed possible Left Atrial enlargement, consistent with TTE previously.   - Holding Lasix at this time with no concerns for volume overload and may be volume depleted.   - Continue Toprol home med

## 2019-08-06 PROBLEM — R88.8: Status: ACTIVE | Noted: 2019-08-06

## 2019-08-06 LAB
ALBUMIN SERPL BCP-MCNC: 2.9 G/DL (ref 3.5–5.2)
ALP SERPL-CCNC: 226 U/L (ref 55–135)
ALT SERPL W/O P-5'-P-CCNC: 37 U/L (ref 10–44)
ANION GAP SERPL CALC-SCNC: 9 MMOL/L (ref 8–16)
AST SERPL-CCNC: 31 U/L (ref 10–40)
BASOPHILS # BLD AUTO: 0.06 K/UL (ref 0–0.2)
BASOPHILS NFR BLD: 1.3 % (ref 0–1.9)
BILIRUB SERPL-MCNC: 1.6 MG/DL (ref 0.1–1)
BUN SERPL-MCNC: 72 MG/DL (ref 6–20)
CALCIUM SERPL-MCNC: 8.8 MG/DL (ref 8.7–10.5)
CHLORIDE SERPL-SCNC: 99 MMOL/L (ref 95–110)
CO2 SERPL-SCNC: 23 MMOL/L (ref 23–29)
CREAT SERPL-MCNC: 2.8 MG/DL (ref 0.5–1.4)
DIFFERENTIAL METHOD: ABNORMAL
EOSINOPHIL # BLD AUTO: 0.1 K/UL (ref 0–0.5)
EOSINOPHIL NFR BLD: 2.7 % (ref 0–8)
ERYTHROCYTE [DISTWIDTH] IN BLOOD BY AUTOMATED COUNT: 18.8 % (ref 11.5–14.5)
EST. GFR  (AFRICAN AMERICAN): 28.1 ML/MIN/1.73 M^2
EST. GFR  (NON AFRICAN AMERICAN): 24.3 ML/MIN/1.73 M^2
GLUCOSE SERPL-MCNC: 203 MG/DL (ref 70–110)
HCT VFR BLD AUTO: 36.9 % (ref 40–54)
HGB BLD-MCNC: 11.6 G/DL (ref 14–18)
IMM GRANULOCYTES # BLD AUTO: 0.01 K/UL (ref 0–0.04)
IMM GRANULOCYTES NFR BLD AUTO: 0.2 % (ref 0–0.5)
LYMPHOCYTES # BLD AUTO: 0.5 K/UL (ref 1–4.8)
LYMPHOCYTES NFR BLD: 10.8 % (ref 18–48)
MCH RBC QN AUTO: 23.8 PG (ref 27–31)
MCHC RBC AUTO-ENTMCNC: 31.4 G/DL (ref 32–36)
MCV RBC AUTO: 76 FL (ref 82–98)
MONOCYTES # BLD AUTO: 0.8 K/UL (ref 0.3–1)
MONOCYTES NFR BLD: 15.8 % (ref 4–15)
NEUTROPHILS # BLD AUTO: 3.3 K/UL (ref 1.8–7.7)
NEUTROPHILS NFR BLD: 69.2 % (ref 38–73)
NRBC BLD-RTO: 0 /100 WBC
PLATELET # BLD AUTO: 99 K/UL (ref 150–350)
PMV BLD AUTO: ABNORMAL FL (ref 9.2–12.9)
POCT GLUCOSE: 188 MG/DL (ref 70–110)
POCT GLUCOSE: 204 MG/DL (ref 70–110)
POCT GLUCOSE: 277 MG/DL (ref 70–110)
POTASSIUM SERPL-SCNC: 5 MMOL/L (ref 3.5–5.1)
PROT SERPL-MCNC: 7 G/DL (ref 6–8.4)
RBC # BLD AUTO: 4.87 M/UL (ref 4.6–6.2)
SODIUM SERPL-SCNC: 131 MMOL/L (ref 136–145)
VANCOMYCIN SERPL-MCNC: 14.3 UG/ML
WBC # BLD AUTO: 4.74 K/UL (ref 3.9–12.7)

## 2019-08-06 PROCEDURE — 25000003 PHARM REV CODE 250: Performed by: STUDENT IN AN ORGANIZED HEALTH CARE EDUCATION/TRAINING PROGRAM

## 2019-08-06 PROCEDURE — 99232 SBSQ HOSP IP/OBS MODERATE 35: CPT | Mod: ,,, | Performed by: INTERNAL MEDICINE

## 2019-08-06 PROCEDURE — 97530 THERAPEUTIC ACTIVITIES: CPT

## 2019-08-06 PROCEDURE — 85025 COMPLETE CBC W/AUTO DIFF WBC: CPT

## 2019-08-06 PROCEDURE — 80053 COMPREHEN METABOLIC PANEL: CPT

## 2019-08-06 PROCEDURE — 87040 BLOOD CULTURE FOR BACTERIA: CPT

## 2019-08-06 PROCEDURE — 36415 COLL VENOUS BLD VENIPUNCTURE: CPT

## 2019-08-06 PROCEDURE — 99232 PR SUBSEQUENT HOSPITAL CARE,LEVL II: ICD-10-PCS | Mod: ,,, | Performed by: INTERNAL MEDICINE

## 2019-08-06 PROCEDURE — 80202 ASSAY OF VANCOMYCIN: CPT

## 2019-08-06 PROCEDURE — 99233 SBSQ HOSP IP/OBS HIGH 50: CPT | Mod: ,,, | Performed by: INTERNAL MEDICINE

## 2019-08-06 PROCEDURE — 97165 OT EVAL LOW COMPLEX 30 MIN: CPT

## 2019-08-06 PROCEDURE — 63600175 PHARM REV CODE 636 W HCPCS: Performed by: INTERNAL MEDICINE

## 2019-08-06 PROCEDURE — 11000001 HC ACUTE MED/SURG PRIVATE ROOM

## 2019-08-06 PROCEDURE — 63600175 PHARM REV CODE 636 W HCPCS: Performed by: STUDENT IN AN ORGANIZED HEALTH CARE EDUCATION/TRAINING PROGRAM

## 2019-08-06 PROCEDURE — 99233 PR SUBSEQUENT HOSPITAL CARE,LEVL III: ICD-10-PCS | Mod: ,,, | Performed by: INTERNAL MEDICINE

## 2019-08-06 RX ORDER — ATORVASTATIN CALCIUM 40 MG/1
40 TABLET, FILM COATED ORAL DAILY
Status: ON HOLD | COMMUNITY
End: 2019-08-08 | Stop reason: HOSPADM

## 2019-08-06 RX ORDER — COLCHICINE 0.6 MG/1
0.6 TABLET, FILM COATED ORAL 2 TIMES DAILY
Status: DISCONTINUED | OUTPATIENT
Start: 2019-08-06 | End: 2019-08-08 | Stop reason: HOSPADM

## 2019-08-06 RX ORDER — LOSARTAN POTASSIUM 25 MG/1
25 TABLET ORAL DAILY
Status: ON HOLD | COMMUNITY
End: 2020-01-01 | Stop reason: HOSPADM

## 2019-08-06 RX ORDER — ACETAMINOPHEN 325 MG/1
650 TABLET ORAL EVERY 6 HOURS PRN
Status: DISCONTINUED | OUTPATIENT
Start: 2019-08-06 | End: 2019-08-07

## 2019-08-06 RX ORDER — GLYBURIDE 5 MG/1
10 TABLET ORAL 2 TIMES DAILY WITH MEALS
Status: ON HOLD | COMMUNITY
End: 2020-01-01 | Stop reason: HOSPADM

## 2019-08-06 RX ORDER — VANCOMYCIN HCL IN 5 % DEXTROSE 1G/250ML
1000 PLASTIC BAG, INJECTION (ML) INTRAVENOUS ONCE
Status: COMPLETED | OUTPATIENT
Start: 2019-08-06 | End: 2019-08-06

## 2019-08-06 RX ADMIN — VANCOMYCIN HYDROCHLORIDE 1000 MG: 1 INJECTION, POWDER, LYOPHILIZED, FOR SOLUTION INTRAVENOUS at 12:08

## 2019-08-06 RX ADMIN — MUPIROCIN: 20 OINTMENT TOPICAL at 11:08

## 2019-08-06 RX ADMIN — MUPIROCIN: 20 OINTMENT TOPICAL at 09:08

## 2019-08-06 RX ADMIN — ACETAMINOPHEN 650 MG: 325 TABLET ORAL at 06:08

## 2019-08-06 RX ADMIN — COLCHICINE 0.6 MG: 0.6 TABLET, FILM COATED ORAL at 11:08

## 2019-08-06 RX ADMIN — INSULIN ASPART 3 UNITS: 100 INJECTION, SOLUTION INTRAVENOUS; SUBCUTANEOUS at 12:08

## 2019-08-06 RX ADMIN — INSULIN ASPART 2 UNITS: 100 INJECTION, SOLUTION INTRAVENOUS; SUBCUTANEOUS at 05:08

## 2019-08-06 RX ADMIN — LEVOTHYROXINE SODIUM 175 MCG: 125 TABLET ORAL at 06:08

## 2019-08-06 RX ADMIN — ASPIRIN 81 MG: 81 TABLET, COATED ORAL at 09:08

## 2019-08-06 RX ADMIN — METOPROLOL SUCCINATE 50 MG: 50 TABLET, EXTENDED RELEASE ORAL at 09:08

## 2019-08-06 RX ADMIN — CEFTRIAXONE 2 G: 2 INJECTION, POWDER, FOR SOLUTION INTRAMUSCULAR; INTRAVENOUS at 06:08

## 2019-08-06 RX ADMIN — COLCHICINE 0.6 MG: 0.6 TABLET, FILM COATED ORAL at 09:08

## 2019-08-06 RX ADMIN — ACETAMINOPHEN 650 MG: 325 TABLET ORAL at 11:08

## 2019-08-06 RX ADMIN — ATORVASTATIN CALCIUM 80 MG: 20 TABLET, FILM COATED ORAL at 09:08

## 2019-08-06 RX ADMIN — INSULIN DETEMIR 10 UNITS: 100 INJECTION, SOLUTION SUBCUTANEOUS at 09:08

## 2019-08-06 NOTE — PROGRESS NOTES
[facility] Medicine  Progress Note    Patient Name: Ashish Mckeon  MRN: [id number]  Patient Class: IP- Inpatient   Admission Date: 8/5/2019  Length of Stay: 1 days  Attending Physician: ANGEL Shelton MD  Primary Care Provider: [facility] Medicine Team: [facility] 2 Constantino Barker MD    Subjective:     Principal Problem:Septic joint of left wrist      HPI:  55 yo M with HTN, HLD, DM, hypothyroidism, CHF (EF 25%, Grade III DD) presents with left wrist pain with no reported trauma. Reports increasing pain and swelling over the past 24 hours. Worsens by movement and palpitation. Pain is severe in quality. He is unable to move his fingers and wrist due to pain. He was helping to set up a party for his wife the evening prior to onset. He did not lift anything more than 10 pounds. No history of IVDA or gout. Denies fever and chills. Patient has never experienced this previously.     Last admitted on 12/24/18 for acute on chronic combined heart failure exacerbation and discharged on increased Lasix taking it BID 80mg, now taking 40mg TID after seeing pcp between visits. Taking Toprol XL 50 daily. Previous TTE showed EF 25% with severe left atrial enlargement. Limited to 1L of fluids per day. He is taking Glipizide Bid an insulin for elevated readings. On levothyroxine. He is here for surgery clearance with ortho who is planning for wash out. EKG on presentation with no ST changes. Patient is not having CP. Labs on admission demonstrate elevated sCr of 2.8 and glucose of 237.     Overview/Hospital Course:  8/5: Admitted for pre op and ortho washout for acute left arm pain.   8/6: Patient recovering well without issue. Afebrile. Discussing ABx with ID    Interval History: Patient resting comfortbaly in bed. He is making urine without issue. Last BM was just prior to admission and surgery.     Review of Systems   Constitutional: Negative for appetite  change, chills and fever.   HENT: Negative for congestion and tinnitus.    Eyes: Negative for photophobia and visual disturbance.   Respiratory: Positive for shortness of breath (only with exertion). Negative for cough, chest tightness and wheezing.    Cardiovascular: Negative for chest pain, palpitations and leg swelling.   Gastrointestinal: Negative for abdominal pain, blood in stool, constipation, diarrhea, nausea and vomiting.   Genitourinary: Negative for difficulty urinating, dysuria, enuresis, frequency and hematuria.   Musculoskeletal: Positive for arthralgias. Negative for back pain and neck pain.   Allergic/Immunologic: Negative for environmental allergies and food allergies.   Neurological: Negative for headaches. Dizziness: orthostatic.   Psychiatric/Behavioral: Negative for agitation and confusion.     Objective:     Vital Signs (Most Recent):  Temp: 96.9 °F (36.1 °C) (08/06/19 1214)  Pulse: 68 (08/06/19 1214)  Resp: 19 (08/06/19 1214)  BP: 92/65 (08/06/19 1214)  SpO2: 98 % (08/06/19 1214) Vital Signs (24h Range):  Temp:  [96.9 °F (36.1 °C)-98.8 °F (37.1 °C)] 96.9 °F (36.1 °C)  Pulse:  [53-76] 68  Resp:  [14-19] 19  SpO2:  [94 %-100 %] 98 %  BP: ()/(58-80) 92/65     Weight: 76 kg (167 lb 8.8 oz)  Body mass index is 27.88 kg/m².    Intake/Output Summary (Last 24 hours) at 8/6/2019 1549  Last data filed at 8/6/2019 0600  Gross per 24 hour   Intake 1130 ml   Output 700 ml   Net 430 ml      Physical Exam   Constitutional: He is oriented to person, place, and time. He appears well-developed and well-nourished. No distress.   HENT:   Head: Normocephalic and atraumatic.   Eyes: EOM are normal. No scleral icterus.   Neck: Normal range of motion.   Cardiovascular: Normal rate and regular rhythm.   Murmur (Systolic and S3) heard.  Pulmonary/Chest: Effort normal and breath sounds normal. No respiratory distress.   Abdominal: Soft. Bowel sounds are normal. He exhibits no distension. There is no tenderness.    Musculoskeletal: He exhibits no edema or deformity.   Unable to move left arm and hand. Splint in place   Neurological: He is alert and oriented to person, place, and time.   Skin: Skin is warm and dry. No rash noted. He is not diaphoretic. No erythema.   Psychiatric: He has a normal mood and affect. His behavior is normal. Thought content normal.       Significant Labs:   CBC:   Recent Labs   Lab 08/05/19  1038 08/06/19  0608   WBC 5.41 4.74   HGB 11.3* 11.6*   HCT 36.9* 36.9*   * 99*     CMP:   Recent Labs   Lab 08/05/19  1038 08/06/19  0608   * 131*   K 5.4* 5.0    99   CO2 22* 23   * 203*   BUN 71* 72*   CREATININE 2.8* 2.8*   CALCIUM 8.9 8.8   PROT  --  7.0   ALBUMIN  --  2.9*   BILITOT  --  1.6*   ALKPHOS  --  226*   AST  --  31   ALT  --  37   ANIONGAP 11 9   EGFRNONAA 24.3* 24.3*       Significant Imaging: I have reviewed all pertinent imaging results/findings within the past 24 hours.      Assessment/Plan:      * Septic joint of left wrist  Patient presenting with acute pain from left wrist to the left elbow. Samples with rare budding yeast. Patient afebrile on admission and WBC not concerning. Preop for othro wash out.   - Monitor CBC concerning infxn  - Blood Cx X 2  - Vital checks   - Per ID continue Vanc and Rocephin IV        Crystals present in synovial fluid obtained by arthrocentesis  Concerning for gout or pseudogout with overlying infection. This is considered an acute flare. NSAIDs may not be used with CKD.   -- Start Colchicine       Elevated serum creatinine  sCr is 2.8 on admission. May be related to prerenal KEYANNA secondary to volume depletion with over diuresis or chronic secondary to CHF and cardiorenal syndrome. Patient is urinating without difficulty on admission. Will continue to monitor.   - Trending CMP  - 500mL IV NS bolus in ED      Acute on chronic combined systolic and diastolic congestive heart failure  EF 25% with severe left atrial enlargement on TTE in  11/2018. Denies leg swelling and SOB. No cough. He is not having CP. Taking Lasix 40mg TID and Toprol 50mg XL. He received Lasix 80mg once in ED. EKG not concerning for new abnormalities from previous study. Showed possible Left Atrial enlargement, consistent with TTE previously.   - Holding Lasix at this time with no concerns for volume overload and may be volume depleted.   - Continue Toprol home med     Diabetes mellitus, type II  A1C >14, Glucose > 200 on admission. Taking glipizide BID with insulin. Not well controlled.   - Detemir 10 units QHS  - Glucose accuchecks       Hypothyroidism  Continue home levothyroxine        VTE Risk Mitigation (From admission, onward)        Ordered     IP VTE HIGH RISK PATIENT  Once      08/05/19 2149     Place [place] hose  Until discontinued      08/05/19 1741     Place sequential compression device  Until discontinued      08/05/19 1741     Place [place] hose  Until discontinued      08/05/19 1454                Constantino Barker MD  Department of Hospital Medicine   [facility]

## 2019-08-06 NOTE — PLAN OF CARE
08/06/19 1435   Discharge Assessment   Assessment Type Discharge Planning Assessment   Confirmed/corrected address and phone number on facesheet? Yes   Assessment information obtained from? Patient   Expected Length of Stay (days) 3   Communicated expected length of stay with patient/caregiver yes   Prior to hospitilization cognitive status: Alert/Oriented   Prior to hospitalization functional status: Independent   Current cognitive status: Alert/Oriented   Current Functional Status: Independent   Able to Return to Prior Arrangements yes   Is patient able to care for self after discharge? Yes   Who are your caregiver(s) and their phone number(s)? Laura Barclay- significant [phone]   Patient's perception of discharge disposition home or selfcare   Readmission Within the Last 30 Days no previous admission in last 30 days   Patient currently being followed by outpatient case management? No   Patient currently receives any other outside agency services? No   Equipment Currently Used at Home glucometer   Do you have any problems affording any of your prescribed medications? No   Is the patient taking medications as prescribed? yes   Does the patient have transportation home? Yes   Transportation Anticipated family or friend will provide   Does the patient receive services at the [facility]madin Clinic? No   Discharge Plan A Home with family   Discharge Plan B Home Health   DME Needed Upon Discharge  none   Patient/Family in Agreement with Plan yes

## 2019-08-06 NOTE — CONSULTS
Food & Nutrition  Education    Diet Education: Diabetic diet   Learners: patient       Nutrition Education provided with handouts: Type 2 Diabetic nutrition therapy       Comments: Discussed with pt A1C and elevated BG levels. Pt aware of A1c meaning and his being elevated.Provided handouts on DM diet and consistent carbohydrate intake. Pt states PTA checking BG levels and attempting to follow DM diet. Pt aware of carbohydrate foods and portion sizes for one serving. Encourage compliance with diet and monitoring BG levels.     All questions and concerns answered. Dietitian's contact information provided.       Follow-Up: 8/14/19    Please Re-consult as needed      Thanks!  Kiesha Langston RD, LDN

## 2019-08-06 NOTE — PLAN OF CARE
Problem: Occupational Therapy Goal  Goal: Occupational Therapy Goal  Goals to be met by: 2 visits     Patient will increase functional independence with ADLs by performing:    Pt will be (I) with HEP    Outcome: Ongoing (interventions implemented as appropriate)  OT eval completed, and above goals established. LOS [place]  8/6/2019

## 2019-08-06 NOTE — PLAN OF CARE
Problem: Adult Inpatient Plan of Care  Goal: Plan of Care Review  Outcome: Ongoing (interventions implemented as appropriate)     08/06/19 0543   Plan of Care Review   Plan of Care Reviewed With patient     Pt had no falls. AAOx4. SCDs placed upon arrival. Pt currently reporting no pain. Medication and nursing care per MD order.

## 2019-08-06 NOTE — ASSESSMENT & PLAN NOTE
54 y.o. male POD1 s/p L wrist arthrotomy    Pain control: multimodal  PT/OT: WBAT LUE  DVT PPx: ASA 81 BID, FCDs at all times when not ambulating  Abx: vanc, rocephin; GS from aspiration budding yeast, cx NGTD; intraop GS negative, cx NGTD  Labs: pending  Drain: none  Li: none    Dispo: f/u ID recs, cx, continue splint - down tomorrow

## 2019-08-06 NOTE — PROGRESS NOTES
Pharmacokinetic Initial Assessment: IV Vancomycin    Assessment/Plan:  · Initiate intravenous vancomycin  dose of 1000 mg once with subsequent doses when random concentrations are less than 20 mcg/ml  · Desired empiric serum trough concentration is 10 to 20 mcg/mL.  · Draw vancomycin random level on 08/06 at a.m. labs.  · Pharmacy will continue to follow and monitor vancomycin.      Please contact pharmacy at extension [phone] with any questions regarding this assessment.     Thank you for the consult,   Marnie Yepezte     Patient brief summary:  Ashish Mckeon is a 54 y.o. male initiated on antimicrobial therapy with IV Vancomycin for treatment of suspected bone/joint    Drug Allergies:   Review of patient's allergies indicates:   Allergen Reactions    Lisinopril Other (See Comments)     acei cough         Actual Body Weight:   70.3 kg     Renal Function:   Estimated Creatinine Clearance: 25 mL/min (A) (based on SCr of 2.8 mg/dL (H)).,       CBC (last 72 hours):  Recent Labs   Lab Result Units 08/05/19  1038   WBC K/uL 5.41   Hemoglobin g/dL 11.3*   Hemoglobin A1C % >14.0*   Hematocrit % 36.9*   Platelets K/uL 105*   Gran% % 71.3   Lymph% % 8.9*   Mono% % 16.3*   Eosinophil% % 2.2   Basophil% % 0.9   Differential Method  Automated       Metabolic Panel (last 72 hours):  Recent Labs   Lab Result Units 08/05/19  1038 08/05/19  1454   Sodium mmol/L 134*  --    Potassium mmol/L 5.4*  --    Chloride mmol/L 101  --    CO2 mmol/L 22*  --    Glucose mg/dL 237*  --    Glucose, UA   --  Negative   BUN, Bld mg/dL 71*  --    Creatinine mg/dL 2.8*  --    Magnesium mg/dL 2.2  --        Drug levels (last 3 results):  No results for input(s): VANCOMYCINRA, VANCOMYCINPE, VANCOMYCINTR in the last 72 hours.    Microbiologic Results:  Microbiology Results (last 7 days)     Procedure Component Value Units Date/Time    AFB Culture & Smear [[id number]] Collected:  08/05/19 1928    Order Status:  Sent Specimen:  SWAB from Wrist, Left  Updated:  08/05/19 1928    Fungus culture [[id number]] Collected:  08/05/19 1928    Order Status:  Sent Specimen:  SWAB from Wrist, Left Updated:  08/05/19 1928    Culture, Anaerobe [[id number]] Collected:  08/05/19 1928    Order Status:  Sent Specimen:  SWAB from Wrist, Left Updated:  08/05/19 1928    Aerobic culture [[id number]] Collected:  08/05/19 1928    Order Status:  Sent Specimen:  SWAB from Wrist, Left Updated:  08/05/19 1928    Gram stain [[id number]] Collected:  08/05/19 1928    Order Status:  Sent Specimen:  SWAB from Wrist, Left Updated:  08/05/19 1928    Blood culture [[id number]] Collected:  08/05/19 1802    Order Status:  Sent Specimen:  Blood from Peripheral, Hand, Left Updated:  08/05/19 1803    Blood culture [[id number]] Collected:  08/05/19 1802    Order Status:  Sent Specimen:  Blood from Peripheral, Hand, Right Updated:  08/05/19 1802    Blood culture [[id number]]     Order Status:  Canceled Specimen:  Blood     Gram stain [[id number]] Collected:  08/05/19 1237    Order Status:  Completed Specimen:  Joint Fluid from Wrist, Left Updated:  08/05/19 1339     Gram Stain Result Many  WBC's      Rare budding yeast    AFB Culture & Smear [[id number]] Collected:  08/05/19 1237    Order Status:  Sent Specimen:  Joint Fluid from Wrist, Left Updated:  08/05/19 1256    Fungus culture [[id number]] Collected:  08/05/19 1237    Order Status:  Sent Specimen:  Joint Fluid from Wrist, Left Updated:  08/05/19 1256    Culture, Anaerobic [[id number]] Collected:  08/05/19 1237    Order Status:  Sent Specimen:  Joint Fluid from Wrist, Left Updated:  08/05/19 1255    Aerobic culture [[id number]] Collected:  08/05/19 1237    Order Status:  Sent Specimen:  Joint Fluid from Wrist, Left Updated:  08/05/19 1255

## 2019-08-06 NOTE — OP NOTE
OP NOTE    DOS:  08/05/2019    Preop Dx: Septic arthritis of left wrist    Postop Dx: Septic arthritis of left wrist    Procedure: Left wrist arthrotomy with irrigation for septic arthritis    Surgeon: Joesph Chen M.D.    Asst:  Ernesto Zeng M.D, Corey Persaud MD    Anesthesia: Regional plus monitored anesthesia care    EBL:  Minimal    IVF:  500 cc crystalloid    Specimens: Cultures    Findings: Murky fluid in joint    Dispo:  To PACU awake/stable       Indications for Procedure:      54-year-old male with several day history of left wrist pain.  Aspiration showed 91,000 WBC with rare yeast growing on Gram stain.  He also has positive crystals.  The risks, benefits and alternatives to surgery discussed with the patient at length prior to going to the operating room. Informed consent was obtained.    Procedure in Detail:    Patient was identified the preoperative holding area the site was marked. Patient was wheeled to the operating room where regional analgesia was administered.  Monitored anesthesia care was induced.  The left upper extremity was prepped and draped in sterile fashion. A time-out was undertaken to confirm patient, side, site, surgery, surgeon.  All agreed we proceeded.    A dorsal incision was made overlying the wrist at the junction of the 3rd and 4th dorsal compartments.  Dissection was carried down and the extensor retinaculum was incised.  The wrist was entered between the 3rd and 4th compartments and the capsule incised.  Murky fluid was encountered and cultured.  The wrist was copiously irrigated with normal saline solution. The retinaculum was then repaired with 3 0 antimicrobial Vicryl suture and the skin repaired with 4 O nylon suture running fashion.  A sterile dressings applied.    All instrument sponge counts were reported correct in the case.  There were no complications.  The patient was awakened and taken to recovery room in stable condition.    Plan for the patient:  Will follow  cultures and treat appropriately with antibiotics    Joesph Chen MD

## 2019-08-06 NOTE — ASSESSMENT & PLAN NOTE
54 year old male with PMH of DM, HF, HLD, and hypothyroidism presents with left wrist pain, swelling, and decreased ROM; aspiration revealed 91K WBC with 89% segs; (+) crystals. CRP-2; ESR-36.   Pt now s/p I&D in the OR 8/5; murky fluid encountered.   Gram stain with rare budding yeast.     Plan:  1. Continue empiric vancomycin and rocephin for now; vanc trough goal: 15-20  2. F/u on cultures and tailor accordingly  3. Will follow

## 2019-08-06 NOTE — SUBJECTIVE & OBJECTIVE
"Principal Problem:Septic joint of left wrist    Principal Orthopedic Problem: same    Interval History: Patient seen and examined at bedside.  No acute events overnight.  Pain controlled.  Surgery yesterday.      Review of patient's allergies indicates:   Allergen Reactions    Lisinopril Other (See Comments)     acei cough         Current Facility-Administered Medications   Medication    acetaminophen tablet 650 mg    aspirin EC tablet 81 mg    atorvastatin tablet 80 mg    bisacodyl suppository 10 mg    cefTRIAXone (ROCEPHIN) 2 g in dextrose 5 % 50 mL IVPB    dextrose 10% (D10W) Bolus    dextrose 10% (D10W) Bolus    docusate sodium capsule 100 mg    glucagon (human recombinant) injection 1 mg    glucose chewable tablet 16 g    glucose chewable tablet 24 g    insulin aspart U-100 pen 0-5 Units    insulin aspart U-100 pen 3 Units    insulin detemir U-100 pen 10 Units    levothyroxine tablet 175 mcg    metoprolol succinate (TOPROL-XL) 24 hr tablet 50 mg    mupirocin 2 % ointment    pantoprazole EC tablet 40 mg    polyethylene glycol packet 17 g    sodium chloride 0.9% flush 10 mL    sodium chloride 0.9% flush 10 mL     Objective:     Vital Signs (Most Recent):  Temp: 97.3 °F (36.3 °C) (08/06/19 0406)  Pulse: 68 (08/06/19 0406)  Resp: 16 (08/05/19 2150)  BP: 102/66 (08/06/19 0406)  SpO2: 96 % (08/06/19 0406) Vital Signs (24h Range):  Temp:  [97.3 °F (36.3 °C)-98.8 °F (37.1 °C)] 97.3 °F (36.3 °C)  Pulse:  [53-83] 68  Resp:  [14-19] 16  SpO2:  [94 %-100 %] 96 %  BP: ()/(58-80) 102/66     Weight: 76 kg (167 lb 8.8 oz)  Height: 5' 5" (165.1 cm)  Body mass index is 27.88 kg/m².      Intake/Output Summary (Last 24 hours) at 8/6/2019 0547  Last data filed at 8/6/2019 0200  Gross per 24 hour   Intake 650 ml   Output 400 ml   Net 250 ml       Ortho/SPM Exam  AAOx4  NAD  Reg rate  No increased WOB  Splint c/d/i  No distal motor or sensory function 2/2 nerve block  WWP extremities  FCDs in place and " functioning    Significant Labs:   CBC:   Recent Labs   Lab 08/05/19  1038   WBC 5.41   HGB 11.3*   HCT 36.9*   *     CMP:   Recent Labs   Lab 08/05/19  1038   *   K 5.4*      CO2 22*   *   BUN 71*   CREATININE 2.8*   CALCIUM 8.9   ANIONGAP 11   EGFRNONAA 24.3*     All pertinent labs within the past 24 hours have been reviewed.    Significant Imaging: I have reviewed all pertinent imaging results/findings.

## 2019-08-06 NOTE — ASSESSMENT & PLAN NOTE
Patient presenting with acute pain from left wrist to the left elbow. Samples with rare budding yeast. Patient afebrile on admission and WBC not concerning. Preop for othro wash out.   - Monitor CBC concerning infxn  - Blood Cx X 2  - Vital checks   - Per ID continue Vanc and Rocephin IV

## 2019-08-06 NOTE — NURSING TRANSFER
Nursing Transfer Note      8/5/2019     [facility]    Transfer via stretcher    Transfer with belongingd    Transported by RN    Medicines sent: N/A    Chart send with patient: Yes    Notified: NURSE    Patient reassessed at: 08/05/19 2130 (date, time)    Upon arrival to floor: bed in lowest position

## 2019-08-06 NOTE — PROGRESS NOTES
[facility]  Orthopedics  Progress Note    Patient Name: Ashish Mckeon  MRN: [id number]  Admission Date: 8/5/2019  Hospital Length of Stay: 1 days  Attending Provider: Funmi Cano MD  Primary Care Provider: [facility]  Follow-up For: Procedure(s) (LRB):  INCISION AND DRAINAGE, UPPER EXTREMITY , LEFT WRIST (Left)    Post-Operative Day: 1 Day Post-Op  Subjective:     Principal Problem:Septic joint of left wrist    Principal Orthopedic Problem: same    Interval History: Patient seen and examined at bedside.  No acute events overnight.  Pain controlled.  Surgery yesterday.      Review of patient's allergies indicates:   Allergen Reactions    Lisinopril Other (See Comments)     acei cough         Current Facility-Administered Medications   Medication    acetaminophen tablet 650 mg    aspirin EC tablet 81 mg    atorvastatin tablet 80 mg    bisacodyl suppository 10 mg    cefTRIAXone (ROCEPHIN) 2 g in dextrose 5 % 50 mL IVPB    dextrose 10% (D10W) Bolus    dextrose 10% (D10W) Bolus    docusate sodium capsule 100 mg    glucagon (human recombinant) injection 1 mg    glucose chewable tablet 16 g    glucose chewable tablet 24 g    insulin aspart U-100 pen 0-5 Units    insulin aspart U-100 pen 3 Units    insulin detemir U-100 pen 10 Units    levothyroxine tablet 175 mcg    metoprolol succinate (TOPROL-XL) 24 hr tablet 50 mg    mupirocin 2 % ointment    pantoprazole EC tablet 40 mg    polyethylene glycol packet 17 g    sodium chloride 0.9% flush 10 mL    sodium chloride 0.9% flush 10 mL     Objective:     Vital Signs (Most Recent):  Temp: 97.3 °F (36.3 °C) (08/06/19 0406)  Pulse: 68 (08/06/19 0406)  Resp: 16 (08/05/19 2150)  BP: 102/66 (08/06/19 0406)  SpO2: 96 % (08/06/19 0406) Vital Signs (24h Range):  Temp:  [97.3 °F (36.3 °C)-98.8 °F (37.1 °C)] 97.3 °F (36.3 °C)  Pulse:  [53-83] 68  Resp:  [14-19] 16  SpO2:  [94 %-100 %] 96 %  BP: ()/(58-80) 102/88  "    Weight: 76 kg (167 lb 8.8 oz)  Height: 5' 5" (165.1 cm)  Body mass index is 27.88 kg/m².      Intake/Output Summary (Last 24 hours) at 8/6/2019 0547  Last data filed at 8/6/2019 0200  Gross per 24 hour   Intake 650 ml   Output 400 ml   Net 250 ml       Ortho/SPM Exam  AAOx4  NAD  Reg rate  No increased WOB  Splint c/d/i  No distal motor or sensory function 2/2 nerve block  WWP extremities  FCDs in place and functioning    Significant Labs:   CBC:   Recent Labs   Lab 08/05/19  1038   WBC 5.41   HGB 11.3*   HCT 36.9*   *     CMP:   Recent Labs   Lab 08/05/19  1038   *   K 5.4*      CO2 22*   *   BUN 71*   CREATININE 2.8*   CALCIUM 8.9   ANIONGAP 11   EGFRNONAA 24.3*     All pertinent labs within the past 24 hours have been reviewed.    Significant Imaging: I have reviewed all pertinent imaging results/findings.    Assessment/Plan:     * Septic joint of left wrist  54 y.o. male POD1 s/p L wrist arthrotomy    Pain control: multimodal  PT/OT: WBAT LUE  DVT PPx: ASA 81 BID, FCDs at all times when not ambulating  Abx: vanc, rocephin; GS from aspiration budding yeast, cx NGTD; intraop GS negative, cx NGTD  Labs: pending  Drain: none  Li: none    Dispo: f/u ID recs, cx, continue splint - down tomorrow            Ernesto Morales MD  Orthopedics  [facility]  "

## 2019-08-06 NOTE — HPI
54 year old male with PMH of HTN, HLD, DM, hypothyroidism, CHF who presents with left wrist pain. Denies any trauma. No open skin wounds or abrasions. No history of IVDA or gout. Denies fever and chills. Patient has never experienced this previously.  Ortho aspirated wrist which revealed 91K WBC with 89% segs. Few crystals seen. Gram stain shows rare budding yeast. Cultures pending- NGTD. Pt is afebrile without leukocytosis. CRP-2; ESR 36. Pt started on empiric vanc and rocephin. Brought to the OR yesterday for I&D; murky fluid encountered. ID consulted for further recs.

## 2019-08-06 NOTE — ASSESSMENT & PLAN NOTE
Concerning for gout or pseudogout with overlying infection. This is considered an acute flare. NSAIDs may not be used with CKD.   -- Start Colchicine

## 2019-08-06 NOTE — SUBJECTIVE & OBJECTIVE
"Past Medical History:   Diagnosis Date    CHF (congestive heart failure)     Diabetes mellitus type II     Essential hypertension, benign     Hyperlipidemia     Hypothyroidism     Pain and swelling of left wrist 8/5/2019    Ashish Mckeon is a 54 y.o. male with PMH of  presenting with CHF, IDDM, Hypothyroidism, HTN, HLD presenting with worsening L wrist pain for 1 day. Orthopedic surgery was consulted for septic joint r/o. Pt has been afebrile and has no elevated WBC. ESR 36, normal CRP. Xray shows no signs of trauma and pt has no hx of gout or septic arthritis. Due to atraumatic wrist pain and swelling w/out identifia    Undiagnosed cardiac murmurs        Past Surgical History:   Procedure Laterality Date    COLON SURGERY      "lower intestines removed"    INCISION AND DRAINAGE, UPPER EXTREMITY , LEFT WRIST Left 8/5/2019    Performed by Joesph Chen MD at [facility] OR [address] [place] [address]    THYROID SURGERY         Review of patient's allergies indicates:   Allergen Reactions    Lisinopril Other (See Comments)     acei cough         Medications:  Medications Prior to Admission   Medication Sig    aspirin 81 MG Chew Take 1 tablet (81 mg total) by mouth once daily.    atorvastatin (LIPITOR) 80 MG tablet Take 1 tablet (80 mg total) by mouth every evening. (Patient taking differently: Take 80 mg by mouth once daily. )    furosemide (LASIX) 80 MG tablet Take 1 tablet (80 mg total) by mouth 2 (two) times daily. Take an extra 80mg daily as needed for weight gain of 3 lbs in 24 hrs  or 5 lbs/wk    glipiZIDE (GLUCOTROL) 5 MG TR24 Take 1 tablet (5 mg total) by mouth daily with breakfast.    levothyroxine (SYNTHROID) 175 MCG tablet Take 1 tablet (175 mcg total) by mouth before breakfast.    metoprolol succinate (TOPROL-XL) 50 MG 24 hr tablet Take 1 tablet (50 mg total) by mouth once daily.    acetaminophen (TYLENOL) 325 MG tablet Take 2 tablets (650 mg total) by mouth every 6 to 8 hours as needed.     Antibiotics (From " "admission, onward)    Start     Stop Route Frequency Ordered    08/06/19 1300  vancomycin in dextrose 5 % 1 gram/250 mL IVPB 1,000 mg      -- IV Once 08/06/19 1134    08/05/19 2100  mupirocin 2 % ointment      -- Nasl 2 times daily 08/05/19 1930 08/05/19 2030  cefTRIAXone (ROCEPHIN) 2 g in dextrose 5 % 50 mL IVPB      -- IV Every 24 hours (non-standard times) 08/05/19 1930        Antifungals (From admission, onward)    None        Antivirals (From admission, onward)    None             There is no immunization history on file for this patient.    Family History     Problem Relation (Age of Onset)    Cancer Father        Social History     Socioeconomic History    Marital status:      Spouse name: Not on file    Number of children: Not on file    Years of education: Not on file    Highest education level: Not on file   Occupational History    Not on file   Social Needs    Financial resource strain: Not on file    Food insecurity:     Worry: Not on file     Inability: Not on file    Transportation needs:     Medical: Not on file     Non-medical: Not on file   Tobacco Use    Smoking status: Never Smoker    Smokeless tobacco: Never Used    Tobacco comment: cigars "every other week or so"   Substance and Sexual Activity    Alcohol use: Yes     Alcohol/week: 1.8 oz     Types: 3 Cans of beer per week    Drug use: Yes     Types: Marijuana    Sexual activity: Yes     Partners: Female   Lifestyle    Physical activity:     Days per week: Not on file     Minutes per session: Not on file    Stress: Not on file   Relationships    Social connections:     Talks on phone: Not on file     Gets together: Not on file     Attends Quaker service: Not on file     Active member of club or organization: Not on file     Attends meetings of clubs or organizations: Not on file     Relationship status: Not on file   Other Topics Concern    Not on file   Social History Narrative    Not on file     Review of " Systems   Constitutional: Negative for chills and fever.   Respiratory: Negative for cough and shortness of breath.    Cardiovascular: Negative for chest pain and leg swelling.   Gastrointestinal: Negative for abdominal pain, constipation, diarrhea, nausea and vomiting.   Genitourinary: Negative for dysuria, frequency and hematuria.   Musculoskeletal: Positive for arthralgias (left wrist pain). Negative for back pain and myalgias.   Skin: Negative for rash and wound.   Neurological: Negative for dizziness, light-headedness and headaches.   Psychiatric/Behavioral: Negative for agitation and behavioral problems. The patient is not nervous/anxious.      Objective:     Vital Signs (Most Recent):  Temp: 98.4 °F (36.9 °C) (08/06/19 0740)  Pulse: 69 (08/06/19 0757)  Resp: 18 (08/06/19 0740)  BP: 97/69 (08/06/19 0740)  SpO2: 99 % (08/06/19 0740) Vital Signs (24h Range):  Temp:  [97.3 °F (36.3 °C)-98.8 °F (37.1 °C)] 98.4 °F (36.9 °C)  Pulse:  [53-77] 69  Resp:  [14-19] 18  SpO2:  [94 %-100 %] 99 %  BP: ()/(58-80) 97/69     Weight: 76 kg (167 lb 8.8 oz)  Body mass index is 27.88 kg/m².    Estimated Creatinine Clearance: 28.4 mL/min (A) (based on SCr of 2.8 mg/dL (H)).    Physical Exam   Constitutional: He is oriented to person, place, and time. He appears well-developed and well-nourished. No distress.   Cardiovascular: Normal rate and regular rhythm.   No murmur heard.  Pulmonary/Chest: Effort normal and breath sounds normal. No respiratory distress.   Abdominal: Soft. Bowel sounds are normal. He exhibits no distension.   Musculoskeletal: Normal range of motion. He exhibits no edema.   Left wrist with post op dressing. Sling in place   Neurological: He is alert and oriented to person, place, and time.   Skin: Skin is warm and dry.   Psychiatric: He has a normal mood and affect. His behavior is normal.       Significant Labs:   Blood Culture:   Recent Labs   Lab 08/05/19 1802   LABBLOO No Growth to date  No Growth to  date     CBC:   Recent Labs   Lab 08/05/19  1038 08/06/19  0608   WBC 5.41 4.74   HGB 11.3* 11.6*   HCT 36.9* 36.9*   * 99*     CMP:   Recent Labs   Lab 08/05/19  1038 08/06/19  0608   * 131*   K 5.4* 5.0    99   CO2 22* 23   * 203*   BUN 71* 72*   CREATININE 2.8* 2.8*   CALCIUM 8.9 8.8   PROT  --  7.0   ALBUMIN  --  2.9*   BILITOT  --  1.6*   ALKPHOS  --  226*   AST  --  31   ALT  --  37   ANIONGAP 11 9   EGFRNONAA 24.3* 24.3*     Wound Culture:   Recent Labs   Lab 08/05/19  1237   LABAERO No growth       Significant Imaging: I have reviewed all pertinent imaging results/findings within the past 24 hours.

## 2019-08-06 NOTE — PHARMACY MED REC
"Admission Medication Reconciliation - Pharmacy Consult Note    The home medication history was taken by Vicki Galloway, Pharmacy Technician.  Based on information gathered and subsequent review by the clinical pharmacist, the items below may need attention.     You may go to "Admission" then "Reconcile Home Medications" tabs to review and/or act upon these items.     Potentially problematic discrepancies with current MAR  o Patient is taking a drug DIFFERENTLY than how ordered upon admit  o Atorvastatin 40mg QHS      Potential issues to be addressed PRIOR TO DISCHARGE  o Patient reports to be taking Metoprolol succinate 50mg 1 tablet TID. However, patient should be taking it once daily. Upon discharge, patient will need new prescription.   o Losartan 25mg QD was not ordered due to patient's KEYANNA. Will need a new prescription upon discharge if KEYANNA resolves.  o Furosemide 80mg PO BID was not ordered inpatient because the patient was volume depleted. Consider restarting once discharged.  o If gout is confirmed, consider adding Allopurinol 100mg QD.      Please address this information as you see fit.  Feel free to contact us if you have any questions or require assistance.    [place]  [id number]                .    .          "

## 2019-08-06 NOTE — ADDENDUM NOTE
Addendum  created 08/05/19 2039 by Patricia Pelaez MD    Order list changed, Order sets accessed

## 2019-08-06 NOTE — CONSULTS
[facility]  Infectious Disease  Consult Note    Patient Name: Ashish Mckeon  MRN: [id number]  Admission Date: 8/5/2019  Hospital Length of Stay: 1 days  Attending Physician: ANGEL Shelton MD  Primary Care Provider: [facility]     Isolation Status: No active isolations    Patient information was obtained from patient and past medical records.      Inpatient consult to Infectious Diseases  Consult performed by: AWAIS Ku  Consult ordered by: Ernesto Morales MD        Assessment/Plan:     * Septic joint of left wrist  54 year old male with PMH of DM, HF, HLD, and hypothyroidism presents with left wrist pain, swelling, and decreased ROM; aspiration revealed 91K WBC with 89% segs; (+) crystals. CRP-2; ESR-36.   Pt now s/p I&D in the OR 8/5; murky fluid encountered.   Gram stain with rare budding yeast.     Plan:  1. Continue empiric vancomycin and rocephin for now; vanc trough goal: 15-20  2. F/u on cultures and tailor accordingly  3. Will follow      Thank you for your consult. I will follow-up with patient. Please contact us if you have any additional questions.  AWAIS Carmona Pager: [phone]  Infectious Disease  [facility]    Subjective:     Principal Problem: Septic joint of left wrist    HPI: 54 year old male with PMH of HTN, HLD, DM, hypothyroidism, CHF who presents with left wrist pain. Denies any trauma. No open skin wounds or abrasions. No history of IVDA or gout. Denies fever and chills. Patient has never experienced this previously.  Ortho aspirated wrist which revealed 91K WBC with 89% segs. Few crystals seen. Gram stain shows rare budding yeast. Cultures pending- NGTD. Pt is afebrile without leukocytosis. CRP-2; ESR 36. Pt started on empiric vanc and rocephin. Brought to the OR yesterday for I&D; murky fluid encountered. ID consulted for further recs.     Past Medical History:   Diagnosis Date    CHF (congestive heart failure)  "    Diabetes mellitus type II     Essential hypertension, benign     Hyperlipidemia     Hypothyroidism     Pain and swelling of left wrist 8/5/2019    Ashish Mckeon is a 54 y.o. male with PMH of  presenting with CHF, IDDM, Hypothyroidism, HTN, HLD presenting with worsening L wrist pain for 1 day. Orthopedic surgery was consulted for septic joint r/o. Pt has been afebrile and has no elevated WBC. ESR 36, normal CRP. Xray shows no signs of trauma and pt has no hx of gout or septic arthritis. Due to atraumatic wrist pain and swelling w/out identifia    Undiagnosed cardiac murmurs        Past Surgical History:   Procedure Laterality Date    COLON SURGERY      "lower intestines removed"    INCISION AND DRAINAGE, UPPER EXTREMITY , LEFT WRIST Left 8/5/2019    Performed by Joesph Chen MD at [facility] OR [address] [place] [address]    THYROID SURGERY         Review of patient's allergies indicates:   Allergen Reactions    Lisinopril Other (See Comments)     acei cough         Medications:  Medications Prior to Admission   Medication Sig    aspirin 81 MG Chew Take 1 tablet (81 mg total) by mouth once daily.    atorvastatin (LIPITOR) 80 MG tablet Take 1 tablet (80 mg total) by mouth every evening. (Patient taking differently: Take 80 mg by mouth once daily. )    furosemide (LASIX) 80 MG tablet Take 1 tablet (80 mg total) by mouth 2 (two) times daily. Take an extra 80mg daily as needed for weight gain of 3 lbs in 24 hrs  or 5 lbs/wk    glipiZIDE (GLUCOTROL) 5 MG TR24 Take 1 tablet (5 mg total) by mouth daily with breakfast.    levothyroxine (SYNTHROID) 175 MCG tablet Take 1 tablet (175 mcg total) by mouth before breakfast.    metoprolol succinate (TOPROL-XL) 50 MG 24 hr tablet Take 1 tablet (50 mg total) by mouth once daily.    acetaminophen (TYLENOL) 325 MG tablet Take 2 tablets (650 mg total) by mouth every 6 to 8 hours as needed.     Antibiotics (From admission, onward)    Start     Stop Route Frequency Ordered    08/06/19 1300 " " vancomycin in dextrose 5 % 1 gram/250 mL IVPB 1,000 mg      -- IV Once 08/06/19 1134    08/05/19 2100  mupirocin 2 % ointment      -- Nasl 2 times daily 08/05/19 1930 08/05/19 2030  cefTRIAXone (ROCEPHIN) 2 g in dextrose 5 % 50 mL IVPB      -- IV Every 24 hours (non-standard times) 08/05/19 1930        Antifungals (From admission, onward)    None        Antivirals (From admission, onward)    None             There is no immunization history on file for this patient.    Family History     Problem Relation (Age of Onset)    Cancer Father        Social History     Socioeconomic History    Marital status:      Spouse name: Not on file    Number of children: Not on file    Years of education: Not on file    Highest education level: Not on file   Occupational History    Not on file   Social Needs    Financial resource strain: Not on file    Food insecurity:     Worry: Not on file     Inability: Not on file    Transportation needs:     Medical: Not on file     Non-medical: Not on file   Tobacco Use    Smoking status: Never Smoker    Smokeless tobacco: Never Used    Tobacco comment: cigars "every other week or so"   Substance and Sexual Activity    Alcohol use: Yes     Alcohol/week: 1.8 oz     Types: 3 Cans of beer per week    Drug use: Yes     Types: Marijuana    Sexual activity: Yes     Partners: Female   Lifestyle    Physical activity:     Days per week: Not on file     Minutes per session: Not on file    Stress: Not on file   Relationships    Social connections:     Talks on phone: Not on file     Gets together: Not on file     Attends Sabianist service: Not on file     Active member of club or organization: Not on file     Attends meetings of clubs or organizations: Not on file     Relationship status: Not on file   Other Topics Concern    Not on file   Social History Narrative    Not on file     Review of Systems   Constitutional: Negative for chills and fever.   Respiratory: Negative " for cough and shortness of breath.    Cardiovascular: Negative for chest pain and leg swelling.   Gastrointestinal: Negative for abdominal pain, constipation, diarrhea, nausea and vomiting.   Genitourinary: Negative for dysuria, frequency and hematuria.   Musculoskeletal: Positive for arthralgias (left wrist pain). Negative for back pain and myalgias.   Skin: Negative for rash and wound.   Neurological: Negative for dizziness, light-headedness and headaches.   Psychiatric/Behavioral: Negative for agitation and behavioral problems. The patient is not nervous/anxious.      Objective:     Vital Signs (Most Recent):  Temp: 98.4 °F (36.9 °C) (08/06/19 0740)  Pulse: 69 (08/06/19 0757)  Resp: 18 (08/06/19 0740)  BP: 97/69 (08/06/19 0740)  SpO2: 99 % (08/06/19 0740) Vital Signs (24h Range):  Temp:  [97.3 °F (36.3 °C)-98.8 °F (37.1 °C)] 98.4 °F (36.9 °C)  Pulse:  [53-77] 69  Resp:  [14-19] 18  SpO2:  [94 %-100 %] 99 %  BP: ()/(58-80) 97/69     Weight: 76 kg (167 lb 8.8 oz)  Body mass index is 27.88 kg/m².    Estimated Creatinine Clearance: 28.4 mL/min (A) (based on SCr of 2.8 mg/dL (H)).    Physical Exam   Constitutional: He is oriented to person, place, and time. He appears well-developed and well-nourished. No distress.   Cardiovascular: Normal rate and regular rhythm.   No murmur heard.  Pulmonary/Chest: Effort normal and breath sounds normal. No respiratory distress.   Abdominal: Soft. Bowel sounds are normal. He exhibits no distension.   Musculoskeletal: Normal range of motion. He exhibits no edema.   Left wrist with post op dressing. Sling in place   Neurological: He is alert and oriented to person, place, and time.   Skin: Skin is warm and dry.   Psychiatric: He has a normal mood and affect. His behavior is normal.       Significant Labs:   Blood Culture:   Recent Labs   Lab 08/05/19  1802   LABBLOO No Growth to date  No Growth to date     CBC:   Recent Labs   Lab 08/05/19  1038 08/06/19  0608   WBC 5.41 4.74    HGB 11.3* 11.6*   HCT 36.9* 36.9*   * 99*     CMP:   Recent Labs   Lab 08/05/19  1038 08/06/19  0608   * 131*   K 5.4* 5.0    99   CO2 22* 23   * 203*   BUN 71* 72*   CREATININE 2.8* 2.8*   CALCIUM 8.9 8.8   PROT  --  7.0   ALBUMIN  --  2.9*   BILITOT  --  1.6*   ALKPHOS  --  226*   AST  --  31   ALT  --  37   ANIONGAP 11 9   EGFRNONAA 24.3* 24.3*     Wound Culture:   Recent Labs   Lab 08/05/19  1237   LABAERO No growth       Significant Imaging: I have reviewed all pertinent imaging results/findings within the past 24 hours.

## 2019-08-06 NOTE — TRANSFER OF CARE
"Anesthesia Transfer of Care Note    Patient: Ashish Mckeon    Procedure(s) Performed: Procedure(s) (LRB):  INCISION AND DRAINAGE, UPPER EXTREMITY , LEFT WRIST (Left)    Patient location: PACU    Anesthesia Type: general    Transport from OR: Transported from OR on 6-10 L/min O2 by face mask with adequate spontaneous ventilation    Post pain: adequate analgesia    Post assessment: no apparent anesthetic complications and tolerated procedure well    Post vital signs: stable    Level of consciousness: awake, alert and oriented    Nausea/Vomiting: no nausea/vomiting    Complications: none    Transfer of care protocol was followed      Last vitals:   Visit Vitals  /80 (BP Location: Right arm, Patient Position: Lying)   Pulse 76   Temp 36.4 °C (97.5 °F) (Oral)   Resp 18   Ht 5' 4" (1.626 m)   Wt 70.3 kg (155 lb)   SpO2 98%   BMI 26.61 kg/m²     "

## 2019-08-06 NOTE — ANESTHESIA POSTPROCEDURE EVALUATION
Anesthesia Post Evaluation    Patient: Ashish Mckeon    Procedure(s) Performed: Procedure(s) (LRB):  INCISION AND DRAINAGE, UPPER EXTREMITY , LEFT WRIST (Left)    Final Anesthesia Type: regional  Patient location during evaluation: PACU  Patient participation: Yes- Able to Participate  Level of consciousness: awake and alert and oriented  Post-procedure vital signs: reviewed and stable  Pain management: adequate  Airway patency: patent  PONV status at discharge: No PONV  Anesthetic complications: no      Cardiovascular status: blood pressure returned to baseline and hemodynamically stable  Respiratory status: unassisted and spontaneous ventilation  Hydration status: euvolemic  Follow-up not needed.          Vitals Value Taken Time   BP 91/63 8/5/2019  8:19 PM   Temp 37.1 °C (98.8 °F) 8/5/2019  7:39 PM   Pulse 54 8/5/2019  8:20 PM   Resp 32 8/5/2019  8:09 PM   SpO2 98 % 8/5/2019  8:20 PM   Vitals shown include unvalidated device data.      No case tracking events are documented in the log.      Pain/Jan Score: Pain Rating Prior to Med Admin: 10 (8/5/2019 10:43 AM)  Jan Score: 9 (8/5/2019  8:10 PM)

## 2019-08-06 NOTE — SUBJECTIVE & OBJECTIVE
Interval History: Patient resting comfortbaly in bed. He is making urine without issue. Last BM was just prior to admission and surgery.     Review of Systems   Constitutional: Negative for appetite change, chills and fever.   HENT: Negative for congestion and tinnitus.    Eyes: Negative for photophobia and visual disturbance.   Respiratory: Positive for shortness of breath (only with exertion). Negative for cough, chest tightness and wheezing.    Cardiovascular: Negative for chest pain, palpitations and leg swelling.   Gastrointestinal: Negative for abdominal pain, blood in stool, constipation, diarrhea, nausea and vomiting.   Genitourinary: Negative for difficulty urinating, dysuria, enuresis, frequency and hematuria.   Musculoskeletal: Positive for arthralgias. Negative for back pain and neck pain.   Allergic/Immunologic: Negative for environmental allergies and food allergies.   Neurological: Negative for headaches. Dizziness: orthostatic.   Psychiatric/Behavioral: Negative for agitation and confusion.     Objective:     Vital Signs (Most Recent):  Temp: 96.9 °F (36.1 °C) (08/06/19 1214)  Pulse: 68 (08/06/19 1214)  Resp: 19 (08/06/19 1214)  BP: 92/65 (08/06/19 1214)  SpO2: 98 % (08/06/19 1214) Vital Signs (24h Range):  Temp:  [96.9 °F (36.1 °C)-98.8 °F (37.1 °C)] 96.9 °F (36.1 °C)  Pulse:  [53-76] 68  Resp:  [14-19] 19  SpO2:  [94 %-100 %] 98 %  BP: ()/(58-80) 92/65     Weight: 76 kg (167 lb 8.8 oz)  Body mass index is 27.88 kg/m².    Intake/Output Summary (Last 24 hours) at 8/6/2019 1549  Last data filed at 8/6/2019 0600  Gross per 24 hour   Intake 1130 ml   Output 700 ml   Net 430 ml      Physical Exam   Constitutional: He is oriented to person, place, and time. He appears well-developed and well-nourished. No distress.   HENT:   Head: Normocephalic and atraumatic.   Eyes: EOM are normal. No scleral icterus.   Neck: Normal range of motion.   Cardiovascular: Normal rate and regular rhythm.   Murmur  (Systolic and S3) heard.  Pulmonary/Chest: Effort normal and breath sounds normal. No respiratory distress.   Abdominal: Soft. Bowel sounds are normal. He exhibits no distension. There is no tenderness.   Musculoskeletal: He exhibits no edema or deformity.   Unable to move left arm and hand. Splint in place   Neurological: He is alert and oriented to person, place, and time.   Skin: Skin is warm and dry. No rash noted. He is not diaphoretic. No erythema.   Psychiatric: He has a normal mood and affect. His behavior is normal. Thought content normal.       Significant Labs:   CBC:   Recent Labs   Lab 08/05/19  1038 08/06/19  0608   WBC 5.41 4.74   HGB 11.3* 11.6*   HCT 36.9* 36.9*   * 99*     CMP:   Recent Labs   Lab 08/05/19  1038 08/06/19  0608   * 131*   K 5.4* 5.0    99   CO2 22* 23   * 203*   BUN 71* 72*   CREATININE 2.8* 2.8*   CALCIUM 8.9 8.8   PROT  --  7.0   ALBUMIN  --  2.9*   BILITOT  --  1.6*   ALKPHOS  --  226*   AST  --  31   ALT  --  37   ANIONGAP 11 9   EGFRNONAA 24.3* 24.3*       Significant Imaging: I have reviewed all pertinent imaging results/findings within the past 24 hours.

## 2019-08-06 NOTE — PROGRESS NOTES
Pharmacokinetic Assessment Follow Up: IV Vancomycin    Vancomycin serum concentration assessment(s):    Vancomycin 1000mg x1 given at 2047 on 8/5. Random level from 8/6 AM labs resulted at 14.3 mcg/mL. Vancomycin goal trough 15-20 mcg/mL.     Vancomycin Regimen Plan:    Ordered another Vancomycin 1000mg x1. Will check random with AM labs tomorrow 8/7.     Pharmacy will continue to follow and monitor vancomycin.    Please contact pharmacy at extension [phone] for questions regarding this assessment.    Thank you for the consult,   Julia Yeondam Roh     Patient brief summary:  Ashish Mckeon is a 54 y.o. male initiated on antimicrobial therapy with IV Vancomycin for treatment of septic arthritis.     Drug Allergies:   Review of patient's allergies indicates:   Allergen Reactions    Lisinopril Other (See Comments)     acei cough         Actual Body Weight:   76 kg    Renal Function:   Estimated Creatinine Clearance: 28.4 mL/min (A) (based on SCr of 2.8 mg/dL (H)).,       CBC (last 72 hours):  Recent Labs   Lab Result Units 08/05/19  1038 08/06/19  0608   WBC K/uL 5.41 4.74   Hemoglobin g/dL 11.3* 11.6*   Hemoglobin A1C % >14.0*  --    Hematocrit % 36.9* 36.9*   Platelets K/uL 105* 99*   Gran% % 71.3 69.2   Lymph% % 8.9* 10.8*   Mono% % 16.3* 15.8*   Eosinophil% % 2.2 2.7   Basophil% % 0.9 1.3   Differential Method  Automated Automated       Metabolic Panel (last 72 hours):  Recent Labs   Lab Result Units 08/05/19  1038 08/05/19  1454 08/06/19  0608   Sodium mmol/L 134*  --  131*   Potassium mmol/L 5.4*  --  5.0   Chloride mmol/L 101  --  99   CO2 mmol/L 22*  --  23   Glucose mg/dL 237*  --  203*   Glucose, UA   --  Negative  --    BUN, Bld mg/dL 71*  --  72*   Creatinine mg/dL 2.8*  --  2.8*   Albumin g/dL  --   --  2.9*   Total Bilirubin mg/dL  --   --  1.6*   Alkaline Phosphatase U/L  --   --  226*   AST U/L  --   --  31   ALT U/L  --   --  37   Magnesium mg/dL 2.2  --   --        Vancomycin Administrations:  vancomycin  given in the last 96 hours                   vancomycin in dextrose 5 % 1 gram/250 mL IVPB 1,000 mg (mg) 1,000 mg New Bag 08/05/19 2047                Drug levels (last 3 results):  Recent Labs   Lab Result Units 08/06/19  0608   Vancomycin, Random ug/mL 14.3       Microbiologic Results:  Microbiology Results (last 7 days)     Procedure Component Value Units Date/Time    Fungus culture [[id number]] Collected:  08/05/19 2014    Order Status:  Completed Specimen:  Wound from Wrist, Left Updated:  08/06/19 0944     Fungus (Mycology) Culture Culture in progress    Narrative:       1. Swab From Left Wrist    Fungus culture [[id number]] Collected:  08/05/19 1237    Order Status:  Completed Specimen:  Joint Fluid from Wrist, Left Updated:  08/06/19 0944     Fungus (Mycology) Culture Culture in progress    Blood culture [[id number]] Collected:  08/06/19 0753    Order Status:  Sent Specimen:  Blood Updated:  08/06/19 0806    Blood culture [[id number]] Collected:  08/06/19 0759    Order Status:  Sent Specimen:  Blood Updated:  08/06/19 0806    Culture, Anaerobe [[id number]] Collected:  08/05/19 2014    Order Status:  Completed Specimen:  Wound from Wrist, Left Updated:  08/06/19 0735     Anaerobic Culture Culture in progress    Narrative:       1. Swab From Left Wrist    Culture, Anaerobic [[id number]] Collected:  08/05/19 1237    Order Status:  Completed Specimen:  Joint Fluid from Wrist, Left Updated:  08/06/19 0735     Anaerobic Culture Culture in progress    Aerobic culture [[id number]] Collected:  08/05/19 1237    Order Status:  Completed Specimen:  Joint Fluid from Wrist, Left Updated:  08/06/19 0725     Aerobic Bacterial Culture No growth    Blood culture [[id number]] Collected:  08/05/19 1802    Order Status:  Completed Specimen:  Blood from Peripheral, Hand, Left Updated:  08/06/19 0115     Blood Culture, Routine No Growth to date    Blood culture [[id number]] Collected:  08/05/19 1802    Order Status:  Completed Specimen:   Blood from Peripheral, Hand, Right Updated:  08/06/19 0115     Blood Culture, Routine No Growth to date    Gram stain [[id number]] Collected:  08/05/19 2014    Order Status:  Completed Specimen:  Wound from Wrist, Left Updated:  08/05/19 2116     Gram Stain Result Moderate WBC's      No organisms seen    Narrative:       1. Swab From Left Wrist    Aerobic culture [[id number]] Collected:  08/05/19 2014    Order Status:  Sent Specimen:  Wound from Wrist, Left Updated:  08/05/19 2019    AFB Culture & Smear [[id number]] Collected:  08/05/19 2014    Order Status:  Sent Specimen:  Wound from Wrist, Left Updated:  08/05/19 2019    Fungus culture [[id number]] Collected:  08/05/19 1928    Order Status:  Canceled Specimen:  SWAB from Wrist, Left     AFB Culture & Smear [[id number]] Collected:  08/05/19 1928    Order Status:  Canceled Specimen:  SWAB from Wrist, Left     Culture, Anaerobe [[id number]] Collected:  08/05/19 1928    Order Status:  Canceled Specimen:  SWAB from Wrist, Left     Aerobic culture [[id number]] Collected:  08/05/19 1928    Order Status:  Canceled Specimen:  SWAB from Wrist, Left     Gram stain [[id number]] Collected:  08/05/19 1928    Order Status:  Canceled Specimen:  SWAB from Wrist, Left     Blood culture [[id number]]     Order Status:  Canceled Specimen:  Blood     Gram stain [[id number]] Collected:  08/05/19 1237    Order Status:  Completed Specimen:  Joint Fluid from Wrist, Left Updated:  08/05/19 1339     Gram Stain Result Many  WBC's      Rare budding yeast    AFB Culture & Smear [[id number]] Collected:  08/05/19 1237    Order Status:  Sent Specimen:  Joint Fluid from Wrist, Left Updated:  08/05/19 1256

## 2019-08-06 NOTE — PT/OT/SLP EVAL
Occupational Therapy   Evaluation    Name: Ashish Mckeon  MRN: [id number]  Admitting Diagnosis:  Septic joint of left wrist 1 Day Post-Op s/p I&D    Recommendations:     Discharge Recommendations:    Discharge Equipment Recommendations:  none  Barriers to discharge:  None    Assessment:     Ashish Mckeon is a 54 y.o. male with a medical diagnosis of Septic joint of left wrist.  He presents with impaired functional use of L UE r/t numbness and decreased AROM. Performance deficits affecting function: decreased upper extremity function, impaired sensation, impaired self care skills, decreased coordination, impaired coordination, decreased ROM, impaired fine motor.      Rehab Prognosis: Good; patient would benefit from acute skilled OT services to address these deficits and reach maximum level of function.       Plan:     Patient to be seen   to address the above listed problems via self-care/home management, therapeutic exercises, therapeutic activities, neuromuscular re-education  · Plan of Care Expires:    · Plan of Care Reviewed with: patient    Subjective     Chief Complaint: L UE decreased use  Patient/Family Comments/goals: to get better    Occupational Profile:  Living Environment: lives with wife in ground floor apartment with tub/shower combo  Previous level of function: (I) with ADL and ambulation  Roles and Routines: spouse; does not work  Equipment Used at Home:  none  Assistance upon Discharge: wife works during the day    Pain/Comfort:  · Pain Rating 1: 0/10  · Pain Rating Post-Intervention 1: 0/10    Patients cultural, spiritual, Baptism conflicts given the current situation:      Objective:     Communicated with: RN prior to session.  Patient found supine with telemetry upon OT entry to room.    General Precautions: Standard, fall   Orthopedic Precautions:    Braces:       Occupational Performance:    Bed Mobility:    ·     Functional Mobility/Transfers:  · Patient completed Sit <> Stand Transfer with  independence  with  no assistive device   · Patient completed Bed <> Chair Transfer using Step Transfer technique with independence with no assistive device  · Functional Mobility: Independent; pt able to manage IV pole without difficulty    Activities of Daily Living:  · Feeding:  modified independence    · Grooming: modified independence    · Upper Body Dressing: minimal A for fasteners    · Lower Body Dressing: minimum assistance for fasteners  · Toileting: modified independence     Cognitive/Visual Perceptual:  Oriented to: Person, Place, Time and Situation  Follows Commands/attention: Follows multistep  commands  Communication: clear/fluent  Memory:  No Deficits noted  Safety awareness/insight to disability: intact  Coping skills/emotional control: Appropriate to situation    Physical Exam:  Postural examination/scapula alignment:    -       No postural abnormalities identified  Sensation:    -       Intact  Upper Extremity Range of Motion:     -       Right Upper Extremity: WFL  -       Left Upper Extremity: PROM WFL PIP/DIP/elbow/shoulder; wrist and MP NT 2* immobilization  Upper Extremity Strength:    -       Right Upper Extremity: WFL  -       Left Upper Extremity: 0/5 fingers, elbow; 2-/5 shoulder flexion   Strength:    -       Right Upper Extremity: WFL  -       Left Upper Extremity: absent  Fine Motor Coordination/Gross motor coordination: no active use of L UE       AMPAC 6 Click ADL:  AMPAC Total Score:      Treatment & Education:  Pt ed on OT POC  Pt ed on importance of OOB/ambulation in hallways and daily self-care  Pt ed on self ROM and PROM by family members of L UE non-immobilized joints  Pt ed on proper wear and use of sling (during mobility)  Education:    Patient left up in chair with all lines intact, call button in reach and RN notified    GOALS:   Multidisciplinary Problems     Occupational Therapy Goals        Problem: Occupational Therapy Goal    Goal Priority Disciplines Outcome  "Interventions   Occupational Therapy Goal     OT, PT/OT Ongoing (interventions implemented as appropriate)    Description:  Goals to be met by: 2 visits     Patient will increase functional independence with ADLs by performing:    Pt will be (I) with HEP                      History:     Past Medical History:   Diagnosis Date    CHF (congestive heart failure)     Diabetes mellitus type II     Essential hypertension, benign     Hyperlipidemia     Hypothyroidism     Pain and swelling of left wrist 8/5/2019    Ashish Mckeon is a 54 y.o. male with PMH of  presenting with CHF, IDDM, Hypothyroidism, HTN, HLD presenting with worsening L wrist pain for 1 day. Orthopedic surgery was consulted for septic joint r/o. Pt has been afebrile and has no elevated WBC. ESR 36, normal CRP. Xray shows no signs of trauma and pt has no hx of gout or septic arthritis. Due to atraumatic wrist pain and swelling w/out identifia    Undiagnosed cardiac murmurs        Past Surgical History:   Procedure Laterality Date    COLON SURGERY      "lower intestines removed"    INCISION AND DRAINAGE, UPPER EXTREMITY , LEFT WRIST Left 8/5/2019    Performed by Joesph Chen MD at [facility] OR [address] [place] [address]    THYROID SURGERY         Time Tracking:     OT Date of Treatment: 08/06/19  OT Start Time: 1349  OT Stop Time: 1407  OT Total Time (min): 18 min    Billable Minutes:Evaluation 8  Therapeutic Activity 10    LOS Noriega  8/6/2019    "

## 2019-08-07 PROBLEM — I50.42 CHRONIC COMBINED SYSTOLIC AND DIASTOLIC CONGESTIVE HEART FAILURE: Status: ACTIVE | Noted: 2017-01-16

## 2019-08-07 LAB
ALBUMIN SERPL BCP-MCNC: 2.8 G/DL (ref 3.5–5.2)
ALP SERPL-CCNC: 248 U/L (ref 55–135)
ALT SERPL W/O P-5'-P-CCNC: 25 U/L (ref 10–44)
ANION GAP SERPL CALC-SCNC: 13 MMOL/L (ref 8–16)
AST SERPL-CCNC: 26 U/L (ref 10–40)
BASOPHILS # BLD AUTO: 0.05 K/UL (ref 0–0.2)
BASOPHILS NFR BLD: 0.8 % (ref 0–1.9)
BILIRUB SERPL-MCNC: 1.3 MG/DL (ref 0.1–1)
BNP SERPL-MCNC: 2854 PG/ML (ref 0–99)
BUN SERPL-MCNC: 79 MG/DL (ref 6–20)
CALCIUM SERPL-MCNC: 9 MG/DL (ref 8.7–10.5)
CHLORIDE SERPL-SCNC: 102 MMOL/L (ref 95–110)
CO2 SERPL-SCNC: 16 MMOL/L (ref 23–29)
CREAT SERPL-MCNC: 2.9 MG/DL (ref 0.5–1.4)
DIFFERENTIAL METHOD: ABNORMAL
EOSINOPHIL # BLD AUTO: 0.1 K/UL (ref 0–0.5)
EOSINOPHIL NFR BLD: 2.2 % (ref 0–8)
ERYTHROCYTE [DISTWIDTH] IN BLOOD BY AUTOMATED COUNT: 18.5 % (ref 11.5–14.5)
EST. GFR  (AFRICAN AMERICAN): 26.9 ML/MIN/1.73 M^2
EST. GFR  (NON AFRICAN AMERICAN): 23.3 ML/MIN/1.73 M^2
GLUCOSE SERPL-MCNC: 121 MG/DL (ref 70–110)
HCT VFR BLD AUTO: 35.3 % (ref 40–54)
HGB BLD-MCNC: 11.4 G/DL (ref 14–18)
IMM GRANULOCYTES # BLD AUTO: 0.02 K/UL (ref 0–0.04)
IMM GRANULOCYTES NFR BLD AUTO: 0.3 % (ref 0–0.5)
LYMPHOCYTES # BLD AUTO: 0.7 K/UL (ref 1–4.8)
LYMPHOCYTES NFR BLD: 12.3 % (ref 18–48)
MCH RBC QN AUTO: 23.8 PG (ref 27–31)
MCHC RBC AUTO-ENTMCNC: 32.3 G/DL (ref 32–36)
MCV RBC AUTO: 74 FL (ref 82–98)
MONOCYTES # BLD AUTO: 1.1 K/UL (ref 0.3–1)
MONOCYTES NFR BLD: 17.9 % (ref 4–15)
NEUTROPHILS # BLD AUTO: 4 K/UL (ref 1.8–7.7)
NEUTROPHILS NFR BLD: 66.5 % (ref 38–73)
NRBC BLD-RTO: 0 /100 WBC
PLATELET # BLD AUTO: 102 K/UL (ref 150–350)
PMV BLD AUTO: ABNORMAL FL (ref 9.2–12.9)
POCT GLUCOSE: 121 MG/DL (ref 70–110)
POCT GLUCOSE: 252 MG/DL (ref 70–110)
POCT GLUCOSE: 301 MG/DL (ref 70–110)
POTASSIUM SERPL-SCNC: 5.2 MMOL/L (ref 3.5–5.1)
PROT SERPL-MCNC: 7 G/DL (ref 6–8.4)
RBC # BLD AUTO: 4.78 M/UL (ref 4.6–6.2)
SODIUM SERPL-SCNC: 131 MMOL/L (ref 136–145)
VANCOMYCIN SERPL-MCNC: 18.2 UG/ML
WBC # BLD AUTO: 6.04 K/UL (ref 3.9–12.7)

## 2019-08-07 PROCEDURE — 63600175 PHARM REV CODE 636 W HCPCS: Performed by: INTERNAL MEDICINE

## 2019-08-07 PROCEDURE — 63600175 PHARM REV CODE 636 W HCPCS: Performed by: STUDENT IN AN ORGANIZED HEALTH CARE EDUCATION/TRAINING PROGRAM

## 2019-08-07 PROCEDURE — 25000003 PHARM REV CODE 250: Performed by: STUDENT IN AN ORGANIZED HEALTH CARE EDUCATION/TRAINING PROGRAM

## 2019-08-07 PROCEDURE — 80202 ASSAY OF VANCOMYCIN: CPT

## 2019-08-07 PROCEDURE — 99232 SBSQ HOSP IP/OBS MODERATE 35: CPT | Mod: ,,, | Performed by: INTERNAL MEDICINE

## 2019-08-07 PROCEDURE — 99232 PR SUBSEQUENT HOSPITAL CARE,LEVL II: ICD-10-PCS | Mod: ,,, | Performed by: INTERNAL MEDICINE

## 2019-08-07 PROCEDURE — 85025 COMPLETE CBC W/AUTO DIFF WBC: CPT

## 2019-08-07 PROCEDURE — 25000003 PHARM REV CODE 250: Performed by: INTERNAL MEDICINE

## 2019-08-07 PROCEDURE — 80053 COMPREHEN METABOLIC PANEL: CPT

## 2019-08-07 PROCEDURE — 97161 PT EVAL LOW COMPLEX 20 MIN: CPT

## 2019-08-07 PROCEDURE — 94761 N-INVAS EAR/PLS OXIMETRY MLT: CPT

## 2019-08-07 PROCEDURE — 99233 SBSQ HOSP IP/OBS HIGH 50: CPT | Mod: ,,, | Performed by: PHYSICIAN ASSISTANT

## 2019-08-07 PROCEDURE — 36415 COLL VENOUS BLD VENIPUNCTURE: CPT

## 2019-08-07 PROCEDURE — 87449 NOS EACH ORGANISM AG IA: CPT

## 2019-08-07 PROCEDURE — 83880 ASSAY OF NATRIURETIC PEPTIDE: CPT

## 2019-08-07 PROCEDURE — 99233 PR SUBSEQUENT HOSPITAL CARE,LEVL III: ICD-10-PCS | Mod: ,,, | Performed by: PHYSICIAN ASSISTANT

## 2019-08-07 PROCEDURE — 11000001 HC ACUTE MED/SURG PRIVATE ROOM

## 2019-08-07 RX ORDER — ENOXAPARIN SODIUM 100 MG/ML
30 INJECTION SUBCUTANEOUS EVERY 24 HOURS
Status: DISCONTINUED | OUTPATIENT
Start: 2019-08-07 | End: 2019-08-08 | Stop reason: HOSPADM

## 2019-08-07 RX ORDER — ACETAMINOPHEN 325 MG/1
325 TABLET ORAL EVERY 6 HOURS PRN
Status: DISCONTINUED | OUTPATIENT
Start: 2019-08-07 | End: 2019-08-08 | Stop reason: HOSPADM

## 2019-08-07 RX ORDER — HYDROCODONE BITARTRATE AND ACETAMINOPHEN 7.5; 325 MG/1; MG/1
1 TABLET ORAL ONCE
Status: COMPLETED | OUTPATIENT
Start: 2019-08-07 | End: 2019-08-07

## 2019-08-07 RX ORDER — HYDROCODONE BITARTRATE AND ACETAMINOPHEN 5; 325 MG/1; MG/1
1 TABLET ORAL EVERY 6 HOURS PRN
Status: DISCONTINUED | OUTPATIENT
Start: 2019-08-07 | End: 2019-08-07

## 2019-08-07 RX ORDER — DOCUSATE SODIUM 100 MG/1
100 CAPSULE, LIQUID FILLED ORAL DAILY PRN
Status: DISCONTINUED | OUTPATIENT
Start: 2019-08-07 | End: 2019-08-08 | Stop reason: HOSPADM

## 2019-08-07 RX ORDER — HYDROCODONE BITARTRATE AND ACETAMINOPHEN 5; 325 MG/1; MG/1
1 TABLET ORAL EVERY 6 HOURS PRN
Status: DISCONTINUED | OUTPATIENT
Start: 2019-08-07 | End: 2019-08-08 | Stop reason: HOSPADM

## 2019-08-07 RX ORDER — ACETAMINOPHEN 325 MG/1
325 TABLET ORAL EVERY 6 HOURS PRN
Status: DISCONTINUED | OUTPATIENT
Start: 2019-08-07 | End: 2019-08-07

## 2019-08-07 RX ORDER — FUROSEMIDE 10 MG/ML
80 INJECTION INTRAMUSCULAR; INTRAVENOUS ONCE
Status: COMPLETED | OUTPATIENT
Start: 2019-08-07 | End: 2019-08-07

## 2019-08-07 RX ORDER — ASPIRIN 81 MG/1
81 TABLET ORAL DAILY
Status: DISCONTINUED | OUTPATIENT
Start: 2019-08-08 | End: 2019-08-08 | Stop reason: HOSPADM

## 2019-08-07 RX ORDER — HYDROMORPHONE HYDROCHLORIDE 1 MG/ML
0.5 INJECTION, SOLUTION INTRAMUSCULAR; INTRAVENOUS; SUBCUTANEOUS ONCE
Status: COMPLETED | OUTPATIENT
Start: 2019-08-07 | End: 2019-08-07

## 2019-08-07 RX ADMIN — MUPIROCIN: 20 OINTMENT TOPICAL at 09:08

## 2019-08-07 RX ADMIN — INSULIN DETEMIR 10 UNITS: 100 INJECTION, SOLUTION SUBCUTANEOUS at 10:08

## 2019-08-07 RX ADMIN — HYDROCODONE BITARTRATE AND ACETAMINOPHEN 1 TABLET: 5; 325 TABLET ORAL at 07:08

## 2019-08-07 RX ADMIN — LEVOTHYROXINE SODIUM 175 MCG: 125 TABLET ORAL at 05:08

## 2019-08-07 RX ADMIN — POLYETHYLENE GLYCOL 3350 17 G: 17 POWDER, FOR SOLUTION ORAL at 09:08

## 2019-08-07 RX ADMIN — ACETAMINOPHEN 650 MG: 325 TABLET ORAL at 05:08

## 2019-08-07 RX ADMIN — VANCOMYCIN HYDROCHLORIDE 750 MG: 750 INJECTION, POWDER, LYOPHILIZED, FOR SOLUTION INTRAVENOUS at 11:08

## 2019-08-07 RX ADMIN — COLCHICINE 0.6 MG: 0.6 TABLET, FILM COATED ORAL at 09:08

## 2019-08-07 RX ADMIN — HYDROCODONE BITARTRATE AND ACETAMINOPHEN 1 TABLET: 5; 325 TABLET ORAL at 06:08

## 2019-08-07 RX ADMIN — HYDROMORPHONE HYDROCHLORIDE 0.5 MG: 1 INJECTION, SOLUTION INTRAMUSCULAR; INTRAVENOUS; SUBCUTANEOUS at 01:08

## 2019-08-07 RX ADMIN — DOCUSATE SODIUM 100 MG: 100 CAPSULE, LIQUID FILLED ORAL at 09:08

## 2019-08-07 RX ADMIN — FUROSEMIDE 80 MG: 10 INJECTION, SOLUTION INTRAMUSCULAR; INTRAVENOUS at 02:08

## 2019-08-07 RX ADMIN — ATORVASTATIN CALCIUM 80 MG: 20 TABLET, FILM COATED ORAL at 09:08

## 2019-08-07 RX ADMIN — INSULIN ASPART 4 UNITS: 100 INJECTION, SOLUTION INTRAVENOUS; SUBCUTANEOUS at 05:08

## 2019-08-07 RX ADMIN — ENOXAPARIN SODIUM 30 MG: 100 INJECTION SUBCUTANEOUS at 04:08

## 2019-08-07 RX ADMIN — INSULIN ASPART 1 UNITS: 100 INJECTION, SOLUTION INTRAVENOUS; SUBCUTANEOUS at 10:08

## 2019-08-07 RX ADMIN — METOPROLOL SUCCINATE 50 MG: 50 TABLET, EXTENDED RELEASE ORAL at 09:08

## 2019-08-07 RX ADMIN — ASPIRIN 81 MG: 81 TABLET, COATED ORAL at 09:08

## 2019-08-07 RX ADMIN — HYDROCODONE BITARTRATE AND ACETAMINOPHEN 1 TABLET: 7.5; 325 TABLET ORAL at 10:08

## 2019-08-07 NOTE — PLAN OF CARE
Problem: Physical Therapy Goal  Goal: Physical Therapy Goal  Outcome: Outcome(s) achieved Date Met: 08/07/19  No goals set, pt does not require additional acute PT services at this time.     Pt educated on asking medical staff for PT consult if changes in functional status occurs.     - Zach Aguilera, PT, DPT  8/7/2019

## 2019-08-07 NOTE — PROGRESS NOTES
[facility]  Orthopedics  Progress Note    Patient Name: Ashish Mckeon  MRN: [id number]  Admission Date: 8/5/2019  Hospital Length of Stay: 2 days  Attending Provider: ANGEL Shelton MD  Primary Care Provider: [facility]  Follow-up For: Procedure(s) (LRB):  INCISION AND DRAINAGE, UPPER EXTREMITY , LEFT WRIST (Left)    Post-Operative Day: 2 Days Post-Op  Subjective:     Principal Problem:Septic joint of left wrist    Principal Orthopedic Problem: same    Interval History: Patient seen and examined at bedside.  No acute events overnight.  Pain controlled.       Review of patient's allergies indicates:   Allergen Reactions    Lisinopril Other (See Comments)     acei cough         Current Facility-Administered Medications   Medication    acetaminophen tablet 650 mg    aspirin EC tablet 81 mg    atorvastatin tablet 80 mg    bisacodyl suppository 10 mg    cefTRIAXone (ROCEPHIN) 2 g in dextrose 5 % 50 mL IVPB    colchicine capsule/tablet 0.6 mg    dextrose 10% (D10W) Bolus    dextrose 10% (D10W) Bolus    docusate sodium capsule 100 mg    glucagon (human recombinant) injection 1 mg    glucose chewable tablet 16 g    glucose chewable tablet 24 g    HYDROcodone-acetaminophen 5-325 mg per tablet 1 tablet    insulin aspart U-100 pen 0-5 Units    insulin detemir U-100 pen 10 Units    levothyroxine tablet 175 mcg    metoprolol succinate (TOPROL-XL) 24 hr tablet 50 mg    mupirocin 2 % ointment    pantoprazole EC tablet 40 mg    polyethylene glycol packet 17 g    sodium chloride 0.9% flush 10 mL    sodium chloride 0.9% flush 10 mL     Objective:     Vital Signs (Most Recent):  Temp: 97.1 °F (36.2 °C) (08/07/19 0735)  Pulse: 69 (08/07/19 0736)  Resp: 18 (08/07/19 0735)  BP: 104/71 (08/07/19 0735)  SpO2: 99 % (08/07/19 0735) Vital Signs (24h Range):  Temp:  [96.4 °F (35.8 °C)-99 °F (37.2 °C)] 97.1 °F (36.2 °C)  Pulse:  [64-78] 69  Resp:  [16-19] 18  SpO2:  [91 %-99 %]  "99 %  BP: ()/(65-73) 104/71     Weight: 76 kg (167 lb 8.8 oz)  Height: 5' 5" (165.1 cm)  Body mass index is 27.88 kg/m².      Intake/Output Summary (Last 24 hours) at 8/7/2019 0811  Last data filed at 8/7/2019 0300  Gross per 24 hour   Intake 480 ml   Output 600 ml   Net -120 ml       Ortho/SPM Exam    AAOx4  NAD  Reg rate  No increased WOB  Incision with minimal sanguinous drainage  Motor intact AIN/PIN/U/M  SILT M/R/U  WWP extremities  FCDs in place and functioning    Significant Labs:   CBC:   Recent Labs   Lab 08/05/19  1038 08/06/19  0608 08/07/19 0319   WBC 5.41 4.74 6.04   HGB 11.3* 11.6* 11.4*   HCT 36.9* 36.9* 35.3*   * 99* 102*     CMP:   Recent Labs   Lab 08/05/19 1038 08/06/19  0608 08/07/19 0319   * 131* 131*   K 5.4* 5.0 5.2*    99 102   CO2 22* 23 16*   * 203* 121*   BUN 71* 72* 79*   CREATININE 2.8* 2.8* 2.9*   CALCIUM 8.9 8.8 9.0   PROT  --  7.0 7.0   ALBUMIN  --  2.9* 2.8*   BILITOT  --  1.6* 1.3*   ALKPHOS  --  226* 248*   AST  --  31 26   ALT  --  37 25   ANIONGAP 11 9 13   EGFRNONAA 24.3* 24.3* 23.3*     All pertinent labs within the past 24 hours have been reviewed.    Significant Imaging: I have reviewed all pertinent imaging results/findings.    Assessment/Plan:     * Septic joint of left wrist  54 y.o. male POD2 s/p L wrist arthrotomy    Pain control: multimodal  PT/OT: WBAT LUE  DVT PPx: ASA 81 BID, FCDs at all times when not ambulating  Abx: vanc, rocephin; GS from aspiration budding yeast, cx NGTD; intraop GS negative, cx NGTD  Labs: WBC 6,   Drain: none  Li: none    Dispo: f/u ID recs, cx            Ernesto Morales MD  Orthopedics  [facility]    Attg Note:  I agree with the resident's assessment and plan. I spoke with Infectious Disease physicians today. The patient had positive crystals in the wrist.  He did have supposed budding yeast on the Gram stain but no Candida growing on culture.  ID would like to stop antibiotics at " this point time which I think is reasonable.    Joesph Chen MD

## 2019-08-07 NOTE — PROGRESS NOTES
[facility] Hospital Medicine  Progress Note    Patient Name: Ashish Mckeon  MRN: [id number]  Patient Class: IP- Inpatient   Admission Date: 8/5/2019  Length of Stay: 2 days  Attending Physician: ANGEL Shelton MD  Primary Care Provider: [facility] Hospital Medicine Team: [facility] HOSP MED 2 Debbie Galindo MD    Subjective:     Principal Problem:Septic joint of left wrist      HPI:  55 yo M with HTN, HLD, DM, hypothyroidism, CHF (EF 25%, Grade III DD) presents with left wrist pain with no reported trauma. Reports increasing pain and swelling over the past 24 hours. Worsens by movement and palpitation. Pain is severe in quality. He is unable to move his fingers and wrist due to pain. He was helping to set up a party for his wife the evening prior to onset. He did not lift anything more than 10 pounds. No history of IVDA or gout. Denies fever and chills. Patient has never experienced this previously.     Last admitted on 12/24/18 for acute on chronic combined heart failure exacerbation and discharged on increased Lasix taking it BID 80mg, now taking 40mg TID after seeing pcp between visits. Taking Toprol XL 50 daily. Previous TTE showed EF 25% with severe left atrial enlargement. Limited to 1L of fluids per day. He is taking Glipizide Bid an insulin for elevated readings. On levothyroxine. He is here for surgery clearance with ortho who is planning for wash out. EKG on presentation with no ST changes. Patient is not having CP. Labs on admission demonstrate elevated sCr of 2.8 and glucose of 237.     Overview/Hospital Course:  8/5: Admitted for pre op and ortho washout for acute left arm pain.   8/6: Patient recovering well without issue. Afebrile. Discussing ABx with ID    Interval History: NAEON. Reports return of movement and sensation of L arm. Continues to have L wrist pain today. Worsening Cr today, suspect volume-overload. IV furosemide 80mg X 1 given. Denies SOB or cough.      Review of Systems   Constitutional: Negative for appetite change, chills and fever.   HENT: Negative for congestion and tinnitus.    Eyes: Negative for photophobia and visual disturbance.   Respiratory: Positive for shortness of breath (only with exertion). Negative for cough, chest tightness and wheezing.    Cardiovascular: Negative for chest pain, palpitations and leg swelling.   Gastrointestinal: Negative for abdominal pain, blood in stool, constipation, diarrhea, nausea and vomiting.   Genitourinary: Negative for difficulty urinating, dysuria, enuresis, frequency and hematuria.   Musculoskeletal: Positive for arthralgias. Negative for back pain and neck pain.   Allergic/Immunologic: Negative for environmental allergies and food allergies.   Neurological: Negative for headaches. Dizziness: orthostatic.   Psychiatric/Behavioral: Negative for agitation and confusion.     Objective:     Vital Signs (Most Recent):  Temp: 97.1 °F (36.2 °C) (08/07/19 0735)  Pulse: 69 (08/07/19 0736)  Resp: 18 (08/07/19 0735)  BP: 104/71 (08/07/19 0735)  SpO2: 99 % (08/07/19 0735) Vital Signs (24h Range):  Temp:  [96.4 °F (35.8 °C)-99 °F (37.2 °C)] 97.1 °F (36.2 °C)  Pulse:  [64-78] 69  Resp:  [16-19] 18  SpO2:  [91 %-99 %] 99 %  BP: ()/(68-73) 104/71     Weight: 76 kg (167 lb 8.8 oz)  Body mass index is 27.88 kg/m².    Intake/Output Summary (Last 24 hours) at 8/7/2019 1237  Last data filed at 8/7/2019 0300  Gross per 24 hour   Intake 480 ml   Output 600 ml   Net -120 ml      Physical Exam   Constitutional: He is oriented to person, place, and time. He appears well-developed and well-nourished. No distress.   HENT:   Head: Normocephalic and atraumatic.   Eyes: EOM are normal. No scleral icterus.   Neck: Normal range of motion.   Cardiovascular: Normal rate and regular rhythm.   Murmur (Systolic and S3) heard.  Pulmonary/Chest: Effort normal and breath sounds normal. No respiratory distress.   Abdominal: Soft. Bowel sounds are  normal. He exhibits no distension. There is no tenderness.   Musculoskeletal: He exhibits no edema or deformity.   Splint removed   Neurological: He is alert and oriented to person, place, and time.   Skin: Skin is warm and dry. No rash noted. He is not diaphoretic. No erythema.   Psychiatric: He has a normal mood and affect. His behavior is normal. Thought content normal.       Significant Labs:   CBC:   Recent Labs   Lab 08/06/19 0608 08/07/19 0319   WBC 4.74 6.04   HGB 11.6* 11.4*   HCT 36.9* 35.3*   PLT 99* 102*     CMP:   Recent Labs   Lab 08/06/19  0608 08/07/19 0319   * 131*   K 5.0 5.2*   CL 99 102   CO2 23 16*   * 121*   BUN 72* 79*   CREATININE 2.8* 2.9*   CALCIUM 8.8 9.0   PROT 7.0 7.0   ALBUMIN 2.9* 2.8*   BILITOT 1.6* 1.3*   ALKPHOS 226* 248*   AST 31 26   ALT 37 25   ANIONGAP 9 13   EGFRNONAA 24.3* 23.3*     All pertinent labs within the past 24 hours have been reviewed.    Significant Imaging: I have reviewed all pertinent imaging results/findings within the past 24 hours.      Assessment/Plan:      * Septic joint of left wrist  Patient presenting with acute pain from left wrist to the left elbow. Samples with rare budding yeast. Patient afebrile on admission and WBC not concerning. Preop for othro wash out.   - Monitor CBC concerning infxn  - Blood Cx X 2  - Vital checks   - Per ID continue Vanc and Rocephin IV    Crystals present in synovial fluid obtained by arthrocentesis  Concerning for gout or pseudogout with overlying infection. This is considered an acute flare. NSAIDs may not be used with CKD.   -- Start Colchicine       Septic arthritis        Elevated serum creatinine  sCr is 2.8 on admission. May be related to prerenal KEYANNA secondary to volume depletion with over diuresis or chronic secondary to CHF and cardiorenal syndrome. Patient is urinating without difficulty on admission. Will continue to monitor.   - Trending CMP  - 500mL IV NS bolus in ED      Chronic combined  systolic and diastolic congestive heart failure  EF 25% with severe left atrial enlargement on TTE in 11/2018. Denies leg swelling and SOB. No cough. He is not having CP. Taking Lasix 40mg TID and Toprol 50mg XL. He received Lasix 80mg once in ED. EKG not concerning for new abnormalities from previous study. Showed possible Left Atrial enlargement, consistent with TTE previously.   - Holding home Lasix at this time with no concerns for volume overload and may be volume depleted.   - Continue Toprol home med   - Lasix 80mg IV X 1 given POD 2 for suspected worsening renal function 2/2 heart failure    Diabetes mellitus, type II  A1C >14, Glucose > 200 on admission. Taking glipizide BID with insulin. Not well controlled.   - Detemir 10 units QHS  - Glucose accuchecks       Hypothyroidism  Continue home levothyroxine        VTE Risk Mitigation (From admission, onward)        Ordered     enoxaparin injection 30 mg  Daily      08/07/19 1203     IP VTE HIGH RISK PATIENT  Once      08/05/19 2149     Place [place] hose  Until discontinued      08/05/19 1741     Place sequential compression device  Until discontinued      08/05/19 1741     Place [place] hose  Until discontinued      08/05/19 1454                Debbie Galindo MD  Department of Hospital Medicine   [facility]

## 2019-08-07 NOTE — ASSESSMENT & PLAN NOTE
54 y.o. male POD2 s/p L wrist arthrotomy    Pain control: multimodal  PT/OT: WBAT LUE  DVT PPx: ASA 81 BID, FCDs at all times when not ambulating  Abx: vanc, rocephin; GS from aspiration budding yeast, cx NGTD; intraop GS negative, cx NGTD  Labs: WBC 6,   Drain: none  Li: none    Dispo: f/u ID recs, cx

## 2019-08-07 NOTE — PROGRESS NOTES
Pharmacokinetic Assessment Follow Up: IV Vancomycin    Vancomycin serum concentration assessment(s):    Vancomycin random with AM labs resulted at 18.2 mcg/mL. Last dose of Vancomycin 1000 mg given at 1300. Goal trough of 15-20 mcg/mL.     Vancomycin Regimen Plan:    Change Vancomycin to 750mg once. Will check Vancomycin random with AM labs on 8/8/19.     Pharmacy will continue to follow and monitor vancomycin.    Please contact pharmacy at extension [phone] for questions regarding this assessment.    Thank you for the consult,   Julia Yeondam Roh     Patient brief summary:  Ashish Mckeon is a 54 y.o. male initiated on antimicrobial therapy with IV Vancomycin for treatment of suspected bone/joint    Drug Allergies:   Review of patient's allergies indicates:   Allergen Reactions    Lisinopril Other (See Comments)     acei cough         Actual Body Weight:   76kg    Renal Function:   Estimated Creatinine Clearance: 27.4 mL/min (A) (based on SCr of 2.9 mg/dL (H)).,     CBC (last 72 hours):  Recent Labs   Lab Result Units 08/05/19  1038 08/06/19  0608 08/07/19  0319   WBC K/uL 5.41 4.74 6.04   Hemoglobin g/dL 11.3* 11.6* 11.4*   Hemoglobin A1C % >14.0*  --   --    Hematocrit % 36.9* 36.9* 35.3*   Platelets K/uL 105* 99* 102*   Gran% % 71.3 69.2 66.5   Lymph% % 8.9* 10.8* 12.3*   Mono% % 16.3* 15.8* 17.9*   Eosinophil% % 2.2 2.7 2.2   Basophil% % 0.9 1.3 0.8   Differential Method  Automated Automated Automated       Metabolic Panel (last 72 hours):  Recent Labs   Lab Result Units 08/05/19  1038 08/05/19  1454 08/06/19  0608 08/07/19  0319   Sodium mmol/L 134*  --  131* 131*   Potassium mmol/L 5.4*  --  5.0 5.2*   Chloride mmol/L 101  --  99 102   CO2 mmol/L 22*  --  23 16*   Glucose mg/dL 237*  --  203* 121*   Glucose, UA   --  Negative  --   --    BUN, Bld mg/dL 71*  --  72* 79*   Creatinine mg/dL 2.8*  --  2.8* 2.9*   Albumin g/dL  --   --  2.9* 2.8*   Total Bilirubin mg/dL  --   --  1.6* 1.3*   Alkaline Phosphatase U/L   --   --  226* 248*   AST U/L  --   --  31 26   ALT U/L  --   --  37 25   Magnesium mg/dL 2.2  --   --   --        Vancomycin Administrations:  vancomycin given in the last 96 hours                   vancomycin 750 mg in dextrose 5 % 250 mL IVPB (ready to mix system) (mg) 750 mg New Bag 08/07/19 1101    vancomycin in dextrose 5 % 1 gram/250 mL IVPB 1,000 mg (mg) 1,000 mg New Bag 08/06/19 1258    vancomycin in dextrose 5 % 1 gram/250 mL IVPB 1,000 mg (mg) 1,000 mg New Bag 08/05/19 2047                Drug levels (last 3 results):  Recent Labs   Lab Result Units 08/06/19  0608 08/07/19  0319   Vancomycin, Random ug/mL 14.3 18.2       Microbiologic Results:  Microbiology Results (last 7 days)     Procedure Component Value Units Date/Time    Blood culture [[id number]] Collected:  08/06/19 0753    Order Status:  Completed Specimen:  Blood Updated:  08/07/19 1012     Blood Culture, Routine No Growth to date      No Growth to date    Blood culture [[id number]] Collected:  08/06/19 0759    Order Status:  Completed Specimen:  Blood Updated:  08/07/19 1012     Blood Culture, Routine No Growth to date      No Growth to date    Aerobic culture [[id number]] Collected:  08/05/19 2014    Order Status:  Completed Specimen:  Wound from Wrist, Left Updated:  08/07/19 0730     Aerobic Bacterial Culture No growth    Narrative:       1. Swab From Left Wrist    AFB Culture & Smear [[id number]] Collected:  08/05/19 1237    Order Status:  Completed Specimen:  Joint Fluid from Wrist, Left Updated:  08/06/19 2127     AFB Culture & Smear Culture in progress     AFB CULTURE STAIN No acid fast bacilli seen.    AFB Culture & Smear [[id number]] Collected:  08/05/19 2014    Order Status:  Completed Specimen:  Wound from Wrist, Left Updated:  08/06/19 2127     AFB Culture & Smear Culture in progress     AFB CULTURE STAIN No acid fast bacilli seen.    Narrative:       1. Swab From Left Wrist    Blood culture [[id number]] Collected:  08/05/19 1802    Order  Status:  Completed Specimen:  Blood from Peripheral, Hand, Left Updated:  08/06/19 1822     Blood Culture, Routine No Growth to date      No Growth to date    Blood culture [[id number]] Collected:  08/05/19 1802    Order Status:  Completed Specimen:  Blood from Peripheral, Hand, Right Updated:  08/06/19 1822     Blood Culture, Routine No Growth to date      No Growth to date    Fungus culture [[id number]] Collected:  08/05/19 2014    Order Status:  Completed Specimen:  Wound from Wrist, Left Updated:  08/06/19 0944     Fungus (Mycology) Culture Culture in progress    Narrative:       1. Swab From Left Wrist    Fungus culture [[id number]] Collected:  08/05/19 1237    Order Status:  Completed Specimen:  Joint Fluid from Wrist, Left Updated:  08/06/19 0944     Fungus (Mycology) Culture Culture in progress    Culture, Anaerobe [[id number]] Collected:  08/05/19 2014    Order Status:  Completed Specimen:  Wound from Wrist, Left Updated:  08/06/19 0735     Anaerobic Culture Culture in progress    Narrative:       1. Swab From Left Wrist    Culture, Anaerobic [[id number]] Collected:  08/05/19 1237    Order Status:  Completed Specimen:  Joint Fluid from Wrist, Left Updated:  08/06/19 0735     Anaerobic Culture Culture in progress    Aerobic culture [[id number]] Collected:  08/05/19 1237    Order Status:  Completed Specimen:  Joint Fluid from Wrist, Left Updated:  08/06/19 0725     Aerobic Bacterial Culture No growth    Gram stain [[id number]] Collected:  08/05/19 2014    Order Status:  Completed Specimen:  Wound from Wrist, Left Updated:  08/05/19 2116     Gram Stain Result Moderate WBC's      No organisms seen    Narrative:       1. Swab From Left Wrist    Fungus culture [[id number]] Collected:  08/05/19 1928    Order Status:  Canceled Specimen:  SWAB from Wrist, Left     AFB Culture & Smear [[id number]] Collected:  08/05/19 1928    Order Status:  Canceled Specimen:  SWAB from Wrist, Left     Culture, Anaerobe [[id number]] Collected:   08/05/19 1928    Order Status:  Canceled Specimen:  SWAB from Wrist, Left     Aerobic culture [[id number]] Collected:  08/05/19 1928    Order Status:  Canceled Specimen:  SWAB from Wrist, Left     Gram stain [[id number]] Collected:  08/05/19 1928    Order Status:  Canceled Specimen:  SWAB from Wrist, Left     Blood culture [[id number]]     Order Status:  Canceled Specimen:  Blood     Gram stain [[id number]] Collected:  08/05/19 1237    Order Status:  Completed Specimen:  Joint Fluid from Wrist, Left Updated:  08/05/19 1339     Gram Stain Result Many  WBC's      Rare budding yeast

## 2019-08-07 NOTE — SUBJECTIVE & OBJECTIVE
Interval History: NAEON. Reports return of movement and sensation of L arm. Continues to have L wrist pain today. Worsening Cr today, suspect volume-overload. IV furosemide 80mg X 1 given. Denies SOB or cough.     Review of Systems   Constitutional: Negative for appetite change, chills and fever.   HENT: Negative for congestion and tinnitus.    Eyes: Negative for photophobia and visual disturbance.   Respiratory: Positive for shortness of breath (only with exertion). Negative for cough, chest tightness and wheezing.    Cardiovascular: Negative for chest pain, palpitations and leg swelling.   Gastrointestinal: Negative for abdominal pain, blood in stool, constipation, diarrhea, nausea and vomiting.   Genitourinary: Negative for difficulty urinating, dysuria, enuresis, frequency and hematuria.   Musculoskeletal: Positive for arthralgias. Negative for back pain and neck pain.   Allergic/Immunologic: Negative for environmental allergies and food allergies.   Neurological: Negative for headaches. Dizziness: orthostatic.   Psychiatric/Behavioral: Negative for agitation and confusion.     Objective:     Vital Signs (Most Recent):  Temp: 97.1 °F (36.2 °C) (08/07/19 0735)  Pulse: 69 (08/07/19 0736)  Resp: 18 (08/07/19 0735)  BP: 104/71 (08/07/19 0735)  SpO2: 99 % (08/07/19 0735) Vital Signs (24h Range):  Temp:  [96.4 °F (35.8 °C)-99 °F (37.2 °C)] 97.1 °F (36.2 °C)  Pulse:  [64-78] 69  Resp:  [16-19] 18  SpO2:  [91 %-99 %] 99 %  BP: ()/(68-73) 104/71     Weight: 76 kg (167 lb 8.8 oz)  Body mass index is 27.88 kg/m².    Intake/Output Summary (Last 24 hours) at 8/7/2019 1237  Last data filed at 8/7/2019 0300  Gross per 24 hour   Intake 480 ml   Output 600 ml   Net -120 ml      Physical Exam   Constitutional: He is oriented to person, place, and time. He appears well-developed and well-nourished. No distress.   HENT:   Head: Normocephalic and atraumatic.   Eyes: EOM are normal. No scleral icterus.   Neck: Normal range of  motion.   Cardiovascular: Normal rate and regular rhythm.   Murmur (Systolic and S3) heard.  Pulmonary/Chest: Effort normal and breath sounds normal. No respiratory distress.   Abdominal: Soft. Bowel sounds are normal. He exhibits no distension. There is no tenderness.   Musculoskeletal: He exhibits no edema or deformity.   Splint removed   Neurological: He is alert and oriented to person, place, and time.   Skin: Skin is warm and dry. No rash noted. He is not diaphoretic. No erythema.   Psychiatric: He has a normal mood and affect. His behavior is normal. Thought content normal.       Significant Labs:   CBC:   Recent Labs   Lab 08/06/19 0608 08/07/19 0319   WBC 4.74 6.04   HGB 11.6* 11.4*   HCT 36.9* 35.3*   PLT 99* 102*     CMP:   Recent Labs   Lab 08/06/19 0608 08/07/19 0319   * 131*   K 5.0 5.2*   CL 99 102   CO2 23 16*   * 121*   BUN 72* 79*   CREATININE 2.8* 2.9*   CALCIUM 8.8 9.0   PROT 7.0 7.0   ALBUMIN 2.9* 2.8*   BILITOT 1.6* 1.3*   ALKPHOS 226* 248*   AST 31 26   ALT 37 25   ANIONGAP 9 13   EGFRNONAA 24.3* 23.3*     All pertinent labs within the past 24 hours have been reviewed.    Significant Imaging: I have reviewed all pertinent imaging results/findings within the past 24 hours.

## 2019-08-07 NOTE — PT/OT/SLP EVAL
Physical Therapy Evaluation and Discharge Note    Patient Name:  Ashish Mckeon   MRN:  [id number]    Recommendations:     Discharge Recommendations:  home   Discharge Equipment Recommendations: none   Barriers to discharge: None    Assessment:     Ashish Mckeon is a 54 y.o. male admitted with a medical diagnosis of Septic joint of left wrist. .  At this time, patient is functioning at their prior level of function and does not require further acute PT services.     Recent Surgery: Procedure(s) (LRB):  INCISION AND DRAINAGE, UPPER EXTREMITY , LEFT WRIST (Left) 2 Days Post-Op    Plan:     During this hospitalization, patient does not require further acute PT services.  Please re-consult if situation changes.      Subjective     Chief Complaint: 6/10 L wrist pain   Patient/Family Comments/goals: Pt pleasant and willing to participate with therapy on this date.    Pain/Comfort:  · Pain Rating 1: 6/10  · Location - Side 1: Left  · Location 1: wrist    Patients cultural, spiritual, Christian conflicts given the current situation: no    Living Environment:  Pt lives with wife in first floor apartment with 0 LARA  Prior to admission, patients level of function was Ind and driving.  Equipment used at home:  .  DME owned (not currently used): none.  Upon discharge, patient will have assistance from wife.    Objective:     Communicated with RN prior to session.  Patient found supine with telemetry upon PT entry to room.    General Precautions: Standard, fall   Orthopedic Precautions:N/A   Braces: N/A     Exams:  · Gross Motor Coordination:  WFL  · RLE ROM: WFL  · RLE Strength: WFL  · LLE ROM: WFL  · LLE Strength: WFL    Functional Mobility:  · Bed Mobility:     · Supine to Sit: modified independence  · Transfers:     · Sit to Stand:  modified independence with no AD  · Gait: 150ft Mod I without AD no LOB  · Balance: Mod I     AM-PAC 6 CLICK MOBILITY  Total Score:24       Therapeutic Activities and Exercises:   - Pt educated  "on:   -PT roles, expectations, and POC    -Safety with mobility   -Benefits of OOB activities to increase strength and functional mobility    -Performing ther ex for increasing LE ROM and strength   -Discharge recommendations     AM-PAC 6 CLICK MOBILITY  Total Score:24     Patient left supine with call button in reach.    GOALS:   Multidisciplinary Problems     Physical Therapy Goals     Not on file          Multidisciplinary Problems (Resolved)        Problem: Physical Therapy Goal    Goal Priority Disciplines Outcome Goal Variances Interventions   Physical Therapy Goal   (Resolved)     PT, PT/OT Outcome(s) achieved                     History:     Past Medical History:   Diagnosis Date    CHF (congestive heart failure)     Diabetes mellitus type II     Essential hypertension, benign     Hyperlipidemia     Hypothyroidism     Pain and swelling of left wrist 8/5/2019    Ashish Mckeon is a 54 y.o. male with PMH of  presenting with CHF, IDDM, Hypothyroidism, HTN, HLD presenting with worsening L wrist pain for 1 day. Orthopedic surgery was consulted for septic joint r/o. Pt has been afebrile and has no elevated WBC. ESR 36, normal CRP. Xray shows no signs of trauma and pt has no hx of gout or septic arthritis. Due to atraumatic wrist pain and swelling w/out identifia    Undiagnosed cardiac murmurs        Past Surgical History:   Procedure Laterality Date    COLON SURGERY      "lower intestines removed"    INCISION AND DRAINAGE, UPPER EXTREMITY , LEFT WRIST Left 8/5/2019    Performed by Joesph Chen MD at [facility] OR [address] [place] [address]    THYROID SURGERY         Time Tracking:     PT Received On: 08/07/19  PT Start Time: 1012     PT Stop Time: 1020  PT Total Time (min): 8 min     Billable Minutes: Evaluation 8      Nitin Aguilera, PT  08/07/2019  "

## 2019-08-07 NOTE — PLAN OF CARE
Problem: Adult Inpatient Plan of Care  Goal: Plan of Care Review  Outcome: Ongoing (interventions implemented as appropriate)     08/07/19 0619   Plan of Care Review   Plan of Care Reviewed With patient     Pt had no falls. AAOX4. Able to move fingers on left hand now. Pt required prn pain medication as well as a one time dose for pain management. Medication and nursing care completed per MD order.

## 2019-08-07 NOTE — SUBJECTIVE & OBJECTIVE
"Principal Problem:Septic joint of left wrist    Principal Orthopedic Problem: same    Interval History: Patient seen and examined at bedside.  No acute events overnight.  Pain controlled.       Review of patient's allergies indicates:   Allergen Reactions    Lisinopril Other (See Comments)     acei cough         Current Facility-Administered Medications   Medication    acetaminophen tablet 650 mg    aspirin EC tablet 81 mg    atorvastatin tablet 80 mg    bisacodyl suppository 10 mg    cefTRIAXone (ROCEPHIN) 2 g in dextrose 5 % 50 mL IVPB    colchicine capsule/tablet 0.6 mg    dextrose 10% (D10W) Bolus    dextrose 10% (D10W) Bolus    docusate sodium capsule 100 mg    glucagon (human recombinant) injection 1 mg    glucose chewable tablet 16 g    glucose chewable tablet 24 g    HYDROcodone-acetaminophen 5-325 mg per tablet 1 tablet    insulin aspart U-100 pen 0-5 Units    insulin detemir U-100 pen 10 Units    levothyroxine tablet 175 mcg    metoprolol succinate (TOPROL-XL) 24 hr tablet 50 mg    mupirocin 2 % ointment    pantoprazole EC tablet 40 mg    polyethylene glycol packet 17 g    sodium chloride 0.9% flush 10 mL    sodium chloride 0.9% flush 10 mL     Objective:     Vital Signs (Most Recent):  Temp: 97.1 °F (36.2 °C) (08/07/19 0735)  Pulse: 69 (08/07/19 0736)  Resp: 18 (08/07/19 0735)  BP: 104/71 (08/07/19 0735)  SpO2: 99 % (08/07/19 0735) Vital Signs (24h Range):  Temp:  [96.4 °F (35.8 °C)-99 °F (37.2 °C)] 97.1 °F (36.2 °C)  Pulse:  [64-78] 69  Resp:  [16-19] 18  SpO2:  [91 %-99 %] 99 %  BP: ()/(65-73) 104/71     Weight: 76 kg (167 lb 8.8 oz)  Height: 5' 5" (165.1 cm)  Body mass index is 27.88 kg/m².      Intake/Output Summary (Last 24 hours) at 8/7/2019 0811  Last data filed at 8/7/2019 0300  Gross per 24 hour   Intake 480 ml   Output 600 ml   Net -120 ml       Ortho/SPM Exam    AAOx4  NAD  Reg rate  No increased WOB  Incision with minimal sanguinous drainage  Motor intact " AIN/PIN/U/M  SILT M/R/U  WWP extremities  FCDs in place and functioning    Significant Labs:   CBC:   Recent Labs   Lab 08/05/19  1038 08/06/19  0608 08/07/19 0319   WBC 5.41 4.74 6.04   HGB 11.3* 11.6* 11.4*   HCT 36.9* 36.9* 35.3*   * 99* 102*     CMP:   Recent Labs   Lab 08/05/19  1038 08/06/19  0608 08/07/19 0319   * 131* 131*   K 5.4* 5.0 5.2*    99 102   CO2 22* 23 16*   * 203* 121*   BUN 71* 72* 79*   CREATININE 2.8* 2.8* 2.9*   CALCIUM 8.9 8.8 9.0   PROT  --  7.0 7.0   ALBUMIN  --  2.9* 2.8*   BILITOT  --  1.6* 1.3*   ALKPHOS  --  226* 248*   AST  --  31 26   ALT  --  37 25   ANIONGAP 11 9 13   EGFRNONAA 24.3* 24.3* 23.3*     All pertinent labs within the past 24 hours have been reviewed.    Significant Imaging: I have reviewed all pertinent imaging results/findings.

## 2019-08-07 NOTE — ASSESSMENT & PLAN NOTE
54 year old male with PMH of DM, HF, HLD, and hypothyroidism presents with left wrist pain, swelling, and decreased ROM; aspiration revealed 91K WBC with 89% segs; (+) crystals. CRP-2; ESR-36.   Pt now s/p I&D in the OR 8/5; murky fluid encountered.   Gram stain with rare budding yeast however no growth on cultures.     Plan:  1. Continue empiric vancomycin and rocephin for now; however, given negative cultures and presence of crystals, would consider stopping all antibiotics   2. Continue to follow cultures  3. Will follow

## 2019-08-07 NOTE — SUBJECTIVE & OBJECTIVE
Interval History: NAEO. Cultures remain negative. Wrist pain improved.    Review of Systems   Constitutional: Negative for chills and fever.   Respiratory: Negative for shortness of breath.    Cardiovascular: Negative for chest pain and leg swelling.   Gastrointestinal: Negative for abdominal pain, diarrhea and nausea.   Musculoskeletal: Positive for arthralgias (left wrist pain). Negative for back pain and myalgias.   Skin: Negative for rash and wound.   Psychiatric/Behavioral: Negative for agitation and behavioral problems. The patient is not nervous/anxious.      Objective:     Vital Signs (Most Recent):  Temp: 97.1 °F (36.2 °C) (08/07/19 0735)  Pulse: 69 (08/07/19 0736)  Resp: 18 (08/07/19 0735)  BP: 104/71 (08/07/19 0735)  SpO2: 99 % (08/07/19 0735) Vital Signs (24h Range):  Temp:  [96.4 °F (35.8 °C)-99 °F (37.2 °C)] 97.1 °F (36.2 °C)  Pulse:  [64-78] 69  Resp:  [16-19] 18  SpO2:  [91 %-99 %] 99 %  BP: ()/(68-73) 104/71     Weight: 76 kg (167 lb 8.8 oz)  Body mass index is 27.88 kg/m².    Estimated Creatinine Clearance: 27.4 mL/min (A) (based on SCr of 2.9 mg/dL (H)).    Physical Exam   Constitutional: He is oriented to person, place, and time. He appears well-developed and well-nourished. No distress.   Cardiovascular: Normal rate and regular rhythm.   No murmur heard.  Pulmonary/Chest: Effort normal and breath sounds normal. No respiratory distress.   Abdominal: Soft. Bowel sounds are normal. He exhibits no distension.   Musculoskeletal: He exhibits no edema.        Left wrist: He exhibits decreased range of motion.   Left wrist with bandage in place   Neurological: He is alert and oriented to person, place, and time.   Skin: Skin is warm and dry.   Psychiatric: He has a normal mood and affect. His behavior is normal.       Significant Labs:   Blood Culture:   Recent Labs   Lab 08/05/19  1802 08/06/19  0753 08/06/19  0759   LABBLOO No Growth to date  No Growth to date  No Growth to date  No Growth to  date No Growth to date  No Growth to date No Growth to date  No Growth to date     CBC:   Recent Labs   Lab 08/06/19  0608 08/07/19 0319   WBC 4.74 6.04   HGB 11.6* 11.4*   HCT 36.9* 35.3*   PLT 99* 102*     CMP:   Recent Labs   Lab 08/06/19 0608 08/07/19 0319   * 131*   K 5.0 5.2*   CL 99 102   CO2 23 16*   * 121*   BUN 72* 79*   CREATININE 2.8* 2.9*   CALCIUM 8.8 9.0   PROT 7.0 7.0   ALBUMIN 2.9* 2.8*   BILITOT 1.6* 1.3*   ALKPHOS 226* 248*   AST 31 26   ALT 37 25   ANIONGAP 9 13   EGFRNONAA 24.3* 23.3*     Wound Culture:   Recent Labs   Lab 08/05/19  1237 08/05/19 2014   LABAERO No growth No growth       Significant Imaging: I have reviewed all pertinent imaging results/findings within the past 24 hours.

## 2019-08-07 NOTE — ASSESSMENT & PLAN NOTE
EF 25% with severe left atrial enlargement on TTE in 11/2018. Denies leg swelling and SOB. No cough. He is not having CP. Taking Lasix 40mg TID and Toprol 50mg XL. He received Lasix 80mg once in ED. EKG not concerning for new abnormalities from previous study. Showed possible Left Atrial enlargement, consistent with TTE previously.   - Holding home Lasix at this time with no concerns for volume overload and may be volume depleted.   - Continue Toprol home med   - Lasix 80mg IV X 1 given POD 2 for suspected worsening renal function 2/2 heart failure

## 2019-08-07 NOTE — PLAN OF CARE
Problem: Diabetes Comorbidity  Goal: Blood Glucose Level Within Desired Range    Intervention: Maintain Glycemic Control  Pt accucheck q4 hrs results wnl.

## 2019-08-07 NOTE — PROGRESS NOTES
[facility]  Infectious Disease  Progress Note    Patient Name: Ashish Mckeon  MRN: [id number]  Admission Date: 8/5/2019  Length of Stay: 2 days  Attending Physician: ANGEL Shelton MD  Primary Care Provider: [facility]    Isolation Status: No active isolations  Assessment/Plan:      * Septic joint of left wrist  54 year old male with PMH of DM, HF, HLD, and hypothyroidism presents with left wrist pain, swelling, and decreased ROM; aspiration revealed 91K WBC with 89% segs; (+) crystals. CRP-2; ESR-36.   Pt now s/p I&D in the OR 8/5; murky fluid encountered.   Gram stain with rare budding yeast however no growth on cultures.     Plan:  1. Given negative cultures and presence of crystals, would stop all antibiotics and monitor off  2. Continue to follow cultures  3. Add blasto urine antigen  4. Will follow from afar      Thank you for your consult. I will follow-up with patient. Please contact us if you have any additional questions.  AWAIS Carmona Pager: [phone]  Infectious Disease  [facility]    Subjective:     Principal Problem:Septic joint of left wrist    HPI: 54 year old male with PMH of HTN, HLD, DM, hypothyroidism, CHF who presents with left wrist pain. Denies any trauma. No open skin wounds or abrasions. No history of IVDA or gout. Denies fever and chills. Patient has never experienced this previously.  Ortho aspirated wrist which revealed 91K WBC with 89% segs. Few crystals seen. Gram stain shows rare budding yeast. Cultures pending- NGTD. Pt is afebrile without leukocytosis. CRP-2; ESR 36. Pt started on empiric vanc and rocephin. Brought to the OR yesterday for I&D; murky fluid encountered. ID consulted for further recs.   Interval History: NAEO. Cultures remain negative. Wrist pain improved.    Review of Systems   Constitutional: Negative for chills and fever.   Respiratory: Negative for shortness of breath.    Cardiovascular: Negative  for chest pain and leg swelling.   Gastrointestinal: Negative for abdominal pain, diarrhea and nausea.   Musculoskeletal: Positive for arthralgias (left wrist pain). Negative for back pain and myalgias.   Skin: Negative for rash and wound.   Psychiatric/Behavioral: Negative for agitation and behavioral problems. The patient is not nervous/anxious.      Objective:     Vital Signs (Most Recent):  Temp: 97.1 °F (36.2 °C) (08/07/19 0735)  Pulse: 69 (08/07/19 0736)  Resp: 18 (08/07/19 0735)  BP: 104/71 (08/07/19 0735)  SpO2: 99 % (08/07/19 0735) Vital Signs (24h Range):  Temp:  [96.4 °F (35.8 °C)-99 °F (37.2 °C)] 97.1 °F (36.2 °C)  Pulse:  [64-78] 69  Resp:  [16-19] 18  SpO2:  [91 %-99 %] 99 %  BP: ()/(68-73) 104/71     Weight: 76 kg (167 lb 8.8 oz)  Body mass index is 27.88 kg/m².    Estimated Creatinine Clearance: 27.4 mL/min (A) (based on SCr of 2.9 mg/dL (H)).    Physical Exam   Constitutional: He is oriented to person, place, and time. He appears well-developed and well-nourished. No distress.   Cardiovascular: Normal rate and regular rhythm.   No murmur heard.  Pulmonary/Chest: Effort normal and breath sounds normal. No respiratory distress.   Abdominal: Soft. Bowel sounds are normal. He exhibits no distension.   Musculoskeletal: He exhibits no edema.        Left wrist: He exhibits decreased range of motion.   Left wrist with bandage in place   Neurological: He is alert and oriented to person, place, and time.   Skin: Skin is warm and dry.   Psychiatric: He has a normal mood and affect. His behavior is normal.       Significant Labs:   Blood Culture:   Recent Labs   Lab 08/05/19  1802 08/06/19  0753 08/06/19  0759   LABBLOO No Growth to date  No Growth to date  No Growth to date  No Growth to date No Growth to date  No Growth to date No Growth to date  No Growth to date     CBC:   Recent Labs   Lab 08/06/19  0608 08/07/19  0319   WBC 4.74 6.04   HGB 11.6* 11.4*   HCT 36.9* 35.3*   PLT 99* 102*     CMP:    Recent Labs   Lab 08/06/19  0608 08/07/19  0319   * 131*   K 5.0 5.2*   CL 99 102   CO2 23 16*   * 121*   BUN 72* 79*   CREATININE 2.8* 2.9*   CALCIUM 8.8 9.0   PROT 7.0 7.0   ALBUMIN 2.9* 2.8*   BILITOT 1.6* 1.3*   ALKPHOS 226* 248*   AST 31 26   ALT 37 25   ANIONGAP 9 13   EGFRNONAA 24.3* 23.3*     Wound Culture:   Recent Labs   Lab 08/05/19  1237 08/05/19 2014   LABAERO No growth No growth       Significant Imaging: I have reviewed all pertinent imaging results/findings within the past 24 hours.

## 2019-08-08 VITALS
SYSTOLIC BLOOD PRESSURE: 115 MMHG | HEART RATE: 83 BPM | TEMPERATURE: 97 F | RESPIRATION RATE: 18 BRPM | OXYGEN SATURATION: 95 % | HEIGHT: 65 IN | WEIGHT: 172.63 LBS | DIASTOLIC BLOOD PRESSURE: 76 MMHG | BODY MASS INDEX: 28.76 KG/M2

## 2019-08-08 PROBLEM — M11.232 PSEUDOGOUT OF LEFT WRIST: Status: ACTIVE | Noted: 2019-08-05

## 2019-08-08 LAB
ALBUMIN SERPL BCP-MCNC: 2.7 G/DL (ref 3.5–5.2)
ALP SERPL-CCNC: 258 U/L (ref 55–135)
ALT SERPL W/O P-5'-P-CCNC: 20 U/L (ref 10–44)
ANION GAP SERPL CALC-SCNC: 13 MMOL/L (ref 8–16)
AST SERPL-CCNC: 24 U/L (ref 10–40)
BACTERIA SPEC AEROBE CULT: NO GROWTH
BASOPHILS # BLD AUTO: 0.06 K/UL (ref 0–0.2)
BASOPHILS NFR BLD: 1.1 % (ref 0–1.9)
BILIRUB SERPL-MCNC: 1.1 MG/DL (ref 0.1–1)
BUN SERPL-MCNC: 80 MG/DL (ref 6–20)
CALCIUM SERPL-MCNC: 8.5 MG/DL (ref 8.7–10.5)
CHLORIDE SERPL-SCNC: 99 MMOL/L (ref 95–110)
CO2 SERPL-SCNC: 19 MMOL/L (ref 23–29)
CREAT SERPL-MCNC: 2.8 MG/DL (ref 0.5–1.4)
CRP SERPL-MCNC: 84.2 MG/L (ref 0–8.2)
DIFFERENTIAL METHOD: ABNORMAL
EOSINOPHIL # BLD AUTO: 0.2 K/UL (ref 0–0.5)
EOSINOPHIL NFR BLD: 3.5 % (ref 0–8)
ERYTHROCYTE [DISTWIDTH] IN BLOOD BY AUTOMATED COUNT: 19 % (ref 11.5–14.5)
ERYTHROCYTE [SEDIMENTATION RATE] IN BLOOD BY WESTERGREN METHOD: 73 MM/HR (ref 0–23)
EST. GFR  (AFRICAN AMERICAN): 28.1 ML/MIN/1.73 M^2
EST. GFR  (NON AFRICAN AMERICAN): 24.3 ML/MIN/1.73 M^2
GLUCOSE SERPL-MCNC: 183 MG/DL (ref 70–110)
HCT VFR BLD AUTO: 35.7 % (ref 40–54)
HGB BLD-MCNC: 11.4 G/DL (ref 14–18)
IMM GRANULOCYTES # BLD AUTO: 0.01 K/UL (ref 0–0.04)
IMM GRANULOCYTES NFR BLD AUTO: 0.2 % (ref 0–0.5)
LYMPHOCYTES # BLD AUTO: 0.5 K/UL (ref 1–4.8)
LYMPHOCYTES NFR BLD: 9.7 % (ref 18–48)
MCH RBC QN AUTO: 24.1 PG (ref 27–31)
MCHC RBC AUTO-ENTMCNC: 31.9 G/DL (ref 32–36)
MCV RBC AUTO: 75 FL (ref 82–98)
MONOCYTES # BLD AUTO: 1.1 K/UL (ref 0.3–1)
MONOCYTES NFR BLD: 19.3 % (ref 4–15)
NEUTROPHILS # BLD AUTO: 3.6 K/UL (ref 1.8–7.7)
NEUTROPHILS NFR BLD: 66.2 % (ref 38–73)
NRBC BLD-RTO: 0 /100 WBC
PLATELET # BLD AUTO: 102 K/UL (ref 150–350)
PMV BLD AUTO: ABNORMAL FL (ref 9.2–12.9)
POCT GLUCOSE: 168 MG/DL (ref 70–110)
POCT GLUCOSE: 234 MG/DL (ref 70–110)
POTASSIUM SERPL-SCNC: 4.7 MMOL/L (ref 3.5–5.1)
PROCALCITONIN SERPL IA-MCNC: 0.5 NG/ML
PROT SERPL-MCNC: 7 G/DL (ref 6–8.4)
RBC # BLD AUTO: 4.74 M/UL (ref 4.6–6.2)
SODIUM SERPL-SCNC: 131 MMOL/L (ref 136–145)
VANCOMYCIN SERPL-MCNC: 21.2 UG/ML
WBC # BLD AUTO: 5.49 K/UL (ref 3.9–12.7)

## 2019-08-08 PROCEDURE — 63600175 PHARM REV CODE 636 W HCPCS: Performed by: STUDENT IN AN ORGANIZED HEALTH CARE EDUCATION/TRAINING PROGRAM

## 2019-08-08 PROCEDURE — 97535 SELF CARE MNGMENT TRAINING: CPT

## 2019-08-08 PROCEDURE — 84145 PROCALCITONIN (PCT): CPT

## 2019-08-08 PROCEDURE — 99239 HOSP IP/OBS DSCHRG MGMT >30: CPT | Mod: ,,, | Performed by: INTERNAL MEDICINE

## 2019-08-08 PROCEDURE — 85652 RBC SED RATE AUTOMATED: CPT

## 2019-08-08 PROCEDURE — 86140 C-REACTIVE PROTEIN: CPT

## 2019-08-08 PROCEDURE — 99239 PR HOSPITAL DISCHARGE DAY,>30 MIN: ICD-10-PCS | Mod: ,,, | Performed by: INTERNAL MEDICINE

## 2019-08-08 PROCEDURE — 85025 COMPLETE CBC W/AUTO DIFF WBC: CPT

## 2019-08-08 PROCEDURE — 36415 COLL VENOUS BLD VENIPUNCTURE: CPT

## 2019-08-08 PROCEDURE — 80202 ASSAY OF VANCOMYCIN: CPT

## 2019-08-08 PROCEDURE — 25000003 PHARM REV CODE 250: Performed by: STUDENT IN AN ORGANIZED HEALTH CARE EDUCATION/TRAINING PROGRAM

## 2019-08-08 PROCEDURE — 80053 COMPREHEN METABOLIC PANEL: CPT

## 2019-08-08 RX ORDER — PREDNISONE 20 MG/1
40 TABLET ORAL ONCE
Status: COMPLETED | OUTPATIENT
Start: 2019-08-08 | End: 2019-08-08

## 2019-08-08 RX ORDER — PREDNISONE 5 MG/1
5 TABLET ORAL DAILY
Qty: 14 TABLET | Refills: 0 | Status: SHIPPED | OUTPATIENT
Start: 2019-08-11 | End: 2019-08-25

## 2019-08-08 RX ORDER — HYDROCODONE BITARTRATE AND ACETAMINOPHEN 5; 325 MG/1; MG/1
1 TABLET ORAL EVERY 6 HOURS PRN
Qty: 10 TABLET | Refills: 0 | Status: SHIPPED | OUTPATIENT
Start: 2019-08-08 | End: 2019-08-08

## 2019-08-08 RX ORDER — INSULIN ASPART 100 [IU]/ML
0-5 INJECTION, SOLUTION INTRAVENOUS; SUBCUTANEOUS
Status: DISCONTINUED | OUTPATIENT
Start: 2019-08-08 | End: 2019-08-08 | Stop reason: HOSPADM

## 2019-08-08 RX ORDER — ALLOPURINOL 100 MG/1
50 TABLET ORAL DAILY
Qty: 15 TABLET | Refills: 3 | Status: SHIPPED | OUTPATIENT
Start: 2019-08-11 | End: 2019-09-10

## 2019-08-08 RX ORDER — PANTOPRAZOLE SODIUM 40 MG/1
40 TABLET, DELAYED RELEASE ORAL DAILY PRN
Qty: 30 TABLET | Refills: 0 | Status: SHIPPED | OUTPATIENT
Start: 2019-08-08 | End: 2020-01-01

## 2019-08-08 RX ORDER — ALLOPURINOL 100 MG/1
50 TABLET ORAL DAILY
Qty: 15 TABLET | Refills: 3 | Status: SHIPPED | OUTPATIENT
Start: 2019-08-08 | End: 2019-08-08 | Stop reason: SDUPTHER

## 2019-08-08 RX ORDER — PREDNISONE 20 MG/1
40 TABLET ORAL DAILY
Qty: 4 TABLET | Refills: 0 | Status: SHIPPED | OUTPATIENT
Start: 2019-08-09 | End: 2019-08-11

## 2019-08-08 RX ORDER — HYDROCODONE BITARTRATE AND ACETAMINOPHEN 5; 325 MG/1; MG/1
1 TABLET ORAL EVERY 6 HOURS PRN
Qty: 10 TABLET | Refills: 0 | Status: SHIPPED | OUTPATIENT
Start: 2019-08-08 | End: 2019-08-11

## 2019-08-08 RX ADMIN — HYDROCODONE BITARTRATE AND ACETAMINOPHEN 1 TABLET: 5; 325 TABLET ORAL at 04:08

## 2019-08-08 RX ADMIN — MUPIROCIN: 20 OINTMENT TOPICAL at 09:08

## 2019-08-08 RX ADMIN — PREDNISONE 40 MG: 20 TABLET ORAL at 09:08

## 2019-08-08 RX ADMIN — LEVOTHYROXINE SODIUM 175 MCG: 125 TABLET ORAL at 04:08

## 2019-08-08 RX ADMIN — COLCHICINE 0.6 MG: 0.6 TABLET, FILM COATED ORAL at 09:08

## 2019-08-08 RX ADMIN — METOPROLOL SUCCINATE 50 MG: 50 TABLET, EXTENDED RELEASE ORAL at 09:08

## 2019-08-08 RX ADMIN — ASPIRIN 81 MG: 81 TABLET, COATED ORAL at 09:08

## 2019-08-08 NOTE — ASSESSMENT & PLAN NOTE
54 y.o. 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

## 2019-08-08 NOTE — DISCHARGE SUMMARY
[facility] Hospital Medicine  Discharge Summary      Patient Name: Ashish Mckeon  MRN: [id number]  Admission Date: 8/5/2019  Hospital Length of Stay: 3 days  Discharge Date and Time:  08/08/2019 2:39 PM  Attending Physician: ANGEL Shelton MD   Discharging Provider: Constantino Barker MD  Primary Care Provider: [facility] Hospital Medicine Team: [facility] HOSP MED 2 Constantino Barker MD    HPI:   55 yo M with HTN, HLD, DM, hypothyroidism, CHF (EF 25%, Grade III DD) presents with left wrist pain with no reported trauma. Reports increasing pain and swelling over the past 24 hours. Worsens by movement and palpitation. Pain is severe in quality. He is unable to move his fingers and wrist due to pain. He was helping to set up a party for his wife the evening prior to onset. He did not lift anything more than 10 pounds. No history of IVDA or gout. Denies fever and chills. Patient has never experienced this previously.     Last admitted on 12/24/18 for acute on chronic combined heart failure exacerbation and discharged on increased Lasix taking it BID 80mg, now taking 40mg TID after seeing pcp between visits. Taking Toprol XL 50 daily. Previous TTE showed EF 25% with severe left atrial enlargement. Limited to 1L of fluids per day. He is taking Glipizide Bid an insulin for elevated readings. On levothyroxine. He is here for surgery clearance with ortho who is planning for wash out. EKG on presentation with no ST changes. Patient is not having CP. Labs on admission demonstrate elevated sCr of 2.8 and glucose of 237.     Procedure(s) (LRB):  INCISION AND DRAINAGE, UPPER EXTREMITY , LEFT WRIST (Left)      Hospital Course:   8/5: Admitted for pre op and ortho washout for acute left arm pain.   8/6: Patient recovering well without issue. Afebrile. Discussing ABx with ID  08/07/2019: Reports return of movement and sensation of L arm. Continues to have L wrist pain today. Worsening Cr today,  suspect volume-overload. IV furosemide 80mg X 1 given. Denies SOB or cough.   08/08/2019: Arm is still swollen but feeling much better. Discussed allopurinol medication and need for him to f/u promptly with PCP for DM control. D/C to home        Consults:   Consults (From admission, onward)        Status Ordering Provider     Inpatient consult to Infectious Diseases  Once     Provider:  (Not yet assigned)    Completed SEAMUS SMITH     Inpatient consult to Orthopedic Surgery  Once     Provider:  (Not yet assigned)    Completed ADELE MARSHALL     Inpatient consult to Registered Dietitian/Nutritionist  Once     Provider:  (Not yet assigned)    Completed FABIANA LAINEZ          * Pseudogout of left wrist  Patient presenting with acute pain from left wrist to the left elbow. Samples with rare budding yeast. Patient afebrile on admission and WBC not concerning. Preop for othro wash out.   - Monitor CBC concerning infxn  - Blood Cx X 2  - Vital checks   - Per ID continue Vanc and Rocephin IV    Crystals present in synovial fluid obtained by arthrocentesis  Concerning for gout or pseudogout with overlying infection. This is considered an acute flare. NSAIDs may not be used with CKD.   -- Start Colchicine       Septic arthritis  Ruled out. Gout is suspected.       Elevated serum creatinine  sCr is 2.8 on admission. May be related to prerenal KEYANNA secondary to volume depletion with over diuresis or chronic secondary to CHF and cardiorenal syndrome. Patient is urinating without difficulty on admission. Will continue to monitor.   - Trending CMP  - 500mL IV NS bolus in ED      Chronic combined systolic and diastolic congestive heart failure  EF 25% with severe left atrial enlargement on TTE in 11/2018. Denies leg swelling and SOB. No cough. He is not having CP. Taking Lasix 40mg TID and Toprol 50mg XL. He received Lasix 80mg once in ED. EKG not concerning for new abnormalities from previous study. Showed possible Left Atrial  enlargement, consistent with TTE previously.   - Holding home Lasix at this time with no concerns for volume overload and may be volume depleted.   - Continue Toprol home med   - Lasix 80mg IV X 1 given POD 2 for suspected worsening renal function 2/2 heart failure    Diabetes mellitus, type II  A1C >14, Glucose > 200 on admission. Taking glipizide BID with insulin. Not well controlled.   - Detemir 10 units QHS  - Glucose accuchecks       Hypothyroidism  Continue home levothyroxine        Final Active Diagnoses:    Diagnosis Date Noted POA    PRINCIPAL PROBLEM:  Pseudogout of left wrist [M11.232] 08/05/2019 Yes    Crystals present in synovial fluid obtained by arthrocentesis [R88.8] 08/06/2019 Unknown    Elevated serum creatinine [R79.89] 08/05/2019 Unknown    Septic arthritis [M00.9] 08/05/2019 Yes    Chronic combined systolic and diastolic congestive heart failure [I50.42] 01/16/2017 Yes    Hypothyroidism [E03.9]  Yes     Chronic    Diabetes mellitus, type II [E11.9]  Yes     Chronic      Problems Resolved During this Admission:       Discharged Condition: stable    Disposition: Home or Self Care    Follow Up:    Patient Instructions:      Diet renal     Diet diabetic     Notify your health care provider if you experience any of the following:  temperature >100.4     Notify your health care provider if you experience any of the following:  persistent nausea and vomiting or diarrhea     Notify your health care provider if you experience any of the following:  severe uncontrolled pain     Notify your health care provider if you experience any of the following:  redness, tenderness, or signs of infection (pain, swelling, redness, odor or green/yellow discharge around incision site)     Activity as tolerated       Significant Diagnostic Studies: Microbiology:   Blood Culture   Lab Results   Component Value Date    LABBLOO No Growth to date 08/06/2019    LABBLOO No Growth to date 08/06/2019    LABBLOO No Growth  to date 08/06/2019     All collected from L wrist   Gram Stain Result Moderate WBC's      Aerobic Bacterial Culture No growth P     Anaerobic Culture Culture in progress P     AFB Culture & Smear Culture in progress P   AFB CULTURE STAIN No acid fast bacilli seen.      Fungus (Mycology) Culture Culture in progress P               Pending Diagnostic Studies:     None         Medications:  Reconciled Home Medications:      Medication List      START taking these medications    allopurinol 100 MG tablet  Commonly known as:  ZYLOPRIM  Take 0.5 tablets (50 mg total) by mouth once daily.  Start taking on:  8/11/2019     HYDROcodone-acetaminophen 5-325 mg per tablet  Commonly known as:  NORCO  Take 1 tablet by mouth every 6 (six) hours as needed.     * predniSONE 20 MG tablet  Commonly known as:  DELTASONE  Take 2 tablets (40 mg total) by mouth once daily. for 2 doses then start prednisone 5 mg script  Start taking on:  8/9/2019     * predniSONE 5 MG tablet  Commonly known as:  DELTASONE  Start after prednisone 20 mg twice daily: Take 1 tablet (5 mg total) by mouth once daily for 14 days  Start taking on:  8/11/2019         * This list has 2 medication(s) that are the same as other medications prescribed for you. Read the directions carefully, and ask your doctor or other care provider to review them with you.            CHANGE how you take these medications    acetaminophen 325 MG tablet  Commonly known as:  TYLENOL  Take 2 tablets (650 mg total) by mouth every 6 to 8 hours as needed.  What changed:    · how much to take  · when to take this  · reasons to take this     atorvastatin 80 MG tablet  Commonly known as:  LIPITOR  Take 1 tablet (80 mg total) by mouth every evening.  What changed:  Another medication with the same name was removed. Continue taking this medication, and follow the directions you see here.     metoprolol succinate 50 MG 24 hr tablet  Commonly known as:  TOPROL-XL  Take 1 tablet (50 mg total) by  mouth once daily.  What changed:    · how much to take  · how to take this  · when to take this  · additional instructions        CONTINUE taking these medications    aspirin 81 MG Chew  Take 1 tablet (81 mg total) by mouth once daily.     furosemide 80 MG tablet  Commonly known as:  LASIX  Take 1 tablet (80 mg total) by mouth 2 (two) times daily. Take an extra 80mg daily as needed for weight gain of 3 lbs in 24 hrs  or 5 lbs/wk     glyBURIDE 5 MG tablet  Commonly known as:  DIABETA  Take 10 mg by mouth 2 (two) times daily with meals.     insulin detemir U-100 100 unit/mL injection  Commonly known as:  LEVEMIR  Inject 15 Units into the skin every evening.     levothyroxine 175 MCG tablet  Commonly known as:  SYNTHROID  Take 1 tablet (175 mcg total) by mouth before breakfast.     losartan 25 MG tablet  Commonly known as:  COZAAR  Take 25 mg by mouth once daily.     pantoprazole 40 MG tablet  Commonly known as:  PROTONIX  Take 1 tablet (40 mg total) by mouth daily as needed (heartburn).            Indwelling Lines/Drains at time of discharge:   Lines/Drains/Airways          None          Time spent on the discharge of patient: 45 minutes  Patient was seen and examined on the date of discharge and determined to be suitable for discharge.         Constantino Barker MD  Department of Hospital Medicine  [facility]

## 2019-08-08 NOTE — PROGRESS NOTES
[facility]  Orthopedics  Progress Note    Patient Name: Ashish Mckeon  MRN: [id number]  Admission Date: 8/5/2019  Hospital Length of Stay: 3 days  Attending Provider: ANGEL Shelton MD  Primary Care Provider: [facility]  Follow-up For: Procedure(s) (LRB):  INCISION AND DRAINAGE, UPPER EXTREMITY , LEFT WRIST (Left)    Post-Operative Day: 3 Days Post-Op  Subjective:     Principal Problem:Septic joint of left wrist    Principal Orthopedic Problem: same    Interval History: Patient seen and examined at bedside.  No acute events overnight.  Pain controlled and improved from yesterday and before surgery.  Abx stopped in favor of pseudogout treatment.      Review of patient's allergies indicates:   Allergen Reactions    Lisinopril Other (See Comments)     acei cough         Current Facility-Administered Medications   Medication    acetaminophen tablet 325 mg    aspirin EC tablet 81 mg    atorvastatin tablet 80 mg    bisacodyl suppository 10 mg    colchicine capsule/tablet 0.6 mg    dextrose 10% (D10W) Bolus    dextrose 10% (D10W) Bolus    docusate sodium capsule 100 mg    enoxaparin injection 30 mg    glucagon (human recombinant) injection 1 mg    glucose chewable tablet 16 g    glucose chewable tablet 24 g    HYDROcodone-acetaminophen 5-325 mg per tablet 1 tablet    insulin aspart U-100 pen 0-5 Units    insulin detemir U-100 pen 10 Units    levothyroxine tablet 175 mcg    metoprolol succinate (TOPROL-XL) 24 hr tablet 50 mg    mupirocin 2 % ointment    pantoprazole EC tablet 40 mg    polyethylene glycol packet 17 g    sodium chloride 0.9% flush 10 mL    sodium chloride 0.9% flush 10 mL     Objective:     Vital Signs (Most Recent):  Temp: 96.8 °F (36 °C) (08/08/19 0821)  Pulse: 72 (08/08/19 0821)  Resp: 18 (08/08/19 0821)  BP: 109/77 (08/08/19 0821)  SpO2: 99 % (08/08/19 0821) Vital Signs (24h Range):  Temp:  [96.1 °F (35.6 °C)-96.8 °F (36 °C)] 96.8 °F (36  "°C)  Pulse:  [64-85] 72  Resp:  [14-18] 18  SpO2:  [93 %-99 %] 99 %  BP: (101-113)/(65-77) 109/77     Weight: 78.3 kg (172 lb 9.9 oz)  Height: 5' 5" (165.1 cm)  Body mass index is 28.73 kg/m².      Intake/Output Summary (Last 24 hours) at 8/8/2019 1050  Last data filed at 8/8/2019 0800  Gross per 24 hour   Intake 500 ml   Output 1725 ml   Net -1225 ml       Ortho/SPM Exam    AAOx4  NAD  Reg rate  No increased WOB  Incision with minimal sanguinous drainage  No pain with mid 20 deg wrist ROM  Pain beyond  Motor intact AIN/PIN/U/M  SILT M/R/U  WWP extremities  FCDs in place and functioning    Significant Labs:   CBC:   Recent Labs   Lab 08/07/19  0319 08/08/19  0306   WBC 6.04 5.49   HGB 11.4* 11.4*   HCT 35.3* 35.7*   * 102*     CMP:   Recent Labs   Lab 08/07/19  0319 08/08/19  0306   * 131*   K 5.2* 4.7    99   CO2 16* 19*   * 183*   BUN 79* 80*   CREATININE 2.9* 2.8*   CALCIUM 9.0 8.5*   PROT 7.0 7.0   ALBUMIN 2.8* 2.7*   BILITOT 1.3* 1.1*   ALKPHOS 248* 258*   AST 26 24   ALT 25 20   ANIONGAP 13 13   EGFRNONAA 23.3* 24.3*     All pertinent labs within the past 24 hours have been reviewed.    Significant Imaging: I have reviewed all pertinent imaging results/findings.    Assessment/Plan:     * Pseudogout of left wrist  54 y.o. 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    Joesph Chen MD    "

## 2019-08-08 NOTE — PLAN OF CARE
Problem: Occupational Therapy Goal  Goal: Occupational Therapy Goal  Goals to be met by: 2 visits     Patient will increase functional independence with ADLs by performing:    Pt will be (I) with HEP MET 08/08 JAYLENE     Outcome: Outcome(s) achieved Date Met: 08/08/19  Pt has MET all goals, Discharge from OT.

## 2019-08-08 NOTE — PT/OT/SLP PROGRESS
Occupational Therapy   Treatment/Discharge    Name: Ashish Mckeon  MRN: [id number]  Admitting Diagnosis:  Pseudogout of left wrist  3 Days Post-Op    Recommendations:     Discharge Recommendations: home  Discharge Equipment Recommendations:  none  Barriers to discharge:  None    Assessment:     Ashish Mckeon is a 54 y.o. male with a medical diagnosis of Pseudogout of left wrist.  He presents with consistent level of pain that limits his ability to use his L hand. Performance deficits affecting function are impaired fine motor, impaired skin, decreased upper extremity function, decreased coordination, impaired joint extensibility, decreased ROM.     Rehab Prognosis:  Fair; patient would benefit from acute skilled OT services to address these deficits and reach maximum level of function.       Plan:     Patient to be seen   to address the above listed problems via self-care/home management, therapeutic exercises, therapeutic activities, neuromuscular re-education  · Plan of Care Expires: 08/08/19  · Plan of Care Reviewed with: patient    Subjective     Pain/Comfort:  · Pain Rating 1: other (see comments)(did not rate pain)  · Location - Side 1: Left  · Location - Orientation 1: generalized  · Location 1: wrist  · Pain Rating Post-Intervention 1: other (see comments)(no change)    Objective:     Communicated with: RN prior to session.  Patient found HOB elevated with telemetry upon OT entry to room.    General Precautions: Standard, fall   Orthopedic Precautions:N/A   Braces: N/A       AMPA 6 Click ADL: 24    Treatment & Education:  -Pt edu on OT role/POC  -Importance of OOB activity with staff assistance  -Safety during functional t/f and mobility  - White board updated  - Multiple self care tasks/functional mobility completed-- assistance level noted above  - All questions/concerns answered within OT scope of practice    Reviewed HEP, and discuss the importance of Medication management, diet, and physical fitness to  manage symptoms.  Reviewed gentle ROM to promote removal of fluid in wrist joint.  Discussed bracing only at night to protect it and allow for better sleep.     Patient left HOB elevated with all lines intact, call button in reach and RN notifiedEducation:      GOALS:   Multidisciplinary Problems     Occupational Therapy Goals     Not on file          Multidisciplinary Problems (Resolved)        Problem: Occupational Therapy Goal    Goal Priority Disciplines Outcome Interventions   Occupational Therapy Goal   (Resolved)     OT, PT/OT Outcome(s) achieved    Description:  Goals to be met by: 2 visits     Patient will increase functional independence with ADLs by performing:    Pt will be (I) with HEP MET 08/08 JAYLENE                       Time Tracking:     OT Date of Treatment: 08/08/19  OT Start Time: 1113  OT Stop Time: 1122  OT Total Time (min): 9 min    Billable Minutes:Self Care/Home Management 9 mins    Tushar Mccarty, OT  8/8/2019

## 2019-08-08 NOTE — SUBJECTIVE & OBJECTIVE
"Principal Problem:Septic joint of left wrist    Principal Orthopedic Problem: same    Interval History: Patient seen and examined at bedside.  No acute events overnight.  Pain controlled and improved from yesterday and before surgery.  Abx stopped in favor of pseudogout treatment.      Review of patient's allergies indicates:   Allergen Reactions    Lisinopril Other (See Comments)     acei cough         Current Facility-Administered Medications   Medication    acetaminophen tablet 325 mg    aspirin EC tablet 81 mg    atorvastatin tablet 80 mg    bisacodyl suppository 10 mg    colchicine capsule/tablet 0.6 mg    dextrose 10% (D10W) Bolus    dextrose 10% (D10W) Bolus    docusate sodium capsule 100 mg    enoxaparin injection 30 mg    glucagon (human recombinant) injection 1 mg    glucose chewable tablet 16 g    glucose chewable tablet 24 g    HYDROcodone-acetaminophen 5-325 mg per tablet 1 tablet    insulin aspart U-100 pen 0-5 Units    insulin detemir U-100 pen 10 Units    levothyroxine tablet 175 mcg    metoprolol succinate (TOPROL-XL) 24 hr tablet 50 mg    mupirocin 2 % ointment    pantoprazole EC tablet 40 mg    polyethylene glycol packet 17 g    sodium chloride 0.9% flush 10 mL    sodium chloride 0.9% flush 10 mL     Objective:     Vital Signs (Most Recent):  Temp: 96.8 °F (36 °C) (08/08/19 0821)  Pulse: 72 (08/08/19 0821)  Resp: 18 (08/08/19 0821)  BP: 109/77 (08/08/19 0821)  SpO2: 99 % (08/08/19 0821) Vital Signs (24h Range):  Temp:  [96.1 °F (35.6 °C)-96.8 °F (36 °C)] 96.8 °F (36 °C)  Pulse:  [64-85] 72  Resp:  [14-18] 18  SpO2:  [93 %-99 %] 99 %  BP: (101-113)/(65-77) 109/77     Weight: 78.3 kg (172 lb 9.9 oz)  Height: 5' 5" (165.1 cm)  Body mass index is 28.73 kg/m².      Intake/Output Summary (Last 24 hours) at 8/8/2019 1050  Last data filed at 8/8/2019 0800  Gross per 24 hour   Intake 500 ml   Output 1725 ml   Net -1225 ml       Ortho/SPM Exam    AAOx4  NAD  Reg rate  No increased " WOB  Incision with minimal sanguinous drainage  No pain with mid 20 deg wrist ROM  Pain beyond  Motor intact AIN/PIN/U/M  SILT M/R/U  WWP extremities  FCDs in place and functioning    Significant Labs:   CBC:   Recent Labs   Lab 08/07/19 0319 08/08/19  0306   WBC 6.04 5.49   HGB 11.4* 11.4*   HCT 35.3* 35.7*   * 102*     CMP:   Recent Labs   Lab 08/07/19 0319 08/08/19  0306   * 131*   K 5.2* 4.7    99   CO2 16* 19*   * 183*   BUN 79* 80*   CREATININE 2.9* 2.8*   CALCIUM 9.0 8.5*   PROT 7.0 7.0   ALBUMIN 2.8* 2.7*   BILITOT 1.3* 1.1*   ALKPHOS 248* 258*   AST 26 24   ALT 25 20   ANIONGAP 13 13   EGFRNONAA 23.3* 24.3*     All pertinent labs within the past 24 hours have been reviewed.    Significant Imaging: I have reviewed all pertinent imaging results/findings.

## 2019-08-08 NOTE — PLAN OF CARE
08/08/19 0956   Final Note   Assessment Type Final Discharge Note   Anticipated Discharge Disposition Home   What phone number can be called within the next 1-3 days to see how you are doing after discharge? [phone]   Hospital Follow Up  Appt(s) scheduled? Yes   Discharge plans and expectations educations in teach back method with documentation complete? Yes   Right Care Referral Info   Post Acute Recommendation No Care     Future Appointments   Date Time Provider Department Center   8/20/2019  1:45 PM Sissy Rayo PA-C [facility]

## 2019-08-09 ENCOUNTER — PATIENT OUTREACH (OUTPATIENT)
Dept: ADMINISTRATIVE | Facility: CLINIC | Age: 54
End: 2019-08-09

## 2019-08-09 LAB — BACTERIA SPEC AEROBE CULT: NO GROWTH

## 2019-08-09 NOTE — PLAN OF CARE
Problem: Adult Inpatient Plan of Care  Goal: Plan of Care Review  Outcome: Outcome(s) achieved Date Met: 08/08/19  Patient alert and discharged with instructions and reviewed all medications . Did receive prescriptions from our pharmacy. Did verbalize understanding of medications and follow up appointments. Iv removed without any issues. No free bleeding , redness or edema. TB test read , negative. Blood sugars maintained and dressing to left hand/wrist clean dry and intact. Left ambulating off unit with all belongings. Wife at side. No falls or occurrences.

## 2019-08-09 NOTE — PATIENT INSTRUCTIONS
Discharge Instructions: Caring for Your Incision  You are going home with stitches (sutures), surgical staples, special strips of surgical tape called Steri-Strips, or surgical skin glue. One of these items was used to close your incision, help stop bleeding, and speed healing. Follow the tips on this sheet to help your incision heal.  Home care  · Always wash your hands before touching your incision.  · Keep your incision clean and dry.  · Avoid doing things that could cause dirt or sweat to get on your incision.  · Dont pick at scabs. They help protect the wound.  · Keep your incision out of water.  · Take a sponge bath to avoid getting your incision wet, unless your healthcare provider tells you otherwise.  · Ask your provider when can you take a shower or bathe.  · Ask your provider about the best way to keep your incision dry when bathing or showering.  · Pat sutures dry if they get wet. Dont rub.  · Leave the dressing (bandage) in place until you are told to remove it or change it. Change it only as directed, using clean hands.  · After the first 12 hours, change your dressing every 24 hours, or as directed by your healthcare provider.  · Change your dressing if it gets wet or soiled.  Care for specific closures  Follow these guidelines unless your child's healthcare provider tells you otherwise:  · Sutures or staples. Once you no longer need to keep these dry, clean the incision or wound daily. First remove the bandage using clean hands. Then wash the area gently with soap and warm water. Use a wet cotton swab to loosen and remove any blood or crust that forms. After cleaning, put a thin layer of antibiotic ointment on. Then put on a new bandage.  · Skin glue. Dont put liquid, ointment, or cream on your incision or wound while the glue is in place. Avoid activities that cause heavy sweating. Protect the incision or wound from sunlight. Do not scratch, rub, or pick at the glue. Do not put tape directly  over the glue. The glue should peel off within 5 to 10 days.  · Surgical tape. Keep your incision or wound dry. If it gets wet, blot the area dry with a clean towel. Surgical tape usually falls off within 7 to 10 days. If it has not fallen off after 10 days, contact your healthcare provider before taking it off yourself. If you are told to remove the tape, put mineral oil or petroleum jelly on a cotton ball. Gently rub the tape until it is removed.  Follow-up care  Follow up with your healthcare provider to ask how long sutures or staples should be left in place. Be sure to return for suture or staple removal as directed. If dissolving stitches were used in your mouth, these will not need to be removed. They should fall out or dissolve on their own.  If tape closures were used, remove them yourself when your provider recommends if they have not fallen off on their own. If skin glue was used, the glue will wear off by itself.      When to seek medical care  Call your healthcare provider right away if you have any of the following:  · More pain, redness, swelling, bleeding, or foul-smelling discharge around the incision area  · Fever of 100.4°F (38°C) or higher, or as directed by your healthcare provider  · Shaking chills  · Vomiting or nausea that doesnt go away  · Numbness, coldness, or tingling around the incision area, or changes in skin color  · Opening of the sutures or wound  · Stitches or staples come apart or fall out or surgical tape falls off before 7 days, or as directed by your provider   Date Last Reviewed: 10/22/2014  © 9168-1977 [employer]. [address] [place] [address], [place] [address]. All rights reserved. This information is not intended as a substitute for professional medical care. Always follow your healthcare professional's instructions.

## 2019-08-09 NOTE — PROGRESS NOTES
Please forward this important TCC information to your provider in order to maximize the post discharge care delivery of this patient.    C3 nurse spoke with Ashish Mckeonfor a TCC post hospital discharge follow up call. The patient does not have a scheduled HOSFU appointment with non [facility] PCP.within 7-14 days post hospital discharge date 08/08/2019. C3 nurse was unable to schedule HOSFU appointment in [facility].  Patient instructed to contact pcp on or before 08/22/2019 for hosp f/u appt.    Respectfully,    Miroslava Cisse LPN    [facility]    [email or web address]       Please do not reply to this message, as this inbox is not routinely monitored.

## 2019-08-10 LAB
BACTERIA BLD CULT: NORMAL
BACTERIA BLD CULT: NORMAL

## 2019-08-11 LAB
BACTERIA BLD CULT: NORMAL
BACTERIA BLD CULT: NORMAL

## 2019-08-12 LAB
BACTERIA SPEC ANAEROBE CULT: NORMAL
BACTERIA SPEC ANAEROBE CULT: NORMAL
BLASTOMYCES AG INTERP: NEGATIVE
BLASTOMYCES AG RESULT: NORMAL NG/ML
BLASTOMYCES AG SPECIMEN: NORMAL

## 2019-08-20 ENCOUNTER — OFFICE VISIT (OUTPATIENT)
Dept: ORTHOPEDICS | Facility: CLINIC | Age: 54
End: 2019-08-20
Payer: MEDICAID

## 2019-08-20 VITALS
BODY MASS INDEX: 24.49 KG/M2 | DIASTOLIC BLOOD PRESSURE: 60 MMHG | HEIGHT: 67 IN | SYSTOLIC BLOOD PRESSURE: 88 MMHG | RESPIRATION RATE: 18 BRPM | WEIGHT: 156.06 LBS | HEART RATE: 69 BPM | TEMPERATURE: 97 F

## 2019-08-20 DIAGNOSIS — M11.232 PSEUDOGOUT OF LEFT WRIST: Primary | ICD-10-CM

## 2019-08-20 PROCEDURE — 99999 PR PBB SHADOW E&M-EST. PATIENT-LVL IV: ICD-10-PCS | Mod: PBBFAC,,, | Performed by: PHYSICIAN ASSISTANT

## 2019-08-20 PROCEDURE — 99214 OFFICE O/P EST MOD 30 MIN: CPT | Mod: PBBFAC | Performed by: PHYSICIAN ASSISTANT

## 2019-08-20 PROCEDURE — 99024 POSTOP FOLLOW-UP VISIT: CPT | Mod: ,,, | Performed by: PHYSICIAN ASSISTANT

## 2019-08-20 PROCEDURE — 99999 PR PBB SHADOW E&M-EST. PATIENT-LVL IV: CPT | Mod: PBBFAC,,, | Performed by: PHYSICIAN ASSISTANT

## 2019-08-20 PROCEDURE — 99024 PR POST-OP FOLLOW-UP VISIT: ICD-10-PCS | Mod: ,,, | Performed by: PHYSICIAN ASSISTANT

## 2019-08-26 NOTE — ADDENDUM NOTE
Addendum  created 08/26/19 1002 by Leandro Soto MD    Child order released for a procedure order, Diagnosis association updated, Intraprocedure Blocks edited, Order Canceled from Note, Sign clinical note

## 2019-09-05 LAB
FUNGUS SPEC CULT: NORMAL
FUNGUS SPEC CULT: NORMAL

## 2019-09-21 ENCOUNTER — HOSPITAL ENCOUNTER (EMERGENCY)
Facility: HOSPITAL | Age: 54
Discharge: HOME | End: 2019-09-21
Attending: EMERGENCY MEDICINE
Payer: MEDICAID

## 2019-09-21 VITALS
DIASTOLIC BLOOD PRESSURE: 66 MMHG | BODY MASS INDEX: 20.9 KG/M2 | WEIGHT: 146 LBS | HEART RATE: 59 BPM | OXYGEN SATURATION: 100 % | TEMPERATURE: 98 F | RESPIRATION RATE: 18 BRPM | SYSTOLIC BLOOD PRESSURE: 104 MMHG | HEIGHT: 70 IN

## 2019-09-21 DIAGNOSIS — M25.539 WRIST PAIN: ICD-10-CM

## 2019-09-21 LAB
ANION GAP SERPL CALC-SCNC: 10 MMOL/L (ref 8–16)
BASOPHILS # BLD AUTO: 0.08 K/UL (ref 0–0.2)
BASOPHILS NFR BLD: 1.7 % (ref 0–1.9)
BUN SERPL-MCNC: 48 MG/DL (ref 6–20)
CALCIUM SERPL-MCNC: 9.1 MG/DL (ref 8.7–10.5)
CHLORIDE SERPL-SCNC: 109 MMOL/L (ref 95–110)
CO2 SERPL-SCNC: 19 MMOL/L (ref 23–29)
CREAT SERPL-MCNC: 2 MG/DL (ref 0.5–1.4)
CRP SERPL-MCNC: 2.5 MG/L (ref 0–8.2)
DIFFERENTIAL METHOD: ABNORMAL
EOSINOPHIL # BLD AUTO: 0.3 K/UL (ref 0–0.5)
EOSINOPHIL NFR BLD: 6.4 % (ref 0–8)
ERYTHROCYTE [DISTWIDTH] IN BLOOD BY AUTOMATED COUNT: 20.3 % (ref 11.5–14.5)
ERYTHROCYTE [SEDIMENTATION RATE] IN BLOOD BY WESTERGREN METHOD: 44 MM/HR (ref 0–23)
EST. GFR  (AFRICAN AMERICAN): 42.2 ML/MIN/1.73 M^2
EST. GFR  (NON AFRICAN AMERICAN): 36.5 ML/MIN/1.73 M^2
GLUCOSE SERPL-MCNC: 195 MG/DL (ref 70–110)
HCT VFR BLD AUTO: 40.4 % (ref 40–54)
HGB BLD-MCNC: 12.1 G/DL (ref 14–18)
IMM GRANULOCYTES # BLD AUTO: 0.02 K/UL (ref 0–0.04)
IMM GRANULOCYTES NFR BLD AUTO: 0.4 % (ref 0–0.5)
LYMPHOCYTES # BLD AUTO: 0.5 K/UL (ref 1–4.8)
LYMPHOCYTES NFR BLD: 9.6 % (ref 18–48)
MCH RBC QN AUTO: 24.2 PG (ref 27–31)
MCHC RBC AUTO-ENTMCNC: 30 G/DL (ref 32–36)
MCV RBC AUTO: 81 FL (ref 82–98)
MONOCYTES # BLD AUTO: 0.6 K/UL (ref 0.3–1)
MONOCYTES NFR BLD: 12.6 % (ref 4–15)
NEUTROPHILS # BLD AUTO: 3.2 K/UL (ref 1.8–7.7)
NEUTROPHILS NFR BLD: 69.3 % (ref 38–73)
NRBC BLD-RTO: 0 /100 WBC
PLATELET # BLD AUTO: 132 K/UL (ref 150–350)
PMV BLD AUTO: ABNORMAL FL (ref 9.2–12.9)
POTASSIUM SERPL-SCNC: 5 MMOL/L (ref 3.5–5.1)
RBC # BLD AUTO: 5.01 M/UL (ref 4.6–6.2)
SODIUM SERPL-SCNC: 138 MMOL/L (ref 136–145)
WBC # BLD AUTO: 4.68 K/UL (ref 3.9–12.7)

## 2019-09-21 PROCEDURE — 85652 RBC SED RATE AUTOMATED: CPT

## 2019-09-21 PROCEDURE — 86140 C-REACTIVE PROTEIN: CPT

## 2019-09-21 PROCEDURE — 80048 BASIC METABOLIC PNL TOTAL CA: CPT

## 2019-09-21 PROCEDURE — 99284 EMERGENCY DEPT VISIT MOD MDM: CPT | Mod: 25

## 2019-09-21 PROCEDURE — 99283 PR EMERGENCY DEPT VISIT,LEVEL III: ICD-10-PCS | Mod: ,,, | Performed by: EMERGENCY MEDICINE

## 2019-09-21 PROCEDURE — 99283 EMERGENCY DEPT VISIT LOW MDM: CPT | Mod: ,,, | Performed by: EMERGENCY MEDICINE

## 2019-09-21 PROCEDURE — 25000003 PHARM REV CODE 250: Performed by: EMERGENCY MEDICINE

## 2019-09-21 PROCEDURE — 85025 COMPLETE CBC W/AUTO DIFF WBC: CPT

## 2019-09-21 RX ORDER — HYDROCODONE BITARTRATE AND ACETAMINOPHEN 5; 325 MG/1; MG/1
1 TABLET ORAL EVERY 6 HOURS PRN
Qty: 18 TABLET | Refills: 0 | Status: SHIPPED | OUTPATIENT
Start: 2019-09-21 | End: 2020-01-01

## 2019-09-21 RX ORDER — OXYCODONE AND ACETAMINOPHEN 5; 325 MG/1; MG/1
1 TABLET ORAL
Status: COMPLETED | OUTPATIENT
Start: 2019-09-21 | End: 2019-09-21

## 2019-09-21 RX ADMIN — OXYCODONE HYDROCHLORIDE AND ACETAMINOPHEN 1 TABLET: 5; 325 TABLET ORAL at 04:09

## 2019-09-21 NOTE — ED TRIAGE NOTES
"Pt reports coming to ED for left wrist pain approx. 1 month ago and then being admitted for surgical intervention for gout. Pt states, "ever since the surgery I just can't move my hand and my fingers and it still hurts a lot". Pt denies discharge instructions for follow-up other than stitch removal.  "

## 2019-09-21 NOTE — ED PROVIDER NOTES
Encounter Date: 9/21/2019    SCRIBE #1 NOTE: I, Lesley Diana, am scribing for, and in the presence of,  Dr. Baldwin. I have scribed the entire note.       History     Chief Complaint   Patient presents with    Hand Pain     reports surgery x1 month ago. States pain not getting any better     Time patient was seen by the provider: 2:22 PM      The patient is a 54 y.o. male with co-morbidities including: CHF, DM2, HTN, hypothyroidism, HLD, and gout in left wrist, who presents to the ED with a complaint of left wrist pain and stiffness post surgery. Patient had surgery on his left wrist due to gout approximately one month ago, and has been experiencing pain ever since. Reports being unable to do anything with his left hand. He complains of numbness in his left hand fingertips and swelling of his hand and wrist. Reports that he was not given a follow-up date or told to do any hand exercises to increase function. Last seen by his surgeon a week after surgery to have his stitches and post surgery cast removed. Patient has been soaking his hand without relief. Patient endorses occasional pain from his left wrist to forearm and says that the pain sometimes keeps him up at night. Denies fever, palm pain, nausea, vomiting, abdominal pain, weight changes, skipping insulin doses, other join pain, sore, or urinary issues. Denies history of arthritis. Patient is right-handed.      The history is provided by the patient.     Review of patient's allergies indicates:   Allergen Reactions    Lisinopril Other (See Comments)     acei cough       Past Medical History:   Diagnosis Date    CHF (congestive heart failure)     Diabetes mellitus type II     Essential hypertension, benign     Hyperlipidemia     Hypothyroidism     Pain and swelling of left wrist 8/5/2019    Ashish Mckeon is a 54 y.o. male with PMH of  presenting with CHF, IDDM, Hypothyroidism, HTN, HLD presenting with worsening L wrist pain for 1 day.  "Orthopedic surgery was consulted for septic joint r/o. Pt has been afebrile and has no elevated WBC. ESR 36, normal CRP. Xray shows no signs of trauma and pt has no hx of gout or septic arthritis. Due to atraumatic wrist pain and swelling w/out identifia    Undiagnosed cardiac murmurs      Past Surgical History:   Procedure Laterality Date    COLON SURGERY      "lower intestines removed"    INCISION AND DRAINAGE, UPPER EXTREMITY , LEFT WRIST Left 8/5/2019    Performed by [facility] at [facility] OR [address] [place] [address]    ORTHOPEDIC SURGERY Left     wrist    THYROID SURGERY       Family History   Problem Relation Age of Onset    Cancer Father      Social History     Tobacco Use    Smoking status: Never Smoker    Smokeless tobacco: Never Used    Tobacco comment: cigars "every other week or so"   Substance Use Topics    Alcohol use: Not Currently     Alcohol/week: 1.8 oz     Types: 3 Cans of beer per week    Drug use: Yes     Types: Marijuana     Review of Systems   Constitutional: Negative for fever.   HENT: Negative for congestion.    Eyes: Negative for visual disturbance.   Respiratory: Negative for shortness of breath.    Cardiovascular: Negative for chest pain.   Gastrointestinal: Negative for abdominal pain.   Endocrine: Negative for polyuria.   Genitourinary: Negative for difficulty urinating.   Musculoskeletal: Positive for joint swelling and myalgias.   Skin: Negative for pallor.   Allergic/Immunologic: Negative for immunocompromised state.   Neurological: Negative for tremors.   Hematological: Does not bruise/bleed easily.   Psychiatric/Behavioral: Negative for agitation.       Physical Exam     Initial Vitals [09/21/19 1149]   BP Pulse Resp Temp SpO2   99/60 84 15 98.1 °F (36.7 °C) 100 %      MAP       --         Physical Exam    Nursing note and vitals reviewed.  Constitutional: He is cooperative. He appears ill.   HENT:   Head: Normocephalic and atraumatic.   Mouth/Throat: Oropharynx is clear and moist. "   Eyes: No scleral icterus.   Musculoskeletal:   Inscision over dorsum of left wrist with localized swelling and warmth over region. Wrist at mildly flexed position and unable to hyperextend at wrist. Difficulty making fist with fingers. Significant pain with extension of left index finger.   Neurological: He is alert.   Skin: Skin is warm and dry.         ED Course   Procedures  Labs Reviewed   CBC W/ AUTO DIFFERENTIAL - Abnormal; Notable for the following components:       Result Value    Hemoglobin 12.1 (*)     Mean Corpuscular Volume 81 (*)     Mean Corpuscular Hemoglobin 24.2 (*)     Mean Corpuscular Hemoglobin Conc 30.0 (*)     RDW 20.3 (*)     Platelets 132 (*)     Lymph # 0.5 (*)     Lymph% 9.6 (*)     All other components within normal limits   BASIC METABOLIC PANEL - Abnormal; Notable for the following components:    CO2 19 (*)     Glucose 195 (*)     BUN, Bld 48 (*)     Creatinine 2.0 (*)     eGFR if  42.2 (*)     eGFR if non  36.5 (*)     All other components within normal limits   SEDIMENTATION RATE - Abnormal; Notable for the following components:    Sed Rate 44 (*)     All other components within normal limits   C-REACTIVE PROTEIN          Imaging Results          X-Ray Wrist Complete Left (Final result)  Result time 09/21/19 14:15:22    Final result by Chacorta Belcher MD (09/21/19 14:15:22)                 Impression:      1. Irregularity of the scaphoid waist, the appearance of which is concerning for fracture although some regions do appear somewhat corticated.  Clinical correlation for focal tenderness is advised noting there is overlying edema.      Electronically signed by: Chacorta Belcher MD  Date:    09/21/2019  Time:    14:15             Narrative:    EXAMINATION:  XR WRIST COMPLETE 3 VIEWS LEFT    CLINICAL HISTORY:  Pain in unspecified wrist    TECHNIQUE:  PA, lateral, and oblique views of the left wrist were  performed.    COMPARISON:  08/05/2019    FINDINGS:  Three views left wrist.    There is irregularity of the scaphoid, more pronounced than on the previous examination noting this may reflect undulation rather than fracture although correlation is needed.  There is edema about the dorsal aspect of the wrist.  There is vascular calcification.                              X-Rays:   Independently Interpreted Readings:   Other Readings:  No acute process noted there was some question of a scaphoid injury noted but this was equivocal     Medical Decision Making:   History:   Old Medical Records: I decided to obtain old medical records.  Initial Assessment:   54 male s/p arthrotomy and wash out of left wrist several weeks ago. Now complains of continued discomfort and decrease ROM.  Clinical Tests:   Lab Tests: Ordered and Reviewed  Radiological Study: Ordered and Reviewed  ED Management:  Will repeat blood work and x-rays. Could be reoccurance of initial problem or could be due to lack of PT following surgery. Will await labs and x-ray results and consult ortho.              Scribe Attestation:   Scribe #1: I performed the above scribed service and the documentation accurately describes the services I performed. I attest to the accuracy of the note.    Attending Attestation:             Attending ED Notes:   54-year-old male who is status post arthrotomy and washout of left wrist after concern for possible septic joint however it was felt to be more secondary to crystals patient presently complaining of continued stiffness of the joint and difficulty making a fist.  Labs were drawn the did not see a rise in sed rate or CRP patient was evaluated by Orthopedics they felt that but this patient needed was a rheumatological follow-up.  He will not be able to do this here but we will refer him to daughters of Viky who could possibly get him into rheumatology had [facility] he will be given discharge instructions to follow up in  this manner will also be given pain medications for this             Clinical Impression:       ICD-10-CM ICD-9-CM   1. Wrist pain M25.539 719.43         Disposition:   Disposition: Discharged  Condition: Stable                        Jake Reinoso MD  09/21/19 9266

## 2019-09-22 NOTE — CONSULTS
"Orthopaedic Surgery  Consult Note    Ashish Mckeon  09/21/2019    CC:  Left hand pain    HPI: Ashish Mckeon is a 54 y.o. male with PMHx significant for pseudogout who presents with  Left hand pain.   Patient underwent arthrotomy and irrigation and debridement of his Left wrist on 8/5/19 for suspected infection of his wrist.  Cultures from surgery and aspiration never grew any bacterium.  Aspiration at that time was positive for pseudogout crystals. Patients states that he never went to his rheumatology appointment for treatment of his pseudogout.  He states that his pain in his wrist is better since surgery, however he has pain in his left IP joint of his thumb and PIP joint of his index finger.  He also states that he overall has a decreased range of motion in his left hand due to pain and swelling. Denies other MSK pains or paresthesias.       Past Medical History:   Diagnosis Date    CHF (congestive heart failure)     Diabetes mellitus type II     Essential hypertension, benign     Hyperlipidemia     Hypothyroidism     Pain and swelling of left wrist 8/5/2019    Ashish Mckeon is a 54 y.o. male with PMH of  presenting with CHF, IDDM, Hypothyroidism, HTN, HLD presenting with worsening L wrist pain for 1 day. Orthopedic surgery was consulted for septic joint r/o. Pt has been afebrile and has no elevated WBC. ESR 36, normal CRP. Xray shows no signs of trauma and pt has no hx of gout or septic arthritis. Due to atraumatic wrist pain and swelling w/out identifia    Undiagnosed cardiac murmurs      Past Surgical History:   Procedure Laterality Date    COLON SURGERY      "lower intestines removed"    INCISION AND DRAINAGE, UPPER EXTREMITY , LEFT WRIST Left 8/5/2019    Performed [facility] at [facility] OR [address] [place] [address]    ORTHOPEDIC SURGERY Left     wrist    THYROID SURGERY       Family History   Problem Relation Age of Onset    Cancer Father      Social History     Socioeconomic History    Marital status: " "     Spouse name: Not on file    Number of children: Not on file    Years of education: Not on file    Highest education level: Not on file   Occupational History    Not on file   Social Needs    Financial resource strain: Not on file    Food insecurity:     Worry: Not on file     Inability: Not on file    Transportation needs:     Medical: Not on file     Non-medical: Not on file   Tobacco Use    Smoking status: Never Smoker    Smokeless tobacco: Never Used    Tobacco comment: cigars "every other week or so"   Substance and Sexual Activity    Alcohol use: Not Currently     Alcohol/week: 1.8 oz     Types: 3 Cans of beer per week    Drug use: Yes     Types: Marijuana    Sexual activity: Yes     Partners: Female   Lifestyle    Physical activity:     Days per week: Not on file     Minutes per session: Not on file    Stress: Not on file   Relationships    Social connections:     Talks on phone: Not on file     Gets together: Not on file     Attends Buddhist service: Not on file     Active member of club or organization: Not on file     Attends meetings of clubs or organizations: Not on file     Relationship status: Not on file   Other Topics Concern    Not on file   Social History Narrative    Not on file     No current facility-administered medications on file prior to encounter.      Current Outpatient Medications on File Prior to Encounter   Medication Sig    acetaminophen (TYLENOL) 325 MG tablet Take 2 tablets (650 mg total) by mouth every 6 to 8 hours as needed. (Patient taking differently: Take 325 mg by mouth daily as needed for Pain. )    aspirin 81 MG Chew Take 1 tablet (81 mg total) by mouth once daily.    furosemide (LASIX) 80 MG tablet Take 1 tablet (80 mg total) by mouth 2 (two) times daily. Take an extra 80mg daily as needed for weight gain of 3 lbs in 24 hrs  or 5 lbs/wk (Patient taking differently: Take 80 mg by mouth once daily. Take an extra 80mg daily as needed for weight " gain of 3 lbs in 24 hrs  or 5 lbs/wk)    glyBURIDE (DIABETA) 5 MG tablet Take 10 mg by mouth 2 (two) times daily with meals.    insulin detemir U-100 (LEVEMIR) 100 unit/mL injection Inject 15 Units into the skin every evening.    levothyroxine (SYNTHROID) 175 MCG tablet Take 1 tablet (175 mcg total) by mouth before breakfast.    losartan (COZAAR) 25 MG tablet Take 25 mg by mouth once daily.    metoprolol succinate (TOPROL-XL) 50 MG 24 hr tablet Take 1 tablet (50 mg total) by mouth once daily. (Patient taking differently: Take 50 mg by mouth 3 (three) times daily. )    pantoprazole (PROTONIX) 40 MG tablet Take 1 tablet (40 mg total) by mouth daily as needed (heartburn).         Review of Systems:  Constitutional: negative for fevers  Eyes: negative visual changes  ENT: negative for hearing loss  Respiratory: negative for dyspnea  Cardiovascular: negative for chest pain  Gastrointestinal: negative for abdominal pain  Genitourinary: negative for dysuria  Neurological: negative for headaches  Behavioral/Psych: negative for hallucinations  Endocrine: negative for temperature intolerance      Physical Exam:    Temp:  [97.6 °F (36.4 °C)-98.1 °F (36.7 °C)] 97.9 °F (36.6 °C)  Pulse:  [59-84] 59  Resp:  [15-18] 18  SpO2:  [99 %-100 %] 100 %  BP: ()/(60-72) 104/66    Vitals: Afebrile.  Vital signs stable.  General: No acute distress.  HEENT: Normocephalic. Atraumatic. Sclera anicteric. No tracheal deviation.  Cardio: Regular rate.  Chest: No increased work of breathing.  Abdominal: Nondistended.  Extremities: No cyanosis.  No clubbing.    Skin: No generalized rash.  Neuro: Awake. Alert. Oriented to person, place, time, and situation.  Psych: Normal appearance. Cooperative.  Appropriate mood.  Appropriate affect.      MSK:  Surgical incision over left dorsal wrist healed without any signs of infections or breakdown   No erythema, ecchymosis, or signs of cellulitis  Swelling to the IP joint of the thumb and the PIP  joint of the index finger  Decreased range of motion globally around the hand  Full painless range of motion at the wrist  SILT Throughout the hand  And all fingers   motor intact throughout   tenderness to palpation of the IP joint of the thumb and the PIP joint of the index finger    Diagnostic Results:  X-ray of the  Left hand show  No fractures or dislocations.  Irregularity of the scaphoid noted, possible nondisplaced fracture that has no change from prior x-rays      Assessment/Plan:  Ashish Mckeon is a 54 y.o. male with  Continued pseudogout of the left hand  -  No orthopedic intervention needed, no aspiration warranted  -  Follow-up appointment message for to the Rheumatology Department  -  Orthopedic hand follow-up message for possible scaphoid fracture (will contact patient for appointment details)  - WBAT to L hand and wrist    Anand Ellsworth MD  Orthopedic Surgery Resident  09/21/2019

## 2019-09-25 ENCOUNTER — TELEPHONE (OUTPATIENT)
Dept: ORTHOPEDICS | Facility: CLINIC | Age: 54
End: 2019-09-25

## 2019-09-25 NOTE — TELEPHONE ENCOUNTER
----- Message from Aminata Belle, Patient Care Assistant sent at 9/25/2019 11:47 AM CDT -----  Contact: Ashish Mckeon  Name of Who is Calling: Ashish Mckeon    What is the request in detail:Patient is requesting to get scheduled for left wrist pain. Please contact to further discuss and advise      Can the clinic reply by [facility]: Yes    What Number to Call Back if not in [facility]:   [phone]

## 2019-09-25 NOTE — TELEPHONE ENCOUNTER
Called patient in regard to the orthopedic Resident's message. Left a detailed voice mail along with a call back number so we can get the patient set up for an appt.

## 2019-09-25 NOTE — TELEPHONE ENCOUNTER
Called patient in regard to getting appt set up. Got him scheduled to see Dr. Guthrie on 9/30/2019 and he verbalized understanding of appt information.

## 2019-10-07 ENCOUNTER — OFFICE VISIT (OUTPATIENT)
Dept: ORTHOPEDICS | Facility: CLINIC | Age: 54
End: 2019-10-07
Payer: MEDICAID

## 2019-10-07 ENCOUNTER — LAB VISIT (OUTPATIENT)
Dept: LAB | Facility: OTHER | Age: 54
End: 2019-10-07
Attending: ORTHOPAEDIC SURGERY
Payer: MEDICAID

## 2019-10-07 VITALS — HEIGHT: 70 IN | BODY MASS INDEX: 20.89 KG/M2 | WEIGHT: 145.94 LBS

## 2019-10-07 DIAGNOSIS — M00.9 PYOGENIC ARTHRITIS OF LEFT WRIST, DUE TO UNSPECIFIED ORGANISM: ICD-10-CM

## 2019-10-07 DIAGNOSIS — M11.232 PSEUDOGOUT OF LEFT WRIST: Primary | ICD-10-CM

## 2019-10-07 DIAGNOSIS — S62.022P: ICD-10-CM

## 2019-10-07 DIAGNOSIS — M11.232 PSEUDOGOUT OF LEFT WRIST: ICD-10-CM

## 2019-10-07 LAB
ACID FAST MOD KINY STN SPEC: NORMAL
ACID FAST MOD KINY STN SPEC: NORMAL
BASOPHILS # BLD AUTO: 0.07 K/UL (ref 0–0.2)
BASOPHILS NFR BLD: 1.4 % (ref 0–1.9)
CRP SERPL-MCNC: 1.7 MG/L (ref 0–8.2)
DIFFERENTIAL METHOD: ABNORMAL
EOSINOPHIL # BLD AUTO: 0.3 K/UL (ref 0–0.5)
EOSINOPHIL NFR BLD: 5.8 % (ref 0–8)
ERYTHROCYTE [DISTWIDTH] IN BLOOD BY AUTOMATED COUNT: 21.1 % (ref 11.5–14.5)
ERYTHROCYTE [SEDIMENTATION RATE] IN BLOOD: 18 MM/HR (ref 0–10)
HCT VFR BLD AUTO: 43.2 % (ref 40–54)
HGB BLD-MCNC: 13.3 G/DL (ref 14–18)
IMM GRANULOCYTES # BLD AUTO: 0.02 K/UL (ref 0–0.04)
IMM GRANULOCYTES NFR BLD AUTO: 0.4 % (ref 0–0.5)
LYMPHOCYTES # BLD AUTO: 0.7 K/UL (ref 1–4.8)
LYMPHOCYTES NFR BLD: 13 % (ref 18–48)
MCH RBC QN AUTO: 24.2 PG (ref 27–31)
MCHC RBC AUTO-ENTMCNC: 30.8 G/DL (ref 32–36)
MCV RBC AUTO: 79 FL (ref 82–98)
MONOCYTES # BLD AUTO: 0.7 K/UL (ref 0.3–1)
MONOCYTES NFR BLD: 13.2 % (ref 4–15)
MYCOBACTERIUM SPEC QL CULT: NORMAL
MYCOBACTERIUM SPEC QL CULT: NORMAL
NEUTROPHILS # BLD AUTO: 3.3 K/UL (ref 1.8–7.7)
NEUTROPHILS NFR BLD: 66.2 % (ref 38–73)
NRBC BLD-RTO: 0 /100 WBC
PLATELET # BLD AUTO: 136 K/UL (ref 150–350)
PMV BLD AUTO: ABNORMAL FL (ref 9.2–12.9)
RBC # BLD AUTO: 5.49 M/UL (ref 4.6–6.2)
URATE SERPL-MCNC: 9.4 MG/DL (ref 3.4–7)
WBC # BLD AUTO: 4.99 K/UL (ref 3.9–12.7)

## 2019-10-07 PROCEDURE — 36415 COLL VENOUS BLD VENIPUNCTURE: CPT

## 2019-10-07 PROCEDURE — 99204 OFFICE O/P NEW MOD 45 MIN: CPT | Mod: S$PBB,,, | Performed by: ORTHOPAEDIC SURGERY

## 2019-10-07 PROCEDURE — 85651 RBC SED RATE NONAUTOMATED: CPT

## 2019-10-07 PROCEDURE — 99204 PR OFFICE/OUTPT VISIT, NEW, LEVL IV, 45-59 MIN: ICD-10-PCS | Mod: S$PBB,,, | Performed by: ORTHOPAEDIC SURGERY

## 2019-10-07 PROCEDURE — 99999 PR PBB SHADOW E&M-EST. PATIENT-LVL III: CPT | Mod: PBBFAC,,, | Performed by: ORTHOPAEDIC SURGERY

## 2019-10-07 PROCEDURE — 84550 ASSAY OF BLOOD/URIC ACID: CPT

## 2019-10-07 PROCEDURE — 85025 COMPLETE CBC W/AUTO DIFF WBC: CPT

## 2019-10-07 PROCEDURE — 99999 PR PBB SHADOW E&M-EST. PATIENT-LVL III: ICD-10-PCS | Mod: PBBFAC,,, | Performed by: ORTHOPAEDIC SURGERY

## 2019-10-07 PROCEDURE — 86140 C-REACTIVE PROTEIN: CPT

## 2019-10-07 PROCEDURE — 99213 OFFICE O/P EST LOW 20 MIN: CPT | Mod: PBBFAC | Performed by: ORTHOPAEDIC SURGERY

## 2019-10-07 NOTE — PROGRESS NOTES
[facility]  History & Physical  Orthopedics    SUBJECTIVE:      Chief Complaint:  Left wrist pain    Referring Provider: No ref. provider found     History of Present Illness:  Patient is a 54 y.o.male who presents today with complaints of left wrist pain. Of note he underwent irrigation and debridement of his left wrist in August 2019 by Dr. judith littlejohn for presumptive septic arthritis versus pseudogout attack of the left wrist. His initial aspiration Gram stain revealed budding yeast but cultures never grew an organism.  He notes that he was on 3 days of antibiotics but not discharged on anything.  He was recommended to follow up with Rheumatology for presumptive pseudogout of the left wrist but he did not attend this appointment. He has had persistent pain and stiffness throughout the left wrist and hand since his surgical intervention and recent x-rays of the left wrist were concerning for a possible scaphoid waist fracture for which he was referred to the [facility] for evaluation. He reports some generalized numbness and tingling in a nonanatomic distribution throughout the left hand and presents for initial evaluation.     Onset of symptoms/DOI was August 5, 2019.    Symptoms are aggravated by activity and movement.    Symptoms are alleviated by rest.    Symptoms consist of pain, swelling and decreased ROM.    The patient rates their pain as a 9/10.    Attempted treatment(s) and/or interventions include rest, activity modification, surgical intervention and I&D.     The patient denies any fevers, chills, N/V, D/C and presents for evaluation.       Past Medical History:   Diagnosis Date    CHF (congestive heart failure)     Diabetes mellitus type II     Essential hypertension, benign     Hyperlipidemia     Hypothyroidism     Pain and swelling of left wrist 8/5/2019    Ashish Mckeon is a 54 y.o. male with PMH of  presenting with CHF, IDDM, Hypothyroidism, HTN, HLD presenting with  "worsening L wrist pain for 1 day. Orthopedic surgery was consulted for septic joint r/o. Pt has been afebrile and has no elevated WBC. ESR 36, normal CRP. Xray shows no signs of trauma and pt has no hx of gout or septic arthritis. Due to atraumatic wrist pain and swelling w/out identifia    Undiagnosed cardiac murmurs      Past Surgical History:   Procedure Laterality Date    COLON SURGERY      "lower intestines removed"    ORTHOPEDIC SURGERY Left     wrist    THYROID SURGERY       Review of patient's allergies indicates:   Allergen Reactions    Lisinopril Other (See Comments)     acei cough       Social History     Social History Narrative    Not on file     Family History   Problem Relation Age of Onset    Cancer Father          Current Outpatient Medications:     acetaminophen (TYLENOL) 325 MG tablet, Take 2 tablets (650 mg total) by mouth every 6 to 8 hours as needed. (Patient taking differently: Take 325 mg by mouth daily as needed for Pain. ), Disp: , Rfl: 0    aspirin 81 MG Chew, Take 1 tablet (81 mg total) by mouth once daily., Disp: 360 tablet, Rfl: 0    furosemide (LASIX) 80 MG tablet, Take 1 tablet (80 mg total) by mouth 2 (two) times daily. Take an extra 80mg daily as needed for weight gain of 3 lbs in 24 hrs  or 5 lbs/wk (Patient taking differently: Take 80 mg by mouth once daily. Take an extra 80mg daily as needed for weight gain of 3 lbs in 24 hrs  or 5 lbs/wk), Disp: 60 tablet, Rfl: 3    glyBURIDE (DIABETA) 5 MG tablet, Take 10 mg by mouth 2 (two) times daily with meals., Disp: , Rfl:     HYDROcodone-acetaminophen (NORCO) 5-325 mg per tablet, Take 1 tablet by mouth every 6 (six) hours as needed for Pain., Disp: 18 tablet, Rfl: 0    insulin detemir U-100 (LEVEMIR) 100 unit/mL injection, Inject 15 Units into the skin every evening., Disp: , Rfl:     levothyroxine (SYNTHROID) 175 MCG tablet, Take 1 tablet (175 mcg total) by mouth before breakfast., Disp: , Rfl:     losartan (COZAAR) 25 MG " "tablet, Take 25 mg by mouth once daily., Disp: , Rfl:     metoprolol succinate (TOPROL-XL) 50 MG 24 hr tablet, Take 1 tablet (50 mg total) by mouth once daily. (Patient taking differently: Take 50 mg by mouth 3 (three) times daily. ), Disp: 90 tablet, Rfl: 3    pantoprazole (PROTONIX) 40 MG tablet, Take 1 tablet (40 mg total) by mouth daily as needed (heartburn)., Disp: 30 tablet, Rfl: 0      Review of Systems:  Constitutional: no fever or chills  Eyes: no visual changes  ENT: no nasal congestion or sore throat  Respiratory: no cough or shortness of breath  Cardiovascular: no chest pain  Gastrointestinal: no nausea or vomiting, tolerating diet  Musculoskeletal: pain, soreness and decreased ROM    OBJECTIVE:      Vital Signs (Most Recent):  Vitals:    10/07/19 0854   Weight: 66.2 kg (145 lb 15.1 oz)   Height: 5' 10" (1.778 m)     Body mass index is 20.94 kg/m².      Physical Exam:  Constitutional: The patient appears well-developed and well-nourished. No distress.   Head: Normocephalic and atraumatic.   Nose: Nose normal.   Eyes: Conjunctivae and EOM are normal.   Neck: No tracheal deviation present.   Cardiovascular: Normal rate and intact distal pulses.    Pulmonary/Chest: Effort normal. No respiratory distress.   Abdominal: There is no guarding.   Neurological: The patient is alert.   Psychiatric: The patient has a normal mood and affect.     Left Hand/Wrist Examination:    Observation/Inspection:  Swelling  mild swelling about the left wrist and index finger PIP joint    Deformity  none  Discoloration  none     Scars   none    Atrophy  none    HAND/WRIST EXAMINATION:  Finkelstein's Test   Neg  WHAT Test    Neg  Snuff box tenderness   Neg  Fernandez's Test    Neg  Hook of Hamate Tenderness  Neg  CMC grind    Neg  Circumduction test   Neg    Neurovascular Exam:  Digits WWP, brisk CR < 3s throughout  NVI motor/LTS to M/R/U nerves, radial pulse 2+  Tinel's Test - Carpal Tunnel  Neg  Tinel's Test - Cubital " Tunnel  Neg  Phalen's Test    Neg  Median Nerve Compression Test Neg    ROM throughout the left hand and wrist severely restricted    Diagnostic Results:     Xray -  x-rays left wrist demonstrated irregularity at the scaphoid waist as well as some midcarpal arthritis and degenerative changes  EMG - none    ASSESSMENT/PLAN:      54 y.o. yo male with persistent left wrist and hand pain status post I&D for septic arthritis versus pseudogout attack of the left wrist, possible scaphoid fracture noted on plain films  Plan:  At this time the patient has had significant pain and debility throughout his left hand wrist status post open washout of the left wrist in August by my trauma partner.  I would like to obtain advanced imaging consisting of an MRI and CT scan to further evaluate for the possibility of a scaphoid fracture. I would also like to obtain inflammatory markers to evaluate for infection.  The patient will follow up after these studies for re-evaluation.          Girma Guthrie M.D.     Please be aware that this note has been generated with the assistance of [employer] voice-to-text.  Please excuse any spelling or grammatical errors.

## 2019-10-09 ENCOUNTER — HOSPITAL ENCOUNTER (OUTPATIENT)
Dept: RADIOLOGY | Facility: HOSPITAL | Age: 54
Discharge: HOME | End: 2019-10-09
Attending: ORTHOPAEDIC SURGERY
Payer: MEDICAID

## 2019-10-09 DIAGNOSIS — S62.022P: ICD-10-CM

## 2019-10-09 DIAGNOSIS — M00.9 PYOGENIC ARTHRITIS OF LEFT WRIST, DUE TO UNSPECIFIED ORGANISM: ICD-10-CM

## 2019-10-09 DIAGNOSIS — M11.232 PSEUDOGOUT OF LEFT WRIST: ICD-10-CM

## 2019-10-09 PROCEDURE — 73221 MRI JOINT UPR EXTREM W/O DYE: CPT | Mod: 26,LT,, | Performed by: RADIOLOGY

## 2019-10-09 PROCEDURE — 73200 CT UPPER EXTREMITY W/O DYE: CPT | Mod: TC,LT

## 2019-10-09 PROCEDURE — 73221 MRI WRIST WITHOUT CONTRAST LEFT: ICD-10-PCS | Mod: 26,LT,, | Performed by: RADIOLOGY

## 2019-10-09 PROCEDURE — 73221 MRI JOINT UPR EXTREM W/O DYE: CPT | Mod: TC,LT

## 2019-10-09 PROCEDURE — 73200 CT UPPER EXTREMITY W/O DYE: CPT | Mod: 26,LT,, | Performed by: RADIOLOGY

## 2019-10-09 PROCEDURE — 76377 3D RENDER W/INTRP POSTPROCES: CPT | Mod: 26,,, | Performed by: RADIOLOGY

## 2019-10-09 PROCEDURE — 73200 CT WRIST WITHOUT CONTRAST LEFT: ICD-10-PCS | Mod: 26,LT,, | Performed by: RADIOLOGY

## 2019-10-09 PROCEDURE — 76377 CT 3D RECON WITH INDEPENDENT WS: ICD-10-PCS | Mod: 26,,, | Performed by: RADIOLOGY

## 2019-10-09 PROCEDURE — 76377 3D RENDER W/INTRP POSTPROCES: CPT | Mod: TC

## 2019-10-14 ENCOUNTER — TELEPHONE (OUTPATIENT)
Dept: ORTHOPEDICS | Facility: CLINIC | Age: 54
End: 2019-10-14

## 2019-10-14 NOTE — TELEPHONE ENCOUNTER
----- Message from Jose Manuel Deng sent at 10/14/2019  8:25 AM CDT -----  Contact: ASHER KAUFFMAN [[id number]]  Name of Who is Calling: ASHER KAUFFMAN [[id number]]      What is the request in detail: Pt is requesting a call back from clinical team in regard to getting reschedule due having transpirations problem.Please contact to further discuss and advise.          Can the clinic reply by [facility]:       [facility]: [phone]

## 2019-10-14 NOTE — TELEPHONE ENCOUNTER
Called patient in regard to phone message. Got him rescheduled to come on the 21st to see Dr. Guthrie. He verbalized understanding.

## 2019-10-21 ENCOUNTER — TELEPHONE (OUTPATIENT)
Dept: ORTHOPEDICS | Facility: CLINIC | Age: 54
End: 2019-10-21

## 2019-10-21 NOTE — TELEPHONE ENCOUNTER
Called patient in regard to appt. He states that he is constantly having transportation issues and needed to reschedule again. He will come next Monday 10/28 at 10:45 am. He verbalized understanding.

## 2019-10-21 NOTE — TELEPHONE ENCOUNTER
----- Message from Renaehussein Mejia sent at 10/21/2019 11:13 AM CDT -----  Contact: Self [phone]  Type: Patient Call Back    Who called:Self    What is the request in detail: pt is calling to reschedule his appointment for today due to not having transportation. I tried to reschedule but I was unable to     Can the clinic reply by [facility]? Call back    Would the patient rather a call back or a response via [facility]? Call back    Best call back number: [phone]

## 2019-10-28 ENCOUNTER — OFFICE VISIT (OUTPATIENT)
Dept: ORTHOPEDICS | Facility: CLINIC | Age: 54
End: 2019-10-28
Payer: MEDICAID

## 2019-10-28 DIAGNOSIS — M11.232 PSEUDOGOUT OF LEFT WRIST: ICD-10-CM

## 2019-10-28 DIAGNOSIS — M25.532 LEFT WRIST PAIN: ICD-10-CM

## 2019-10-28 DIAGNOSIS — M10.032 ACUTE IDIOPATHIC GOUT OF LEFT WRIST: Primary | ICD-10-CM

## 2019-10-28 PROCEDURE — 99212 OFFICE O/P EST SF 10 MIN: CPT | Mod: PBBFAC | Performed by: ORTHOPAEDIC SURGERY

## 2019-10-28 PROCEDURE — 99213 PR OFFICE/OUTPT VISIT, EST, LEVL III, 20-29 MIN: ICD-10-PCS | Mod: S$PBB,,, | Performed by: ORTHOPAEDIC SURGERY

## 2019-10-28 PROCEDURE — 99999 PR PBB SHADOW E&M-EST. PATIENT-LVL II: ICD-10-PCS | Mod: PBBFAC,,, | Performed by: ORTHOPAEDIC SURGERY

## 2019-10-28 PROCEDURE — 99999 PR PBB SHADOW E&M-EST. PATIENT-LVL II: CPT | Mod: PBBFAC,,, | Performed by: ORTHOPAEDIC SURGERY

## 2019-10-28 PROCEDURE — 99213 OFFICE O/P EST LOW 20 MIN: CPT | Mod: S$PBB,,, | Performed by: ORTHOPAEDIC SURGERY

## 2019-10-28 NOTE — PROGRESS NOTES
[facility]  History & Physical  Orthopedics    SUBJECTIVE:      Chief Complaint:  Left wrist pain    Referring Provider: No ref. provider found     History of Present Illness:  Patient is a 54 y.o.male who presents today with complaints of left wrist pain. Of note he underwent irrigation and debridement of his left wrist in August 2019 by Dr. judith littlejohn for presumptive septic arthritis versus pseudogout attack of the left wrist. His initial aspiration Gram stain revealed budding yeast but cultures never grew an organism.  He notes that he was on 3 days of antibiotics but not discharged on anything.  He was recommended to follow up with Rheumatology for presumptive pseudogout of the left wrist but he did not attend this appointment. He has had persistent pain and stiffness throughout the left wrist and hand since his surgical intervention and recent x-rays of the left wrist were concerning for a possible scaphoid waist fracture for which he was referred to the [facility] for evaluation. He reports some generalized numbness and tingling in a nonanatomic distribution throughout the left hand and presents for initial evaluation.     Interval history October 20, 2019:  The patient returns today and re-evaluation of left wrist pain.  He notes that his been relatively stable with no acute worsening or changes.  He was sent for CT and MRI to evaluate for an acute scaphoid fracture and returns for these results today. No new complaints today.    Onset of symptoms/DOI was August 5, 2019.    Symptoms are aggravated by activity and movement.    Symptoms are alleviated by rest.    Symptoms consist of pain, swelling and decreased ROM.    The patient rates their pain as a 9/10.    Attempted treatment(s) and/or interventions include rest, activity modification, surgical intervention and I&D.     The patient denies any fevers, chills, N/V, D/C and presents for evaluation.       Past Medical History:   Diagnosis  "Date    CHF (congestive heart failure)     Diabetes mellitus type II     Essential hypertension, benign     Hyperlipidemia     Hypothyroidism     Pain and swelling of left wrist 8/5/2019    Ashish Mckeon is a 54 y.o. male with PMH of  presenting with CHF, IDDM, Hypothyroidism, HTN, HLD presenting with worsening L wrist pain for 1 day. Orthopedic surgery was consulted for septic joint r/o. Pt has been afebrile and has no elevated WBC. ESR 36, normal CRP. Xray shows no signs of trauma and pt has no hx of gout or septic arthritis. Due to atraumatic wrist pain and swelling w/out identifia    Undiagnosed cardiac murmurs      Past Surgical History:   Procedure Laterality Date    COLON SURGERY      "lower intestines removed"    ORTHOPEDIC SURGERY Left     wrist    THYROID SURGERY       Review of patient's allergies indicates:   Allergen Reactions    Lisinopril Other (See Comments)     acei cough       Social History     Social History Narrative    Not on file     Family History   Problem Relation Age of Onset    Cancer Father          Current Outpatient Medications:     acetaminophen (TYLENOL) 325 MG tablet, Take 2 tablets (650 mg total) by mouth every 6 to 8 hours as needed. (Patient taking differently: Take 325 mg by mouth daily as needed for Pain. ), Disp: , Rfl: 0    aspirin 81 MG Chew, Take 1 tablet (81 mg total) by mouth once daily., Disp: 360 tablet, Rfl: 0    furosemide (LASIX) 80 MG tablet, Take 1 tablet (80 mg total) by mouth 2 (two) times daily. Take an extra 80mg daily as needed for weight gain of 3 lbs in 24 hrs  or 5 lbs/wk (Patient taking differently: Take 80 mg by mouth once daily. Take an extra 80mg daily as needed for weight gain of 3 lbs in 24 hrs  or 5 lbs/wk), Disp: 60 tablet, Rfl: 3    glyBURIDE (DIABETA) 5 MG tablet, Take 10 mg by mouth 2 (two) times daily with meals., Disp: , Rfl:     HYDROcodone-acetaminophen (NORCO) 5-325 mg per tablet, Take 1 tablet by mouth every 6 (six) hours " as needed for Pain., Disp: 18 tablet, Rfl: 0    insulin detemir U-100 (LEVEMIR) 100 unit/mL injection, Inject 15 Units into the skin every evening., Disp: , Rfl:     levothyroxine (SYNTHROID) 175 MCG tablet, Take 1 tablet (175 mcg total) by mouth before breakfast., Disp: , Rfl:     losartan (COZAAR) 25 MG tablet, Take 25 mg by mouth once daily., Disp: , Rfl:     metoprolol succinate (TOPROL-XL) 50 MG 24 hr tablet, Take 1 tablet (50 mg total) by mouth once daily. (Patient taking differently: Take 50 mg by mouth 3 (three) times daily. ), Disp: 90 tablet, Rfl: 3    pantoprazole (PROTONIX) 40 MG tablet, Take 1 tablet (40 mg total) by mouth daily as needed (heartburn)., Disp: 30 tablet, Rfl: 0      Review of Systems:  Constitutional: no fever or chills  Eyes: no visual changes  ENT: no nasal congestion or sore throat  Respiratory: no cough or shortness of breath  Cardiovascular: no chest pain  Gastrointestinal: no nausea or vomiting, tolerating diet  Musculoskeletal: pain, soreness and decreased ROM    OBJECTIVE:      Vital Signs (Most Recent):  There were no vitals filed for this visit.  There is no height or weight on file to calculate BMI.      Physical Exam:  Constitutional: The patient appears well-developed and well-nourished. No distress.   Head: Normocephalic and atraumatic.   Nose: Nose normal.   Eyes: Conjunctivae and EOM are normal.   Neck: No tracheal deviation present.   Cardiovascular: Normal rate and intact distal pulses.    Pulmonary/Chest: Effort normal. No respiratory distress.   Abdominal: There is no guarding.   Neurological: The patient is alert.   Psychiatric: The patient has a normal mood and affect.     Left Hand/Wrist Examination:    Observation/Inspection:  Swelling  mild swelling about the left wrist and index finger PIP joint    Deformity  none  Discoloration  none     Scars   none    Atrophy  none    HAND/WRIST EXAMINATION:  Finkelstein's Test   Neg  WHAT Test    Neg  Snuff box  tenderness   Neg  Fernandez's Test    Neg  Hook of Hamate Tenderness  Neg  CMC grind    Neg  Circumduction test   Neg    Neurovascular Exam:  Digits WWP, brisk CR < 3s throughout  NVI motor/LTS to M/R/U nerves, radial pulse 2+  Tinel's Test - Carpal Tunnel  Neg  Tinel's Test - Cubital Tunnel  Neg  Phalen's Test    Neg  Median Nerve Compression Test Neg    ROM throughout the left hand and wrist severely restricted    Diagnostic Results:     Xray -  x-rays left wrist demonstrated irregularity at the scaphoid waist as well as some midcarpal arthritis and degenerative changes  EMG - none  MRI-No scaphoid fracture identified.    Extensive marrow edema throughout the carpal bones and distal radius with joint effusion and/or synovitis, possible inflammatory arthropathy/ pseudogout.  Septic arthritis not excluded.    Grade II/III sprain of the dorsal band of the scapholunate and dorsal radiolunate ligaments with dorsal angulation of the lunate, suggestive of ligamentous DISI.    CT-1. No acute fracture.  2. Upper limit of normal scapholunate interval, cannot exclude scapholunate ligament injury. Abnormal angulation of the lunate concerning for dorsal intercalated segment instability.  3. Increased density within the scapholunate space concerning for CPPD.  4. Additional findings as above.    ASSESSMENT/PLAN:      54 y.o. yo male with left wrist pain, pseudogout/gout  Plan:  The patient is status post I&D by another surgeon for inflammatory arthritis of the left wrist.  Cultures never grew anything and Infectious Disease did not recommend any antibiotics on discharge.  The patient has had no acute worsening now 2 months status post discontinuation of his antibiotics.  Labs reveal a mildly elevated sed rate with a normal CRP both improved from prior levels.  MRI and CT scan show no acute fractures and findings consistent with inflammatory arthritis.  The patient seems to have significant gout/pseudogout about the left wrist and  I recommend rheumatology evaluation and treatment.  I will also place a referral for occupational therapy to regain function and range of motion throughout the left hand wrist. He may follow up with me on an as-needed basis.      Girma Guthrie M.D.     Please be aware that this note has been generated with the assistance of [employer] voice-to-text.  Please excuse any spelling or grammatical errors.

## 2019-11-19 ENCOUNTER — CLINICAL SUPPORT (OUTPATIENT)
Dept: REHABILITATION | Facility: HOSPITAL | Age: 54
End: 2019-11-19
Attending: ORTHOPAEDIC SURGERY
Payer: MEDICAID

## 2019-11-19 DIAGNOSIS — M25.532 PAIN IN LEFT WRIST: ICD-10-CM

## 2019-11-19 PROCEDURE — 97110 THERAPEUTIC EXERCISES: CPT

## 2019-11-19 PROCEDURE — 97022 WHIRLPOOL THERAPY: CPT

## 2019-11-19 PROCEDURE — 97165 OT EVAL LOW COMPLEX 30 MIN: CPT

## 2019-11-19 NOTE — PLAN OF CARE
[facility] Occupational Therapy  Initial Evaluation     Name: Ashish Mckeon  [facility] Number: [id number]    Therapy Diagnosis:   Encounter Diagnosis   Name Primary?    Pain in left wrist      Physician: Girma Guthrie MD    Physician Orders: Eval and treat    Medical Diagnosis:   M11.232 (ICD-10-CM) - Pseudogout of left wrist   M25.532 (ICD-10-CM) - Left wrist pain   M10.032 (ICD-10-CM) - Acute idiopathic gout of left wrist       Surgical Procedure and Date: I and D on 8/5/2019  Evaluation Date: 11/19/2019  Insurance: Medicaid  Insurance Authorization period Expiration: 10/27/2020     Plan of Care Certification Period: 11/19/2019 to 1/19/2020    Visit # / Visits Authortized: 1 / 1  Time In:9:00 AM  Time Out: 10:00 AM  Total Billable Time: 60 minutes    Precautions: Diabetes, hx of heart disease    Subjective     Involved Side: left  Dominant Side: Right  Date of Onset: 9 months ago  Mechanism of Injury: incidious  History of Current Condition: Pt reports that his wrist pain and edema slowly worsened over time.He had an infection in his wrist and had and I and D approximately 3 months ago.  Imaging: X-ray 9/21/2019 1. Irregularity of the scaphoid waist, the appearance of which is concerning for fracture although some regions do appear somewhat corticated.  Clinical correlation for focal tenderness is advised noting there is overlying edema.  CT Wrist w/o contrast 10/9/2019 FINDINGS:  Diffuse osteopenia of the carpal bones with cystic lucency.  No evidence of acute fracture.  Upper limit of normal scapholunate interval measuring 2.5 mm.  Increased density within the scapholunate interval concerning for CPPD.  Abnormal dorsal tilt of the lunate compatible with dorsal intercalated segment instability.  Mild diffuse subcutaneous edema of the hand.  Nonspecific well corticated soft tissue calcification adjacent to the volar aspect of the capitate.  CT 3D 10/9/2019   Impression       1. No acute  "fracture.  2. Upper limit of normal scapholunate interval, cannot exclude scapholunate ligament injury. Abnormal angulation of the lunate concerning for dorsal intercalated segment instability.  3. Increased density within the scapholunate space concerning for CPPD.  4. Additional findings as above.   MRI 10/9/2019:   No scaphoid fracture identified.    Extensive marrow edema throughout the carpal bones and distal radius with joint effusion and/or synovitis, possible inflammatory arthropathy/ pseudogout.  Septic arthritis not excluded.    Grade II/III sprain of the dorsal band of the scapholunate and dorsal radiolunate ligaments with dorsal angulation of the lunate, suggestive of ligamentous DISI.      Previous Therapy: no    Patient's Goals for Therapy: "get use of hand again"    Pain:  Functional Pain Scale Rating 0-10:   7/10 on average  7/10 at best  9/10 at worst  Location: left wrist  Description: Aching and Throbbing  Aggravating Factors: Lifting  Easing Factors: pain medication    Occupation:  retired  Working presently: unemployed  Duties: N/A    Functional Limitations/Social History:    Previous functional status includes: Independent with all ADLs.     Current FunctionalStatus   Home/Living environment : lives with their spouse      Limitation of Functional Status as follows:   ADLs/IADLs:     - Feeding:Independent    - Bathing: Independent    - Dressing/Grooming: Independent    - Driving: Independent with right hand     Leisure: Fishing-unable to clean fish    Pt reports difficulty opening water bottles      Past Medical History/Physical Systems Review:   Ashish Mckeon  has a past medical history of CHF (congestive heart failure), Diabetes mellitus type II, Essential hypertension, benign, Hyperlipidemia, Hypothyroidism, Pain and swelling of left wrist, and Undiagnosed cardiac murmurs.    Ashish Mckeon  has a past surgical history that includes Thyroid surgery; Colon surgery; and orthopedic surgery " (Left).    Ashish has a current medication list which includes the following prescription(s): acetaminophen, aspirin, furosemide, glyburide, hydrocodone-acetaminophen, insulin detemir u-100, levothyroxine, losartan, metoprolol succinate, and pantoprazole.    Review of patient's allergies indicates:   Allergen Reactions    Lisinopril Other (See Comments)     acei cough            Objective   Observation/Appearance:  Skin intact and scar flat, well healed on dorsum of wrist.   Edema. None    Hand ROM. Measured in degrees.   11/19/2019    Left       Index: MP  0/72              PIP     0/35              DIP 0/15              TEJADA 122       Long:  MP 0/80              PIP 0/80              DIP 0/25              TEJADA 185       Ring:   MP 0/72              PIP 0/90              DIP 0/20              TEJADA 182       Small:  MP 0/60               PIP 0/85               DIP 15/32              TEJADA 162       Thumb: MP 0/55                IP 0/20       Rad ADD/ABD 35       Pal ADD/ABD 45          Opposition 3 cm from base of 5th     Wrist AROM  Date 11/19/2019 11/19/2019    Left Right   Supination/Pronation 55/90 75/90   Wrist ext/flex 25/30 50/50   Wrist RD/UD 5/30 15/35       Sensation: Occasional numbness in tips of fingers     Strength (Dyanmometer) and Pinch Strength (Pinch Gauge)  Measured in pounds and psi. Average of three trials.   11/19/2019 11/19/2019    Left Right   Rung II def def   Perry Pinch def def   3pt Pinch def def   2pt Pinch def def           CMS Impairment/Limitation/Restriction for FOTO Wrist Survey    Therapist reviewed FOTO scores for Ashish Mckeon on 11/19/2019.   FOTO documents entered into [employer] - see Media section.    Limitation Score: 69%  Category: Carrying    Current : CL = least 60% but < 80% impaired, limited or restricted  Goal: CK = at least 40% but < 60% impaired, limited or restricted           Treatment     Treatment Time In: 9:00 AM  Treatment Time Out: 10:00 AM  Total Treatment time  separate from Evaluation time:15 min    Ashish received the following supervised modalities after being cleared for contradictions for 15 minutes:   -Patient received fluidotherapy to left hand for 15 minutes to increase blood flow, circulation, desensitization, sensory re-education and for pain management.     Ashish received the following manual therapy techniques for 0 minutes:   -NT    Ashish received therapeutic exercises for 15 minutes including:  -AROM as follows: jt blocking, TGEs, finger abd/add, finger ext, wrist ext/flex, RD/UD, sup/pron, thumb IP blocking, thumb opposition, RA/PA x 10 reps each    Ashish participated in dynamic functional therapeutic activities to improve functional performance for 0  minutes, including:  -NT    Home Exercise Program/Education:  Issued HEP (see patient instructions in EMR) and educated on modality use for pain management . Exercises were reviewed and Ashish was able to demonstrate them prior to the end of the session.   Pt received a written copy of exercises to perform at home. Ashish demonstrated good  understanding of the education provided.  Pt was advised to perform these exercises free of pain, and to stop performing them if pain occurs.    Patient/Family Education: role of OT, goals for OT, scheduling/cancellations - pt verbalized understanding. Discussed insurance limitations with patient.    Additional Education provided: none    Assessment     Ashish Mckeon is a 54 y.o. male referred to outpatient occupational/hand therapy and presents with a medical diagnosis of left wrist pain, resulting in pain, decreased range of motion, decreased strength, decreased functional use and demonstrates limitations as described in the chart below. Following a brief medical record review it is determined that pt will benefit from occupational therapy services in order to maximize pain free and/or functional use of right hand.     The patient's rehab potential is Fair.  "    Anticipated barriers to occupational therapy: low pain threshold, diabetes, heart disease  Pt has no cultural, educational or language barriers to learning provided.    Profile and History Assessment of Occupational Performance Level of Clinical Decision Making Complexity Score   Occupational Profile:   Ashish Mckeon is a 54 y.o. male who lives with their spouse and is currently retired. Ashish Mckeon has difficulty with  opening bottles and grasping objects  affecting his/her daily functional abilities. His/her main goal for therapy is  "get use of hand again".     Comorbidities:    has a past medical history of CHF (congestive heart failure), Diabetes mellitus type II, Essential hypertension, benign, Hyperlipidemia, Hypothyroidism, Pain and swelling of left wrist, and Undiagnosed cardiac murmurs.        Medical and Therapy History Review:   Brief               Performance Deficits    Physical:  Joint Mobility  Muscle Power/Strength  Muscle Endurance   Strength  Pinch Strength  Fine Motor Coordination  Pain    Cognitive:  No Deficits    Psychosocial:    No Deficits     Clinical Decision Making:  low    Assessment Process:  Problem-Focused Assessments    Modification/Need for Assistance:  Not Necessary    Intervention Selection:  Limited Treatment Options       low  Based on PMHX, co morbidities , data from assessments and functional level of assistance required with task and clinical presentation directly impacting function.       The following goals were discussed with the patient and patient is in agreement with them as to be addressed in the treatment plan.     Goals:   Short Term (4 weeks on 12/19/2019):  1)   Patient to be IND with HEP and modalities for pain management.  2)   Increase ROM 15-20 degrees for TEJADA in index, middle, ring and small fingers  to increase functional hand use for grasping objects  3)   Measure /pinch strength in 2 to 3 weeks.  4)   Increase ROM 5-10 degrees with wrist " ext/flex to increase functional hand use for ADLs/work/leisure activities.    Long Term (by discharge):  1)   Pt will report 2 out of 10 pain with left hand use.  2)   Patient to score at CK on  FOTO to demonstrate improved perception of functional left hand use.  3)   Pt will return to prior level of function for ADLs and household management.       Plan   Certification Period/Plan of care expiration: 11/19/2019 to 1/19/2019.    Outpatient Occupational Therapy 2 times weekly for 8 weeks may include the following interventions: Fluidotherapy, Manual therapy/joint mobilizations, Modalities for pain management, Therapeutic exercises/activities. and Strengthening.      Montserrat Monsalve, OT

## 2019-11-19 NOTE — PATIENT INSTRUCTIONS
"[facility] - OCCUPATIONAL THERAPY  HOME EXERCISE PROGRAM         Complete the following exercises with 10 repetitions each, 6 x/day.     AROM: DIP Flexion / Extension  Pinch middle knuckle to prevent bending. Bend end knuckle until stretch is felt.   Hold 3 seconds. Relax. Straighten finger as far as possible.    AROM: PIP Flexion / Extension  Pinch bottom knuckle  to prevent bending. Actively bend middle knuckle until stretch is felt.   Hold 3 seconds. Relax. Straighten finger as far as possible.      AROM: Isolated MCP Flexion / Extension ("Wave")   Bend only your large, bottom knuckles. Hold 3 seconds. Keep the tips of your fingers straight. Straighten fingers.    AROM: Isolated IPJ Flexion / Extension ("Hook")  Bend only your middle and end knuckles. Hold 3 seconds.   Straighten your fingers.     AROM: MCP and PIP Flexion / Extension ("Straight Fist")  Bend your bottom and middle knuckles, keeping the tips of your fingers straight. Try to touch the pads of your fingers on your palm. Hold 3 seconds. Straighten your fingers.     AROM: Composite Flexion / Extension ("Full Fist")  Bend every joint in your hand into a fist. Hold 3 seconds. Straighten your fingers.     AROM: Composte Extension ("Finger Lifts")  Lift your finger off of the table one at a time. Hold 3 seconds. Relax your finger.    AROM: Abduction / Adduction  With hand flat on table, spread all fingers apart, then bring them together as close as possible.                                              AROM: Supination / Pronation   With your elbow by your side, turn your palm up then turn your palm down.     AROM: Wrist Flexion / Extension               Bend your wrist forward and back as far as possible.      AROM: Wrist Radial / Ulnar Deviation  Bend your wrist from side to side as far as possible.    Copyright © I. All rights reserved.     Therapist: LOS Madrigal, DARRYL    "

## 2020-01-01 ENCOUNTER — HOSPITAL ENCOUNTER (INPATIENT)
Facility: HOSPITAL | Age: 55
LOS: 5 days | Discharge: HOME | DRG: 291 | End: 2020-05-13
Attending: EMERGENCY MEDICINE | Admitting: EMERGENCY MEDICINE
Payer: MEDICAID

## 2020-01-01 ENCOUNTER — TELEPHONE (OUTPATIENT)
Dept: TRANSPLANT | Facility: CLINIC | Age: 55
End: 2020-01-01

## 2020-01-01 ENCOUNTER — DOCUMENT SCAN (OUTPATIENT)
Dept: HOME HEALTH SERVICES | Facility: HOSPITAL | Age: 55
End: 2020-01-01
Payer: MEDICAID

## 2020-01-01 ENCOUNTER — TELEPHONE (OUTPATIENT)
Dept: HEPATOLOGY | Facility: CLINIC | Age: 55
End: 2020-01-01

## 2020-01-01 ENCOUNTER — LAB VISIT (OUTPATIENT)
Dept: LAB | Facility: HOSPITAL | Age: 55
End: 2020-01-01
Attending: INTERNAL MEDICINE
Payer: MEDICAID

## 2020-01-01 ENCOUNTER — HOSPITAL ENCOUNTER (OUTPATIENT)
Dept: INTERVENTIONAL RADIOLOGY/VASCULAR | Facility: HOSPITAL | Age: 55
Discharge: HOME | End: 2020-10-07
Attending: INTERNAL MEDICINE
Payer: MEDICAID

## 2020-01-01 ENCOUNTER — TELEPHONE (OUTPATIENT)
Dept: TRANSPLANT | Facility: HOSPITAL | Age: 55
End: 2020-01-01

## 2020-01-01 ENCOUNTER — EDUCATION (OUTPATIENT)
Dept: TRANSPLANT | Facility: CLINIC | Age: 55
End: 2020-01-01

## 2020-01-01 ENCOUNTER — PATIENT OUTREACH (OUTPATIENT)
Dept: ADMINISTRATIVE | Facility: CLINIC | Age: 55
End: 2020-01-01

## 2020-01-01 ENCOUNTER — EXTERNAL HOME HEALTH (OUTPATIENT)
Dept: HOME HEALTH SERVICES | Facility: HOSPITAL | Age: 55
End: 2020-01-01

## 2020-01-01 ENCOUNTER — TELEPHONE (OUTPATIENT)
Dept: PALLIATIVE MEDICINE | Facility: CLINIC | Age: 55
End: 2020-01-01

## 2020-01-01 ENCOUNTER — DOCUMENTATION ONLY (OUTPATIENT)
Dept: TRANSPLANT | Facility: CLINIC | Age: 55
End: 2020-01-01

## 2020-01-01 ENCOUNTER — HOSPITAL ENCOUNTER (INPATIENT)
Facility: HOSPITAL | Age: 55
LOS: 12 days | Discharge: HOME HEALTH | DRG: 286 | End: 2020-06-19
Attending: EMERGENCY MEDICINE | Admitting: INTERNAL MEDICINE
Payer: MEDICAID

## 2020-01-01 ENCOUNTER — OFFICE VISIT (OUTPATIENT)
Dept: TRANSPLANT | Facility: CLINIC | Age: 55
End: 2020-01-01
Attending: INTERNAL MEDICINE
Payer: MEDICAID

## 2020-01-01 ENCOUNTER — HOSPITAL ENCOUNTER (INPATIENT)
Facility: HOSPITAL | Age: 55
LOS: 7 days | Discharge: HOME | DRG: 291 | End: 2020-03-03
Attending: EMERGENCY MEDICINE | Admitting: HOSPITALIST
Payer: MEDICAID

## 2020-01-01 ENCOUNTER — OFFICE VISIT (OUTPATIENT)
Dept: HEPATOLOGY | Facility: CLINIC | Age: 55
End: 2020-01-01
Attending: INTERNAL MEDICINE
Payer: MEDICAID

## 2020-01-01 ENCOUNTER — OFFICE VISIT (OUTPATIENT)
Dept: TRANSPLANT | Facility: CLINIC | Age: 55
End: 2020-01-01
Payer: MEDICAID

## 2020-01-01 ENCOUNTER — TELEPHONE (OUTPATIENT)
Dept: ELECTROPHYSIOLOGY | Facility: CLINIC | Age: 55
End: 2020-01-01

## 2020-01-01 ENCOUNTER — EXTERNAL HOME HEALTH (OUTPATIENT)
Dept: HOME HEALTH SERVICES | Facility: HOSPITAL | Age: 55
End: 2020-01-01
Payer: MEDICAID

## 2020-01-01 ENCOUNTER — TELEPHONE (OUTPATIENT)
Dept: NEPHROLOGY | Facility: CLINIC | Age: 55
End: 2020-01-01

## 2020-01-01 ENCOUNTER — LAB VISIT (OUTPATIENT)
Dept: LAB | Facility: HOSPITAL | Age: 55
End: 2020-01-01
Payer: MEDICAID

## 2020-01-01 ENCOUNTER — HOSPITAL ENCOUNTER (INPATIENT)
Facility: HOSPITAL | Age: 55
LOS: 7 days | Discharge: HOME HEALTH | DRG: 291 | End: 2020-11-12
Attending: EMERGENCY MEDICINE | Admitting: INTERNAL MEDICINE
Payer: MEDICAID

## 2020-01-01 ENCOUNTER — NURSE TRIAGE (OUTPATIENT)
Dept: ADMINISTRATIVE | Facility: CLINIC | Age: 55
End: 2020-01-01

## 2020-01-01 ENCOUNTER — INITIAL CONSULT (OUTPATIENT)
Dept: TRANSPLANT | Facility: CLINIC | Age: 55
End: 2020-01-01
Payer: MEDICAID

## 2020-01-01 ENCOUNTER — LAB VISIT (OUTPATIENT)
Dept: LAB | Facility: HOSPITAL | Age: 55
End: 2020-01-01
Attending: NURSE PRACTITIONER
Payer: MEDICAID

## 2020-01-01 ENCOUNTER — HOSPITAL ENCOUNTER (OUTPATIENT)
Facility: HOSPITAL | Age: 55
Discharge: HOME | End: 2020-08-04
Attending: EMERGENCY MEDICINE | Admitting: INTERNAL MEDICINE
Payer: MEDICAID

## 2020-01-01 ENCOUNTER — SOCIAL WORK (OUTPATIENT)
Dept: TRANSPLANT | Facility: CLINIC | Age: 55
End: 2020-01-01
Payer: MEDICAID

## 2020-01-01 ENCOUNTER — PROCEDURE VISIT (OUTPATIENT)
Dept: HEPATOLOGY | Facility: CLINIC | Age: 55
End: 2020-01-01
Payer: MEDICAID

## 2020-01-01 VITALS
TEMPERATURE: 98 F | SYSTOLIC BLOOD PRESSURE: 96 MMHG | HEIGHT: 71 IN | RESPIRATION RATE: 18 BRPM | DIASTOLIC BLOOD PRESSURE: 61 MMHG | HEART RATE: 81 BPM | BODY MASS INDEX: 21.73 KG/M2 | WEIGHT: 155.19 LBS | OXYGEN SATURATION: 94 %

## 2020-01-01 VITALS
RESPIRATION RATE: 18 BRPM | DIASTOLIC BLOOD PRESSURE: 62 MMHG | SYSTOLIC BLOOD PRESSURE: 94 MMHG | HEIGHT: 71 IN | HEART RATE: 85 BPM | TEMPERATURE: 97 F | WEIGHT: 137.25 LBS | OXYGEN SATURATION: 98 % | BODY MASS INDEX: 19.22 KG/M2

## 2020-01-01 VITALS
TEMPERATURE: 97 F | SYSTOLIC BLOOD PRESSURE: 96 MMHG | OXYGEN SATURATION: 95 % | DIASTOLIC BLOOD PRESSURE: 63 MMHG | RESPIRATION RATE: 19 BRPM | HEIGHT: 71 IN | HEART RATE: 83 BPM | WEIGHT: 125.63 LBS | BODY MASS INDEX: 17.59 KG/M2

## 2020-01-01 VITALS
TEMPERATURE: 98 F | SYSTOLIC BLOOD PRESSURE: 102 MMHG | RESPIRATION RATE: 15 BRPM | DIASTOLIC BLOOD PRESSURE: 76 MMHG | OXYGEN SATURATION: 100 % | HEART RATE: 75 BPM

## 2020-01-01 VITALS
SYSTOLIC BLOOD PRESSURE: 98 MMHG | RESPIRATION RATE: 20 BRPM | OXYGEN SATURATION: 97 % | TEMPERATURE: 98 F | BODY MASS INDEX: 23.61 KG/M2 | WEIGHT: 168.63 LBS | HEART RATE: 77 BPM | DIASTOLIC BLOOD PRESSURE: 71 MMHG | HEIGHT: 71 IN

## 2020-01-01 VITALS
DIASTOLIC BLOOD PRESSURE: 69 MMHG | BODY MASS INDEX: 19.76 KG/M2 | WEIGHT: 141.13 LBS | HEIGHT: 71 IN | HEART RATE: 92 BPM | SYSTOLIC BLOOD PRESSURE: 103 MMHG

## 2020-01-01 VITALS
HEIGHT: 71 IN | BODY MASS INDEX: 18.55 KG/M2 | HEART RATE: 82 BPM | DIASTOLIC BLOOD PRESSURE: 70 MMHG | SYSTOLIC BLOOD PRESSURE: 103 MMHG | WEIGHT: 132.5 LBS

## 2020-01-01 VITALS
DIASTOLIC BLOOD PRESSURE: 69 MMHG | HEIGHT: 71 IN | BODY MASS INDEX: 17.04 KG/M2 | TEMPERATURE: 99 F | RESPIRATION RATE: 18 BRPM | WEIGHT: 121.69 LBS | OXYGEN SATURATION: 94 % | HEART RATE: 102 BPM | SYSTOLIC BLOOD PRESSURE: 117 MMHG

## 2020-01-01 VITALS
DIASTOLIC BLOOD PRESSURE: 74 MMHG | HEART RATE: 88 BPM | BODY MASS INDEX: 20.06 KG/M2 | SYSTOLIC BLOOD PRESSURE: 108 MMHG | HEIGHT: 71 IN | WEIGHT: 143.31 LBS | OXYGEN SATURATION: 98 %

## 2020-01-01 VITALS
HEIGHT: 71 IN | SYSTOLIC BLOOD PRESSURE: 104 MMHG | BODY MASS INDEX: 20.53 KG/M2 | WEIGHT: 146.63 LBS | DIASTOLIC BLOOD PRESSURE: 76 MMHG | HEART RATE: 91 BPM

## 2020-01-01 VITALS
SYSTOLIC BLOOD PRESSURE: 117 MMHG | HEART RATE: 82 BPM | HEIGHT: 71 IN | DIASTOLIC BLOOD PRESSURE: 76 MMHG | BODY MASS INDEX: 19.32 KG/M2 | WEIGHT: 138 LBS

## 2020-01-01 VITALS
BODY MASS INDEX: 18.92 KG/M2 | DIASTOLIC BLOOD PRESSURE: 69 MMHG | WEIGHT: 135.13 LBS | HEIGHT: 71 IN | SYSTOLIC BLOOD PRESSURE: 103 MMHG | HEART RATE: 92 BPM

## 2020-01-01 DIAGNOSIS — Z71.89 GOALS OF CARE, COUNSELING/DISCUSSION: ICD-10-CM

## 2020-01-01 DIAGNOSIS — I10 ESSENTIAL HYPERTENSION: ICD-10-CM

## 2020-01-01 DIAGNOSIS — R93.2 ABNORMAL LIVER ULTRASOUND: Primary | ICD-10-CM

## 2020-01-01 DIAGNOSIS — D53.1 MEGALOBLASTIC ANEMIA DUE TO VITAMIN B12 DEFICIENCY: ICD-10-CM

## 2020-01-01 DIAGNOSIS — N18.9 ACUTE KIDNEY INJURY SUPERIMPOSED ON CKD: ICD-10-CM

## 2020-01-01 DIAGNOSIS — I50.42 CHRONIC COMBINED SYSTOLIC AND DIASTOLIC HEART FAILURE: Primary | ICD-10-CM

## 2020-01-01 DIAGNOSIS — N17.9 ACUTE KIDNEY INJURY SUPERIMPOSED ON CKD: ICD-10-CM

## 2020-01-01 DIAGNOSIS — D63.1 ANEMIA OF CHRONIC RENAL FAILURE: ICD-10-CM

## 2020-01-01 DIAGNOSIS — I42.0 DCM (DILATED CARDIOMYOPATHY): ICD-10-CM

## 2020-01-01 DIAGNOSIS — Z51.5 PALLIATIVE CARE ENCOUNTER: ICD-10-CM

## 2020-01-01 DIAGNOSIS — Z71.89 ADVANCE CARE PLANNING: ICD-10-CM

## 2020-01-01 DIAGNOSIS — R93.2 ABNORMAL LIVER ULTRASOUND: ICD-10-CM

## 2020-01-01 DIAGNOSIS — N18.4 TYPE 2 DIABETES MELLITUS WITH STAGE 4 CHRONIC KIDNEY DISEASE, WITH LONG-TERM CURRENT USE OF INSULIN: ICD-10-CM

## 2020-01-01 DIAGNOSIS — N64.4 NIPPLE PAIN: ICD-10-CM

## 2020-01-01 DIAGNOSIS — N18.32 STAGE 3B CHRONIC KIDNEY DISEASE: ICD-10-CM

## 2020-01-01 DIAGNOSIS — I13.0 HYPERTENSIVE HEART AND RENAL DISEASE WITH CONGESTIVE HEART FAILURE: Primary | ICD-10-CM

## 2020-01-01 DIAGNOSIS — I50.41 ACUTE COMBINED SYSTOLIC AND DIASTOLIC HEART FAILURE DUE TO VALVULAR DISEASE: ICD-10-CM

## 2020-01-01 DIAGNOSIS — I50.42 CHRONIC COMBINED SYSTOLIC AND DIASTOLIC CONGESTIVE HEART FAILURE: ICD-10-CM

## 2020-01-01 DIAGNOSIS — I47.29 NSVT (NONSUSTAINED VENTRICULAR TACHYCARDIA): ICD-10-CM

## 2020-01-01 DIAGNOSIS — E87.5 HYPERKALEMIA: Primary | ICD-10-CM

## 2020-01-01 DIAGNOSIS — Z78.9 PROBLEM WITH VASCULAR ACCESS: Primary | ICD-10-CM

## 2020-01-01 DIAGNOSIS — I50.43 ACUTE ON CHRONIC COMBINED SYSTOLIC AND DIASTOLIC HEART FAILURE: ICD-10-CM

## 2020-01-01 DIAGNOSIS — E03.9 ACQUIRED HYPOTHYROIDISM: ICD-10-CM

## 2020-01-01 DIAGNOSIS — I50.20 HFREF (HEART FAILURE WITH REDUCED EJECTION FRACTION): ICD-10-CM

## 2020-01-01 DIAGNOSIS — N18.4 TYPE 2 DIABETES MELLITUS WITH STAGE 4 CHRONIC KIDNEY DISEASE, WITHOUT LONG-TERM CURRENT USE OF INSULIN: Chronic | ICD-10-CM

## 2020-01-01 DIAGNOSIS — I50.43 ACUTE ON CHRONIC COMBINED SYSTOLIC AND DIASTOLIC HEART FAILURE DUE TO VALVULAR DISEASE: ICD-10-CM

## 2020-01-01 DIAGNOSIS — D69.6 THROMBOCYTOPENIA: ICD-10-CM

## 2020-01-01 DIAGNOSIS — N18.9 ANEMIA OF CHRONIC RENAL FAILURE: ICD-10-CM

## 2020-01-01 DIAGNOSIS — E80.6 HYPERBILIRUBINEMIA: ICD-10-CM

## 2020-01-01 DIAGNOSIS — I42.8 NONISCHEMIC CARDIOMYOPATHY: ICD-10-CM

## 2020-01-01 DIAGNOSIS — I42.8 NONISCHEMIC CARDIOMYOPATHY: Primary | ICD-10-CM

## 2020-01-01 DIAGNOSIS — I13.0 HYPERTENSIVE HEART AND RENAL DISEASE WITH CONGESTIVE HEART FAILURE: ICD-10-CM

## 2020-01-01 DIAGNOSIS — L81.9 HYPERPIGMENTED SKIN LESION: ICD-10-CM

## 2020-01-01 DIAGNOSIS — D69.6 THROMBOCYTOPENIA, UNSPECIFIED: ICD-10-CM

## 2020-01-01 DIAGNOSIS — I50.43 ACUTE ON CHRONIC COMBINED SYSTOLIC AND DIASTOLIC CONGESTIVE HEART FAILURE: Primary | ICD-10-CM

## 2020-01-01 DIAGNOSIS — N18.30 CKD (CHRONIC KIDNEY DISEASE) STAGE 3, GFR 30-59 ML/MIN: ICD-10-CM

## 2020-01-01 DIAGNOSIS — E10.8 TYPE 1 DIABETES MELLITUS WITH COMPLICATIONS: ICD-10-CM

## 2020-01-01 DIAGNOSIS — I50.9 HEART FAILURE: ICD-10-CM

## 2020-01-01 DIAGNOSIS — R06.02 SHORTNESS OF BREATH: ICD-10-CM

## 2020-01-01 DIAGNOSIS — I50.42 CHRONIC COMBINED SYSTOLIC AND DIASTOLIC CONGESTIVE HEART FAILURE: Primary | ICD-10-CM

## 2020-01-01 DIAGNOSIS — I38 ACUTE ON CHRONIC COMBINED SYSTOLIC AND DIASTOLIC HEART FAILURE DUE TO VALVULAR DISEASE: ICD-10-CM

## 2020-01-01 DIAGNOSIS — E89.0 POSTOPERATIVE HYPOTHYROIDISM: Chronic | ICD-10-CM

## 2020-01-01 DIAGNOSIS — R60.9 EDEMA: ICD-10-CM

## 2020-01-01 DIAGNOSIS — E03.8 OTHER SPECIFIED HYPOTHYROIDISM: ICD-10-CM

## 2020-01-01 DIAGNOSIS — I50.22 CHRONIC SYSTOLIC HEART FAILURE: Primary | ICD-10-CM

## 2020-01-01 DIAGNOSIS — N17.9 AKI (ACUTE KIDNEY INJURY): ICD-10-CM

## 2020-01-01 DIAGNOSIS — M86.171 ACUTE OSTEOMYELITIS OF RIGHT ANKLE OR FOOT: Primary | ICD-10-CM

## 2020-01-01 DIAGNOSIS — E78.2 MIXED HYPERLIPIDEMIA: ICD-10-CM

## 2020-01-01 DIAGNOSIS — I42.0 DCM (DILATED CARDIOMYOPATHY): Primary | ICD-10-CM

## 2020-01-01 DIAGNOSIS — E11.22 TYPE 2 DIABETES MELLITUS WITH STAGE 4 CHRONIC KIDNEY DISEASE, WITHOUT LONG-TERM CURRENT USE OF INSULIN: Chronic | ICD-10-CM

## 2020-01-01 DIAGNOSIS — I38 ACUTE COMBINED SYSTOLIC AND DIASTOLIC HEART FAILURE DUE TO VALVULAR DISEASE: ICD-10-CM

## 2020-01-01 DIAGNOSIS — E11.22 TYPE 2 DIABETES MELLITUS WITH STAGE 4 CHRONIC KIDNEY DISEASE, WITH LONG-TERM CURRENT USE OF INSULIN: ICD-10-CM

## 2020-01-01 DIAGNOSIS — M10.49 OTHER SECONDARY ACUTE GOUT OF MULTIPLE SITES: ICD-10-CM

## 2020-01-01 DIAGNOSIS — I50.9 CHF (CONGESTIVE HEART FAILURE): ICD-10-CM

## 2020-01-01 DIAGNOSIS — D04.20: ICD-10-CM

## 2020-01-01 DIAGNOSIS — I50.42 CHRONIC COMBINED SYSTOLIC AND DIASTOLIC HEART FAILURE: ICD-10-CM

## 2020-01-01 DIAGNOSIS — I49.3 PVC'S (PREMATURE VENTRICULAR CONTRACTIONS): ICD-10-CM

## 2020-01-01 DIAGNOSIS — I50.9 DECOMPENSATED HEART FAILURE: Primary | ICD-10-CM

## 2020-01-01 DIAGNOSIS — R06.02 SOB (SHORTNESS OF BREATH): ICD-10-CM

## 2020-01-01 DIAGNOSIS — Z79.4 TYPE 2 DIABETES MELLITUS WITH STAGE 4 CHRONIC KIDNEY DISEASE, WITH LONG-TERM CURRENT USE OF INSULIN: ICD-10-CM

## 2020-01-01 DIAGNOSIS — I50.43 ACUTE ON CHRONIC COMBINED SYSTOLIC AND DIASTOLIC CHF (CONGESTIVE HEART FAILURE): ICD-10-CM

## 2020-01-01 DIAGNOSIS — R60.9 EDEMA, UNSPECIFIED TYPE: ICD-10-CM

## 2020-01-01 DIAGNOSIS — R74.8 ELEVATED LIVER ENZYMES: ICD-10-CM

## 2020-01-01 DIAGNOSIS — I50.43 ACUTE ON CHRONIC COMBINED SYSTOLIC AND DIASTOLIC HEART FAILURE: Primary | ICD-10-CM

## 2020-01-01 DIAGNOSIS — T82.9XXA CENTRAL LINE COMPLICATION, INITIAL ENCOUNTER: ICD-10-CM

## 2020-01-01 DIAGNOSIS — N18.4 CKD (CHRONIC KIDNEY DISEASE), STAGE IV: ICD-10-CM

## 2020-01-01 DIAGNOSIS — N18.4 CHRONIC KIDNEY DISEASE, STAGE IV (SEVERE): ICD-10-CM

## 2020-01-01 DIAGNOSIS — D13.0 BENIGN NEOPLASM OF ESOPHAGUS: Primary | ICD-10-CM

## 2020-01-01 LAB
25(OH)D3+25(OH)D2 SERPL-MCNC: 4 NG/ML (ref 30–96)
AFP SERPL-MCNC: 4.7 NG/ML (ref 0–8.4)
ALBUMIN FLD-MCNC: 2.1 G/DL
ALBUMIN SERPL BCP-MCNC: 2.4 G/DL (ref 3.5–5.2)
ALBUMIN SERPL BCP-MCNC: 2.6 G/DL (ref 3.5–5.2)
ALBUMIN SERPL BCP-MCNC: 2.7 G/DL (ref 3.5–5.2)
ALBUMIN SERPL BCP-MCNC: 2.8 G/DL (ref 3.5–5.2)
ALBUMIN SERPL BCP-MCNC: 2.9 G/DL (ref 3.5–5.2)
ALBUMIN SERPL BCP-MCNC: 3 G/DL (ref 3.5–5.2)
ALBUMIN SERPL BCP-MCNC: 3.1 G/DL (ref 3.5–5.2)
ALBUMIN SERPL BCP-MCNC: 3.1 G/DL (ref 3.5–5.2)
ALBUMIN SERPL BCP-MCNC: 3.2 G/DL (ref 3.5–5.2)
ALBUMIN SERPL BCP-MCNC: 3.2 G/DL (ref 3.5–5.2)
ALBUMIN SERPL BCP-MCNC: 3.3 G/DL (ref 3.5–5.2)
ALBUMIN SERPL BCP-MCNC: 3.4 G/DL (ref 3.5–5.2)
ALBUMIN SERPL BCP-MCNC: 3.5 G/DL (ref 3.5–5.2)
ALBUMIN SERPL BCP-MCNC: 3.6 G/DL (ref 3.5–5.2)
ALBUMIN SERPL BCP-MCNC: 3.7 G/DL (ref 3.5–5.2)
ALBUMIN SERPL BCP-MCNC: 3.7 G/DL (ref 3.5–5.2)
ALLENS TEST: ABNORMAL
ALP SERPL-CCNC: 158 U/L (ref 55–135)
ALP SERPL-CCNC: 170 U/L (ref 55–135)
ALP SERPL-CCNC: 171 U/L (ref 55–135)
ALP SERPL-CCNC: 175 U/L (ref 55–135)
ALP SERPL-CCNC: 176 U/L (ref 55–135)
ALP SERPL-CCNC: 179 U/L (ref 55–135)
ALP SERPL-CCNC: 182 U/L (ref 55–135)
ALP SERPL-CCNC: 184 U/L (ref 55–135)
ALP SERPL-CCNC: 186 U/L (ref 55–135)
ALP SERPL-CCNC: 187 U/L (ref 55–135)
ALP SERPL-CCNC: 189 U/L (ref 55–135)
ALP SERPL-CCNC: 191 U/L (ref 55–135)
ALP SERPL-CCNC: 195 U/L (ref 55–135)
ALP SERPL-CCNC: 196 U/L (ref 55–135)
ALP SERPL-CCNC: 198 U/L (ref 55–135)
ALP SERPL-CCNC: 198 U/L (ref 55–135)
ALP SERPL-CCNC: 202 U/L (ref 55–135)
ALP SERPL-CCNC: 205 U/L (ref 55–135)
ALP SERPL-CCNC: 206 U/L (ref 55–135)
ALP SERPL-CCNC: 207 U/L (ref 55–135)
ALP SERPL-CCNC: 209 U/L (ref 55–135)
ALP SERPL-CCNC: 210 U/L (ref 55–135)
ALP SERPL-CCNC: 213 U/L (ref 55–135)
ALP SERPL-CCNC: 214 U/L (ref 55–135)
ALP SERPL-CCNC: 214 U/L (ref 55–135)
ALP SERPL-CCNC: 217 U/L (ref 55–135)
ALP SERPL-CCNC: 218 U/L (ref 55–135)
ALP SERPL-CCNC: 224 U/L (ref 55–135)
ALP SERPL-CCNC: 229 U/L (ref 55–135)
ALP SERPL-CCNC: 233 U/L (ref 55–135)
ALP SERPL-CCNC: 234 U/L (ref 55–135)
ALP SERPL-CCNC: 236 U/L (ref 55–135)
ALP SERPL-CCNC: 239 U/L (ref 55–135)
ALP SERPL-CCNC: 243 U/L (ref 55–135)
ALP SERPL-CCNC: 246 U/L (ref 55–135)
ALP SERPL-CCNC: 251 U/L (ref 55–135)
ALP SERPL-CCNC: 282 U/L (ref 55–135)
ALP SERPL-CCNC: 287 U/L (ref 55–135)
ALP SERPL-CCNC: 290 U/L (ref 55–135)
ALP SERPL-CCNC: 293 U/L (ref 55–135)
ALP SERPL-CCNC: 298 U/L (ref 55–135)
ALP SERPL-CCNC: 302 U/L (ref 55–135)
ALP SERPL-CCNC: 308 U/L (ref 55–135)
ALP SERPL-CCNC: 310 U/L (ref 55–135)
ALP SERPL-CCNC: 315 U/L (ref 55–135)
ALP SERPL-CCNC: 318 U/L (ref 55–135)
ALP SERPL-CCNC: 333 U/L (ref 55–135)
ALP SERPL-CCNC: 335 U/L (ref 55–135)
ALT SERPL W/O P-5'-P-CCNC: 13 U/L (ref 10–44)
ALT SERPL W/O P-5'-P-CCNC: 14 U/L (ref 10–44)
ALT SERPL W/O P-5'-P-CCNC: 15 U/L (ref 10–44)
ALT SERPL W/O P-5'-P-CCNC: 15 U/L (ref 10–44)
ALT SERPL W/O P-5'-P-CCNC: 17 U/L (ref 10–44)
ALT SERPL W/O P-5'-P-CCNC: 18 U/L (ref 10–44)
ALT SERPL W/O P-5'-P-CCNC: 19 U/L (ref 10–44)
ALT SERPL W/O P-5'-P-CCNC: 19 U/L (ref 10–44)
ALT SERPL W/O P-5'-P-CCNC: 20 U/L (ref 10–44)
ALT SERPL W/O P-5'-P-CCNC: 21 U/L (ref 10–44)
ALT SERPL W/O P-5'-P-CCNC: 21 U/L (ref 10–44)
ALT SERPL W/O P-5'-P-CCNC: 22 U/L (ref 10–44)
ALT SERPL W/O P-5'-P-CCNC: 23 U/L (ref 10–44)
ALT SERPL W/O P-5'-P-CCNC: 24 U/L (ref 10–44)
ALT SERPL W/O P-5'-P-CCNC: 25 U/L (ref 10–44)
ALT SERPL W/O P-5'-P-CCNC: 25 U/L (ref 10–44)
ALT SERPL W/O P-5'-P-CCNC: 26 U/L (ref 10–44)
ALT SERPL W/O P-5'-P-CCNC: 26 U/L (ref 10–44)
ALT SERPL W/O P-5'-P-CCNC: 28 U/L (ref 10–44)
ALT SERPL W/O P-5'-P-CCNC: 29 U/L (ref 10–44)
ALT SERPL W/O P-5'-P-CCNC: 29 U/L (ref 10–44)
ALT SERPL W/O P-5'-P-CCNC: 31 U/L (ref 10–44)
ALT SERPL W/O P-5'-P-CCNC: 32 U/L (ref 10–44)
ALT SERPL W/O P-5'-P-CCNC: 33 U/L (ref 10–44)
ALT SERPL W/O P-5'-P-CCNC: 34 U/L (ref 10–44)
ALT SERPL W/O P-5'-P-CCNC: 34 U/L (ref 10–44)
ALT SERPL W/O P-5'-P-CCNC: 35 U/L (ref 10–44)
ALT SERPL W/O P-5'-P-CCNC: 38 U/L (ref 10–44)
ALT SERPL W/O P-5'-P-CCNC: 38 U/L (ref 10–44)
ALT SERPL W/O P-5'-P-CCNC: 42 U/L (ref 10–44)
ALT SERPL W/O P-5'-P-CCNC: 43 U/L (ref 10–44)
ALT SERPL W/O P-5'-P-CCNC: 45 U/L (ref 10–44)
ALT SERPL W/O P-5'-P-CCNC: 47 U/L (ref 10–44)
ALT SERPL W/O P-5'-P-CCNC: 48 U/L (ref 10–44)
ALT SERPL W/O P-5'-P-CCNC: 50 U/L (ref 10–44)
ALT SERPL W/O P-5'-P-CCNC: 53 U/L (ref 10–44)
ALT SERPL W/O P-5'-P-CCNC: 56 U/L (ref 10–44)
ALT SERPL W/O P-5'-P-CCNC: 56 U/L (ref 10–44)
ALT SERPL W/O P-5'-P-CCNC: 69 U/L (ref 10–44)
ALT SERPL W/O P-5'-P-CCNC: 74 U/L (ref 10–44)
ALT SERPL W/O P-5'-P-CCNC: 78 U/L (ref 10–44)
AMMONIA PLAS-SCNC: 32 UMOL/L (ref 10–50)
AMORPH CRY UR QL COMP ASSIST: ABNORMAL
ANA PATTERN 1: NORMAL
ANA SER QL IF: POSITIVE
ANA TITR SER IF: NORMAL {TITER}
ANCA AB TITR SER IF: NORMAL TITER
ANION GAP SERPL CALC-SCNC: 10 MMOL/L (ref 8–16)
ANION GAP SERPL CALC-SCNC: 11 MMOL/L (ref 8–16)
ANION GAP SERPL CALC-SCNC: 12 MMOL/L (ref 8–16)
ANION GAP SERPL CALC-SCNC: 13 MMOL/L (ref 8–16)
ANION GAP SERPL CALC-SCNC: 14 MMOL/L (ref 8–16)
ANION GAP SERPL CALC-SCNC: 15 MMOL/L (ref 8–16)
ANION GAP SERPL CALC-SCNC: 16 MMOL/L (ref 8–16)
ANION GAP SERPL CALC-SCNC: 17 MMOL/L (ref 8–16)
ANION GAP SERPL CALC-SCNC: 17 MMOL/L (ref 8–16)
ANION GAP SERPL CALC-SCNC: 18 MMOL/L (ref 8–16)
ANION GAP SERPL CALC-SCNC: 8 MMOL/L (ref 8–16)
ANION GAP SERPL CALC-SCNC: 9 MMOL/L (ref 8–16)
ANISOCYTOSIS BLD QL SMEAR: ABNORMAL
ANISOCYTOSIS BLD QL SMEAR: SLIGHT
ANTI SM ANTIBODY: 0.09 RATIO (ref 0–0.99)
ANTI SM/RNP ANTIBODY: 0.07 RATIO (ref 0–0.99)
ANTI-SM INTERPRETATION: NEGATIVE
ANTI-SM/RNP INTERPRETATION: NEGATIVE
ANTI-SSA ANTIBODY: 0.07 RATIO (ref 0–0.99)
ANTI-SSA INTERPRETATION: NEGATIVE
ANTI-SSB ANTIBODY: 0.1 RATIO (ref 0–0.99)
ANTI-SSB INTERPRETATION: NEGATIVE
APPEARANCE FLD: CLEAR
APTT HEX PL PPP: NEGATIVE S
ASCENDING AORTA: 2.75 CM
ASCENDING AORTA: 2.95 CM
AST SERPL-CCNC: 16 U/L (ref 10–40)
AST SERPL-CCNC: 17 U/L (ref 10–40)
AST SERPL-CCNC: 17 U/L (ref 10–40)
AST SERPL-CCNC: 18 U/L (ref 10–40)
AST SERPL-CCNC: 18 U/L (ref 10–40)
AST SERPL-CCNC: 19 U/L (ref 10–40)
AST SERPL-CCNC: 20 U/L (ref 10–40)
AST SERPL-CCNC: 20 U/L (ref 10–40)
AST SERPL-CCNC: 21 U/L (ref 10–40)
AST SERPL-CCNC: 21 U/L (ref 10–40)
AST SERPL-CCNC: 22 U/L (ref 10–40)
AST SERPL-CCNC: 23 U/L (ref 10–40)
AST SERPL-CCNC: 25 U/L (ref 10–40)
AST SERPL-CCNC: 28 U/L (ref 10–40)
AST SERPL-CCNC: 28 U/L (ref 10–40)
AST SERPL-CCNC: 29 U/L (ref 10–40)
AST SERPL-CCNC: 29 U/L (ref 10–40)
AST SERPL-CCNC: 30 U/L (ref 10–40)
AST SERPL-CCNC: 34 U/L (ref 10–40)
AST SERPL-CCNC: 34 U/L (ref 10–40)
AST SERPL-CCNC: 35 U/L (ref 10–40)
AST SERPL-CCNC: 35 U/L (ref 10–40)
AST SERPL-CCNC: 36 U/L (ref 10–40)
AST SERPL-CCNC: 36 U/L (ref 10–40)
AST SERPL-CCNC: 37 U/L (ref 10–40)
AST SERPL-CCNC: 37 U/L (ref 10–40)
AST SERPL-CCNC: 41 U/L (ref 10–40)
AST SERPL-CCNC: 43 U/L (ref 10–40)
AST SERPL-CCNC: 45 U/L (ref 10–40)
AST SERPL-CCNC: 48 U/L (ref 10–40)
AST SERPL-CCNC: 50 U/L (ref 10–40)
AST SERPL-CCNC: 53 U/L (ref 10–40)
AST SERPL-CCNC: 54 U/L (ref 10–40)
AST SERPL-CCNC: 56 U/L (ref 10–40)
AST SERPL-CCNC: 58 U/L (ref 10–40)
AST SERPL-CCNC: 65 U/L (ref 10–40)
AST SERPL-CCNC: 65 U/L (ref 10–40)
AST SERPL-CCNC: 75 U/L (ref 10–40)
AST SERPL-CCNC: 78 U/L (ref 10–40)
AST SERPL-CCNC: 82 U/L (ref 10–40)
AV INDEX (PROSTH): 0.53
AV INDEX (PROSTH): 0.6
AV INDEX (PROSTH): 0.61
AV MEAN GRADIENT: 2 MMHG
AV MEAN GRADIENT: 2 MMHG
AV MEAN GRADIENT: 3 MMHG
AV PEAK GRADIENT: 0 MMHG
AV PEAK GRADIENT: 3 MMHG
AV PEAK GRADIENT: 4 MMHG
AV VALVE AREA: 1.88 CM2
AV VALVE AREA: 1.99 CM2
AV VALVE AREA: 2.04 CM2
AV VELOCITY RATIO: 0.59
AV VELOCITY RATIO: 0.63
AV VELOCITY RATIO: 2.61
B-OH-BUTYR BLD STRIP-SCNC: 0.2 MMOL/L (ref 0–0.5)
B2 GLYCOPROT1 IGA SER QL: <9 SAU
B2 GLYCOPROT1 IGG SER QL: <9 SGU
B2 GLYCOPROT1 IGM SER QL: <9 SMU
BACTERIA #/AREA URNS AUTO: ABNORMAL /HPF
BACTERIA #/AREA URNS AUTO: ABNORMAL /HPF
BACTERIA #/AREA URNS AUTO: NORMAL /HPF
BACTERIA BLD CULT: NORMAL
BACTERIA SPEC AEROBE CULT: NO GROWTH
BACTERIA SPEC ANAEROBE CULT: NORMAL
BASOPHILS # BLD AUTO: 0.02 K/UL (ref 0–0.2)
BASOPHILS # BLD AUTO: 0.02 K/UL (ref 0–0.2)
BASOPHILS # BLD AUTO: 0.03 K/UL (ref 0–0.2)
BASOPHILS # BLD AUTO: 0.04 K/UL (ref 0–0.2)
BASOPHILS # BLD AUTO: 0.05 K/UL (ref 0–0.2)
BASOPHILS # BLD AUTO: 0.06 K/UL (ref 0–0.2)
BASOPHILS # BLD AUTO: 0.07 K/UL (ref 0–0.2)
BASOPHILS # BLD AUTO: 0.08 K/UL (ref 0–0.2)
BASOPHILS # BLD AUTO: 0.11 K/UL (ref 0–0.2)
BASOPHILS NFR BLD: 0.5 % (ref 0–1.9)
BASOPHILS NFR BLD: 0.6 % (ref 0–1.9)
BASOPHILS NFR BLD: 0.7 % (ref 0–1.9)
BASOPHILS NFR BLD: 0.8 % (ref 0–1.9)
BASOPHILS NFR BLD: 0.9 % (ref 0–1.9)
BASOPHILS NFR BLD: 1 % (ref 0–1.9)
BASOPHILS NFR BLD: 1.1 % (ref 0–1.9)
BASOPHILS NFR BLD: 1.2 % (ref 0–1.9)
BASOPHILS NFR BLD: 1.3 % (ref 0–1.9)
BASOPHILS NFR BLD: 1.4 % (ref 0–1.9)
BASOPHILS NFR BLD: 1.5 % (ref 0–1.9)
BASOPHILS NFR BLD: 1.6 % (ref 0–1.9)
BASOPHILS NFR BLD: 1.7 % (ref 0–1.9)
BASOPHILS NFR BLD: 2.2 % (ref 0–1.9)
BILIRUB DIRECT SERPL-MCNC: 1 MG/DL (ref 0.1–0.3)
BILIRUB DIRECT SERPL-MCNC: 1 MG/DL (ref 0.1–0.3)
BILIRUB DIRECT SERPL-MCNC: 1.1 MG/DL (ref 0.1–0.3)
BILIRUB SERPL-MCNC: 0.9 MG/DL (ref 0.1–1)
BILIRUB SERPL-MCNC: 1 MG/DL (ref 0.1–1)
BILIRUB SERPL-MCNC: 1.1 MG/DL (ref 0.1–1)
BILIRUB SERPL-MCNC: 1.2 MG/DL (ref 0.1–1)
BILIRUB SERPL-MCNC: 1.2 MG/DL (ref 0.1–1)
BILIRUB SERPL-MCNC: 1.3 MG/DL (ref 0.1–1)
BILIRUB SERPL-MCNC: 1.4 MG/DL (ref 0.1–1)
BILIRUB SERPL-MCNC: 1.5 MG/DL (ref 0.1–1)
BILIRUB SERPL-MCNC: 1.6 MG/DL (ref 0.1–1)
BILIRUB SERPL-MCNC: 1.7 MG/DL (ref 0.1–1)
BILIRUB SERPL-MCNC: 1.7 MG/DL (ref 0.1–1)
BILIRUB SERPL-MCNC: 1.8 MG/DL (ref 0.1–1)
BILIRUB SERPL-MCNC: 1.9 MG/DL (ref 0.1–1)
BILIRUB SERPL-MCNC: 1.9 MG/DL (ref 0.1–1)
BILIRUB SERPL-MCNC: 2.4 MG/DL (ref 0.1–1)
BILIRUB SERPL-MCNC: 3 MG/DL (ref 0.1–1)
BILIRUB SERPL-MCNC: 3.3 MG/DL (ref 0.1–1)
BILIRUB SERPL-MCNC: 3.5 MG/DL (ref 0.1–1)
BILIRUB SERPL-MCNC: 3.6 MG/DL (ref 0.1–1)
BILIRUB SERPL-MCNC: 3.7 MG/DL (ref 0.1–1)
BILIRUB SERPL-MCNC: 3.7 MG/DL (ref 0.1–1)
BILIRUB SERPL-MCNC: 4.4 MG/DL (ref 0.1–1)
BILIRUB SERPL-MCNC: 5.2 MG/DL (ref 0.1–1)
BILIRUB SERPL-MCNC: 5.6 MG/DL (ref 0.1–1)
BILIRUB SERPL-MCNC: 5.6 MG/DL (ref 0.1–1)
BILIRUB SERPL-MCNC: 6.2 MG/DL (ref 0.1–1)
BILIRUB SERPL-MCNC: 6.3 MG/DL (ref 0.1–1)
BILIRUB SERPL-MCNC: 6.8 MG/DL (ref 0.1–1)
BILIRUB SERPL-MCNC: 6.9 MG/DL (ref 0.1–1)
BILIRUB SERPL-MCNC: 7 MG/DL (ref 0.1–1)
BILIRUB SERPL-MCNC: 7.5 MG/DL (ref 0.1–1)
BILIRUB SERPL-MCNC: 7.6 MG/DL (ref 0.1–1)
BILIRUB SERPL-MCNC: 8.3 MG/DL (ref 0.1–1)
BILIRUB SERPL-MCNC: 9 MG/DL (ref 0.1–1)
BILIRUB UR QL STRIP: NEGATIVE
BNP SERPL-MCNC: 1070 PG/ML (ref 0–99)
BNP SERPL-MCNC: 1261 PG/ML (ref 0–99)
BNP SERPL-MCNC: 1293 PG/ML (ref 0–99)
BNP SERPL-MCNC: 1331 PG/ML (ref 0–99)
BNP SERPL-MCNC: 1341 PG/ML (ref 0–99)
BNP SERPL-MCNC: 1356 PG/ML (ref 0–99)
BNP SERPL-MCNC: 1367 PG/ML (ref 0–99)
BNP SERPL-MCNC: 1390 PG/ML (ref 0–99)
BNP SERPL-MCNC: 1412 PG/ML (ref 0–99)
BNP SERPL-MCNC: 1434 PG/ML (ref 0–99)
BNP SERPL-MCNC: 1505 PG/ML (ref 0–99)
BNP SERPL-MCNC: 1514 PG/ML (ref 0–99)
BNP SERPL-MCNC: 1601 PG/ML (ref 0–99)
BNP SERPL-MCNC: 1636 PG/ML (ref 0–99)
BNP SERPL-MCNC: 1763 PG/ML (ref 0–99)
BNP SERPL-MCNC: 1765 PG/ML (ref 0–99)
BNP SERPL-MCNC: 1850 PG/ML (ref 0–99)
BNP SERPL-MCNC: 1953 PG/ML (ref 0–99)
BNP SERPL-MCNC: 1975 PG/ML (ref 0–99)
BNP SERPL-MCNC: 1992 PG/ML (ref 0–99)
BNP SERPL-MCNC: 2184 PG/ML (ref 0–99)
BNP SERPL-MCNC: 2362 PG/ML (ref 0–99)
BNP SERPL-MCNC: 2520 PG/ML (ref 0–99)
BNP SERPL-MCNC: 2558 PG/ML (ref 0–99)
BNP SERPL-MCNC: 2568 PG/ML (ref 0–99)
BNP SERPL-MCNC: 2635 PG/ML (ref 0–99)
BNP SERPL-MCNC: 2934 PG/ML (ref 0–99)
BNP SERPL-MCNC: 861 PG/ML (ref 0–99)
BODY FLD TYPE: NORMAL
BODY FLUID SOURCE, LDH: NORMAL
BSA FOR ECHO PROCEDURE: 1.75 M2
BSA FOR ECHO PROCEDURE: 1.88 M2
BSA FOR ECHO PROCEDURE: 1.96 M2
BUN SERPL-MCNC: 38 MG/DL (ref 6–20)
BUN SERPL-MCNC: 40 MG/DL (ref 6–20)
BUN SERPL-MCNC: 41 MG/DL (ref 6–20)
BUN SERPL-MCNC: 42 MG/DL (ref 6–20)
BUN SERPL-MCNC: 43 MG/DL (ref 6–20)
BUN SERPL-MCNC: 45 MG/DL (ref 6–20)
BUN SERPL-MCNC: 47 MG/DL (ref 6–20)
BUN SERPL-MCNC: 48 MG/DL (ref 6–20)
BUN SERPL-MCNC: 48 MG/DL (ref 6–20)
BUN SERPL-MCNC: 50 MG/DL (ref 6–20)
BUN SERPL-MCNC: 51 MG/DL (ref 6–20)
BUN SERPL-MCNC: 53 MG/DL (ref 6–20)
BUN SERPL-MCNC: 54 MG/DL (ref 6–20)
BUN SERPL-MCNC: 55 MG/DL (ref 6–20)
BUN SERPL-MCNC: 56 MG/DL (ref 6–20)
BUN SERPL-MCNC: 56 MG/DL (ref 6–20)
BUN SERPL-MCNC: 58 MG/DL (ref 6–20)
BUN SERPL-MCNC: 59 MG/DL (ref 6–20)
BUN SERPL-MCNC: 59 MG/DL (ref 6–20)
BUN SERPL-MCNC: 60 MG/DL (ref 6–20)
BUN SERPL-MCNC: 61 MG/DL (ref 6–20)
BUN SERPL-MCNC: 62 MG/DL (ref 6–20)
BUN SERPL-MCNC: 62 MG/DL (ref 6–20)
BUN SERPL-MCNC: 63 MG/DL (ref 6–20)
BUN SERPL-MCNC: 64 MG/DL (ref 6–20)
BUN SERPL-MCNC: 65 MG/DL (ref 6–20)
BUN SERPL-MCNC: 66 MG/DL (ref 6–20)
BUN SERPL-MCNC: 67 MG/DL (ref 6–20)
BUN SERPL-MCNC: 68 MG/DL (ref 6–20)
BUN SERPL-MCNC: 68 MG/DL (ref 6–20)
BUN SERPL-MCNC: 69 MG/DL (ref 6–20)
BUN SERPL-MCNC: 70 MG/DL (ref 6–20)
BUN SERPL-MCNC: 70 MG/DL (ref 6–20)
BUN SERPL-MCNC: 71 MG/DL (ref 6–20)
BUN SERPL-MCNC: 72 MG/DL (ref 6–20)
BUN SERPL-MCNC: 72 MG/DL (ref 6–20)
BUN SERPL-MCNC: 73 MG/DL (ref 6–20)
BUN SERPL-MCNC: 74 MG/DL (ref 6–20)
BUN SERPL-MCNC: 75 MG/DL (ref 6–20)
BUN SERPL-MCNC: 75 MG/DL (ref 6–20)
BUN SERPL-MCNC: 76 MG/DL (ref 6–20)
BUN SERPL-MCNC: 77 MG/DL (ref 6–20)
BUN SERPL-MCNC: 77 MG/DL (ref 6–20)
BUN SERPL-MCNC: 78 MG/DL (ref 6–20)
BUN SERPL-MCNC: 79 MG/DL (ref 6–20)
BUN SERPL-MCNC: 80 MG/DL (ref 6–20)
BUN SERPL-MCNC: 80 MG/DL (ref 6–20)
BUN SERPL-MCNC: 81 MG/DL (ref 6–20)
BUN SERPL-MCNC: 81 MG/DL (ref 6–20)
BUN SERPL-MCNC: 85 MG/DL (ref 6–20)
BUN SERPL-MCNC: 86 MG/DL (ref 6–20)
BUN SERPL-MCNC: 88 MG/DL (ref 6–20)
BURR CELLS BLD QL SMEAR: ABNORMAL
C3 SERPL-MCNC: 125 MG/DL (ref 50–180)
C4 SERPL-MCNC: 37 MG/DL (ref 11–44)
CALCIUM SERPL-MCNC: 7 MG/DL (ref 8.7–10.5)
CALCIUM SERPL-MCNC: 7.3 MG/DL (ref 8.7–10.5)
CALCIUM SERPL-MCNC: 7.4 MG/DL (ref 8.7–10.5)
CALCIUM SERPL-MCNC: 7.4 MG/DL (ref 8.7–10.5)
CALCIUM SERPL-MCNC: 7.5 MG/DL (ref 8.7–10.5)
CALCIUM SERPL-MCNC: 7.6 MG/DL (ref 8.7–10.5)
CALCIUM SERPL-MCNC: 7.8 MG/DL (ref 8.7–10.5)
CALCIUM SERPL-MCNC: 7.9 MG/DL (ref 8.7–10.5)
CALCIUM SERPL-MCNC: 8 MG/DL (ref 8.7–10.5)
CALCIUM SERPL-MCNC: 8.1 MG/DL (ref 8.7–10.5)
CALCIUM SERPL-MCNC: 8.2 MG/DL (ref 8.7–10.5)
CALCIUM SERPL-MCNC: 8.3 MG/DL (ref 8.7–10.5)
CALCIUM SERPL-MCNC: 8.4 MG/DL (ref 8.7–10.5)
CALCIUM SERPL-MCNC: 8.5 MG/DL (ref 8.7–10.5)
CALCIUM SERPL-MCNC: 8.6 MG/DL (ref 8.7–10.5)
CALCIUM SERPL-MCNC: 8.7 MG/DL (ref 8.7–10.5)
CALCIUM SERPL-MCNC: 8.8 MG/DL (ref 8.7–10.5)
CALCIUM SERPL-MCNC: 8.8 MG/DL (ref 8.7–10.5)
CALCIUM SERPL-MCNC: 8.9 MG/DL (ref 8.7–10.5)
CALCIUM SERPL-MCNC: 9 MG/DL (ref 8.7–10.5)
CALCIUM SERPL-MCNC: 9.1 MG/DL (ref 8.7–10.5)
CALCIUM SERPL-MCNC: 9.1 MG/DL (ref 8.7–10.5)
CALCIUM SERPL-MCNC: 9.2 MG/DL (ref 8.7–10.5)
CALCIUM SERPL-MCNC: 9.2 MG/DL (ref 8.7–10.5)
CARDIOLIPIN IGG SER IA-ACNC: <9.4 GPL (ref 0–14.99)
CARDIOLIPIN IGM SER IA-ACNC: 12 MPL (ref 0–12.49)
CHLORIDE SERPL-SCNC: 100 MMOL/L (ref 95–110)
CHLORIDE SERPL-SCNC: 101 MMOL/L (ref 95–110)
CHLORIDE SERPL-SCNC: 102 MMOL/L (ref 95–110)
CHLORIDE SERPL-SCNC: 103 MMOL/L (ref 95–110)
CHLORIDE SERPL-SCNC: 104 MMOL/L (ref 95–110)
CHLORIDE SERPL-SCNC: 104 MMOL/L (ref 95–110)
CHLORIDE SERPL-SCNC: 105 MMOL/L (ref 95–110)
CHLORIDE SERPL-SCNC: 106 MMOL/L (ref 95–110)
CHLORIDE SERPL-SCNC: 107 MMOL/L (ref 95–110)
CHLORIDE SERPL-SCNC: 108 MMOL/L (ref 95–110)
CHLORIDE SERPL-SCNC: 110 MMOL/L (ref 95–110)
CHLORIDE SERPL-SCNC: 111 MMOL/L (ref 95–110)
CHLORIDE SERPL-SCNC: 111 MMOL/L (ref 95–110)
CHLORIDE SERPL-SCNC: 88 MMOL/L (ref 95–110)
CHLORIDE SERPL-SCNC: 89 MMOL/L (ref 95–110)
CHLORIDE SERPL-SCNC: 90 MMOL/L (ref 95–110)
CHLORIDE SERPL-SCNC: 91 MMOL/L (ref 95–110)
CHLORIDE SERPL-SCNC: 92 MMOL/L (ref 95–110)
CHLORIDE SERPL-SCNC: 93 MMOL/L (ref 95–110)
CHLORIDE SERPL-SCNC: 94 MMOL/L (ref 95–110)
CHLORIDE SERPL-SCNC: 95 MMOL/L (ref 95–110)
CHLORIDE SERPL-SCNC: 96 MMOL/L (ref 95–110)
CHLORIDE SERPL-SCNC: 97 MMOL/L (ref 95–110)
CHLORIDE SERPL-SCNC: 98 MMOL/L (ref 95–110)
CHLORIDE SERPL-SCNC: 99 MMOL/L (ref 95–110)
CLARITY UR REFRACT.AUTO: ABNORMAL
CLARITY UR REFRACT.AUTO: CLEAR
CO2 SERPL-SCNC: 16 MMOL/L (ref 23–29)
CO2 SERPL-SCNC: 16 MMOL/L (ref 23–29)
CO2 SERPL-SCNC: 17 MMOL/L (ref 23–29)
CO2 SERPL-SCNC: 18 MMOL/L (ref 23–29)
CO2 SERPL-SCNC: 18 MMOL/L (ref 23–29)
CO2 SERPL-SCNC: 19 MMOL/L (ref 23–29)
CO2 SERPL-SCNC: 20 MMOL/L (ref 23–29)
CO2 SERPL-SCNC: 21 MMOL/L (ref 23–29)
CO2 SERPL-SCNC: 22 MMOL/L (ref 23–29)
CO2 SERPL-SCNC: 23 MMOL/L (ref 23–29)
CO2 SERPL-SCNC: 24 MMOL/L (ref 23–29)
CO2 SERPL-SCNC: 25 MMOL/L (ref 23–29)
CO2 SERPL-SCNC: 26 MMOL/L (ref 23–29)
CO2 SERPL-SCNC: 27 MMOL/L (ref 23–29)
CO2 SERPL-SCNC: 28 MMOL/L (ref 23–29)
CO2 SERPL-SCNC: 29 MMOL/L (ref 23–29)
CO2 SERPL-SCNC: 29 MMOL/L (ref 23–29)
CO2 SERPL-SCNC: 30 MMOL/L (ref 23–29)
CO2 SERPL-SCNC: 31 MMOL/L (ref 23–29)
CO2 SERPL-SCNC: 31 MMOL/L (ref 23–29)
COLOR FLD: YELLOW
COLOR UR AUTO: ABNORMAL
COLOR UR AUTO: YELLOW
CREAT SERPL-MCNC: 1.8 MG/DL (ref 0.5–1.4)
CREAT SERPL-MCNC: 1.9 MG/DL (ref 0.5–1.4)
CREAT SERPL-MCNC: 2 MG/DL (ref 0.5–1.4)
CREAT SERPL-MCNC: 2.1 MG/DL (ref 0.5–1.4)
CREAT SERPL-MCNC: 2.2 MG/DL (ref 0.5–1.4)
CREAT SERPL-MCNC: 2.3 MG/DL (ref 0.5–1.4)
CREAT SERPL-MCNC: 2.4 MG/DL (ref 0.5–1.4)
CREAT SERPL-MCNC: 2.5 MG/DL (ref 0.5–1.4)
CREAT SERPL-MCNC: 2.6 MG/DL (ref 0.5–1.4)
CREAT SERPL-MCNC: 2.7 MG/DL (ref 0.5–1.4)
CREAT SERPL-MCNC: 2.8 MG/DL (ref 0.5–1.4)
CREAT SERPL-MCNC: 2.9 MG/DL (ref 0.5–1.4)
CREAT SERPL-MCNC: 3 MG/DL (ref 0.5–1.4)
CREAT SERPL-MCNC: 3.1 MG/DL (ref 0.5–1.4)
CREAT SERPL-MCNC: 3.2 MG/DL (ref 0.5–1.4)
CREAT SERPL-MCNC: 3.3 MG/DL (ref 0.5–1.4)
CREAT SERPL-MCNC: 3.4 MG/DL (ref 0.5–1.4)
CREAT SERPL-MCNC: 3.5 MG/DL (ref 0.5–1.4)
CREAT SERPL-MCNC: 3.7 MG/DL (ref 0.5–1.4)
CREAT SERPL-MCNC: 4 MG/DL (ref 0.5–1.4)
CREAT SERPL-MCNC: 4 MG/DL (ref 0.5–1.4)
CREAT UR-MCNC: 12 MG/DL (ref 23–375)
CREAT UR-MCNC: 20 MG/DL (ref 23–375)
CREAT UR-MCNC: 21 MG/DL (ref 23–375)
CREAT UR-MCNC: 40 MG/DL (ref 23–375)
CRP SERPL-MCNC: 5.57 MG/L (ref 0–3.19)
CRYOGLOB SER QL: NORMAL
CV ECHO LV RWT: 0.29 CM
CV ECHO LV RWT: 0.32 CM
CV ECHO LV RWT: 0.35 CM
DACRYOCYTES BLD QL SMEAR: ABNORMAL
DELSYS: ABNORMAL
DELSYS: ABNORMAL
DIFFERENTIAL METHOD: ABNORMAL
DOP CALC AO PEAK VEL: 0.28 M/S
DOP CALC AO PEAK VEL: 0.87 M/S
DOP CALC AO PEAK VEL: 0.96 M/S
DOP CALC AO VTI: 12 CM
DOP CALC AO VTI: 15.86 CM
DOP CALC AO VTI: 19.24 CM
DOP CALC LVOT AREA: 3.1 CM2
DOP CALC LVOT AREA: 3.3 CM2
DOP CALC LVOT AREA: 3.8 CM2
DOP CALC LVOT DIAMETER: 2 CM
DOP CALC LVOT DIAMETER: 2.04 CM
DOP CALC LVOT DIAMETER: 2.21 CM
DOP CALC LVOT PEAK VEL: 0.55 M/S
DOP CALC LVOT PEAK VEL: 0.57 M/S
DOP CALC LVOT PEAK VEL: 0.73 M/S
DOP CALC LVOT STROKE VOLUME: 22.55 CM3
DOP CALC LVOT STROKE VOLUME: 32.4 CM3
DOP CALC LVOT STROKE VOLUME: 38.35 CM3
DOP CALCLVOT PEAK VEL VTI: 11.74 CM
DOP CALCLVOT PEAK VEL VTI: 7.18 CM
DOP CALCLVOT PEAK VEL VTI: 8.45 CM
DSDNA AB SER-ACNC: NORMAL [IU]/ML
E WAVE DECELERATION TIME: 107.85 MSEC
E WAVE DECELERATION TIME: 159.24 MSEC
E WAVE DECELERATION TIME: 90.86 MSEC
E/A RATIO: 4.3
E/A RATIO: 4.75
E/A RATIO: 5.63
E/E' RATIO: 13.41 M/S
E/E' RATIO: 19.29 M/S
E/E' RATIO: 21.09 M/S
ECHO LV POSTERIOR WALL: 0.72 CM (ref 0.6–1.1)
ECHO LV POSTERIOR WALL: 0.94 CM (ref 0.6–1.1)
ECHO LV POSTERIOR WALL: 0.98 CM (ref 0.6–1.1)
EOSINOPHIL # BLD AUTO: 0 K/UL (ref 0–0.5)
EOSINOPHIL # BLD AUTO: 0 K/UL (ref 0–0.5)
EOSINOPHIL # BLD AUTO: 0.1 K/UL (ref 0–0.5)
EOSINOPHIL # BLD AUTO: 0.2 K/UL (ref 0–0.5)
EOSINOPHIL # BLD AUTO: 0.3 K/UL (ref 0–0.5)
EOSINOPHIL # BLD AUTO: 0.4 K/UL (ref 0–0.5)
EOSINOPHIL NFR BLD: 0.5 % (ref 0–8)
EOSINOPHIL NFR BLD: 0.5 % (ref 0–8)
EOSINOPHIL NFR BLD: 1.1 % (ref 0–8)
EOSINOPHIL NFR BLD: 11.1 % (ref 0–8)
EOSINOPHIL NFR BLD: 11.4 % (ref 0–8)
EOSINOPHIL NFR BLD: 2 % (ref 0–8)
EOSINOPHIL NFR BLD: 2 % (ref 0–8)
EOSINOPHIL NFR BLD: 2.2 % (ref 0–8)
EOSINOPHIL NFR BLD: 2.3 % (ref 0–8)
EOSINOPHIL NFR BLD: 2.5 % (ref 0–8)
EOSINOPHIL NFR BLD: 2.7 % (ref 0–8)
EOSINOPHIL NFR BLD: 2.9 % (ref 0–8)
EOSINOPHIL NFR BLD: 2.9 % (ref 0–8)
EOSINOPHIL NFR BLD: 3 % (ref 0–8)
EOSINOPHIL NFR BLD: 3.1 % (ref 0–8)
EOSINOPHIL NFR BLD: 3.3 % (ref 0–8)
EOSINOPHIL NFR BLD: 3.3 % (ref 0–8)
EOSINOPHIL NFR BLD: 3.4 % (ref 0–8)
EOSINOPHIL NFR BLD: 3.5 % (ref 0–8)
EOSINOPHIL NFR BLD: 3.7 % (ref 0–8)
EOSINOPHIL NFR BLD: 3.9 % (ref 0–8)
EOSINOPHIL NFR BLD: 4 % (ref 0–8)
EOSINOPHIL NFR BLD: 4.1 % (ref 0–8)
EOSINOPHIL NFR BLD: 4.3 % (ref 0–8)
EOSINOPHIL NFR BLD: 4.4 % (ref 0–8)
EOSINOPHIL NFR BLD: 4.5 % (ref 0–8)
EOSINOPHIL NFR BLD: 4.6 % (ref 0–8)
EOSINOPHIL NFR BLD: 4.7 % (ref 0–8)
EOSINOPHIL NFR BLD: 4.7 % (ref 0–8)
EOSINOPHIL NFR BLD: 4.8 % (ref 0–8)
EOSINOPHIL NFR BLD: 5 % (ref 0–8)
EOSINOPHIL NFR BLD: 5.2 % (ref 0–8)
EOSINOPHIL NFR BLD: 5.3 % (ref 0–8)
EOSINOPHIL NFR BLD: 5.4 % (ref 0–8)
EOSINOPHIL NFR BLD: 5.4 % (ref 0–8)
EOSINOPHIL NFR BLD: 5.9 % (ref 0–8)
EOSINOPHIL NFR BLD: 6 % (ref 0–8)
EOSINOPHIL NFR BLD: 6.1 % (ref 0–8)
EOSINOPHIL NFR BLD: 6.3 % (ref 0–8)
EOSINOPHIL NFR BLD: 6.3 % (ref 0–8)
EOSINOPHIL NFR BLD: 6.5 % (ref 0–8)
EOSINOPHIL NFR BLD: 6.5 % (ref 0–8)
EOSINOPHIL NFR BLD: 7 % (ref 0–8)
EOSINOPHIL NFR BLD: 7.1 % (ref 0–8)
EOSINOPHIL NFR BLD: 7.1 % (ref 0–8)
EOSINOPHIL NFR BLD: 7.3 % (ref 0–8)
EOSINOPHIL NFR BLD: 8.1 % (ref 0–8)
EOSINOPHIL NFR BLD: 8.8 % (ref 0–8)
EOSINOPHIL NFR BLD: 9 % (ref 0–8)
ERYTHROCYTE [DISTWIDTH] IN BLOOD BY AUTOMATED COUNT: 14.9 % (ref 11.5–14.5)
ERYTHROCYTE [DISTWIDTH] IN BLOOD BY AUTOMATED COUNT: 15.2 % (ref 11.5–14.5)
ERYTHROCYTE [DISTWIDTH] IN BLOOD BY AUTOMATED COUNT: 15.7 % (ref 11.5–14.5)
ERYTHROCYTE [DISTWIDTH] IN BLOOD BY AUTOMATED COUNT: 15.8 % (ref 11.5–14.5)
ERYTHROCYTE [DISTWIDTH] IN BLOOD BY AUTOMATED COUNT: 16.1 % (ref 11.5–14.5)
ERYTHROCYTE [DISTWIDTH] IN BLOOD BY AUTOMATED COUNT: 16.3 % (ref 11.5–14.5)
ERYTHROCYTE [DISTWIDTH] IN BLOOD BY AUTOMATED COUNT: 16.5 % (ref 11.5–14.5)
ERYTHROCYTE [DISTWIDTH] IN BLOOD BY AUTOMATED COUNT: 16.7 % (ref 11.5–14.5)
ERYTHROCYTE [DISTWIDTH] IN BLOOD BY AUTOMATED COUNT: 16.7 % (ref 11.5–14.5)
ERYTHROCYTE [DISTWIDTH] IN BLOOD BY AUTOMATED COUNT: 16.8 % (ref 11.5–14.5)
ERYTHROCYTE [DISTWIDTH] IN BLOOD BY AUTOMATED COUNT: 16.8 % (ref 11.5–14.5)
ERYTHROCYTE [DISTWIDTH] IN BLOOD BY AUTOMATED COUNT: 16.9 % (ref 11.5–14.5)
ERYTHROCYTE [DISTWIDTH] IN BLOOD BY AUTOMATED COUNT: 17.1 % (ref 11.5–14.5)
ERYTHROCYTE [DISTWIDTH] IN BLOOD BY AUTOMATED COUNT: 17.1 % (ref 11.5–14.5)
ERYTHROCYTE [DISTWIDTH] IN BLOOD BY AUTOMATED COUNT: 17.2 % (ref 11.5–14.5)
ERYTHROCYTE [DISTWIDTH] IN BLOOD BY AUTOMATED COUNT: 17.7 % (ref 11.5–14.5)
ERYTHROCYTE [DISTWIDTH] IN BLOOD BY AUTOMATED COUNT: 18.1 % (ref 11.5–14.5)
ERYTHROCYTE [DISTWIDTH] IN BLOOD BY AUTOMATED COUNT: 18.3 % (ref 11.5–14.5)
ERYTHROCYTE [DISTWIDTH] IN BLOOD BY AUTOMATED COUNT: 18.3 % (ref 11.5–14.5)
ERYTHROCYTE [DISTWIDTH] IN BLOOD BY AUTOMATED COUNT: 18.4 % (ref 11.5–14.5)
ERYTHROCYTE [DISTWIDTH] IN BLOOD BY AUTOMATED COUNT: 18.6 % (ref 11.5–14.5)
ERYTHROCYTE [DISTWIDTH] IN BLOOD BY AUTOMATED COUNT: 18.8 % (ref 11.5–14.5)
ERYTHROCYTE [DISTWIDTH] IN BLOOD BY AUTOMATED COUNT: 18.9 % (ref 11.5–14.5)
ERYTHROCYTE [DISTWIDTH] IN BLOOD BY AUTOMATED COUNT: 19 % (ref 11.5–14.5)
ERYTHROCYTE [DISTWIDTH] IN BLOOD BY AUTOMATED COUNT: 19 % (ref 11.5–14.5)
ERYTHROCYTE [DISTWIDTH] IN BLOOD BY AUTOMATED COUNT: 19.1 % (ref 11.5–14.5)
ERYTHROCYTE [DISTWIDTH] IN BLOOD BY AUTOMATED COUNT: 19.3 % (ref 11.5–14.5)
ERYTHROCYTE [DISTWIDTH] IN BLOOD BY AUTOMATED COUNT: 19.4 % (ref 11.5–14.5)
ERYTHROCYTE [DISTWIDTH] IN BLOOD BY AUTOMATED COUNT: 19.8 % (ref 11.5–14.5)
ERYTHROCYTE [DISTWIDTH] IN BLOOD BY AUTOMATED COUNT: 19.9 % (ref 11.5–14.5)
ERYTHROCYTE [DISTWIDTH] IN BLOOD BY AUTOMATED COUNT: 19.9 % (ref 11.5–14.5)
ERYTHROCYTE [DISTWIDTH] IN BLOOD BY AUTOMATED COUNT: 20.2 % (ref 11.5–14.5)
ERYTHROCYTE [DISTWIDTH] IN BLOOD BY AUTOMATED COUNT: 21.4 % (ref 11.5–14.5)
ERYTHROCYTE [DISTWIDTH] IN BLOOD BY AUTOMATED COUNT: 21.4 % (ref 11.5–14.5)
ERYTHROCYTE [DISTWIDTH] IN BLOOD BY AUTOMATED COUNT: 22.1 % (ref 11.5–14.5)
ERYTHROCYTE [DISTWIDTH] IN BLOOD BY AUTOMATED COUNT: 22.6 % (ref 11.5–14.5)
ERYTHROCYTE [DISTWIDTH] IN BLOOD BY AUTOMATED COUNT: 22.8 % (ref 11.5–14.5)
ERYTHROCYTE [DISTWIDTH] IN BLOOD BY AUTOMATED COUNT: 22.9 % (ref 11.5–14.5)
ERYTHROCYTE [DISTWIDTH] IN BLOOD BY AUTOMATED COUNT: 23.2 % (ref 11.5–14.5)
ERYTHROCYTE [DISTWIDTH] IN BLOOD BY AUTOMATED COUNT: 23.2 % (ref 11.5–14.5)
ERYTHROCYTE [DISTWIDTH] IN BLOOD BY AUTOMATED COUNT: 23.4 % (ref 11.5–14.5)
ERYTHROCYTE [DISTWIDTH] IN BLOOD BY AUTOMATED COUNT: 23.4 % (ref 11.5–14.5)
ERYTHROCYTE [DISTWIDTH] IN BLOOD BY AUTOMATED COUNT: 23.5 % (ref 11.5–14.5)
ERYTHROCYTE [DISTWIDTH] IN BLOOD BY AUTOMATED COUNT: 23.9 % (ref 11.5–14.5)
ERYTHROCYTE [DISTWIDTH] IN BLOOD BY AUTOMATED COUNT: 24 % (ref 11.5–14.5)
ERYTHROCYTE [DISTWIDTH] IN BLOOD BY AUTOMATED COUNT: 24.4 % (ref 11.5–14.5)
ERYTHROCYTE [DISTWIDTH] IN BLOOD BY AUTOMATED COUNT: 24.6 % (ref 11.5–14.5)
ERYTHROCYTE [DISTWIDTH] IN BLOOD BY AUTOMATED COUNT: 25.2 % (ref 11.5–14.5)
ERYTHROCYTE [DISTWIDTH] IN BLOOD BY AUTOMATED COUNT: 25.5 % (ref 11.5–14.5)
ERYTHROCYTE [DISTWIDTH] IN BLOOD BY AUTOMATED COUNT: 26 % (ref 11.5–14.5)
ERYTHROCYTE [DISTWIDTH] IN BLOOD BY AUTOMATED COUNT: 26.3 % (ref 11.5–14.5)
ERYTHROCYTE [DISTWIDTH] IN BLOOD BY AUTOMATED COUNT: 26.6 % (ref 11.5–14.5)
ERYTHROCYTE [SEDIMENTATION RATE] IN BLOOD BY WESTERGREN METHOD: 67 MM/HR (ref 0–23)
EST. GFR  (AFRICAN AMERICAN): 18.1 ML/MIN/1.73 M^2
EST. GFR  (AFRICAN AMERICAN): 18.1 ML/MIN/1.73 M^2
EST. GFR  (AFRICAN AMERICAN): 19.9 ML/MIN/1.73 M^2
EST. GFR  (AFRICAN AMERICAN): 21.3 ML/MIN/1.73 M^2
EST. GFR  (AFRICAN AMERICAN): 22.1 ML/MIN/1.73 M^2
EST. GFR  (AFRICAN AMERICAN): 22.9 ML/MIN/1.73 M^2
EST. GFR  (AFRICAN AMERICAN): 22.9 ML/MIN/1.73 M^2
EST. GFR  (AFRICAN AMERICAN): 23 ML/MIN/1.73 M^2
EST. GFR  (AFRICAN AMERICAN): 23 ML/MIN/1.73 M^2
EST. GFR  (AFRICAN AMERICAN): 23.7 ML/MIN/1.73 M^2
EST. GFR  (AFRICAN AMERICAN): 23.7 ML/MIN/1.73 M^2
EST. GFR  (AFRICAN AMERICAN): 23.9 ML/MIN/1.73 M^2
EST. GFR  (AFRICAN AMERICAN): 24.7 ML/MIN/1.73 M^2
EST. GFR  (AFRICAN AMERICAN): 25.7 ML/MIN/1.73 M^2
EST. GFR  (AFRICAN AMERICAN): 25.7 ML/MIN/1.73 M^2
EST. GFR  (AFRICAN AMERICAN): 25.8 ML/MIN/1.73 M^2
EST. GFR  (AFRICAN AMERICAN): 26.7 ML/MIN/1.73 M^2
EST. GFR  (AFRICAN AMERICAN): 26.9 ML/MIN/1.73 M^2
EST. GFR  (AFRICAN AMERICAN): 26.9 ML/MIN/1.73 M^2
EST. GFR  (AFRICAN AMERICAN): 27.9 ML/MIN/1.73 M^2
EST. GFR  (AFRICAN AMERICAN): 29.1 ML/MIN/1.73 M^2
EST. GFR  (AFRICAN AMERICAN): 30.5 ML/MIN/1.73 M^2
EST. GFR  (AFRICAN AMERICAN): 32 ML/MIN/1.73 M^2
EST. GFR  (AFRICAN AMERICAN): 33.6 ML/MIN/1.73 M^2
EST. GFR  (AFRICAN AMERICAN): 35.4 ML/MIN/1.73 M^2
EST. GFR  (AFRICAN AMERICAN): 37.3 ML/MIN/1.73 M^2
EST. GFR  (AFRICAN AMERICAN): 39.5 ML/MIN/1.73 M^2
EST. GFR  (AFRICAN AMERICAN): 41.9 ML/MIN/1.73 M^2
EST. GFR  (AFRICAN AMERICAN): 44.6 ML/MIN/1.73 M^2
EST. GFR  (AFRICAN AMERICAN): 47.6 ML/MIN/1.73 M^2
EST. GFR  (NON AFRICAN AMERICAN): 15.7 ML/MIN/1.73 M^2
EST. GFR  (NON AFRICAN AMERICAN): 15.7 ML/MIN/1.73 M^2
EST. GFR  (NON AFRICAN AMERICAN): 17.2 ML/MIN/1.73 M^2
EST. GFR  (NON AFRICAN AMERICAN): 18.4 ML/MIN/1.73 M^2
EST. GFR  (NON AFRICAN AMERICAN): 19.1 ML/MIN/1.73 M^2
EST. GFR  (NON AFRICAN AMERICAN): 19.8 ML/MIN/1.73 M^2
EST. GFR  (NON AFRICAN AMERICAN): 19.8 ML/MIN/1.73 M^2
EST. GFR  (NON AFRICAN AMERICAN): 19.9 ML/MIN/1.73 M^2
EST. GFR  (NON AFRICAN AMERICAN): 19.9 ML/MIN/1.73 M^2
EST. GFR  (NON AFRICAN AMERICAN): 20.5 ML/MIN/1.73 M^2
EST. GFR  (NON AFRICAN AMERICAN): 20.5 ML/MIN/1.73 M^2
EST. GFR  (NON AFRICAN AMERICAN): 20.7 ML/MIN/1.73 M^2
EST. GFR  (NON AFRICAN AMERICAN): 21.3 ML/MIN/1.73 M^2
EST. GFR  (NON AFRICAN AMERICAN): 22.2 ML/MIN/1.73 M^2
EST. GFR  (NON AFRICAN AMERICAN): 22.2 ML/MIN/1.73 M^2
EST. GFR  (NON AFRICAN AMERICAN): 22.3 ML/MIN/1.73 M^2
EST. GFR  (NON AFRICAN AMERICAN): 23.1 ML/MIN/1.73 M^2
EST. GFR  (NON AFRICAN AMERICAN): 23.3 ML/MIN/1.73 M^2
EST. GFR  (NON AFRICAN AMERICAN): 23.3 ML/MIN/1.73 M^2
EST. GFR  (NON AFRICAN AMERICAN): 24.1 ML/MIN/1.73 M^2
EST. GFR  (NON AFRICAN AMERICAN): 25.2 ML/MIN/1.73 M^2
EST. GFR  (NON AFRICAN AMERICAN): 26.4 ML/MIN/1.73 M^2
EST. GFR  (NON AFRICAN AMERICAN): 27.7 ML/MIN/1.73 M^2
EST. GFR  (NON AFRICAN AMERICAN): 29.1 ML/MIN/1.73 M^2
EST. GFR  (NON AFRICAN AMERICAN): 30.6 ML/MIN/1.73 M^2
EST. GFR  (NON AFRICAN AMERICAN): 32.3 ML/MIN/1.73 M^2
EST. GFR  (NON AFRICAN AMERICAN): 34.2 ML/MIN/1.73 M^2
EST. GFR  (NON AFRICAN AMERICAN): 36.2 ML/MIN/1.73 M^2
EST. GFR  (NON AFRICAN AMERICAN): 38.6 ML/MIN/1.73 M^2
EST. GFR  (NON AFRICAN AMERICAN): 41.2 ML/MIN/1.73 M^2
ESTIMATED AVG GLUCOSE: 169 MG/DL (ref 68–131)
ESTIMATED AVG GLUCOSE: 180 MG/DL (ref 68–131)
ESTIMATED AVG GLUCOSE: 186 MG/DL (ref 68–131)
ESTIMATED AVG GLUCOSE: 237 MG/DL (ref 68–131)
FERRITIN SERPL-MCNC: 35 NG/ML (ref 20–300)
FINAL PATHOLOGIC DIAGNOSIS: NORMAL
FIO2: 21
FOLATE SERPL-MCNC: 11.9 NG/ML (ref 4–24)
FRACTIONAL SHORTENING: 10 % (ref 28–44)
FRACTIONAL SHORTENING: 13 % (ref 28–44)
FRACTIONAL SHORTENING: 13 % (ref 28–44)
FUNGUS SPEC CULT: NORMAL
GGT SERPL-CCNC: 84 U/L (ref 8–55)
GLUCOSE FLD-MCNC: 118 MG/DL
GLUCOSE SERPL-MCNC: 105 MG/DL (ref 70–110)
GLUCOSE SERPL-MCNC: 108 MG/DL (ref 70–110)
GLUCOSE SERPL-MCNC: 109 MG/DL (ref 70–110)
GLUCOSE SERPL-MCNC: 109 MG/DL (ref 70–110)
GLUCOSE SERPL-MCNC: 111 MG/DL (ref 70–110)
GLUCOSE SERPL-MCNC: 111 MG/DL (ref 70–110)
GLUCOSE SERPL-MCNC: 113 MG/DL (ref 70–110)
GLUCOSE SERPL-MCNC: 114 MG/DL (ref 70–110)
GLUCOSE SERPL-MCNC: 117 MG/DL (ref 70–110)
GLUCOSE SERPL-MCNC: 120 MG/DL (ref 70–110)
GLUCOSE SERPL-MCNC: 122 MG/DL (ref 70–110)
GLUCOSE SERPL-MCNC: 124 MG/DL (ref 70–110)
GLUCOSE SERPL-MCNC: 124 MG/DL (ref 70–110)
GLUCOSE SERPL-MCNC: 125 MG/DL (ref 70–110)
GLUCOSE SERPL-MCNC: 125 MG/DL (ref 70–110)
GLUCOSE SERPL-MCNC: 127 MG/DL (ref 70–110)
GLUCOSE SERPL-MCNC: 127 MG/DL (ref 70–110)
GLUCOSE SERPL-MCNC: 128 MG/DL (ref 70–110)
GLUCOSE SERPL-MCNC: 130 MG/DL (ref 70–110)
GLUCOSE SERPL-MCNC: 132 MG/DL (ref 70–110)
GLUCOSE SERPL-MCNC: 136 MG/DL (ref 70–110)
GLUCOSE SERPL-MCNC: 138 MG/DL (ref 70–110)
GLUCOSE SERPL-MCNC: 138 MG/DL (ref 70–110)
GLUCOSE SERPL-MCNC: 140 MG/DL (ref 70–110)
GLUCOSE SERPL-MCNC: 142 MG/DL (ref 70–110)
GLUCOSE SERPL-MCNC: 143 MG/DL (ref 70–110)
GLUCOSE SERPL-MCNC: 144 MG/DL (ref 70–110)
GLUCOSE SERPL-MCNC: 145 MG/DL (ref 70–110)
GLUCOSE SERPL-MCNC: 146 MG/DL (ref 70–110)
GLUCOSE SERPL-MCNC: 146 MG/DL (ref 70–110)
GLUCOSE SERPL-MCNC: 149 MG/DL (ref 70–110)
GLUCOSE SERPL-MCNC: 150 MG/DL (ref 70–110)
GLUCOSE SERPL-MCNC: 152 MG/DL (ref 70–110)
GLUCOSE SERPL-MCNC: 153 MG/DL (ref 70–110)
GLUCOSE SERPL-MCNC: 155 MG/DL (ref 70–110)
GLUCOSE SERPL-MCNC: 158 MG/DL (ref 70–110)
GLUCOSE SERPL-MCNC: 159 MG/DL (ref 70–110)
GLUCOSE SERPL-MCNC: 160 MG/DL (ref 70–110)
GLUCOSE SERPL-MCNC: 163 MG/DL (ref 70–110)
GLUCOSE SERPL-MCNC: 164 MG/DL (ref 70–110)
GLUCOSE SERPL-MCNC: 164 MG/DL (ref 70–110)
GLUCOSE SERPL-MCNC: 165 MG/DL (ref 70–110)
GLUCOSE SERPL-MCNC: 165 MG/DL (ref 70–110)
GLUCOSE SERPL-MCNC: 166 MG/DL (ref 70–110)
GLUCOSE SERPL-MCNC: 166 MG/DL (ref 70–110)
GLUCOSE SERPL-MCNC: 167 MG/DL (ref 70–110)
GLUCOSE SERPL-MCNC: 168 MG/DL (ref 70–110)
GLUCOSE SERPL-MCNC: 168 MG/DL (ref 70–110)
GLUCOSE SERPL-MCNC: 170 MG/DL (ref 70–110)
GLUCOSE SERPL-MCNC: 170 MG/DL (ref 70–110)
GLUCOSE SERPL-MCNC: 173 MG/DL (ref 70–110)
GLUCOSE SERPL-MCNC: 174 MG/DL (ref 70–110)
GLUCOSE SERPL-MCNC: 176 MG/DL (ref 70–110)
GLUCOSE SERPL-MCNC: 181 MG/DL (ref 70–110)
GLUCOSE SERPL-MCNC: 182 MG/DL (ref 70–110)
GLUCOSE SERPL-MCNC: 186 MG/DL (ref 70–110)
GLUCOSE SERPL-MCNC: 186 MG/DL (ref 70–110)
GLUCOSE SERPL-MCNC: 187 MG/DL (ref 70–110)
GLUCOSE SERPL-MCNC: 188 MG/DL (ref 70–110)
GLUCOSE SERPL-MCNC: 196 MG/DL (ref 70–110)
GLUCOSE SERPL-MCNC: 199 MG/DL (ref 70–110)
GLUCOSE SERPL-MCNC: 199 MG/DL (ref 70–110)
GLUCOSE SERPL-MCNC: 205 MG/DL (ref 70–110)
GLUCOSE SERPL-MCNC: 205 MG/DL (ref 70–110)
GLUCOSE SERPL-MCNC: 212 MG/DL (ref 70–110)
GLUCOSE SERPL-MCNC: 215 MG/DL (ref 70–110)
GLUCOSE SERPL-MCNC: 231 MG/DL (ref 70–110)
GLUCOSE SERPL-MCNC: 240 MG/DL (ref 70–110)
GLUCOSE SERPL-MCNC: 267 MG/DL (ref 70–110)
GLUCOSE SERPL-MCNC: 275 MG/DL (ref 70–110)
GLUCOSE SERPL-MCNC: 294 MG/DL (ref 70–110)
GLUCOSE SERPL-MCNC: 306 MG/DL (ref 70–110)
GLUCOSE SERPL-MCNC: 328 MG/DL (ref 70–110)
GLUCOSE SERPL-MCNC: 351 MG/DL (ref 70–110)
GLUCOSE SERPL-MCNC: 430 MG/DL (ref 70–110)
GLUCOSE SERPL-MCNC: 53 MG/DL (ref 70–110)
GLUCOSE SERPL-MCNC: 563 MG/DL (ref 70–110)
GLUCOSE SERPL-MCNC: 68 MG/DL (ref 70–110)
GLUCOSE SERPL-MCNC: 70 MG/DL (ref 70–110)
GLUCOSE SERPL-MCNC: 72 MG/DL (ref 70–110)
GLUCOSE SERPL-MCNC: 78 MG/DL (ref 70–110)
GLUCOSE SERPL-MCNC: 79 MG/DL (ref 70–110)
GLUCOSE SERPL-MCNC: 792 MG/DL (ref 70–110)
GLUCOSE SERPL-MCNC: 88 MG/DL (ref 70–110)
GLUCOSE SERPL-MCNC: 90 MG/DL (ref 70–110)
GLUCOSE SERPL-MCNC: 94 MG/DL (ref 70–110)
GLUCOSE SERPL-MCNC: 95 MG/DL (ref 70–110)
GLUCOSE SERPL-MCNC: 95 MG/DL (ref 70–110)
GLUCOSE SERPL-MCNC: 98 MG/DL (ref 70–110)
GLUCOSE SERPL-MCNC: 99 MG/DL (ref 70–110)
GLUCOSE SERPL-MCNC: 99 MG/DL (ref 70–110)
GLUCOSE UR QL STRIP: NEGATIVE
GRAN CASTS UR QL COMP ASSIST: 2 /LPF
GROSS: NORMAL
HAV IGM SERPL QL IA: NEGATIVE
HBA1C MFR BLD HPLC: 7.5 % (ref 4–5.6)
HBA1C MFR BLD HPLC: 7.9 % (ref 4–5.6)
HBA1C MFR BLD HPLC: 8.1 % (ref 4–5.6)
HBA1C MFR BLD HPLC: 9.9 % (ref 4–5.6)
HBV CORE AB SERPL QL IA: NEGATIVE
HBV CORE IGM SERPL QL IA: NEGATIVE
HBV SURFACE AB SER-ACNC: NEGATIVE M[IU]/ML
HBV SURFACE AG SERPL QL IA: NEGATIVE
HCO3 UR-SCNC: 20.5 MMOL/L (ref 24–28)
HCO3 UR-SCNC: 21.4 MMOL/L (ref 24–28)
HCO3 UR-SCNC: 25.6 MMOL/L (ref 24–28)
HCT VFR BLD AUTO: 26.7 % (ref 40–54)
HCT VFR BLD AUTO: 27.4 % (ref 40–54)
HCT VFR BLD AUTO: 28.1 % (ref 40–54)
HCT VFR BLD AUTO: 29.1 % (ref 40–54)
HCT VFR BLD AUTO: 29.7 % (ref 40–54)
HCT VFR BLD AUTO: 30.1 % (ref 40–54)
HCT VFR BLD AUTO: 30.2 % (ref 40–54)
HCT VFR BLD AUTO: 30.7 % (ref 40–54)
HCT VFR BLD AUTO: 31 % (ref 40–54)
HCT VFR BLD AUTO: 31.1 % (ref 40–54)
HCT VFR BLD AUTO: 31.2 % (ref 40–54)
HCT VFR BLD AUTO: 31.2 % (ref 40–54)
HCT VFR BLD AUTO: 31.3 % (ref 40–54)
HCT VFR BLD AUTO: 31.8 % (ref 40–54)
HCT VFR BLD AUTO: 32.4 % (ref 40–54)
HCT VFR BLD AUTO: 32.4 % (ref 40–54)
HCT VFR BLD AUTO: 32.7 % (ref 40–54)
HCT VFR BLD AUTO: 32.8 % (ref 40–54)
HCT VFR BLD AUTO: 33.2 % (ref 40–54)
HCT VFR BLD AUTO: 33.3 % (ref 40–54)
HCT VFR BLD AUTO: 33.7 % (ref 40–54)
HCT VFR BLD AUTO: 33.7 % (ref 40–54)
HCT VFR BLD AUTO: 34.1 % (ref 40–54)
HCT VFR BLD AUTO: 34.3 % (ref 40–54)
HCT VFR BLD AUTO: 34.3 % (ref 40–54)
HCT VFR BLD AUTO: 34.6 % (ref 40–54)
HCT VFR BLD AUTO: 35 % (ref 40–54)
HCT VFR BLD AUTO: 35.1 % (ref 40–54)
HCT VFR BLD AUTO: 35.3 % (ref 40–54)
HCT VFR BLD AUTO: 35.8 % (ref 40–54)
HCT VFR BLD AUTO: 36 % (ref 40–54)
HCT VFR BLD AUTO: 36 % (ref 40–54)
HCT VFR BLD AUTO: 36.5 % (ref 40–54)
HCT VFR BLD AUTO: 36.5 % (ref 40–54)
HCT VFR BLD AUTO: 36.6 % (ref 40–54)
HCT VFR BLD AUTO: 36.8 % (ref 40–54)
HCT VFR BLD AUTO: 37.4 % (ref 40–54)
HCT VFR BLD AUTO: 37.6 % (ref 40–54)
HCT VFR BLD AUTO: 38.5 % (ref 40–54)
HCT VFR BLD AUTO: 38.8 % (ref 40–54)
HCT VFR BLD AUTO: 39.4 % (ref 40–54)
HCT VFR BLD AUTO: 39.7 % (ref 40–54)
HCT VFR BLD AUTO: 39.8 % (ref 40–54)
HCT VFR BLD AUTO: 40.3 % (ref 40–54)
HCT VFR BLD AUTO: 40.3 % (ref 40–54)
HCT VFR BLD AUTO: 40.6 % (ref 40–54)
HCT VFR BLD AUTO: 40.6 % (ref 40–54)
HCT VFR BLD AUTO: 41.2 % (ref 40–54)
HCT VFR BLD AUTO: 41.6 % (ref 40–54)
HCT VFR BLD AUTO: 42 % (ref 40–54)
HCT VFR BLD AUTO: 42.7 % (ref 40–54)
HCT VFR BLD AUTO: 43.1 % (ref 40–54)
HCT VFR BLD AUTO: 46.7 % (ref 40–54)
HCT VFR BLD AUTO: 47.1 % (ref 40–54)
HCV AB SERPL QL IA: NEGATIVE
HGB BLD-MCNC: 10 G/DL (ref 14–18)
HGB BLD-MCNC: 10.1 G/DL (ref 14–18)
HGB BLD-MCNC: 10.1 G/DL (ref 14–18)
HGB BLD-MCNC: 10.4 G/DL (ref 14–18)
HGB BLD-MCNC: 10.5 G/DL (ref 14–18)
HGB BLD-MCNC: 10.5 G/DL (ref 14–18)
HGB BLD-MCNC: 10.6 G/DL (ref 14–18)
HGB BLD-MCNC: 10.7 G/DL (ref 14–18)
HGB BLD-MCNC: 10.7 G/DL (ref 14–18)
HGB BLD-MCNC: 10.8 G/DL (ref 14–18)
HGB BLD-MCNC: 10.8 G/DL (ref 14–18)
HGB BLD-MCNC: 10.9 G/DL (ref 14–18)
HGB BLD-MCNC: 11 G/DL (ref 14–18)
HGB BLD-MCNC: 11 G/DL (ref 14–18)
HGB BLD-MCNC: 11.1 G/DL (ref 14–18)
HGB BLD-MCNC: 11.1 G/DL (ref 14–18)
HGB BLD-MCNC: 11.2 G/DL (ref 14–18)
HGB BLD-MCNC: 11.2 G/DL (ref 14–18)
HGB BLD-MCNC: 11.4 G/DL (ref 14–18)
HGB BLD-MCNC: 11.5 G/DL (ref 14–18)
HGB BLD-MCNC: 11.5 G/DL (ref 14–18)
HGB BLD-MCNC: 11.6 G/DL (ref 14–18)
HGB BLD-MCNC: 11.7 G/DL (ref 14–18)
HGB BLD-MCNC: 11.7 G/DL (ref 14–18)
HGB BLD-MCNC: 11.8 G/DL (ref 14–18)
HGB BLD-MCNC: 12.3 G/DL (ref 14–18)
HGB BLD-MCNC: 12.4 G/DL (ref 14–18)
HGB BLD-MCNC: 12.5 G/DL (ref 14–18)
HGB BLD-MCNC: 12.5 G/DL (ref 14–18)
HGB BLD-MCNC: 12.7 G/DL (ref 14–18)
HGB BLD-MCNC: 12.9 G/DL (ref 14–18)
HGB BLD-MCNC: 12.9 G/DL (ref 14–18)
HGB BLD-MCNC: 13 G/DL (ref 14–18)
HGB BLD-MCNC: 13.7 G/DL (ref 14–18)
HGB BLD-MCNC: 13.9 G/DL (ref 14–18)
HGB BLD-MCNC: 14.1 G/DL (ref 14–18)
HGB BLD-MCNC: 14.7 G/DL (ref 14–18)
HGB BLD-MCNC: 15.2 G/DL (ref 14–18)
HGB BLD-MCNC: 8.5 G/DL (ref 14–18)
HGB BLD-MCNC: 8.7 G/DL (ref 14–18)
HGB BLD-MCNC: 9 G/DL (ref 14–18)
HGB BLD-MCNC: 9 G/DL (ref 14–18)
HGB BLD-MCNC: 9.1 G/DL (ref 14–18)
HGB BLD-MCNC: 9.3 G/DL (ref 14–18)
HGB BLD-MCNC: 9.3 G/DL (ref 14–18)
HGB BLD-MCNC: 9.4 G/DL (ref 14–18)
HGB BLD-MCNC: 9.4 G/DL (ref 14–18)
HGB BLD-MCNC: 9.5 G/DL (ref 14–18)
HGB BLD-MCNC: 9.5 G/DL (ref 14–18)
HGB BLD-MCNC: 9.7 G/DL (ref 14–18)
HGB BLD-MCNC: 9.7 G/DL (ref 14–18)
HGB BLD-MCNC: 9.8 G/DL (ref 14–18)
HGB BLD-MCNC: 9.9 G/DL (ref 14–18)
HGB BLD-MCNC: 9.9 G/DL (ref 14–18)
HGB UR QL STRIP: ABNORMAL
HGB UR QL STRIP: NEGATIVE
HIV 1+2 AB+HIV1 P24 AG SERPL QL IA: NEGATIVE
HYALINE CASTS UR QL AUTO: 0 /LPF
HYALINE CASTS UR QL AUTO: 10 /LPF
HYALINE CASTS UR QL AUTO: 2 /LPF
HYPOCHROMIA BLD QL SMEAR: ABNORMAL
IGG SERPL-MCNC: 1381 MG/DL (ref 650–1600)
IMM GRANULOCYTES # BLD AUTO: 0 K/UL (ref 0–0.04)
IMM GRANULOCYTES # BLD AUTO: 0.01 K/UL (ref 0–0.04)
IMM GRANULOCYTES # BLD AUTO: 0.02 K/UL (ref 0–0.04)
IMM GRANULOCYTES # BLD AUTO: 0.03 K/UL (ref 0–0.04)
IMM GRANULOCYTES NFR BLD AUTO: 0 % (ref 0–0.5)
IMM GRANULOCYTES NFR BLD AUTO: 0.2 % (ref 0–0.5)
IMM GRANULOCYTES NFR BLD AUTO: 0.3 % (ref 0–0.5)
IMM GRANULOCYTES NFR BLD AUTO: 0.4 % (ref 0–0.5)
IMM GRANULOCYTES NFR BLD AUTO: 0.5 % (ref 0–0.5)
IMM GRANULOCYTES NFR BLD AUTO: 0.8 % (ref 0–0.5)
INR PPP: 1.2 (ref 0.8–1.2)
INR PPP: 1.3 (ref 0.8–1.2)
INR PPP: 1.4 (ref 0.8–1.2)
INR PPP: 1.5 (ref 0.8–1.2)
INR PPP: 1.5 (ref 0.8–1.2)
INTERVENTRICULAR SEPTUM: 0.47 CM (ref 0.6–1.1)
INTERVENTRICULAR SEPTUM: 0.78 CM (ref 0.6–1.1)
INTERVENTRICULAR SEPTUM: 0.78 CM (ref 0.6–1.1)
IRON SERPL-MCNC: 30 UG/DL (ref 45–160)
KETONES UR QL STRIP: NEGATIVE
LA MAJOR: 6.81 CM
LA MAJOR: 7.04 CM
LA MAJOR: 7.26 CM
LA MINOR: 5.36 CM
LA MINOR: 7.03 CM
LA MINOR: 7.25 CM
LA PPP-IMP: NEGATIVE
LA WIDTH: 4.04 CM
LA WIDTH: 4.47 CM
LA WIDTH: 5.88 CM
LACTATE SERPL-SCNC: 1.6 MMOL/L (ref 0.5–2.2)
LACTATE SERPL-SCNC: 2.1 MMOL/L (ref 0.5–2.2)
LDH FLD L TO P-CCNC: 158 U/L
LEFT ATRIUM SIZE: 4.04 CM
LEFT ATRIUM SIZE: 5.11 CM
LEFT ATRIUM SIZE: 5.17 CM
LEFT ATRIUM VOLUME INDEX: 102.1 ML/M2
LEFT ATRIUM VOLUME INDEX: 44 ML/M2
LEFT ATRIUM VOLUME INDEX: 71.7 ML/M2
LEFT ATRIUM VOLUME: 140.86 CM3
LEFT ATRIUM VOLUME: 181.78 CM3
LEFT ATRIUM VOLUME: 83.22 CM3
LEFT INTERNAL DIMENSION IN SYSTOLE: 4.3 CM (ref 2.1–4)
LEFT INTERNAL DIMENSION IN SYSTOLE: 4.94 CM (ref 2.1–4)
LEFT INTERNAL DIMENSION IN SYSTOLE: 5.25 CM (ref 2.1–4)
LEFT VENTRICLE DIASTOLIC VOLUME INDEX: 61.15 ML/M2
LEFT VENTRICLE DIASTOLIC VOLUME INDEX: 80.92 ML/M2
LEFT VENTRICLE DIASTOLIC VOLUME INDEX: 94.86 ML/M2
LEFT VENTRICLE DIASTOLIC VOLUME: 115.72 ML
LEFT VENTRICLE DIASTOLIC VOLUME: 158.87 ML
LEFT VENTRICLE DIASTOLIC VOLUME: 168.85 ML
LEFT VENTRICLE MASS INDEX: 109 G/M2
LEFT VENTRICLE MASS INDEX: 49 G/M2
LEFT VENTRICLE MASS INDEX: 97 G/M2
LEFT VENTRICLE SYSTOLIC VOLUME INDEX: 43.9 ML/M2
LEFT VENTRICLE SYSTOLIC VOLUME INDEX: 58.7 ML/M2
LEFT VENTRICLE SYSTOLIC VOLUME INDEX: 74.4 ML/M2
LEFT VENTRICLE SYSTOLIC VOLUME: 115.15 ML
LEFT VENTRICLE SYSTOLIC VOLUME: 132.4 ML
LEFT VENTRICLE SYSTOLIC VOLUME: 83.07 ML
LEFT VENTRICULAR INTERNAL DIMENSION IN DIASTOLE: 4.95 CM (ref 3.5–6)
LEFT VENTRICULAR INTERNAL DIMENSION IN DIASTOLE: 5.68 CM (ref 3.5–6)
LEFT VENTRICULAR INTERNAL DIMENSION IN DIASTOLE: 5.83 CM (ref 3.5–6)
LEFT VENTRICULAR MASS: 190.78 G
LEFT VENTRICULAR MASS: 193.8 G
LEFT VENTRICULAR MASS: 92.28 G
LEUKOCYTE ESTERASE UR QL STRIP: NEGATIVE
LV LATERAL E/E' RATIO: 12.67 M/S
LV LATERAL E/E' RATIO: 16.88 M/S
LV LATERAL E/E' RATIO: 23.2 M/S
LV SEPTAL E/E' RATIO: 14.25 M/S
LV SEPTAL E/E' RATIO: 19.33 M/S
LV SEPTAL E/E' RATIO: 22.5 M/S
LYMPHOCYTES # BLD AUTO: 0.1 K/UL (ref 1–4.8)
LYMPHOCYTES # BLD AUTO: 0.2 K/UL (ref 1–4.8)
LYMPHOCYTES # BLD AUTO: 0.3 K/UL (ref 1–4.8)
LYMPHOCYTES # BLD AUTO: 0.4 K/UL (ref 1–4.8)
LYMPHOCYTES # BLD AUTO: 0.5 K/UL (ref 1–4.8)
LYMPHOCYTES # BLD AUTO: 0.6 K/UL (ref 1–4.8)
LYMPHOCYTES # BLD AUTO: 0.7 K/UL (ref 1–4.8)
LYMPHOCYTES # BLD AUTO: 0.8 K/UL (ref 1–4.8)
LYMPHOCYTES NFR BLD: 1.6 % (ref 18–48)
LYMPHOCYTES NFR BLD: 10 % (ref 18–48)
LYMPHOCYTES NFR BLD: 10 % (ref 18–48)
LYMPHOCYTES NFR BLD: 10.1 % (ref 18–48)
LYMPHOCYTES NFR BLD: 10.2 % (ref 18–48)
LYMPHOCYTES NFR BLD: 10.2 % (ref 18–48)
LYMPHOCYTES NFR BLD: 10.5 % (ref 18–48)
LYMPHOCYTES NFR BLD: 10.9 % (ref 18–48)
LYMPHOCYTES NFR BLD: 11.3 % (ref 18–48)
LYMPHOCYTES NFR BLD: 11.6 % (ref 18–48)
LYMPHOCYTES NFR BLD: 11.9 % (ref 18–48)
LYMPHOCYTES NFR BLD: 11.9 % (ref 18–48)
LYMPHOCYTES NFR BLD: 12.1 % (ref 18–48)
LYMPHOCYTES NFR BLD: 12.2 % (ref 18–48)
LYMPHOCYTES NFR BLD: 12.3 % (ref 18–48)
LYMPHOCYTES NFR BLD: 12.3 % (ref 18–48)
LYMPHOCYTES NFR BLD: 12.4 % (ref 18–48)
LYMPHOCYTES NFR BLD: 12.5 % (ref 18–48)
LYMPHOCYTES NFR BLD: 12.6 % (ref 18–48)
LYMPHOCYTES NFR BLD: 12.6 % (ref 18–48)
LYMPHOCYTES NFR BLD: 13.1 % (ref 18–48)
LYMPHOCYTES NFR BLD: 13.1 % (ref 18–48)
LYMPHOCYTES NFR BLD: 13.3 % (ref 18–48)
LYMPHOCYTES NFR BLD: 13.8 % (ref 18–48)
LYMPHOCYTES NFR BLD: 13.8 % (ref 18–48)
LYMPHOCYTES NFR BLD: 14.2 % (ref 18–48)
LYMPHOCYTES NFR BLD: 14.2 % (ref 18–48)
LYMPHOCYTES NFR BLD: 14.4 % (ref 18–48)
LYMPHOCYTES NFR BLD: 15.3 % (ref 18–48)
LYMPHOCYTES NFR BLD: 16 % (ref 18–48)
LYMPHOCYTES NFR BLD: 16.3 % (ref 18–48)
LYMPHOCYTES NFR BLD: 16.3 % (ref 18–48)
LYMPHOCYTES NFR BLD: 16.5 % (ref 18–48)
LYMPHOCYTES NFR BLD: 16.7 % (ref 18–48)
LYMPHOCYTES NFR BLD: 19.9 % (ref 18–48)
LYMPHOCYTES NFR BLD: 23.6 % (ref 18–48)
LYMPHOCYTES NFR BLD: 3.5 % (ref 18–48)
LYMPHOCYTES NFR BLD: 4.4 % (ref 18–48)
LYMPHOCYTES NFR BLD: 4.6 % (ref 18–48)
LYMPHOCYTES NFR BLD: 6.1 % (ref 18–48)
LYMPHOCYTES NFR BLD: 7 % (ref 18–48)
LYMPHOCYTES NFR BLD: 7 % (ref 18–48)
LYMPHOCYTES NFR BLD: 7.3 % (ref 18–48)
LYMPHOCYTES NFR BLD: 7.6 % (ref 18–48)
LYMPHOCYTES NFR BLD: 8.5 % (ref 18–48)
LYMPHOCYTES NFR BLD: 8.9 % (ref 18–48)
LYMPHOCYTES NFR BLD: 9.7 % (ref 18–48)
LYMPHOCYTES NFR BLD: 9.9 % (ref 18–48)
LYMPHOCYTES NFR BLD: 9.9 % (ref 18–48)
LYMPHOCYTES NFR FLD MANUAL: 10 %
MAGNESIUM SERPL-MCNC: 1.6 MG/DL (ref 1.6–2.6)
MAGNESIUM SERPL-MCNC: 1.6 MG/DL (ref 1.6–2.6)
MAGNESIUM SERPL-MCNC: 1.7 MG/DL (ref 1.6–2.6)
MAGNESIUM SERPL-MCNC: 1.8 MG/DL (ref 1.6–2.6)
MAGNESIUM SERPL-MCNC: 1.9 MG/DL (ref 1.6–2.6)
MAGNESIUM SERPL-MCNC: 2 MG/DL (ref 1.6–2.6)
MAGNESIUM SERPL-MCNC: 2.1 MG/DL (ref 1.6–2.6)
MAGNESIUM SERPL-MCNC: 2.2 MG/DL (ref 1.6–2.6)
MAGNESIUM SERPL-MCNC: 2.3 MG/DL (ref 1.6–2.6)
MAGNESIUM SERPL-MCNC: 2.4 MG/DL (ref 1.6–2.6)
MAGNESIUM SERPL-MCNC: 2.5 MG/DL (ref 1.6–2.6)
MCH RBC QN AUTO: 21.1 PG (ref 27–31)
MCH RBC QN AUTO: 22.2 PG (ref 27–31)
MCH RBC QN AUTO: 22.5 PG (ref 27–31)
MCH RBC QN AUTO: 22.8 PG (ref 27–31)
MCH RBC QN AUTO: 22.9 PG (ref 27–31)
MCH RBC QN AUTO: 23.1 PG (ref 27–31)
MCH RBC QN AUTO: 23.1 PG (ref 27–31)
MCH RBC QN AUTO: 23.2 PG (ref 27–31)
MCH RBC QN AUTO: 23.2 PG (ref 27–31)
MCH RBC QN AUTO: 23.4 PG (ref 27–31)
MCH RBC QN AUTO: 23.4 PG (ref 27–31)
MCH RBC QN AUTO: 23.6 PG (ref 27–31)
MCH RBC QN AUTO: 23.7 PG (ref 27–31)
MCH RBC QN AUTO: 23.7 PG (ref 27–31)
MCH RBC QN AUTO: 23.8 PG (ref 27–31)
MCH RBC QN AUTO: 23.9 PG (ref 27–31)
MCH RBC QN AUTO: 23.9 PG (ref 27–31)
MCH RBC QN AUTO: 24 PG (ref 27–31)
MCH RBC QN AUTO: 24.3 PG (ref 27–31)
MCH RBC QN AUTO: 24.4 PG (ref 27–31)
MCH RBC QN AUTO: 24.5 PG (ref 27–31)
MCH RBC QN AUTO: 24.6 PG (ref 27–31)
MCH RBC QN AUTO: 24.7 PG (ref 27–31)
MCH RBC QN AUTO: 24.9 PG (ref 27–31)
MCH RBC QN AUTO: 25.2 PG (ref 27–31)
MCH RBC QN AUTO: 25.7 PG (ref 27–31)
MCH RBC QN AUTO: 25.8 PG (ref 27–31)
MCH RBC QN AUTO: 25.8 PG (ref 27–31)
MCH RBC QN AUTO: 25.9 PG (ref 27–31)
MCH RBC QN AUTO: 25.9 PG (ref 27–31)
MCH RBC QN AUTO: 26 PG (ref 27–31)
MCH RBC QN AUTO: 26 PG (ref 27–31)
MCH RBC QN AUTO: 26.1 PG (ref 27–31)
MCH RBC QN AUTO: 26.2 PG (ref 27–31)
MCH RBC QN AUTO: 26.2 PG (ref 27–31)
MCH RBC QN AUTO: 26.3 PG (ref 27–31)
MCH RBC QN AUTO: 26.4 PG (ref 27–31)
MCH RBC QN AUTO: 26.4 PG (ref 27–31)
MCH RBC QN AUTO: 26.7 PG (ref 27–31)
MCH RBC QN AUTO: 27.2 PG (ref 27–31)
MCH RBC QN AUTO: 28.1 PG (ref 27–31)
MCH RBC QN AUTO: 28.1 PG (ref 27–31)
MCH RBC QN AUTO: 28.2 PG (ref 27–31)
MCH RBC QN AUTO: 28.3 PG (ref 27–31)
MCH RBC QN AUTO: 28.4 PG (ref 27–31)
MCH RBC QN AUTO: 28.5 PG (ref 27–31)
MCH RBC QN AUTO: 28.5 PG (ref 27–31)
MCH RBC QN AUTO: 28.6 PG (ref 27–31)
MCHC RBC AUTO-ENTMCNC: 28.6 G/DL (ref 32–36)
MCHC RBC AUTO-ENTMCNC: 29.3 G/DL (ref 32–36)
MCHC RBC AUTO-ENTMCNC: 29.4 G/DL (ref 32–36)
MCHC RBC AUTO-ENTMCNC: 29.5 G/DL (ref 32–36)
MCHC RBC AUTO-ENTMCNC: 29.9 G/DL (ref 32–36)
MCHC RBC AUTO-ENTMCNC: 30 G/DL (ref 32–36)
MCHC RBC AUTO-ENTMCNC: 30.1 G/DL (ref 32–36)
MCHC RBC AUTO-ENTMCNC: 30.1 G/DL (ref 32–36)
MCHC RBC AUTO-ENTMCNC: 30.2 G/DL (ref 32–36)
MCHC RBC AUTO-ENTMCNC: 30.3 G/DL (ref 32–36)
MCHC RBC AUTO-ENTMCNC: 30.4 G/DL (ref 32–36)
MCHC RBC AUTO-ENTMCNC: 30.5 G/DL (ref 32–36)
MCHC RBC AUTO-ENTMCNC: 30.6 G/DL (ref 32–36)
MCHC RBC AUTO-ENTMCNC: 30.6 G/DL (ref 32–36)
MCHC RBC AUTO-ENTMCNC: 30.7 G/DL (ref 32–36)
MCHC RBC AUTO-ENTMCNC: 30.7 G/DL (ref 32–36)
MCHC RBC AUTO-ENTMCNC: 30.8 G/DL (ref 32–36)
MCHC RBC AUTO-ENTMCNC: 30.9 G/DL (ref 32–36)
MCHC RBC AUTO-ENTMCNC: 31 G/DL (ref 32–36)
MCHC RBC AUTO-ENTMCNC: 31 G/DL (ref 32–36)
MCHC RBC AUTO-ENTMCNC: 31.1 G/DL (ref 32–36)
MCHC RBC AUTO-ENTMCNC: 31.2 G/DL (ref 32–36)
MCHC RBC AUTO-ENTMCNC: 31.2 G/DL (ref 32–36)
MCHC RBC AUTO-ENTMCNC: 31.3 G/DL (ref 32–36)
MCHC RBC AUTO-ENTMCNC: 31.4 G/DL (ref 32–36)
MCHC RBC AUTO-ENTMCNC: 31.5 G/DL (ref 32–36)
MCHC RBC AUTO-ENTMCNC: 31.5 G/DL (ref 32–36)
MCHC RBC AUTO-ENTMCNC: 31.6 G/DL (ref 32–36)
MCHC RBC AUTO-ENTMCNC: 31.7 G/DL (ref 32–36)
MCHC RBC AUTO-ENTMCNC: 31.7 G/DL (ref 32–36)
MCHC RBC AUTO-ENTMCNC: 31.8 G/DL (ref 32–36)
MCHC RBC AUTO-ENTMCNC: 31.8 G/DL (ref 32–36)
MCHC RBC AUTO-ENTMCNC: 32 G/DL (ref 32–36)
MCHC RBC AUTO-ENTMCNC: 32.1 G/DL (ref 32–36)
MCHC RBC AUTO-ENTMCNC: 32.2 G/DL (ref 32–36)
MCHC RBC AUTO-ENTMCNC: 32.2 G/DL (ref 32–36)
MCHC RBC AUTO-ENTMCNC: 32.3 G/DL (ref 32–36)
MCHC RBC AUTO-ENTMCNC: 32.5 G/DL (ref 32–36)
MCHC RBC AUTO-ENTMCNC: 32.6 G/DL (ref 32–36)
MCHC RBC AUTO-ENTMCNC: 32.7 G/DL (ref 32–36)
MCHC RBC AUTO-ENTMCNC: 32.8 G/DL (ref 32–36)
MCHC RBC AUTO-ENTMCNC: 33.4 G/DL (ref 32–36)
MCV RBC AUTO: 74 FL (ref 82–98)
MCV RBC AUTO: 75 FL (ref 82–98)
MCV RBC AUTO: 76 FL (ref 82–98)
MCV RBC AUTO: 76 FL (ref 82–98)
MCV RBC AUTO: 77 FL (ref 82–98)
MCV RBC AUTO: 78 FL (ref 82–98)
MCV RBC AUTO: 79 FL (ref 82–98)
MCV RBC AUTO: 80 FL (ref 82–98)
MCV RBC AUTO: 81 FL (ref 82–98)
MCV RBC AUTO: 82 FL (ref 82–98)
MCV RBC AUTO: 83 FL (ref 82–98)
MCV RBC AUTO: 84 FL (ref 82–98)
MCV RBC AUTO: 84 FL (ref 82–98)
MCV RBC AUTO: 85 FL (ref 82–98)
MCV RBC AUTO: 88 FL (ref 82–98)
MCV RBC AUTO: 89 FL (ref 82–98)
MCV RBC AUTO: 93 FL (ref 82–98)
MCV RBC AUTO: 93 FL (ref 82–98)
MCV RBC AUTO: 94 FL (ref 82–98)
MCV RBC AUTO: 94 FL (ref 82–98)
MCV RBC AUTO: 97 FL (ref 82–98)
MICROSCOPIC COMMENT: ABNORMAL
MICROSCOPIC COMMENT: ABNORMAL
MICROSCOPIC COMMENT: NORMAL
MICROSCOPIC EXAM: NORMAL
MODE: ABNORMAL
MODE: ABNORMAL
MONOCYTES # BLD AUTO: 0.2 K/UL (ref 0.3–1)
MONOCYTES # BLD AUTO: 0.3 K/UL (ref 0.3–1)
MONOCYTES # BLD AUTO: 0.4 K/UL (ref 0.3–1)
MONOCYTES # BLD AUTO: 0.5 K/UL (ref 0.3–1)
MONOCYTES # BLD AUTO: 0.6 K/UL (ref 0.3–1)
MONOCYTES # BLD AUTO: 0.7 K/UL (ref 0.3–1)
MONOCYTES # BLD AUTO: 0.8 K/UL (ref 0.3–1)
MONOCYTES # BLD AUTO: 0.9 K/UL (ref 0.3–1)
MONOCYTES # BLD AUTO: 0.9 K/UL (ref 0.3–1)
MONOCYTES # BLD AUTO: 1.3 K/UL (ref 0.3–1)
MONOCYTES NFR BLD: 10.1 % (ref 4–15)
MONOCYTES NFR BLD: 10.3 % (ref 4–15)
MONOCYTES NFR BLD: 10.3 % (ref 4–15)
MONOCYTES NFR BLD: 10.6 % (ref 4–15)
MONOCYTES NFR BLD: 10.8 % (ref 4–15)
MONOCYTES NFR BLD: 10.8 % (ref 4–15)
MONOCYTES NFR BLD: 10.9 % (ref 4–15)
MONOCYTES NFR BLD: 11.4 % (ref 4–15)
MONOCYTES NFR BLD: 11.7 % (ref 4–15)
MONOCYTES NFR BLD: 11.8 % (ref 4–15)
MONOCYTES NFR BLD: 11.9 % (ref 4–15)
MONOCYTES NFR BLD: 12.2 % (ref 4–15)
MONOCYTES NFR BLD: 12.8 % (ref 4–15)
MONOCYTES NFR BLD: 12.9 % (ref 4–15)
MONOCYTES NFR BLD: 12.9 % (ref 4–15)
MONOCYTES NFR BLD: 13 % (ref 4–15)
MONOCYTES NFR BLD: 13.1 % (ref 4–15)
MONOCYTES NFR BLD: 13.2 % (ref 4–15)
MONOCYTES NFR BLD: 13.3 % (ref 4–15)
MONOCYTES NFR BLD: 13.7 % (ref 4–15)
MONOCYTES NFR BLD: 13.9 % (ref 4–15)
MONOCYTES NFR BLD: 14 % (ref 4–15)
MONOCYTES NFR BLD: 14.1 % (ref 4–15)
MONOCYTES NFR BLD: 14.9 % (ref 4–15)
MONOCYTES NFR BLD: 15 % (ref 4–15)
MONOCYTES NFR BLD: 15.3 % (ref 4–15)
MONOCYTES NFR BLD: 15.7 % (ref 4–15)
MONOCYTES NFR BLD: 16.1 % (ref 4–15)
MONOCYTES NFR BLD: 16.5 % (ref 4–15)
MONOCYTES NFR BLD: 16.8 % (ref 4–15)
MONOCYTES NFR BLD: 16.9 % (ref 4–15)
MONOCYTES NFR BLD: 16.9 % (ref 4–15)
MONOCYTES NFR BLD: 17.1 % (ref 4–15)
MONOCYTES NFR BLD: 17.3 % (ref 4–15)
MONOCYTES NFR BLD: 17.6 % (ref 4–15)
MONOCYTES NFR BLD: 17.6 % (ref 4–15)
MONOCYTES NFR BLD: 17.7 % (ref 4–15)
MONOCYTES NFR BLD: 17.9 % (ref 4–15)
MONOCYTES NFR BLD: 17.9 % (ref 4–15)
MONOCYTES NFR BLD: 18.9 % (ref 4–15)
MONOCYTES NFR BLD: 19.1 % (ref 4–15)
MONOCYTES NFR BLD: 19.2 % (ref 4–15)
MONOCYTES NFR BLD: 19.2 % (ref 4–15)
MONOCYTES NFR BLD: 7.7 % (ref 4–15)
MONOCYTES NFR BLD: 8.5 % (ref 4–15)
MONOCYTES NFR BLD: 8.5 % (ref 4–15)
MONOCYTES NFR BLD: 8.9 % (ref 4–15)
MONOCYTES NFR BLD: 9 % (ref 4–15)
MONOCYTES NFR BLD: 9.2 % (ref 4–15)
MONOCYTES NFR BLD: 9.6 % (ref 4–15)
MONOCYTES NFR BLD: 9.8 % (ref 4–15)
MONOS+MACROS NFR FLD MANUAL: 83 %
MV PEAK A VEL: 0.24 M/S
MV PEAK A VEL: 0.24 M/S
MV PEAK A VEL: 0.27 M/S
MV PEAK E VEL: 1.14 M/S
MV PEAK E VEL: 1.16 M/S
MV PEAK E VEL: 1.35 M/S
MV STENOSIS PRESSURE HALF TIME: 46.18 MS
MV VALVE AREA P 1/2 METHOD: 4.76 CM2
MYELOPEROXIDASE AB SER-ACNC: 3 UNITS
NEUTROPHILS # BLD AUTO: 1.5 K/UL (ref 1.8–7.7)
NEUTROPHILS # BLD AUTO: 1.6 K/UL (ref 1.8–7.7)
NEUTROPHILS # BLD AUTO: 1.8 K/UL (ref 1.8–7.7)
NEUTROPHILS # BLD AUTO: 1.8 K/UL (ref 1.8–7.7)
NEUTROPHILS # BLD AUTO: 1.9 K/UL (ref 1.8–7.7)
NEUTROPHILS # BLD AUTO: 2 K/UL (ref 1.8–7.7)
NEUTROPHILS # BLD AUTO: 2.1 K/UL (ref 1.8–7.7)
NEUTROPHILS # BLD AUTO: 2.2 K/UL (ref 1.8–7.7)
NEUTROPHILS # BLD AUTO: 2.3 K/UL (ref 1.8–7.7)
NEUTROPHILS # BLD AUTO: 2.4 K/UL (ref 1.8–7.7)
NEUTROPHILS # BLD AUTO: 2.5 K/UL (ref 1.8–7.7)
NEUTROPHILS # BLD AUTO: 2.6 K/UL (ref 1.8–7.7)
NEUTROPHILS # BLD AUTO: 2.7 K/UL (ref 1.8–7.7)
NEUTROPHILS # BLD AUTO: 2.8 K/UL (ref 1.8–7.7)
NEUTROPHILS # BLD AUTO: 2.9 K/UL (ref 1.8–7.7)
NEUTROPHILS # BLD AUTO: 3 K/UL (ref 1.8–7.7)
NEUTROPHILS # BLD AUTO: 3.1 K/UL (ref 1.8–7.7)
NEUTROPHILS # BLD AUTO: 3.1 K/UL (ref 1.8–7.7)
NEUTROPHILS # BLD AUTO: 3.2 K/UL (ref 1.8–7.7)
NEUTROPHILS # BLD AUTO: 3.3 K/UL (ref 1.8–7.7)
NEUTROPHILS # BLD AUTO: 3.3 K/UL (ref 1.8–7.7)
NEUTROPHILS # BLD AUTO: 3.4 K/UL (ref 1.8–7.7)
NEUTROPHILS # BLD AUTO: 3.4 K/UL (ref 1.8–7.7)
NEUTROPHILS # BLD AUTO: 4.7 K/UL (ref 1.8–7.7)
NEUTROPHILS # BLD AUTO: 5.5 K/UL (ref 1.8–7.7)
NEUTROPHILS # BLD AUTO: 5.6 K/UL (ref 1.8–7.7)
NEUTROPHILS # BLD AUTO: 5.7 K/UL (ref 1.8–7.7)
NEUTROPHILS NFR BLD: 58.3 % (ref 38–73)
NEUTROPHILS NFR BLD: 58.7 % (ref 38–73)
NEUTROPHILS NFR BLD: 59.7 % (ref 38–73)
NEUTROPHILS NFR BLD: 60.2 % (ref 38–73)
NEUTROPHILS NFR BLD: 60.9 % (ref 38–73)
NEUTROPHILS NFR BLD: 61.2 % (ref 38–73)
NEUTROPHILS NFR BLD: 61.7 % (ref 38–73)
NEUTROPHILS NFR BLD: 61.8 % (ref 38–73)
NEUTROPHILS NFR BLD: 62.5 % (ref 38–73)
NEUTROPHILS NFR BLD: 63.1 % (ref 38–73)
NEUTROPHILS NFR BLD: 63.5 % (ref 38–73)
NEUTROPHILS NFR BLD: 63.8 % (ref 38–73)
NEUTROPHILS NFR BLD: 64.8 % (ref 38–73)
NEUTROPHILS NFR BLD: 65.3 % (ref 38–73)
NEUTROPHILS NFR BLD: 65.3 % (ref 38–73)
NEUTROPHILS NFR BLD: 65.4 % (ref 38–73)
NEUTROPHILS NFR BLD: 66.2 % (ref 38–73)
NEUTROPHILS NFR BLD: 66.4 % (ref 38–73)
NEUTROPHILS NFR BLD: 66.5 % (ref 38–73)
NEUTROPHILS NFR BLD: 66.7 % (ref 38–73)
NEUTROPHILS NFR BLD: 67.5 % (ref 38–73)
NEUTROPHILS NFR BLD: 67.5 % (ref 38–73)
NEUTROPHILS NFR BLD: 68.1 % (ref 38–73)
NEUTROPHILS NFR BLD: 68.2 % (ref 38–73)
NEUTROPHILS NFR BLD: 68.3 % (ref 38–73)
NEUTROPHILS NFR BLD: 68.4 % (ref 38–73)
NEUTROPHILS NFR BLD: 68.8 % (ref 38–73)
NEUTROPHILS NFR BLD: 69 % (ref 38–73)
NEUTROPHILS NFR BLD: 69.2 % (ref 38–73)
NEUTROPHILS NFR BLD: 69.2 % (ref 38–73)
NEUTROPHILS NFR BLD: 69.6 % (ref 38–73)
NEUTROPHILS NFR BLD: 69.9 % (ref 38–73)
NEUTROPHILS NFR BLD: 70.1 % (ref 38–73)
NEUTROPHILS NFR BLD: 70.4 % (ref 38–73)
NEUTROPHILS NFR BLD: 70.4 % (ref 38–73)
NEUTROPHILS NFR BLD: 71 % (ref 38–73)
NEUTROPHILS NFR BLD: 71 % (ref 38–73)
NEUTROPHILS NFR BLD: 71.2 % (ref 38–73)
NEUTROPHILS NFR BLD: 71.4 % (ref 38–73)
NEUTROPHILS NFR BLD: 71.4 % (ref 38–73)
NEUTROPHILS NFR BLD: 72 % (ref 38–73)
NEUTROPHILS NFR BLD: 72.3 % (ref 38–73)
NEUTROPHILS NFR BLD: 72.6 % (ref 38–73)
NEUTROPHILS NFR BLD: 72.9 % (ref 38–73)
NEUTROPHILS NFR BLD: 72.9 % (ref 38–73)
NEUTROPHILS NFR BLD: 73.3 % (ref 38–73)
NEUTROPHILS NFR BLD: 73.4 % (ref 38–73)
NEUTROPHILS NFR BLD: 76.6 % (ref 38–73)
NEUTROPHILS NFR BLD: 77.8 % (ref 38–73)
NEUTROPHILS NFR BLD: 77.8 % (ref 38–73)
NEUTROPHILS NFR BLD: 78.8 % (ref 38–73)
NEUTROPHILS NFR BLD: 78.9 % (ref 38–73)
NEUTROPHILS NFR BLD: 84.9 % (ref 38–73)
NEUTROPHILS NFR FLD MANUAL: 7 %
NITRITE UR QL STRIP: NEGATIVE
NON-SQ EPI CELLS #/AREA URNS AUTO: 1 /HPF
NRBC BLD-RTO: 0 /100 WBC
NRBC BLD-RTO: 1 /100 WBC
NRBC BLD-RTO: 2 /100 WBC
NRBC BLD-RTO: 2 /100 WBC
OSMOLALITY SERPL: 331 MOSM/KG (ref 280–300)
OVALOCYTES BLD QL SMEAR: ABNORMAL
P-ANCA TITR SER IF: NORMAL TITER
PCO2 BLDA: 31.3 MMHG (ref 35–45)
PCO2 BLDA: 38.6 MMHG (ref 35–45)
PCO2 BLDA: 49.5 MMHG (ref 35–45)
PH SMN: 7.32 [PH] (ref 7.35–7.45)
PH SMN: 7.35 [PH] (ref 7.35–7.45)
PH SMN: 7.42 [PH] (ref 7.35–7.45)
PH UR STRIP: 5 [PH] (ref 5–8)
PH UR STRIP: 6 [PH] (ref 5–8)
PH UR STRIP: 6 [PH] (ref 5–8)
PH UR STRIP: 7 [PH] (ref 5–8)
PHOSPHATE SERPL-MCNC: 2.9 MG/DL (ref 2.7–4.5)
PHOSPHATE SERPL-MCNC: 3 MG/DL (ref 2.7–4.5)
PHOSPHATE SERPL-MCNC: 3.1 MG/DL (ref 2.7–4.5)
PHOSPHATE SERPL-MCNC: 3.2 MG/DL (ref 2.7–4.5)
PHOSPHATE SERPL-MCNC: 3.3 MG/DL (ref 2.7–4.5)
PHOSPHATE SERPL-MCNC: 3.4 MG/DL (ref 2.7–4.5)
PHOSPHATE SERPL-MCNC: 3.5 MG/DL (ref 2.7–4.5)
PHOSPHATE SERPL-MCNC: 3.6 MG/DL (ref 2.7–4.5)
PHOSPHATE SERPL-MCNC: 3.8 MG/DL (ref 2.7–4.5)
PHOSPHATE SERPL-MCNC: 3.9 MG/DL (ref 2.7–4.5)
PHOSPHATE SERPL-MCNC: 3.9 MG/DL (ref 2.7–4.5)
PHOSPHATE SERPL-MCNC: 4 MG/DL (ref 2.7–4.5)
PHOSPHATE SERPL-MCNC: 4 MG/DL (ref 2.7–4.5)
PHOSPHATE SERPL-MCNC: 4.2 MG/DL (ref 2.7–4.5)
PHOSPHATE SERPL-MCNC: 4.3 MG/DL (ref 2.7–4.5)
PHOSPHATE SERPL-MCNC: 4.3 MG/DL (ref 2.7–4.5)
PHOSPHATE SERPL-MCNC: 4.5 MG/DL (ref 2.7–4.5)
PHOSPHATIDYLETHANOL (PETH): NEGATIVE NG/ML
PISA TR MAX VEL: 3.44 M/S
PISA TR MAX VEL: 3.5 M/S
PISA TR MAX VEL: 3.54 M/S
PLATELET # BLD AUTO: 103 K/UL (ref 150–350)
PLATELET # BLD AUTO: 103 K/UL (ref 150–350)
PLATELET # BLD AUTO: 104 K/UL (ref 150–350)
PLATELET # BLD AUTO: 105 K/UL (ref 150–350)
PLATELET # BLD AUTO: 106 K/UL (ref 150–350)
PLATELET # BLD AUTO: 108 K/UL (ref 150–350)
PLATELET # BLD AUTO: 111 K/UL (ref 150–350)
PLATELET # BLD AUTO: 114 K/UL (ref 150–350)
PLATELET # BLD AUTO: 115 K/UL (ref 150–350)
PLATELET # BLD AUTO: 117 K/UL (ref 150–350)
PLATELET # BLD AUTO: 118 K/UL (ref 150–350)
PLATELET # BLD AUTO: 119 K/UL (ref 150–350)
PLATELET # BLD AUTO: 119 K/UL (ref 150–350)
PLATELET # BLD AUTO: 120 K/UL (ref 150–350)
PLATELET # BLD AUTO: 122 K/UL (ref 150–350)
PLATELET # BLD AUTO: 123 K/UL (ref 150–350)
PLATELET # BLD AUTO: 126 K/UL (ref 150–350)
PLATELET # BLD AUTO: 126 K/UL (ref 150–350)
PLATELET # BLD AUTO: 127 K/UL (ref 150–350)
PLATELET # BLD AUTO: 128 K/UL (ref 150–350)
PLATELET # BLD AUTO: 129 K/UL (ref 150–350)
PLATELET # BLD AUTO: 132 K/UL (ref 150–350)
PLATELET # BLD AUTO: 133 K/UL (ref 150–350)
PLATELET # BLD AUTO: 137 K/UL (ref 150–350)
PLATELET # BLD AUTO: 138 K/UL (ref 150–350)
PLATELET # BLD AUTO: 146 K/UL (ref 150–350)
PLATELET # BLD AUTO: 158 K/UL (ref 150–350)
PLATELET # BLD AUTO: 166 K/UL (ref 150–350)
PLATELET # BLD AUTO: 60 K/UL (ref 150–350)
PLATELET # BLD AUTO: 62 K/UL (ref 150–350)
PLATELET # BLD AUTO: 63 K/UL (ref 150–350)
PLATELET # BLD AUTO: 64 K/UL (ref 150–350)
PLATELET # BLD AUTO: 66 K/UL (ref 150–350)
PLATELET # BLD AUTO: 66 K/UL (ref 150–350)
PLATELET # BLD AUTO: 68 K/UL (ref 150–350)
PLATELET # BLD AUTO: 70 K/UL (ref 150–350)
PLATELET # BLD AUTO: 71 K/UL (ref 150–350)
PLATELET # BLD AUTO: 71 K/UL (ref 150–350)
PLATELET # BLD AUTO: 72 K/UL (ref 150–350)
PLATELET # BLD AUTO: 74 K/UL (ref 150–350)
PLATELET # BLD AUTO: 79 K/UL (ref 150–350)
PLATELET # BLD AUTO: 82 K/UL (ref 150–350)
PLATELET # BLD AUTO: 85 K/UL (ref 150–350)
PLATELET # BLD AUTO: 91 K/UL (ref 150–350)
PLATELET # BLD AUTO: 93 K/UL (ref 150–350)
PLATELET # BLD AUTO: 94 K/UL (ref 150–350)
PLATELET # BLD AUTO: 94 K/UL (ref 150–350)
PLATELET # BLD AUTO: 95 K/UL (ref 150–350)
PLATELET # BLD AUTO: 96 K/UL (ref 150–350)
PLATELET # BLD AUTO: 97 K/UL (ref 150–350)
PLATELET BLD QL SMEAR: ABNORMAL
PLATELET BLD QL SMEAR: ABNORMAL
PMV BLD AUTO: 10.9 FL (ref 9.2–12.9)
PMV BLD AUTO: 11 FL (ref 9.2–12.9)
PMV BLD AUTO: 11.5 FL (ref 9.2–12.9)
PMV BLD AUTO: 11.6 FL (ref 9.2–12.9)
PMV BLD AUTO: 12 FL (ref 9.2–12.9)
PMV BLD AUTO: 12.1 FL (ref 9.2–12.9)
PMV BLD AUTO: 12.3 FL (ref 9.2–12.9)
PMV BLD AUTO: 12.6 FL (ref 9.2–12.9)
PMV BLD AUTO: 12.6 FL (ref 9.2–12.9)
PMV BLD AUTO: 12.7 FL (ref 9.2–12.9)
PMV BLD AUTO: 12.8 FL (ref 9.2–12.9)
PMV BLD AUTO: 12.9 FL (ref 9.2–12.9)
PMV BLD AUTO: 13.5 FL (ref 9.2–12.9)
PMV BLD AUTO: 13.6 FL (ref 9.2–12.9)
PMV BLD AUTO: 13.7 FL (ref 9.2–12.9)
PMV BLD AUTO: 13.9 FL (ref 9.2–12.9)
PMV BLD AUTO: ABNORMAL FL (ref 9.2–12.9)
PO2 BLDA: 22 MMHG (ref 40–60)
PO2 BLDA: 29 MMHG (ref 40–60)
PO2 BLDA: 48 MMHG (ref 40–60)
POC BE: -1 MMOL/L
POC BE: -4 MMOL/L
POC BE: -4 MMOL/L
POC SATURATED O2: 33 % (ref 95–100)
POC SATURATED O2: 52 % (ref 95–100)
POC SATURATED O2: 85 % (ref 95–100)
POC TCO2: 21 MMOL/L (ref 24–29)
POC TCO2: 23 MMOL/L (ref 24–29)
POC TCO2: 27 MMOL/L (ref 24–29)
POCT GLUCOSE: 103 MG/DL (ref 70–110)
POCT GLUCOSE: 103 MG/DL (ref 70–110)
POCT GLUCOSE: 104 MG/DL (ref 70–110)
POCT GLUCOSE: 105 MG/DL (ref 70–110)
POCT GLUCOSE: 108 MG/DL (ref 70–110)
POCT GLUCOSE: 110 MG/DL (ref 70–110)
POCT GLUCOSE: 112 MG/DL (ref 70–110)
POCT GLUCOSE: 114 MG/DL (ref 70–110)
POCT GLUCOSE: 115 MG/DL (ref 70–110)
POCT GLUCOSE: 115 MG/DL (ref 70–110)
POCT GLUCOSE: 116 MG/DL (ref 70–110)
POCT GLUCOSE: 121 MG/DL (ref 70–110)
POCT GLUCOSE: 122 MG/DL (ref 70–110)
POCT GLUCOSE: 122 MG/DL (ref 70–110)
POCT GLUCOSE: 123 MG/DL (ref 70–110)
POCT GLUCOSE: 124 MG/DL (ref 70–110)
POCT GLUCOSE: 125 MG/DL (ref 70–110)
POCT GLUCOSE: 127 MG/DL (ref 70–110)
POCT GLUCOSE: 127 MG/DL (ref 70–110)
POCT GLUCOSE: 128 MG/DL (ref 70–110)
POCT GLUCOSE: 128 MG/DL (ref 70–110)
POCT GLUCOSE: 129 MG/DL (ref 70–110)
POCT GLUCOSE: 130 MG/DL (ref 70–110)
POCT GLUCOSE: 131 MG/DL (ref 70–110)
POCT GLUCOSE: 131 MG/DL (ref 70–110)
POCT GLUCOSE: 132 MG/DL (ref 70–110)
POCT GLUCOSE: 134 MG/DL (ref 70–110)
POCT GLUCOSE: 135 MG/DL (ref 70–110)
POCT GLUCOSE: 135 MG/DL (ref 70–110)
POCT GLUCOSE: 136 MG/DL (ref 70–110)
POCT GLUCOSE: 137 MG/DL (ref 70–110)
POCT GLUCOSE: 139 MG/DL (ref 70–110)
POCT GLUCOSE: 140 MG/DL (ref 70–110)
POCT GLUCOSE: 141 MG/DL (ref 70–110)
POCT GLUCOSE: 143 MG/DL (ref 70–110)
POCT GLUCOSE: 144 MG/DL (ref 70–110)
POCT GLUCOSE: 144 MG/DL (ref 70–110)
POCT GLUCOSE: 146 MG/DL (ref 70–110)
POCT GLUCOSE: 146 MG/DL (ref 70–110)
POCT GLUCOSE: 147 MG/DL (ref 70–110)
POCT GLUCOSE: 148 MG/DL (ref 70–110)
POCT GLUCOSE: 149 MG/DL (ref 70–110)
POCT GLUCOSE: 150 MG/DL (ref 70–110)
POCT GLUCOSE: 152 MG/DL (ref 70–110)
POCT GLUCOSE: 152 MG/DL (ref 70–110)
POCT GLUCOSE: 153 MG/DL (ref 70–110)
POCT GLUCOSE: 153 MG/DL (ref 70–110)
POCT GLUCOSE: 157 MG/DL (ref 70–110)
POCT GLUCOSE: 158 MG/DL (ref 70–110)
POCT GLUCOSE: 159 MG/DL (ref 70–110)
POCT GLUCOSE: 159 MG/DL (ref 70–110)
POCT GLUCOSE: 160 MG/DL (ref 70–110)
POCT GLUCOSE: 161 MG/DL (ref 70–110)
POCT GLUCOSE: 162 MG/DL (ref 70–110)
POCT GLUCOSE: 162 MG/DL (ref 70–110)
POCT GLUCOSE: 163 MG/DL (ref 70–110)
POCT GLUCOSE: 163 MG/DL (ref 70–110)
POCT GLUCOSE: 164 MG/DL (ref 70–110)
POCT GLUCOSE: 165 MG/DL (ref 70–110)
POCT GLUCOSE: 167 MG/DL (ref 70–110)
POCT GLUCOSE: 170 MG/DL (ref 70–110)
POCT GLUCOSE: 170 MG/DL (ref 70–110)
POCT GLUCOSE: 172 MG/DL (ref 70–110)
POCT GLUCOSE: 173 MG/DL (ref 70–110)
POCT GLUCOSE: 175 MG/DL (ref 70–110)
POCT GLUCOSE: 175 MG/DL (ref 70–110)
POCT GLUCOSE: 176 MG/DL (ref 70–110)
POCT GLUCOSE: 179 MG/DL (ref 70–110)
POCT GLUCOSE: 181 MG/DL (ref 70–110)
POCT GLUCOSE: 181 MG/DL (ref 70–110)
POCT GLUCOSE: 183 MG/DL (ref 70–110)
POCT GLUCOSE: 185 MG/DL (ref 70–110)
POCT GLUCOSE: 186 MG/DL (ref 70–110)
POCT GLUCOSE: 188 MG/DL (ref 70–110)
POCT GLUCOSE: 188 MG/DL (ref 70–110)
POCT GLUCOSE: 189 MG/DL (ref 70–110)
POCT GLUCOSE: 190 MG/DL (ref 70–110)
POCT GLUCOSE: 191 MG/DL (ref 70–110)
POCT GLUCOSE: 195 MG/DL (ref 70–110)
POCT GLUCOSE: 196 MG/DL (ref 70–110)
POCT GLUCOSE: 197 MG/DL (ref 70–110)
POCT GLUCOSE: 201 MG/DL (ref 70–110)
POCT GLUCOSE: 203 MG/DL (ref 70–110)
POCT GLUCOSE: 203 MG/DL (ref 70–110)
POCT GLUCOSE: 204 MG/DL (ref 70–110)
POCT GLUCOSE: 204 MG/DL (ref 70–110)
POCT GLUCOSE: 205 MG/DL (ref 70–110)
POCT GLUCOSE: 205 MG/DL (ref 70–110)
POCT GLUCOSE: 210 MG/DL (ref 70–110)
POCT GLUCOSE: 211 MG/DL (ref 70–110)
POCT GLUCOSE: 212 MG/DL (ref 70–110)
POCT GLUCOSE: 212 MG/DL (ref 70–110)
POCT GLUCOSE: 214 MG/DL (ref 70–110)
POCT GLUCOSE: 214 MG/DL (ref 70–110)
POCT GLUCOSE: 215 MG/DL (ref 70–110)
POCT GLUCOSE: 217 MG/DL (ref 70–110)
POCT GLUCOSE: 217 MG/DL (ref 70–110)
POCT GLUCOSE: 221 MG/DL (ref 70–110)
POCT GLUCOSE: 223 MG/DL (ref 70–110)
POCT GLUCOSE: 226 MG/DL (ref 70–110)
POCT GLUCOSE: 232 MG/DL (ref 70–110)
POCT GLUCOSE: 235 MG/DL (ref 70–110)
POCT GLUCOSE: 235 MG/DL (ref 70–110)
POCT GLUCOSE: 246 MG/DL (ref 70–110)
POCT GLUCOSE: 252 MG/DL (ref 70–110)
POCT GLUCOSE: 253 MG/DL (ref 70–110)
POCT GLUCOSE: 255 MG/DL (ref 70–110)
POCT GLUCOSE: 259 MG/DL (ref 70–110)
POCT GLUCOSE: 269 MG/DL (ref 70–110)
POCT GLUCOSE: 275 MG/DL (ref 70–110)
POCT GLUCOSE: 275 MG/DL (ref 70–110)
POCT GLUCOSE: 278 MG/DL (ref 70–110)
POCT GLUCOSE: 284 MG/DL (ref 70–110)
POCT GLUCOSE: 297 MG/DL (ref 70–110)
POCT GLUCOSE: 300 MG/DL (ref 70–110)
POCT GLUCOSE: 301 MG/DL (ref 70–110)
POCT GLUCOSE: 302 MG/DL (ref 70–110)
POCT GLUCOSE: 325 MG/DL (ref 70–110)
POCT GLUCOSE: 33 MG/DL (ref 70–110)
POCT GLUCOSE: 335 MG/DL (ref 70–110)
POCT GLUCOSE: 38 MG/DL (ref 70–110)
POCT GLUCOSE: 39 MG/DL (ref 70–110)
POCT GLUCOSE: 435 MG/DL (ref 70–110)
POCT GLUCOSE: 53 MG/DL (ref 70–110)
POCT GLUCOSE: 56 MG/DL (ref 70–110)
POCT GLUCOSE: 60 MG/DL (ref 70–110)
POCT GLUCOSE: 68 MG/DL (ref 70–110)
POCT GLUCOSE: 75 MG/DL (ref 70–110)
POCT GLUCOSE: 80 MG/DL (ref 70–110)
POCT GLUCOSE: 87 MG/DL (ref 70–110)
POCT GLUCOSE: 90 MG/DL (ref 70–110)
POCT GLUCOSE: 94 MG/DL (ref 70–110)
POCT GLUCOSE: 95 MG/DL (ref 70–110)
POCT GLUCOSE: 95 MG/DL (ref 70–110)
POCT GLUCOSE: 96 MG/DL (ref 70–110)
POCT GLUCOSE: 96 MG/DL (ref 70–110)
POCT GLUCOSE: 98 MG/DL (ref 70–110)
POCT GLUCOSE: 99 MG/DL (ref 70–110)
POIKILOCYTOSIS BLD QL SMEAR: SLIGHT
POLYCHROMASIA BLD QL SMEAR: ABNORMAL
POLYCHROMASIA BLD QL SMEAR: ABNORMAL
POTASSIUM SERPL-SCNC: 3.2 MMOL/L (ref 3.5–5.1)
POTASSIUM SERPL-SCNC: 3.3 MMOL/L (ref 3.5–5.1)
POTASSIUM SERPL-SCNC: 3.4 MMOL/L (ref 3.5–5.1)
POTASSIUM SERPL-SCNC: 3.5 MMOL/L (ref 3.5–5.1)
POTASSIUM SERPL-SCNC: 3.6 MMOL/L (ref 3.5–5.1)
POTASSIUM SERPL-SCNC: 3.7 MMOL/L (ref 3.5–5.1)
POTASSIUM SERPL-SCNC: 3.8 MMOL/L (ref 3.5–5.1)
POTASSIUM SERPL-SCNC: 3.9 MMOL/L (ref 3.5–5.1)
POTASSIUM SERPL-SCNC: 4 MMOL/L (ref 3.5–5.1)
POTASSIUM SERPL-SCNC: 4.1 MMOL/L (ref 3.5–5.1)
POTASSIUM SERPL-SCNC: 4.2 MMOL/L (ref 3.5–5.1)
POTASSIUM SERPL-SCNC: 4.3 MMOL/L (ref 3.5–5.1)
POTASSIUM SERPL-SCNC: 4.4 MMOL/L (ref 3.5–5.1)
POTASSIUM SERPL-SCNC: 4.5 MMOL/L (ref 3.5–5.1)
POTASSIUM SERPL-SCNC: 4.5 MMOL/L (ref 3.5–5.1)
POTASSIUM SERPL-SCNC: 4.6 MMOL/L (ref 3.5–5.1)
POTASSIUM SERPL-SCNC: 4.7 MMOL/L (ref 3.5–5.1)
POTASSIUM SERPL-SCNC: 5.8 MMOL/L (ref 3.5–5.1)
POTASSIUM SERPL-SCNC: 5.9 MMOL/L (ref 3.5–5.1)
POTASSIUM UR-SCNC: <10 MMOL/L (ref 15–95)
PROT FLD-MCNC: 4.5 G/DL
PROT SERPL-MCNC: 6.1 G/DL (ref 6–8.4)
PROT SERPL-MCNC: 6.1 G/DL (ref 6–8.4)
PROT SERPL-MCNC: 6.3 G/DL (ref 6–8.4)
PROT SERPL-MCNC: 6.4 G/DL (ref 6–8.4)
PROT SERPL-MCNC: 6.4 G/DL (ref 6–8.4)
PROT SERPL-MCNC: 6.5 G/DL (ref 6–8.4)
PROT SERPL-MCNC: 6.6 G/DL (ref 6–8.4)
PROT SERPL-MCNC: 6.7 G/DL (ref 6–8.4)
PROT SERPL-MCNC: 6.8 G/DL (ref 6–8.4)
PROT SERPL-MCNC: 6.9 G/DL (ref 6–8.4)
PROT SERPL-MCNC: 7.1 G/DL (ref 6–8.4)
PROT SERPL-MCNC: 7.1 G/DL (ref 6–8.4)
PROT SERPL-MCNC: 7.2 G/DL (ref 6–8.4)
PROT SERPL-MCNC: 7.3 G/DL (ref 6–8.4)
PROT SERPL-MCNC: 7.5 G/DL (ref 6–8.4)
PROT SERPL-MCNC: 7.6 G/DL (ref 6–8.4)
PROT SERPL-MCNC: 7.6 G/DL (ref 6–8.4)
PROT SERPL-MCNC: 7.7 G/DL (ref 6–8.4)
PROT SERPL-MCNC: 7.8 G/DL (ref 6–8.4)
PROT SERPL-MCNC: 7.8 G/DL (ref 6–8.4)
PROT SERPL-MCNC: 7.9 G/DL (ref 6–8.4)
PROT SERPL-MCNC: 8 G/DL (ref 6–8.4)
PROT SERPL-MCNC: 8.1 G/DL (ref 6–8.4)
PROT SERPL-MCNC: 8.3 G/DL (ref 6–8.4)
PROT UR QL STRIP: ABNORMAL
PROT UR QL STRIP: NEGATIVE
PROT UR-MCNC: 14 MG/DL (ref 0–15)
PROT UR-MCNC: 17 MG/DL (ref 0–15)
PROT UR-MCNC: 98 MG/DL (ref 0–15)
PROT/CREAT UR: 0.67 MG/G{CREAT} (ref 0–0.2)
PROT/CREAT UR: 1.42 MG/G{CREAT} (ref 0–0.2)
PROT/CREAT UR: 2.45 MG/G{CREAT} (ref 0–0.2)
PROTEINASE3 IGG SER-ACNC: <0.2 U
PROTHROMBIN TIME: 13 SEC (ref 9–12.5)
PROTHROMBIN TIME: 13.1 SEC (ref 9–12.5)
PROTHROMBIN TIME: 13.8 SEC (ref 9–12.5)
PROTHROMBIN TIME: 14.6 SEC (ref 9–12.5)
PROTHROMBIN TIME: 15.1 SEC (ref 9–12.5)
PULM VEIN S/D RATIO: 4
PV PEAK D VEL: 0.08 M/S
PV PEAK S VEL: 0.32 M/S
PV PEAK VELOCITY: 1.77 CM/S
RA MAJOR: 5.59 CM
RA MAJOR: 6.2 CM
RA MAJOR: 6.32 CM
RA PRESSURE: 15 MMHG
RA PRESSURE: 15 MMHG
RA PRESSURE: 8 MMHG
RA WIDTH: 4.47 CM
RA WIDTH: 4.94 CM
RA WIDTH: 5.45 CM
RBC # BLD AUTO: 3 M/UL (ref 4.6–6.2)
RBC # BLD AUTO: 3.09 M/UL (ref 4.6–6.2)
RBC # BLD AUTO: 3.16 M/UL (ref 4.6–6.2)
RBC # BLD AUTO: 3.25 M/UL (ref 4.6–6.2)
RBC # BLD AUTO: 3.33 M/UL (ref 4.6–6.2)
RBC # BLD AUTO: 3.48 M/UL (ref 4.6–6.2)
RBC # BLD AUTO: 3.72 M/UL (ref 4.6–6.2)
RBC # BLD AUTO: 3.76 M/UL (ref 4.6–6.2)
RBC # BLD AUTO: 3.87 M/UL (ref 4.6–6.2)
RBC # BLD AUTO: 3.88 M/UL (ref 4.6–6.2)
RBC # BLD AUTO: 3.94 M/UL (ref 4.6–6.2)
RBC # BLD AUTO: 3.95 M/UL (ref 4.6–6.2)
RBC # BLD AUTO: 3.95 M/UL (ref 4.6–6.2)
RBC # BLD AUTO: 3.98 M/UL (ref 4.6–6.2)
RBC # BLD AUTO: 3.98 M/UL (ref 4.6–6.2)
RBC # BLD AUTO: 4.05 M/UL (ref 4.6–6.2)
RBC # BLD AUTO: 4.07 M/UL (ref 4.6–6.2)
RBC # BLD AUTO: 4.11 M/UL (ref 4.6–6.2)
RBC # BLD AUTO: 4.12 M/UL (ref 4.6–6.2)
RBC # BLD AUTO: 4.16 M/UL (ref 4.6–6.2)
RBC # BLD AUTO: 4.22 M/UL (ref 4.6–6.2)
RBC # BLD AUTO: 4.26 M/UL (ref 4.6–6.2)
RBC # BLD AUTO: 4.3 M/UL (ref 4.6–6.2)
RBC # BLD AUTO: 4.32 M/UL (ref 4.6–6.2)
RBC # BLD AUTO: 4.33 M/UL (ref 4.6–6.2)
RBC # BLD AUTO: 4.35 M/UL (ref 4.6–6.2)
RBC # BLD AUTO: 4.35 M/UL (ref 4.6–6.2)
RBC # BLD AUTO: 4.38 M/UL (ref 4.6–6.2)
RBC # BLD AUTO: 4.44 M/UL (ref 4.6–6.2)
RBC # BLD AUTO: 4.45 M/UL (ref 4.6–6.2)
RBC # BLD AUTO: 4.49 M/UL (ref 4.6–6.2)
RBC # BLD AUTO: 4.55 M/UL (ref 4.6–6.2)
RBC # BLD AUTO: 4.55 M/UL (ref 4.6–6.2)
RBC # BLD AUTO: 4.57 M/UL (ref 4.6–6.2)
RBC # BLD AUTO: 4.64 M/UL (ref 4.6–6.2)
RBC # BLD AUTO: 4.7 M/UL (ref 4.6–6.2)
RBC # BLD AUTO: 4.73 M/UL (ref 4.6–6.2)
RBC # BLD AUTO: 4.75 M/UL (ref 4.6–6.2)
RBC # BLD AUTO: 4.8 M/UL (ref 4.6–6.2)
RBC # BLD AUTO: 4.83 M/UL (ref 4.6–6.2)
RBC # BLD AUTO: 4.92 M/UL (ref 4.6–6.2)
RBC # BLD AUTO: 4.97 M/UL (ref 4.6–6.2)
RBC # BLD AUTO: 4.98 M/UL (ref 4.6–6.2)
RBC # BLD AUTO: 5.01 M/UL (ref 4.6–6.2)
RBC # BLD AUTO: 5.07 M/UL (ref 4.6–6.2)
RBC # BLD AUTO: 5.17 M/UL (ref 4.6–6.2)
RBC # BLD AUTO: 5.23 M/UL (ref 4.6–6.2)
RBC # BLD AUTO: 5.32 M/UL (ref 4.6–6.2)
RBC # BLD AUTO: 5.42 M/UL (ref 4.6–6.2)
RBC # BLD AUTO: 5.45 M/UL (ref 4.6–6.2)
RBC # BLD AUTO: 5.47 M/UL (ref 4.6–6.2)
RBC # BLD AUTO: 5.5 M/UL (ref 4.6–6.2)
RBC # BLD AUTO: 5.56 M/UL (ref 4.6–6.2)
RBC # BLD AUTO: 5.69 M/UL (ref 4.6–6.2)
RBC #/AREA URNS AUTO: 1 /HPF (ref 0–4)
RBC #/AREA URNS AUTO: 2 /HPF (ref 0–4)
RBC #/AREA URNS AUTO: 3 /HPF (ref 0–4)
RIGHT VENTRICULAR END-DIASTOLIC DIMENSION: 4.46 CM
RIGHT VENTRICULAR END-DIASTOLIC DIMENSION: 5 CM
RIGHT VENTRICULAR END-DIASTOLIC DIMENSION: 5.05 CM
RPR SER QL: NORMAL
SAMPLE: ABNORMAL
SARS-COV-2 RDRP RESP QL NAA+PROBE: NEGATIVE
SARS-COV-2 RNA RESP QL NAA+PROBE: NOT DETECTED
SATURATED IRON: 6 % (ref 20–50)
SCHISTOCYTES BLD QL SMEAR: ABNORMAL
SINUS: 2.87 CM
SINUS: 3.02 CM
SINUS: 3.1 CM
SITE: ABNORMAL
SMOOTH MUSCLE AB TITR SER IF: ABNORMAL {TITER}
SODIUM SERPL-SCNC: 124 MMOL/L (ref 136–145)
SODIUM SERPL-SCNC: 129 MMOL/L (ref 136–145)
SODIUM SERPL-SCNC: 130 MMOL/L (ref 136–145)
SODIUM SERPL-SCNC: 130 MMOL/L (ref 136–145)
SODIUM SERPL-SCNC: 131 MMOL/L (ref 136–145)
SODIUM SERPL-SCNC: 132 MMOL/L (ref 136–145)
SODIUM SERPL-SCNC: 133 MMOL/L (ref 136–145)
SODIUM SERPL-SCNC: 134 MMOL/L (ref 136–145)
SODIUM SERPL-SCNC: 135 MMOL/L (ref 136–145)
SODIUM SERPL-SCNC: 136 MMOL/L (ref 136–145)
SODIUM SERPL-SCNC: 137 MMOL/L (ref 136–145)
SODIUM SERPL-SCNC: 138 MMOL/L (ref 136–145)
SODIUM SERPL-SCNC: 139 MMOL/L (ref 136–145)
SODIUM SERPL-SCNC: 140 MMOL/L (ref 136–145)
SODIUM SERPL-SCNC: 141 MMOL/L (ref 136–145)
SODIUM SERPL-SCNC: 142 MMOL/L (ref 136–145)
SODIUM SERPL-SCNC: 143 MMOL/L (ref 136–145)
SODIUM SERPL-SCNC: 143 MMOL/L (ref 136–145)
SODIUM SERPL-SCNC: 145 MMOL/L (ref 136–145)
SODIUM SERPL-SCNC: 145 MMOL/L (ref 136–145)
SODIUM UR-SCNC: 122 MMOL/L (ref 20–250)
SP GR UR STRIP: 1 (ref 1–1.03)
SP GR UR STRIP: 1.01 (ref 1–1.03)
SPECIMEN SOURCE: NORMAL
SPHEROCYTES BLD QL SMEAR: ABNORMAL
SQUAMOUS #/AREA URNS AUTO: 0 /HPF
SQUAMOUS #/AREA URNS AUTO: 0 /HPF
STJ: 2.51 CM
STJ: 2.92 CM
STJ: 3.8 CM
T4 FREE SERPL-MCNC: 0.74 NG/DL (ref 0.71–1.51)
T4 FREE SERPL-MCNC: 0.86 NG/DL (ref 0.71–1.51)
TARGETS BLD QL SMEAR: ABNORMAL
TARGETS BLD QL SMEAR: ABNORMAL
TDI LATERAL: 0.05 M/S
TDI LATERAL: 0.08 M/S
TDI LATERAL: 0.09 M/S
TDI SEPTAL: 0.06 M/S
TDI SEPTAL: 0.06 M/S
TDI SEPTAL: 0.08 M/S
TDI: 0.06 M/S
TDI: 0.07 M/S
TDI: 0.09 M/S
TOTAL IRON BINDING CAPACITY: 519 UG/DL (ref 250–450)
TR MAX PG: 47 MMHG
TR MAX PG: 49 MMHG
TR MAX PG: 50 MMHG
TRANSFERRIN SERPL-MCNC: 351 MG/DL (ref 200–375)
TRICUSPID ANNULAR PLANE SYSTOLIC EXCURSION: 1.07 CM
TRICUSPID ANNULAR PLANE SYSTOLIC EXCURSION: 1.14 CM
TRICUSPID ANNULAR PLANE SYSTOLIC EXCURSION: 1.16 CM
TROPONIN I SERPL DL<=0.01 NG/ML-MCNC: 0.04 NG/ML (ref 0–0.03)
TROPONIN I SERPL DL<=0.01 NG/ML-MCNC: 0.04 NG/ML (ref 0–0.03)
TROPONIN I SERPL DL<=0.01 NG/ML-MCNC: 0.05 NG/ML (ref 0–0.03)
TROPONIN I SERPL DL<=0.01 NG/ML-MCNC: 0.06 NG/ML (ref 0–0.03)
TROPONIN I SERPL DL<=0.01 NG/ML-MCNC: 0.07 NG/ML (ref 0–0.03)
TROPONIN I SERPL DL<=0.01 NG/ML-MCNC: 0.08 NG/ML (ref 0–0.03)
TROPONIN I SERPL DL<=0.01 NG/ML-MCNC: 0.1 NG/ML (ref 0–0.03)
TROPONIN I SERPL DL<=0.01 NG/ML-MCNC: 0.13 NG/ML (ref 0–0.03)
TSH SERPL DL<=0.005 MIU/L-ACNC: 10.87 UIU/ML (ref 0.4–4)
TSH SERPL DL<=0.005 MIU/L-ACNC: 2.41 UIU/ML (ref 0.4–4)
TSH SERPL DL<=0.005 MIU/L-ACNC: 8.45 UIU/ML (ref 0.4–4)
TV REST PULMONARY ARTERY PRESSURE: 55 MMHG
TV REST PULMONARY ARTERY PRESSURE: 64 MMHG
TV REST PULMONARY ARTERY PRESSURE: 65 MMHG
URATE SERPL-MCNC: 15.5 MG/DL (ref 3.4–7)
URN SPEC COLLECT METH UR: ABNORMAL
URN SPEC COLLECT METH UR: NORMAL
UUN UR-MCNC: 166 MG/DL (ref 140–1050)
VIT B12 SERPL-MCNC: 445 PG/ML (ref 210–950)
WBC # BLD AUTO: 2.5 K/UL (ref 3.9–12.7)
WBC # BLD AUTO: 2.61 K/UL (ref 3.9–12.7)
WBC # BLD AUTO: 2.88 K/UL (ref 3.9–12.7)
WBC # BLD AUTO: 2.88 K/UL (ref 3.9–12.7)
WBC # BLD AUTO: 2.98 K/UL (ref 3.9–12.7)
WBC # BLD AUTO: 3.12 K/UL (ref 3.9–12.7)
WBC # BLD AUTO: 3.16 K/UL (ref 3.9–12.7)
WBC # BLD AUTO: 3.2 K/UL (ref 3.9–12.7)
WBC # BLD AUTO: 3.4 K/UL (ref 3.9–12.7)
WBC # BLD AUTO: 3.45 K/UL (ref 3.9–12.7)
WBC # BLD AUTO: 3.5 K/UL (ref 3.9–12.7)
WBC # BLD AUTO: 3.5 K/UL (ref 3.9–12.7)
WBC # BLD AUTO: 3.52 K/UL (ref 3.9–12.7)
WBC # BLD AUTO: 3.52 K/UL (ref 3.9–12.7)
WBC # BLD AUTO: 3.56 K/UL (ref 3.9–12.7)
WBC # BLD AUTO: 3.62 K/UL (ref 3.9–12.7)
WBC # BLD AUTO: 3.63 K/UL (ref 3.9–12.7)
WBC # BLD AUTO: 3.66 K/UL (ref 3.9–12.7)
WBC # BLD AUTO: 3.66 K/UL (ref 3.9–12.7)
WBC # BLD AUTO: 3.67 K/UL (ref 3.9–12.7)
WBC # BLD AUTO: 3.67 K/UL (ref 3.9–12.7)
WBC # BLD AUTO: 3.68 K/UL (ref 3.9–12.7)
WBC # BLD AUTO: 3.68 K/UL (ref 3.9–12.7)
WBC # BLD AUTO: 3.69 K/UL (ref 3.9–12.7)
WBC # BLD AUTO: 3.69 K/UL (ref 3.9–12.7)
WBC # BLD AUTO: 3.72 K/UL (ref 3.9–12.7)
WBC # BLD AUTO: 3.75 K/UL (ref 3.9–12.7)
WBC # BLD AUTO: 3.76 K/UL (ref 3.9–12.7)
WBC # BLD AUTO: 3.8 K/UL (ref 3.9–12.7)
WBC # BLD AUTO: 3.84 K/UL (ref 3.9–12.7)
WBC # BLD AUTO: 3.87 K/UL (ref 3.9–12.7)
WBC # BLD AUTO: 3.88 K/UL (ref 3.9–12.7)
WBC # BLD AUTO: 3.91 K/UL (ref 3.9–12.7)
WBC # BLD AUTO: 3.92 K/UL (ref 3.9–12.7)
WBC # BLD AUTO: 3.92 K/UL (ref 3.9–12.7)
WBC # BLD AUTO: 3.94 K/UL (ref 3.9–12.7)
WBC # BLD AUTO: 3.97 K/UL (ref 3.9–12.7)
WBC # BLD AUTO: 3.98 K/UL (ref 3.9–12.7)
WBC # BLD AUTO: 3.99 K/UL (ref 3.9–12.7)
WBC # BLD AUTO: 3.99 K/UL (ref 3.9–12.7)
WBC # BLD AUTO: 4.01 K/UL (ref 3.9–12.7)
WBC # BLD AUTO: 4.02 K/UL (ref 3.9–12.7)
WBC # BLD AUTO: 4.11 K/UL (ref 3.9–12.7)
WBC # BLD AUTO: 4.28 K/UL (ref 3.9–12.7)
WBC # BLD AUTO: 4.38 K/UL (ref 3.9–12.7)
WBC # BLD AUTO: 4.45 K/UL (ref 3.9–12.7)
WBC # BLD AUTO: 4.5 K/UL (ref 3.9–12.7)
WBC # BLD AUTO: 4.54 K/UL (ref 3.9–12.7)
WBC # BLD AUTO: 4.61 K/UL (ref 3.9–12.7)
WBC # BLD AUTO: 4.77 K/UL (ref 3.9–12.7)
WBC # BLD AUTO: 4.78 K/UL (ref 3.9–12.7)
WBC # BLD AUTO: 4.9 K/UL (ref 3.9–12.7)
WBC # BLD AUTO: 5.49 K/UL (ref 3.9–12.7)
WBC # BLD AUTO: 7.2 K/UL (ref 3.9–12.7)
WBC # BLD AUTO: 7.21 K/UL (ref 3.9–12.7)
WBC # BLD AUTO: 7.22 K/UL (ref 3.9–12.7)
WBC # FLD: 59 /CU MM
WBC #/AREA URNS AUTO: 2 /HPF (ref 0–5)
WBC #/AREA URNS AUTO: 3 /HPF (ref 0–5)
WBC #/AREA URNS AUTO: 5 /HPF (ref 0–5)

## 2020-01-01 PROCEDURE — 63600175 PHARM REV CODE 636 W HCPCS: Performed by: STUDENT IN AN ORGANIZED HEALTH CARE EDUCATION/TRAINING PROGRAM

## 2020-01-01 PROCEDURE — 99232 SBSQ HOSP IP/OBS MODERATE 35: CPT | Mod: ,,, | Performed by: NURSE PRACTITIONER

## 2020-01-01 PROCEDURE — 80053 COMPREHEN METABOLIC PANEL: CPT

## 2020-01-01 PROCEDURE — 83880 ASSAY OF NATRIURETIC PEPTIDE: CPT | Mod: NTX

## 2020-01-01 PROCEDURE — 85025 COMPLETE CBC W/AUTO DIFF WBC: CPT

## 2020-01-01 PROCEDURE — 99900035 HC TECH TIME PER 15 MIN (STAT)

## 2020-01-01 PROCEDURE — 36415 COLL VENOUS BLD VENIPUNCTURE: CPT

## 2020-01-01 PROCEDURE — 84443 ASSAY THYROID STIM HORMONE: CPT

## 2020-01-01 PROCEDURE — 93010 EKG 12-LEAD: ICD-10-PCS | Mod: ICN,,, | Performed by: INTERNAL MEDICINE

## 2020-01-01 PROCEDURE — 99231 SBSQ HOSP IP/OBS SF/LOW 25: CPT | Mod: ,,, | Performed by: INTERNAL MEDICINE

## 2020-01-01 PROCEDURE — 25000003 PHARM REV CODE 250: Performed by: NURSE PRACTITIONER

## 2020-01-01 PROCEDURE — C1894 INTRO/SHEATH, NON-LASER: HCPCS | Performed by: INTERNAL MEDICINE

## 2020-01-01 PROCEDURE — 94761 N-INVAS EAR/PLS OXIMETRY MLT: CPT

## 2020-01-01 PROCEDURE — 25000003 PHARM REV CODE 250: Performed by: STUDENT IN AN ORGANIZED HEALTH CARE EDUCATION/TRAINING PROGRAM

## 2020-01-01 PROCEDURE — 85610 PROTHROMBIN TIME: CPT

## 2020-01-01 PROCEDURE — 99233 SBSQ HOSP IP/OBS HIGH 50: CPT | Mod: ,,, | Performed by: INTERNAL MEDICINE

## 2020-01-01 PROCEDURE — 63600175 PHARM REV CODE 636 W HCPCS: Performed by: INTERNAL MEDICINE

## 2020-01-01 PROCEDURE — 25000003 PHARM REV CODE 250: Mod: NTX | Performed by: NURSE PRACTITIONER

## 2020-01-01 PROCEDURE — C9399 UNCLASSIFIED DRUGS OR BIOLOG: HCPCS | Performed by: NURSE PRACTITIONER

## 2020-01-01 PROCEDURE — 86235 NUCLEAR ANTIGEN ANTIBODY: CPT | Mod: 59

## 2020-01-01 PROCEDURE — 83735 ASSAY OF MAGNESIUM: CPT

## 2020-01-01 PROCEDURE — 84132 ASSAY OF SERUM POTASSIUM: CPT | Mod: NTX

## 2020-01-01 PROCEDURE — 80053 COMPREHEN METABOLIC PANEL: CPT | Mod: TXP

## 2020-01-01 PROCEDURE — 83735 ASSAY OF MAGNESIUM: CPT | Mod: 91

## 2020-01-01 PROCEDURE — 84484 ASSAY OF TROPONIN QUANT: CPT | Mod: 91

## 2020-01-01 PROCEDURE — 83880 ASSAY OF NATRIURETIC PEPTIDE: CPT

## 2020-01-01 PROCEDURE — 93005 ELECTROCARDIOGRAM TRACING: CPT | Mod: NTX

## 2020-01-01 PROCEDURE — 81001 URINALYSIS AUTO W/SCOPE: CPT

## 2020-01-01 PROCEDURE — 36415 COLL VENOUS BLD VENIPUNCTURE: CPT | Mod: PO

## 2020-01-01 PROCEDURE — 99232 PR SUBSEQUENT HOSPITAL CARE,LEVL II: ICD-10-PCS | Mod: ,,, | Performed by: INTERNAL MEDICINE

## 2020-01-01 PROCEDURE — 80048 BASIC METABOLIC PNL TOTAL CA: CPT | Mod: NTX

## 2020-01-01 PROCEDURE — 96374 THER/PROPH/DIAG INJ IV PUSH: CPT

## 2020-01-01 PROCEDURE — 93005 ELECTROCARDIOGRAM TRACING: CPT

## 2020-01-01 PROCEDURE — 25000003 PHARM REV CODE 250: Performed by: INTERNAL MEDICINE

## 2020-01-01 PROCEDURE — 99233 SBSQ HOSP IP/OBS HIGH 50: CPT | Mod: ,,, | Performed by: CLINICAL NURSE SPECIALIST

## 2020-01-01 PROCEDURE — 83735 ASSAY OF MAGNESIUM: CPT | Mod: NTX

## 2020-01-01 PROCEDURE — U0002 COVID-19 LAB TEST NON-CDC: HCPCS

## 2020-01-01 PROCEDURE — 37200 IR TRANSJUGULAR LIVER BIOPSY: ICD-10-PCS | Mod: RT,NTX,, | Performed by: STUDENT IN AN ORGANIZED HEALTH CARE EDUCATION/TRAINING PROGRAM

## 2020-01-01 PROCEDURE — 75970 IR TRANSJUGULAR LIVER BIOPSY: ICD-10-PCS | Mod: 26,NTX,, | Performed by: STUDENT IN AN ORGANIZED HEALTH CARE EDUCATION/TRAINING PROGRAM

## 2020-01-01 PROCEDURE — 63600175 PHARM REV CODE 636 W HCPCS: Performed by: NURSE PRACTITIONER

## 2020-01-01 PROCEDURE — 63600175 PHARM REV CODE 636 W HCPCS: Mod: NTX | Performed by: STUDENT IN AN ORGANIZED HEALTH CARE EDUCATION/TRAINING PROGRAM

## 2020-01-01 PROCEDURE — 83880 ASSAY OF NATRIURETIC PEPTIDE: CPT | Mod: TXP

## 2020-01-01 PROCEDURE — 87102 FUNGUS ISOLATION CULTURE: CPT | Mod: NTX

## 2020-01-01 PROCEDURE — 84439 ASSAY OF FREE THYROXINE: CPT

## 2020-01-01 PROCEDURE — 20600001 HC STEP DOWN PRIVATE ROOM

## 2020-01-01 PROCEDURE — 80321 ALCOHOLS BIOMARKERS 1OR 2: CPT

## 2020-01-01 PROCEDURE — 63600175 PHARM REV CODE 636 W HCPCS: Mod: NTX | Performed by: NURSE PRACTITIONER

## 2020-01-01 PROCEDURE — 93010 EKG 12-LEAD: ICD-10-PCS | Mod: ,,, | Performed by: INTERNAL MEDICINE

## 2020-01-01 PROCEDURE — 84100 ASSAY OF PHOSPHORUS: CPT

## 2020-01-01 PROCEDURE — 84484 ASSAY OF TROPONIN QUANT: CPT | Mod: NTX

## 2020-01-01 PROCEDURE — 80048 BASIC METABOLIC PNL TOTAL CA: CPT

## 2020-01-01 PROCEDURE — 99999 PR PBB SHADOW E&M-EST. PATIENT-LVL V: CPT | Mod: PBBFAC,,, | Performed by: INTERNAL MEDICINE

## 2020-01-01 PROCEDURE — 99223 PR INITIAL HOSPITAL CARE,LEVL III: ICD-10-PCS | Mod: ,,, | Performed by: HOSPITALIST

## 2020-01-01 PROCEDURE — 99233 SBSQ HOSP IP/OBS HIGH 50: CPT | Mod: NTX,,, | Performed by: NURSE PRACTITIONER

## 2020-01-01 PROCEDURE — G0378 HOSPITAL OBSERVATION PER HR: HCPCS

## 2020-01-01 PROCEDURE — 88313 SPECIAL STAINS GROUP 2: CPT | Mod: 26,NTX,, | Performed by: PATHOLOGY

## 2020-01-01 PROCEDURE — 99214 PR OFFICE/OUTPT VISIT, EST, LEVL IV, 30-39 MIN: ICD-10-PCS | Mod: S$PBB,TXP,, | Performed by: INTERNAL MEDICINE

## 2020-01-01 PROCEDURE — 63600175 PHARM REV CODE 636 W HCPCS: Performed by: HOSPITALIST

## 2020-01-01 PROCEDURE — 86703 HIV-1/HIV-2 1 RESULT ANTBDY: CPT

## 2020-01-01 PROCEDURE — 88307 PR  SURG PATH,LEVEL V: ICD-10-PCS | Mod: 26,NTX,, | Performed by: PATHOLOGY

## 2020-01-01 PROCEDURE — 25000003 PHARM REV CODE 250: Mod: NTX | Performed by: INTERNAL MEDICINE

## 2020-01-01 PROCEDURE — 85025 COMPLETE CBC W/AUTO DIFF WBC: CPT | Mod: NTX

## 2020-01-01 PROCEDURE — 99233 PR SUBSEQUENT HOSPITAL CARE,LEVL III: ICD-10-PCS | Mod: NTX,,, | Performed by: PHYSICIAN ASSISTANT

## 2020-01-01 PROCEDURE — 84484 ASSAY OF TROPONIN QUANT: CPT

## 2020-01-01 PROCEDURE — C9399 UNCLASSIFIED DRUGS OR BIOLOG: HCPCS | Performed by: STUDENT IN AN ORGANIZED HEALTH CARE EDUCATION/TRAINING PROGRAM

## 2020-01-01 PROCEDURE — 99284 EMERGENCY DEPT VISIT MOD MDM: CPT | Mod: ,,, | Performed by: EMERGENCY MEDICINE

## 2020-01-01 PROCEDURE — 25000003 PHARM REV CODE 250: Performed by: HOSPITALIST

## 2020-01-01 PROCEDURE — 63600175 PHARM REV CODE 636 W HCPCS: Performed by: EMERGENCY MEDICINE

## 2020-01-01 PROCEDURE — 88313 SPECIAL STAINS GROUP 2: CPT | Mod: TXP | Performed by: PATHOLOGY

## 2020-01-01 PROCEDURE — 93451 RIGHT HEART CATH: CPT | Mod: 26,,, | Performed by: INTERNAL MEDICINE

## 2020-01-01 PROCEDURE — 91200 LIVER ELASTOGRAPHY: CPT | Mod: PBBFAC,TXP | Performed by: INTERNAL MEDICINE

## 2020-01-01 PROCEDURE — 99232 PR SUBSEQUENT HOSPITAL CARE,LEVL II: ICD-10-PCS | Mod: ,,, | Performed by: NURSE PRACTITIONER

## 2020-01-01 PROCEDURE — 25000003 PHARM REV CODE 250: Mod: NTX | Performed by: STUDENT IN AN ORGANIZED HEALTH CARE EDUCATION/TRAINING PROGRAM

## 2020-01-01 PROCEDURE — 37200 TRANSCATHETER BIOPSY: CPT | Mod: RT,NTX,, | Performed by: STUDENT IN AN ORGANIZED HEALTH CARE EDUCATION/TRAINING PROGRAM

## 2020-01-01 PROCEDURE — 36415 COLL VENOUS BLD VENIPUNCTURE: CPT | Mod: NTX

## 2020-01-01 PROCEDURE — U0002 COVID-19 LAB TEST NON-CDC: HCPCS | Mod: NTX

## 2020-01-01 PROCEDURE — 99497 PR ADVNCD CARE PLAN 30 MIN: ICD-10-PCS | Mod: ,,, | Performed by: CLINICAL NURSE SPECIALIST

## 2020-01-01 PROCEDURE — 89051 BODY FLUID CELL COUNT: CPT

## 2020-01-01 PROCEDURE — 84156 ASSAY OF PROTEIN URINE: CPT | Mod: NTX

## 2020-01-01 PROCEDURE — 25000003 PHARM REV CODE 250: Mod: NTX | Performed by: PHYSICIAN ASSISTANT

## 2020-01-01 PROCEDURE — 99232 SBSQ HOSP IP/OBS MODERATE 35: CPT | Mod: ,,, | Performed by: INTERNAL MEDICINE

## 2020-01-01 PROCEDURE — 20600001 HC STEP DOWN PRIVATE ROOM: Mod: NTX

## 2020-01-01 PROCEDURE — 93010 ELECTROCARDIOGRAM REPORT: CPT | Mod: ,,, | Performed by: INTERNAL MEDICINE

## 2020-01-01 PROCEDURE — 99291 CRITICAL CARE FIRST HOUR: CPT | Mod: ,,, | Performed by: EMERGENCY MEDICINE

## 2020-01-01 PROCEDURE — G0179 MD RECERTIFICATION HHA PT: HCPCS | Mod: ,,, | Performed by: INTERNAL MEDICINE

## 2020-01-01 PROCEDURE — 99232 SBSQ HOSP IP/OBS MODERATE 35: CPT | Mod: ,,, | Performed by: HOSPITALIST

## 2020-01-01 PROCEDURE — 86592 SYPHILIS TEST NON-TREP QUAL: CPT | Mod: NTX

## 2020-01-01 PROCEDURE — 99497 ADVNCD CARE PLAN 30 MIN: CPT | Mod: ,,, | Performed by: CLINICAL NURSE SPECIALIST

## 2020-01-01 PROCEDURE — 84157 ASSAY OF PROTEIN OTHER: CPT

## 2020-01-01 PROCEDURE — 99233 PR SUBSEQUENT HOSPITAL CARE,LEVL III: ICD-10-PCS | Mod: NTX,,, | Performed by: NURSE PRACTITIONER

## 2020-01-01 PROCEDURE — 99285 EMERGENCY DEPT VISIT HI MDM: CPT | Mod: 25

## 2020-01-01 PROCEDURE — 99204 OFFICE O/P NEW MOD 45 MIN: CPT | Mod: S$PBB,NTX,, | Performed by: INTERNAL MEDICINE

## 2020-01-01 PROCEDURE — 25000003 PHARM REV CODE 250: Performed by: PHYSICIAN ASSISTANT

## 2020-01-01 PROCEDURE — G0179 MD RECERTIFICATION HHA PT: HCPCS | Mod: NTX,,, | Performed by: INTERNAL MEDICINE

## 2020-01-01 PROCEDURE — 80048 BASIC METABOLIC PNL TOTAL CA: CPT | Mod: 91

## 2020-01-01 PROCEDURE — G0180 MD CERTIFICATION HHA PATIENT: HCPCS | Mod: ,,, | Performed by: INTERNAL MEDICINE

## 2020-01-01 PROCEDURE — 85025 COMPLETE CBC W/AUTO DIFF WBC: CPT | Mod: TXP

## 2020-01-01 PROCEDURE — 83605 ASSAY OF LACTIC ACID: CPT

## 2020-01-01 PROCEDURE — 82962 GLUCOSE BLOOD TEST: CPT

## 2020-01-01 PROCEDURE — 99233 PR SUBSEQUENT HOSPITAL CARE,LEVL III: ICD-10-PCS | Mod: ,,, | Performed by: INTERNAL MEDICINE

## 2020-01-01 PROCEDURE — 99232 SBSQ HOSP IP/OBS MODERATE 35: CPT | Mod: NTX,,, | Performed by: INTERNAL MEDICINE

## 2020-01-01 PROCEDURE — 11000001 HC ACUTE MED/SURG PRIVATE ROOM

## 2020-01-01 PROCEDURE — 99232 PR SUBSEQUENT HOSPITAL CARE,LEVL II: ICD-10-PCS | Mod: ,,, | Performed by: HOSPITALIST

## 2020-01-01 PROCEDURE — 99204 PR OFFICE/OUTPT VISIT, NEW, LEVL IV, 45-59 MIN: ICD-10-PCS | Mod: S$PBB,,, | Performed by: INTERNAL MEDICINE

## 2020-01-01 PROCEDURE — 86160 COMPLEMENT ANTIGEN: CPT | Mod: 59,NTX

## 2020-01-01 PROCEDURE — 82570 ASSAY OF URINE CREATININE: CPT

## 2020-01-01 PROCEDURE — 83735 ASSAY OF MAGNESIUM: CPT | Mod: TXP

## 2020-01-01 PROCEDURE — 82595 ASSAY OF CRYOGLOBULIN: CPT | Mod: NTX

## 2020-01-01 PROCEDURE — C1729 CATH, DRAINAGE: HCPCS | Mod: TXP

## 2020-01-01 PROCEDURE — 99214 OFFICE O/P EST MOD 30 MIN: CPT | Mod: PBBFAC | Performed by: INTERNAL MEDICINE

## 2020-01-01 PROCEDURE — 99233 PR SUBSEQUENT HOSPITAL CARE,LEVL III: ICD-10-PCS | Mod: NTX,,, | Performed by: INTERNAL MEDICINE

## 2020-01-01 PROCEDURE — 99223 1ST HOSP IP/OBS HIGH 75: CPT | Mod: ,,, | Performed by: HOSPITALIST

## 2020-01-01 PROCEDURE — 94761 N-INVAS EAR/PLS OXIMETRY MLT: CPT | Mod: NTX

## 2020-01-01 PROCEDURE — 99285 EMERGENCY DEPT VISIT HI MDM: CPT | Mod: ,,, | Performed by: EMERGENCY MEDICINE

## 2020-01-01 PROCEDURE — 99233 SBSQ HOSP IP/OBS HIGH 50: CPT | Mod: NTX,,, | Performed by: INTERNAL MEDICINE

## 2020-01-01 PROCEDURE — 75970 VASCULAR BIOPSY: CPT | Mod: 26,NTX,, | Performed by: STUDENT IN AN ORGANIZED HEALTH CARE EDUCATION/TRAINING PROGRAM

## 2020-01-01 PROCEDURE — 86141 C-REACTIVE PROTEIN HS: CPT | Mod: NTX

## 2020-01-01 PROCEDURE — 97802 MEDICAL NUTRITION INDIV IN: CPT

## 2020-01-01 PROCEDURE — 99999 PR PBB SHADOW E&M-EST. PATIENT-LVL V: ICD-10-PCS | Mod: PBBFAC,,, | Performed by: INTERNAL MEDICINE

## 2020-01-01 PROCEDURE — 99213 OFFICE O/P EST LOW 20 MIN: CPT | Mod: PBBFAC | Performed by: INTERNAL MEDICINE

## 2020-01-01 PROCEDURE — 99285 EMERGENCY DEPT VISIT HI MDM: CPT | Mod: ,,, | Performed by: PHYSICIAN ASSISTANT

## 2020-01-01 PROCEDURE — 81003 URINALYSIS AUTO W/O SCOPE: CPT | Mod: NTX

## 2020-01-01 PROCEDURE — 82803 BLOOD GASES ANY COMBINATION: CPT

## 2020-01-01 PROCEDURE — 99285 PR EMERGENCY DEPT VISIT,LEVEL V: ICD-10-PCS | Mod: ,,, | Performed by: PHYSICIAN ASSISTANT

## 2020-01-01 PROCEDURE — 83036 HEMOGLOBIN GLYCOSYLATED A1C: CPT

## 2020-01-01 PROCEDURE — 99285 EMERGENCY DEPT VISIT HI MDM: CPT | Mod: 25,NTX

## 2020-01-01 PROCEDURE — 99231 SBSQ HOSP IP/OBS SF/LOW 25: CPT | Mod: NTX,,, | Performed by: INTERNAL MEDICINE

## 2020-01-01 PROCEDURE — 99224 PR SUBSEQUENT OBSERVATION CARE,LEVEL I: ICD-10-PCS | Mod: ,,, | Performed by: INTERNAL MEDICINE

## 2020-01-01 PROCEDURE — C9399 UNCLASSIFIED DRUGS OR BIOLOG: HCPCS | Performed by: HOSPITALIST

## 2020-01-01 PROCEDURE — 99204 PR OFFICE/OUTPT VISIT, NEW, LEVL IV, 45-59 MIN: ICD-10-PCS | Mod: S$PBB,NTX,, | Performed by: INTERNAL MEDICINE

## 2020-01-01 PROCEDURE — 80076 HEPATIC FUNCTION PANEL: CPT | Mod: NTX

## 2020-01-01 PROCEDURE — 82140 ASSAY OF AMMONIA: CPT

## 2020-01-01 PROCEDURE — 99214 PR OFFICE/OUTPT VISIT, EST, LEVL IV, 30-39 MIN: ICD-10-PCS | Mod: S$PBB,,, | Performed by: INTERNAL MEDICINE

## 2020-01-01 PROCEDURE — 99291 CRITICAL CARE FIRST HOUR: CPT | Mod: 25

## 2020-01-01 PROCEDURE — 88307 TISSUE EXAM BY PATHOLOGIST: CPT | Mod: 26,NTX,, | Performed by: PATHOLOGY

## 2020-01-01 PROCEDURE — 87040 BLOOD CULTURE FOR BACTERIA: CPT | Mod: 59

## 2020-01-01 PROCEDURE — 99239 HOSP IP/OBS DSCHRG MGMT >30: CPT | Mod: ,,, | Performed by: INTERNAL MEDICINE

## 2020-01-01 PROCEDURE — 99232 SBSQ HOSP IP/OBS MODERATE 35: CPT | Mod: NTX,,, | Performed by: NURSE PRACTITIONER

## 2020-01-01 PROCEDURE — 91200 FIBROSCAN (VIBRATION CONTROLLED TRANSIENT ELASTOGRAPHY): ICD-10-PCS | Mod: 26,S$PBB,TXP, | Performed by: INTERNAL MEDICINE

## 2020-01-01 PROCEDURE — 86146 BETA-2 GLYCOPROTEIN ANTIBODY: CPT | Mod: NTX

## 2020-01-01 PROCEDURE — 80053 COMPREHEN METABOLIC PANEL: CPT | Mod: NTX

## 2020-01-01 PROCEDURE — G0179 PR HOME HEALTH MD RECERTIFICATION: ICD-10-PCS | Mod: NTX,,, | Performed by: INTERNAL MEDICINE

## 2020-01-01 PROCEDURE — 82042 OTHER SOURCE ALBUMIN QUAN EA: CPT

## 2020-01-01 PROCEDURE — 99999 PR PBB SHADOW E&M-EST. PATIENT-LVL IV: CPT | Mod: PBBFAC,TXP,, | Performed by: INTERNAL MEDICINE

## 2020-01-01 PROCEDURE — 99233 PR SUBSEQUENT HOSPITAL CARE,LEVL III: ICD-10-PCS | Mod: ,,, | Performed by: CLINICAL NURSE SPECIALIST

## 2020-01-01 PROCEDURE — 82105 ALPHA-FETOPROTEIN SERUM: CPT

## 2020-01-01 PROCEDURE — 80074 ACUTE HEPATITIS PANEL: CPT

## 2020-01-01 PROCEDURE — 99215 OFFICE O/P EST HI 40 MIN: CPT | Mod: PBBFAC | Performed by: INTERNAL MEDICINE

## 2020-01-01 PROCEDURE — 99223 1ST HOSP IP/OBS HIGH 75: CPT | Mod: ,,, | Performed by: INTERNAL MEDICINE

## 2020-01-01 PROCEDURE — 80053 COMPREHEN METABOLIC PANEL: CPT | Mod: 91

## 2020-01-01 PROCEDURE — 85027 COMPLETE CBC AUTOMATED: CPT | Mod: TXP

## 2020-01-01 PROCEDURE — 99285 PR EMERGENCY DEPT VISIT,LEVEL V: ICD-10-PCS | Mod: ,,, | Performed by: EMERGENCY MEDICINE

## 2020-01-01 PROCEDURE — G0179 PR HOME HEALTH MD RECERTIFICATION: ICD-10-PCS | Mod: ,,, | Performed by: INTERNAL MEDICINE

## 2020-01-01 PROCEDURE — 99232 PR SUBSEQUENT HOSPITAL CARE,LEVL II: ICD-10-PCS | Mod: NTX,,, | Performed by: INTERNAL MEDICINE

## 2020-01-01 PROCEDURE — 36556 INSERT NON-TUNNEL CV CATH: CPT

## 2020-01-01 PROCEDURE — 87040 BLOOD CULTURE FOR BACTERIA: CPT | Mod: NTX

## 2020-01-01 PROCEDURE — 99204 OFFICE O/P NEW MOD 45 MIN: CPT | Mod: S$PBB,,, | Performed by: INTERNAL MEDICINE

## 2020-01-01 PROCEDURE — 99231 PR SUBSEQUENT HOSPITAL CARE,LEVL I: ICD-10-PCS | Mod: NTX,,, | Performed by: INTERNAL MEDICINE

## 2020-01-01 PROCEDURE — 99223 PR INITIAL HOSPITAL CARE,LEVL III: ICD-10-PCS | Mod: ,,, | Performed by: PHYSICIAN ASSISTANT

## 2020-01-01 PROCEDURE — 87040 BLOOD CULTURE FOR BACTERIA: CPT

## 2020-01-01 PROCEDURE — 99238 PR HOSPITAL DISCHARGE DAY,<30 MIN: ICD-10-PCS | Mod: ,,, | Performed by: INTERNAL MEDICINE

## 2020-01-01 PROCEDURE — 36011 IR TRANSJUGULAR LIVER BIOPSY: ICD-10-PCS | Mod: RT,NTX,, | Performed by: STUDENT IN AN ORGANIZED HEALTH CARE EDUCATION/TRAINING PROGRAM

## 2020-01-01 PROCEDURE — 99225 PR SUBSEQUENT OBSERVATION CARE,LEVEL II: CPT | Mod: ,,, | Performed by: INTERNAL MEDICINE

## 2020-01-01 PROCEDURE — 75889 IR TRANSJUGULAR LIVER BIOPSY: ICD-10-PCS | Mod: 26,59,NTX, | Performed by: STUDENT IN AN ORGANIZED HEALTH CARE EDUCATION/TRAINING PROGRAM

## 2020-01-01 PROCEDURE — 75825 IR TRANSJUGULAR LIVER BIOPSY: ICD-10-PCS | Mod: 26,59,NTX, | Performed by: STUDENT IN AN ORGANIZED HEALTH CARE EDUCATION/TRAINING PROGRAM

## 2020-01-01 PROCEDURE — 83930 ASSAY OF BLOOD OSMOLALITY: CPT

## 2020-01-01 PROCEDURE — 12000002 HC ACUTE/MED SURGE SEMI-PRIVATE ROOM

## 2020-01-01 PROCEDURE — 93451 PR RIGHT HEART CATH O2 SATURATION & CARDIAC OUTPUT: ICD-10-PCS | Mod: 26,,, | Performed by: INTERNAL MEDICINE

## 2020-01-01 PROCEDURE — 93010 ELECTROCARDIOGRAM REPORT: CPT | Mod: NTX,,, | Performed by: INTERNAL MEDICINE

## 2020-01-01 PROCEDURE — 82010 KETONE BODYS QUAN: CPT

## 2020-01-01 PROCEDURE — G0180 PR HOME HEALTH MD CERTIFICATION: ICD-10-PCS | Mod: ,,, | Performed by: INTERNAL MEDICINE

## 2020-01-01 PROCEDURE — 99999 PR PBB SHADOW E&M-EST. PATIENT-LVL IV: CPT | Mod: PBBFAC,,, | Performed by: INTERNAL MEDICINE

## 2020-01-01 PROCEDURE — 82306 VITAMIN D 25 HYDROXY: CPT

## 2020-01-01 PROCEDURE — 99999 PR PBB SHADOW E&M-EST. PATIENT-LVL III: CPT | Mod: PBBFAC,,, | Performed by: INTERNAL MEDICINE

## 2020-01-01 PROCEDURE — 86255 FLUORESCENT ANTIBODY SCREEN: CPT | Mod: NTX

## 2020-01-01 PROCEDURE — 82607 VITAMIN B-12: CPT | Mod: NTX

## 2020-01-01 PROCEDURE — 25500020 PHARM REV CODE 255: Mod: TXP | Performed by: INTERNAL MEDICINE

## 2020-01-01 PROCEDURE — 99238 HOSP IP/OBS DSCHRG MGMT 30/<: CPT | Mod: ,,, | Performed by: INTERNAL MEDICINE

## 2020-01-01 PROCEDURE — 86706 HEP B SURFACE ANTIBODY: CPT

## 2020-01-01 PROCEDURE — 83605 ASSAY OF LACTIC ACID: CPT | Mod: NTX

## 2020-01-01 PROCEDURE — 96375 TX/PRO/DX INJ NEW DRUG ADDON: CPT | Performed by: EMERGENCY MEDICINE

## 2020-01-01 PROCEDURE — 75825 VEIN X-RAY TRUNK: CPT | Mod: 59,TC,NTX

## 2020-01-01 PROCEDURE — 76937 IR TRANSJUGULAR LIVER BIOPSY: ICD-10-PCS | Mod: 26,NTX,, | Performed by: STUDENT IN AN ORGANIZED HEALTH CARE EDUCATION/TRAINING PROGRAM

## 2020-01-01 PROCEDURE — 99231 PR SUBSEQUENT HOSPITAL CARE,LEVL I: ICD-10-PCS | Mod: ,,, | Performed by: INTERNAL MEDICINE

## 2020-01-01 PROCEDURE — 99223 1ST HOSP IP/OBS HIGH 75: CPT | Mod: ,,, | Performed by: PHYSICIAN ASSISTANT

## 2020-01-01 PROCEDURE — 93451 RIGHT HEART CATH: CPT | Performed by: INTERNAL MEDICINE

## 2020-01-01 PROCEDURE — 63600175 PHARM REV CODE 636 W HCPCS: Performed by: PHYSICIAN ASSISTANT

## 2020-01-01 PROCEDURE — 83516 IMMUNOASSAY NONANTIBODY: CPT | Mod: NTX

## 2020-01-01 PROCEDURE — 82962 GLUCOSE BLOOD TEST: CPT | Mod: TXP

## 2020-01-01 PROCEDURE — 63600175 PHARM REV CODE 636 W HCPCS: Mod: TXP | Performed by: STUDENT IN AN ORGANIZED HEALTH CARE EDUCATION/TRAINING PROGRAM

## 2020-01-01 PROCEDURE — 96374 THER/PROPH/DIAG INJ IV PUSH: CPT | Mod: 59 | Performed by: EMERGENCY MEDICINE

## 2020-01-01 PROCEDURE — 83540 ASSAY OF IRON: CPT

## 2020-01-01 PROCEDURE — 99239 PR HOSPITAL DISCHARGE DAY,>30 MIN: ICD-10-PCS | Mod: ,,, | Performed by: INTERNAL MEDICINE

## 2020-01-01 PROCEDURE — 99223 PR INITIAL HOSPITAL CARE,LEVL III: ICD-10-PCS | Mod: NTX,,, | Performed by: INTERNAL MEDICINE

## 2020-01-01 PROCEDURE — 99214 OFFICE O/P EST MOD 30 MIN: CPT | Mod: S$PBB,,, | Performed by: INTERNAL MEDICINE

## 2020-01-01 PROCEDURE — 75889 VEIN X-RAY LIVER W/HEMODYNAM: CPT | Mod: 26,59,NTX, | Performed by: STUDENT IN AN ORGANIZED HEALTH CARE EDUCATION/TRAINING PROGRAM

## 2020-01-01 PROCEDURE — 96372 THER/PROPH/DIAG INJ SC/IM: CPT | Mod: 59 | Performed by: EMERGENCY MEDICINE

## 2020-01-01 PROCEDURE — 75825 VEIN X-RAY TRUNK: CPT | Mod: 26,59,NTX, | Performed by: STUDENT IN AN ORGANIZED HEALTH CARE EDUCATION/TRAINING PROGRAM

## 2020-01-01 PROCEDURE — 84443 ASSAY THYROID STIM HORMONE: CPT | Mod: NTX

## 2020-01-01 PROCEDURE — 99214 OFFICE O/P EST MOD 30 MIN: CPT | Mod: S$PBB,TXP,, | Performed by: INTERNAL MEDICINE

## 2020-01-01 PROCEDURE — 99214 OFFICE O/P EST MOD 30 MIN: CPT | Mod: PBBFAC,TXP | Performed by: INTERNAL MEDICINE

## 2020-01-01 PROCEDURE — 82803 BLOOD GASES ANY COMBINATION: CPT | Mod: NTX

## 2020-01-01 PROCEDURE — 99215 OFFICE O/P EST HI 40 MIN: CPT | Mod: PBBFAC,TXP,25 | Performed by: INTERNAL MEDICINE

## 2020-01-01 PROCEDURE — 99999 PR PBB SHADOW E&M-EST. PATIENT-LVL V: ICD-10-PCS | Mod: PBBFAC,TXP,, | Performed by: INTERNAL MEDICINE

## 2020-01-01 PROCEDURE — 99224 PR SUBSEQUENT OBSERVATION CARE,LEVEL I: CPT | Mod: ,,, | Performed by: INTERNAL MEDICINE

## 2020-01-01 PROCEDURE — 84300 ASSAY OF URINE SODIUM: CPT

## 2020-01-01 PROCEDURE — 87070 CULTURE OTHR SPECIMN AEROBIC: CPT

## 2020-01-01 PROCEDURE — 85027 COMPLETE CBC AUTOMATED: CPT | Mod: NTX

## 2020-01-01 PROCEDURE — 99291 PR CRITICAL CARE, E/M 30-74 MINUTES: ICD-10-PCS | Mod: ,,, | Performed by: EMERGENCY MEDICINE

## 2020-01-01 PROCEDURE — 83516 IMMUNOASSAY NONANTIBODY: CPT | Mod: 59,NTX

## 2020-01-01 PROCEDURE — 99900035 HC TECH TIME PER 15 MIN (STAT): Mod: NTX

## 2020-01-01 PROCEDURE — 86256 FLUORESCENT ANTIBODY TITER: CPT

## 2020-01-01 PROCEDURE — 36415 COLL VENOUS BLD VENIPUNCTURE: CPT | Mod: TXP

## 2020-01-01 PROCEDURE — 87075 CULTR BACTERIA EXCEPT BLOOD: CPT

## 2020-01-01 PROCEDURE — 84133 ASSAY OF URINE POTASSIUM: CPT

## 2020-01-01 PROCEDURE — 76937 US GUIDE VASCULAR ACCESS: CPT | Mod: 26,NTX,, | Performed by: STUDENT IN AN ORGANIZED HEALTH CARE EDUCATION/TRAINING PROGRAM

## 2020-01-01 PROCEDURE — 93010 EKG 12-LEAD: ICD-10-PCS | Mod: NTX,,, | Performed by: INTERNAL MEDICINE

## 2020-01-01 PROCEDURE — 93010 ELECTROCARDIOGRAM REPORT: CPT | Mod: ICN,,, | Performed by: INTERNAL MEDICINE

## 2020-01-01 PROCEDURE — U0003 INFECTIOUS AGENT DETECTION BY NUCLEIC ACID (DNA OR RNA); SEVERE ACUTE RESPIRATORY SYNDROME CORONAVIRUS 2 (SARS-COV-2) (CORONAVIRUS DISEASE [COVID-19]), AMPLIFIED PROBE TECHNIQUE, MAKING USE OF HIGH THROUGHPUT TECHNOLOGIES AS DESCRIBED BY CMS-2020-01-R: HCPCS

## 2020-01-01 PROCEDURE — 99999 PR PBB SHADOW E&M-EST. PATIENT-LVL IV: ICD-10-PCS | Mod: PBBFAC,,, | Performed by: INTERNAL MEDICINE

## 2020-01-01 PROCEDURE — 80069 RENAL FUNCTION PANEL: CPT

## 2020-01-01 PROCEDURE — 85598 HEXAGNAL PHOSPH PLTLT NEUTRL: CPT | Mod: NTX

## 2020-01-01 PROCEDURE — 86147 CARDIOLIPIN ANTIBODY EA IG: CPT | Mod: 59,NTX

## 2020-01-01 PROCEDURE — 99233 PR SUBSEQUENT HOSPITAL CARE,LEVL III: ICD-10-PCS | Mod: 95,NTX,, | Performed by: NURSE PRACTITIONER

## 2020-01-01 PROCEDURE — 99233 SBSQ HOSP IP/OBS HIGH 50: CPT | Mod: NTX,,, | Performed by: PHYSICIAN ASSISTANT

## 2020-01-01 PROCEDURE — 86038 ANTINUCLEAR ANTIBODIES: CPT

## 2020-01-01 PROCEDURE — C9399 UNCLASSIFIED DRUGS OR BIOLOG: HCPCS | Mod: NTX | Performed by: STUDENT IN AN ORGANIZED HEALTH CARE EDUCATION/TRAINING PROGRAM

## 2020-01-01 PROCEDURE — 82728 ASSAY OF FERRITIN: CPT

## 2020-01-01 PROCEDURE — 99223 PR INITIAL HOSPITAL CARE,LEVL III: ICD-10-PCS | Mod: ,,, | Performed by: INTERNAL MEDICINE

## 2020-01-01 PROCEDURE — C1751 CATH, INF, PER/CENT/MIDLINE: HCPCS | Performed by: INTERNAL MEDICINE

## 2020-01-01 PROCEDURE — 84540 ASSAY OF URINE/UREA-N: CPT

## 2020-01-01 PROCEDURE — 99233 SBSQ HOSP IP/OBS HIGH 50: CPT | Mod: ,,, | Performed by: HOSPITALIST

## 2020-01-01 PROCEDURE — 80069 RENAL FUNCTION PANEL: CPT | Mod: 91

## 2020-01-01 PROCEDURE — 99238 HOSP IP/OBS DSCHRG MGMT 30/<: CPT | Mod: ,,, | Performed by: HOSPITALIST

## 2020-01-01 PROCEDURE — 85613 RUSSELL VIPER VENOM DILUTED: CPT | Mod: NTX

## 2020-01-01 PROCEDURE — 82977 ASSAY OF GGT: CPT

## 2020-01-01 PROCEDURE — 86039 ANTINUCLEAR ANTIBODIES (ANA): CPT

## 2020-01-01 PROCEDURE — 85652 RBC SED RATE AUTOMATED: CPT | Mod: NTX

## 2020-01-01 PROCEDURE — 99233 SBSQ HOSP IP/OBS HIGH 50: CPT | Mod: 95,NTX,, | Performed by: NURSE PRACTITIONER

## 2020-01-01 PROCEDURE — 86704 HEP B CORE ANTIBODY TOTAL: CPT

## 2020-01-01 PROCEDURE — 88307 TISSUE EXAM BY PATHOLOGIST: CPT | Mod: TXP | Performed by: PATHOLOGY

## 2020-01-01 PROCEDURE — 83735 ASSAY OF MAGNESIUM: CPT | Mod: 91,NTX

## 2020-01-01 PROCEDURE — 99999 PR PBB SHADOW E&M-EST. PATIENT-LVL IV: ICD-10-PCS | Mod: PBBFAC,TXP,, | Performed by: INTERNAL MEDICINE

## 2020-01-01 PROCEDURE — 99238 PR HOSPITAL DISCHARGE DAY,<30 MIN: ICD-10-PCS | Mod: ,,, | Performed by: HOSPITALIST

## 2020-01-01 PROCEDURE — 99999 PR PBB SHADOW E&M-EST. PATIENT-LVL V: CPT | Mod: PBBFAC,TXP,, | Performed by: INTERNAL MEDICINE

## 2020-01-01 PROCEDURE — 99231 SBSQ HOSP IP/OBS SF/LOW 25: CPT | Mod: ,,, | Performed by: NURSE PRACTITIONER

## 2020-01-01 PROCEDURE — 84550 ASSAY OF BLOOD/URIC ACID: CPT | Mod: NTX

## 2020-01-01 PROCEDURE — 82945 GLUCOSE OTHER FLUID: CPT

## 2020-01-01 PROCEDURE — 99225 PR SUBSEQUENT OBSERVATION CARE,LEVEL II: ICD-10-PCS | Mod: ,,, | Performed by: INTERNAL MEDICINE

## 2020-01-01 PROCEDURE — 99233 PR SUBSEQUENT HOSPITAL CARE,LEVL III: ICD-10-PCS | Mod: ,,, | Performed by: HOSPITALIST

## 2020-01-01 PROCEDURE — 99284 PR EMERGENCY DEPT VISIT,LEVEL IV: ICD-10-PCS | Mod: ,,, | Performed by: EMERGENCY MEDICINE

## 2020-01-01 PROCEDURE — 84156 ASSAY OF PROTEIN URINE: CPT

## 2020-01-01 PROCEDURE — 83615 LACTATE (LD) (LDH) ENZYME: CPT

## 2020-01-01 PROCEDURE — 84132 ASSAY OF SERUM POTASSIUM: CPT

## 2020-01-01 PROCEDURE — 82746 ASSAY OF FOLIC ACID SERUM: CPT | Mod: NTX

## 2020-01-01 PROCEDURE — 63600175 PHARM REV CODE 636 W HCPCS: Performed by: RADIOLOGY

## 2020-01-01 PROCEDURE — 83036 HEMOGLOBIN GLYCOSYLATED A1C: CPT | Mod: NTX

## 2020-01-01 PROCEDURE — 82435 ASSAY OF BLOOD CHLORIDE: CPT | Mod: TXP

## 2020-01-01 PROCEDURE — 36011 PLACE CATHETER IN VEIN: CPT | Mod: RT,NTX,, | Performed by: STUDENT IN AN ORGANIZED HEALTH CARE EDUCATION/TRAINING PROGRAM

## 2020-01-01 PROCEDURE — 86160 COMPLEMENT ANTIGEN: CPT | Mod: NTX

## 2020-01-01 PROCEDURE — 99231 PR SUBSEQUENT HOSPITAL CARE,LEVL I: ICD-10-PCS | Mod: ,,, | Performed by: NURSE PRACTITIONER

## 2020-01-01 PROCEDURE — A4216 STERILE WATER/SALINE, 10 ML: HCPCS | Performed by: STUDENT IN AN ORGANIZED HEALTH CARE EDUCATION/TRAINING PROGRAM

## 2020-01-01 PROCEDURE — 88313 PR  SPECIAL STAINS,GROUP II: ICD-10-PCS | Mod: 26,NTX,, | Performed by: PATHOLOGY

## 2020-01-01 PROCEDURE — 82784 ASSAY IGA/IGD/IGG/IGM EACH: CPT

## 2020-01-01 PROCEDURE — 99223 1ST HOSP IP/OBS HIGH 75: CPT | Mod: NTX,,, | Performed by: INTERNAL MEDICINE

## 2020-01-01 PROCEDURE — 99232 PR SUBSEQUENT HOSPITAL CARE,LEVL II: ICD-10-PCS | Mod: NTX,,, | Performed by: NURSE PRACTITIONER

## 2020-01-01 PROCEDURE — 99999 PR PBB SHADOW E&M-EST. PATIENT-LVL III: ICD-10-PCS | Mod: PBBFAC,,, | Performed by: INTERNAL MEDICINE

## 2020-01-01 RX ORDER — ONDANSETRON 8 MG/1
8 TABLET, ORALLY DISINTEGRATING ORAL EVERY 8 HOURS PRN
Status: DISCONTINUED | OUTPATIENT
Start: 2020-01-01 | End: 2020-01-01 | Stop reason: HOSPADM

## 2020-01-01 RX ORDER — GUAIFENESIN/DEXTROMETHORPHAN 100-10MG/5
10 SYRUP ORAL EVERY 4 HOURS PRN
Status: DISCONTINUED | OUTPATIENT
Start: 2020-01-01 | End: 2020-01-01 | Stop reason: HOSPADM

## 2020-01-01 RX ORDER — ATORVASTATIN CALCIUM 10 MG/1
40 TABLET, FILM COATED ORAL DAILY
Status: DISCONTINUED | OUTPATIENT
Start: 2020-01-01 | End: 2020-01-01 | Stop reason: HOSPADM

## 2020-01-01 RX ORDER — FERROUS SULFATE 325(65) MG
325 TABLET, DELAYED RELEASE (ENTERIC COATED) ORAL EVERY OTHER DAY
Status: DISCONTINUED | OUTPATIENT
Start: 2020-01-01 | End: 2020-01-01 | Stop reason: HOSPADM

## 2020-01-01 RX ORDER — FUROSEMIDE 10 MG/ML
80 INJECTION INTRAMUSCULAR; INTRAVENOUS 2 TIMES DAILY
Status: DISCONTINUED | OUTPATIENT
Start: 2020-01-01 | End: 2020-01-01

## 2020-01-01 RX ORDER — HEPARIN SODIUM 5000 [USP'U]/ML
5000 INJECTION, SOLUTION INTRAVENOUS; SUBCUTANEOUS EVERY 8 HOURS
Status: DISCONTINUED | OUTPATIENT
Start: 2020-01-01 | End: 2020-01-01

## 2020-01-01 RX ORDER — LIDOCAINE HYDROCHLORIDE 10 MG/ML
INJECTION INFILTRATION; PERINEURAL
Status: DISPENSED
Start: 2020-01-01 | End: 2020-01-01

## 2020-01-01 RX ORDER — POLYETHYLENE GLYCOL 3350 17 G/17G
17 POWDER, FOR SOLUTION ORAL DAILY
Status: DISCONTINUED | OUTPATIENT
Start: 2020-01-01 | End: 2020-01-01 | Stop reason: HOSPADM

## 2020-01-01 RX ORDER — LIDOCAINE HYDROCHLORIDE 10 MG/ML
1 INJECTION, SOLUTION EPIDURAL; INFILTRATION; INTRACAUDAL; PERINEURAL ONCE
Status: DISCONTINUED | OUTPATIENT
Start: 2020-01-01 | End: 2020-01-01

## 2020-01-01 RX ORDER — MIDAZOLAM HYDROCHLORIDE 1 MG/ML
2 INJECTION INTRAMUSCULAR; INTRAVENOUS ONCE
Status: DISCONTINUED | OUTPATIENT
Start: 2020-01-01 | End: 2020-01-01 | Stop reason: HOSPADM

## 2020-01-01 RX ORDER — DOBUTAMINE 250 MG/20ML
INJECTION INTRAVENOUS
COMMUNITY
Start: 2020-01-01

## 2020-01-01 RX ORDER — SODIUM,POTASSIUM PHOSPHATES 280-250MG
2 POWDER IN PACKET (EA) ORAL
Status: DISCONTINUED | OUTPATIENT
Start: 2020-01-01 | End: 2020-01-01

## 2020-01-01 RX ORDER — LANOLIN ALCOHOL/MO/W.PET/CERES
800 CREAM (GRAM) TOPICAL
Status: DISCONTINUED | OUTPATIENT
Start: 2020-01-01 | End: 2020-01-01

## 2020-01-01 RX ORDER — COLCHICINE 0.6 MG/1
0.6 CAPSULE ORAL DAILY
Qty: 6 CAPSULE | Refills: 0 | Status: CANCELLED | OUTPATIENT
Start: 2020-01-01 | End: 2020-01-01

## 2020-01-01 RX ORDER — FUROSEMIDE 10 MG/ML
100 INJECTION INTRAMUSCULAR; INTRAVENOUS
Status: COMPLETED | OUTPATIENT
Start: 2020-01-01 | End: 2020-01-01

## 2020-01-01 RX ORDER — FERROUS SULFATE 325(65) MG
325 TABLET, DELAYED RELEASE (ENTERIC COATED) ORAL EVERY OTHER DAY
Qty: 30 TABLET | Refills: 3 | Status: SHIPPED | OUTPATIENT
Start: 2020-01-01

## 2020-01-01 RX ORDER — ATORVASTATIN CALCIUM 80 MG/1
80 TABLET, FILM COATED ORAL NIGHTLY
Qty: 90 TABLET | Refills: 3 | Status: SHIPPED | OUTPATIENT
Start: 2020-01-01 | End: 2020-01-01

## 2020-01-01 RX ORDER — LIDOCAINE HYDROCHLORIDE 20 MG/ML
INJECTION, SOLUTION INFILTRATION; PERINEURAL
Status: DISCONTINUED | OUTPATIENT
Start: 2020-01-01 | End: 2020-01-01 | Stop reason: HOSPADM

## 2020-01-01 RX ORDER — DOBUTAMINE HYDROCHLORIDE 400 MG/100ML
5 INJECTION, SOLUTION INTRAVENOUS CONTINUOUS
Qty: 250 ML | Refills: 10
Start: 2020-01-01 | End: 2020-01-01

## 2020-01-01 RX ORDER — ISOSORBIDE DINITRATE 10 MG/1
10 TABLET ORAL 3 TIMES DAILY
Status: DISCONTINUED | OUTPATIENT
Start: 2020-01-01 | End: 2020-01-01

## 2020-01-01 RX ORDER — SILVER NITRATE 38.21; 12.74 MG/1; MG/1
2 STICK TOPICAL ONCE
Status: COMPLETED | OUTPATIENT
Start: 2020-01-01 | End: 2020-01-01

## 2020-01-01 RX ORDER — HYDRALAZINE HYDROCHLORIDE 50 MG/1
50 TABLET, FILM COATED ORAL 3 TIMES DAILY
Status: DISCONTINUED | OUTPATIENT
Start: 2020-01-01 | End: 2020-01-01

## 2020-01-01 RX ORDER — INSULIN ASPART 100 [IU]/ML
0-5 INJECTION, SOLUTION INTRAVENOUS; SUBCUTANEOUS
Status: DISCONTINUED | OUTPATIENT
Start: 2020-01-01 | End: 2020-01-01 | Stop reason: HOSPADM

## 2020-01-01 RX ORDER — IBUPROFEN 200 MG
16 TABLET ORAL
Status: DISCONTINUED | OUTPATIENT
Start: 2020-01-01 | End: 2020-01-01 | Stop reason: HOSPADM

## 2020-01-01 RX ORDER — GLUCAGON 1 MG
1 KIT INJECTION
Status: DISCONTINUED | OUTPATIENT
Start: 2020-01-01 | End: 2020-01-01

## 2020-01-01 RX ORDER — POTASSIUM CHLORIDE 750 MG/1
40 CAPSULE, EXTENDED RELEASE ORAL ONCE
Status: COMPLETED | OUTPATIENT
Start: 2020-01-01 | End: 2020-01-01

## 2020-01-01 RX ORDER — FUROSEMIDE 10 MG/ML
120 INJECTION INTRAMUSCULAR; INTRAVENOUS 2 TIMES DAILY
Status: DISCONTINUED | OUTPATIENT
Start: 2020-01-01 | End: 2020-01-01 | Stop reason: HOSPADM

## 2020-01-01 RX ORDER — ONDANSETRON 2 MG/ML
4 INJECTION INTRAMUSCULAR; INTRAVENOUS EVERY 8 HOURS PRN
Status: DISCONTINUED | OUTPATIENT
Start: 2020-01-01 | End: 2020-01-01 | Stop reason: HOSPADM

## 2020-01-01 RX ORDER — FUROSEMIDE 10 MG/ML
80 INJECTION INTRAMUSCULAR; INTRAVENOUS
Status: COMPLETED | OUTPATIENT
Start: 2020-01-01 | End: 2020-01-01

## 2020-01-01 RX ORDER — IBUPROFEN 200 MG
16 TABLET ORAL
Status: DISCONTINUED | OUTPATIENT
Start: 2020-01-01 | End: 2020-01-01

## 2020-01-01 RX ORDER — BLOOD-GLUCOSE CONTROL, NORMAL
EACH MISCELLANEOUS
Qty: 200 EACH | Refills: 11 | Status: SHIPPED | OUTPATIENT
Start: 2020-01-01

## 2020-01-01 RX ORDER — ACETAMINOPHEN 325 MG/1
650 TABLET ORAL EVERY 4 HOURS PRN
Status: DISCONTINUED | OUTPATIENT
Start: 2020-01-01 | End: 2020-01-01 | Stop reason: HOSPADM

## 2020-01-01 RX ORDER — HYDRALAZINE HYDROCHLORIDE 25 MG/1
25 TABLET, FILM COATED ORAL 3 TIMES DAILY
Status: DISCONTINUED | OUTPATIENT
Start: 2020-01-01 | End: 2020-01-01

## 2020-01-01 RX ORDER — ENOXAPARIN SODIUM 100 MG/ML
40 INJECTION SUBCUTANEOUS EVERY 24 HOURS
Status: DISCONTINUED | OUTPATIENT
Start: 2020-01-01 | End: 2020-01-01

## 2020-01-01 RX ORDER — NAPROXEN SODIUM 220 MG/1
81 TABLET, FILM COATED ORAL DAILY
Status: DISCONTINUED | OUTPATIENT
Start: 2020-01-01 | End: 2020-01-01 | Stop reason: HOSPADM

## 2020-01-01 RX ORDER — ACETAMINOPHEN 325 MG/1
650 TABLET ORAL EVERY 4 HOURS PRN
Status: DISCONTINUED | OUTPATIENT
Start: 2020-01-01 | End: 2020-01-01

## 2020-01-01 RX ORDER — ONDANSETRON 2 MG/ML
INJECTION INTRAMUSCULAR; INTRAVENOUS CODE/TRAUMA/SEDATION MEDICATION
Status: COMPLETED | OUTPATIENT
Start: 2020-01-01 | End: 2020-01-01

## 2020-01-01 RX ORDER — DOBUTAMINE HYDROCHLORIDE 400 MG/100ML
5 INJECTION INTRAVENOUS CONTINUOUS
Status: DISCONTINUED | OUTPATIENT
Start: 2020-01-01 | End: 2020-01-01 | Stop reason: HOSPADM

## 2020-01-01 RX ORDER — TORSEMIDE 20 MG/1
100 TABLET ORAL DAILY
Status: DISCONTINUED | OUTPATIENT
Start: 2020-01-01 | End: 2020-01-01 | Stop reason: HOSPADM

## 2020-01-01 RX ORDER — HYDROCODONE BITARTRATE AND ACETAMINOPHEN 5; 325 MG/1; MG/1
1 TABLET ORAL EVERY 6 HOURS PRN
Status: DISCONTINUED | OUTPATIENT
Start: 2020-01-01 | End: 2020-01-01 | Stop reason: HOSPADM

## 2020-01-01 RX ORDER — DOBUTAMINE HYDROCHLORIDE 400 MG/100ML
5 INJECTION, SOLUTION INTRAVENOUS CONTINUOUS
Status: DISCONTINUED | OUTPATIENT
Start: 2020-01-01 | End: 2020-01-01 | Stop reason: HOSPADM

## 2020-01-01 RX ORDER — POTASSIUM CHLORIDE 20 MEQ/1
40 TABLET, EXTENDED RELEASE ORAL ONCE
Status: COMPLETED | OUTPATIENT
Start: 2020-01-01 | End: 2020-01-01

## 2020-01-01 RX ORDER — LANCING DEVICE
EACH MISCELLANEOUS
Qty: 1 EACH | Refills: 0 | Status: SHIPPED | OUTPATIENT
Start: 2020-01-01

## 2020-01-01 RX ORDER — METOPROLOL SUCCINATE 50 MG/1
50 TABLET, EXTENDED RELEASE ORAL DAILY
Status: DISCONTINUED | OUTPATIENT
Start: 2020-01-01 | End: 2020-01-01 | Stop reason: HOSPADM

## 2020-01-01 RX ORDER — POTASSIUM CHLORIDE 20 MEQ/1
40 TABLET, EXTENDED RELEASE ORAL
Status: COMPLETED | OUTPATIENT
Start: 2020-01-01 | End: 2020-01-01

## 2020-01-01 RX ORDER — ACETAMINOPHEN 325 MG/1
650 TABLET ORAL EVERY 6 HOURS PRN
Status: DISCONTINUED | OUTPATIENT
Start: 2020-01-01 | End: 2020-01-01 | Stop reason: HOSPADM

## 2020-01-01 RX ORDER — HYDRALAZINE HYDROCHLORIDE AND ISOSORBIDE DINITRATE 37.5; 2 MG/1; MG/1
1 TABLET, FILM COATED ORAL 3 TIMES DAILY
Qty: 270 TABLET | Refills: 3 | Status: SHIPPED | OUTPATIENT
Start: 2020-01-01 | End: 2020-01-01 | Stop reason: SDUPTHER

## 2020-01-01 RX ORDER — TORSEMIDE 100 MG/1
100 TABLET ORAL DAILY
Status: DISCONTINUED | OUTPATIENT
Start: 2020-01-01 | End: 2020-01-01 | Stop reason: HOSPADM

## 2020-01-01 RX ORDER — INSULIN ASPART 100 [IU]/ML
4 INJECTION, SOLUTION INTRAVENOUS; SUBCUTANEOUS
Status: DISCONTINUED | OUTPATIENT
Start: 2020-01-01 | End: 2020-01-01 | Stop reason: HOSPADM

## 2020-01-01 RX ORDER — DOXYLAMINE SUCCINATE 25 MG
TABLET ORAL 2 TIMES DAILY
Status: DISCONTINUED | OUTPATIENT
Start: 2020-01-01 | End: 2020-01-01 | Stop reason: HOSPADM

## 2020-01-01 RX ORDER — AMOXICILLIN 250 MG
1 CAPSULE ORAL DAILY PRN
Status: DISCONTINUED | OUTPATIENT
Start: 2020-01-01 | End: 2020-01-01 | Stop reason: HOSPADM

## 2020-01-01 RX ORDER — POTASSIUM CHLORIDE 750 MG/1
30 CAPSULE, EXTENDED RELEASE ORAL ONCE
Status: DISCONTINUED | OUTPATIENT
Start: 2020-01-01 | End: 2020-01-01

## 2020-01-01 RX ORDER — INSULIN ASPART 100 [IU]/ML
4 INJECTION, SOLUTION INTRAVENOUS; SUBCUTANEOUS
Qty: 6 ML | Refills: 4 | Status: SHIPPED | OUTPATIENT
Start: 2020-01-01 | End: 2020-01-01

## 2020-01-01 RX ORDER — POTASSIUM CHLORIDE 20 MEQ/1
40 TABLET, EXTENDED RELEASE ORAL ONCE
Status: DISCONTINUED | OUTPATIENT
Start: 2020-01-01 | End: 2020-01-01

## 2020-01-01 RX ORDER — ISOSORBIDE DINITRATE 20 MG/1
20 TABLET ORAL 3 TIMES DAILY
Qty: 90 TABLET | Refills: 4 | Status: SHIPPED | OUTPATIENT
Start: 2020-01-01 | End: 2020-01-01

## 2020-01-01 RX ORDER — DOXYLAMINE SUCCINATE 25 MG
TABLET ORAL 2 TIMES DAILY
Qty: 14 G | Refills: 0 | Status: SHIPPED | OUTPATIENT
Start: 2020-01-01

## 2020-01-01 RX ORDER — FENTANYL CITRATE 50 UG/ML
INJECTION, SOLUTION INTRAMUSCULAR; INTRAVENOUS CODE/TRAUMA/SEDATION MEDICATION
Status: COMPLETED | OUTPATIENT
Start: 2020-01-01 | End: 2020-01-01

## 2020-01-01 RX ORDER — POTASSIUM CHLORIDE 20 MEQ/1
20 TABLET, EXTENDED RELEASE ORAL 2 TIMES DAILY
Status: DISCONTINUED | OUTPATIENT
Start: 2020-01-01 | End: 2020-01-01

## 2020-01-01 RX ORDER — ATORVASTATIN CALCIUM 20 MG/1
80 TABLET, FILM COATED ORAL NIGHTLY
Status: DISCONTINUED | OUTPATIENT
Start: 2020-01-01 | End: 2020-01-01 | Stop reason: HOSPADM

## 2020-01-01 RX ORDER — DEXTROSE MONOHYDRATE 100 MG/ML
1000 INJECTION, SOLUTION INTRAVENOUS
Status: CANCELLED | OUTPATIENT
Start: 2020-01-01

## 2020-01-01 RX ORDER — INSULIN ASPART 100 [IU]/ML
3 INJECTION, SOLUTION INTRAVENOUS; SUBCUTANEOUS
Status: DISCONTINUED | OUTPATIENT
Start: 2020-01-01 | End: 2020-01-01 | Stop reason: HOSPADM

## 2020-01-01 RX ORDER — DOBUTAMINE HYDROCHLORIDE 400 MG/100ML
5 INJECTION, SOLUTION INTRAVENOUS CONTINUOUS
Status: DISCONTINUED | OUTPATIENT
Start: 2020-01-01 | End: 2020-01-01

## 2020-01-01 RX ORDER — FUROSEMIDE 80 MG/1
80 TABLET ORAL 2 TIMES DAILY
Qty: 60 TABLET | Refills: 11 | Status: ON HOLD | OUTPATIENT
Start: 2020-01-01 | End: 2020-01-01 | Stop reason: SDUPTHER

## 2020-01-01 RX ORDER — POTASSIUM CHLORIDE 20 MEQ/1
20 TABLET, EXTENDED RELEASE ORAL ONCE
Status: COMPLETED | OUTPATIENT
Start: 2020-01-01 | End: 2020-01-01

## 2020-01-01 RX ORDER — IBUPROFEN 200 MG
24 TABLET ORAL
Status: DISCONTINUED | OUTPATIENT
Start: 2020-01-01 | End: 2020-01-01 | Stop reason: HOSPADM

## 2020-01-01 RX ORDER — LANOLIN ALCOHOL/MO/W.PET/CERES
400 CREAM (GRAM) TOPICAL DAILY
Status: DISCONTINUED | OUTPATIENT
Start: 2020-01-01 | End: 2020-01-01

## 2020-01-01 RX ORDER — SODIUM CHLORIDE 0.9 % (FLUSH) 0.9 %
10 SYRINGE (ML) INJECTION
Status: DISCONTINUED | OUTPATIENT
Start: 2020-01-01 | End: 2020-01-01 | Stop reason: HOSPADM

## 2020-01-01 RX ORDER — COLCHICINE 0.6 MG/1
0.6 CAPSULE ORAL DAILY
Qty: 30 CAPSULE | Refills: 3 | Status: SHIPPED | OUTPATIENT
Start: 2020-01-01 | End: 2020-01-01 | Stop reason: SDUPTHER

## 2020-01-01 RX ORDER — POTASSIUM CHLORIDE 1.5 G/1.58G
20 POWDER, FOR SOLUTION ORAL ONCE
Status: COMPLETED | OUTPATIENT
Start: 2020-01-01 | End: 2020-01-01

## 2020-01-01 RX ORDER — ISOSORBIDE DINITRATE 20 MG/1
40 TABLET ORAL 3 TIMES DAILY
Qty: 90 TABLET | Refills: 4 | Status: SHIPPED | OUTPATIENT
Start: 2020-01-01 | End: 2021-06-19

## 2020-01-01 RX ORDER — COLCHICINE 0.6 MG/1
0.6 TABLET, FILM COATED ORAL DAILY
Status: DISCONTINUED | OUTPATIENT
Start: 2020-01-01 | End: 2020-01-01

## 2020-01-01 RX ORDER — DEXTROSE MONOHYDRATE 50 MG/ML
INJECTION, SOLUTION INTRAVENOUS
Status: ON HOLD | COMMUNITY
Start: 2020-01-01 | End: 2020-01-01 | Stop reason: HOSPADM

## 2020-01-01 RX ORDER — FUROSEMIDE 10 MG/ML
120 INJECTION INTRAMUSCULAR; INTRAVENOUS ONCE
Status: DISCONTINUED | OUTPATIENT
Start: 2020-01-01 | End: 2020-01-01

## 2020-01-01 RX ORDER — ISOSORBIDE DINITRATE 20 MG/1
20 TABLET ORAL 3 TIMES DAILY
Status: DISCONTINUED | OUTPATIENT
Start: 2020-01-01 | End: 2020-01-01 | Stop reason: HOSPADM

## 2020-01-01 RX ORDER — PEN NEEDLE, DIABETIC 30 GX3/16"
NEEDLE, DISPOSABLE MISCELLANEOUS
Qty: 200 EACH | Refills: 11 | COMMUNITY
Start: 2020-01-01

## 2020-01-01 RX ORDER — ISOSORBIDE DINITRATE 40 MG/1
40 TABLET ORAL 3 TIMES DAILY
Status: DISCONTINUED | OUTPATIENT
Start: 2020-01-01 | End: 2020-01-01 | Stop reason: HOSPADM

## 2020-01-01 RX ORDER — UREA 200 MG/G
1 CREAM TOPICAL 2 TIMES DAILY
Qty: 85 G | Refills: 0 | Status: SHIPPED | OUTPATIENT
Start: 2020-01-01

## 2020-01-01 RX ORDER — SPIRONOLACTONE 25 MG/1
25 TABLET ORAL DAILY
Qty: 30 TABLET | Refills: 11 | Status: ON HOLD | OUTPATIENT
Start: 2020-01-01 | End: 2020-01-01 | Stop reason: HOSPADM

## 2020-01-01 RX ORDER — TORSEMIDE 100 MG/1
100 TABLET ORAL DAILY
Qty: 30 TABLET | Refills: 11 | Status: SHIPPED | OUTPATIENT
Start: 2020-01-01 | End: 2021-11-27

## 2020-01-01 RX ORDER — FUROSEMIDE 10 MG/ML
120 INJECTION INTRAMUSCULAR; INTRAVENOUS DAILY
Status: DISCONTINUED | OUTPATIENT
Start: 2020-01-01 | End: 2020-01-01

## 2020-01-01 RX ORDER — IPRATROPIUM BROMIDE AND ALBUTEROL SULFATE 2.5; .5 MG/3ML; MG/3ML
3 SOLUTION RESPIRATORY (INHALATION) EVERY 4 HOURS PRN
Status: DISCONTINUED | OUTPATIENT
Start: 2020-01-01 | End: 2020-01-01 | Stop reason: HOSPADM

## 2020-01-01 RX ORDER — POTASSIUM CHLORIDE 20 MEQ/1
40 TABLET, EXTENDED RELEASE ORAL EVERY 4 HOURS
Status: DISCONTINUED | OUTPATIENT
Start: 2020-01-01 | End: 2020-01-01

## 2020-01-01 RX ORDER — POTASSIUM CHLORIDE 20 MEQ/1
40 TABLET, EXTENDED RELEASE ORAL DAILY
Status: DISCONTINUED | OUTPATIENT
Start: 2020-01-01 | End: 2020-01-01

## 2020-01-01 RX ORDER — METOLAZONE 2.5 MG/1
5 TABLET ORAL ONCE
Status: COMPLETED | OUTPATIENT
Start: 2020-01-01 | End: 2020-01-01

## 2020-01-01 RX ORDER — POTASSIUM CHLORIDE 1.5 G/1.58G
20 POWDER, FOR SOLUTION ORAL DAILY
Qty: 30 PACKET | Refills: 11 | Status: SHIPPED | OUTPATIENT
Start: 2020-01-01

## 2020-01-01 RX ORDER — COLCHICINE 0.6 MG/1
0.6 TABLET, FILM COATED ORAL EVERY OTHER DAY
Status: DISCONTINUED | OUTPATIENT
Start: 2020-01-01 | End: 2020-01-01 | Stop reason: HOSPADM

## 2020-01-01 RX ORDER — POTASSIUM CHLORIDE 750 MG/1
30 CAPSULE, EXTENDED RELEASE ORAL DAILY
Status: DISCONTINUED | OUTPATIENT
Start: 2020-01-01 | End: 2020-01-01 | Stop reason: HOSPADM

## 2020-01-01 RX ORDER — POTASSIUM CHLORIDE 20 MEQ/15ML
60 SOLUTION ORAL
Status: DISCONTINUED | OUTPATIENT
Start: 2020-01-01 | End: 2020-01-01

## 2020-01-01 RX ORDER — FUROSEMIDE 10 MG/ML
80 INJECTION INTRAMUSCULAR; INTRAVENOUS ONCE
Status: COMPLETED | OUTPATIENT
Start: 2020-01-01 | End: 2020-01-01

## 2020-01-01 RX ORDER — DEXTROSE 4 G
TABLET,CHEWABLE ORAL
Qty: 1 EACH | Refills: 0 | Status: SHIPPED | OUTPATIENT
Start: 2020-01-01

## 2020-01-01 RX ORDER — IBUPROFEN 200 MG
24 TABLET ORAL
Status: DISCONTINUED | OUTPATIENT
Start: 2020-01-01 | End: 2020-01-01

## 2020-01-01 RX ORDER — POTASSIUM CHLORIDE 20 MEQ/15ML
40 SOLUTION ORAL
Status: DISCONTINUED | OUTPATIENT
Start: 2020-01-01 | End: 2020-01-01

## 2020-01-01 RX ORDER — METOPROLOL TARTRATE 50 MG/1
50 TABLET ORAL 2 TIMES DAILY
Status: COMPLETED | OUTPATIENT
Start: 2020-01-01 | End: 2020-01-01

## 2020-01-01 RX ORDER — LANOLIN ALCOHOL/MO/W.PET/CERES
400 CREAM (GRAM) TOPICAL ONCE
Status: COMPLETED | OUTPATIENT
Start: 2020-01-01 | End: 2020-01-01

## 2020-01-01 RX ORDER — LIDOCAINE HYDROCHLORIDE 10 MG/ML
INJECTION INFILTRATION; PERINEURAL CODE/TRAUMA/SEDATION MEDICATION
Status: COMPLETED | OUTPATIENT
Start: 2020-01-01 | End: 2020-01-01

## 2020-01-01 RX ORDER — INSULIN ASPART 100 [IU]/ML
3 INJECTION, SOLUTION INTRAVENOUS; SUBCUTANEOUS
Status: DISCONTINUED | OUTPATIENT
Start: 2020-01-01 | End: 2020-01-01

## 2020-01-01 RX ORDER — ATORVASTATIN CALCIUM 20 MG/1
40 TABLET, FILM COATED ORAL DAILY
Status: DISCONTINUED | OUTPATIENT
Start: 2020-01-01 | End: 2020-01-01 | Stop reason: HOSPADM

## 2020-01-01 RX ORDER — HYDRALAZINE HYDROCHLORIDE 50 MG/1
50 TABLET, FILM COATED ORAL 3 TIMES DAILY
Status: DISCONTINUED | OUTPATIENT
Start: 2020-01-01 | End: 2020-01-01 | Stop reason: HOSPADM

## 2020-01-01 RX ORDER — DEXTROSE MONOHYDRATE 100 MG/ML
12.5 INJECTION, SOLUTION INTRAVENOUS
Status: DISCONTINUED | OUTPATIENT
Start: 2020-01-01 | End: 2020-01-01 | Stop reason: HOSPADM

## 2020-01-01 RX ORDER — MIDAZOLAM HYDROCHLORIDE 1 MG/ML
INJECTION INTRAMUSCULAR; INTRAVENOUS CODE/TRAUMA/SEDATION MEDICATION
Status: COMPLETED | OUTPATIENT
Start: 2020-01-01 | End: 2020-01-01

## 2020-01-01 RX ORDER — GLUCAGON 1 MG
1 KIT INJECTION
Status: DISCONTINUED | OUTPATIENT
Start: 2020-01-01 | End: 2020-01-01 | Stop reason: HOSPADM

## 2020-01-01 RX ORDER — HYDRALAZINE HYDROCHLORIDE 50 MG/1
75 TABLET, FILM COATED ORAL 3 TIMES DAILY
Qty: 135 TABLET | Refills: 4 | Status: SHIPPED | OUTPATIENT
Start: 2020-01-01 | End: 2021-06-19

## 2020-01-01 RX ORDER — TALC
6 POWDER (GRAM) TOPICAL NIGHTLY PRN
Status: DISCONTINUED | OUTPATIENT
Start: 2020-01-01 | End: 2020-01-01 | Stop reason: HOSPADM

## 2020-01-01 RX ORDER — INSULIN ASPART 100 [IU]/ML
0-5 INJECTION, SOLUTION INTRAVENOUS; SUBCUTANEOUS
Status: DISCONTINUED | OUTPATIENT
Start: 2020-01-01 | End: 2020-01-01

## 2020-01-01 RX ORDER — HYDRALAZINE HYDROCHLORIDE 10 MG/1
10 TABLET, FILM COATED ORAL 3 TIMES DAILY
Status: DISCONTINUED | OUTPATIENT
Start: 2020-01-01 | End: 2020-01-01

## 2020-01-01 RX ORDER — INSULIN ASPART 100 [IU]/ML
1-10 INJECTION, SOLUTION INTRAVENOUS; SUBCUTANEOUS
Status: DISCONTINUED | OUTPATIENT
Start: 2020-01-01 | End: 2020-01-01

## 2020-01-01 RX ORDER — INSULIN ASPART 100 [IU]/ML
0-4 INJECTION, SOLUTION INTRAVENOUS; SUBCUTANEOUS
Status: DISCONTINUED | OUTPATIENT
Start: 2020-01-01 | End: 2020-01-01

## 2020-01-01 RX ORDER — POTASSIUM CHLORIDE 20 MEQ/1
40 TABLET, EXTENDED RELEASE ORAL 2 TIMES DAILY
Status: DISCONTINUED | OUTPATIENT
Start: 2020-01-01 | End: 2020-01-01

## 2020-01-01 RX ORDER — FENTANYL CITRATE 50 UG/ML
100 INJECTION, SOLUTION INTRAMUSCULAR; INTRAVENOUS ONCE
Status: DISCONTINUED | OUTPATIENT
Start: 2020-01-01 | End: 2020-01-01 | Stop reason: HOSPADM

## 2020-01-01 RX ORDER — FUROSEMIDE 80 MG/1
80 TABLET ORAL 2 TIMES DAILY
Status: DISCONTINUED | OUTPATIENT
Start: 2020-01-01 | End: 2020-01-01

## 2020-01-01 RX ORDER — COLCHICINE 0.6 MG/1
0.6 CAPSULE ORAL DAILY
Qty: 6 CAPSULE | Refills: 0 | Status: SHIPPED | OUTPATIENT
Start: 2020-01-01 | End: 2020-01-01 | Stop reason: SDUPTHER

## 2020-01-01 RX ORDER — HEPARIN SODIUM 5000 [USP'U]/ML
5000 INJECTION, SOLUTION INTRAVENOUS; SUBCUTANEOUS EVERY 8 HOURS
Status: DISCONTINUED | OUTPATIENT
Start: 2020-01-01 | End: 2020-01-01 | Stop reason: HOSPADM

## 2020-01-01 RX ORDER — DOBUTAMINE HYDROCHLORIDE 400 MG/100ML
2.5 INJECTION, SOLUTION INTRAVENOUS CONTINUOUS
Status: DISCONTINUED | OUTPATIENT
Start: 2020-01-01 | End: 2020-01-01

## 2020-01-01 RX ORDER — DEXTROSE MONOHYDRATE 100 MG/ML
25 INJECTION, SOLUTION INTRAVENOUS
Status: DISCONTINUED | OUTPATIENT
Start: 2020-01-01 | End: 2020-01-01 | Stop reason: HOSPADM

## 2020-01-01 RX ORDER — SIMETHICONE 80 MG
2 TABLET,CHEWABLE ORAL 3 TIMES DAILY PRN
Status: DISCONTINUED | OUTPATIENT
Start: 2020-01-01 | End: 2020-01-01 | Stop reason: HOSPADM

## 2020-01-01 RX ORDER — DIGOXIN 125 MCG
125 TABLET ORAL
Qty: 12 TABLET | Refills: 0 | Status: SHIPPED | OUTPATIENT
Start: 2020-01-01 | End: 2020-01-01

## 2020-01-01 RX ORDER — FUROSEMIDE 80 MG/1
80 TABLET ORAL 2 TIMES DAILY
Qty: 180 TABLET | Refills: 3 | Status: ON HOLD | OUTPATIENT
Start: 2020-01-01 | End: 2020-01-01 | Stop reason: HOSPADM

## 2020-01-01 RX ORDER — BISACODYL 10 MG
10 SUPPOSITORY, RECTAL RECTAL DAILY PRN
Status: DISCONTINUED | OUTPATIENT
Start: 2020-01-01 | End: 2020-01-01 | Stop reason: HOSPADM

## 2020-01-01 RX ORDER — FUROSEMIDE 40 MG/1
80 TABLET ORAL 2 TIMES DAILY
Status: DISCONTINUED | OUTPATIENT
Start: 2020-01-01 | End: 2020-01-01 | Stop reason: HOSPADM

## 2020-01-01 RX ORDER — COLCHICINE 0.6 MG/1
1.2 TABLET, FILM COATED ORAL ONCE
Status: COMPLETED | OUTPATIENT
Start: 2020-01-01 | End: 2020-01-01

## 2020-01-01 RX ORDER — CALCIUM CARBONATE 200(500)MG
500 TABLET,CHEWABLE ORAL DAILY PRN
Status: DISCONTINUED | OUTPATIENT
Start: 2020-01-01 | End: 2020-01-01 | Stop reason: HOSPADM

## 2020-01-01 RX ORDER — INSULIN ASPART 100 [IU]/ML
2 INJECTION, SOLUTION INTRAVENOUS; SUBCUTANEOUS
Status: DISCONTINUED | OUTPATIENT
Start: 2020-01-01 | End: 2020-01-01

## 2020-01-01 RX ORDER — HYDRALAZINE HYDROCHLORIDE 25 MG/1
75 TABLET, FILM COATED ORAL 3 TIMES DAILY
Status: DISCONTINUED | OUTPATIENT
Start: 2020-01-01 | End: 2020-01-01 | Stop reason: HOSPADM

## 2020-01-01 RX ORDER — COLCHICINE 0.6 MG/1
0.6 CAPSULE ORAL DAILY
Qty: 30 CAPSULE | Refills: 3 | Status: SHIPPED | OUTPATIENT
Start: 2020-01-01 | End: 2021-01-28

## 2020-01-01 RX ORDER — FUROSEMIDE 40 MG/1
80 TABLET ORAL 2 TIMES DAILY
Status: DISCONTINUED | OUTPATIENT
Start: 2020-01-01 | End: 2020-01-01

## 2020-01-01 RX ORDER — POTASSIUM CHLORIDE 20 MEQ/1
20 TABLET, EXTENDED RELEASE ORAL ONCE
Status: DISCONTINUED | OUTPATIENT
Start: 2020-01-01 | End: 2020-01-01

## 2020-01-01 RX ORDER — AMOXICILLIN 250 MG
1 CAPSULE ORAL 2 TIMES DAILY
Status: DISCONTINUED | OUTPATIENT
Start: 2020-01-01 | End: 2020-01-01 | Stop reason: HOSPADM

## 2020-01-01 RX ORDER — HYDRALAZINE HYDROCHLORIDE AND ISOSORBIDE DINITRATE 37.5; 2 MG/1; MG/1
2 TABLET, FILM COATED ORAL 3 TIMES DAILY
Qty: 180 TABLET | Refills: 11 | Status: SHIPPED | OUTPATIENT
Start: 2020-01-01 | End: 2020-01-01 | Stop reason: SDUPTHER

## 2020-01-01 RX ORDER — SPIRONOLACTONE 25 MG/1
25 TABLET ORAL DAILY
Qty: 30 TABLET | Refills: 11 | Status: SHIPPED | OUTPATIENT
Start: 2020-01-01 | End: 2020-01-01 | Stop reason: SDUPTHER

## 2020-01-01 RX ORDER — ISOSORBIDE DINITRATE 20 MG/1
20 TABLET ORAL 3 TIMES DAILY
Status: DISCONTINUED | OUTPATIENT
Start: 2020-01-01 | End: 2020-01-01

## 2020-01-01 RX ORDER — ENOXAPARIN SODIUM 100 MG/ML
30 INJECTION SUBCUTANEOUS EVERY 24 HOURS
Status: DISCONTINUED | OUTPATIENT
Start: 2020-01-01 | End: 2020-01-01 | Stop reason: HOSPADM

## 2020-01-01 RX ORDER — LIDOCAINE HYDROCHLORIDE 10 MG/ML
1 INJECTION, SOLUTION EPIDURAL; INFILTRATION; INTRACAUDAL; PERINEURAL ONCE
Status: COMPLETED | OUTPATIENT
Start: 2020-01-01 | End: 2020-01-01

## 2020-01-01 RX ORDER — TORSEMIDE 100 MG/1
100 TABLET ORAL DAILY
Qty: 30 TABLET | Refills: 11 | Status: SHIPPED | OUTPATIENT
Start: 2020-01-01 | End: 2020-01-01 | Stop reason: SDUPTHER

## 2020-01-01 RX ORDER — POTASSIUM CHLORIDE 1.5 G/1.58G
40 POWDER, FOR SOLUTION ORAL ONCE
Status: COMPLETED | OUTPATIENT
Start: 2020-01-01 | End: 2020-01-01

## 2020-01-01 RX ORDER — INSULIN ASPART 100 [IU]/ML
4 INJECTION, SOLUTION INTRAVENOUS; SUBCUTANEOUS 3 TIMES DAILY
Refills: 0
Start: 2020-01-01 | End: 2020-01-01

## 2020-01-01 RX ORDER — ISOSORBIDE DINITRATE 20 MG/1
40 TABLET ORAL 3 TIMES DAILY
Status: DISCONTINUED | OUTPATIENT
Start: 2020-01-01 | End: 2020-01-01 | Stop reason: HOSPADM

## 2020-01-01 RX ORDER — POTASSIUM CHLORIDE 1.5 G/1.58G
40 POWDER, FOR SOLUTION ORAL 2 TIMES DAILY
Status: DISCONTINUED | OUTPATIENT
Start: 2020-01-01 | End: 2020-01-01

## 2020-01-01 RX ORDER — COLCHICINE 0.6 MG/1
0.6 TABLET, FILM COATED ORAL ONCE
Status: COMPLETED | OUTPATIENT
Start: 2020-01-01 | End: 2020-01-01

## 2020-01-01 RX ORDER — SODIUM CHLORIDE 9 MG/ML
INJECTION, SOLUTION INTRAVENOUS
Status: DISCONTINUED | OUTPATIENT
Start: 2020-01-01 | End: 2020-01-01 | Stop reason: HOSPADM

## 2020-01-01 RX ORDER — HYDRALAZINE HYDROCHLORIDE AND ISOSORBIDE DINITRATE 37.5; 2 MG/1; MG/1
2 TABLET, FILM COATED ORAL 3 TIMES DAILY
Qty: 540 TABLET | Refills: 3 | Status: SHIPPED | OUTPATIENT
Start: 2020-01-01 | End: 2020-01-01 | Stop reason: HOSPADM

## 2020-01-01 RX ORDER — BUMETANIDE 1 MG/1
2 TABLET ORAL DAILY
Status: DISCONTINUED | OUTPATIENT
Start: 2020-01-01 | End: 2020-01-01

## 2020-01-01 RX ORDER — HEPARIN SODIUM 5000 [USP'U]/ML
5000 INJECTION, SOLUTION INTRAVENOUS; SUBCUTANEOUS EVERY 8 HOURS
Status: COMPLETED | OUTPATIENT
Start: 2020-01-01 | End: 2020-01-01

## 2020-01-01 RX ORDER — ATORVASTATIN CALCIUM 40 MG/1
40 TABLET, FILM COATED ORAL DAILY
COMMUNITY

## 2020-01-01 RX ORDER — CEFAZOLIN SODIUM 1 G/3ML
INJECTION, POWDER, FOR SOLUTION INTRAMUSCULAR; INTRAVENOUS CODE/TRAUMA/SEDATION MEDICATION
Status: COMPLETED | OUTPATIENT
Start: 2020-01-01 | End: 2020-01-01

## 2020-01-01 RX ORDER — SODIUM CHLORIDE 0.9 % (FLUSH) 0.9 %
10 SYRINGE (ML) INJECTION
Status: CANCELLED | OUTPATIENT
Start: 2020-01-01

## 2020-01-01 RX ORDER — POTASSIUM CHLORIDE 7.45 MG/ML
10 INJECTION INTRAVENOUS
Status: DISCONTINUED | OUTPATIENT
Start: 2020-01-01 | End: 2020-01-01

## 2020-01-01 RX ORDER — FUROSEMIDE 10 MG/ML
80 INJECTION INTRAMUSCULAR; INTRAVENOUS DAILY
Status: DISCONTINUED | OUTPATIENT
Start: 2020-01-01 | End: 2020-01-01

## 2020-01-01 RX ORDER — CEFAZOLIN SODIUM 1 G/50ML
SOLUTION INTRAVENOUS
Status: COMPLETED | OUTPATIENT
Start: 2020-01-01 | End: 2020-01-01

## 2020-01-01 RX ORDER — CALCIUM CARBONATE 200(500)MG
1000 TABLET,CHEWABLE ORAL ONCE
Status: COMPLETED | OUTPATIENT
Start: 2020-01-01 | End: 2020-01-01

## 2020-01-01 RX ORDER — ISOSORBIDE DINITRATE 20 MG/1
40 TABLET ORAL 3 TIMES DAILY
Status: DISCONTINUED | OUTPATIENT
Start: 2020-01-01 | End: 2020-01-01

## 2020-01-01 RX ORDER — METOPROLOL SUCCINATE 50 MG/1
50 TABLET, EXTENDED RELEASE ORAL DAILY
Qty: 30 TABLET | Refills: 11 | Status: ON HOLD | OUTPATIENT
Start: 2020-01-01 | End: 2020-01-01 | Stop reason: HOSPADM

## 2020-01-01 RX ORDER — POTASSIUM CHLORIDE 20 MEQ/1
40 TABLET, EXTENDED RELEASE ORAL EVERY 4 HOURS
Status: COMPLETED | OUTPATIENT
Start: 2020-01-01 | End: 2020-01-01

## 2020-01-01 RX ADMIN — ISOSORBIDE DINITRATE 40 MG: 40 TABLET ORAL at 08:11

## 2020-01-01 RX ADMIN — ASPIRIN 81 MG CHEWABLE TABLET 81 MG: 81 TABLET CHEWABLE at 01:02

## 2020-01-01 RX ADMIN — INSULIN ASPART 3 UNITS: 100 INJECTION, SOLUTION INTRAVENOUS; SUBCUTANEOUS at 12:05

## 2020-01-01 RX ADMIN — INSULIN ASPART 2 UNITS: 100 INJECTION, SOLUTION INTRAVENOUS; SUBCUTANEOUS at 04:05

## 2020-01-01 RX ADMIN — HYDRALAZINE HYDROCHLORIDE 10 MG: 10 TABLET, FILM COATED ORAL at 09:06

## 2020-01-01 RX ADMIN — POTASSIUM CHLORIDE 10 MEQ: 7.46 INJECTION, SOLUTION INTRAVENOUS at 01:06

## 2020-01-01 RX ADMIN — COLCHICINE 0.6 MG: 0.6 TABLET, FILM COATED ORAL at 08:11

## 2020-01-01 RX ADMIN — MICONAZOLE NITRATE: 20 CREAM TOPICAL at 02:11

## 2020-01-01 RX ADMIN — ONDANSETRON 4 MG: 2 INJECTION INTRAMUSCULAR; INTRAVENOUS at 08:08

## 2020-01-01 RX ADMIN — HEPARIN SODIUM 5000 UNITS: 5000 INJECTION INTRAVENOUS; SUBCUTANEOUS at 10:11

## 2020-01-01 RX ADMIN — FERROUS SULFATE TAB EC 325 MG (65 MG FE EQUIVALENT) 325 MG: 325 (65 FE) TABLET DELAYED RESPONSE at 09:05

## 2020-01-01 RX ADMIN — FUROSEMIDE 20 MG/HR: 10 INJECTION, SOLUTION INTRAMUSCULAR; INTRAVENOUS at 02:06

## 2020-01-01 RX ADMIN — ATORVASTATIN CALCIUM 40 MG: 20 TABLET, FILM COATED ORAL at 10:08

## 2020-01-01 RX ADMIN — HYDRALAZINE HYDROCHLORIDE 25 MG: 25 TABLET, FILM COATED ORAL at 04:06

## 2020-01-01 RX ADMIN — POLYETHYLENE GLYCOL 3350 17 G: 17 POWDER, FOR SOLUTION ORAL at 09:05

## 2020-01-01 RX ADMIN — INSULIN ASPART 3 UNITS: 100 INJECTION, SOLUTION INTRAVENOUS; SUBCUTANEOUS at 09:05

## 2020-01-01 RX ADMIN — INSULIN ASPART 2 UNITS: 100 INJECTION, SOLUTION INTRAVENOUS; SUBCUTANEOUS at 03:06

## 2020-01-01 RX ADMIN — ATORVASTATIN CALCIUM 40 MG: 10 TABLET, FILM COATED ORAL at 08:11

## 2020-01-01 RX ADMIN — INSULIN DETEMIR 8 UNITS: 100 INJECTION, SOLUTION SUBCUTANEOUS at 09:11

## 2020-01-01 RX ADMIN — LIDOCAINE HYDROCHLORIDE 10 ML: 10 INJECTION, SOLUTION INFILTRATION; PERINEURAL at 08:06

## 2020-01-01 RX ADMIN — DOBUTAMINE IN DEXTROSE 5 MCG/KG/MIN: 200 INJECTION, SOLUTION INTRAVENOUS at 09:06

## 2020-01-01 RX ADMIN — ONDANSETRON 4 MG: 2 INJECTION INTRAMUSCULAR; INTRAVENOUS at 07:06

## 2020-01-01 RX ADMIN — HEPARIN SODIUM 5000 UNITS: 5000 INJECTION INTRAVENOUS; SUBCUTANEOUS at 02:11

## 2020-01-01 RX ADMIN — FUROSEMIDE 120 MG: 10 INJECTION, SOLUTION INTRAVENOUS at 07:05

## 2020-01-01 RX ADMIN — HYDRALAZINE HYDROCHLORIDE 75 MG: 25 TABLET, FILM COATED ORAL at 02:11

## 2020-01-01 RX ADMIN — DOBUTAMINE HYDROCHLORIDE 5 MCG/KG/MIN: 200 INJECTION INTRAVENOUS at 07:08

## 2020-01-01 RX ADMIN — HEPARIN SODIUM 5000 UNITS: 5000 INJECTION INTRAVENOUS; SUBCUTANEOUS at 08:05

## 2020-01-01 RX ADMIN — POTASSIUM CHLORIDE 40 MEQ: 1500 TABLET, EXTENDED RELEASE ORAL at 02:11

## 2020-01-01 RX ADMIN — ISOSORBIDE DINITRATE 40 MG: 40 TABLET ORAL at 02:11

## 2020-01-01 RX ADMIN — ATORVASTATIN CALCIUM 40 MG: 20 TABLET, FILM COATED ORAL at 08:08

## 2020-01-01 RX ADMIN — LEVOTHYROXINE SODIUM 175 MCG: 100 TABLET ORAL at 05:06

## 2020-01-01 RX ADMIN — INSULIN ASPART 4 UNITS: 100 INJECTION, SOLUTION INTRAVENOUS; SUBCUTANEOUS at 08:06

## 2020-01-01 RX ADMIN — LEVOTHYROXINE SODIUM 175 MCG: 150 TABLET ORAL at 06:08

## 2020-01-01 RX ADMIN — ONDANSETRON 8 MG: 8 TABLET, ORALLY DISINTEGRATING ORAL at 08:05

## 2020-01-01 RX ADMIN — FUROSEMIDE 160 MG: 10 INJECTION, SOLUTION INTRAMUSCULAR; INTRAVENOUS at 12:08

## 2020-01-01 RX ADMIN — ISOSORBIDE DINITRATE 40 MG: 20 TABLET ORAL at 08:06

## 2020-01-01 RX ADMIN — Medication 400 MG: at 09:06

## 2020-01-01 RX ADMIN — HYDRALAZINE HYDROCHLORIDE 75 MG: 50 TABLET, FILM COATED ORAL at 10:08

## 2020-01-01 RX ADMIN — INSULIN ASPART 1 UNITS: 100 INJECTION, SOLUTION INTRAVENOUS; SUBCUTANEOUS at 09:02

## 2020-01-01 RX ADMIN — POTASSIUM CHLORIDE 40 MEQ: 1500 TABLET, EXTENDED RELEASE ORAL at 06:08

## 2020-01-01 RX ADMIN — ISOSORBIDE DINITRATE 40 MG: 40 TABLET ORAL at 04:11

## 2020-01-01 RX ADMIN — HEPARIN SODIUM 5000 UNITS: 5000 INJECTION INTRAVENOUS; SUBCUTANEOUS at 05:05

## 2020-01-01 RX ADMIN — COLCHICINE 1.2 MG: 0.6 TABLET, FILM COATED ORAL at 05:11

## 2020-01-01 RX ADMIN — METOPROLOL SUCCINATE 50 MG: 50 TABLET, EXTENDED RELEASE ORAL at 09:05

## 2020-01-01 RX ADMIN — METOPROLOL TARTRATE 50 MG: 50 TABLET, FILM COATED ORAL at 09:02

## 2020-01-01 RX ADMIN — INSULIN DETEMIR 5 UNITS: 100 INJECTION, SOLUTION SUBCUTANEOUS at 08:05

## 2020-01-01 RX ADMIN — FERROUS SULFATE TAB EC 325 MG (65 MG FE EQUIVALENT) 325 MG: 325 (65 FE) TABLET DELAYED RESPONSE at 10:11

## 2020-01-01 RX ADMIN — ACETAMINOPHEN 650 MG: 325 TABLET ORAL at 04:06

## 2020-01-01 RX ADMIN — FUROSEMIDE 80 MG: 10 INJECTION, SOLUTION INTRAMUSCULAR; INTRAVENOUS at 08:05

## 2020-01-01 RX ADMIN — ASPIRIN 81 MG 81 MG: 81 TABLET ORAL at 09:06

## 2020-01-01 RX ADMIN — FUROSEMIDE 20 MG/HR: 10 INJECTION, SOLUTION INTRAMUSCULAR; INTRAVENOUS at 04:11

## 2020-01-01 RX ADMIN — POTASSIUM CHLORIDE 40 MEQ: 1500 TABLET, EXTENDED RELEASE ORAL at 11:06

## 2020-01-01 RX ADMIN — ASPIRIN 81 MG CHEWABLE TABLET 81 MG: 81 TABLET CHEWABLE at 09:05

## 2020-01-01 RX ADMIN — ASPIRIN 81 MG CHEWABLE TABLET 81 MG: 81 TABLET CHEWABLE at 09:02

## 2020-01-01 RX ADMIN — HEPARIN SODIUM 5000 UNITS: 5000 INJECTION INTRAVENOUS; SUBCUTANEOUS at 08:11

## 2020-01-01 RX ADMIN — ATORVASTATIN CALCIUM 80 MG: 20 TABLET, FILM COATED ORAL at 09:02

## 2020-01-01 RX ADMIN — HYDRALAZINE HYDROCHLORIDE 75 MG: 25 TABLET, FILM COATED ORAL at 08:11

## 2020-01-01 RX ADMIN — LEVOTHYROXINE SODIUM 175 MCG: 25 TABLET ORAL at 05:11

## 2020-01-01 RX ADMIN — HYDRALAZINE HYDROCHLORIDE 50 MG: 50 TABLET, FILM COATED ORAL at 09:06

## 2020-01-01 RX ADMIN — FUROSEMIDE 20 MG/HR: 10 INJECTION, SOLUTION INTRAMUSCULAR; INTRAVENOUS at 03:06

## 2020-01-01 RX ADMIN — GUAIFENESIN AND DEXTROMETHORPHAN 10 ML: 100; 10 SYRUP ORAL at 10:03

## 2020-01-01 RX ADMIN — HYDRALAZINE HYDROCHLORIDE 75 MG: 25 TABLET, FILM COATED ORAL at 09:11

## 2020-01-01 RX ADMIN — ISOSORBIDE DINITRATE 40 MG: 20 TABLET ORAL at 02:08

## 2020-01-01 RX ADMIN — ISOSORBIDE DINITRATE 20 MG: 20 TABLET ORAL at 08:06

## 2020-01-01 RX ADMIN — INSULIN ASPART 2 UNITS: 100 INJECTION, SOLUTION INTRAVENOUS; SUBCUTANEOUS at 09:06

## 2020-01-01 RX ADMIN — ASPIRIN 81 MG 81 MG: 81 TABLET ORAL at 08:06

## 2020-01-01 RX ADMIN — ASPIRIN 81 MG CHEWABLE TABLET 81 MG: 81 TABLET CHEWABLE at 10:11

## 2020-01-01 RX ADMIN — LEVOTHYROXINE SODIUM 175 MCG: 25 TABLET ORAL at 06:11

## 2020-01-01 RX ADMIN — LEVOTHYROXINE SODIUM 175 MCG: 150 TABLET ORAL at 05:05

## 2020-01-01 RX ADMIN — INSULIN DETEMIR 10 UNITS: 100 INJECTION, SOLUTION SUBCUTANEOUS at 08:06

## 2020-01-01 RX ADMIN — DOBUTAMINE IN DEXTROSE 5 MCG/KG/MIN: 200 INJECTION, SOLUTION INTRAVENOUS at 08:06

## 2020-01-01 RX ADMIN — DOBUTAMINE HYDROCHLORIDE 5 MCG/KG/MIN: 200 INJECTION INTRAVENOUS at 10:06

## 2020-01-01 RX ADMIN — INSULIN ASPART 3 UNITS: 100 INJECTION, SOLUTION INTRAVENOUS; SUBCUTANEOUS at 12:11

## 2020-01-01 RX ADMIN — INSULIN ASPART 2 UNITS: 100 INJECTION, SOLUTION INTRAVENOUS; SUBCUTANEOUS at 05:05

## 2020-01-01 RX ADMIN — POTASSIUM CHLORIDE 40 MEQ: 1500 TABLET, EXTENDED RELEASE ORAL at 06:06

## 2020-01-01 RX ADMIN — HEPARIN SODIUM 5000 UNITS: 5000 INJECTION INTRAVENOUS; SUBCUTANEOUS at 06:11

## 2020-01-01 RX ADMIN — FUROSEMIDE 20 MG/HR: 10 INJECTION, SOLUTION INTRAMUSCULAR; INTRAVENOUS at 04:06

## 2020-01-01 RX ADMIN — LEVOTHYROXINE SODIUM 175 MCG: 150 TABLET ORAL at 06:05

## 2020-01-01 RX ADMIN — Medication 400 MG: at 08:11

## 2020-01-01 RX ADMIN — INSULIN DETEMIR 8 UNITS: 100 INJECTION, SOLUTION SUBCUTANEOUS at 10:08

## 2020-01-01 RX ADMIN — POLYETHYLENE GLYCOL 3350 17 G: 17 POWDER, FOR SOLUTION ORAL at 10:08

## 2020-01-01 RX ADMIN — INSULIN DETEMIR 4 UNITS: 100 INJECTION, SOLUTION SUBCUTANEOUS at 12:05

## 2020-01-01 RX ADMIN — DOBUTAMINE IN DEXTROSE 5 MCG/KG/MIN: 200 INJECTION, SOLUTION INTRAVENOUS at 10:06

## 2020-01-01 RX ADMIN — POTASSIUM CHLORIDE 40 MEQ: 1500 TABLET, EXTENDED RELEASE ORAL at 09:06

## 2020-01-01 RX ADMIN — ACETAMINOPHEN 650 MG: 325 TABLET ORAL at 08:06

## 2020-01-01 RX ADMIN — POTASSIUM CHLORIDE 20 MEQ: 1500 TABLET, EXTENDED RELEASE ORAL at 08:11

## 2020-01-01 RX ADMIN — INSULIN ASPART 4 UNITS: 100 INJECTION, SOLUTION INTRAVENOUS; SUBCUTANEOUS at 11:08

## 2020-01-01 RX ADMIN — FUROSEMIDE 80 MG: 40 TABLET ORAL at 09:02

## 2020-01-01 RX ADMIN — HYDRALAZINE HYDROCHLORIDE 25 MG: 25 TABLET, FILM COATED ORAL at 08:06

## 2020-01-01 RX ADMIN — ENOXAPARIN SODIUM 30 MG: 100 INJECTION SUBCUTANEOUS at 04:02

## 2020-01-01 RX ADMIN — FUROSEMIDE 160 MG: 10 INJECTION, SOLUTION INTRAMUSCULAR; INTRAVENOUS at 09:08

## 2020-01-01 RX ADMIN — INSULIN ASPART 2 UNITS: 100 INJECTION, SOLUTION INTRAVENOUS; SUBCUTANEOUS at 11:06

## 2020-01-01 RX ADMIN — ASPIRIN 81 MG CHEWABLE TABLET 81 MG: 81 TABLET CHEWABLE at 08:05

## 2020-01-01 RX ADMIN — MAGNESIUM SULFATE HEPTAHYDRATE 3 G: 500 INJECTION, SOLUTION INTRAMUSCULAR; INTRAVENOUS at 02:08

## 2020-01-01 RX ADMIN — GUAIFENESIN AND DEXTROMETHORPHAN 10 ML: 100; 10 SYRUP ORAL at 05:02

## 2020-01-01 RX ADMIN — MICONAZOLE NITRATE: 20 CREAM TOPICAL at 05:11

## 2020-01-01 RX ADMIN — CALCIUM CARBONATE (ANTACID) CHEW TAB 500 MG 1000 MG: 500 CHEW TAB at 09:02

## 2020-01-01 RX ADMIN — ASPIRIN 81 MG 81 MG: 81 TABLET ORAL at 10:08

## 2020-01-01 RX ADMIN — ISOSORBIDE DINITRATE 40 MG: 40 TABLET ORAL at 09:11

## 2020-01-01 RX ADMIN — INSULIN ASPART 1 UNITS: 100 INJECTION, SOLUTION INTRAVENOUS; SUBCUTANEOUS at 09:05

## 2020-01-01 RX ADMIN — LIDOCAINE HYDROCHLORIDE 10 MG: 10 INJECTION, SOLUTION EPIDURAL; INFILTRATION; INTRACAUDAL; PERINEURAL at 03:06

## 2020-01-01 RX ADMIN — DOBUTAMINE HYDROCHLORIDE 2.5 MCG/KG/MIN: 200 INJECTION INTRAVENOUS at 09:06

## 2020-01-01 RX ADMIN — ACETAMINOPHEN 650 MG: 325 TABLET ORAL at 09:06

## 2020-01-01 RX ADMIN — POTASSIUM CHLORIDE 40 MEQ: 1500 TABLET, EXTENDED RELEASE ORAL at 01:05

## 2020-01-01 RX ADMIN — DOBUTAMINE HYDROCHLORIDE 5 MCG/KG/MIN: 200 INJECTION INTRAVENOUS at 10:08

## 2020-01-01 RX ADMIN — SIMETHICONE 160 MG: 80 TABLET, CHEWABLE ORAL at 02:11

## 2020-01-01 RX ADMIN — FERROUS SULFATE TAB EC 325 MG (65 MG FE EQUIVALENT) 325 MG: 325 (65 FE) TABLET DELAYED RESPONSE at 09:03

## 2020-01-01 RX ADMIN — INSULIN ASPART 1 UNITS: 100 INJECTION, SOLUTION INTRAVENOUS; SUBCUTANEOUS at 04:06

## 2020-01-01 RX ADMIN — MIDAZOLAM HYDROCHLORIDE 1 MG: 1 INJECTION, SOLUTION INTRAMUSCULAR; INTRAVENOUS at 10:10

## 2020-01-01 RX ADMIN — ASPIRIN 81 MG CHEWABLE TABLET 81 MG: 81 TABLET CHEWABLE at 08:11

## 2020-01-01 RX ADMIN — INSULIN ASPART 4 UNITS: 100 INJECTION, SOLUTION INTRAVENOUS; SUBCUTANEOUS at 08:08

## 2020-01-01 RX ADMIN — HYDRALAZINE HYDROCHLORIDE 75 MG: 50 TABLET, FILM COATED ORAL at 08:06

## 2020-01-01 RX ADMIN — POTASSIUM CHLORIDE 40 MEQ: 1500 TABLET, EXTENDED RELEASE ORAL at 07:05

## 2020-01-01 RX ADMIN — POTASSIUM CHLORIDE 40 MEQ: 1500 TABLET, EXTENDED RELEASE ORAL at 12:06

## 2020-01-01 RX ADMIN — ISOSORBIDE DINITRATE 10 MG: 10 TABLET ORAL at 09:06

## 2020-01-01 RX ADMIN — DOBUTAMINE IN DEXTROSE 5 MCG/KG/MIN: 200 INJECTION, SOLUTION INTRAVENOUS at 06:06

## 2020-01-01 RX ADMIN — MICONAZOLE NITRATE: 20 CREAM TOPICAL at 08:11

## 2020-01-01 RX ADMIN — ISOSORBIDE DINITRATE 40 MG: 20 TABLET ORAL at 09:06

## 2020-01-01 RX ADMIN — HYDRALAZINE HYDROCHLORIDE 75 MG: 50 TABLET, FILM COATED ORAL at 09:08

## 2020-01-01 RX ADMIN — FERROUS SULFATE TAB EC 325 MG (65 MG FE EQUIVALENT) 325 MG: 325 (65 FE) TABLET DELAYED RESPONSE at 08:05

## 2020-01-01 RX ADMIN — FUROSEMIDE 160 MG: 10 INJECTION, SOLUTION INTRAMUSCULAR; INTRAVENOUS at 07:08

## 2020-01-01 RX ADMIN — POTASSIUM BICARBONATE 25 MEQ: 978 TABLET, EFFERVESCENT ORAL at 09:05

## 2020-01-01 RX ADMIN — HEPARIN SODIUM 5000 UNITS: 5000 INJECTION INTRAVENOUS; SUBCUTANEOUS at 04:11

## 2020-01-01 RX ADMIN — HEPARIN SODIUM 5000 UNITS: 5000 INJECTION INTRAVENOUS; SUBCUTANEOUS at 09:11

## 2020-01-01 RX ADMIN — LEVOTHYROXINE SODIUM 175 MCG: 125 TABLET ORAL at 05:03

## 2020-01-01 RX ADMIN — FUROSEMIDE 120 MG: 10 INJECTION, SOLUTION INTRAVENOUS at 08:05

## 2020-01-01 RX ADMIN — FUROSEMIDE 20 MG/HR: 10 INJECTION, SOLUTION INTRAMUSCULAR; INTRAVENOUS at 05:11

## 2020-01-01 RX ADMIN — POTASSIUM CHLORIDE 40 MEQ: 1.5 FOR SOLUTION ORAL at 02:11

## 2020-01-01 RX ADMIN — FUROSEMIDE 160 MG: 10 INJECTION, SOLUTION INTRAMUSCULAR; INTRAVENOUS at 09:03

## 2020-01-01 RX ADMIN — INSULIN ASPART 3 UNITS: 100 INJECTION, SOLUTION INTRAVENOUS; SUBCUTANEOUS at 08:11

## 2020-01-01 RX ADMIN — POLYETHYLENE GLYCOL 3350 17 G: 17 POWDER, FOR SOLUTION ORAL at 08:05

## 2020-01-01 RX ADMIN — INSULIN ASPART 4 UNITS: 100 INJECTION, SOLUTION INTRAVENOUS; SUBCUTANEOUS at 12:08

## 2020-01-01 RX ADMIN — SENNOSIDES AND DOCUSATE SODIUM 1 TABLET: 8.6; 5 TABLET ORAL at 10:08

## 2020-01-01 RX ADMIN — ACETAMINOPHEN 650 MG: 325 TABLET ORAL at 10:06

## 2020-01-01 RX ADMIN — FUROSEMIDE 120 MG: 10 INJECTION, SOLUTION INTRAVENOUS at 05:05

## 2020-01-01 RX ADMIN — FUROSEMIDE 80 MG: 40 TABLET ORAL at 09:03

## 2020-01-01 RX ADMIN — ASPIRIN 81 MG CHEWABLE TABLET 81 MG: 81 TABLET CHEWABLE at 08:02

## 2020-01-01 RX ADMIN — SODIUM CHLORIDE 3 UNITS/HR: 9 INJECTION, SOLUTION INTRAVENOUS at 08:06

## 2020-01-01 RX ADMIN — HYDROCODONE BITARTRATE AND ACETAMINOPHEN 1 TABLET: 5; 325 TABLET ORAL at 09:08

## 2020-01-01 RX ADMIN — HEPARIN SODIUM 5000 UNITS: 5000 INJECTION INTRAVENOUS; SUBCUTANEOUS at 05:11

## 2020-01-01 RX ADMIN — DOBUTAMINE HYDROCHLORIDE 2.5 MCG/KG/MIN: 200 INJECTION INTRAVENOUS at 04:06

## 2020-01-01 RX ADMIN — INSULIN ASPART 4 UNITS: 100 INJECTION, SOLUTION INTRAVENOUS; SUBCUTANEOUS at 11:06

## 2020-01-01 RX ADMIN — FUROSEMIDE 80 MG: 40 TABLET ORAL at 10:03

## 2020-01-01 RX ADMIN — FUROSEMIDE 160 MG: 10 INJECTION, SOLUTION INTRAMUSCULAR; INTRAVENOUS at 02:03

## 2020-01-01 RX ADMIN — FUROSEMIDE 20 MG/HR: 10 INJECTION, SOLUTION INTRAMUSCULAR; INTRAVENOUS at 09:06

## 2020-01-01 RX ADMIN — Medication 16 G: at 08:02

## 2020-01-01 RX ADMIN — HYPROMELLOSE 2910 1 DROP: 5 SOLUTION OPHTHALMIC at 08:06

## 2020-01-01 RX ADMIN — LEVOTHYROXINE SODIUM 175 MCG: 100 TABLET ORAL at 07:06

## 2020-01-01 RX ADMIN — GUAIFENESIN AND DEXTROMETHORPHAN 10 ML: 100; 10 SYRUP ORAL at 03:03

## 2020-01-01 RX ADMIN — ATORVASTATIN CALCIUM 40 MG: 10 TABLET, FILM COATED ORAL at 10:11

## 2020-01-01 RX ADMIN — TORSEMIDE 100 MG: 20 TABLET ORAL at 02:08

## 2020-01-01 RX ADMIN — METOPROLOL TARTRATE 50 MG: 50 TABLET, FILM COATED ORAL at 10:02

## 2020-01-01 RX ADMIN — FUROSEMIDE 120 MG: 10 INJECTION, SOLUTION INTRAMUSCULAR; INTRAVENOUS at 08:02

## 2020-01-01 RX ADMIN — INSULIN ASPART 2 UNITS: 100 INJECTION, SOLUTION INTRAVENOUS; SUBCUTANEOUS at 04:11

## 2020-01-01 RX ADMIN — INSULIN ASPART 2 UNITS: 100 INJECTION, SOLUTION INTRAVENOUS; SUBCUTANEOUS at 08:06

## 2020-01-01 RX ADMIN — POTASSIUM CHLORIDE 20 MEQ: 1.5 FOR SOLUTION ORAL at 11:11

## 2020-01-01 RX ADMIN — FUROSEMIDE 80 MG: 80 TABLET ORAL at 09:06

## 2020-01-01 RX ADMIN — ACETAMINOPHEN 650 MG: 325 TABLET ORAL at 08:11

## 2020-01-01 RX ADMIN — HYDRALAZINE HYDROCHLORIDE 75 MG: 25 TABLET, FILM COATED ORAL at 03:11

## 2020-01-01 RX ADMIN — FUROSEMIDE 20 MG/HR: 10 INJECTION, SOLUTION INTRAMUSCULAR; INTRAVENOUS at 10:06

## 2020-01-01 RX ADMIN — ISOSORBIDE DINITRATE 40 MG: 20 TABLET ORAL at 03:08

## 2020-01-01 RX ADMIN — ASPIRIN 81 MG 81 MG: 81 TABLET ORAL at 08:08

## 2020-01-01 RX ADMIN — INSULIN ASPART 3 UNITS: 100 INJECTION, SOLUTION INTRAVENOUS; SUBCUTANEOUS at 06:05

## 2020-01-01 RX ADMIN — ACETAMINOPHEN 650 MG: 325 TABLET ORAL at 05:11

## 2020-01-01 RX ADMIN — POTASSIUM CHLORIDE 40 MEQ: 1500 TABLET, EXTENDED RELEASE ORAL at 10:06

## 2020-01-01 RX ADMIN — INSULIN ASPART 2 UNITS: 100 INJECTION, SOLUTION INTRAVENOUS; SUBCUTANEOUS at 12:06

## 2020-01-01 RX ADMIN — INSULIN DETEMIR 8 UNITS: 100 INJECTION, SOLUTION SUBCUTANEOUS at 08:11

## 2020-01-01 RX ADMIN — FUROSEMIDE 10 MG/HR: 10 INJECTION, SOLUTION INTRAMUSCULAR; INTRAVENOUS at 10:02

## 2020-01-01 RX ADMIN — INSULIN ASPART 1 UNITS: 100 INJECTION, SOLUTION INTRAVENOUS; SUBCUTANEOUS at 09:06

## 2020-01-01 RX ADMIN — ISOSORBIDE DINITRATE 40 MG: 40 TABLET ORAL at 10:11

## 2020-01-01 RX ADMIN — CALCIUM CARBONATE (ANTACID) CHEW TAB 500 MG 500 MG: 500 CHEW TAB at 10:05

## 2020-01-01 RX ADMIN — POTASSIUM CHLORIDE 30 MEQ: 750 CAPSULE, EXTENDED RELEASE ORAL at 09:06

## 2020-01-01 RX ADMIN — HYPROMELLOSE 2910 1 DROP: 5 SOLUTION OPHTHALMIC at 04:06

## 2020-01-01 RX ADMIN — INSULIN ASPART 4 UNITS: 100 INJECTION, SOLUTION INTRAVENOUS; SUBCUTANEOUS at 05:06

## 2020-01-01 RX ADMIN — INSULIN ASPART 3 UNITS: 100 INJECTION, SOLUTION INTRAVENOUS; SUBCUTANEOUS at 05:05

## 2020-01-01 RX ADMIN — SENNOSIDES AND DOCUSATE SODIUM 1 TABLET: 8.6; 5 TABLET ORAL at 10:07

## 2020-01-01 RX ADMIN — FUROSEMIDE 20 MG/HR: 10 INJECTION, SOLUTION INTRAMUSCULAR; INTRAVENOUS at 06:06

## 2020-01-01 RX ADMIN — HEPARIN SODIUM 5000 UNITS: 5000 INJECTION INTRAVENOUS; SUBCUTANEOUS at 04:06

## 2020-01-01 RX ADMIN — FUROSEMIDE 80 MG: 40 TABLET ORAL at 06:03

## 2020-01-01 RX ADMIN — FENTANYL CITRATE 25 MCG: 50 INJECTION, SOLUTION INTRAMUSCULAR; INTRAVENOUS at 08:06

## 2020-01-01 RX ADMIN — ACETAMINOPHEN 650 MG: 325 TABLET ORAL at 05:06

## 2020-01-01 RX ADMIN — ISOSORBIDE DINITRATE 40 MG: 20 TABLET ORAL at 09:08

## 2020-01-01 RX ADMIN — POTASSIUM CHLORIDE 40 MEQ: 1500 TABLET, EXTENDED RELEASE ORAL at 12:11

## 2020-01-01 RX ADMIN — ENOXAPARIN SODIUM 30 MG: 100 INJECTION SUBCUTANEOUS at 10:02

## 2020-01-01 RX ADMIN — HEPARIN SODIUM 5000 UNITS: 5000 INJECTION INTRAVENOUS; SUBCUTANEOUS at 07:06

## 2020-01-01 RX ADMIN — POTASSIUM CHLORIDE 40 MEQ: 750 CAPSULE, EXTENDED RELEASE ORAL at 01:06

## 2020-01-01 RX ADMIN — HEPARIN SODIUM 5000 UNITS: 5000 INJECTION INTRAVENOUS; SUBCUTANEOUS at 10:06

## 2020-01-01 RX ADMIN — FUROSEMIDE 20 MG/HR: 10 INJECTION, SOLUTION INTRAMUSCULAR; INTRAVENOUS at 01:06

## 2020-01-01 RX ADMIN — GUAIFENESIN AND DEXTROMETHORPHAN 10 ML: 100; 10 SYRUP ORAL at 09:02

## 2020-01-01 RX ADMIN — ONDANSETRON 4 MG: 2 INJECTION, SOLUTION INTRAMUSCULAR; INTRAVENOUS at 10:10

## 2020-01-01 RX ADMIN — DOBUTAMINE IN DEXTROSE 5 MCG/KG/MIN: 200 INJECTION, SOLUTION INTRAVENOUS at 07:06

## 2020-01-01 RX ADMIN — ISOSORBIDE DINITRATE 40 MG: 20 TABLET ORAL at 08:08

## 2020-01-01 RX ADMIN — POTASSIUM CHLORIDE 40 MEQ: 750 CAPSULE, EXTENDED RELEASE ORAL at 12:06

## 2020-01-01 RX ADMIN — INSULIN DETEMIR 8 UNITS: 100 INJECTION, SOLUTION SUBCUTANEOUS at 10:07

## 2020-01-01 RX ADMIN — METOPROLOL SUCCINATE 50 MG: 50 TABLET, EXTENDED RELEASE ORAL at 10:03

## 2020-01-01 RX ADMIN — INSULIN ASPART 1 UNITS: 100 INJECTION, SOLUTION INTRAVENOUS; SUBCUTANEOUS at 08:06

## 2020-01-01 RX ADMIN — Medication 400 MG: at 09:11

## 2020-01-01 RX ADMIN — INSULIN DETEMIR 8 UNITS: 100 INJECTION, SOLUTION SUBCUTANEOUS at 08:08

## 2020-01-01 RX ADMIN — METOLAZONE 5 MG: 2.5 TABLET ORAL at 10:11

## 2020-01-01 RX ADMIN — INSULIN ASPART 4 UNITS: 100 INJECTION, SOLUTION INTRAVENOUS; SUBCUTANEOUS at 04:06

## 2020-01-01 RX ADMIN — FUROSEMIDE 100 MG: 10 INJECTION, SOLUTION INTRAMUSCULAR; INTRAVENOUS at 12:05

## 2020-01-01 RX ADMIN — FUROSEMIDE 10 MG/HR: 10 INJECTION, SOLUTION INTRAMUSCULAR; INTRAVENOUS at 02:02

## 2020-01-01 RX ADMIN — TORSEMIDE 100 MG: 100 TABLET ORAL at 08:11

## 2020-01-01 RX ADMIN — FUROSEMIDE 80 MG: 40 TABLET ORAL at 08:08

## 2020-01-01 RX ADMIN — HUMAN ALBUMIN MICROSPHERES AND PERFLUTREN 0.66 MG: 10; .22 INJECTION, SOLUTION INTRAVENOUS at 10:06

## 2020-01-01 RX ADMIN — INSULIN ASPART 2 UNITS: 100 INJECTION, SOLUTION INTRAVENOUS; SUBCUTANEOUS at 10:11

## 2020-01-01 RX ADMIN — HEPARIN SODIUM 5000 UNITS: 5000 INJECTION INTRAVENOUS; SUBCUTANEOUS at 05:06

## 2020-01-01 RX ADMIN — ISOSORBIDE DINITRATE 40 MG: 20 TABLET ORAL at 10:08

## 2020-01-01 RX ADMIN — ISOSORBIDE DINITRATE 40 MG: 20 TABLET ORAL at 03:06

## 2020-01-01 RX ADMIN — LEVOTHYROXINE SODIUM 175 MCG: 125 TABLET ORAL at 06:02

## 2020-01-01 RX ADMIN — SODIUM CHLORIDE 0.6 UNITS/HR: 9 INJECTION, SOLUTION INTRAVENOUS at 09:06

## 2020-01-01 RX ADMIN — FENTANYL CITRATE 25 MCG: 50 INJECTION, SOLUTION INTRAMUSCULAR; INTRAVENOUS at 08:08

## 2020-01-01 RX ADMIN — FERROUS SULFATE TAB EC 325 MG (65 MG FE EQUIVALENT) 325 MG: 325 (65 FE) TABLET DELAYED RESPONSE at 10:08

## 2020-01-01 RX ADMIN — ACETAMINOPHEN 650 MG: 325 TABLET ORAL at 07:06

## 2020-01-01 RX ADMIN — SPIRONOLACTONE 25 MG: 25 TABLET, FILM COATED ORAL at 08:08

## 2020-01-01 RX ADMIN — FUROSEMIDE 120 MG: 10 INJECTION, SOLUTION INTRAVENOUS at 09:05

## 2020-01-01 RX ADMIN — IOHEXOL 15 ML: 300 INJECTION, SOLUTION INTRAVENOUS at 11:10

## 2020-01-01 RX ADMIN — POTASSIUM BICARBONATE 50 MEQ: 978 TABLET, EFFERVESCENT ORAL at 01:05

## 2020-01-01 RX ADMIN — POTASSIUM CHLORIDE 40 MEQ: 1500 TABLET, EXTENDED RELEASE ORAL at 12:08

## 2020-01-01 RX ADMIN — MICONAZOLE NITRATE: 20 CREAM TOPICAL at 09:11

## 2020-01-01 RX ADMIN — LEVOTHYROXINE SODIUM 175 MCG: 125 TABLET ORAL at 05:02

## 2020-01-01 RX ADMIN — DOBUTAMINE HYDROCHLORIDE 5 MCG/KG/MIN: 200 INJECTION INTRAVENOUS at 08:08

## 2020-01-01 RX ADMIN — FUROSEMIDE 80 MG: 10 INJECTION, SOLUTION INTRAMUSCULAR; INTRAVENOUS at 04:02

## 2020-01-01 RX ADMIN — INSULIN ASPART 3 UNITS: 100 INJECTION, SOLUTION INTRAVENOUS; SUBCUTANEOUS at 04:06

## 2020-01-01 RX ADMIN — INSULIN ASPART 3 UNITS: 100 INJECTION, SOLUTION INTRAVENOUS; SUBCUTANEOUS at 02:05

## 2020-01-01 RX ADMIN — ATORVASTATIN CALCIUM 80 MG: 20 TABLET, FILM COATED ORAL at 10:03

## 2020-01-01 RX ADMIN — POTASSIUM CHLORIDE 40 MEQ: 1500 TABLET, EXTENDED RELEASE ORAL at 08:06

## 2020-01-01 RX ADMIN — INSULIN ASPART 3 UNITS: 100 INJECTION, SOLUTION INTRAVENOUS; SUBCUTANEOUS at 08:06

## 2020-01-01 RX ADMIN — SILVER NITRATE APPLICATORS 2 APPLICATOR: 25; 75 STICK TOPICAL at 05:11

## 2020-01-01 RX ADMIN — FUROSEMIDE 80 MG: 80 TABLET ORAL at 05:06

## 2020-01-01 RX ADMIN — ENOXAPARIN SODIUM 30 MG: 100 INJECTION SUBCUTANEOUS at 05:02

## 2020-01-01 RX ADMIN — HYDRALAZINE HYDROCHLORIDE 10 MG: 10 TABLET, FILM COATED ORAL at 03:06

## 2020-01-01 RX ADMIN — INSULIN ASPART 2 UNITS: 100 INJECTION, SOLUTION INTRAVENOUS; SUBCUTANEOUS at 08:05

## 2020-01-01 RX ADMIN — INSULIN ASPART 1 UNITS: 100 INJECTION, SOLUTION INTRAVENOUS; SUBCUTANEOUS at 09:11

## 2020-01-01 RX ADMIN — ISOSORBIDE DINITRATE 40 MG: 40 TABLET ORAL at 03:11

## 2020-01-01 RX ADMIN — HEPARIN SODIUM 5000 UNITS: 5000 INJECTION INTRAVENOUS; SUBCUTANEOUS at 02:05

## 2020-01-01 RX ADMIN — ACETAMINOPHEN 650 MG: 325 TABLET ORAL at 12:11

## 2020-01-01 RX ADMIN — FUROSEMIDE 10 MG/HR: 10 INJECTION, SOLUTION INTRAMUSCULAR; INTRAVENOUS at 09:11

## 2020-01-01 RX ADMIN — INSULIN ASPART 2 UNITS: 100 INJECTION, SOLUTION INTRAVENOUS; SUBCUTANEOUS at 06:06

## 2020-01-01 RX ADMIN — INSULIN ASPART 3 UNITS: 100 INJECTION, SOLUTION INTRAVENOUS; SUBCUTANEOUS at 04:11

## 2020-01-01 RX ADMIN — PHYTONADIONE 5 MG: 10 INJECTION, EMULSION INTRAMUSCULAR; INTRAVENOUS; SUBCUTANEOUS at 11:06

## 2020-01-01 RX ADMIN — SIMETHICONE 160 MG: 80 TABLET, CHEWABLE ORAL at 09:11

## 2020-01-01 RX ADMIN — ACETAMINOPHEN 650 MG: 325 TABLET ORAL at 03:11

## 2020-01-01 RX ADMIN — INSULIN ASPART 4 UNITS: 100 INJECTION, SOLUTION INTRAVENOUS; SUBCUTANEOUS at 12:06

## 2020-01-01 RX ADMIN — DEXTROSE MONOHYDRATE 25 G: 10 INJECTION, SOLUTION INTRAVENOUS at 08:05

## 2020-01-01 RX ADMIN — INSULIN ASPART 3 UNITS: 100 INJECTION, SOLUTION INTRAVENOUS; SUBCUTANEOUS at 07:05

## 2020-01-01 RX ADMIN — INSULIN ASPART 2 UNITS: 100 INJECTION, SOLUTION INTRAVENOUS; SUBCUTANEOUS at 12:08

## 2020-01-01 RX ADMIN — INSULIN ASPART 2 UNITS: 100 INJECTION, SOLUTION INTRAVENOUS; SUBCUTANEOUS at 11:11

## 2020-01-01 RX ADMIN — SODIUM CHLORIDE 0.8 UNITS/HR: 9 INJECTION, SOLUTION INTRAVENOUS at 02:06

## 2020-01-01 RX ADMIN — LEVOTHYROXINE SODIUM 175 MCG: 150 TABLET ORAL at 05:08

## 2020-01-01 RX ADMIN — FERROUS SULFATE TAB EC 325 MG (65 MG FE EQUIVALENT) 325 MG: 325 (65 FE) TABLET DELAYED RESPONSE at 08:11

## 2020-01-01 RX ADMIN — ASPIRIN 81 MG 81 MG: 81 TABLET ORAL at 10:06

## 2020-01-01 RX ADMIN — DOBUTAMINE HYDROCHLORIDE 5 MCG/KG/MIN: 200 INJECTION INTRAVENOUS at 10:07

## 2020-01-01 RX ADMIN — POTASSIUM CHLORIDE 40 MEQ: 1500 TABLET, EXTENDED RELEASE ORAL at 08:11

## 2020-01-01 RX ADMIN — CEFAZOLIN SODIUM 1 G: 1 SOLUTION INTRAVENOUS at 08:08

## 2020-01-01 RX ADMIN — FUROSEMIDE 80 MG: 10 INJECTION, SOLUTION INTRAMUSCULAR; INTRAVENOUS at 02:10

## 2020-01-01 RX ADMIN — INSULIN ASPART 3 UNITS: 100 INJECTION, SOLUTION INTRAVENOUS; SUBCUTANEOUS at 05:11

## 2020-01-01 RX ADMIN — INSULIN ASPART 2 UNITS: 100 INJECTION, SOLUTION INTRAVENOUS; SUBCUTANEOUS at 04:06

## 2020-01-01 RX ADMIN — INSULIN ASPART 1 UNITS: 100 INJECTION, SOLUTION INTRAVENOUS; SUBCUTANEOUS at 09:08

## 2020-01-01 RX ADMIN — HEPARIN SODIUM 5000 UNITS: 5000 INJECTION INTRAVENOUS; SUBCUTANEOUS at 04:05

## 2020-01-01 RX ADMIN — GUAIFENESIN AND DEXTROMETHORPHAN 10 ML: 100; 10 SYRUP ORAL at 06:03

## 2020-01-01 RX ADMIN — ISOSORBIDE DINITRATE 40 MG: 20 TABLET ORAL at 02:06

## 2020-01-01 RX ADMIN — DOBUTAMINE IN DEXTROSE 5 MCG/KG/MIN: 200 INJECTION, SOLUTION INTRAVENOUS at 12:06

## 2020-01-01 RX ADMIN — HYDRALAZINE HYDROCHLORIDE 75 MG: 50 TABLET, FILM COATED ORAL at 02:08

## 2020-01-01 RX ADMIN — FUROSEMIDE 160 MG: 10 INJECTION, SOLUTION INTRAMUSCULAR; INTRAVENOUS at 11:02

## 2020-01-01 RX ADMIN — CEFAZOLIN 1 G: 330 INJECTION, POWDER, FOR SOLUTION INTRAMUSCULAR; INTRAVENOUS at 07:06

## 2020-01-01 RX ADMIN — GUAIFENESIN AND DEXTROMETHORPHAN 10 ML: 100; 10 SYRUP ORAL at 09:03

## 2020-01-01 RX ADMIN — POTASSIUM CHLORIDE 20 MEQ: 1.5 FOR SOLUTION ORAL at 01:11

## 2020-01-01 RX ADMIN — HEPARIN SODIUM 5000 UNITS: 5000 INJECTION, SOLUTION INTRAVENOUS; SUBCUTANEOUS at 09:06

## 2020-01-01 RX ADMIN — INSULIN ASPART 3 UNITS: 100 INJECTION, SOLUTION INTRAVENOUS; SUBCUTANEOUS at 11:05

## 2020-01-01 RX ADMIN — ISOSORBIDE DINITRATE 10 MG: 10 TABLET ORAL at 03:06

## 2020-01-01 RX ADMIN — DOBUTAMINE IN DEXTROSE 5 MCG/KG/MIN: 200 INJECTION, SOLUTION INTRAVENOUS at 03:06

## 2020-01-01 RX ADMIN — INSULIN ASPART 4 UNITS: 100 INJECTION, SOLUTION INTRAVENOUS; SUBCUTANEOUS at 12:05

## 2020-01-01 RX ADMIN — POTASSIUM CHLORIDE 40 MEQ: 1500 TABLET, EXTENDED RELEASE ORAL at 10:08

## 2020-01-01 RX ADMIN — HEPARIN SODIUM 5000 UNITS: 5000 INJECTION INTRAVENOUS; SUBCUTANEOUS at 03:11

## 2020-01-01 RX ADMIN — FENTANYL CITRATE 50 MCG: 50 INJECTION, SOLUTION INTRAMUSCULAR; INTRAVENOUS at 10:10

## 2020-01-01 RX ADMIN — ATORVASTATIN CALCIUM 80 MG: 20 TABLET, FILM COATED ORAL at 10:02

## 2020-01-01 RX ADMIN — HEPARIN SODIUM 5000 UNITS: 5000 INJECTION INTRAVENOUS; SUBCUTANEOUS at 01:06

## 2020-01-01 RX ADMIN — ASPIRIN 81 MG CHEWABLE TABLET 81 MG: 81 TABLET CHEWABLE at 10:03

## 2020-01-01 RX ADMIN — ENOXAPARIN SODIUM 30 MG: 100 INJECTION SUBCUTANEOUS at 06:03

## 2020-01-01 RX ADMIN — SPIRONOLACTONE 25 MG: 25 TABLET, FILM COATED ORAL at 02:08

## 2020-01-01 RX ADMIN — INSULIN ASPART 2 UNITS: 100 INJECTION, SOLUTION INTRAVENOUS; SUBCUTANEOUS at 08:11

## 2020-01-01 RX ADMIN — FUROSEMIDE 20 MG/HR: 10 INJECTION, SOLUTION INTRAMUSCULAR; INTRAVENOUS at 08:11

## 2020-01-01 RX ADMIN — DOBUTAMINE HYDROCHLORIDE 5 MCG/KG/MIN: 400 INJECTION INTRAVENOUS at 05:11

## 2020-01-01 RX ADMIN — INSULIN ASPART 3 UNITS: 100 INJECTION, SOLUTION INTRAVENOUS; SUBCUTANEOUS at 11:11

## 2020-01-01 RX ADMIN — HEPARIN SODIUM 5000 UNITS: 5000 INJECTION INTRAVENOUS; SUBCUTANEOUS at 01:05

## 2020-01-01 RX ADMIN — FUROSEMIDE 160 MG: 10 INJECTION, SOLUTION INTRAMUSCULAR; INTRAVENOUS at 08:08

## 2020-01-01 RX ADMIN — MIDAZOLAM HYDROCHLORIDE 1 MG: 1 INJECTION, SOLUTION INTRAMUSCULAR; INTRAVENOUS at 08:08

## 2020-01-01 RX ADMIN — BUMETANIDE 2 MG: 1 TABLET ORAL at 09:02

## 2020-01-01 RX ADMIN — SENNOSIDES AND DOCUSATE SODIUM 1 TABLET: 8.6; 5 TABLET ORAL at 09:08

## 2020-01-01 RX ADMIN — PHYTONADIONE 5 MG: 10 INJECTION, EMULSION INTRAMUSCULAR; INTRAVENOUS; SUBCUTANEOUS at 09:06

## 2020-01-01 RX ADMIN — ASPIRIN 81 MG CHEWABLE TABLET 81 MG: 81 TABLET CHEWABLE at 09:03

## 2020-01-01 RX ADMIN — HYDRALAZINE HYDROCHLORIDE 75 MG: 50 TABLET, FILM COATED ORAL at 03:06

## 2020-01-01 RX ADMIN — ATORVASTATIN CALCIUM 80 MG: 20 TABLET, FILM COATED ORAL at 09:03

## 2020-01-01 RX ADMIN — METOPROLOL TARTRATE 50 MG: 50 TABLET, FILM COATED ORAL at 11:02

## 2020-01-01 RX ADMIN — FERROUS SULFATE TAB EC 325 MG (65 MG FE EQUIVALENT) 325 MG: 325 (65 FE) TABLET DELAYED RESPONSE at 08:08

## 2020-01-01 RX ADMIN — INSULIN ASPART 2 UNITS: 100 INJECTION, SOLUTION INTRAVENOUS; SUBCUTANEOUS at 12:11

## 2020-01-01 RX ADMIN — DOBUTAMINE HYDROCHLORIDE 5 MCG/KG/MIN: 200 INJECTION INTRAVENOUS at 11:06

## 2020-01-01 RX ADMIN — CALCIUM CARBONATE (ANTACID) CHEW TAB 500 MG 500 MG: 500 CHEW TAB at 08:05

## 2020-01-01 RX ADMIN — TORSEMIDE 100 MG: 20 TABLET ORAL at 08:08

## 2020-01-01 RX ADMIN — POTASSIUM CHLORIDE 40 MEQ: 1500 TABLET, EXTENDED RELEASE ORAL at 08:08

## 2020-01-01 RX ADMIN — FUROSEMIDE 160 MG: 10 INJECTION, SOLUTION INTRAMUSCULAR; INTRAVENOUS at 05:02

## 2020-01-01 RX ADMIN — FUROSEMIDE 10 MG/HR: 10 INJECTION, SOLUTION INTRAMUSCULAR; INTRAVENOUS at 09:02

## 2020-01-01 RX ADMIN — HYDRALAZINE HYDROCHLORIDE 50 MG: 50 TABLET, FILM COATED ORAL at 02:06

## 2020-01-01 RX ADMIN — POTASSIUM CHLORIDE 20 MEQ: 1500 TABLET, EXTENDED RELEASE ORAL at 09:11

## 2020-01-01 RX ADMIN — ISOSORBIDE DINITRATE 20 MG: 20 TABLET ORAL at 04:06

## 2020-01-01 RX ADMIN — FUROSEMIDE 20 MG/HR: 10 INJECTION, SOLUTION INTRAMUSCULAR; INTRAVENOUS at 05:06

## 2020-01-01 RX ADMIN — LEVOTHYROXINE SODIUM 175 MCG: 125 TABLET ORAL at 06:03

## 2020-01-01 RX ADMIN — POTASSIUM CHLORIDE 40 MEQ: 1500 TABLET, EXTENDED RELEASE ORAL at 02:08

## 2020-01-01 RX ADMIN — INSULIN ASPART 4 UNITS: 100 INJECTION, SOLUTION INTRAVENOUS; SUBCUTANEOUS at 07:06

## 2020-01-01 RX ADMIN — ACETAMINOPHEN 650 MG: 325 TABLET ORAL at 09:11

## 2020-01-01 RX ADMIN — INSULIN DETEMIR 8 UNITS: 100 INJECTION, SOLUTION SUBCUTANEOUS at 10:11

## 2020-01-01 RX ADMIN — FUROSEMIDE 160 MG: 10 INJECTION, SOLUTION INTRAMUSCULAR; INTRAVENOUS at 10:02

## 2020-01-01 RX ADMIN — METOLAZONE 5 MG: 2.5 TABLET ORAL at 09:11

## 2020-01-01 RX ADMIN — FUROSEMIDE 120 MG: 10 INJECTION, SOLUTION INTRAVENOUS at 04:05

## 2020-01-01 RX ADMIN — DOBUTAMINE HYDROCHLORIDE 5 MCG/KG/MIN: 400 INJECTION INTRAVENOUS at 10:11

## 2020-01-01 RX ADMIN — INSULIN ASPART 2 UNITS: 100 INJECTION, SOLUTION INTRAVENOUS; SUBCUTANEOUS at 05:06

## 2020-01-01 RX ADMIN — FUROSEMIDE 80 MG: 40 TABLET ORAL at 10:07

## 2020-01-01 RX ADMIN — INSULIN ASPART 3 UNITS: 100 INJECTION, SOLUTION INTRAVENOUS; SUBCUTANEOUS at 07:11

## 2020-01-01 RX ADMIN — LEVOTHYROXINE SODIUM 175 MCG: 100 TABLET ORAL at 06:06

## 2020-01-01 RX ADMIN — INSULIN DETEMIR 10 UNITS: 100 INJECTION, SOLUTION SUBCUTANEOUS at 09:02

## 2020-01-01 RX ADMIN — INSULIN ASPART 1 UNITS: 100 INJECTION, SOLUTION INTRAVENOUS; SUBCUTANEOUS at 08:05

## 2020-01-01 RX ADMIN — POTASSIUM CHLORIDE 30 MEQ: 750 CAPSULE, EXTENDED RELEASE ORAL at 08:06

## 2020-01-01 RX ADMIN — INSULIN DETEMIR 10 UNITS: 100 INJECTION, SOLUTION SUBCUTANEOUS at 10:02

## 2020-01-01 RX ADMIN — Medication 10 ML: at 05:08

## 2020-01-01 RX ADMIN — Medication 24 G: at 08:05

## 2020-01-01 RX ADMIN — FUROSEMIDE 80 MG: 10 INJECTION, SOLUTION INTRAMUSCULAR; INTRAVENOUS at 02:06

## 2020-01-01 RX ADMIN — ENOXAPARIN SODIUM 30 MG: 100 INJECTION SUBCUTANEOUS at 05:03

## 2020-01-01 RX ADMIN — INSULIN ASPART 4 UNITS: 100 INJECTION, SOLUTION INTRAVENOUS; SUBCUTANEOUS at 05:08

## 2020-01-01 RX ADMIN — HYDRALAZINE HYDROCHLORIDE 75 MG: 25 TABLET, FILM COATED ORAL at 04:11

## 2020-01-01 RX ADMIN — METOPROLOL TARTRATE 50 MG: 50 TABLET, FILM COATED ORAL at 08:02

## 2020-01-01 RX ADMIN — INSULIN ASPART 1 UNITS: 100 INJECTION, SOLUTION INTRAVENOUS; SUBCUTANEOUS at 08:11

## 2020-01-01 RX ADMIN — HYDRALAZINE HYDROCHLORIDE 75 MG: 25 TABLET, FILM COATED ORAL at 10:11

## 2020-01-01 RX ADMIN — INSULIN DETEMIR 10 UNITS: 100 INJECTION, SOLUTION SUBCUTANEOUS at 09:06

## 2020-01-01 RX ADMIN — INSULIN ASPART 3 UNITS: 100 INJECTION, SOLUTION INTRAVENOUS; SUBCUTANEOUS at 11:06

## 2020-01-01 RX ADMIN — FUROSEMIDE 120 MG: 10 INJECTION, SOLUTION INTRAVENOUS at 06:05

## 2020-01-01 RX ADMIN — SENNOSIDES AND DOCUSATE SODIUM 1 TABLET: 8.6; 5 TABLET ORAL at 08:08

## 2020-01-01 RX ADMIN — FUROSEMIDE 80 MG: 10 INJECTION, SOLUTION INTRAMUSCULAR; INTRAVENOUS at 08:11

## 2020-01-01 RX ADMIN — FUROSEMIDE 80 MG: 10 INJECTION, SOLUTION INTRAMUSCULAR; INTRAVENOUS at 11:06

## 2020-01-01 RX ADMIN — FUROSEMIDE 80 MG: 10 INJECTION, SOLUTION INTRAMUSCULAR; INTRAVENOUS at 11:11

## 2020-01-01 RX ADMIN — ACETAMINOPHEN 650 MG: 325 TABLET ORAL at 04:05

## 2020-01-01 RX ADMIN — POTASSIUM CHLORIDE 20 MEQ: 1500 TABLET, FILM COATED, EXTENDED RELEASE ORAL at 10:03

## 2020-01-01 RX ADMIN — POTASSIUM CHLORIDE 20 MEQ: 1500 TABLET, EXTENDED RELEASE ORAL at 10:11

## 2020-01-01 RX ADMIN — DOBUTAMINE HYDROCHLORIDE 5 MCG/KG/MIN: 400 INJECTION INTRAVENOUS at 07:11

## 2020-01-01 RX ADMIN — ACETAMINOPHEN 650 MG: 325 TABLET ORAL at 04:11

## 2020-01-01 RX ADMIN — DOBUTAMINE IN DEXTROSE 5 MCG/KG/MIN: 200 INJECTION, SOLUTION INTRAVENOUS at 04:06

## 2020-01-01 RX ADMIN — METOPROLOL SUCCINATE 50 MG: 50 TABLET, EXTENDED RELEASE ORAL at 09:03

## 2020-01-01 RX ADMIN — ACETAMINOPHEN 650 MG: 325 TABLET ORAL at 11:11

## 2020-01-01 RX ADMIN — MIDAZOLAM HYDROCHLORIDE 0.5 MG: 1 INJECTION, SOLUTION INTRAMUSCULAR; INTRAVENOUS at 08:06

## 2020-01-01 RX ADMIN — HYDRALAZINE HYDROCHLORIDE 50 MG: 50 TABLET, FILM COATED ORAL at 03:06

## 2020-01-01 RX ADMIN — LEVOTHYROXINE SODIUM 175 MCG: 150 TABLET ORAL at 04:05

## 2020-01-01 RX ADMIN — PHYTONADIONE 5 MG: 10 INJECTION, EMULSION INTRAMUSCULAR; INTRAVENOUS; SUBCUTANEOUS at 08:06

## 2020-02-25 PROBLEM — I50.9 DECOMPENSATED HEART FAILURE: Status: ACTIVE | Noted: 2020-01-01

## 2020-02-25 PROBLEM — N18.4 CHRONIC KIDNEY DISEASE, STAGE IV (SEVERE): Status: ACTIVE | Noted: 2020-01-01

## 2020-02-25 PROBLEM — I38: Status: ACTIVE | Noted: 2020-01-01

## 2020-02-25 PROBLEM — D64.9 ANEMIA: Status: ACTIVE | Noted: 2020-01-01

## 2020-02-25 PROBLEM — I50.41: Status: ACTIVE | Noted: 2020-01-01

## 2020-02-25 PROBLEM — D69.6 THROMBOCYTOPENIA, UNSPECIFIED: Status: ACTIVE | Noted: 2020-01-01

## 2020-02-25 NOTE — ED TRIAGE NOTES
"Patient states cough onset this morning with white sputum, states h/o CHF and feels like "full of fluid" States 11 # weight gain in 1 month. Unable to lie down at all. Denies fevers. Took Lasix 80 mg today, Denies CP  "

## 2020-02-25 NOTE — H&P
"[facility]  Emergency Medicine  History & Physical      Patient Name: Ashish Mckeon  MRN: [id number]  Admission Date: 2/25/2020  Attending Physician: Jamila Soria MD   Primary Care Provider: [facility] [place] [facility] [place] [facility] [place] Gonz[place]lez Hooper MD     Patient information was obtained from patient, past medical records and ER records.     Subjective:     Principal Problem:Acute combined systolic and diastolic heart failure due to valvular disease    Chief Complaint:   Chief Complaint   Patient presents with    Cough     c/o cough and SOB x1 week, states feels like "fluid backed up"        HPI: Ashish Mckeon is a 54 y.o. male with a PMH of HTN, HLD, DM, hypothyroidism, CHF (EF 25%, Grade III DD) who presented to the ED on 2/25/2020 diagnosed with  Cough (c/o cough and SOB x1 week, states feels like "fluid backed up")  AAOX 3 c/o dyspnea with minimal exertion, leg swelling  and orthopnea,   reports increased weight gain- currently 170 lb, baseline 158lb over last month .  Patient states he  was  compliant with low salt diet and  Lasix 80 mg p.o. at home. denies chest pain or palpitations. has productive cough with white sputum and intermittent wheezing x 1 day. denies fever chills or myalgia/ sick contacts .      while in ED  has Elevated BNP 2638  and CXR with lungs are clear with normal appearance of pulmonary vasculature. No pleural effusion. No evident pneumothorax..Suspecting diuretic resistance with CKD - 4.  afebrile and saturating well on RA. Given one time dose IV 60 mg lasix in ED and made 250ml urine. .  Troponin  .049. started on   lasix  drip at 10mg/hr     Baseline ADL independent   Past Medical History:   Diagnosis Date    CHF (congestive heart failure)     Diabetes mellitus type II     Essential hypertension, benign     Hyperlipidemia     Hypothyroidism     Pain and swelling of left wrist 8/5/2019    Ashish Mckeon is a 54 y.o. male with " "PMH of  presenting with CHF, IDDM, Hypothyroidism, HTN, HLD presenting with worsening L wrist pain for 1 day. Orthopedic surgery was consulted for septic joint r/o. Pt has been afebrile and has no elevated WBC. ESR 36, normal CRP. Xray shows no signs of trauma and pt has no hx of gout or septic arthritis. Due to atraumatic wrist pain and swelling w/out identifia    Undiagnosed cardiac murmurs        Past Surgical History:   Procedure Laterality Date    COLON SURGERY      "lower intestines removed"    ORTHOPEDIC SURGERY Left     wrist    THYROID SURGERY         Review of patient's allergies indicates:   Allergen Reactions    Lisinopril Other (See Comments)     acei cough         No current facility-administered medications on file prior to encounter.      Current Outpatient Medications on File Prior to Encounter   Medication Sig    aspirin 81 MG Chew Take 1 tablet (81 mg total) by mouth once daily.    furosemide (LASIX) 80 MG tablet Take 1 tablet (80 mg total) by mouth 2 (two) times daily. Take an extra 80mg daily as needed for weight gain of 3 lbs in 24 hrs  or 5 lbs/wk (Patient taking differently: Take 80 mg by mouth once daily. Take an extra 80mg daily as needed for weight gain of 3 lbs in 24 hrs  or 5 lbs/wk)    glyBURIDE (DIABETA) 5 MG tablet Take 10 mg by mouth 2 (two) times daily with meals.    levothyroxine (SYNTHROID) 175 MCG tablet Take 1 tablet (175 mcg total) by mouth before breakfast.    losartan (COZAAR) 25 MG tablet Take 25 mg by mouth once daily.    metoprolol succinate (TOPROL-XL) 50 MG 24 hr tablet Take 1 tablet (50 mg total) by mouth once daily. (Patient taking differently: Take 50 mg by mouth 3 (three) times daily. )    acetaminophen (TYLENOL) 325 MG tablet Take 2 tablets (650 mg total) by mouth every 6 to 8 hours as needed. (Patient taking differently: Take 325 mg by mouth daily as needed for Pain. )    HYDROcodone-acetaminophen (NORCO) 5-325 mg per tablet Take 1 tablet by mouth " "every 6 (six) hours as needed for Pain.    insulin detemir U-100 (LEVEMIR) 100 unit/mL injection Inject 15 Units into the skin every evening.    pantoprazole (PROTONIX) 40 MG tablet Take 1 tablet (40 mg total) by mouth daily as needed (heartburn).     Family History     Problem Relation (Age of Onset)    Cancer Father        Tobacco Use    Smoking status: Never Smoker    Smokeless tobacco: Never Used    Tobacco comment: cigars "every other week or so"   Substance and Sexual Activity    Alcohol use: Not Currently     Alcohol/week: 3.0 standard drinks     Types: 3 Cans of beer per week    Drug use: Yes     Types: Marijuana    Sexual activity: Yes     Partners: Female     Review of Systems   Constitutional: Positive for activity change, fatigue and unexpected weight change (wt gain of 12lb in 1 month). Negative for appetite change and chills.   HENT: Positive for congestion. Negative for drooling, ear discharge, ear pain, facial swelling, hearing loss and sore throat.    Eyes: Negative for pain, discharge, redness and itching.   Respiratory: Positive for cough, shortness of breath and wheezing. Negative for chest tightness.    Cardiovascular: Positive for leg swelling. Negative for chest pain and palpitations.        Orthopnea   Gastrointestinal: Negative for abdominal distention, blood in stool, constipation, nausea and vomiting.   Endocrine: Negative for cold intolerance.   Genitourinary: Negative for dysuria, flank pain, frequency, hematuria and urgency.   Musculoskeletal: Negative for arthralgias, back pain, gait problem, joint swelling, neck pain and neck stiffness.   Skin: Negative for color change, pallor and rash.   Allergic/Immunologic: Negative for environmental allergies.   Neurological: Negative for dizziness, seizures, facial asymmetry, speech difficulty, weakness, light-headedness and headaches.   Hematological: Negative for adenopathy.   Psychiatric/Behavioral: Negative for agitation, confusion, " dysphoric mood and suicidal ideas.     Objective:     Vital Signs (Most Recent):  Temp: 97.6 °F (36.4 °C) (02/25/20 0939)  Pulse: 93 (02/25/20 1132)  Resp: 20 (02/25/20 1132)  BP: 121/83 (02/25/20 1132)  SpO2: 99 % (02/25/20 1132) Vital Signs (24h Range):  Temp:  [97.6 °F (36.4 °C)] 97.6 °F (36.4 °C)  Pulse:  [85-93] 93  Resp:  [19-22] 20  SpO2:  [93 %-99 %] 99 %  BP: (117-121)/(77-84) 121/83     Weight: 78.8 kg (173 lb 11.6 oz)  Body mass index is 24.23 kg/m².    Physical Exam   Constitutional: He is oriented to person, place, and time. He appears well-developed and well-nourished.   HENT:   Head: Normocephalic and atraumatic.   Mouth/Throat: Oropharynx is clear and moist. No oropharyngeal exudate.   Eyes: Pupils are equal, round, and reactive to light. Conjunctivae and EOM are normal. Right eye exhibits no discharge. Left eye exhibits no discharge. No scleral icterus.   Neck: Normal range of motion. Neck supple. JVD present. No tracheal deviation present. No thyromegaly present.   Cardiovascular: Normal rate, regular rhythm and normal heart sounds. Exam reveals no gallop and no friction rub.   No murmur heard.  Pulmonary/Chest: Effort normal. No stridor. No respiratory distress. He has no wheezes. He has rales. He exhibits no tenderness.   Abdominal: Soft. Bowel sounds are normal. He exhibits no distension and no mass. There is no tenderness. There is no rebound and no guarding.   midline abdominal scar    Musculoskeletal: He exhibits edema (2+ pitting bilateral LE ).   Lymphadenopathy:     He has no cervical adenopathy.   Neurological: He is alert and oriented to person, place, and time. No cranial nerve deficit.   able to move upper and lower extremities without limitation   Skin: Skin is warm and dry.   Psychiatric: He has a normal mood and affect.   Nursing note and vitals reviewed.        CRANIAL NERVES     CN III, IV, VI   Pupils are equal, round, and reactive to light.  Extraocular motions are normal.        Significant Labs:   A1C: No results for input(s): HGBA1C in the last 4320 hours.  ABGs: No results for input(s): PH, PCO2, HCO3, POCSATURATED, BE, TOTALHB, COHB, METHB, O2HB, POCFIO2 in the last 48 hours.  Bilirubin:   Recent Labs   Lab 02/25/20  1026   BILITOT 1.6*     Blood Culture: No results for input(s): LABBLOO in the last 48 hours.  BMP:   Recent Labs   Lab 02/25/20  1026   *      K 4.5      CO2 20*   BUN 66*   CREATININE 3.2*   CALCIUM 8.3*     CBC:   Recent Labs   Lab 02/25/20  1026   WBC 4.90   HGB 11.7*   HCT 39.7*   *     CMP:   Recent Labs   Lab 02/25/20  1026      K 4.5      CO2 20*   *   BUN 66*   CREATININE 3.2*   CALCIUM 8.3*   PROT 7.5   ALBUMIN 3.6   BILITOT 1.6*   ALKPHOS 239*   AST 20   ALT 21   ANIONGAP 16   EGFRNONAA 20.7*     Cardiac Markers:   Recent Labs   Lab 02/25/20  1026   BNP 2,635*     Coagulation:   Recent Labs   Lab 02/25/20  1026   INR 1.5*     Lactic Acid: No results for input(s): LACTATE in the last 48 hours.  Lipase: No results for input(s): LIPASE in the last 48 hours.  Lipid Panel: No results for input(s): CHOL, HDL, LDLCALC, TRIG, CHOLHDL in the last 48 hours.  Magnesium: No results for input(s): MG in the last 48 hours.  Pathology Results  (Last 10 years)    None        POCT Glucose: No results for input(s): POCTGLUCOSE in the last 48 hours.  Prealbumin: No results for input(s): PREALBUMIN in the last 48 hours.  Respiratory Culture: No results for input(s): GSRESP, RESPIRATORYC in the last 48 hours.  Troponin:   Recent Labs   Lab 02/25/20  1026   TROPONINI 0.049*     TSH: No results for input(s): TSH in the last 4320 hours.  Urine Culture: No results for input(s): LABURIN in the last 48 hours.  Urine Studies: No results for input(s): COLORU, APPEARANCEUA, PHUR, SPECGRAV, PROTEINUA, GLUCUA, KETONESU, BILIRUBINUA, OCCULTUA, NITRITE, UROBILINOGEN, LEUKOCYTESUR, RBCUA, WBCUA, BACTERIA, SQUAMEPITHEL, HYALINECASTS in the last 48  hours.    Invalid input(s): KAYE  Recent Lab Results       02/25/20  1026        Albumin 3.6     Alkaline Phosphatase 239     ALT 21     Anion Gap 16     AST 20     Baso # 0.11     Basophil% 2.2     BILIRUBIN TOTAL 1.6  Comment:  For infants and newborns, interpretation of results should be based  on gestational age, weight and in agreement with clinical  observations.  Premature Infant recommended reference ranges:  Up to 24 hours.............<8.0 mg/dL  Up to 48 hours............<12.0 mg/dL  3-5 days..................<15.0 mg/dL  6-29 days.................<15.0 mg/dL       BNP 2,635  Comment:  Values of less than 100 pg/ml are consistent with non-CHF populations.     BUN, Bld 66     Calcium 8.3     Chloride 107     CO2 20     Creatinine 3.2     Differential Method Automated     eGFR if African American 23.9     eGFR if non  20.7  Comment:  Calculation used to obtain the estimated glomerular filtration  rate (eGFR) is the CKD-EPI equation.        Eos # 0.1     Eosinophil% 2.9     Glucose 155     Gran # (ANC) 3.0     Gran% 61.7     Hematocrit 39.7     Hemoglobin 11.7     Immature Grans (Abs) 0.02  Comment:  Mild elevation in immature granulocytes is non specific and   can be seen in a variety of conditions including stress response,   acute inflammation, trauma and pregnancy. Correlation with other   laboratory and clinical findings is essential.       Immature Granulocytes 0.4     INR 1.5  Comment:  Coumadin Therapy:  2.0 - 3.0 for INR for all indicators except mechanical heart valves  and antiphospholipid syndromes which should use 2.5 - 3.5.       Lymph # 0.8     Lymph% 16.3     MCH 23.1     MCHC 29.5     MCV 78     Mono # 0.8     Mono% 16.5     MPV SEE COMMENT  Comment:  Result not available.     nRBC 0     Platelets 138     Potassium 4.5     PROTEIN TOTAL 7.5     Protime 14.6     RBC 5.07     RDW 19.9     Sodium 143     Troponin I 0.049  Comment:  The reference interval for Troponin I  represents the 99th percentile   cutoff   for our facility and is consistent with 3rd generation assay   performance.       WBC 4.90         All pertinent labs within the past 24 hours have been reviewed.    Significant Imaging:   Imaging Results          X-Ray Chest AP Portable (Final result)  Result time 02/25/20 10:48:38    Final result by Adolfo Almanzar MD (02/25/20 10:48:38)             Impression:      Stable cardiomegaly.      Electronically signed by: Adolfo Almanzar MD  Date:    02/25/2020  Time:    10:48           Narrative:    EXAMINATION:  XR CHEST AP PORTABLE    CLINICAL HISTORY:  CHF;    TECHNIQUE:  Single frontal view of the chest was performed.    COMPARISON:  08/05/2019    FINDINGS:  The lungs are clear with normal appearance of pulmonary vasculature. No pleural effusion. No evident pneumothorax.    The cardiac silhouette is enlarged yet stable.  The hilar and mediastinal contours are unremarkable.    Bones are intact.                            EKG - Unusual P axis, possible ectopic atrial rhythm with occasional Premature ventricular complexes and Fusion  complexes  Low voltage QRS    Assessment/Plan:     Active Diagnoses:    Diagnosis Date Noted POA    PRINCIPAL PROBLEM:  Acute combined systolic and diastolic heart failure due to valvular disease [I50.41, I38] non ischemic cardiomyopathy (unknown etiology since teen age )with reduced EF of 25% presenting with weight gain . Coronary angiogram in July 2018 at showed non obstructive CAD   while in ED  has Elevated BNP 2638  and CXR with lungs are clear with normal appearance of pulmonary vasculature. No pleural effusion. No evident pneumothorax..Suspecting diuretic resistance with CKD - 4.  afebrile and saturating well on RA. Given one time dose IV 60 mg lasix in ED and made 250ml urine. .  Troponin  .049. trend troponins. started on   lasix  drip at 10mg/hr   Continue home metoprolol 50 mg daily. Holding ARB with diuresis   Strict I/O, daily  weights standing   Monitor kidney function and electrolytes    Fluid 1500 mL and Sodium restriction   follows up with [facility]    02/25/2020 Yes    Decompensated heart failure [I50.9]as above 02/25/2020 Yes    Anemia [D64.9] Hb 11.7 ,microcytosis MC 78. obtain iron studies 02/25/2020 Unknown    Thrombocytopenia, unspecified [D69.6]138. monitor . chronic since 2013 02/25/2020 Unknown    Chronic kidney disease, stage IV (severe) [N18.4]seems to be at baseline, Holding ARB with diuresis. Suspecting diuretic resistance with CKD - 4.  afebrile and saturating well on RA. Given one time dose IV 60 mg lasix in ED and made 250ml urine. .   started on   lasix  drip at 10mg/hr    02/25/2020 Unknown    Pseudogout of left wrist [M11.232]no active issues . follows up with orthopedics  08/05/2019 Yes    Hyperlipidemia [E78.5- continue lipitor  12/22/2018 Yes    Essential hypertension [I10]Blood pressure 117/77. controlled off medications  07/17/2018 Yes    Substance use disorder [F19.90]quit marijuana. denies smoking /alcohol abuse  07/17/2018 Yes    Hypothyroidism [E03.9]Continue home synthroid  Had hx of hyperthyroidism,and total thyroidectomy in teen age   Yes     Chronic    Diabetes mellitus, type II [E11.9]poorly controlled. A1c > 14 08/19. repeat A1c. diabetic diet SSI. endocrine evaluation   Yes     Chronic      Problems Resolved During this Admission:     VTE Risk Mitigation (From admission, onward)         Ordered     IP VTE HIGH RISK PATIENT  Once      02/25/20 1140     Place sequential compression device  Until discontinued      02/25/20 1140                  González Hooper MD  Department of Hospital Medicine   [facility]

## 2020-02-25 NOTE — ED PROVIDER NOTES
"Encounter Date: 2/25/2020       History     Chief Complaint   Patient presents with    Cough     c/o cough and SOB x1 week, states feels like "fluid backed up"     Mr. Mckeon is a 54-year-old male history of CHF, diabetes, hypertension, hyperlipidemia presenting to the ER with complaints of shortness of breath for the past 3 days.  Patient states symptoms have gotten worse, associated with productive cough with white sputum, exertional dyspnea and paroxysmal nocturnal dyspnea.  He also reports increased weight gain- currently 170 lb, baseline 159.  He reports bilateral leg swelling and increased abdominal distention.  Patient states he has been compliant with Lasix 80 mg p.o. at home, has not been urinating as well as he used to.  He denies fever, chills.  No chest pain.  No recent sick contacts.  Reports low-salt diet.        Review of patient's allergies indicates:   Allergen Reactions    Lisinopril Other (See Comments)     acei cough       Past Medical History:   Diagnosis Date    CHF (congestive heart failure)     Diabetes mellitus type II     Essential hypertension, benign     Hyperlipidemia     Hypothyroidism     Pain and swelling of left wrist 8/5/2019    Ashish Mckeon is a 54 y.o. male with PMH of  presenting with CHF, IDDM, Hypothyroidism, HTN, HLD presenting with worsening L wrist pain for 1 day. Orthopedic surgery was consulted for septic joint r/o. Pt has been afebrile and has no elevated WBC. ESR 36, normal CRP. Xray shows no signs of trauma and pt has no hx of gout or septic arthritis. Due to atraumatic wrist pain and swelling w/out identifia    Undiagnosed cardiac murmurs      Past Surgical History:   Procedure Laterality Date    COLON SURGERY      "lower intestines removed"    ORTHOPEDIC SURGERY Left     wrist    THYROID SURGERY       Family History   Problem Relation Age of Onset    Cancer Father      Social History     Tobacco Use    Smoking status: Never Smoker    Smokeless tobacco: " "Never Used    Tobacco comment: cigars "every other week or so"   Substance Use Topics    Alcohol use: Not Currently     Alcohol/week: 3.0 standard drinks     Types: 3 Cans of beer per week    Drug use: Yes     Types: Marijuana     Review of Systems   Constitutional: Negative for chills and diaphoresis.   HENT: Negative for congestion and sore throat.    Respiratory: Positive for cough and shortness of breath.    Cardiovascular: Positive for leg swelling. Negative for chest pain and palpitations.   Gastrointestinal: Positive for abdominal distention. Negative for abdominal pain, diarrhea, nausea and vomiting.   Genitourinary: Positive for decreased urine volume. Negative for dysuria.   Musculoskeletal: Positive for arthralgias.   Skin: Negative for pallor.   Neurological: Negative for dizziness, weakness and headaches.   Psychiatric/Behavioral: Negative for confusion.       Physical Exam     Initial Vitals [02/25/20 0939]   BP Pulse Resp Temp SpO2   117/77 89 19 97.6 °F (36.4 °C) 98 %      MAP       --         Physical Exam    Nursing note and vitals reviewed.  Constitutional: He appears well-developed and well-nourished. No distress.   HENT:   Mouth/Throat: Oropharynx is clear and moist.   Eyes: Conjunctivae and EOM are normal. Pupils are equal, round, and reactive to light.   Neck: Neck supple.   Cardiovascular: Normal rate, regular rhythm and intact distal pulses.   Pulmonary/Chest: No stridor. He has rhonchi. He has rales.   Abdominal: Soft. Bowel sounds are normal. He exhibits no distension. There is no rebound.   Musculoskeletal: Normal range of motion. He exhibits edema (1+ to the mid shin bilaterally).   Neurological: He is alert and oriented to person, place, and time.   Skin: Skin is warm. Capillary refill takes less than 2 seconds. No rash noted.   Psychiatric: He has a normal mood and affect.         ED Course   Procedures  Labs Reviewed   CBC W/ AUTO DIFFERENTIAL - Abnormal; Notable for the following " components:       Result Value    Hemoglobin 11.7 (*)     Hematocrit 39.7 (*)     Mean Corpuscular Volume 78 (*)     Mean Corpuscular Hemoglobin 23.1 (*)     Mean Corpuscular Hemoglobin Conc 29.5 (*)     RDW 19.9 (*)     Platelets 138 (*)     Lymph # 0.8 (*)     Lymph% 16.3 (*)     Mono% 16.5 (*)     Basophil% 2.2 (*)     All other components within normal limits   COMPREHENSIVE METABOLIC PANEL - Abnormal; Notable for the following components:    CO2 20 (*)     Glucose 155 (*)     BUN, Bld 66 (*)     Creatinine 3.2 (*)     Calcium 8.3 (*)     Total Bilirubin 1.6 (*)     Alkaline Phosphatase 239 (*)     eGFR if  23.9 (*)     eGFR if non  20.7 (*)     All other components within normal limits   TROPONIN I - Abnormal; Notable for the following components:    Troponin I 0.049 (*)     All other components within normal limits   B-TYPE NATRIURETIC PEPTIDE - Abnormal; Notable for the following components:    BNP 2,635 (*)     All other components within normal limits   PROTIME-INR - Abnormal; Notable for the following components:    Prothrombin Time 14.6 (*)     INR 1.5 (*)     All other components within normal limits   FERRITIN   IRON AND TIBC   TROPONIN I          Imaging Results          X-Ray Chest AP Portable (Final result)  Result time 02/25/20 10:48:38    Final result by Adolfo Almanzar MD (02/25/20 10:48:38)                 Impression:      Stable cardiomegaly.      Electronically signed by: Adolfo Almanzar MD  Date:    02/25/2020  Time:    10:48             Narrative:    EXAMINATION:  XR CHEST AP PORTABLE    CLINICAL HISTORY:  CHF;    TECHNIQUE:  Single frontal view of the chest was performed.    COMPARISON:  08/05/2019    FINDINGS:  The lungs are clear with normal appearance of pulmonary vasculature. No pleural effusion. No evident pneumothorax.    The cardiac silhouette is enlarged yet stable.  The hilar and mediastinal contours are unremarkable.    Bones are intact.                                  Medical Decision Making:   History:   Old Medical Records: I decided to obtain old medical records.  Initial Assessment:   Emergent evaluation a 54-year-old male history of nonischemic cardiomyopathy, EF of 20% here today with shortness of breath, waking, lower extremity edema.  Differential Diagnosis:   Hypervolemia, acute decompensated heart failure, pneumonia, bronchitis, electrolyte derangement, pleural effusion, a KI  Independently Interpreted Test(s):   I have ordered and independently interpreted X-rays - see prior notes.  I have ordered and independently interpreted EKG Reading(s) - see prior notes  Clinical Tests:   Lab Tests: Ordered and Reviewed  Radiological Study: Ordered and Reviewed  Medical Tests: Reviewed and Ordered  ED Management:  - labs  - EKG  - chest x-ray  - Lasix 160 mg IV    Labs reviewed significant for elevated creatinine at 3.2 up from baseline of 2.0.  BNP is elevated, troponin elevated at 0.049 ( less than baseline).  Bili up to 1.6    Chest x-ray shows cardiomegaly.      Exam and lab findings concerning for acute decompensated heart failure.  Will admit for IV diuresis    Other:   I have discussed this case with another health care provider.       <> Summary of the Discussion: Hospital medicine                                 Clinical Impression:       ICD-10-CM ICD-9-CM   1. Decompensated heart failure I50.9 428.0   2. Shortness of breath R06.02 786.05                                Jamila Soria MD  02/26/20 1049

## 2020-02-26 NOTE — CONSULTS
Nutrition-Related Diabetes Education      Time Spent: 10 minutes    Learners: patient     Current HbA1c: 7.5    Is patient aware of their A1c and their goal A1c?       _<7.0_ yes    Home diabetes medication(s): insulin    Nutrition Education with handouts: Consistent carbohydrate nutrition therapy     Comments:  Pt reported good PO intake, states he was following low Na diet and trying to follow DM diet at home. Pt aware of DM diet restrictions and carbohydrate counting just states he has trouble following diet with portion sizes. Pt aware of A1c value and goal. Discussed DM diet and provided handouts to bedside on consistent carbohydrate diet. Pt verbalized understanding.   Pt with good PO intake, wt loss due to fluid retention returning to UBW ~150 lbs.  Pt requesting to try boost glucose to aid in intake. No indications of malnutrition at this time.     Barriers to Learning: None    Follow up: 3/5/20    Please consult as needed.  Thank you!  Kiesha Langston RD, LDN

## 2020-02-26 NOTE — PROGRESS NOTES
"Progress Note  Hospital Medicine    Admit Date: 2/25/2020  Length of Stay:  LOS: 1 day     SUBJECTIVE:         Follow-up For:  Acute combined systolic and diastolic heart failure due to valvular disease    HPI/Interval history (See H&P for complete P,F,SHx) :     Ashish Mckeon is a 54 y.o. male with a PMH of HTN, HLD, DM, hypothyroidism, CHF (EF 25%, Grade III DD) who presented to the ED on 2/25/2020 diagnosed with  Cough (c/o cough and SOB x1 week, states feels like "fluid backed up")  AAOX 3 c/o dyspnea with minimal exertion, leg swelling  and orthopnea,   reports increased weight gain- currently 170 lb, baseline 158lb over last month .  Patient states he  was  compliant with low salt diet and  Lasix 80 mg p.o. at home. denies chest pain or palpitations. has productive cough with white sputum and intermittent wheezing x 1 day. denies fever chills or myalgia/ sick contacts .       while in ED  has Elevated BNP 2638  and CXR with lungs are clear with normal appearance of pulmonary vasculature. No pleural effusion. No evident pneumothorax..Suspecting diuretic resistance with CKD - 4.  afebrile and saturating well on RA. Given one time dose IV 60 mg lasix in ED and made 250ml urine. .  Troponin  .049. started on   lasix  drip at 10mg/hr      Baseline ADL independent     2/26  negative balance of 2.2L. reports improved shortenss of breath           Review of Systems:   Pain scale: Constitutional: Positive for activity change, fatigue and unexpected weight change (wt gain of 12lb in 1 month). Negative for appetite change and chills.   HENT: Positive for congestion. Negative for drooling, ear discharge, ear pain, facial swelling, hearing loss and sore throat.    Eyes: Negative for pain, discharge, redness and itching.   Respiratory: Positive for cough, shortness of breath and wheezing. Negative for chest tightness.    Cardiovascular: Positive for leg swelling. Negative for chest pain and palpitations.        Orthopnea "   Gastrointestinal: Negative for abdominal distention, blood in stool, constipation, nausea and vomiting.   Endocrine: Negative for cold intolerance.   Genitourinary: Negative for dysuria, flank pain, frequency, hematuria and urgency.   Musculoskeletal: Negative for arthralgias, back pain, gait problem, joint swelling, neck pain and neck stiffness.   Skin: Negative for color change, pallor and rash.   Allergic/Immunologic: Negative for environmental allergies.   Neurological: Negative for dizziness, seizures, facial asymmetry, speech difficulty, weakness, light-headedness and headaches.   Hematological: Negative for adenopathy.   Psychiatric/Behavioral: Negative for agitation, confusion, dysphoric mood and suicidal ideas.        OBJECTIVE:     Vital Signs Range (Last 24H):  Temp:  [97.6 °F (36.4 °C)-98.5 °F (36.9 °C)]   Pulse:  []   Resp:  [17-22]   BP: (110-121)/(77-87)   SpO2:  [93 %-99 %]     Physical Exam:  Physical Exam   Constitutional: He is oriented to person, place, and time. He appears well-developed and well-nourished.   HENT:   Head: Normocephalic and atraumatic.   Mouth/Throat: Oropharynx is clear and moist. No oropharyngeal exudate.   Eyes: Pupils are equal, round, and reactive to light. Conjunctivae and EOM are normal. Right eye exhibits no discharge. Left eye exhibits no discharge. No scleral icterus.   Neck: Normal range of motion. Neck supple. JVD present. No tracheal deviation present. No thyromegaly present.   Cardiovascular: Normal rate, regular rhythm and normal heart sounds. Exam reveals no gallop and no friction rub.   No murmur heard.  Pulmonary/Chest: Effort normal. No stridor. No respiratory distress. He has no wheezes. He has rales. He exhibits no tenderness.   Abdominal: Soft. Bowel sounds are normal. He exhibits no distension and no mass. There is no tenderness. There is no rebound and no guarding.   midline abdominal scar    Musculoskeletal: He exhibits edema (2+ pitting  bilateral LE ).   Lymphadenopathy:     He has no cervical adenopathy.   Neurological: He is alert and oriented to person, place, and time. No cranial nerve deficit.   able to move upper and lower extremities without limitation   Skin: Skin is warm and dry.   Psychiatric: He has a normal mood and affect.   Nursing note and vitals reviewed.    Medications:  Medication list was reviewed and changes noted under Assessment/Plan.      Current Facility-Administered Medications:     acetaminophen tablet 650 mg, 650 mg, Oral, Q6H PRN, González Hooper MD    aspirin chewable tablet 81 mg, 81 mg, Oral, Daily, González Hooper MD, 81 mg at 02/25/20 1326    atorvastatin tablet 80 mg, 80 mg, Oral, QHS, González Hooper MD, 80 mg at 02/25/20 2216    dextrose 10% (D10W) Bolus, 12.5 g, Intravenous, PRN, González Hooper MD    dextrose 10% (D10W) Bolus, 25 g, Intravenous, PRN, González Hooper MD    furosemide (LASIX) 160 mg in sodium chloride 0.9% IVPB, 160 mg, Intravenous, Continuous, Jamila Soria MD, Stopped at 02/25/20 1300    furosemide (LASIX) 2 mg/mL in sodium chloride 0.9% 100 mL continuous infusion (conc: 2 mg/mL), 10 mg/hr, Intravenous, Continuous, González Hooper MD, Last Rate: 5 mL/hr at 02/25/20 1405, 10 mg/hr at 02/25/20 1405    glucagon (human recombinant) injection 1 mg, 1 mg, Intramuscular, PRN, González Hooper MD    glucose chewable tablet 16 g, 16 g, Oral, PRN, González Hooper MD    glucose chewable tablet 24 g, 24 g, Oral, PRN, González Hooper MD    insulin aspart U-100 pen 0-5 Units, 0-5 Units, Subcutaneous, QID (AC + HS) PRN, González Hooper MD    insulin detemir U-100 pen 10 Units, 10 Units, Subcutaneous, QHS, González Hooper MD, 10 Units at 02/25/20 2224    levothyroxine tablet 175 mcg, 175 mcg, Oral, Before breakfast, González Hooper MD    sodium chloride 0.9% flush 10 mL, 10 mL, Intravenous, PRN, González Hooper MD    acetaminophen, Dextrose 10% Bolus,  "Dextrose 10% Bolus, glucagon (human recombinant), glucose, glucose, insulin aspart U-100, sodium chloride 0.9%    Laboratory/Diagnostic Data:  Reviewed and noted in plan where applicable- Please see chart for full lab data.    Recent Labs   Lab 02/25/20 1026 02/26/20  0352   WBC 4.90 5.49   HGB 11.7* 11.4*   HCT 39.7* 36.8*   * 126*       Recent Labs   Lab 02/25/20 1026 02/25/20 1644 02/25/20 2021    141 142   K 4.5 4.6 4.4    107 107   CO2 20* 21* 22*   BUN 66* 66* 65*   CREATININE 3.2* 3.0* 3.0*   * 109 159*   CALCIUM 8.3* 8.7 8.7   PHOS  --  3.8 3.6       Recent Labs   Lab 02/25/20 1026 02/25/20 1644 02/25/20 2021   ALKPHOS 239*  --   --    ALT 21  --   --    AST 20  --   --    ALBUMIN 3.6 3.5 3.3*   PROT 7.5  --   --    BILITOT 1.6*  --   --    INR 1.5*  --   --         Microbiology labs for the last week  Microbiology Results (last 7 days)     ** No results found for the last 168 hours. **           Imaging Results          X-Ray Chest AP Portable (Final result)  Result time 02/25/20 10:48:38    Final result by Adolfo Almanzar MD (02/25/20 10:48:38)             Impression:      Stable cardiomegaly.      Electronically signed by: Adolfo Almanzar MD  Date:    02/25/2020  Time:    10:48           Narrative:    EXAMINATION:  XR CHEST AP PORTABLE    CLINICAL HISTORY:  CHF;    TECHNIQUE:  Single frontal view of the chest was performed.    COMPARISON:  08/05/2019    FINDINGS:  The lungs are clear with normal appearance of pulmonary vasculature. No pleural effusion. No evident pneumothorax.    The cardiac silhouette is enlarged yet stable.  The hilar and mediastinal contours are unremarkable.    Bones are intact.                              Estimated body mass index is 24.23 kg/m² as calculated from the following:    Height as of this encounter: 5' 11" (1.803 m).    Weight as of this encounter: 78.8 kg (173 lb 11.6 oz).    I & O (Last 24H):    Intake/Output Summary (Last 24 hours) " at 2/26/2020 0515  Last data filed at 2/26/2020 0100  Gross per 24 hour   Intake no documentation   Output 1600 ml   Net -1600 ml       Body mass index is 24.23 kg/m².    Estimated Creatinine Clearance: 29.6 mL/min (A) (based on SCr of 3 mg/dL (H)).    ASSESSMENT/PLAN:     Active Problems:            Active Diagnoses:     Diagnosis Date Noted POA    PRINCIPAL PROBLEM:  Acute combined systolic and diastolic heart failure due to valvular disease [I50.41, I38] non ischemic cardiomyopathy (unknown etiology since teen age )with reduced EF of 25% presenting with weight gain . Coronary angiogram in July 2018 at showed non obstructive CAD   while in ED  has Elevated BNP 2638  and CXR with lungs are clear with normal appearance of pulmonary vasculature. No pleural effusion. No evident pneumothorax..Suspecting diuretic resistance with CKD - 4.  afebrile and saturating well on RA. Given one time dose IV 60 mg lasix in ED and made 250ml urine. .  Troponin  .049.-->0.03 trend troponins. started on   lasix  drip at 10mg/hr   Continue home metoprolol 50 mg daily. Holding ARB with diuresis   Strict I/O, daily weights standing   Monitor kidney function and electrolytes    Fluid 1500 mL and Sodium restriction   follows up with [facility]     2/26  negative balance of 2.2L.reports improved shortenss of breath         02/25/2020 Yes    Decompensated heart failure [I50.9]as above 02/25/2020 Yes    Anemia [D64.9] Hb 11.7 ,microcytosis MC 78. obtain iron studies 02/25/2020 Unknown    Thrombocytopenia, unspecified [D69.6]138. monitor . chronic since 2013 02/25/2020 Unknown    Chronic kidney disease, stage IV (severe) [N18.4]seems to be at baseline, Holding ARB with diuresis. Suspecting diuretic resistance with CKD - 4.  afebrile and saturating well on RA. Given one time dose IV 60 mg lasix in ED and made 250ml urine. .   started on   lasix  drip at 10mg/hr     02/25/2020 Unknown    Pseudogout of left wrist [M11.232]no active  issues . follows up with orthopedics  08/05/2019 Yes    Hyperlipidemia [E78.5- continue lipitor  12/22/2018 Yes    Essential hypertension [I10]Blood pressure 117/77. controlled off medications  07/17/2018 Yes    Substance use disorder [F19.90]quit marijuana. denies smoking /alcohol abuse  07/17/2018 Yes    Hypothyroidism [E03.9]Continue home synthroid  Had hx of hyperthyroidism,and total thyroidectomy in teen age   2/26  TSH at 10.8 and free T4 WNL continue diuresis. repeat TSH as outpatient after euvolemia. discussed with endocrine   Yes       Chronic    Diabetes mellitus, type II [E11.9]poorly controlled. A1c > 14 08/19. repeat A1c at 7.5  diabetic diet SSI.     Yes       Chronic     Disposition- Home    DVT prophylaxis addressed with: Raquel Hooper MD  Attending Staff Physician  [facility]  pager- [phone] [place] - [phone]

## 2020-02-26 NOTE — PLAN OF CARE
CM met with patient in room for [facility] Planning Assessment.  Patient was able  to answer questions.  Per patient he  lives with significant other Laura Beaver [phone] in an apartment with no step(s) to enter.   Per patient he was independent with ADLS and ambulation. Patient used no DME at home.  Patient will have assistance from Laura Beaver upon discharge.   All questions addressed.  CM will follow for needs. Patient stated his PCP was Dr. Hinojosa Post.       02/26/20 3202   Discharge Assessment   Assessment Type Discharge Planning Assessment   Confirmed/corrected address and phone number on facesheet? Yes   Assessment information obtained from? Patient   Expected Length of Stay (days) 3   Prior to hospitilization cognitive status: Alert/Oriented   Prior to hospitalization functional status: Independent   Current cognitive status: Alert/Oriented   Current Functional Status: Needs Assistance   Lives With significant other  (Laura Beaver [phone])   Able to Return to Prior Arrangements yes   Is patient able to care for self after discharge? Yes   Patient's perception of discharge disposition home or selfcare   Readmission Within the Last 30 Days no previous admission in last 30 days   Patient currently being followed by outpatient case management? No   Patient currently receives any other outside agency services? No   Equipment Currently Used at Home none   Do you have any problems affording any of your prescribed medications? No   Is the patient taking medications as prescribed? yes   Does the patient have transportation home? Yes   Transportation Anticipated family or friend will provide   Does the patient receive services at the [facility]? No   Discharge Plan A Home   Discharge Plan B Home with family   DME Needed Upon Discharge  none   Patient/Family in Agreement with Plan yes       [facility] [place] [facility] [address] [facility] [address]  [address] [address]  Phone:  [phone] Fax: [phone]    Payor: MEDICAID / Plan: [employer] / Product Type: Managed Medicaid /

## 2020-02-27 PROBLEM — I50.43: Status: ACTIVE | Noted: 2020-01-01

## 2020-02-27 NOTE — SUBJECTIVE & OBJECTIVE
"Past Medical History:   Diagnosis Date    CHF (congestive heart failure)     Diabetes mellitus type II     Essential hypertension, benign     Hyperlipidemia     Hypothyroidism     Pain and swelling of left wrist 8/5/2019    Ashish Mckeon is a 54 y.o. male with PMH of  presenting with CHF, IDDM, Hypothyroidism, HTN, HLD presenting with worsening L wrist pain for 1 day. Orthopedic surgery was consulted for septic joint r/o. Pt has been afebrile and has no elevated WBC. ESR 36, normal CRP. Xray shows no signs of trauma and pt has no hx of gout or septic arthritis. Due to atraumatic wrist pain and swelling w/out identifia    Undiagnosed cardiac murmurs        Past Surgical History:   Procedure Laterality Date    COLON SURGERY      "lower intestines removed"    ORTHOPEDIC SURGERY Left     wrist    THYROID SURGERY         Review of patient's allergies indicates:   Allergen Reactions    Lisinopril Other (See Comments)     acei cough         No current facility-administered medications on file prior to encounter.      Current Outpatient Medications on File Prior to Encounter   Medication Sig    aspirin 81 MG Chew Take 1 tablet (81 mg total) by mouth once daily.    furosemide (LASIX) 80 MG tablet Take 1 tablet (80 mg total) by mouth 2 (two) times daily. Take an extra 80mg daily as needed for weight gain of 3 lbs in 24 hrs  or 5 lbs/wk (Patient taking differently: Take 80 mg by mouth once daily. Take an extra 80mg daily as needed for weight gain of 3 lbs in 24 hrs  or 5 lbs/wk)    glyBURIDE (DIABETA) 5 MG tablet Take 10 mg by mouth 2 (two) times daily with meals.    levothyroxine (SYNTHROID) 175 MCG tablet Take 1 tablet (175 mcg total) by mouth before breakfast.    losartan (COZAAR) 25 MG tablet Take 25 mg by mouth once daily.    metoprolol succinate (TOPROL-XL) 50 MG 24 hr tablet Take 1 tablet (50 mg total) by mouth once daily. (Patient taking differently: Take 50 mg by mouth 3 (three) times daily. )    " "acetaminophen (TYLENOL) 325 MG tablet Take 2 tablets (650 mg total) by mouth every 6 to 8 hours as needed. (Patient taking differently: Take 325 mg by mouth daily as needed for Pain. )    HYDROcodone-acetaminophen (NORCO) 5-325 mg per tablet Take 1 tablet by mouth every 6 (six) hours as needed for Pain.    insulin detemir U-100 (LEVEMIR) 100 unit/mL injection Inject 15 Units into the skin every evening.    pantoprazole (PROTONIX) 40 MG tablet Take 1 tablet (40 mg total) by mouth daily as needed (heartburn).     Family History     Problem Relation (Age of Onset)    Cancer Father        Tobacco Use    Smoking status: Never Smoker    Smokeless tobacco: Never Used    Tobacco comment: cigars "every other week or so"   Substance and Sexual Activity    Alcohol use: Not Currently     Alcohol/week: 3.0 standard drinks     Types: 3 Cans of beer per week    Drug use: Yes     Types: Marijuana    Sexual activity: Yes     Partners: Female     Review of Systems   Constitution: Negative for chills and fever.   Cardiovascular: Negative for chest pain, dyspnea on exertion, irregular heartbeat, near-syncope and syncope.   Respiratory: Positive for cough (dry) and shortness of breath (on exrtion).    Gastrointestinal: Negative for nausea.   Neurological: Negative for headaches and weakness.     Objective:     Vital Signs (Most Recent):  Temp: 98.1 °F (36.7 °C) (02/27/20 1100)  Pulse: 78 (02/27/20 1503)  Resp: 17 (02/27/20 1100)  BP: (!) 107/56 (02/27/20 1100)  SpO2: 98 % (02/27/20 1100) Vital Signs (24h Range):  Temp:  [97 °F (36.1 °C)-100.3 °F (37.9 °C)] 98.1 °F (36.7 °C)  Pulse:  [] 78  Resp:  [16-20] 17  SpO2:  [91 %-98 %] 98 %  BP: (104-133)/(56-79) 107/56       Weight: 70.3 kg (154 lb 15.7 oz)  Body mass index is 21.62 kg/m².    SpO2: 98 %  O2 Device (Oxygen Therapy): room air    Physical Exam   Constitutional: He is oriented to person, place, and time. He appears well-developed and well-nourished.   HENT:   Head: " Normocephalic and atraumatic.   Eyes: Pupils are equal, round, and reactive to light.   Neck: No JVD present.   Cardiovascular: Normal rate and regular rhythm.   No murmur heard.  Pulmonary/Chest: Effort normal and breath sounds normal. No respiratory distress.   Abdominal: Soft. Bowel sounds are normal. He exhibits no distension. There is no tenderness.   Musculoskeletal: He exhibits no edema.   Neurological: He is alert and oriented to person, place, and time.   Skin: Skin is warm and dry. No erythema.   Psychiatric: His behavior is normal. Judgment and thought content normal.   Vitals reviewed.      Significant Labs:   CMP:   Recent Labs   Lab 02/26/20 0352 02/26/20 2004 02/27/20 0352 02/27/20  0739      < > 137 139 141   K 4.3   < > 4.1 4.0 3.9      < > 101 103 103   CO2 20*   < > 24 26 27   *   < > 146* 127* 53*   BUN 67*   < > 63* 63* 63*   CREATININE 2.9*   < > 2.9* 3.0* 3.0*   CALCIUM 8.7   < > 8.7 8.7 8.9   PROT 7.3  --   --  6.9  --    ALBUMIN 3.4*   < > 3.2* 3.0* 3.0*   BILITOT 1.9*  --   --  1.6*  --    ALKPHOS 224*  --   --  213*  --    AST 18  --   --  29  --    ALT 20  --   --  20  --    ANIONGAP 16   < > 12 10 11   ESTGFRAFRICA 26.9*   < > 26.9* 25.8* 25.8*   EGFRNONAA 23.3*   < > 23.3* 22.3* 22.3*    < > = values in this interval not displayed.    and CBC:   Recent Labs   Lab 02/26/20 0352 02/27/20 0352   WBC 5.49 4.01   HGB 11.4* 11.5*   HCT 36.8* 37.4*   * 115*       Significant Imaging: All imaging in the last 24 hours reviewed

## 2020-02-27 NOTE — CONSULTS
[facility]  Cardiac Electrophysiology  Consult Note    Admission Date: 2/25/2020  Code Status: Full Code   Attending Provider: Kiesha Paz MD  Consulting Provider: Mansi Carrasquillo MD  Principal Problem:Acute combined systolic and diastolic heart failure due to valvular disease    Inpatient consult to Electrophysiology  Consult performed by: Mansi Carrasquillo MD  Consult ordered by: Kiesha Paz MD        Subjective:     Chief Complaint:  Shortness of breath     HPI:   Ashish Mckeon is a 54 y.o. M with NICM (EF 20%, G3DD), T2DM, HTN, HLD, CKD, hypothyroidism who was presented 2/25/20 with progressive SOBOE, orthopnea, PND and was admitted for ADHF. He has been diuresed net - 3.7L since admission and reports improvement in symptoms. This morning, patient had recurrent runs of NSVT on tele. EP is being consulted to evaluate for AICD.     Pt had preserved EF of 45% 4-years ago in 2016. Since Jan 2017, his estimated EF has been 35% or lower. His LHC in Feb 2018 did not show obstructive CAD. He does not have an established Cardiologist and reports distance and inability to drive as his limitation. He follows with [facility] and reports compliance with medications at his time, however has been reportedly non-compliant in the past. He was seen by EP in 2018, during his prior admission for HF at which time, it was recommended he follow up in clinic outpatient for ICD.    He denies any symptoms during the NSVT episode today and denies any prior history of LOC or presyncopal symptoms. He can walk up to 1-block before getting SOB and fatigued. 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     No prior history of malignancy, chemo or radiation, or port placed. He is right handed.     Past Medical History:   Diagnosis Date    CHF (congestive heart failure)     Diabetes  "mellitus type II     Essential hypertension, benign     Hyperlipidemia     Hypothyroidism     Pain and swelling of left wrist 8/5/2019    Ashish Mckeon is a 54 y.o. male with PMH of  presenting with CHF, IDDM, Hypothyroidism, HTN, HLD presenting with worsening L wrist pain for 1 day. Orthopedic surgery was consulted for septic joint r/o. Pt has been afebrile and has no elevated WBC. ESR 36, normal CRP. Xray shows no signs of trauma and pt has no hx of gout or septic arthritis. Due to atraumatic wrist pain and swelling w/out identifia    Undiagnosed cardiac murmurs        Past Surgical History:   Procedure Laterality Date    COLON SURGERY      "lower intestines removed"    ORTHOPEDIC SURGERY Left     wrist    THYROID SURGERY         Review of patient's allergies indicates:   Allergen Reactions    Lisinopril Other (See Comments)     acei cough         No current facility-administered medications on file prior to encounter.      Current Outpatient Medications on File Prior to Encounter   Medication Sig    aspirin 81 MG Chew Take 1 tablet (81 mg total) by mouth once daily.    furosemide (LASIX) 80 MG tablet Take 1 tablet (80 mg total) by mouth 2 (two) times daily. Take an extra 80mg daily as needed for weight gain of 3 lbs in 24 hrs  or 5 lbs/wk (Patient taking differently: Take 80 mg by mouth once daily. Take an extra 80mg daily as needed for weight gain of 3 lbs in 24 hrs  or 5 lbs/wk)    glyBURIDE (DIABETA) 5 MG tablet Take 10 mg by mouth 2 (two) times daily with meals.    levothyroxine (SYNTHROID) 175 MCG tablet Take 1 tablet (175 mcg total) by mouth before breakfast.    losartan (COZAAR) 25 MG tablet Take 25 mg by mouth once daily.    metoprolol succinate (TOPROL-XL) 50 MG 24 hr tablet Take 1 tablet (50 mg total) by mouth once daily. (Patient taking differently: Take 50 mg by mouth 3 (three) times daily. )    acetaminophen (TYLENOL) 325 MG tablet Take 2 tablets (650 mg total) by mouth every 6 to 8 " "hours as needed. (Patient taking differently: Take 325 mg by mouth daily as needed for Pain. )    HYDROcodone-acetaminophen (NORCO) 5-325 mg per tablet Take 1 tablet by mouth every 6 (six) hours as needed for Pain.    insulin detemir U-100 (LEVEMIR) 100 unit/mL injection Inject 15 Units into the skin every evening.    pantoprazole (PROTONIX) 40 MG tablet Take 1 tablet (40 mg total) by mouth daily as needed (heartburn).     Family History     Problem Relation (Age of Onset)    Cancer Father        Tobacco Use    Smoking status: Never Smoker    Smokeless tobacco: Never Used    Tobacco comment: cigars "every other week or so"   Substance and Sexual Activity    Alcohol use: Not Currently     Alcohol/week: 3.0 standard drinks     Types: 3 Cans of beer per week    Drug use: Yes     Types: Marijuana    Sexual activity: Yes     Partners: Female     Review of Systems   Constitution: Negative for chills and fever.   Cardiovascular: Negative for chest pain, dyspnea on exertion, irregular heartbeat, near-syncope and syncope.   Respiratory: Positive for cough (dry) and shortness of breath (on exrtion).    Gastrointestinal: Negative for nausea.   Neurological: Negative for headaches and weakness.     Objective:     Vital Signs (Most Recent):  Temp: 98.1 °F (36.7 °C) (02/27/20 1100)  Pulse: 78 (02/27/20 1503)  Resp: 17 (02/27/20 1100)  BP: (!) 107/56 (02/27/20 1100)  SpO2: 98 % (02/27/20 1100) Vital Signs (24h Range):  Temp:  [97 °F (36.1 °C)-100.3 °F (37.9 °C)] 98.1 °F (36.7 °C)  Pulse:  [] 78  Resp:  [16-20] 17  SpO2:  [91 %-98 %] 98 %  BP: (104-133)/(56-79) 107/56       Weight: 70.3 kg (154 lb 15.7 oz)  Body mass index is 21.62 kg/m².    SpO2: 98 %  O2 Device (Oxygen Therapy): room air    Physical Exam   Constitutional: He is oriented to person, place, and time. He appears well-developed and well-nourished.   HENT:   Head: Normocephalic and atraumatic.   Eyes: Pupils are equal, round, and reactive to light. "   Neck: No JVD present.   Cardiovascular: Normal rate and regular rhythm.   No murmur heard.  Pulmonary/Chest: Effort normal and breath sounds normal. No respiratory distress.   Abdominal: Soft. Bowel sounds are normal. He exhibits no distension. There is no tenderness.   Musculoskeletal: He exhibits no edema.   Neurological: He is alert and oriented to person, place, and time.   Skin: Skin is warm and dry. No erythema.   Psychiatric: His behavior is normal. Judgment and thought content normal.   Vitals reviewed.      Significant Labs:   CMP:   Recent Labs   Lab 02/26/20 0352 02/26/20 2004 02/27/20 0352 02/27/20  0739      < > 137 139 141   K 4.3   < > 4.1 4.0 3.9      < > 101 103 103   CO2 20*   < > 24 26 27   *   < > 146* 127* 53*   BUN 67*   < > 63* 63* 63*   CREATININE 2.9*   < > 2.9* 3.0* 3.0*   CALCIUM 8.7   < > 8.7 8.7 8.9   PROT 7.3  --   --  6.9  --    ALBUMIN 3.4*   < > 3.2* 3.0* 3.0*   BILITOT 1.9*  --   --  1.6*  --    ALKPHOS 224*  --   --  213*  --    AST 18  --   --  29  --    ALT 20  --   --  20  --    ANIONGAP 16   < > 12 10 11   ESTGFRAFRICA 26.9*   < > 26.9* 25.8* 25.8*   EGFRNONAA 23.3*   < > 23.3* 22.3* 22.3*    < > = values in this interval not displayed.    and CBC:   Recent Labs   Lab 02/26/20 0352 02/27/20 0352   WBC 5.49 4.01   HGB 11.4* 11.5*   HCT 36.8* 37.4*   * 115*       Significant Imaging: All imaging in the last 24 hours reviewed    TTE 2/26/20:  · Moderate left atrial enlargement.  · Severely decreased left ventricular systolic function. The estimated ejection fraction is 20%.  · Grade III (severe) left ventricular diastolic dysfunction consistent with restrictive physiology.  · Mild-to-moderate mitral regurgitation.  · Global hypokinetic wall motion.  · Mild left ventricular enlargement.  · Moderate right ventricular enlargement.  · Moderate tricuspid regurgitation.  · Intermediate central venous pressure (8 mmHg).  · The estimated PA systolic  pressure is 55 mmHg.  · Pulmonary hypertension present.  · Small posterolateral pericardial effusion.  · Mild mitral sclerosis.  · Mild aortic regurgitation.  · Moderately reduced right ventricular systolic function.    Assessment and Plan:       * Acute combined systolic and diastolic heart failure due to valvular disease  Nonsustained ventricular tachycardia   Ashish Mckeon is a 54 y.o. M with NICM (EF 20%, G3DD), T2DM, HTN, HLD, CKD, hypothyroidism who was presented 2/25/20 with progressive SOBOE, orthopnea, PND and was admitted for ADHF. He has been diuresed net - 3.7L since admission and reports improvement in symptoms. This morning, patient had recurrent runs of NSVT on tele. EP is being consulted to evaluate for AICD.   Pt asymptomatic of NSVT and hemodynamically stable.    - NYHA FC III   - TTE 2/26/20: EF 20%, G3DD, severe MR, no LVH.  - on ECG 2/27/20: NSR with 1st AVB, low voltage QRS  - pt has a Class I indication for ICD implantation for primary prevention, however no urgent indication for it to be done inpatient. Recommend follow up with Dr. Paz in EP clinic.   - current GDMT meds: metoprolol 50 BID (he takes toprol 50mg XL at home), losartan 25 mg daily; would up-titrate as tolerated by BP  - recommend outpatient follow up with [facility] in 1-2 weeks to optimization medical therapy, risk factors, and consider candidacy for advanced options.  - encouraged patient to stay compliant with low-sat diet, medications and clinic visits.    Thank you for your consult. I will follow-up with patient. Please contact us if you have any additional questions.    Patient seen and plan of care discussed with Dr. Paz.    Mansi Carrasquillo MD  Cardiac Electrophysiology  [facility]

## 2020-02-27 NOTE — NURSING
Pts heart has fluctuated from low 40's to 107. Pt is asymptomatic and heart fluctuation resolves itself.  Called and spoke to  on call med team B with current status. Was advised to continue to monitor.

## 2020-02-27 NOTE — ASSESSMENT & PLAN NOTE
Nonsustained ventricular tachycardia   Ashish Mckeon is a 54 y.o. M with NICM (EF 20%, G3DD), T2DM, HTN, HLD, CKD, hypothyroidism who was presented 2/25/20 with progressive SOBOE, orthopnea, PND and was admitted for ADHF. He has been diuresed net - 3.7L since admission and reports improvement in symptoms. This morning, patient had recurrent runs of NSVT on tele. EP is being consulted to evaluate for AICD.   Pt had preserved EF of 45% 4-years ago in 2016. Since Jan 2017, his estimated EF has been 35% or lower. He does not have an established Cardiologist and reports distance and inability to drive as his limitation. He follows with [facility] and reports compliance with medications at his time, however has been reportedly non-compliant in the past. He was seen by EP in 2018, during his prior admission for HF at which time, it was recommended he follow up in clinic outpatient for ICD.  He denies any symptoms during the NSVT episode today and denies any prior history of LOC or presyncopal symptoms. He can walk up to 1-block before getting SOB and fatigued. 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   No prior history of malignancy, chemo or radiation, or port placed. He is right handed.   - TTE 2/26/20: EF 20%, G3DD, severe MR, no LVH  - on ECG 2/25/20: narrow QRS  - pt meets criteria for ICD implantation; recommend follow up with Dr. Paz in clinic to further discuss ICD implantation  - up-titrate GDMT as tolerated by BP  - recommend outpatient follow up with HTS for optimization of GDMT in HFrEF patient.

## 2020-02-27 NOTE — PROGRESS NOTES
"RAPID RESPONSE NURSE PROACTIVE ROUNDING NOTE     Time of Visit: 0650    Admit Date: 2020  LOS: 2  Code Status: Prior   Date of Visit: 2020  : 1965  Age: 54 y.o.  Sex: male  Race: Black or   Bed: [address] [place] [address] A:   MRN: [id number]  Was the patient discharged from an ICU this admission? no   Was the patient discharged from a PACU within last 24 hours?  no  Did the patient receive conscious sedation/general anesthesia in last 24 hours?  no  Was the patient in the ED within the past 24 hours?  no  Was the patient started on NIPPV within the past 24 hours?  no  Attending Physician: González Hooper MD  Primary Service: [facility] B    ASSESSMENT     Diagnosis: Acute combined systolic and diastolic heart failure due to valvular disease    Abnormal Vital Signs: /67   Pulse 95   Temp 97 °F (36.1 °C)   Resp 20   Ht 5' 11" (1.803 m)   Wt 70.3 kg (154 lb 15.7 oz)   SpO2 (!) 94%   BMI 21.62 kg/m²      Clinical Issues: Dysrythmia    Patient  has a past medical history of CHF (congestive heart failure), Diabetes mellitus type II, Essential hypertension, benign, Hyperlipidemia, Hypothyroidism, Pain and swelling of left wrist, and Undiagnosed cardiac murmurs.         INTERVENTIONS/ RECOMMENDATIONS   12 Lead EKG and Troponin ordered per AWAIS Guerra. Patient reports no SOB and no chest pain. 12 Lead given to AWAIS Guerra and is similar to previous 12 lead. RRT will follow.      Discussed plan of care with Rosa SWAIN.    PHYSICIAN ESCALATION     Yes/No  yes    Orders received and case discussed with AWAIS Guerra.    Disposition: Remain in room 605.    FOLLOW-UP     Call back the Rapid Response Nurse, Timothy Pritchard RN at [phone] for additional questions or concerns.        "

## 2020-02-27 NOTE — PLAN OF CARE
02/27/20 0843   Post-Acute Status   Post-Acute Authorization Other   Other Status No Post-Acute Service Needs

## 2020-02-27 NOTE — PROGRESS NOTES
"Hospital Medicine  Progress Note    Team: [facility] Kiesha Paz MD  Admit Date: 2/25/2020  KARINA 2/29/2020  Length of Stay:  LOS: 2 days   Code status: Full Code    Principal Problem:  Acute combined systolic and diastolic heart failure due to valvular disease    Overview/Hospital Course:   Ashish Mckeon is a 54 y.o. male with a PMH of HTN, HLD, DM, hypothyroidism, CHF (EF 25%, Grade III DD) who presented to the ED on 2/25/2020 diagnosed with  Cough (c/o cough and SOB x1 week, states feels like "fluid backed up")  AAOX 3 c/o dyspnea with minimal exertion, leg swelling  and orthopnea,   reports increased weight gain- currently 170 lb, baseline 158lb over last month .  Patient states he  was  compliant with low salt diet and  Lasix 80 mg p.o. at home. denies chest pain or palpitations. has productive cough with white sputum and intermittent wheezing x 1 day. denies fever chills or myalgia/ sick contacts .       while in ED  has Elevated BNP 2638  and CXR with lungs are clear with normal appearance of pulmonary vasculature. No pleural effusion. No evident pneumothorax..Suspecting diuretic resistance with CKD - 4.  afebrile and saturating well on RA. Given one time dose IV 60 mg lasix in ED and made 250ml urine. .  Troponin  .049. started on   lasix  drip at 10mg/hr      Baseline ADL independent   2/26  negative balance of 2.2L. reports improved shortenss of breath       Interval History:    Several runs of NSVT overnight. Mildly symptomatic, but no dizziness or syncope.  Troponin peaked at 0.1, now down trending.  Dyspnea is improved, no major complaints at this time. I/O negative 1.7L. Weight is down to 70kg. Hypoglycemic this morning.     ROS   Respiratory: no cough or shortness of breath  Cardiovascular: no chest pain or palpitations  Gastrointestinal: no nausea or vomiting, no abdominal pain or change in bowel habits  Behavioral/Psych: no depression or anxiety    Physical Exam   /64 (BP Location: Left " "arm, Patient Position: Lying)   Pulse 82   Temp 98.4 °F (36.9 °C) (Oral)   Resp 18   Ht 5' 11" (1.803 m)   Wt 70.3 kg (154 lb 15.7 oz)   SpO2 97%   BMI 21.62 kg/m²     Intake/Output Summary (Last 24 hours) at 2/27/2020 1757  Last data filed at 2/27/2020 1638  Gross per 24 hour   Intake 50 ml   Output 1300 ml   Net -1250 ml       Wt Readings from Last 1 Encounters:   02/26/20 1505 70.3 kg (154 lb 15.7 oz)   02/26/20 0500 70.3 kg (154 lb 15.7 oz)   02/25/20 0939 78.8 kg (173 lb 11.6 oz)     General Appearance: no acute distress, resting comfortably   HEENT: moist mucous membranes, no oral lesions  CV: JVP prominent at mandible. regular rate and rhythm, no murmurs/rubs/gallops, radial/DP/PT 2+ and symmetric,   Respiratory: Normal respiratory effort, clear to auscultation bilaterally, no crackles/wheezes  Abdomen: Soft, non-tender; bowel sounds active  Skin: intact. No rashes or skin breakdown  Neurologic:  Face symmetric, no focal numbness or weakness  Mental status: Alert, oriented x 4, affect appropriate     Recent Labs   Lab 02/25/20  1026 02/26/20  0352 02/27/20  0352   WBC 4.90 5.49 4.01   HGB 11.7* 11.4* 11.5*   HCT 39.7* 36.8* 37.4*   * 126* 115*     Recent Labs   Lab 02/26/20  0352  02/26/20 2004 02/27/20  0352 02/27/20  0739      < > 137 139 141   K 4.3   < > 4.1 4.0 3.9      < > 101 103 103   CO2 20*   < > 24 26 27   BUN 67*   < > 63* 63* 63*   CREATININE 2.9*   < > 2.9* 3.0* 3.0*   *   < > 146* 127* 53*   CALCIUM 8.7   < > 8.7 8.7 8.9   MG 1.9  --   --  2.1  --    PHOS 3.5   < > 3.2 3.1 3.3    < > = values in this interval not displayed.     Recent Labs   Lab 02/25/20  1026  02/26/20 0352 02/26/20 2004 02/27/20 0352 02/27/20  0739   ALKPHOS 239*  --  224*  --   --  213*  --    ALT 21  --  20  --   --  20  --    AST 20  --  18  --   --  29  --    ALBUMIN 3.6   < > 3.4*   < > 3.2* 3.0* 3.0*   PROT 7.5  --  7.3  --   --  6.9  --    BILITOT 1.6*  --  1.9*  --   --  1.6*  --  "   INR 1.5*  --   --   --   --   --   --     < > = values in this interval not displayed.        Recent Labs     02/25/20  1810 02/27/20  0739 02/27/20  1601   TROPONINI 0.035* 0.133* 0.097*     Recent Labs   Lab 02/26/20  2146 02/27/20  0751 02/27/20  0834 02/27/20  0944 02/27/20  1234 02/27/20  1741   POCTGLUCOSE 137* 53* 60* 147* 175* 134*     Hemoglobin A1C   Date Value Ref Range Status   02/25/2020 7.5 (H) 4.0 - 5.6 % Final     Comment:     ADA Screening Guidelines:  5.7-6.4%  Consistent with prediabetes  >or=6.5%  Consistent with diabetes  High levels of fetal hemoglobin interfere with the HbA1C  assay. Heterozygous hemoglobin variants (HbS, HgC, etc)do  not significantly interfere with this assay.   However, presence of multiple variants may affect accuracy.     08/05/2019 >14.0 (H) 4.0 - 5.6 % Final     Comment:     ADA Screening Guidelines:  5.7-6.4%  Consistent with prediabetes  >or=6.5%  Consistent with diabetes  High levels of fetal hemoglobin interfere with the HbA1C  assay. Heterozygous hemoglobin variants (HbS, HgC, etc)do  not significantly interfere with this assay.   However, presence of multiple variants may affect accuracy.     11/26/2018 7.5 (H) 4.0 - 5.6 % Final     Comment:     ADA Screening Guidelines:  5.7-6.4%  Consistent with prediabetes  >or=6.5%  Consistent with diabetes  High levels of fetal hemoglobin interfere with the HbA1C  assay. Heterozygous hemoglobin variants (HbS, HgC, etc)do  not significantly interfere with this assay.   However, presence of multiple variants may affect accuracy.         Medications  Scheduled Meds:   aspirin  81 mg Oral Daily    atorvastatin  80 mg Oral QHS    [START ON 2/28/2020] furosemide (LASIX) IV  80 mg Intravenous Daily    insulin detemir U-100  10 Units Subcutaneous QHS    levothyroxine  175 mcg Oral Before breakfast    metoprolol tartrate  50 mg Oral BID     Continuous Infusions:  As Needed:  acetaminophen, Dextrose 10% Bolus, Dextrose 10%  Bolus, glucagon (human recombinant), glucose, glucose, insulin aspart U-100, sodium chloride 0.9%    Active Hospital Problems    Diagnosis  POA    *Acute on chronic combined systolic and diastolic heart failure due to valvular disease [I50.43, I38]  Yes    Decompensated heart failure [I50.9]  Yes    Anemia [D64.9]  Yes    Thrombocytopenia, unspecified [D69.6]  Unknown    Chronic kidney disease, stage IV (severe) [N18.4]  No    Pseudogout of left wrist [M11.232]  Yes    Hyperlipidemia [E78.5]  Yes    NSVT (nonsustained ventricular tachycardia) [I47.2]  Yes    Essential hypertension [I10]  Yes    Substance use disorder [F19.90]  Yes    Hypothyroidism [E03.9]  Yes     Chronic    Diabetes mellitus, type II [E11.9]  Yes     Chronic      Resolved Hospital Problems   No resolved problems to display.         Assessment and Plan    Acute combined systolic and diastolic heart failure due to valvular disease   Nonishcemic cardiomyopathy  Unknown etiology since teenager.   - Coronary angiogram in July 2018 at showed non obstructive CAD   - BNP 2638 on admission  - CXR without overt edema  - echocardiogram repeated here with EF 20%  - On furosemide gtt from admission to 2/27  - IV furosemide 80 BID  - Goal net negative 1.5L daily  - continue metoprolol tartrate 50 BID, switch to succinate as able  - ARB on hold, restart as able  - Strict in/out, daily standing weights  - BMP, Mg daily  - 1.5L fluid restrict, 2gNa restrict  - Amb follow up with [facility], to consider sacubitril-valsartan initiation    Nonsustained ventricular tachycardia  In the setting of decompensated systolic heart failure  - appreciate electrophysiology consultation, feel that he may benefit from optimized GDMT including sacubitril-valsartan prior to proceeding with AICD    Iron deficiency anemia  - Follow-up iron studies    Chronic kidney disease, stage IV  - reviewed labs, renal function is stable, at baseline  - daily BMP  - avoid  nephrotoxins    Hyperlipidemia  - Continue atorvastatin 80 mg daily  - continue aspirin 81 mg daily    Essential hypertension  - continue furosemide, metoprolol,     Hypothyroidism  - status post thyroidectomy as a teenager  - continue home levothyroxine 175 mg  - TSH 10.8, free T4 normal    Type 2 diabetes mellitus  - insulin detemir 10 units q.h.s., decrease to 5u QHS due to AM hypoglycemia  - Accu-Cheks 4 times daily with meals    High Risk Conditions  Patient has a condition that poses threat to life and bodily function:  Arrhythmia     Diet:  ADA, 2g sodium  GI PPx:  not applicable  DVT PPx:   SCDs, start enoxaparin renally dosed  Lines:  PIV  Drains: n/a  Wounds: n/a    Goals of Care:  full code  Discharge Plan:  Anticipate discharge to home with outpatient follow-up in 1-2 days pending continued diuresis    Kiesha Paz M.D., M.P.H.  Department of Hospital Medicine  [facility]  (pager) [phone] (spect) [phone]

## 2020-02-27 NOTE — TELEPHONE ENCOUNTER
----- Message from Tj Paz MD sent at 2/27/2020  3:00 PM CST -----  Lenard Hughes    Can you schedule Mr. Mckeon a follow-up appointment with me in 4-6 weeks?    Thanks    Tj

## 2020-02-28 NOTE — PLAN OF CARE
Lying in bed resting quietly, watching tv with visitor. No c/o pain or discomfort, breathing even and unlabored. JEROME noted at times. Safety maintained during shift.

## 2020-02-28 NOTE — PLAN OF CARE
02/28/20 0843   Post-Acute Status   Post-Acute Authorization Other   Other Status No Post-Acute Service Needs

## 2020-02-28 NOTE — PROGRESS NOTES
"Hospital Medicine  Progress Note    Team: [facility] MED B Kiesha Paz MD  Admit Date: 2/25/2020  KARINA 2/29/2020  Length of Stay:  LOS: 3 days   Code status: Full Code    Principal Problem:  Acute on chronic combined systolic and diastolic heart failure due to valvular disease    Overview/Hospital Course:   Ashish Mckeon is a 54 y.o. male with a PMH of HTN, HLD, DM, hypothyroidism, CHF (EF 25%, Grade III DD) who presented to the ED on 2/25/2020 diagnosed with  Cough (c/o cough and SOB x1 week, states feels like "fluid backed up")  AAOX 3 c/o dyspnea with minimal exertion, leg swelling  and orthopnea,   reports increased weight gain- currently 170 lb, baseline 158lb over last month .  Patient states he  was  compliant with low salt diet and  Lasix 80 mg p.o. at home. denies chest pain or palpitations. has productive cough with white sputum and intermittent wheezing x 1 day. denies fever chills or myalgia/ sick contacts .       while in ED  has Elevated BNP 2638  and CXR with lungs are clear with normal appearance of pulmonary vasculature. No pleural effusion. No evident pneumothorax..Suspecting diuretic resistance with CKD - 4.  afebrile and saturating well on RA. Given one time dose IV 60 mg lasix in ED and made 250ml urine. .  Troponin  .049. started on   lasix  drip at 10mg/hr      Baseline ADL independent   2/26  negative balance of 2.2L. reports improved shortenss of breath     2/27 Several runs of NSVT overnight. EP consulted, recommend beta blockade optimization. Will need good GDMT (potentially sacubitril-valsartan) for 3 months, given this is NICM and has a reasonable chance of recovery. If no meaningful recovery of EF with GDMT, recommending AICD.       Interval History:    Furosemide PO yesterday with poor urine output, returned to 80mg IV. Still not yet achieving adequate diuresis. Feels well, breathing is better, no chest pain or palpitations. Seen by EP, recommending GDMT and follow up for possible " "AICD.     ROS   Respiratory: Dry cough. no shortness of breath  Cardiovascular: no chest pain or palpitations  Gastrointestinal: no nausea or vomiting, no abdominal pain or change in bowel habits  Behavioral/Psych: no depression or anxiety    Physical Exam   BP 95/65 (BP Location: Left arm, Patient Position: Lying)   Pulse 70   Temp 98.7 °F (37.1 °C) (Oral)   Resp 16   Ht 5' 11" (1.803 m)   Wt 72.9 kg (160 lb 11.5 oz)   SpO2 (!) 94%   BMI 22.42 kg/m²     Intake/Output Summary (Last 24 hours) at 2/28/2020 0725  Last data filed at 2/28/2020 0633  Gross per 24 hour   Intake 750 ml   Output 1425 ml   Net -675 ml       Wt Readings from Last 1 Encounters:   02/28/20 0631 72.9 kg (160 lb 11.5 oz)   02/26/20 1505 70.3 kg (154 lb 15.7 oz)   02/26/20 0500 70.3 kg (154 lb 15.7 oz)   02/25/20 0939 78.8 kg (173 lb 11.6 oz)     General Appearance: no acute distress, resting comfortably   HEENT: moist mucous membranes, no oral lesions  CV: JVP at clavicle. regular rate and rhythm, no murmurs/rubs/gallops, radial/DP/PT 2+ and symmetric  Respiratory: Normal respiratory effort, clear to auscultation bilaterally, no crackles/wheezes  Abdomen: Soft, non-tender; bowel sounds active  Skin: intact. No rashes or skin breakdown  Neurologic:  Face symmetric, no focal numbness or weakness  Mental status: Alert, oriented x 4, affect appropriate     Recent Labs   Lab 02/26/20  0352 02/27/20  0352 02/28/20  0449   WBC 5.49 4.01 2.98*   HGB 11.4* 11.5* 11.1*   HCT 36.8* 37.4* 35.0*   * 115* 103*     Recent Labs   Lab 02/26/20  0352  02/27/20  0352 02/27/20  0739 02/28/20  0449      < > 139 141 137   K 4.3   < > 4.0 3.9 4.3      < > 103 103 101   CO2 20*   < > 26 27 27   BUN 67*   < > 63* 63* 69*   CREATININE 2.9*   < > 3.0* 3.0* 3.3*   *   < > 127* 53* 127*   CALCIUM 8.7   < > 8.7 8.9 7.8*   MG 1.9  --  2.1  --  2.0   PHOS 3.5   < > 3.1 3.3 3.1    < > = values in this interval not displayed.     Recent Labs   Lab " 02/25/20  1026  02/26/20  0352  02/27/20  0352 02/27/20  0739 02/28/20  0449   ALKPHOS 239*  --  224*  --  213*  --  209*   ALT 21  --  20  --  20  --  23   AST 20  --  18  --  29  --  35   ALBUMIN 3.6   < > 3.4*   < > 3.0* 3.0* 2.8*   PROT 7.5  --  7.3  --  6.9  --  6.5   BILITOT 1.6*  --  1.9*  --  1.6*  --  1.4*   INR 1.5*  --   --   --   --   --   --     < > = values in this interval not displayed.        Recent Labs     02/25/20  1810 02/27/20  0739 02/27/20  1601   TROPONINI 0.035* 0.133* 0.097*     Recent Labs   Lab 02/27/20  0751 02/27/20  0834 02/27/20  0944 02/27/20  1234 02/27/20  1741 02/27/20  2032   POCTGLUCOSE 53* 60* 147* 175* 134* 157*     Hemoglobin A1C   Date Value Ref Range Status   02/25/2020 7.5 (H) 4.0 - 5.6 % Final     Comment:     ADA Screening Guidelines:  5.7-6.4%  Consistent with prediabetes  >or=6.5%  Consistent with diabetes  High levels of fetal hemoglobin interfere with the HbA1C  assay. Heterozygous hemoglobin variants (HbS, HgC, etc)do  not significantly interfere with this assay.   However, presence of multiple variants may affect accuracy.     08/05/2019 >14.0 (H) 4.0 - 5.6 % Final     Comment:     ADA Screening Guidelines:  5.7-6.4%  Consistent with prediabetes  >or=6.5%  Consistent with diabetes  High levels of fetal hemoglobin interfere with the HbA1C  assay. Heterozygous hemoglobin variants (HbS, HgC, etc)do  not significantly interfere with this assay.   However, presence of multiple variants may affect accuracy.     11/26/2018 7.5 (H) 4.0 - 5.6 % Final     Comment:     ADA Screening Guidelines:  5.7-6.4%  Consistent with prediabetes  >or=6.5%  Consistent with diabetes  High levels of fetal hemoglobin interfere with the HbA1C  assay. Heterozygous hemoglobin variants (HbS, HgC, etc)do  not significantly interfere with this assay.   However, presence of multiple variants may affect accuracy.         Medications  Scheduled Meds:   aspirin  81 mg Oral Daily    atorvastatin  80  mg Oral QHS    enoxaparin  30 mg Subcutaneous Daily    furosemide (LASIX) IV  120 mg Intravenous Daily    insulin detemir U-100  5 Units Subcutaneous QHS    levothyroxine  175 mcg Oral Before breakfast    metoprolol tartrate  50 mg Oral BID     Continuous Infusions:  As Needed:  acetaminophen, Dextrose 10% Bolus, Dextrose 10% Bolus, glucagon (human recombinant), glucose, glucose, insulin aspart U-100, sodium chloride 0.9%    Active Hospital Problems    Diagnosis  POA    *Acute on chronic combined systolic and diastolic heart failure due to valvular disease [I50.43, I38]  Yes    Decompensated heart failure [I50.9]  Yes    Anemia [D64.9]  Yes    Thrombocytopenia, unspecified [D69.6]  Unknown    Chronic kidney disease, stage IV (severe) [N18.4]  No    Pseudogout of left wrist [M11.232]  Yes    Hyperlipidemia [E78.5]  Yes    NSVT (nonsustained ventricular tachycardia) [I47.2]  Yes    Essential hypertension [I10]  Yes    Substance use disorder [F19.90]  Yes    Hypothyroidism [E03.9]  Yes     Chronic    Diabetes mellitus, type II [E11.9]  Yes     Chronic      Resolved Hospital Problems   No resolved problems to display.         Assessment and Plan    Acute combined systolic and diastolic heart failure due to valvular disease   Nonishcemic cardiomyopathy  Unknown etiology since teenager. Nonishcemic via coronary angiogram in July 2018 at showed non obstructive CAD.  - BNP 2638 on admission  - CXR without overt edema  - echocardiogram repeated here with EF 20%  - On furosemide gtt from admission to 2/27, failed oral transition  - increase furosemide 120mg IV BID   - Goal net negative 1.5L daily  - continue metoprolol tartrate 50 BID, switch to succinate as able  - ARB on hold, restart as able  - Strict in/out, daily standing weights  - BMP, Mg, Phos daily, replete PRN  - 1.5L fluid restrict, 2g Na restrict  - Amb follow up with [facility], to consider sacubitril-valsartan initiation    Nonsustained  ventricular tachycardia  In the setting of decompensated systolic heart failure.  - appreciate electrophysiology consultation, feel that he may benefit from optimized GDMT including sacubitril-valsartan prior to proceeding with AICD  - continue metoprolol tartrate, transition to succinate as able    Iron deficiency anemia  - Follow-up iron studies    Chronic kidney disease, stage IV  - reviewed labs, renal function is stable, at baseline  - daily BMP  - avoid nephrotoxins    Hyperlipidemia  - Continue atorvastatin 80 mg daily  - continue aspirin 81 mg daily    Essential hypertension  - continue furosemide, metoprolol    Hypothyroidism  - status post thyroidectomy as a teenager  - continue home levothyroxine 175 mg  - TSH 10.8, free T4 normal    Type 2 diabetes mellitus  - insulin detemir 10 units q.h.s., decrease to 5u QHS due to AM hypoglycemia  - Accu-Cheks 4 times daily with meals    High Risk Conditions  Patient has a condition that poses threat to life and bodily function:  Arrhythmia     Diet:  ADA, 2g sodium  GI PPx:  not applicable  DVT PPx:   SCDs, start enoxaparin renally dosed  Lines:  PIV  Drains: n/a  Wounds: n/a    Goals of Care:  full code  Discharge Plan:  Anticipate discharge to home with outpatient follow-up in 1-2 days pending continued diuresis    Kiesha Paz M.D., M.P.H.  Department of Hospital Medicine  [facility]  (pager) [phone] (spect) [phone]

## 2020-02-29 PROBLEM — I42.8 NONISCHEMIC CARDIOMYOPATHY: Status: ACTIVE | Noted: 2020-01-01

## 2020-02-29 NOTE — PROGRESS NOTES
"Hospital Medicine  Progress Note    Team: [facility] MED B Kiesha Paz MD  Admit Date: 2/25/2020  KARINA 2/29/2020  Length of Stay:  LOS: 4 days   Code status: Full Code    Principal Problem:  Acute on chronic combined systolic and diastolic heart failure due to valvular disease    Overview/Hospital Course:   Ashish Mckeon is a 54 y.o. male with a PMH of HTN, HLD, DM, hypothyroidism, CHF (EF 25%, Grade III DD) who presented to the ED on 2/25/2020 diagnosed with  Cough (c/o cough and SOB x1 week, states feels like "fluid backed up")  AAOX 3 c/o dyspnea with minimal exertion, leg swelling  and orthopnea,   reports increased weight gain- currently 170 lb, baseline 158lb over last month .  Patient states he  was  compliant with low salt diet and  Lasix 80 mg p.o. at home. denies chest pain or palpitations. has productive cough with white sputum and intermittent wheezing x 1 day. denies fever chills or myalgia/ sick contacts .       while in ED  has Elevated BNP 2638  and CXR with lungs are clear with normal appearance of pulmonary vasculature. No pleural effusion. No evident pneumothorax..Suspecting diuretic resistance with CKD - 4.  afebrile and saturating well on RA. Given one time dose IV 60 mg lasix in ED and made 250ml urine. .  Troponin  .049. started on   lasix  drip at 10mg/hr      Baseline ADL independent   2/26  negative balance of 2.2L. reports improved shortenss of breath     2/27 Several runs of NSVT overnight. EP consulted, recommend beta blockade optimization. Will need good GDMT (potentially sacubitril-valsartan) for 3 months, given this is NICM and has a reasonable chance of recovery. If no meaningful recovery of EF with GDMT, recommending AICD.     2/28. No further symptomatic NSVT. IV diuresis.       Interval History:    Trialed oral bumetanide this AM with only 550ml urine out, transitioning back to IV furosemide, TID dosing. Dry cough, gave cough suppressant. Overall feels significantly better, " "edema and dyspnea are nearly resolved. Weight down from 78.8kg to 72.9kg to 70.4kg today. Obtained and reviewed UA, calculated FeUrea consistent with intrinsic disease. US with medical renal disease.     ROS   Respiratory: Dry cough. no shortness of breath  Cardiovascular: no chest pain or palpitations  Gastrointestinal: no nausea or vomiting, no abdominal pain or change in bowel habits  Behavioral/Psych: no depression or anxiety  LE: No edema    Physical Exam   /82   Pulse 84   Temp 98.3 °F (36.8 °C)   Resp 18   Ht 5' 11" (1.803 m)   Wt 70.4 kg (155 lb 3.3 oz)   SpO2 99%   BMI 21.65 kg/m²     Intake/Output Summary (Last 24 hours) at 2/29/2020 1718  Last data filed at 2/29/2020 1400  Gross per 24 hour   Intake 240 ml   Output 800 ml   Net -560 ml     Wt Readings from Last 1 Encounters:   02/29/20 0457 70.4 kg (155 lb 3.3 oz)   02/28/20 0631 72.9 kg (160 lb 11.5 oz)   02/26/20 1505 70.3 kg (154 lb 15.7 oz)   02/26/20 0500 70.3 kg (154 lb 15.7 oz)   02/25/20 0939 78.8 kg (173 lb 11.6 oz)     General Appearance: no acute distress, resting comfortably in bed  HEENT: moist mucous membranes, no oral lesions  CV: JVP at clavicle. regular rate and rhythm, no murmurs/rubs/gallops, radial/DP/PT 2+ and symmetric  Respiratory: Normal respiratory effort, clear to auscultation bilaterally, no crackles/wheezes  Abdomen: Soft, non-tender; bowel sounds active  Skin: intact. No rashes or skin breakdown. No peripheral edema  Neurologic:  Face symmetric, no focal numbness or weakness  Mental status: Alert, oriented x 4, affect appropriate     Recent Labs   Lab 02/27/20  0352 02/28/20  0449 02/29/20  0425   WBC 4.01 2.98* 2.61*   HGB 11.5* 11.1* 11.0*   HCT 37.4* 35.0* 35.8*   * 103* 103*     Recent Labs   Lab 02/27/20  0352 02/27/20  0739 02/28/20  0449 02/29/20  0425    141 137 135*   K 4.0 3.9 4.3 4.0    103 101 101   CO2 26 27 27 25   BUN 63* 63* 69* 72*   CREATININE 3.0* 3.0* 3.3* 3.3*   * 53* " 127* 79   CALCIUM 8.7 8.9 7.8* 7.5*   MG 2.1  --  2.0 2.0   PHOS 3.1 3.3 3.1 3.3     Recent Labs   Lab 02/25/20  1026  02/27/20  0352 02/27/20  0739 02/28/20  0449 02/29/20  0425   ALKPHOS 239*   < > 213*  --  209* 214*   ALT 21   < > 20  --  23 24   AST 20   < > 29  --  35 35   ALBUMIN 3.6   < > 3.0* 3.0* 2.8* 2.8*   PROT 7.5   < > 6.9  --  6.5 6.4   BILITOT 1.6*   < > 1.6*  --  1.4* 1.3*   INR 1.5*  --   --   --   --   --     < > = values in this interval not displayed.        Recent Labs     02/27/20  0739 02/27/20  1601   TROPONINI 0.133* 0.097*     Recent Labs   Lab 02/27/20  2032 02/28/20  0749 02/28/20  1207 02/28/20  2121 02/29/20  0814 02/29/20  1155   POCTGLUCOSE 157* 68* 103 179* 115* 190*     Hemoglobin A1C   Date Value Ref Range Status   02/25/2020 7.5 (H) 4.0 - 5.6 % Final     Comment:     ADA Screening Guidelines:  5.7-6.4%  Consistent with prediabetes  >or=6.5%  Consistent with diabetes  High levels of fetal hemoglobin interfere with the HbA1C  assay. Heterozygous hemoglobin variants (HbS, HgC, etc)do  not significantly interfere with this assay.   However, presence of multiple variants may affect accuracy.     08/05/2019 >14.0 (H) 4.0 - 5.6 % Final     Comment:     ADA Screening Guidelines:  5.7-6.4%  Consistent with prediabetes  >or=6.5%  Consistent with diabetes  High levels of fetal hemoglobin interfere with the HbA1C  assay. Heterozygous hemoglobin variants (HbS, HgC, etc)do  not significantly interfere with this assay.   However, presence of multiple variants may affect accuracy.     11/26/2018 7.5 (H) 4.0 - 5.6 % Final     Comment:     ADA Screening Guidelines:  5.7-6.4%  Consistent with prediabetes  >or=6.5%  Consistent with diabetes  High levels of fetal hemoglobin interfere with the HbA1C  assay. Heterozygous hemoglobin variants (HbS, HgC, etc)do  not significantly interfere with this assay.   However, presence of multiple variants may affect accuracy.         Medications  Scheduled  Meds:   aspirin  81 mg Oral Daily    atorvastatin  80 mg Oral QHS    enoxaparin  30 mg Subcutaneous Daily    [START ON 3/1/2020] ferrous sulfate  325 mg Oral Every other day    furosemide (LASIX) IV  160 mg Intravenous TID    insulin detemir U-100  5 Units Subcutaneous QHS    levothyroxine  175 mcg Oral Before breakfast    metoprolol succinate  50 mg Oral Daily     Continuous Infusions:  As Needed:  acetaminophen, dextromethorphan-guaifenesin  mg/5 ml, Dextrose 10% Bolus, Dextrose 10% Bolus, glucagon (human recombinant), glucose, glucose, insulin aspart U-100, sodium chloride 0.9%    Active Hospital Problems    Diagnosis  POA    *Acute on chronic combined systolic and diastolic heart failure due to valvular disease [I50.43, I38]  Yes     Nickysner July 2018  Patient has a right dominant coronary artery.      - Left Main Coronary Artery:             The LM is normal. There is VERÓNICA 3 flow.     - Left Anterior Descending Artery:             The mid LAD has a 30% stenosis. There is VERÓNICA 3 flow.     - Left Circumflex Artery:             The LCX is normal. There is VERÓNICA 3 flow.     - OM1:             The ostial OM1 has a 40% stenosis. There is VERÓNICA 3 flow.     - Right Coronary Artery:             The RCA is normal. There is VERÓNICA 3 flow.        Nonischemic cardiomyopathy [I42.8]  Yes    Decompensated heart failure [I50.9]  Yes    Anemia [D64.9]  Yes    Thrombocytopenia, unspecified [D69.6]  Yes    Chronic kidney disease, stage IV (severe) [N18.4]  No    Pseudogout of left wrist [M11.232]  Yes    Hyperlipidemia [E78.5]  Yes    NSVT (nonsustained ventricular tachycardia) [I47.2]  Yes    Essential hypertension [I10]  Yes    Substance use disorder [F19.90]  Yes    Hypothyroidism [E03.9]  Yes     Chronic    Diabetes mellitus, type II [E11.9]  Yes     Chronic      Resolved Hospital Problems   No resolved problems to display.         Assessment and Plan    Acute combined systolic and diastolic heart  failure due to valvular disease   Nonishcemic cardiomyopathy  Unknown etiology since teenager. Nonishcemic via coronary angiogram in July 2018 at showed non obstructive CAD. Weight down from 78.8kg at admission to 70.4kg.   - BNP 2638 on admission  - CXR without overt edema  - echocardiogram repeated here with EF 20%  - On furosemide gtt from admission to 2/27, failed oral transition  - increase furosemide 160mg IV TID  - Goal net negative 1.5L daily  - continue metoprolol tartrate 50 BID, switch to succinate as able  - ARB on hold, would benefit from sacubitril-valsartan  - Strict in/out, daily standing weights  - BMP, Mg, Phos daily, replete PRN  - 1.5L fluid restrict, 2g Na restrict  - Amb follow up with [facility] (needs to be scheduled), to consider sacubitril-valsartan initiation    Nonsustained ventricular tachycardia  In the setting of decompensated systolic heart failure.  - appreciate electrophysiology consultation, feel that he may benefit from optimized GDMT including sacubitril-valsartan prior to proceeding with AICD  - continue metoprolol tartrate, transition to succinate as able    Iron deficiency anemia  - Follow-up iron studies    Chronic kidney disease, stage IV  - reviewed labs, renal function is stable, likely new baseline 3-3.3  - UA: 1+ protein, 2+ occult blood, no RBCs. FeUrea 38% (intrinsic disease)  - US RP: medical renal disease bilaterally  - daily BMP  - avoid nephrotoxins  - May benefit from Ambulatory Nephrology visit at [facility]    Hyperlipidemia  - Continue atorvastatin 80 mg daily  - continue aspirin 81 mg daily    Essential hypertension  - continue furosemide, metoprolol  - Holding ARB    Hypothyroidism  - status post thyroidectomy as a teenager  - continue home levothyroxine 175 mg  - TSH 10.8, free T4 normal    Type 2 diabetes mellitus  - insulin detemir 5u QHS due to episodes of AM hypoglycemia  - Accu-Cheks 4 times daily with meals      Diet:  ADA, 2g sodium  GI PPx:  not  applicable  DVT PPx:   SCDs, start enoxaparin renally dosed  Lines:  PIV  Drains: n/a  Wounds: n/a  Goals of Care:  full code  Discharge Plan:  Anticipate discharge to home with outpatient follow-up in 1-2 days pending successful transition to oral diuretic    Kiesha Paz M.D., M.P.H.  Department of Hospital Medicine  [facility]  (pager) [phone] (spect) [phone]

## 2020-02-29 NOTE — PLAN OF CARE
Problem: Fall Injury Risk  Goal: Absence of Fall and Fall-Related Injury  Outcome: Ongoing, Progressing     Problem: Adult Inpatient Plan of Care  Goal: Plan of Care Review  Outcome: Ongoing, Progressing  Goal: Patient-Specific Goal (Individualization)  Outcome: Ongoing, Progressing     Problem: Diabetes Comorbidity  Goal: Blood Glucose Level Within Desired Range  Outcome: Ongoing, Progressing     Problem: Fluid Imbalance (Heart Failure)  Goal: Fluid Balance  Outcome: Ongoing, Progressing     Problem: Respiratory Compromise (Heart Failure)  Goal: Effective Oxygenation and Ventilation  Outcome: Ongoing, Progressing

## 2020-03-01 NOTE — ASSESSMENT & PLAN NOTE
Cont current basal insulin regimen   Low dose correction scale   Cont blood glucose monitoring   BG goal:  Preprandial blood glucose target <140 mg/dL  Random glucoses <180 mg/dL  Avoid hypoglycemia -  Reduce antihyperglycemic therapy when caloric intake is reduced. Avoid insulin stacking as a result of repeated injection of prandial insulin at close intervals.   Avoid severe hyperglycemia  Ensure adequate nutrition   ADA diet

## 2020-03-01 NOTE — ASSESSMENT & PLAN NOTE
Appreciate EP consult  Continue metoprolol succinate  May benefit from GDMT optimization with addition of entresto when renal function permits   Needs follow up with EP in 4-6 weeks

## 2020-03-01 NOTE — ASSESSMENT & PLAN NOTE
Nonischemic etiology per angiogram in July 2018  Initially did not respond well to diuretic dosages and thought to be secondary to diuretic resistance from CKD  Currently on lasix 160 mg IV TID; will switch back to 80 PO BID per EP recs (plan to switch on 3/2); there was concern about non-compliance with medications   Goal 1.5L net negative / day  Cont to monitor I/O's and daily weights.  Monitor for signs of fluid overload: RR>30, O2 sat<92%, weight gain of >3 lbs, or urinary output <160ml/8hr  Maintain oxygen sats >92% via NC if supplemental oxygen needed.   Continue metoprolol succinate EP recs   HTS follow up upon dc  Hold initiating entresto for now given KEYANNA    Patient Vitals for the past 72 hrs (Last 3 readings):   Weight   02/29/20 0457 70.4 kg (155 lb 3.3 oz)   02/28/20 0631 72.9 kg (160 lb 11.5 oz)

## 2020-03-01 NOTE — SUBJECTIVE & OBJECTIVE
Interval History: Net -7L since admit. Feels well. On RA.     Review of Systems   Constitutional: Negative for chills, fatigue and fever.   HENT: Negative.    Respiratory: Positive for shortness of breath (improved). Negative for cough and wheezing.    Cardiovascular: Negative for chest pain and palpitations.   Gastrointestinal: Negative for abdominal pain, diarrhea and vomiting.   Genitourinary: Negative for difficulty urinating and hematuria.   Musculoskeletal: Negative.    Neurological: Negative for light-headedness and headaches.   Psychiatric/Behavioral: Negative for hallucinations. The patient is not nervous/anxious.      Objective:     Vital Signs (Most Recent):  Temp: 98.7 °F (37.1 °C) (03/01/20 0800)  Pulse: 81 (03/01/20 0800)  Resp: 18 (03/01/20 0800)  BP: (!) 99/55 (03/01/20 0800)  SpO2: 98 % (03/01/20 0800) Vital Signs (24h Range):  Temp:  [97.5 °F (36.4 °C)-98.7 °F (37.1 °C)] 98.7 °F (37.1 °C)  Pulse:  [74-92] 81  Resp:  [16-18] 18  SpO2:  [94 %-100 %] 98 %  BP: ()/(55-82) 99/55     Weight: 70.4 kg (155 lb 3.3 oz)  Body mass index is 21.65 kg/m².    Intake/Output Summary (Last 24 hours) at 3/1/2020 0905  Last data filed at 3/1/2020 0927  Gross per 24 hour   Intake --   Output 3000 ml   Net -3000 ml      Physical Exam   Constitutional: He is oriented to person, place, and time. He appears well-developed and well-nourished. No distress.   Cardiovascular: Normal rate.   Pulmonary/Chest: Effort normal. No respiratory distress.   Basilar crackles    Abdominal: Soft. Bowel sounds are normal.   Musculoskeletal: He exhibits edema (trace BL LE).   Neurological: He is alert and oriented to person, place, and time.   Psychiatric: He has a normal mood and affect.       Significant Labs: All pertinent labs within the past 24 hours have been reviewed.    Significant Imaging: I have reviewed all pertinent imaging results/findings within the past 24 hours.

## 2020-03-01 NOTE — ASSESSMENT & PLAN NOTE
Lab Results   Component Value Date    CREATININE 3.3 (H) 02/29/2020     Sr Cr stable at baseline   Cont to monitor with daily labs   Avoid nephrotoxins.   Renally dose all medications   Monitor events that may lead to decreased renal perfusion (hypovolemia, hypotension, sepsis).   Monitor urine output (goal 0.5 mL/kg/hr) to assure that no obstruction precipitates worsening in GFR.

## 2020-03-02 NOTE — PLAN OF CARE
03/02/20 1130   Discharge Reassessment   Assessment Type Discharge Planning Reassessment   Provided patient/caregiver education on the expected discharge date and the discharge plan Yes   Do you have any problems affording any of your prescribed medications? No   Discharge Plan A Home   Discharge Plan B Home   DME Needed Upon Discharge  none   Anticipated Discharge Disposition Home   Can the patient/caregiver answer the patient profile reliably? Yes, cognitively intact   How does the patient rate their overall health at the present time? Fair   Describe the patient's ability to walk at the present time. No restrictions   How often would a person be available to care for the patient? Whenever needed   Post-Acute Status   Other Status No Post-Acute Service Needs

## 2020-03-02 NOTE — PLAN OF CARE
03/02/20 0828   Post-Acute Status   Post-Acute Authorization Other   Other Status No Post-Acute Service Needs

## 2020-03-02 NOTE — PROGRESS NOTES
[facility] Hospital Medicine  Progress Note    Patient Name: Ashish Mckeon  MRN: [id number]  Patient Class: IP- Inpatient   Admission Date: 2/25/2020  Length of Stay: 5 days  Attending Physician: Damion Whitman MD  Primary Care Provider: [facility] Hospital Medicine Team: [facility] HOSP MED B Damion Whitman MD    Subjective:     Principal Problem:Acute on chronic combined systolic and diastolic heart failure due to valvular disease        HPI:  No notes on file    Overview/Hospital Course:  No notes on file    Interval History: Net -7L since admit. Feels well. On RA.     Review of Systems   Constitutional: Negative for chills, fatigue and fever.   HENT: Negative.    Respiratory: Positive for shortness of breath (improved). Negative for cough and wheezing.    Cardiovascular: Negative for chest pain and palpitations.   Gastrointestinal: Negative for abdominal pain, diarrhea and vomiting.   Genitourinary: Negative for difficulty urinating and hematuria.   Musculoskeletal: Negative.    Neurological: Negative for light-headedness and headaches.   Psychiatric/Behavioral: Negative for hallucinations. The patient is not nervous/anxious.      Objective:     Vital Signs (Most Recent):  Temp: 98.7 °F (37.1 °C) (03/01/20 0800)  Pulse: 81 (03/01/20 0800)  Resp: 18 (03/01/20 0800)  BP: (!) 99/55 (03/01/20 0800)  SpO2: 98 % (03/01/20 0800) Vital Signs (24h Range):  Temp:  [97.5 °F (36.4 °C)-98.7 °F (37.1 °C)] 98.7 °F (37.1 °C)  Pulse:  [74-92] 81  Resp:  [16-18] 18  SpO2:  [94 %-100 %] 98 %  BP: ()/(55-82) 99/55     Weight: 70.4 kg (155 lb 3.3 oz)  Body mass index is 21.65 kg/m².    Intake/Output Summary (Last 24 hours) at 3/1/2020 0993  Last data filed at 3/1/2020 0927  Gross per 24 hour   Intake --   Output 3000 ml   Net -3000 ml      Physical Exam   Constitutional: He is oriented to person, place, and time. He appears well-developed and well-nourished. No distress.    Cardiovascular: Normal rate.   Pulmonary/Chest: Effort normal. No respiratory distress.   Basilar crackles    Abdominal: Soft. Bowel sounds are normal.   Musculoskeletal: He exhibits edema (trace BL LE).   Neurological: He is alert and oriented to person, place, and time.   Psychiatric: He has a normal mood and affect.       Significant Labs: All pertinent labs within the past 24 hours have been reviewed.    Significant Imaging: I have reviewed all pertinent imaging results/findings within the past 24 hours.      Assessment/Plan:      * Acute on chronic combined systolic and diastolic heart failure due to valvular disease  Nonischemic etiology per angiogram in July 2018  Initially did not respond well to diuretic dosages and thought to be secondary to diuretic resistance from CKD  Currently on lasix 160 mg IV TID; will switch back to 80 PO BID per EP recs (plan to switch on 3/2); there was concern about non-compliance with medications   Goal 1.5L net negative / day  Cont to monitor I/O's and daily weights.  Monitor for signs of fluid overload: RR>30, O2 sat<92%, weight gain of >3 lbs, or urinary output <160ml/8hr  Maintain oxygen sats >92% via NC if supplemental oxygen needed.   Continue metoprolol succinate EP recs   HTS follow up upon dc  Hold initiating entresto for now given KEYANNA    Patient Vitals for the past 72 hrs (Last 3 readings):   Weight   02/29/20 0457 70.4 kg (155 lb 3.3 oz)   02/28/20 0631 72.9 kg (160 lb 11.5 oz)           Chronic kidney disease, stage IV (severe)  Lab Results   Component Value Date    CREATININE 3.3 (H) 02/29/2020     Sr Cr stable at baseline   Cont to monitor with daily labs   Avoid nephrotoxins.   Renally dose all medications   Monitor events that may lead to decreased renal perfusion (hypovolemia, hypotension, sepsis).   Monitor urine output (goal 0.5 mL/kg/hr) to assure that no obstruction precipitates worsening in GFR.          Hyperlipidemia  Continue atorvastatin       NSVT  (nonsustained ventricular tachycardia)  Appreciate EP consult  Continue metoprolol succinate  May benefit from GDMT optimization with addition of entresto when renal function permits   Needs follow up with EP in 4-6 weeks       Essential hypertension  Continue furosemide and metoprolol      Diabetes mellitus, type II  Cont current basal insulin regimen   Low dose correction scale   Cont blood glucose monitoring   BG goal:  Preprandial blood glucose target <140 mg/dL  Random glucoses <180 mg/dL  Avoid hypoglycemia -  Reduce antihyperglycemic therapy when caloric intake is reduced. Avoid insulin stacking as a result of repeated injection of prandial insulin at close intervals.   Avoid severe hyperglycemia  Ensure adequate nutrition   ADA diet        Hypothyroidism  Continue levothyroxine         VTE Risk Mitigation (From admission, onward)         Ordered     enoxaparin injection 30 mg  Daily      02/27/20 1811     IP VTE HIGH RISK PATIENT  Once      02/27/20 1811     Place sequential compression device  Until discontinued      02/25/20 1140                      Damion Whitman MD  Department of Hospital Medicine   [facility]

## 2020-03-03 NOTE — SUBJECTIVE & OBJECTIVE
Interval History:  Feels well. On RA. Creatinine trending down. Transitioned to PO lasix.     Review of Systems   Constitutional: Negative for chills, fatigue and fever.   HENT: Negative.    Respiratory: Negative for cough, shortness of breath (resolved) and wheezing.    Cardiovascular: Negative for chest pain and palpitations.   Gastrointestinal: Negative for abdominal pain, diarrhea and vomiting.   Genitourinary: Negative for difficulty urinating and hematuria.   Musculoskeletal: Negative.    Neurological: Negative for light-headedness and headaches.   Psychiatric/Behavioral: Negative for hallucinations. The patient is not nervous/anxious.      Objective:     Vital Signs (Most Recent):  Temp: 98.5 °F (36.9 °C) (03/02/20 2139)  Pulse: 80 (03/02/20 2139)  Resp: 18 (03/02/20 2139)  BP: 94/73 (03/02/20 2139)  SpO2: (!) 90 % (03/02/20 2139) Vital Signs (24h Range):  Temp:  [97.6 °F (36.4 °C)-98.5 °F (36.9 °C)] 98.5 °F (36.9 °C)  Pulse:  [79-85] 80  Resp:  [14-20] 18  SpO2:  [90 %-97 %] 90 %  BP: ()/(57-73) 94/73     Weight: 70.4 kg (155 lb 3.3 oz)  Body mass index is 21.65 kg/m².    Intake/Output Summary (Last 24 hours) at 3/2/2020 2320  Last data filed at 3/2/2020 0500  Gross per 24 hour   Intake 360 ml   Output 900 ml   Net -540 ml      Physical Exam   Constitutional: He is oriented to person, place, and time. He appears well-developed and well-nourished. No distress.   Cardiovascular: Normal rate.   Pulmonary/Chest: Effort normal. No respiratory distress.   Basilar crackles improved   Abdominal: Soft. Bowel sounds are normal.   Musculoskeletal: He exhibits edema (trace BL LE).   Neurological: He is alert and oriented to person, place, and time.   Psychiatric: He has a normal mood and affect.       Significant Labs: All pertinent labs within the past 24 hours have been reviewed.    Significant Imaging: I have reviewed all pertinent imaging results/findings within the past 24 hours.

## 2020-03-03 NOTE — HPI
"Ashish Mckeon is a 54 y.o. male with a PMH of HTN, HLD, DM, hypothyroidism, CHF (EF 25%, Grade III DD) who presented to the ED on 2/25/2020 diagnosed with  Cough (c/o cough and SOB x1 week, states feels like "fluid backed up")  AAOX 3 c/o dyspnea with minimal exertion, leg swelling  and orthopnea,   reports increased weight gain- currently 170 lb, baseline 158lb over last month .  Patient states he  was  compliant with low salt diet and  Lasix 80 mg p.o. at home. denies chest pain or palpitations. has productive cough with white sputum and intermittent wheezing x 1 day. denies fever chills or myalgia/ sick contacts .       while in ED  has Elevated BNP 2638  and CXR with lungs are clear with normal appearance of pulmonary vasculature. No pleural effusion. No evident pneumothorax..Suspecting diuretic resistance with CKD - 4.  afebrile and saturating well on RA. Given one time dose IV 60 mg lasix in ED and made 250ml urine. .  Troponin  .049. started on   lasix  drip at 10mg/hr   "
Ashish Mckeon is a 54 y.o. M with NICM (EF 20%, G3DD), T2DM, HTN, HLD, CKD, hypothyroidism who was presented 2/25/20 with progressive SOBOE, orthopnea, PND and was admitted for ADHF. He has been diuresed net - 3.7L since admission and reports improvement in symptoms. This morning, patient had recurrent runs of NSVT on tele. EP is being consulted to evaluate for AICD.   Pt had preserved EF of 45% 4-years ago in 2016. Since Jan 2017, his estimated EF has been 35% or lower. He does not have an established Cardiologist and reports distance and inability to drive as his limitation. He follows with [facility] and reports compliance with medications at his time, however has been reportedly non-compliant in the past. He was seen by EP in 2018, during his prior admission for HF at which time, it was recommended he follow up in clinic outpatient for ICD.  He denies any symptoms during the NSVT episode today and denies any prior history of LOC or presyncopal symptoms. He can walk up to 1-block before getting SOB and fatigued. 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   No prior history of malignancy, chemo or radiation, or port placed. He is right handed.   
97.5

## 2020-03-03 NOTE — HOSPITAL COURSE
The patient was admitted for acute combined heart failure exacerbation. He was initially poorly responsive to diuretic therapy but seemed to do well on lasix 160 mg IB TID. He was then able to be switched back to lasix 80 mg PO BID which he will continue on dc. He will follow up with [facility] clinic. Due to KEYANNA he was unable to start entresto during this admit. He was noted to have NSVT and EP was consulted. They recommended switching to toprol xl and to follow up with EP in 4-6 weeks. The patient had improvements in his symptoms and was on RA without any dyspnea on ambulation.

## 2020-03-03 NOTE — PLAN OF CARE
03/03/20 0839   Post-Acute Status   Post-Acute Authorization Other   Other Status No Post-Acute Service Needs

## 2020-03-03 NOTE — ASSESSMENT & PLAN NOTE
Nonischemic etiology per angiogram in July 2018  Initially did not respond well to diuretic dosages and thought to be secondary to diuretic resistance from CKD  was on lasix 160 mg IV TID; switched back to 80 PO BID on 3/2 per EP recs; there was concern about non-compliance with medications   Goal 1.5L net negative / day  Cont to monitor I/O's and daily weights.  Monitor for signs of fluid overload: RR>30, O2 sat<92%, weight gain of >3 lbs, or urinary output <160ml/8hr  Maintain oxygen sats >92% via NC if supplemental oxygen needed.   Continue metoprolol succinate EP recs   HTS follow up upon dc  Hold initiating entresto for now given KEYANNA    Patient Vitals for the past 72 hrs (Last 3 readings):   Weight   02/29/20 0457 70.4 kg (155 lb 3.3 oz)

## 2020-03-03 NOTE — DISCHARGE SUMMARY
"[facility] Hospital Medicine  Discharge Summary      Patient Name: Ashish Mckeon  MRN: [id number]  Admission Date: 2/25/2020  Hospital Length of Stay: 7 days  Discharge Date and Time:  03/03/2020 12:40 PM  Attending Physician: Damion Whitman MD   Discharging Provider: Damion Whitman MD  Primary Care Provider: [facility] Hospital Medicine Team: [facility] Damion Whitman MD    HPI:   Ashish Mckeon is a 54 y.o. male with a PMH of HTN, HLD, DM, hypothyroidism, CHF (EF 25%, Grade III DD) who presented to the ED on 2/25/2020 diagnosed with  Cough (c/o cough and SOB x1 week, states feels like "fluid backed up")  AAOX 3 c/o dyspnea with minimal exertion, leg swelling  and orthopnea,   reports increased weight gain- currently 170 lb, baseline 158lb over last month .  Patient states he  was  compliant with low salt diet and  Lasix 80 mg p.o. at home. denies chest pain or palpitations. has productive cough with white sputum and intermittent wheezing x 1 day. denies fever chills or myalgia/ sick contacts .       while in ED  has Elevated BNP 2638  and CXR with lungs are clear with normal appearance of pulmonary vasculature. No pleural effusion. No evident pneumothorax..Suspecting diuretic resistance with CKD - 4.  afebrile and saturating well on RA. Given one time dose IV 60 mg lasix in ED and made 250ml urine. .  Troponin  .049. started on   lasix  drip at 10mg/hr     * No surgery found *      Hospital Course:   The patient was admitted for acute combined heart failure exacerbation. He was initially poorly responsive to diuretic therapy but seemed to do well on lasix 160 mg IB TID. He was then able to be switched back to lasix 80 mg PO BID which he will continue on dc. He will follow up with [facility]. Due to KEYANNA he was unable to start entresto during this admit. He was noted to have NSVT and EP was consulted. They recommended switching to toprol xl and to follow up with EP " in 4-6 weeks. The patient had improvements in his symptoms and was on RA without any dyspnea on ambulation.      Consults:   Consults (From admission, onward)        Status Ordering Provider     Inpatient consult to Electrophysiology  Once     Provider:  (Not yet assigned)    Completed ALL GARCIA     Inpatient consult to Registered Dietitian/Nutritionist  Once     Provider:  (Not yet assigned)    Completed NIEVES JOHNS          * Acute on chronic combined systolic and diastolic heart failure due to valvular disease  Nonischemic etiology per angiogram in July 2018  Initially did not respond well to diuretic dosages and thought to be secondary to diuretic resistance from CKD  was on lasix 160 mg IV TID; switched back to 80 PO BID on 3/2 per EP recs; there was concern about non-compliance with medications   Goal 1.5L net negative / day  Cont to monitor I/O's and daily weights.  Monitor for signs of fluid overload: RR>30, O2 sat<92%, weight gain of >3 lbs, or urinary output <160ml/8hr  Maintain oxygen sats >92% via NC if supplemental oxygen needed.   Continue metoprolol succinate EP recs   HTS follow up upon dc  Hold initiating entresto for now given KEYANNA    No data found.        Chronic kidney disease, stage IV (severe)  Lab Results   Component Value Date    CREATININE 2.8 (H) 03/03/2020     Sr Cr improving   Cont to monitor with daily labs   Avoid nephrotoxins.   Renally dose all medications   Monitor events that may lead to decreased renal perfusion (hypovolemia, hypotension, sepsis).   Monitor urine output (goal 0.5 mL/kg/hr) to assure that no obstruction precipitates worsening in GFR.          Hyperlipidemia  Continue atorvastatin       NSVT (nonsustained ventricular tachycardia)  Appreciate EP consult  Continue metoprolol succinate  May benefit from GDMT optimization with addition of entresto when renal function permits   Needs follow up with EP in 4-6 weeks       Essential hypertension  Continue  furosemide and metoprolol      Diabetes mellitus, type II  Cont current basal insulin regimen   Low dose correction scale   Cont blood glucose monitoring   BG goal:  Preprandial blood glucose target <140 mg/dL  Random glucoses <180 mg/dL  Avoid hypoglycemia -  Reduce antihyperglycemic therapy when caloric intake is reduced. Avoid insulin stacking as a result of repeated injection of prandial insulin at close intervals.   Avoid severe hyperglycemia  Ensure adequate nutrition   ADA diet        Hypothyroidism  Continue levothyroxine         Final Active Diagnoses:    Diagnosis Date Noted POA    PRINCIPAL PROBLEM:  Acute on chronic combined systolic and diastolic heart failure due to valvular disease [I50.43, I38] 02/25/2020 Yes    Chronic kidney disease, stage IV (severe) [N18.4] 02/25/2020 No    Hyperlipidemia [E78.5] 12/22/2018 Yes    NSVT (nonsustained ventricular tachycardia) [I47.2] 07/18/2018 Yes    Essential hypertension [I10] 07/17/2018 Yes    Hypothyroidism [E03.9]  Yes     Chronic    Diabetes mellitus, type II [E11.9]  Yes     Chronic      Problems Resolved During this Admission:       Discharged Condition: good    Disposition: Home or Self Care    Follow Up:    Patient Instructions:      Ambulatory referral/consult to Congestive Heart Failure Clinic   Standing Status: Future   Referral Priority: Routine Referral Type: Consultation   Referral Reason: Specialty Services Required   Requested Specialty: Cardiology   Number of Visits Requested: 1     Ambulatory referral/consult to Electrophysiology   Standing Status: Future   Referral Priority: Routine Referral Type: Consultation   Referral Reason: Specialty Services Required   Requested Specialty: Electrophysiology   Number of Visits Requested: 1       Significant Diagnostic Studies: see imaging    Pending Diagnostic Studies:     None         Medications:  Reconciled Home Medications:      Medication List      START taking these medications     atorvastatin 80 MG tablet  Commonly known as:  LIPITOR  Take 1 tablet (80 mg total) by mouth every evening.     ferrous sulfate 325 (65 FE) MG EC tablet  Take 1 tablet (325 mg total) by mouth every other day.        CHANGE how you take these medications    acetaminophen 325 MG tablet  Commonly known as:  TYLENOL  Take 2 tablets (650 mg total) by mouth every 6 to 8 hours as needed.  What changed:    · how much to take  · when to take this  · reasons to take this     furosemide 80 MG tablet  Commonly known as:  LASIX  Take 1 tablet (80 mg total) by mouth 2 (two) times daily.  What changed:  additional instructions     Levemir U-100 Insulin 100 unit/mL injection  Generic drug:  insulin detemir U-100  Inject 5 Units into the skin every evening.  What changed:  how much to take     metoprolol succinate 50 MG 24 hr tablet  Commonly known as:  TOPROL-XL  Take 1 tablet (50 mg total) by mouth once daily.  What changed:  when to take this        CONTINUE taking these medications    aspirin 81 MG Chew  Take 1 tablet (81 mg total) by mouth once daily.     HYDROcodone-acetaminophen 5-325 mg per tablet  Commonly known as:  NORCO  Take 1 tablet by mouth every 6 (six) hours as needed for Pain.     levothyroxine 175 MCG tablet  Commonly known as:  Synthroid  Take 1 tablet (175 mcg total) by mouth before breakfast.     pantoprazole 40 MG tablet  Commonly known as:  PROTONIX  Take 1 tablet (40 mg total) by mouth daily as needed (heartburn).        STOP taking these medications    glyBURIDE 5 MG tablet  Commonly known as:  DIABETA     losartan 25 MG tablet  Commonly known as:  COZAAR            Indwelling Lines/Drains at time of discharge:   Lines/Drains/Airways     None                    Damion Whitman MD  Department of Hospital Medicine  [facility]

## 2020-03-03 NOTE — PLAN OF CARE
Patient discharged to home.       03/03/20 1308   Final Note   Assessment Type Final Discharge Note   Anticipated Discharge Disposition Home   What phone number can be called within the next 1-3 days to see how you are doing after discharge? [phone]   Hospital Follow Up  Appt(s) scheduled? Yes   Discharge plans and expectations educations in teach back method with documentation complete? Yes

## 2020-03-03 NOTE — PROGRESS NOTES
[facility] Hospital Medicine  Progress Note    Patient Name: Ashish Mckeon  MRN: [id number]  Patient Class: IP- Inpatient   Admission Date: 2/25/2020  Length of Stay: 6 days  Attending Physician: Damion Whitman MD  Primary Care Provider: [facility] Hospital Medicine Team: [facility]OS[facility] MED B Damion Whimtan MD    Subjective:     Principal Problem:Acute on chronic combined systolic and diastolic heart failure due to valvular disease        HPI:  No notes on file    Overview/Hospital Course:  No notes on file    Interval History:  Feels well. On RA. Creatinine trending down. Transitioned to PO lasix.     Review of Systems   Constitutional: Negative for chills, fatigue and fever.   HENT: Negative.    Respiratory: Negative for cough, shortness of breath (resolved) and wheezing.    Cardiovascular: Negative for chest pain and palpitations.   Gastrointestinal: Negative for abdominal pain, diarrhea and vomiting.   Genitourinary: Negative for difficulty urinating and hematuria.   Musculoskeletal: Negative.    Neurological: Negative for light-headedness and headaches.   Psychiatric/Behavioral: Negative for hallucinations. The patient is not nervous/anxious.      Objective:     Vital Signs (Most Recent):  Temp: 98.5 °F (36.9 °C) (03/02/20 2139)  Pulse: 80 (03/02/20 2139)  Resp: 18 (03/02/20 2139)  BP: 94/73 (03/02/20 2139)  SpO2: (!) 90 % (03/02/20 2139) Vital Signs (24h Range):  Temp:  [97.6 °F (36.4 °C)-98.5 °F (36.9 °C)] 98.5 °F (36.9 °C)  Pulse:  [79-85] 80  Resp:  [14-20] 18  SpO2:  [90 %-97 %] 90 %  BP: ()/(57-73) 94/73     Weight: 70.4 kg (155 lb 3.3 oz)  Body mass index is 21.65 kg/m².    Intake/Output Summary (Last 24 hours) at 3/2/2020 2320  Last data filed at 3/2/2020 0500  Gross per 24 hour   Intake 360 ml   Output 900 ml   Net -540 ml      Physical Exam   Constitutional: He is oriented to person, place, and time. He appears well-developed and well-nourished. No  distress.   Cardiovascular: Normal rate.   Pulmonary/Chest: Effort normal. No respiratory distress.   Basilar crackles improved   Abdominal: Soft. Bowel sounds are normal.   Musculoskeletal: He exhibits edema (trace BL LE).   Neurological: He is alert and oriented to person, place, and time.   Psychiatric: He has a normal mood and affect.       Significant Labs: All pertinent labs within the past 24 hours have been reviewed.    Significant Imaging: I have reviewed all pertinent imaging results/findings within the past 24 hours.      Assessment/Plan:      * Acute on chronic combined systolic and diastolic heart failure due to valvular disease  Nonischemic etiology per angiogram in July 2018  Initially did not respond well to diuretic dosages and thought to be secondary to diuretic resistance from CKD  was on lasix 160 mg IV TID; switched back to 80 PO BID on 3/2 per EP recs; there was concern about non-compliance with medications   Goal 1.5L net negative / day  Cont to monitor I/O's and daily weights.  Monitor for signs of fluid overload: RR>30, O2 sat<92%, weight gain of >3 lbs, or urinary output <160ml/8hr  Maintain oxygen sats >92% via NC if supplemental oxygen needed.   Continue metoprolol succinate EP recs   HTS follow up upon dc  Hold initiating entresto for now given KEYANNA    Patient Vitals for the past 72 hrs (Last 3 readings):   Weight   02/29/20 0457 70.4 kg (155 lb 3.3 oz)           Chronic kidney disease, stage IV (severe)  Lab Results   Component Value Date    CREATININE 2.9 (H) 03/02/2020     Sr Cr improving   Cont to monitor with daily labs   Avoid nephrotoxins.   Renally dose all medications   Monitor events that may lead to decreased renal perfusion (hypovolemia, hypotension, sepsis).   Monitor urine output (goal 0.5 mL/kg/hr) to assure that no obstruction precipitates worsening in GFR.          Hyperlipidemia  Continue atorvastatin       NSVT (nonsustained ventricular tachycardia)  Appreciate EP  consult  Continue metoprolol succinate  May benefit from GDMT optimization with addition of entresto when renal function permits   Needs follow up with EP in 4-6 weeks       Essential hypertension  Continue furosemide and metoprolol      Diabetes mellitus, type II  Cont current basal insulin regimen   Low dose correction scale   Cont blood glucose monitoring   BG goal:  Preprandial blood glucose target <140 mg/dL  Random glucoses <180 mg/dL  Avoid hypoglycemia -  Reduce antihyperglycemic therapy when caloric intake is reduced. Avoid insulin stacking as a result of repeated injection of prandial insulin at close intervals.   Avoid severe hyperglycemia  Ensure adequate nutrition   ADA diet        Hypothyroidism  Continue levothyroxine         VTE Risk Mitigation (From admission, onward)         Ordered     enoxaparin injection 30 mg  Daily      02/27/20 1811     IP VTE HIGH RISK PATIENT  Once      02/27/20 1811     Place sequential compression device  Until discontinued      02/25/20 1140                      Damion Whitman MD  Department of Hospital Medicine   [facility]

## 2020-03-03 NOTE — ASSESSMENT & PLAN NOTE
Nonischemic etiology per angiogram in July 2018  Initially did not respond well to diuretic dosages and thought to be secondary to diuretic resistance from CKD  was on lasix 160 mg IV TID; switched back to 80 PO BID on 3/2 per EP recs; there was concern about non-compliance with medications   Goal 1.5L net negative / day  Cont to monitor I/O's and daily weights.  Monitor for signs of fluid overload: RR>30, O2 sat<92%, weight gain of >3 lbs, or urinary output <160ml/8hr  Maintain oxygen sats >92% via NC if supplemental oxygen needed.   Continue metoprolol succinate EP recs   HTS follow up upon dc  Hold initiating entresto for now given KEYANNA    No data found.

## 2020-03-03 NOTE — ASSESSMENT & PLAN NOTE
Lab Results   Component Value Date    CREATININE 2.8 (H) 03/03/2020     Sr Cr improving   Cont to monitor with daily labs   Avoid nephrotoxins.   Renally dose all medications   Monitor events that may lead to decreased renal perfusion (hypovolemia, hypotension, sepsis).   Monitor urine output (goal 0.5 mL/kg/hr) to assure that no obstruction precipitates worsening in GFR.

## 2020-03-03 NOTE — ASSESSMENT & PLAN NOTE
Lab Results   Component Value Date    CREATININE 2.9 (H) 03/02/2020     Sr Cr improving   Cont to monitor with daily labs   Avoid nephrotoxins.   Renally dose all medications   Monitor events that may lead to decreased renal perfusion (hypovolemia, hypotension, sepsis).   Monitor urine output (goal 0.5 mL/kg/hr) to assure that no obstruction precipitates worsening in GFR.

## 2020-03-03 NOTE — NURSING
Patient is ready for discharge. Patient stable alert and oriented. IVs removed. Tele removed. Discussed discharge plan. Reviewed medications and side effects, appointments, and answered questions with patient and family.

## 2020-03-04 NOTE — TELEPHONE ENCOUNTER
Pt needs 4-6wk f/u scheduled per  . He needs to be scheduled as new pt with  ( pt voicemail is not set up)

## 2020-05-08 PROBLEM — R06.02 SOB (SHORTNESS OF BREATH): Status: ACTIVE | Noted: 2020-01-01

## 2020-05-08 NOTE — ASSESSMENT & PLAN NOTE
Non ischemic cardiomyopathy    Patient presents with SOB, edema  - last ECHO 2/26 with EF 20%   - BNP 1992  - stable on room air  - CXR with mild edema  - weight 168lbs on admission  - given Lasix 100mg IVP  - continue 80mg IV BID  - strict Is/Os, daily weights

## 2020-05-08 NOTE — ED NOTES
Receiving nurse, BRYNN Gonzales made aware of HR and rhythm malfunctions with monitors during ER visit. Confirmed HR of 78 with artifact and frequent PVCs per Karon of telemetry.

## 2020-05-08 NOTE — NURSING
Pt arrived to unit via stretcher. AOx4. VSS Denies pain Oriented to room. POC reviewed. Pt unable to stand on standing scale 2/2 weakness and preferred to not ambulate at this time.     VISI applied to pt.

## 2020-05-08 NOTE — HPI
Patient is a 53yo male with a PMHx of HTN, HLD, DM2, hypothyroidism, and CHF (EF 20%) being admitted to medicine for acute CHF exacerbation. Patient reports worsening shortness of breath over the last 2 weeks. He reports associated productive cough, orthopnea, abdominal distension, and LE swelling. He denies dietary indiscretion and states he is compliant with his home medications. He denies fevers, chills, nausea, abdominal pain, and diarrhea. He denies sick contacts or known Covid exposures.    In the ED, vitals stable on room air. Intake labs remarkable for Cr 4.0 (baseline 2.8), BNP 1992, troponin 0.070, INR 1.5, Tbili 3.7, AST 58, alk phos 251. He was Covid negative. He was given Lasix 100mg IVP and transferred to the floor.

## 2020-05-08 NOTE — MEDICAL/APP STUDENT
Progress Note    Primary Team: [facility] MED B  Admit Date: 5/8/2020   Length of Stay:  LOS: 0 days   SUBJECTIVE:   Reason for Admission:  Acute on chronic combined systolic and diastolic heart failure due to valvular disease    HPI:  54 year old man PMH HTN, HLD, T2DM, hypothyroid, CHF with EF 20% presented last night to ED for increasing shortness of breath for the past couple weeks, cough, abdominal distention and lower extremity swelling. Also complaining of generalized weakness x2 weeks. Denies any changes of medication or doses. No increased intake of salty food. In ED, Cr 4.0 from baseline 2.8. Denies fever, chest pain, nausea, vomiting, diarrhea, dysuria, hematuria. States eating and drinking less over the last couple days, feeling full. Last bowel movement yesterday.     Review of Systems   Constitutional: Negative for fever.   Respiratory: Positive for cough and shortness of breath.    Cardiovascular: Positive for orthopnea and leg swelling. Negative for chest pain.   Gastrointestinal: Positive for constipation. Negative for abdominal pain, diarrhea, nausea and vomiting.   Genitourinary: Negative for dysuria and hematuria.   Neurological: Positive for weakness. Negative for dizziness and loss of consciousness.       OBJECTIVE:     Temp:  [97.7 °F (36.5 °C)-98.8 °F (37.1 °C)]   Pulse:  [44-79]   Resp:  [10-24]   BP: ()/(60-97)   SpO2:  [93 %-99 %]  Body mass index is 23.52 kg/m².  Intake/Outake:  This Shift:  I/O this shift:  In: 970 [P.O.:720; I.V.:250]  Out: 300 [Urine:300]    Net I/O past 24h:     Intake/Output Summary (Last 24 hours) at 5/8/2020 1221  Last data filed at 5/8/2020 0932  Gross per 24 hour   Intake 1070 ml   Output 550 ml   Net 520 ml             Physical Exam:  Physical Exam   Constitutional: He is oriented to person, place, and time. He appears well-developed and well-nourished.   HENT:   Head: Normocephalic and atraumatic.   Eyes: EOM are normal.   Neck: Normal range of motion. JVD  present.   Cardiovascular: Normal rate and regular rhythm.   Bilateral LE pitting edema 2+   Pulmonary/Chest: Effort normal. No respiratory distress. He has no wheezes. He has rales.   Abdominal: Bowel sounds are normal. He exhibits distension. There is no tenderness.   No asterixis    Neurological: He is alert and oriented to person, place, and time. He exhibits normal muscle tone.   Skin: Skin is warm and dry.       Laboratory:  CBC/Anemia Labs: Coags:    Recent Labs   Lab 05/08/20 0019 05/08/20 0412   WBC 3.76* 3.63*   HGB 12.9* 12.3*   HCT 42.7 40.3   PLT 95* 91*   MCV 78* 78*   RDW 23.9* 23.2*    Recent Labs   Lab 05/08/20 0019   INR 1.5*        Chemistries:   Recent Labs   Lab 05/08/20 0019 05/08/20 0412    137   K 4.4 3.6   CL 98 98   CO2 21* 23   BUN 85* 86*   CREATININE 4.0* 4.0*   CALCIUM 8.1* 8.0*   PROT 7.2 6.7   BILITOT 3.7* 3.3*   ALKPHOS 251* 236*   ALT 43 38   AST 58* 43*        Medications:  Scheduled Meds:   aspirin  81 mg Oral Daily    ferrous sulfate  325 mg Oral Every other day    furosemide (LASIX) IV  80 mg Intravenous BID loop    heparin (porcine)  5,000 Units Subcutaneous Q8H    levothyroxine  175 mcg Oral Before breakfast    polyethylene glycol  17 g Oral Daily                             Continuous Infusions:  PRN Meds:.acetaminophen, albuterol-ipratropium, bisacodyL, dextrose 10 % in water (D10W), dextrose 10 % in water (D10W), glucagon (human recombinant), glucose, glucose, insulin aspart U-100, melatonin, ondansetron, promethazine (PHENERGAN) IVPB     ASSESSMENT/PLAN:   Acute Kidney Injury  - Cr 4.0 from baseline 2.8  -BUN 86  - KEYANNA likely due to CHF exacerbation and fluid overload. Other causes include dehydration from decreased oral intake over the last couple days.    - Increase Lasix 120BID    - Urine protein:creatinine level   - Monitor daily renal function   - Strict Is and Os   - Renally dose all medications   - Avoid nephrotoxins    Esme  Harvey  Nephrology  MS4

## 2020-05-08 NOTE — ASSESSMENT & PLAN NOTE
- home insulin: insulin detemir 5u qhs  - hospital insulin: low dose SSI, ACHS  - cardiac diabetic diet

## 2020-05-08 NOTE — H&P
[facility] Hospital Medicine  History & Physical    Patient Name: Ashish Mckeon  MRN: [id number]  Admission Date: 5/8/2020  Attending Physician: Lorene Alexandre MD   Primary Care Provider: [facility] Hospital Medicine Team: [facility]OSP MED B Juan Carlos Adams PA-C     Patient information was obtained from patient, past medical records and ER records.     Subjective:     Principal Problem:Acute on chronic combined systolic and diastolic heart failure due to valvular disease    Chief Complaint:   Chief Complaint   Patient presents with    Shortness of Breath      2 weeks. on and off leg swelling and abdominal swelling. denies chestpain, fever or chills        HPI: Patient is a 55yo male with a PMHx of HTN, HLD, DM2, hypothyroidism, and CHF (EF 20%) being admitted to medicine for acute CHF exacerbation. Patient reports worsening shortness of breath over the last 2 weeks. He reports associated productive cough, orthopnea, abdominal distension, and LE swelling. He denies dietary indiscretion and states he is compliant with his home medications. He denies fevers, chills, nausea, abdominal pain, and diarrhea. He denies sick contacts or known Covid exposures.    In the ED, vitals stable on room air. Intake labs remarkable for Cr 4.0 (baseline 2.8), BNP 1992, troponin 0.070, INR 1.5, Tbili 3.7, AST 58, alk phos 251. He was Covid negative. He was given Lasix 100mg IVP and transferred to the floor.      Past Medical History:   Diagnosis Date    CHF (congestive heart failure)     Diabetes mellitus type II     Essential hypertension, benign     Hyperlipidemia     Hypothyroidism     Pain and swelling of left wrist 8/5/2019    Ashish Mckeon is a 54 y.o. male with PMH of  presenting with CHF, IDDM, Hypothyroidism, HTN, HLD presenting with worsening L wrist pain for 1 day. Orthopedic surgery was consulted for septic joint r/o. Pt has been afebrile and has no elevated WBC. ESR 36,  "normal CRP. Xray shows no signs of trauma and pt has no hx of gout or septic arthritis. Due to atraumatic wrist pain and swelling w/out identifia    Undiagnosed cardiac murmurs        Past Surgical History:   Procedure Laterality Date    COLON SURGERY      "lower intestines removed"    ORTHOPEDIC SURGERY Left     wrist    THYROID SURGERY         Review of patient's allergies indicates:   Allergen Reactions    Lisinopril Other (See Comments)     acei cough         No current facility-administered medications on file prior to encounter.      Current Outpatient Medications on File Prior to Encounter   Medication Sig    aspirin 81 MG Chew Take 1 tablet (81 mg total) by mouth once daily.    ferrous sulfate 325 (65 FE) MG EC tablet Take 1 tablet (325 mg total) by mouth every other day.    furosemide (LASIX) 80 MG tablet Take 1 tablet (80 mg total) by mouth 2 (two) times daily.    insulin detemir U-100 (LEVEMIR) 100 unit/mL injection Inject 5 Units into the skin every evening.    levothyroxine (SYNTHROID) 175 MCG tablet Take 1 tablet (175 mcg total) by mouth before breakfast.    metoprolol succinate (TOPROL-XL) 50 MG 24 hr tablet Take 1 tablet (50 mg total) by mouth once daily.    acetaminophen (TYLENOL) 325 MG tablet Take 2 tablets (650 mg total) by mouth every 6 to 8 hours as needed. (Patient taking differently: Take 325 mg by mouth daily as needed for Pain. )    HYDROcodone-acetaminophen (NORCO) 5-325 mg per tablet Take 1 tablet by mouth every 6 (six) hours as needed for Pain.    [DISCONTINUED] atorvastatin (LIPITOR) 80 MG tablet Take 1 tablet (80 mg total) by mouth every evening.    [DISCONTINUED] pantoprazole (PROTONIX) 40 MG tablet Take 1 tablet (40 mg total) by mouth daily as needed (heartburn).     Family History     Problem Relation (Age of Onset)    Cancer Father        Tobacco Use    Smoking status: Never Smoker    Smokeless tobacco: Never Used    Tobacco comment: cigars "every other week or " "so"   Substance and Sexual Activity    Alcohol use: Not Currently     Alcohol/week: 3.0 standard drinks     Types: 3 Cans of beer per week    Drug use: Not Currently     Types: Marijuana    Sexual activity: Yes     Partners: Female     Review of Systems   Constitutional: Positive for unexpected weight change. Negative for activity change, chills and fever.   HENT: Negative for trouble swallowing.    Eyes: Negative for photophobia and visual disturbance.   Respiratory: Positive for cough and shortness of breath. Negative for chest tightness and wheezing.    Cardiovascular: Positive for leg swelling. Negative for chest pain and palpitations.   Gastrointestinal: Positive for abdominal distention. Negative for abdominal pain, constipation, diarrhea, nausea and vomiting.   Genitourinary: Negative for dysuria, frequency, hematuria and urgency.   Musculoskeletal: Negative for arthralgias, back pain and gait problem.   Skin: Negative for color change and rash.   Neurological: Negative for dizziness, syncope, weakness, light-headedness, numbness and headaches.   Psychiatric/Behavioral: Negative for agitation and confusion. The patient is not nervous/anxious.      Objective:     Vital Signs (Most Recent):  Temp: 97.8 °F (36.6 °C) (05/08/20 0306)  Pulse: 78 (05/08/20 0306)  Resp: 20 (05/08/20 0306)  BP: (!) 122/97 (05/08/20 0306)  SpO2: 96 % (05/08/20 0306) Vital Signs (24h Range):  Temp:  [97.7 °F (36.5 °C)-97.8 °F (36.6 °C)] 97.8 °F (36.6 °C)  Pulse:  [44-78] 78  Resp:  [20] 20  SpO2:  [96 %-99 %] 96 %  BP: ()/(64-97) 122/97     Weight: 76.5 kg (168 lb 10.4 oz)  Body mass index is 23.52 kg/m².    Physical Exam   Constitutional: He is oriented to person, place, and time. He appears well-developed and well-nourished. No distress.   HENT:   Head: Normocephalic and atraumatic.   Mouth/Throat: No oropharyngeal exudate.   Eyes: Pupils are equal, round, and reactive to light. Conjunctivae and EOM are normal.   Neck: Normal " range of motion. Neck supple. JVD present.   Cardiovascular: Normal rate, regular rhythm, normal heart sounds and intact distal pulses.   Pulmonary/Chest: Effort normal. No respiratory distress. He has no wheezes. He has rales.   Abdominal: Soft. Bowel sounds are normal. He exhibits distension. There is no tenderness.   Musculoskeletal: Normal range of motion. He exhibits edema. He exhibits no tenderness.   Lymphadenopathy:     He has no cervical adenopathy.   Neurological: He is alert and oriented to person, place, and time. No cranial nerve deficit or sensory deficit. Coordination normal.   Skin: Skin is warm and dry. Capillary refill takes less than 2 seconds. No rash noted.   Psychiatric: He has a normal mood and affect. His behavior is normal. Judgment and thought content normal.   Nursing note and vitals reviewed.        CRANIAL NERVES     CN III, IV, VI   Pupils are equal, round, and reactive to light.  Extraocular motions are normal.        Significant Labs:   BMP:   Recent Labs   Lab 05/08/20  0019   *      K 4.4   CL 98   CO2 21*   BUN 85*   CREATININE 4.0*   CALCIUM 8.1*     CBC:   Recent Labs   Lab 05/08/20  0019   WBC 3.76*   HGB 12.9*   HCT 42.7   PLT 95*     Troponin:   Recent Labs   Lab 05/08/20 0019   TROPONINI 0.070*     All pertinent labs within the past 24 hours have been reviewed.    Significant Imaging: I have reviewed all pertinent imaging results/findings within the past 24 hours.    Assessment/Plan:     * Acute on chronic combined systolic and diastolic heart failure due to valvular disease  Non ischemic cardiomyopathy    Patient presents with SOB, edema  - last ECHO 2/26 with EF 20%   - BNP 1992  - stable on room air  - CXR with mild edema  - weight 168lbs on admission  - given Lasix 100mg IVP  - continue 80mg IV BID  - strict Is/Os, daily weights    Chronic kidney disease, stage IV (severe)  KEYANNA    - baseline Cr 2.8, currently 4.0  - daily CMP  - nephrology consulted for KEYANNA  on CKD    Essential hypertension  - controlled  - continue metoprolol    Diabetes mellitus, type II  - home insulin: insulin detemir 5u qhs  - hospital insulin: low dose SSI, ACHS  - cardiac diabetic diet    Hypothyroidism  - continue synthroid  - repeat TSH in AM, last 10.8      VTE Risk Mitigation (From admission, onward)         Ordered     Place [place] hose  Until discontinued      05/08/20 0159     Place sequential compression device  Until discontinued      05/08/20 0159                   Juan Carlos Adams PA-C  Department of Hospital Medicine   [facility]

## 2020-05-08 NOTE — SUBJECTIVE & OBJECTIVE
"Past Medical History:   Diagnosis Date    CHF (congestive heart failure)     Diabetes mellitus type II     Essential hypertension, benign     Hyperlipidemia     Hypothyroidism     Pain and swelling of left wrist 8/5/2019    Ashish Mckeon is a 54 y.o. male with PMH of  presenting with CHF, IDDM, Hypothyroidism, HTN, HLD presenting with worsening L wrist pain for 1 day. Orthopedic surgery was consulted for septic joint r/o. Pt has been afebrile and has no elevated WBC. ESR 36, normal CRP. Xray shows no signs of trauma and pt has no hx of gout or septic arthritis. Due to atraumatic wrist pain and swelling w/out identifia    Undiagnosed cardiac murmurs        Past Surgical History:   Procedure Laterality Date    COLON SURGERY      "lower intestines removed"    ORTHOPEDIC SURGERY Left     wrist    THYROID SURGERY         Review of patient's allergies indicates:   Allergen Reactions    Lisinopril Other (See Comments)     acei cough         No current facility-administered medications on file prior to encounter.      Current Outpatient Medications on File Prior to Encounter   Medication Sig    aspirin 81 MG Chew Take 1 tablet (81 mg total) by mouth once daily.    ferrous sulfate 325 (65 FE) MG EC tablet Take 1 tablet (325 mg total) by mouth every other day.    furosemide (LASIX) 80 MG tablet Take 1 tablet (80 mg total) by mouth 2 (two) times daily.    insulin detemir U-100 (LEVEMIR) 100 unit/mL injection Inject 5 Units into the skin every evening.    levothyroxine (SYNTHROID) 175 MCG tablet Take 1 tablet (175 mcg total) by mouth before breakfast.    metoprolol succinate (TOPROL-XL) 50 MG 24 hr tablet Take 1 tablet (50 mg total) by mouth once daily.    acetaminophen (TYLENOL) 325 MG tablet Take 2 tablets (650 mg total) by mouth every 6 to 8 hours as needed. (Patient taking differently: Take 325 mg by mouth daily as needed for Pain. )    HYDROcodone-acetaminophen (NORCO) 5-325 mg per tablet Take 1 tablet by " "mouth every 6 (six) hours as needed for Pain.    [DISCONTINUED] atorvastatin (LIPITOR) 80 MG tablet Take 1 tablet (80 mg total) by mouth every evening.    [DISCONTINUED] pantoprazole (PROTONIX) 40 MG tablet Take 1 tablet (40 mg total) by mouth daily as needed (heartburn).     Family History     Problem Relation (Age of Onset)    Cancer Father        Tobacco Use    Smoking status: Never Smoker    Smokeless tobacco: Never Used    Tobacco comment: cigars "every other week or so"   Substance and Sexual Activity    Alcohol use: Not Currently     Alcohol/week: 3.0 standard drinks     Types: 3 Cans of beer per week    Drug use: Not Currently     Types: Marijuana    Sexual activity: Yes     Partners: Female     Review of Systems   Constitutional: Positive for unexpected weight change. Negative for activity change, chills and fever.   HENT: Negative for trouble swallowing.    Eyes: Negative for photophobia and visual disturbance.   Respiratory: Positive for cough and shortness of breath. Negative for chest tightness and wheezing.    Cardiovascular: Positive for leg swelling. Negative for chest pain and palpitations.   Gastrointestinal: Positive for abdominal distention. Negative for abdominal pain, constipation, diarrhea, nausea and vomiting.   Genitourinary: Negative for dysuria, frequency, hematuria and urgency.   Musculoskeletal: Negative for arthralgias, back pain and gait problem.   Skin: Negative for color change and rash.   Neurological: Negative for dizziness, syncope, weakness, light-headedness, numbness and headaches.   Psychiatric/Behavioral: Negative for agitation and confusion. The patient is not nervous/anxious.      Objective:     Vital Signs (Most Recent):  Temp: 97.8 °F (36.6 °C) (05/08/20 0306)  Pulse: 78 (05/08/20 0306)  Resp: 20 (05/08/20 0306)  BP: (!) 122/97 (05/08/20 0306)  SpO2: 96 % (05/08/20 0306) Vital Signs (24h Range):  Temp:  [97.7 °F (36.5 °C)-97.8 °F (36.6 °C)] 97.8 °F (36.6 " °C)  Pulse:  [44-78] 78  Resp:  [20] 20  SpO2:  [96 %-99 %] 96 %  BP: ()/(64-97) 122/97     Weight: 76.5 kg (168 lb 10.4 oz)  Body mass index is 23.52 kg/m².    Physical Exam   Constitutional: He is oriented to person, place, and time. He appears well-developed and well-nourished. No distress.   HENT:   Head: Normocephalic and atraumatic.   Mouth/Throat: No oropharyngeal exudate.   Eyes: Pupils are equal, round, and reactive to light. Conjunctivae and EOM are normal.   Neck: Normal range of motion. Neck supple. JVD present.   Cardiovascular: Normal rate, regular rhythm, normal heart sounds and intact distal pulses.   Pulmonary/Chest: Effort normal. No respiratory distress. He has no wheezes. He has rales.   Abdominal: Soft. Bowel sounds are normal. He exhibits distension. There is no tenderness.   Musculoskeletal: Normal range of motion. He exhibits edema. He exhibits no tenderness.   Lymphadenopathy:     He has no cervical adenopathy.   Neurological: He is alert and oriented to person, place, and time. No cranial nerve deficit or sensory deficit. Coordination normal.   Skin: Skin is warm and dry. Capillary refill takes less than 2 seconds. No rash noted.   Psychiatric: He has a normal mood and affect. His behavior is normal. Judgment and thought content normal.   Nursing note and vitals reviewed.        CRANIAL NERVES     CN III, IV, VI   Pupils are equal, round, and reactive to light.  Extraocular motions are normal.        Significant Labs:   BMP:   Recent Labs   Lab 05/08/20  0019   *      K 4.4   CL 98   CO2 21*   BUN 85*   CREATININE 4.0*   CALCIUM 8.1*     CBC:   Recent Labs   Lab 05/08/20  0019   WBC 3.76*   HGB 12.9*   HCT 42.7   PLT 95*     Troponin:   Recent Labs   Lab 05/08/20 0019   TROPONINI 0.070*     All pertinent labs within the past 24 hours have been reviewed.    Significant Imaging: I have reviewed all pertinent imaging results/findings within the past 24 hours.

## 2020-05-08 NOTE — PROGRESS NOTES
"[facility] Hospital Medicine  Progress Note    Team: [facility] HOSP MED B   Attending MD: Lorene Alexandre MD  Admit Date: 5/8/2020  KARINA 5/12/2020  Length of Stay:  LOS: 0 days   Code status: Full Code    Principal Problem: Acute on chronic combined systolic and diastolic heart failure due to valvular disease    Interval hx: patient remains with edema/bloating and feeling congested; + orthopnea; lasix 100 and lasix 80 mg given with minimal effect; low glucose this am so changed to diabetic diet    ROS:  Constitutional: no fever or chills  Respiratory: no cough; + shortness of breath  Cardiovascular: no chest pain or palpitations  Gastrointestinal: no nausea or vomiting, no abdominal pain; last BM: 5/8  Musculoskeletal: no arthralgias or myalgias  Neurological: no seizures or tremors        Physical Exam  Temp:  [97.7 °F (36.5 °C)-98.8 °F (37.1 °C)] 98.8 °F (37.1 °C)  Pulse:  [44-79] 68  Resp:  [10-24] 15  SpO2:  [93 %-99 %] 98 %  BP: ()/(60-97) 98/73      Intake/Output Summary (Last 24 hours) at 5/8/2020 1549  Last data filed at 5/8/2020 1414  Gross per 24 hour   Intake 1550 ml   Output 850 ml   Net 700 ml       Wt Readings from Last 1 Encounters:   05/08/20 0254 76.5 kg (168 lb 10.4 oz)   05/08/20 0020 76.4 kg (168 lb 6.9 oz)        Estimated body mass index is 23.52 kg/m² as calculated from the following:    Height as of this encounter: 5' 11" (1.803 m).    Weight as of this encounter: 76.5 kg (168 lb 10.4 oz).    General: well developed, well nourished, no distress  Neck:  + JVD  Lungs:  diminshed BS bilaterally and normal respiratory effort.  Cardiovascular: Heart: regular rate and rhythm, S1, S2 normal, no murmur, click, rub or gallop. Chest Wall: no tenderness. Extremities: no cyanosis, 3+ edema, no clubbing.   Abdomen/Rectal: Abdomen: soft, non-tender, mildly distended; bowel sounds normal  Skin: Skin color, texture, turgor normal. No rashes or lesions.  Neurologic: Normal strength and " tone. No focal numbness or weakness.  Psych/Behavioral:  Alert and oriented, appropriate affect.      Recent Results (from the past 24 hour(s))   CBC auto differential    Collection Time: 05/08/20 12:19 AM   Result Value Ref Range    WBC 3.76 (L) 3.90 - 12.70 K/uL    RBC 5.45 4.60 - 6.20 M/uL    Hemoglobin 12.9 (L) 14.0 - 18.0 g/dL    Hematocrit 42.7 40.0 - 54.0 %    Mean Corpuscular Volume 78 (L) 82 - 98 fL    Mean Corpuscular Hemoglobin 23.7 (L) 27.0 - 31.0 pg    Mean Corpuscular Hemoglobin Conc 30.2 (L) 32.0 - 36.0 g/dL    RDW 23.9 (H) 11.5 - 14.5 %    Platelets 95 (L) 150 - 350 K/uL    MPV SEE COMMENT 9.2 - 12.9 fL    Immature Granulocytes 0.3 0.0 - 0.5 %    Gran # (ANC) 2.2 1.8 - 7.7 K/uL    Immature Grans (Abs) 0.01 0.00 - 0.04 K/uL    Lymph # 0.6 (L) 1.0 - 4.8 K/uL    Mono # 0.7 0.3 - 1.0 K/uL    Eos # 0.2 0.0 - 0.5 K/uL    Baso # 0.04 0.00 - 0.20 K/uL    nRBC 2 (A) 0 /100 WBC    Gran% 58.7 38.0 - 73.0 %    Lymph% 16.5 (L) 18.0 - 48.0 %    Mono% 18.9 (H) 4.0 - 15.0 %    Eosinophil% 4.5 0.0 - 8.0 %    Basophil% 1.1 0.0 - 1.9 %    Differential Method Automated    Comprehensive metabolic panel    Collection Time: 05/08/20 12:19 AM   Result Value Ref Range    Sodium 136 136 - 145 mmol/L    Potassium 4.4 3.5 - 5.1 mmol/L    Chloride 98 95 - 110 mmol/L    CO2 21 (L) 23 - 29 mmol/L    Glucose 128 (H) 70 - 110 mg/dL    BUN, Bld 85 (H) 6 - 20 mg/dL    Creatinine 4.0 (H) 0.5 - 1.4 mg/dL    Calcium 8.1 (L) 8.7 - 10.5 mg/dL    Total Protein 7.2 6.0 - 8.4 g/dL    Albumin 3.1 (L) 3.5 - 5.2 g/dL    Total Bilirubin 3.7 (H) 0.1 - 1.0 mg/dL    Alkaline Phosphatase 251 (H) 55 - 135 U/L    AST 58 (H) 10 - 40 U/L    ALT 43 10 - 44 U/L    Anion Gap 17 (H) 8 - 16 mmol/L    eGFR if African American 18.1 (A) >60 mL/min/1.73 m^2    eGFR if non  15.7 (A) >60 mL/min/1.73 m^2   Troponin I    Collection Time: 05/08/20 12:19 AM   Result Value Ref Range    Troponin I 0.070 (H) 0.000 - 0.026 ng/mL   Brain natriuretic peptide     Collection Time: 05/08/20 12:19 AM   Result Value Ref Range    BNP 1,992 (H) 0 - 99 pg/mL   Protime-INR    Collection Time: 05/08/20 12:19 AM   Result Value Ref Range    Prothrombin Time 15.1 (H) 9.0 - 12.5 sec    INR 1.5 (H) 0.8 - 1.2   COVID-19 Rapid Screening    Collection Time: 05/08/20 12:19 AM   Result Value Ref Range    SARS-CoV-2 RNA, Amplification, Qual Negative Negative   Troponin I    Collection Time: 05/08/20  4:12 AM   Result Value Ref Range    Troponin I 0.065 (H) 0.000 - 0.026 ng/mL   Troponin I    Collection Time: 05/08/20  4:12 AM   Result Value Ref Range    Troponin I 0.065 (H) 0.000 - 0.026 ng/mL   CBC with Automated Differential    Collection Time: 05/08/20  4:12 AM   Result Value Ref Range    WBC 3.63 (L) 3.90 - 12.70 K/uL    RBC 5.17 4.60 - 6.20 M/uL    Hemoglobin 12.3 (L) 14.0 - 18.0 g/dL    Hematocrit 40.3 40.0 - 54.0 %    Mean Corpuscular Volume 78 (L) 82 - 98 fL    Mean Corpuscular Hemoglobin 23.8 (L) 27.0 - 31.0 pg    Mean Corpuscular Hemoglobin Conc 30.5 (L) 32.0 - 36.0 g/dL    RDW 23.2 (H) 11.5 - 14.5 %    Platelets 91 (L) 150 - 350 K/uL    MPV SEE COMMENT 9.2 - 12.9 fL    Immature Granulocytes 0.3 0.0 - 0.5 %    Gran # (ANC) 2.2 1.8 - 7.7 K/uL    Immature Grans (Abs) 0.01 0.00 - 0.04 K/uL    Lymph # 0.6 (L) 1.0 - 4.8 K/uL    Mono # 0.6 0.3 - 1.0 K/uL    Eos # 0.2 0.0 - 0.5 K/uL    Baso # 0.05 0.00 - 0.20 K/uL    nRBC 1 (A) 0 /100 WBC    Gran% 59.7 38.0 - 73.0 %    Lymph% 16.3 (L) 18.0 - 48.0 %    Mono% 17.6 (H) 4.0 - 15.0 %    Eosinophil% 4.7 0.0 - 8.0 %    Basophil% 1.4 0.0 - 1.9 %    Differential Method Automated    Comprehensive Metabolic Panel (CMP)    Collection Time: 05/08/20  4:12 AM   Result Value Ref Range    Sodium 137 136 - 145 mmol/L    Potassium 3.6 3.5 - 5.1 mmol/L    Chloride 98 95 - 110 mmol/L    CO2 23 23 - 29 mmol/L    Glucose 72 70 - 110 mg/dL    BUN, Bld 86 (H) 6 - 20 mg/dL    Creatinine 4.0 (H) 0.5 - 1.4 mg/dL    Calcium 8.0 (L) 8.7 - 10.5 mg/dL    Total Protein  6.7 6.0 - 8.4 g/dL    Albumin 3.0 (L) 3.5 - 5.2 g/dL    Total Bilirubin 3.3 (H) 0.1 - 1.0 mg/dL    Alkaline Phosphatase 236 (H) 55 - 135 U/L    AST 43 (H) 10 - 40 U/L    ALT 38 10 - 44 U/L    Anion Gap 16 8 - 16 mmol/L    eGFR if African American 18.1 (A) >60 mL/min/1.73 m^2    eGFR if non  15.7 (A) >60 mL/min/1.73 m^2   Urinalysis, Reflex to Urine Culture Urine, Clean Catch    Collection Time: 05/08/20  6:19 AM   Result Value Ref Range    Specimen UA Urine, Clean Catch     Color, UA Yellow Yellow, Straw, Shaista    Appearance, UA Hazy (A) Clear    pH, UA 5.0 5.0 - 8.0    Specific Gravity, UA 1.010 1.005 - 1.030    Protein, UA 2+ (A) Negative    Glucose, UA Negative Negative    Ketones, UA Negative Negative    Bilirubin (UA) Negative Negative    Occult Blood UA 2+ (A) Negative    Nitrite, UA Negative Negative    Leukocytes, UA Negative Negative   Urinalysis Microscopic    Collection Time: 05/08/20  6:19 AM   Result Value Ref Range    RBC, UA 3 0 - 4 /hpf    WBC, UA 5 0 - 5 /hpf    Bacteria Few (A) None-Occ /hpf    Squam Epithel, UA 0 /hpf    Non-Squam Epith 1 (A) <1/hpf /hpf    Hyaline Casts, UA 2 (A) 0-1/lpf /lpf    Granular Casts, UA 2 (A) None /lpf    Amorphous, UA Few None-Moderate    Microscopic Comment SEE COMMENT    Troponin I    Collection Time: 05/08/20  7:52 AM   Result Value Ref Range    Troponin I 0.081 (H) 0.000 - 0.026 ng/mL   POCT glucose    Collection Time: 05/08/20  7:58 AM   Result Value Ref Range    POCT Glucose 33 (LL) 70 - 110 mg/dL   POCT glucose    Collection Time: 05/08/20  8:00 AM   Result Value Ref Range    POCT Glucose 39 (LL) 70 - 110 mg/dL   POCT glucose    Collection Time: 05/08/20  8:23 AM   Result Value Ref Range    POCT Glucose 38 (LL) 70 - 110 mg/dL   POCT glucose    Collection Time: 05/08/20  9:00 AM   Result Value Ref Range    POCT Glucose 132 (H) 70 - 110 mg/dL   POCT glucose    Collection Time: 05/08/20 12:08 PM   Result Value Ref Range    POCT Glucose 205 (H) 70 -  110 mg/dL       Recent Labs   Lab 05/08/20  0019 05/08/20  0412   WBC 3.76* 3.63*   HGB 12.9* 12.3*   HCT 42.7 40.3   PLT 95* 91*       Recent Labs   Lab 05/08/20  0019 05/08/20  0412    137   K 4.4 3.6   CL 98 98   CO2 21* 23   BUN 85* 86*   CREATININE 4.0* 4.0*   * 72   CALCIUM 8.1* 8.0*       Recent Labs   Lab 05/08/20 0019 05/08/20  0412   ALKPHOS 251* 236*   ALT 43 38   AST 58* 43*   ALBUMIN 3.1* 3.0*   PROT 7.2 6.7   BILITOT 3.7* 3.3*   INR 1.5*  --        Recent Labs   Lab 05/08/20 0758 05/08/20  0800 05/08/20  0823 05/08/20  0900 05/08/20  1208   POCTGLUCOSE 33* 39* 38* 132* 205*       Recent Labs     05/08/20 0019 05/08/20 0412 05/08/20  0752   TROPONINI 0.070* 0.065*  0.065* 0.081*       Hemoglobin A1C   Date Value Ref Range Status   02/25/2020 7.5 (H) 4.0 - 5.6 % Final     Comment:     ADA Screening Guidelines:  5.7-6.4%  Consistent with prediabetes  >or=6.5%  Consistent with diabetes  High levels of fetal hemoglobin interfere with the HbA1C  assay. Heterozygous hemoglobin variants (HbS, HgC, etc)do  not significantly interfere with this assay.   However, presence of multiple variants may affect accuracy.     08/05/2019 >14.0 (H) 4.0 - 5.6 % Final     Comment:     ADA Screening Guidelines:  5.7-6.4%  Consistent with prediabetes  >or=6.5%  Consistent with diabetes  High levels of fetal hemoglobin interfere with the HbA1C  assay. Heterozygous hemoglobin variants (HbS, HgC, etc)do  not significantly interfere with this assay.   However, presence of multiple variants may affect accuracy.     11/26/2018 7.5 (H) 4.0 - 5.6 % Final     Comment:     ADA Screening Guidelines:  5.7-6.4%  Consistent with prediabetes  >or=6.5%  Consistent with diabetes  High levels of fetal hemoglobin interfere with the HbA1C  assay. Heterozygous hemoglobin variants (HbS, HgC, etc)do  not significantly interfere with this assay.   However, presence of multiple variants may affect accuracy.         Scheduled Meds:    aspirin  81 mg Oral Daily    ferrous sulfate  325 mg Oral Every other day    furosemide (LASIX) IV  120 mg Intravenous BID loop    heparin (porcine)  5,000 Units Subcutaneous Q8H    levothyroxine  175 mcg Oral Before breakfast    polyethylene glycol  17 g Oral Daily       Continuous Infusions:    As Needed: acetaminophen, albuterol-ipratropium, bisacodyL, dextrose 10 % in water (D10W), dextrose 10 % in water (D10W), glucagon (human recombinant), glucose, glucose, insulin aspart U-100, melatonin, ondansetron, promethazine (PHENERGAN) IVPB    Active Hospital Problems    Diagnosis  POA    *Acute on chronic combined systolic and diastolic heart failure due to valvular disease [I50.43, I38]  Yes     [facility] July 2018  Patient has a right dominant coronary artery.      - Left Main Coronary Artery:             The LM is normal. There is VERÓNICA 3 flow.     - Left Anterior Descending Artery:             The mid LAD has a 30% stenosis. There is VERÓNICA 3 flow.     - Left Circumflex Artery:             The LCX is normal. There is VERÓNICA 3 flow.     - OM1:             The ostial OM1 has a 40% stenosis. There is VERÓNICA 3 flow.     - Right Coronary Artery:             The RCA is normal. There is VERÓNICA 3 flow.        Nonischemic cardiomyopathy [I42.8]  Yes    Chronic kidney disease, stage IV (severe) [N18.4]  Yes    Pulmonary nodules [R91.8]  Yes    Essential hypertension [I10]  Yes    Hypothyroidism [E03.9]  Yes     Chronic    Diabetes mellitus, type II [E11.9]  Yes     Chronic      Resolved Hospital Problems   No resolved problems to display.       Overview:    Assessment and Plan    * Acute on chronic combined systolic and diastolic heart failure due to valvular disease  Non ischemic cardiomyopathy     Patient presents with SOB, edema  - last ECHO 2/26 with EF 20%   - BNP 1992  - stable on room air  - CXR with mild edema  - weight 168lbs on admission  - given Lasix 100mg IVP  - increase to 120mg IV BID per nephrology  -  strict Is/Os, daily weights  -consider Cardiology consult as patient with low BP and may benefit from inotropic therapy - patient was due to f/u with Dr. Paz as outpatient     Chronic kidney disease, stage IV (severe)  KEYANNA  - baseline Cr 2.8, currently 4.0 and unchanged from admit  - daily CMP  - nephrology consulted for KEYANNA on CKD - lasix dose increased and urine studies ordered     Essential hypertension  - controlled  - continue metoprolol     Diabetes mellitus, type II  - home insulin: insulin detemir 5u qhs  - hospital insulin: low dose SSI, ACHS  - discontinue diabetic diet     Hypothyroidism  - continue synthroid  - repeat TSH in AM, last 10      Diet: Diet Cardiac Fluid - 1200mL   DVT PPx:   VTE Risk Mitigation (From admission, onward)         Ordered     heparin (porcine) injection 5,000 Units  Every 8 hours      05/08/20 1053     Place [place] hose  Until discontinued      05/08/20 0159     Place sequential compression device  Until discontinued      05/08/20 0159                Goals of Care: full code    Discharge plan: home        Lorene Alexandre MD  Staff Hospitalist  [phone]

## 2020-05-08 NOTE — ASSESSMENT & PLAN NOTE
KEYANNA    - baseline Cr 2.8, currently 4.0  - daily CMP  - nephrology consulted for KEYANNA on CKD

## 2020-05-08 NOTE — NURSING
BG 33 upon AM check; slurred speech; pt complaining of tremors. 3 cups of OJ administered; pt unable to tolerate glucose tablets. 250 cc of D10 administered. BG recheck 132. Pt ate 25% of breakfast; decreased appetite noted due to FVO and abdominal discomfort.    Afternoon ; Sliding scale not administered at this time due to hypoglycemic episode and no appetite per pt. WCTM

## 2020-05-08 NOTE — ED PROVIDER NOTES
"Encounter Date: 5/7/2020       History     Chief Complaint   Patient presents with    Shortness of Breath      2 weeks. on and off leg swelling and abdominal swelling. denies chestpain, fever or chills     Ashish Mckeon is a 54 y.o. male with a PMH of HTN, HLD, DM, hypothyroidism, CHF (EF 25%, Grade III DD) who [facility] on 5/8 c/o 2 weeks of progressive SOB and intermittently productive cough of clear sputum. He also endorses progressive b/l LE pitting edema and abdominal distension. When prompted, he also believes that his eyes are a little more jaundiced than baseline. He reports being compliant with all of his medications and diet.         Review of patient's allergies indicates:   Allergen Reactions    Lisinopril Other (See Comments)     acei cough       Past Medical History:   Diagnosis Date    CHF (congestive heart failure)     Diabetes mellitus type II     Essential hypertension, benign     Hyperlipidemia     Hypothyroidism     Pain and swelling of left wrist 8/5/2019    Ashish Mckeon is a 54 y.o. male with PMH of  presenting with CHF, IDDM, Hypothyroidism, HTN, HLD presenting with worsening L wrist pain for 1 day. Orthopedic surgery was consulted for septic joint r/o. Pt has been afebrile and has no elevated WBC. ESR 36, normal CRP. Xray shows no signs of trauma and pt has no hx of gout or septic arthritis. Due to atraumatic wrist pain and swelling w/out identifia    Undiagnosed cardiac murmurs      Past Surgical History:   Procedure Laterality Date    COLON SURGERY      "lower intestines removed"    ORTHOPEDIC SURGERY Left     wrist    THYROID SURGERY       Family History   Problem Relation Age of Onset    Cancer Father      Social History     Tobacco Use    Smoking status: Never Smoker    Smokeless tobacco: Never Used    Tobacco comment: cigars "every other week or so"   Substance Use Topics    Alcohol use: Not Currently     Alcohol/week: 3.0 standard drinks     Types: 3 Cans of " beer per week    Drug use: Not Currently     Types: Marijuana     Review of Systems   Constitutional: Negative for chills, diaphoresis and fever.   HENT: Negative for rhinorrhea, sneezing and sore throat.    Eyes: Negative for redness and visual disturbance.   Respiratory: Positive for cough (intermittently productive, clear sputum) and shortness of breath (progressively worsening). Negative for choking and chest tightness.    Cardiovascular: Positive for leg swelling (b/l). Negative for chest pain and palpitations.   Gastrointestinal: Positive for abdominal distention. Negative for abdominal pain, diarrhea, nausea and vomiting.   Genitourinary: Positive for frequency (2/2 lasix). Negative for dysuria, flank pain and hematuria.   Musculoskeletal: Negative for arthralgias and back pain.   Skin: Negative for pallor and rash.   Neurological: Negative for dizziness, weakness, light-headedness and headaches.   Psychiatric/Behavioral: Negative for confusion and decreased concentration.       Physical Exam     Initial Vitals [05/07/20 2356]   BP Pulse Resp Temp SpO2   97/64 77 20 97.7 °F (36.5 °C) 98 %      MAP       --         Physical Exam    Constitutional: He appears well-developed and well-nourished.   HENT:   Head: Normocephalic and atraumatic.   Eyes: EOM are normal. Pupils are equal, round, and reactive to light.   Neck: Normal range of motion. JVD present.   Cardiovascular: Normal rate, normal heart sounds and intact distal pulses.   Pulmonary/Chest: Effort normal. No respiratory distress. He has decreased breath sounds in the right lower field and the left lower field. He has wheezes.   Abdominal: Soft. He exhibits distension. There is no tenderness. There is no rebound and no guarding.   Musculoskeletal:        Right lower leg: He exhibits edema (2+ pitting).        Left lower leg: He exhibits edema (2+ pitting).   Neurological: He is alert and oriented to person, place, and time. He is not disoriented. No  cranial nerve deficit.   Skin: Skin is warm. No erythema.   Psychiatric: He has a normal mood and affect. His behavior is normal. His mood appears not anxious. He is not agitated.         ED Course   Procedures  Labs Reviewed   CBC W/ AUTO DIFFERENTIAL - Abnormal; Notable for the following components:       Result Value    WBC 3.76 (*)     Hemoglobin 12.9 (*)     Mean Corpuscular Volume 78 (*)     Mean Corpuscular Hemoglobin 23.7 (*)     Mean Corpuscular Hemoglobin Conc 30.2 (*)     RDW 23.9 (*)     Platelets 95 (*)     Lymph # 0.6 (*)     nRBC 2 (*)     Lymph% 16.5 (*)     Mono% 18.9 (*)     All other components within normal limits   PROTIME-INR - Abnormal; Notable for the following components:    Prothrombin Time 15.1 (*)     INR 1.5 (*)     All other components within normal limits   COMPREHENSIVE METABOLIC PANEL   TROPONIN I   B-TYPE NATRIURETIC PEPTIDE   SARS-COV-2 RNA AMPLIFICATION, QUAL          Imaging Results    None          Medical Decision Making:   Initial Assessment:   Patient with sob, cough, abdominal distension and b/l LE pitting edema with history of heart failure with decreased EF and previous hospitalizations of the same nature, requiring IV lasix diuresis. Additionally with subjective increase in jaundice of eyes. Denies fever, chills, n/v  Differential Diagnosis:   CHF exacerbation v PNA v COVID   Clinical Tests:   Lab Tests: Ordered and Reviewed  Radiological Study: Ordered and Reviewed  Medical Tests: Ordered and Reviewed  ED Management:  Patient given 100 IV Lasix along with standing weight and I/O. CXR obtained. Bili elevated. Cr elevated, with worsening KEYANNA. Troponin mildly elevated. Signs of end organ damage in setting of CHF exacerbation. Admitted to hospital medicine.              Attending Attestation:   Physician Attestation Statement for Resident:  As the supervising MD   Physician Attestation Statement: I have personally seen and examined this patient.   I agree with the above  history. -:   As the supervising MD I agree with the above PE.   -: 55-year-old male with shortness of breath.  No respiratory distress at this time.  Currently stable.  Findings consistent with CHF exacerbation.  Also with KEYANNA up to 4.1.  Does have elevated bilirubin with some increased icterus.  Unknown etiology at this time.  Will admit to Hospital Medicine for further evaluation management.   As the supervising MD I agree with the above treatment, course, plan, and disposition.  I have reviewed and agree with the residents interpretation of the following: lab data and x-rays.                                  Clinical Impression:       ICD-10-CM ICD-9-CM   1. SOB (shortness of breath) R06.02 786.05         Disposition:   Disposition: Admitted  Condition: Stable                        Jeremy Birmingham MD  Resident  05/08/20 0112       Chucho Erazo DO  05/08/20 0125

## 2020-05-08 NOTE — ED NOTES
Dr. Birmingham notified of trouble obtaining rhythm and rate in pt.'s visit in ER. Monitor intermittently reads HR in 40s with frequent PVCs and somewhat flat rhythm. No further orders at this time. Pt's verbalizes weakness xfew weeks. Pt is AAOx4.

## 2020-05-08 NOTE — ED TRIAGE NOTES
Ashish Mckeon, a 55 y.o. male presents to the ED w/ complaint of shortness of breath, swollen abdomen and bilateral swelling to legs.     Triage note:  Chief Complaint   Patient presents with    Shortness of Breath      2 weeks. on and off leg swelling and abdominal swelling. denies chestpain, fever or chills     Review of patient's allergies indicates:   Allergen Reactions    Lisinopril Other (See Comments)     acei cough       Past Medical History:   Diagnosis Date    CHF (congestive heart failure)     Diabetes mellitus type II     Essential hypertension, benign     Hyperlipidemia     Hypothyroidism     Pain and swelling of left wrist 8/5/2019    Ashish Mckeon is a 54 y.o. male with PMH of  presenting with CHF, IDDM, Hypothyroidism, HTN, HLD presenting with worsening L wrist pain for 1 day. Orthopedic surgery was consulted for septic joint r/o. Pt has been afebrile and has no elevated WBC. ESR 36, normal CRP. Xray shows no signs of trauma and pt has no hx of gout or septic arthritis. Due to atraumatic wrist pain and swelling w/out identifia    Undiagnosed cardiac murmurs       LOC: The patient is awake, alert, and oriented to place, time, situation. Affect is appropriate.  Speech is appropriate and clear.   APPEARANCE: Patient resting comfortably in no acute distress.  Patient is clean and well groomed.  SKIN: The skin is warm and dry; color consistent with ethnicity.  Patient has normal skin turgor and moist mucus membranes.  Skin intact; no breakdown or bruising noted. Abdominal scar noted.   MUSCULOSKELETAL: Patient moving upper and lower extremities without difficulty. Complains of bilateral leg weakness.  RESPIRATORY: Airway is open and patent. Respirations spontaneous, even, easy, and non-labored.  Patient has a normal effort and rate.  No accessory muscle use noted. Reports dry cough and shortness of breath.   CARDIAC:  Normal rhythm and rate noted.  No peripheral edema noted. No complaints of chest  pain.    ABDOMEN:  Abdominal distention noted.   NEUROLOGIC: Eyes open spontaneously. Behavior appropriate to situation.  Follows commands; facial expression symmetrical.  Purposeful motor response noted; normal sensation in all extremities.

## 2020-05-09 NOTE — PROGRESS NOTES
"[facility] Hospital Medicine  Progress Note    Team: [facility] HOSP MED B   Attending MD: Ok Chance MD  Admit Date: 5/8/2020  KARINA 5/12/2020  Length of Stay:  LOS: 1 day   Code status: Full Code    Principal Problem: Acute on chronic combined systolic and diastolic heart failure due to valvular disease    Interval hx:5/09 Diuresing slowly, on lasix 120mg BID IV, CR is down to 3.7. BP on the low side , may consider inotrope.    .patient remains with edema/bloating and feeling congested; + orthopnea; lasix 100 and lasix 80 mg given with minimal effect; low glucose this am so changed to diabetic diet    ROS:  Constitutional: no fever or chills  Respiratory: no cough; + shortness of breath  Cardiovascular: no chest pain or palpitations  Gastrointestinal: no nausea or vomiting, no abdominal pain; last BM: 5/8  Musculoskeletal: no arthralgias or myalgias  Neurological: no seizures or tremors        Physical Exam  Temp:  [97.6 °F (36.4 °C)-98.8 °F (37.1 °C)] 97.7 °F (36.5 °C)  Pulse:  [68-83] 80  Resp:  [11-24] 15  SpO2:  [93 %-99 %] 95 %  BP: ()/(63-80) 98/78      Intake/Output Summary (Last 24 hours) at 5/9/2020 0820  Last data filed at 5/9/2020 0400  Gross per 24 hour   Intake 1570 ml   Output 2300 ml   Net -730 ml       Wt Readings from Last 1 Encounters:   05/09/20 0400 78.4 kg (172 lb 13.5 oz)   05/08/20 0254 76.5 kg (168 lb 10.4 oz)   05/08/20 0020 76.4 kg (168 lb 6.9 oz)        Estimated body mass index is 24.11 kg/m² as calculated from the following:    Height as of this encounter: 5' 11" (1.803 m).    Weight as of this encounter: 78.4 kg (172 lb 13.5 oz).    General: well developed, well nourished, no distress  Neck:  + JVD  Lungs:  diminshed BS bilaterally and normal respiratory effort.  Cardiovascular: Heart: regular rate and rhythm, S1, S2 normal, no murmur, click, rub or gallop. Chest Wall: no tenderness. Extremities: no cyanosis, 3+ edema, no clubbing.   Abdomen/Rectal: Abdomen: " soft, non-tender, mildly distended; bowel sounds normal  Skin: Skin color, texture, turgor normal. No rashes or lesions.  Neurologic: Normal strength and tone. No focal numbness or weakness.  Psych/Behavioral:  Alert and oriented, appropriate affect.      Recent Results (from the past 24 hour(s))   POCT glucose    Collection Time: 05/08/20  8:23 AM   Result Value Ref Range    POCT Glucose 38 (LL) 70 - 110 mg/dL   POCT glucose    Collection Time: 05/08/20  9:00 AM   Result Value Ref Range    POCT Glucose 132 (H) 70 - 110 mg/dL   POCT glucose    Collection Time: 05/08/20 12:08 PM   Result Value Ref Range    POCT Glucose 205 (H) 70 - 110 mg/dL   POCT glucose    Collection Time: 05/08/20  4:18 PM   Result Value Ref Range    POCT Glucose 191 (H) 70 - 110 mg/dL   Protein / creatinine ratio, urine    Collection Time: 05/08/20  4:42 PM   Result Value Ref Range    Protein, Urine Random 98 (H) 0 - 15 mg/dL    Creatinine, Random Ur 40.0 23.0 - 375.0 mg/dL    Prot/Creat Ratio, Ur 2.45 (H) 0.00 - 0.20   POCT glucose    Collection Time: 05/08/20  8:25 PM   Result Value Ref Range    POCT Glucose 275 (H) 70 - 110 mg/dL   CBC with Automated Differential    Collection Time: 05/09/20  4:07 AM   Result Value Ref Range    WBC 3.75 (L) 3.90 - 12.70 K/uL    RBC 4.55 (L) 4.60 - 6.20 M/uL    Hemoglobin 10.8 (L) 14.0 - 18.0 g/dL    Hematocrit 35.3 (L) 40.0 - 54.0 %    Mean Corpuscular Volume 78 (L) 82 - 98 fL    Mean Corpuscular Hemoglobin 23.7 (L) 27.0 - 31.0 pg    Mean Corpuscular Hemoglobin Conc 30.6 (L) 32.0 - 36.0 g/dL    RDW 22.6 (H) 11.5 - 14.5 %    Platelets 72 (L) 150 - 350 K/uL    MPV SEE COMMENT 9.2 - 12.9 fL    Immature Granulocytes 0.3 0.0 - 0.5 %    Gran # (ANC) 2.6 1.8 - 7.7 K/uL    Immature Grans (Abs) 0.01 0.00 - 0.04 K/uL    Lymph # 0.5 (L) 1.0 - 4.8 K/uL    Mono # 0.5 0.3 - 1.0 K/uL    Eos # 0.2 0.0 - 0.5 K/uL    Baso # 0.03 0.00 - 0.20 K/uL    nRBC 0 0 /100 WBC    Gran% 68.4 38.0 - 73.0 %    Lymph% 12.3 (L) 18.0 - 48.0 %     Mono% 13.9 4.0 - 15.0 %    Eosinophil% 4.3 0.0 - 8.0 %    Basophil% 0.8 0.0 - 1.9 %    Differential Method Automated    Comprehensive Metabolic Panel (CMP)    Collection Time: 05/09/20  4:07 AM   Result Value Ref Range    Sodium 135 (L) 136 - 145 mmol/L    Potassium 4.0 3.5 - 5.1 mmol/L    Chloride 97 95 - 110 mmol/L    CO2 25 23 - 29 mmol/L    Glucose 240 (H) 70 - 110 mg/dL    BUN, Bld 81 (H) 6 - 20 mg/dL    Creatinine 3.7 (H) 0.5 - 1.4 mg/dL    Calcium 7.9 (L) 8.7 - 10.5 mg/dL    Total Protein 6.6 6.0 - 8.4 g/dL    Albumin 2.8 (L) 3.5 - 5.2 g/dL    Total Bilirubin 3.0 (H) 0.1 - 1.0 mg/dL    Alkaline Phosphatase 214 (H) 55 - 135 U/L    AST 37 10 - 40 U/L    ALT 35 10 - 44 U/L    Anion Gap 13 8 - 16 mmol/L    eGFR if African American 19.9 (A) >60 mL/min/1.73 m^2    eGFR if non  17.2 (A) >60 mL/min/1.73 m^2   Hemoglobin A1c    Collection Time: 05/09/20  4:07 AM   Result Value Ref Range    Hemoglobin A1C 9.9 (H) 4.0 - 5.6 %    Estimated Avg Glucose 237 (H) 68 - 131 mg/dL   Phosphorus    Collection Time: 05/09/20  4:07 AM   Result Value Ref Range    Phosphorus 4.2 2.7 - 4.5 mg/dL   Magnesium    Collection Time: 05/09/20  4:07 AM   Result Value Ref Range    Magnesium 2.1 1.6 - 2.6 mg/dL   POCT glucose    Collection Time: 05/09/20  7:21 AM   Result Value Ref Range    POCT Glucose 284 (H) 70 - 110 mg/dL       Recent Labs   Lab 05/08/20  0019 05/08/20  0412 05/09/20  0407   WBC 3.76* 3.63* 3.75*   HGB 12.9* 12.3* 10.8*   HCT 42.7 40.3 35.3*   PLT 95* 91* 72*       Recent Labs   Lab 05/08/20 0019 05/08/20 0412 05/09/20  0407    137 135*   K 4.4 3.6 4.0   CL 98 98 97   CO2 21* 23 25   BUN 85* 86* 81*   CREATININE 4.0* 4.0* 3.7*   * 72 240*   CALCIUM 8.1* 8.0* 7.9*   MG  --   --  2.1   PHOS  --   --  4.2       Recent Labs   Lab 05/08/20 0019 05/08/20 0412 05/09/20 0407   ALKPHOS 251* 236* 214*   ALT 43 38 35   AST 58* 43* 37   ALBUMIN 3.1* 3.0* 2.8*   PROT 7.2 6.7 6.6   BILITOT 3.7* 3.3*  3.0*   INR 1.5*  --   --        Recent Labs   Lab 05/08/20  0823 05/08/20  0900 05/08/20  1208 05/08/20  1618 05/08/20  2025 05/09/20  0721   POCTGLUCOSE 38* 132* 205* 191* 275* 284*       Recent Labs     05/08/20  0019 05/08/20  0412 05/08/20  0752   TROPONINI 0.070* 0.065*  0.065* 0.081*       Hemoglobin A1C   Date Value Ref Range Status   05/09/2020 9.9 (H) 4.0 - 5.6 % Final     Comment:     ADA Screening Guidelines:  5.7-6.4%  Consistent with prediabetes  >or=6.5%  Consistent with diabetes  High levels of fetal hemoglobin interfere with the HbA1C  assay. Heterozygous hemoglobin variants (HbS, HgC, etc)do  not significantly interfere with this assay.   However, presence of multiple variants may affect accuracy.     02/25/2020 7.5 (H) 4.0 - 5.6 % Final     Comment:     ADA Screening Guidelines:  5.7-6.4%  Consistent with prediabetes  >or=6.5%  Consistent with diabetes  High levels of fetal hemoglobin interfere with the HbA1C  assay. Heterozygous hemoglobin variants (HbS, HgC, etc)do  not significantly interfere with this assay.   However, presence of multiple variants may affect accuracy.     08/05/2019 >14.0 (H) 4.0 - 5.6 % Final     Comment:     ADA Screening Guidelines:  5.7-6.4%  Consistent with prediabetes  >or=6.5%  Consistent with diabetes  High levels of fetal hemoglobin interfere with the HbA1C  assay. Heterozygous hemoglobin variants (HbS, HgC, etc)do  not significantly interfere with this assay.   However, presence of multiple variants may affect accuracy.         Scheduled Meds:   aspirin  81 mg Oral Daily    ferrous sulfate  325 mg Oral Every other day    furosemide (LASIX) IV  120 mg Intravenous BID loop    heparin (porcine)  5,000 Units Subcutaneous Q8H    levothyroxine  175 mcg Oral Before breakfast    polyethylene glycol  17 g Oral Daily       Continuous Infusions:    As Needed: acetaminophen, albuterol-ipratropium, bisacodyL, dextrose 10 % in water (D10W), dextrose 10 % in water (D10W),  glucagon (human recombinant), glucose, glucose, insulin aspart U-100, melatonin, ondansetron, promethazine (PHENERGAN) IVPB    Active Hospital Problems    Diagnosis  POA    *Acute on chronic combined systolic and diastolic heart failure due to valvular disease [I50.43, I38]  Yes     Nickysner July 2018  Patient has a right dominant coronary artery.      - Left Main Coronary Artery:             The LM is normal. There is VERÓNICA 3 flow.     - Left Anterior Descending Artery:             The mid LAD has a 30% stenosis. There is VERÓNICA 3 flow.     - Left Circumflex Artery:             The LCX is normal. There is VERÓNICA 3 flow.     - OM1:             The ostial OM1 has a 40% stenosis. There is VERÓNICA 3 flow.     - Right Coronary Artery:             The RCA is normal. There is VERÓNICA 3 flow.        Nonischemic cardiomyopathy [I42.8]  Yes    Chronic kidney disease, stage IV (severe) [N18.4]  Yes    Pulmonary nodules [R91.8]  Yes    Essential hypertension [I10]  Yes    Hypothyroidism [E03.9]  Yes     Chronic    Diabetes mellitus, type II [E11.9]  Yes     Chronic      Resolved Hospital Problems   No resolved problems to display.       Overview:    Assessment and Plan    * Acute on chronic combined systolic and diastolic heart failure due to valvular disease  Non ischemic cardiomyopathy     Patient presents with SOB, edema  - last ECHO 2/26 with EF 20%   - BNP 1992  - stable on room air  - CXR with mild edema  - weight 168lbs on admission  - given Lasix 100mg IVP  - increase to 120mg IV BID per nephrology  - strict Is/Os, daily weights  -consider Cardiology consult as patient with low BP and may benefit from inotropic therapy - patient was due to f/u with Dr. Paz as outpatient     Chronic kidney disease, stage IV (severe)  KEYANNA  - baseline Cr 2.8, currently 4.0 and unchanged from admit  - daily CMP  - nephrology consulted for KEYANNA on CKD - lasix dose increased and urine studies ordered     Essential hypertension  - controlled  -  continue metoprolol     Diabetes mellitus, type II  - home insulin: insulin detemir 5u qhs  - hospital insulin: low dose SSI, ACHS  - discontinue diabetic diet     Hypothyroidism  - continue synthroid  - repeat TSH in AM, last 10      Diet: Diet Cardiac Fluid - 1200mL   DVT PPx:   VTE Risk Mitigation (From admission, onward)         Ordered     heparin (porcine) injection 5,000 Units  Every 8 hours      05/08/20 1053     Place [place] hose  Until discontinued      05/08/20 0159     Place sequential compression device  Until discontinued      05/08/20 0159                Goals of Care: full code    Discharge plan: home        Ok Chance MD  Staff Hospitalist

## 2020-05-10 PROBLEM — E10.8 TYPE 1 DIABETES MELLITUS WITH COMPLICATIONS: Status: ACTIVE | Noted: 2020-01-01

## 2020-05-10 NOTE — SUBJECTIVE & OBJECTIVE
Interval HPI:   Overnight events: NAEON. BG at or above goal ranges with prn insulin correction scale. Tolerating cardiac diet. Creatinine 3.4.  Eatin%  Nausea: No  Hypoglycemia and intervention: No  Fever: No  TPN and/or TF: No  If yes, type of TF/TPN and rate: none    PMH, PSH, FH, SH reviewed     ROS:  Constitutional: Positive for weight changes.  Eyes: Negative for visual disturbance.  Respiratory: Positive for cough and SOB.  Cardiovascular: Negative for chest pain.  Gastrointestinal: Negative for nausea.  Endocrine: Negative for polyuria, polydipsia.  Musculoskeletal: Negative for back pain.  Skin: Negative for rash.  Neurological: Negative for syncope.  Psychiatric/Behavioral: Negative for depression.    Review of Systems    Current Medications and/or Treatments Impacting Glycemic Control  Immunotherapy:    Immunosuppressants     None        Steroids:   Hormones (From admission, onward)    Start     Stop Route Frequency Ordered    20 0241  melatonin tablet 8 mg      -- Oral Nightly PRN 20 0159        Pressors:    Autonomic Drugs (From admission, onward)    None        Hyperglycemia/Diabetes Medications:   Antihyperglycemics (From admission, onward)    Start     Stop Route Frequency Ordered    20 0253  insulin aspart U-100 pen 0-5 Units      -- SubQ Before meals & nightly PRN 20 0159             PHYSICAL EXAMINATION:  Vitals:    05/10/20 0745   BP: 104/73   Pulse: 84   Resp: 19   Temp: 98.4 °F (36.9 °C)     Body mass index is 23.89 kg/m².    Physical Exam   Deferred to decrease risk/exposure of COVID-19.

## 2020-05-10 NOTE — ASSESSMENT & PLAN NOTE
Lab Results   Component Value Date    CREATININE 3.4 (H) 05/10/2020     Avoid insulin stacking  Titrate insulin slowly

## 2020-05-10 NOTE — PROGRESS NOTES
Nutrition-Related Diabetes Education      Time Spent: 5 minutes    Learners: patient    Current HbA1c: 9.9    Is patient aware of their A1c and their goal A1c?       _<7.0__ yes    Home diabetes medication(s): insulin    Nutrition Education with handouts: consistent carbohydrate diet    Comments:  Spoke with pt about DM diet, states he follows Low Na diet at home. Encouraged pt to begin making changes to follow DM diet, pt states he avoids all carbohydrate foods but eats fruit. Educated pt on carbohydrate food sources and carbohydrate counting. Handouts to provided to bedside. No further questions  Pt with good PO intake no indications of malnutrition.      Barriers to Learning: none    Follow up: 5/15/20    Please consult as needed.  Thank you!  Kiesha Langston RD, LDN

## 2020-05-10 NOTE — HPI
"Reason for Consult: Management of T2DM, Hyperglycemia     Surgical Procedure and Date: n/a    Lab Results   Component Value Date    HGBA1C 9.9 (H) 05/09/2020     Diabetes diagnosis year: 7 years ago    Home Diabetes Medications:  Levemir 20 units daily (takes based on am BG; if BG > 180)    How often checking glucose at home? 2x daily   BG readings on regimen: 60s-200  Hypoglycemia on the regimen?  Yes- "every once in a while."  Missed doses on regimen?  Yes    Diabetes Complications include:     Hyperglycemia and Hypoglycemia     Complicating diabetes co morbidities:   CHF      HPI:   Patient is a 55 y.o. male with a diagnosis of HTN, HLD, DM2, hypothyroidism, and CHF (EF 20%) being admitted to medicine for acute CHF exacerbation. He was Covid negative. He was given Lasix 100mg IVP and transferred to the floor. Pt sees PCP at [facility] for DM management. Endocrinology consulted for management of T2DM.    "

## 2020-05-10 NOTE — ASSESSMENT & PLAN NOTE
BG goal 140-180  Pt reports he is a T1DM with variable BG readings at home.   BG variable since admission with episodes of hypoglycemia.     Continue Low Dose Correction Scale  Start Levemir 4 units daily (0.1 u/kg dosing)   BG monitoring ac/hs    ** Please call Endocrine for any BG related issues **    Discharge plans:TBD

## 2020-05-10 NOTE — PROGRESS NOTES
"[facility] Medicine  Progress Note    Team: [facility] HOSP MED B   Attending MD: Ok Chance MD  Admit Date: 5/8/2020  KARINA 5/12/2020  Length of Stay:  LOS: 2 days   Code status: Full Code    Principal Problem: Acute on chronic combined systolic and diastolic heart failure due to valvular disease    Interval hx:  5/10 has diuresed 1300cc over past 24 hours, Cr is down to 3.4, wll c/w same lasix dose. Possible dc in 1-2 days.  5/09 Diuresing slowly, on lasix 120mg BID IV, CR is down to 3.7. BP on the low side , may consider inotrope.    .patient remains with edema/bloating and feeling congested; + orthopnea; lasix 100 and lasix 80 mg given with minimal effect; low glucose this am so changed to diabetic diet    ROS:  Constitutional: no fever or chills  Respiratory: no cough; + shortness of breath  Cardiovascular: no chest pain or palpitations  Gastrointestinal: no nausea or vomiting, no abdominal pain; last BM: 5/8  Musculoskeletal: no arthralgias or myalgias  Neurological: no seizures or tremors        Physical Exam  Temp:  [98 °F (36.7 °C)-98.6 °F (37 °C)] 98.4 °F (36.9 °C)  Pulse:  [] 84  Resp:  [12-19] 19  SpO2:  [95 %-99 %] 95 %  BP: ()/(37-88) 104/73      Intake/Output Summary (Last 24 hours) at 5/10/2020 1059  Last data filed at 5/10/2020 0831  Gross per 24 hour   Intake 957 ml   Output 2600 ml   Net -1643 ml       Wt Readings from Last 1 Encounters:   05/10/20 0400 77.7 kg (171 lb 4.8 oz)   05/09/20 0400 78.4 kg (172 lb 13.5 oz)   05/08/20 0254 76.5 kg (168 lb 10.4 oz)   05/08/20 0020 76.4 kg (168 lb 6.9 oz)        Estimated body mass index is 23.89 kg/m² as calculated from the following:    Height as of this encounter: 5' 11" (1.803 m).    Weight as of this encounter: 77.7 kg (171 lb 4.8 oz).    General: well developed, well nourished, no distress  Neck:  + JVD  Lungs:  diminshed BS bilaterally and normal respiratory effort.  Cardiovascular: Heart: regular rate and " rhythm, S1, S2 normal, no murmur, click, rub or gallop. Chest Wall: no tenderness. Extremities: no cyanosis, 3+ edema, no clubbing.   Abdomen/Rectal: Abdomen: soft, non-tender, mildly distended; bowel sounds normal  Skin: Skin color, texture, turgor normal. No rashes or lesions.  Neurologic: Normal strength and tone. No focal numbness or weakness.  Psych/Behavioral:  Alert and oriented, appropriate affect.      Recent Results (from the past 24 hour(s))   POCT glucose    Collection Time: 05/09/20 11:57 AM   Result Value Ref Range    POCT Glucose 252 (H) 70 - 110 mg/dL   POCT glucose    Collection Time: 05/09/20  5:02 PM   Result Value Ref Range    POCT Glucose 195 (H) 70 - 110 mg/dL   POCT glucose    Collection Time: 05/09/20  8:55 PM   Result Value Ref Range    POCT Glucose 183 (H) 70 - 110 mg/dL   CBC with Automated Differential    Collection Time: 05/10/20  6:31 AM   Result Value Ref Range    WBC 3.20 (L) 3.90 - 12.70 K/uL    RBC 4.57 (L) 4.60 - 6.20 M/uL    Hemoglobin 10.7 (L) 14.0 - 18.0 g/dL    Hematocrit 35.1 (L) 40.0 - 54.0 %    Mean Corpuscular Volume 77 (L) 82 - 98 fL    Mean Corpuscular Hemoglobin 23.4 (L) 27.0 - 31.0 pg    Mean Corpuscular Hemoglobin Conc 30.5 (L) 32.0 - 36.0 g/dL    RDW 22.8 (H) 11.5 - 14.5 %    Platelets 62 (L) 150 - 350 K/uL    MPV SEE COMMENT 9.2 - 12.9 fL    Immature Granulocytes 0.3 0.0 - 0.5 %    Gran # (ANC) 2.2 1.8 - 7.7 K/uL    Immature Grans (Abs) 0.01 0.00 - 0.04 K/uL    Lymph # 0.3 (L) 1.0 - 4.8 K/uL    Mono # 0.5 0.3 - 1.0 K/uL    Eos # 0.2 0.0 - 0.5 K/uL    Baso # 0.03 0.00 - 0.20 K/uL    nRBC 0 0 /100 WBC    Gran% 68.8 38.0 - 73.0 %    Lymph% 10.0 (L) 18.0 - 48.0 %    Mono% 14.1 4.0 - 15.0 %    Eosinophil% 5.9 0.0 - 8.0 %    Basophil% 0.9 0.0 - 1.9 %    Differential Method Automated    Comprehensive Metabolic Panel (CMP)    Collection Time: 05/10/20  6:31 AM   Result Value Ref Range    Sodium 134 (L) 136 - 145 mmol/L    Potassium 3.4 (L) 3.5 - 5.1 mmol/L    Chloride 97 95  - 110 mmol/L    CO2 25 23 - 29 mmol/L    Glucose 182 (H) 70 - 110 mg/dL    BUN, Bld 80 (H) 6 - 20 mg/dL    Creatinine 3.4 (H) 0.5 - 1.4 mg/dL    Calcium 7.9 (L) 8.7 - 10.5 mg/dL    Total Protein 6.1 6.0 - 8.4 g/dL    Albumin 2.7 (L) 3.5 - 5.2 g/dL    Total Bilirubin 3.5 (H) 0.1 - 1.0 mg/dL    Alkaline Phosphatase 217 (H) 55 - 135 U/L    AST 36 10 - 40 U/L    ALT 34 10 - 44 U/L    Anion Gap 12 8 - 16 mmol/L    eGFR if African American 22.1 (A) >60 mL/min/1.73 m^2    eGFR if non  19.1 (A) >60 mL/min/1.73 m^2   POCT glucose    Collection Time: 05/10/20  7:36 AM   Result Value Ref Range    POCT Glucose 188 (H) 70 - 110 mg/dL       Recent Labs   Lab 05/08/20  0412 05/09/20  0407 05/10/20  0631   WBC 3.63* 3.75* 3.20*   HGB 12.3* 10.8* 10.7*   HCT 40.3 35.3* 35.1*   PLT 91* 72* 62*       Recent Labs   Lab 05/08/20  0412 05/09/20  0407 05/10/20  0631    135* 134*   K 3.6 4.0 3.4*   CL 98 97 97   CO2 23 25 25   BUN 86* 81* 80*   CREATININE 4.0* 3.7* 3.4*   GLU 72 240* 182*   CALCIUM 8.0* 7.9* 7.9*   MG  --  2.1  --    PHOS  --  4.2  --        Recent Labs   Lab 05/08/20  0019 05/08/20  0412 05/09/20  0407 05/10/20  0631   ALKPHOS 251* 236* 214* 217*   ALT 43 38 35 34   AST 58* 43* 37 36   ALBUMIN 3.1* 3.0* 2.8* 2.7*   PROT 7.2 6.7 6.6 6.1   BILITOT 3.7* 3.3* 3.0* 3.5*   INR 1.5*  --   --   --        Recent Labs   Lab 05/08/20 2025 05/09/20  0721 05/09/20  1157 05/09/20  1702 05/09/20  2055 05/10/20  0736   POCTGLUCOSE 275* 284* 252* 195* 183* 188*       Recent Labs     05/08/20  0019 05/08/20  0412 05/08/20  0752   TROPONINI 0.070* 0.065*  0.065* 0.081*       Hemoglobin A1C   Date Value Ref Range Status   05/09/2020 9.9 (H) 4.0 - 5.6 % Final     Comment:     ADA Screening Guidelines:  5.7-6.4%  Consistent with prediabetes  >or=6.5%  Consistent with diabetes  High levels of fetal hemoglobin interfere with the HbA1C  assay. Heterozygous hemoglobin variants (HbS, HgC, etc)do  not significantly interfere  with this assay.   However, presence of multiple variants may affect accuracy.     02/25/2020 7.5 (H) 4.0 - 5.6 % Final     Comment:     ADA Screening Guidelines:  5.7-6.4%  Consistent with prediabetes  >or=6.5%  Consistent with diabetes  High levels of fetal hemoglobin interfere with the HbA1C  assay. Heterozygous hemoglobin variants (HbS, HgC, etc)do  not significantly interfere with this assay.   However, presence of multiple variants may affect accuracy.     08/05/2019 >14.0 (H) 4.0 - 5.6 % Final     Comment:     ADA Screening Guidelines:  5.7-6.4%  Consistent with prediabetes  >or=6.5%  Consistent with diabetes  High levels of fetal hemoglobin interfere with the HbA1C  assay. Heterozygous hemoglobin variants (HbS, HgC, etc)do  not significantly interfere with this assay.   However, presence of multiple variants may affect accuracy.         Scheduled Meds:   aspirin  81 mg Oral Daily    ferrous sulfate  325 mg Oral Every other day    furosemide (LASIX) IV  120 mg Intravenous BID loop    heparin (porcine)  5,000 Units Subcutaneous Q8H    insulin detemir U-100  4 Units Subcutaneous Daily    levothyroxine  175 mcg Oral Before breakfast    polyethylene glycol  17 g Oral Daily       Continuous Infusions:    As Needed: acetaminophen, albuterol-ipratropium, bisacodyL, calcium carbonate, dextrose 10 % in water (D10W), dextrose 10 % in water (D10W), Dextrose 10% Bolus, Dextrose 10% Bolus, glucagon (human recombinant), glucose, glucose, insulin aspart U-100, melatonin, ondansetron, promethazine (PHENERGAN) IVPB    Active Hospital Problems    Diagnosis  POA    *Acute on chronic combined systolic and diastolic heart failure due to valvular disease [I50.43, I38]  Yes     [facility] July 2018  Patient has a right dominant coronary artery.      - Left Main Coronary Artery:             The LM is normal. There is VERÓNICA 3 flow.     - Left Anterior Descending Artery:             The mid LAD has a 30% stenosis. There is VERÓNICA 3  flow.     - Left Circumflex Artery:             The LCX is normal. There is VERÓNICA 3 flow.     - OM1:             The ostial OM1 has a 40% stenosis. There is VERÓNICA 3 flow.     - Right Coronary Artery:             The RCA is normal. There is VERÓNICA 3 flow.        Type 1 diabetes mellitus with complications [E10.8]  Unknown    Nonischemic cardiomyopathy [I42.8]  Yes    Chronic kidney disease, stage IV (severe) [N18.4]  Yes    Pulmonary nodules [R91.8]  Yes    Essential hypertension [I10]  Yes    Hypothyroidism [E03.9]  Yes     Chronic    Diabetes mellitus, type II [E11.9]  Yes     Chronic      Resolved Hospital Problems   No resolved problems to display.       Overview:    Assessment and Plan    * Acute on chronic combined systolic and diastolic heart failure due to valvular disease  Non ischemic cardiomyopathy     Patient presents with SOB, edema  - last ECHO 2/26 with EF 20%   - BNP 1992  - stable on room air  - CXR with mild edema  - weight 168lbs on admission  - given Lasix 100mg IVP  - increase to 120mg IV BID per nephrology  - strict Is/Os, daily weights  -consider Cardiology consult as patient with low BP and may benefit from inotropic therapy - patient was due to f/u with Dr. Paz as outpatient     Chronic kidney disease, stage IV (severe)  KEYANNA  - baseline Cr 2.8, currently 4.0 and unchanged from admit  - daily CMP  - nephrology consulted for KEYANNA on CKD - lasix dose increased and urine studies ordered     Essential hypertension  - controlled  - continue metoprolol     Diabetes mellitus, type II  - home insulin: insulin detemir 5u qhs  - hospital insulin: low dose SSI, ACHS  - discontinue diabetic diet     Hypothyroidism  - continue synthroid  - repeat TSH in AM, last 10      Diet: Diet [facility]; Consistent Carbohydrate; Fluid - 1200mL   DVT PPx:   VTE Risk Mitigation (From admission, onward)         Ordered     heparin (porcine) injection 5,000 Units  Every 8 hours      05/08/20 7465      Place [place] hose  Until discontinued      05/08/20 0159     Place sequential compression device  Until discontinued      05/08/20 0159                Goals of Care: full code    Discharge plan: home        Ok Chance MD  Staff Hospitalist

## 2020-05-10 NOTE — CONSULTS
[facility]  Endocrinology  Diabetes Consult Note    Consult Requested by: Ok Chance MD   Reason for admit: Acute on chronic combined systolic and diastolic heart failure due to valvular disease    HISTORY OF PRESENT ILLNESS:  Reason for Consult: Management of T1DM, Hyperglycemia     Surgical Procedure and Date: n/a    Lab Results   Component Value Date    HGBA1C 9.9 (H) 2020     Diabetes diagnosis year: 7 years ago    Home Diabetes Medications:  Levemir 20 units daily (takes based on am BG; if BG > 180)    How often checking glucose at home? 2x daily   BG readings on regimen: 60s-200  Hypoglycemia on the regimen?  Yes  Missed doses on regimen?  Yes    Diabetes Complications include:     Hyperglycemia and Hypoglycemia     Complicating diabetes co morbidities:   CHF      HPI:   Patient is a 55 y.o. male with a diagnosis of HTN, HLD, DM2, hypothyroidism, and CHF (EF 20%) being admitted to medicine for acute CHF exacerbation. He was Covid negative. He was given Lasix 100mg IVP and transferred to the floor. Pt sees PCP at [facility] for DM management. Endocrinology consulted for management of T1DM.      Interval HPI:   Overnight events: NAEON. BG at or above goal ranges with prn insulin correction scale. Tolerating cardiac diet. Creatinine 3.4.  Eatin%  Nausea: No  Hypoglycemia and intervention: No  Fever: No  TPN and/or TF: No  If yes, type of TF/TPN and rate: none    PMH, PSH, FH, SH reviewed     ROS:  Constitutional: Positive for weight changes.  Eyes: Negative for visual disturbance.  Respiratory: Positive for cough and SOB.  Cardiovascular: Negative for chest pain.  Gastrointestinal: Negative for nausea.  Endocrine: Negative for polyuria, polydipsia.  Musculoskeletal: Negative for back pain.  Skin: Negative for rash.  Neurological: Negative for syncope.  Psychiatric/Behavioral: Negative for depression.    Review of Systems    Current Medications and/or Treatments  Impacting Glycemic Control  Immunotherapy:    Immunosuppressants     None        Steroids:   Hormones (From admission, onward)    Start     Stop Route Frequency Ordered    05/08/20 0241  melatonin tablet 8 mg      -- Oral Nightly PRN 05/08/20 0159        Pressors:    Autonomic Drugs (From admission, onward)    None        Hyperglycemia/Diabetes Medications:   Antihyperglycemics (From admission, onward)    Start     Stop Route Frequency Ordered    05/08/20 0253  insulin aspart U-100 pen 0-5 Units      -- SubQ Before meals & nightly PRN 05/08/20 0159             PHYSICAL EXAMINATION:  Vitals:    05/10/20 0745   BP: 104/73   Pulse: 84   Resp: 19   Temp: 98.4 °F (36.9 °C)     Body mass index is 23.89 kg/m².    Physical Exam   Deferred to decrease risk/exposure of COVID-19.      Labs Reviewed and Include   Recent Labs   Lab 05/10/20  0631   *   CALCIUM 7.9*   ALBUMIN 2.7*   PROT 6.1   *   K 3.4*   CO2 25   CL 97   BUN 80*   CREATININE 3.4*   ALKPHOS 217*   ALT 34   AST 36   BILITOT 3.5*     Lab Results   Component Value Date    WBC 3.20 (L) 05/10/2020    HGB 10.7 (L) 05/10/2020    HCT 35.1 (L) 05/10/2020    MCV 77 (L) 05/10/2020    PLT 62 (L) 05/10/2020     No results for input(s): TSH, FREET4 in the last 168 hours.  Lab Results   Component Value Date    HGBA1C 9.9 (H) 05/09/2020       Nutritional status:   Body mass index is 23.89 kg/m².  Lab Results   Component Value Date    ALBUMIN 2.7 (L) 05/10/2020    ALBUMIN 2.8 (L) 05/09/2020    ALBUMIN 3.0 (L) 05/08/2020     No results found for: PREALBUMIN    Estimated Creatinine Clearance: 25.8 mL/min (A) (based on SCr of 3.4 mg/dL (H)).    Accu-Checks  Recent Labs     05/08/20  0823 05/08/20  0900 05/08/20  1208 05/08/20  1618 05/08/20  2025 05/09/20  0721 05/09/20  1157 05/09/20  1702 05/09/20  2055 05/10/20  0736   POCTGLUCOSE 38* 132* 205* 191* 275* 284* 252* 195* 183* 188*        ASSESSMENT and PLAN    * Acute on chronic combined systolic and diastolic heart  failure due to valvular disease  Managed per primary team  Optimize BG control      Type 1 diabetes mellitus with complications  BG goal 140-180  Pt reports he is a T1DM with variable BG readings at home.   BG variable since admission with episodes of hypoglycemia.     Continue Low Dose Correction Scale  Start Levemir 4 units daily (0.1 u/kg dosing)   BG monitoring ac/hs    ** Please call Endocrine for any BG related issues **    Discharge plans:TBD      Chronic kidney disease, stage IV (severe)  Lab Results   Component Value Date    CREATININE 3.4 (H) 05/10/2020     Avoid insulin stacking  Titrate insulin slowly    Hypothyroidism  Recommend continuing home Synthroid 175 mcg daily while inpatient.           Plan discussed with patient, family, and RN at bedside.     Carmen Guallpa NP  Endocrinology  [facility]

## 2020-05-11 NOTE — PROGRESS NOTES
"[facility] Hospital Medicine  Progress Note    [place] [facility] [place]   Attending MD: Ok Chance MD  Admit Date: 5/8/2020  KARINA 5/12/2020  Length of Stay:  LOS: 3 days   Code status: Full Code    Principal Problem: Acute on chronic combined systolic and diastolic heart failure due to valvular disease    Interval hx:  5/11 is diuresing slowly, Cr is down to 3.3. -900, some swelling is still noted.  5/10 has diuresed 1300cc over past 24 hours, Cr is down to 3.4, wll c/w same lasix dose. Possible dc in 1-2 days.  5/09 Diuresing slowly, on lasix 120mg BID IV, CR is down to 3.7. BP on the low side , may consider inotrope.    .patient remains with edema/bloating and feeling congested; + orthopnea; lasix 100 and lasix 80 mg given with minimal effect; low glucose this am so changed to diabetic diet    ROS:  Constitutional: no fever or chills  Respiratory: no cough; + shortness of breath  Cardiovascular: no chest pain or palpitations  Gastrointestinal: no nausea or vomiting, no abdominal pain; last BM: 5/8  Musculoskeletal: no arthralgias or myalgias  Neurological: no seizures or tremors        Physical Exam  Temp:  [98.2 °F (36.8 °C)-98.6 °F (37 °C)] 98.3 °F (36.8 °C)  Pulse:  [78-88] 79  Resp:  [14-20] 20  SpO2:  [95 %-98 %] 98 %  BP: ()/(70-91) 99/70      Intake/Output Summary (Last 24 hours) at 5/11/2020 1058  Last data filed at 5/11/2020 0900  Gross per 24 hour   Intake 1230 ml   Output 1875 ml   Net -645 ml       Wt Readings from Last 1 Encounters:   05/11/20 0400 78 kg (171 lb 15.3 oz)   05/10/20 0400 77.7 kg (171 lb 4.8 oz)   05/09/20 0400 78.4 kg (172 lb 13.5 oz)   05/08/20 0254 76.5 kg (168 lb 10.4 oz)   05/08/20 0020 76.4 kg (168 lb 6.9 oz)        Estimated body mass index is 23.98 kg/m² as calculated from the following:    Height as of this encounter: 5' 11" (1.803 m).    Weight as of this encounter: 78 kg (171 lb 15.3 oz).    General: well developed, well nourished, no " distress  Neck:  + JVD  Lungs:  diminshed BS bilaterally and normal respiratory effort.  Cardiovascular: Heart: regular rate and rhythm, S1, S2 normal, no murmur, click, rub or gallop. Chest Wall: no tenderness. Extremities: no cyanosis, 3+ edema, no clubbing.   Abdomen/Rectal: Abdomen: soft, non-tender, mildly distended; bowel sounds normal  Skin: Skin color, texture, turgor normal. No rashes or lesions.  Neurologic: Normal strength and tone. No focal numbness or weakness.  Psych/Behavioral:  Alert and oriented, appropriate affect.      Recent Results (from the past 24 hour(s))   POCT glucose    Collection Time: 05/10/20 12:02 PM   Result Value Ref Range    POCT Glucose 301 (H) 70 - 110 mg/dL   POCT glucose    Collection Time: 05/10/20  4:51 PM   Result Value Ref Range    POCT Glucose 246 (H) 70 - 110 mg/dL   POCT glucose    Collection Time: 05/10/20  8:53 PM   Result Value Ref Range    POCT Glucose 223 (H) 70 - 110 mg/dL   CBC with Automated Differential    Collection Time: 05/11/20  4:46 AM   Result Value Ref Range    WBC 3.52 (L) 3.90 - 12.70 K/uL    RBC 4.64 4.60 - 6.20 M/uL    Hemoglobin 11.1 (L) 14.0 - 18.0 g/dL    Hematocrit 36.5 (L) 40.0 - 54.0 %    Mean Corpuscular Volume 79 (L) 82 - 98 fL    Mean Corpuscular Hemoglobin 23.9 (L) 27.0 - 31.0 pg    Mean Corpuscular Hemoglobin Conc 30.4 (L) 32.0 - 36.0 g/dL    RDW 23.4 (H) 11.5 - 14.5 %    Platelets 64 (L) 150 - 350 K/uL    MPV SEE COMMENT 9.2 - 12.9 fL    Immature Granulocytes 0.3 0.0 - 0.5 %    Gran # (ANC) 2.3 1.8 - 7.7 K/uL    Immature Grans (Abs) 0.01 0.00 - 0.04 K/uL    Lymph # 0.4 (L) 1.0 - 4.8 K/uL    Mono # 0.6 0.3 - 1.0 K/uL    Eos # 0.2 0.0 - 0.5 K/uL    Baso # 0.03 0.00 - 0.20 K/uL    nRBC 0 0 /100 WBC    Gran% 66.4 38.0 - 73.0 %    Lymph% 10.2 (L) 18.0 - 48.0 %    Mono% 16.8 (H) 4.0 - 15.0 %    Eosinophil% 5.4 0.0 - 8.0 %    Basophil% 0.9 0.0 - 1.9 %    Differential Method Automated    Comprehensive Metabolic Panel (CMP)    Collection Time:  05/11/20  4:46 AM   Result Value Ref Range    Sodium 136 136 - 145 mmol/L    Potassium 3.3 (L) 3.5 - 5.1 mmol/L    Chloride 96 95 - 110 mmol/L    CO2 27 23 - 29 mmol/L    Glucose 168 (H) 70 - 110 mg/dL    BUN, Bld 77 (H) 6 - 20 mg/dL    Creatinine 3.3 (H) 0.5 - 1.4 mg/dL    Calcium 7.9 (L) 8.7 - 10.5 mg/dL    Total Protein 6.6 6.0 - 8.4 g/dL    Albumin 2.8 (L) 3.5 - 5.2 g/dL    Total Bilirubin 3.7 (H) 0.1 - 1.0 mg/dL    Alkaline Phosphatase 234 (H) 55 - 135 U/L    AST 36 10 - 40 U/L    ALT 38 10 - 44 U/L    Anion Gap 13 8 - 16 mmol/L    eGFR if African American 22.9 (A) >60 mL/min/1.73 m^2    eGFR if non  19.8 (A) >60 mL/min/1.73 m^2   Magnesium    Collection Time: 05/11/20  4:46 AM   Result Value Ref Range    Magnesium 1.9 1.6 - 2.6 mg/dL   POCT glucose    Collection Time: 05/11/20  8:33 AM   Result Value Ref Range    POCT Glucose 221 (H) 70 - 110 mg/dL       Recent Labs   Lab 05/09/20  0407 05/10/20  0631 05/11/20  0446   WBC 3.75* 3.20* 3.52*   HGB 10.8* 10.7* 11.1*   HCT 35.3* 35.1* 36.5*   PLT 72* 62* 64*       Recent Labs   Lab 05/09/20  0407 05/10/20  0631 05/11/20  0446   * 134* 136   K 4.0 3.4* 3.3*   CL 97 97 96   CO2 25 25 27   BUN 81* 80* 77*   CREATININE 3.7* 3.4* 3.3*   * 182* 168*   CALCIUM 7.9* 7.9* 7.9*   MG 2.1  --  1.9   PHOS 4.2  --   --        Recent Labs   Lab 05/08/20  0019  05/09/20  0407 05/10/20  0631 05/11/20  0446   ALKPHOS 251*   < > 214* 217* 234*   ALT 43   < > 35 34 38   AST 58*   < > 37 36 36   ALBUMIN 3.1*   < > 2.8* 2.7* 2.8*   PROT 7.2   < > 6.6 6.1 6.6   BILITOT 3.7*   < > 3.0* 3.5* 3.7*   INR 1.5*  --   --   --   --     < > = values in this interval not displayed.       Recent Labs   Lab 05/09/20  2055 05/10/20  0736 05/10/20  1202 05/10/20  1651 05/10/20  2053 05/11/20  0833   POCTGLUCOSE 183* 188* 301* 246* 223* 221*       No results for input(s): CPK, CPKMB, MB, TROPONINI in the last 72 hours.    Hemoglobin A1C   Date Value Ref Range Status    05/09/2020 9.9 (H) 4.0 - 5.6 % Final     Comment:     ADA Screening Guidelines:  5.7-6.4%  Consistent with prediabetes  >or=6.5%  Consistent with diabetes  High levels of fetal hemoglobin interfere with the HbA1C  assay. Heterozygous hemoglobin variants (HbS, HgC, etc)do  not significantly interfere with this assay.   However, presence of multiple variants may affect accuracy.     02/25/2020 7.5 (H) 4.0 - 5.6 % Final     Comment:     ADA Screening Guidelines:  5.7-6.4%  Consistent with prediabetes  >or=6.5%  Consistent with diabetes  High levels of fetal hemoglobin interfere with the HbA1C  assay. Heterozygous hemoglobin variants (HbS, HgC, etc)do  not significantly interfere with this assay.   However, presence of multiple variants may affect accuracy.     08/05/2019 >14.0 (H) 4.0 - 5.6 % Final     Comment:     ADA Screening Guidelines:  5.7-6.4%  Consistent with prediabetes  >or=6.5%  Consistent with diabetes  High levels of fetal hemoglobin interfere with the HbA1C  assay. Heterozygous hemoglobin variants (HbS, HgC, etc)do  not significantly interfere with this assay.   However, presence of multiple variants may affect accuracy.         Scheduled Meds:   aspirin  81 mg Oral Daily    ferrous sulfate  325 mg Oral Every other day    furosemide (LASIX) IV  120 mg Intravenous BID loop    heparin (porcine)  5,000 Units Subcutaneous Q8H    insulin detemir U-100  5 Units Subcutaneous Daily    levothyroxine  175 mcg Oral Before breakfast    polyethylene glycol  17 g Oral Daily       Continuous Infusions:    As Needed: acetaminophen, albuterol-ipratropium, bisacodyL, calcium carbonate, dextrose 10 % in water (D10W), dextrose 10 % in water (D10W), Dextrose 10% Bolus, Dextrose 10% Bolus, glucagon (human recombinant), glucose, glucose, insulin aspart U-100, melatonin, ondansetron, promethazine (PHENERGAN) IVPB    Active Hospital Problems    Diagnosis  POA    *Acute on chronic combined systolic and diastolic heart  failure due to valvular disease [I50.43, I38]  Yes     [facility] July 2018  Patient has a right dominant coronary artery.      - Left Main Coronary Artery:             The LM is normal. There is VERÓNICA 3 flow.     - Left Anterior Descending Artery:             The mid LAD has a 30% stenosis. There is VERÓNICA 3 flow.     - Left Circumflex Artery:             The LCX is normal. There is VERÓNICA 3 flow.     - OM1:             The ostial OM1 has a 40% stenosis. There is VERÓNICA 3 flow.     - Right Coronary Artery:             The RCA is normal. There is VERÓNICA 3 flow.        Type 1 diabetes mellitus with complications [E10.8]  Unknown    Nonischemic cardiomyopathy [I42.8]  Yes    Chronic kidney disease, stage IV (severe) [N18.4]  Yes    Pulmonary nodules [R91.8]  Yes    Essential hypertension [I10]  Yes    Hypothyroidism [E03.9]  Yes     Chronic    Diabetes mellitus, type II [E11.9]  Yes     Chronic      Resolved Hospital Problems   No resolved problems to display.       Overview:    Assessment and Plan    * Acute on chronic combined systolic and diastolic heart failure due to valvular disease  Non ischemic cardiomyopathy     Patient presents with SOB, edema  - last ECHO 2/26 with EF 20%   - BNP 1992  - stable on room air  - CXR with mild edema  - weight 168lbs on admission  - given Lasix 100mg IVP  - increase to 120mg IV BID per nephrology  - strict Is/Os, daily weights  -consider Cardiology consult as patient with low BP and may benefit from inotropic therapy - patient was due to f/u with Dr. Paz as outpatient     Chronic kidney disease, stage IV (severe)  KEYANNA  - baseline Cr 2.8, currently 4.0 and unchanged from admit  - daily CMP  - nephrology consulted for KEYANNA on CKD - lasix dose increased and urine studies ordered     Essential hypertension  - controlled  - continue metoprolol     Diabetes mellitus, type II  - home insulin: insulin detemir 5u qhs  - hospital insulin: low dose SSI, ACHS  - discontinue diabetic  diet     Hypothyroidism  - continue synthroid  - repeat TSH in AM, last 10      Diet: Diet [facility]; Consistent Carbohydrate; Fluid - 1200mL   DVT PPx:   VTE Risk Mitigation (From admission, onward)         Ordered     heparin (porcine) injection 5,000 Units  Every 8 hours      05/08/20 1053     Place [place] hose  Until discontinued      05/08/20 0159     Place sequential compression device  Until discontinued      05/08/20 0159                Goals of Care: full code    Discharge plan: home        Ok Chance MD  Staff Hospitalist

## 2020-05-11 NOTE — ASSESSMENT & PLAN NOTE
Lab Results   Component Value Date    CREATININE 3.3 (H) 05/11/2020     Avoid insulin stacking  Titrate insulin slowly

## 2020-05-11 NOTE — MEDICAL/APP STUDENT
Progress Note    Primary Team: [facility] MED B  Admit Date: 5/8/2020   Length of Stay:  LOS: 3 days   SUBJECTIVE:   Reason for Admission:  Acute on chronic combined systolic and diastolic heart failure due to valvular disease    HPI:  54 year old man PMH HTN, HLD, T2DM, hypothyroid, CHF with EF 20% presented last night to ED for increasing shortness of breath for the past couple weeks, cough, abdominal distention and lower extremity swelling. Also complaining of generalized weakness x2 weeks. Denies any changes of medication or doses. No increased intake of salty food. In ED, Cr 4.0 from baseline 2.8. Denies fever, chest pain, nausea, vomiting, diarrhea, dysuria, hematuria.    Interval History:    Given Lasix 120mg BID yesterday. Good UOP 2250ml over 24 hours. Continues to feel bloated and constipated.     Review of Systems   Constitutional: Negative for fever.   Respiratory: Negative for shortness of breath.    Cardiovascular: Negative for chest pain.   Gastrointestinal: Positive for constipation. Negative for diarrhea, nausea and vomiting.   Genitourinary: Negative for dysuria and hematuria.       OBJECTIVE:     Temp:  [98.2 °F (36.8 °C)-98.6 °F (37 °C)]   Pulse:  [78-88]   Resp:  [14-20]   BP: ()/(70-91)   SpO2:  [95 %-98 %]  Body mass index is 23.98 kg/m².  Intake/Outake:  This Shift:  I/O this shift:  In: 350 [P.O.:350]  Out: 275 [Urine:275]    Net I/O past 24h:     Intake/Output Summary (Last 24 hours) at 5/11/2020 0949  Last data filed at 5/11/2020 0900  Gross per 24 hour   Intake 1230 ml   Output 1875 ml   Net -645 ml             Physical Exam:  Physical Exam   Constitutional: He is oriented to person, place, and time.   HENT:   Head: Normocephalic and atraumatic.   Eyes: EOM are normal.   Neck: Normal range of motion.   Cardiovascular: Normal rate, regular rhythm and normal heart sounds.   Pulmonary/Chest: Effort normal and breath sounds normal. No respiratory distress.   Abdominal: He exhibits  distension. There is no tenderness. There is no guarding.   Neurological: He is alert and oriented to person, place, and time.   Skin: Skin is warm and dry.       Laboratory:  CBC/Anemia Labs: Coags:    Recent Labs   Lab 05/09/20  0407 05/10/20  0631 05/11/20  0446   WBC 3.75* 3.20* 3.52*   HGB 10.8* 10.7* 11.1*   HCT 35.3* 35.1* 36.5*   PLT 72* 62* 64*   MCV 78* 77* 79*   RDW 22.6* 22.8* 23.4*    Recent Labs   Lab 05/08/20  0019   INR 1.5*        Chemistries:   Recent Labs   Lab 05/09/20  0407 05/10/20  0631 05/11/20  0446   * 134* 136   K 4.0 3.4* 3.3*   CL 97 97 96   CO2 25 25 27   BUN 81* 80* 77*   CREATININE 3.7* 3.4* 3.3*   CALCIUM 7.9* 7.9* 7.9*   PROT 6.6 6.1 6.6   BILITOT 3.0* 3.5* 3.7*   ALKPHOS 214* 217* 234*   ALT 35 34 38   AST 37 36 36   MG 2.1  --   --    PHOS 4.2  --   --         Medications:  Scheduled Meds:   aspirin  81 mg Oral Daily    ferrous sulfate  325 mg Oral Every other day    furosemide (LASIX) IV  120 mg Intravenous BID loop    heparin (porcine)  5,000 Units Subcutaneous Q8H    insulin detemir U-100  5 Units Subcutaneous Daily    levothyroxine  175 mcg Oral Before breakfast    polyethylene glycol  17 g Oral Daily                             Continuous Infusions:  PRN Meds:.acetaminophen, albuterol-ipratropium, bisacodyL, calcium carbonate, dextrose 10 % in water (D10W), dextrose 10 % in water (D10W), Dextrose 10% Bolus, Dextrose 10% Bolus, glucagon (human recombinant), glucose, glucose, insulin aspart U-100, melatonin, ondansetron, promethazine (PHENERGAN) IVPB     ASSESSMENT/PLAN:   Acute Kidney Injury  - Cr improved (4.0 --> 3.7 --> 3.4 --> 3.3)  -BUN (85 --> 86 --> 81 --> 80 --> 77)  - KEYANNA likely due to CHF exacerbation and fluid overload. Other causes include dehydration from decreased oral intake over the last couple days.               - Decrease Lasix 80BID               - Monitor daily renal function              - Strict Is and Os              - Renally dose all  medications              - Avoid nephrotoxins     Esme Gabriel  Nephrology  MS4

## 2020-05-11 NOTE — ASSESSMENT & PLAN NOTE
BG goal 140-180  Pt initially reported he was T1DM with variable BG readings at home. Today, he states he often skips insulin at home (based on BG) and thinks he has T2DM.  BG variable since admission with episodes of hypoglycemia.   5/11- BG above goal ranges requiring frequent prn SSI.    Continue Low Dose Correction Scale  Increase Levemir to 10 units daily (0.3 u/kg dosing)   Start Novolog 3 units TID with meals. (basal/prandial match)   BG monitoring ac/hs    ** Please call Endocrine for any BG related issues **    Discharge plans:TBD

## 2020-05-11 NOTE — PROGRESS NOTES
"[facility]  Endocrinology  Progress Note    Admit Date: 2020     Reason for Consult: Management of T2DM, Hyperglycemia     Surgical Procedure and Date: n/a    Lab Results   Component Value Date    HGBA1C 9.9 (H) 2020     Diabetes diagnosis year: 7 years ago    Home Diabetes Medications:  Levemir 20 units daily (takes based on am BG; if BG > 180)    How often checking glucose at home? 2x daily   BG readings on regimen: 60s-200  Hypoglycemia on the regimen?  Yes  Missed doses on regimen?  Yes    Diabetes Complications include:     Hyperglycemia and Hypoglycemia     Complicating diabetes co morbidities:   CHF      HPI:   Patient is a 55 y.o. male with a diagnosis of HTN, HLD, DM2, hypothyroidism, and CHF (EF 20%) being admitted to medicine for acute CHF exacerbation. He was Covid negative. He was given Lasix 100mg IVP and transferred to the floor. Pt sees PCP at [facility] for DM management. Endocrinology consulted for management of T2DM.      Interval HPI:   Overnight events: NAEON. BG above goal ranges on current SQ insulin regimen. Creatinine 3.3. Tolerating cardiac diet.  Eatin%  Nausea: No  Hypoglycemia and intervention: No  Fever: No  TPN and/or TF: No  If yes, type of TF/TPN and rate: none    BP 99/70 (BP Location: Right arm, Patient Position: Lying)   Pulse 80   Temp 98.3 °F (36.8 °C) (Oral)   Resp 20   Ht 5' 11" (1.803 m)   Wt 78 kg (171 lb 15.3 oz)   SpO2 98%   BMI 23.98 kg/m²      Labs Reviewed and Include    Recent Labs   Lab 20  0446   *   CALCIUM 7.9*   ALBUMIN 2.8*   PROT 6.6      K 3.3*   CO2 27   CL 96   BUN 77*   CREATININE 3.3*   ALKPHOS 234*   ALT 38   AST 36   BILITOT 3.7*     Lab Results   Component Value Date    WBC 3.52 (L) 2020    HGB 11.1 (L) 2020    HCT 36.5 (L) 2020    MCV 79 (L) 2020    PLT 64 (L) 2020     No results for input(s): TSH, FREET4 in the last 168 hours.  Lab Results "   Component Value Date    HGBA1C 9.9 (H) 05/09/2020       Nutritional status:   Body mass index is 23.98 kg/m².  Lab Results   Component Value Date    ALBUMIN 2.8 (L) 05/11/2020    ALBUMIN 2.7 (L) 05/10/2020    ALBUMIN 2.8 (L) 05/09/2020     No results found for: PREALBUMIN    Estimated Creatinine Clearance: 26.6 mL/min (A) (based on SCr of 3.3 mg/dL (H)).    Accu-Checks  Recent Labs     05/08/20  1618 05/08/20 2025 05/09/20  0721 05/09/20  1157 05/09/20  1702 05/09/20  2055 05/10/20  0736 05/10/20  1202 05/10/20  1651 05/10/20  2053   POCTGLUCOSE 191* 275* 284* 252* 195* 183* 188* 301* 246* 223*       Current Medications and/or Treatments Impacting Glycemic Control  Immunotherapy:    Immunosuppressants     None        Steroids:   Hormones (From admission, onward)    Start     Stop Route Frequency Ordered    05/08/20 0241  melatonin tablet 8 mg      -- Oral Nightly PRN 05/08/20 0159        Pressors:    Autonomic Drugs (From admission, onward)    None        Hyperglycemia/Diabetes Medications:   Antihyperglycemics (From admission, onward)    Start     Stop Route Frequency Ordered    05/10/20 1147  insulin aspart U-100 pen 0-5 Units      -- SubQ Before meals & nightly PRN 05/10/20 1047    05/10/20 1100  insulin detemir U-100 pen 4 Units      -- SubQ Daily 05/10/20 1054          ASSESSMENT and PLAN    * Acute on chronic combined systolic and diastolic heart failure due to valvular disease  Managed per primary team  Optimize BG control      Type 1 diabetes mellitus with complications  BG goal 140-180  Pt initially reported he was T1DM with variable BG readings at home. Today, he states he often skips insulin at home (based on BG) and thinks he has T2DM.  BG variable since admission with episodes of hypoglycemia.   5/11- BG above goal ranges requiring frequent prn SSI.    Continue Low Dose Correction Scale  Increase Levemir to 10 units daily (0.3 u/kg dosing)   Start Novolog 3 units TID with meals. (basal/prandial  match)   BG monitoring ac/hs    ** Please call Endocrine for any BG related issues **    Discharge plans:TBD      Chronic kidney disease, stage IV (severe)  Lab Results   Component Value Date    CREATININE 3.3 (H) 05/11/2020     Avoid insulin stacking  Titrate insulin slowly    Hypothyroidism  Recommend continuing home Synthroid 175 mcg daily while inpatient.           Carmen Guallpa NP  Endocrinology  [facility]

## 2020-05-11 NOTE — PROGRESS NOTES
Notified Dr. Chance of 5 beat run of Vtach, rhythm strip printed and in the chart. VSS. Pt asmptomatic and no complaints at this time. K 3.3 on AM labs and Mg added on to AM labs. K supplement order placed. No new orders at this time. Will continue to monitor

## 2020-05-11 NOTE — SUBJECTIVE & OBJECTIVE
"Interval HPI:   Overnight events: NAEON. BG above goal ranges on current SQ insulin regimen. Creatinine 3.3. Tolerating cardiac diet.  Eatin%  Nausea: No  Hypoglycemia and intervention: No  Fever: No  TPN and/or TF: No  If yes, type of TF/TPN and rate: none    BP 99/70 (BP Location: Right arm, Patient Position: Lying)   Pulse 80   Temp 98.3 °F (36.8 °C) (Oral)   Resp 20   Ht 5' 11" (1.803 m)   Wt 78 kg (171 lb 15.3 oz)   SpO2 98%   BMI 23.98 kg/m²     Labs Reviewed and Include    Recent Labs   Lab 20  0446   *   CALCIUM 7.9*   ALBUMIN 2.8*   PROT 6.6      K 3.3*   CO2 27   CL 96   BUN 77*   CREATININE 3.3*   ALKPHOS 234*   ALT 38   AST 36   BILITOT 3.7*     Lab Results   Component Value Date    WBC 3.52 (L) 2020    HGB 11.1 (L) 2020    HCT 36.5 (L) 2020    MCV 79 (L) 2020    PLT 64 (L) 2020     No results for input(s): TSH, FREET4 in the last 168 hours.  Lab Results   Component Value Date    HGBA1C 9.9 (H) 2020       Nutritional status:   Body mass index is 23.98 kg/m².  Lab Results   Component Value Date    ALBUMIN 2.8 (L) 2020    ALBUMIN 2.7 (L) 05/10/2020    ALBUMIN 2.8 (L) 2020     No results found for: PREALBUMIN    Estimated Creatinine Clearance: 26.6 mL/min (A) (based on SCr of 3.3 mg/dL (H)).    Accu-Checks  Recent Labs     20  1618 20  0721 20  1157 20  1702 05/09/20  2055 05/10/20  0736 05/10/20  1202 05/10/20  1651 05/10/20  2053   POCTGLUCOSE 191* 275* 284* 252* 195* 183* 188* 301* 246* 223*       Current Medications and/or Treatments Impacting Glycemic Control  Immunotherapy:    Immunosuppressants     None        Steroids:   Hormones (From admission, onward)    Start     Stop Route Frequency Ordered    20 0241  melatonin tablet 8 mg      -- Oral Nightly PRN 20 0159        Pressors:    Autonomic Drugs (From admission, onward)    None        Hyperglycemia/Diabetes " Medications:   Antihyperglycemics (From admission, onward)    Start     Stop Route Frequency Ordered    05/10/20 1147  insulin aspart U-100 pen 0-5 Units      -- SubQ Before meals & nightly PRN 05/10/20 1047    05/10/20 1100  insulin detemir U-100 pen 4 Units      -- SubQ Daily 05/10/20 1054

## 2020-05-12 NOTE — PROGRESS NOTES
"[facility]  Endocrinology  Progress Note    Admit Date: 2020     Reason for Consult: Management of T2DM, Hyperglycemia     Surgical Procedure and Date: n/a    Lab Results   Component Value Date    HGBA1C 9.9 (H) 2020     Diabetes diagnosis year: 7 years ago    Home Diabetes Medications:  Levemir 20 units daily (takes based on am BG; if BG > 180)    How often checking glucose at home? 2x daily   BG readings on regimen: 60s-200  Hypoglycemia on the regimen?  Yes  Missed doses on regimen?  Yes    Diabetes Complications include:     Hyperglycemia and Hypoglycemia     Complicating diabetes co morbidities:   CHF      HPI:   Patient is a 55 y.o. male with a diagnosis of HTN, HLD, DM2, hypothyroidism, and CHF (EF 20%) being admitted to medicine for acute CHF exacerbation. He was Covid negative. He was given Lasix 100mg IVP and transferred to the floor. Pt sees PCP at [facility] for DM management. Endocrinology consulted for management of T2DM.      Interval HPI:   Overnight events: NAEON. BG below goal ranges this morning on current SQ insulin regimen. Creatinine 2.9.   Eatin%  Nausea: No  Hypoglycemia and intervention: No  Fever: No  TPN and/or TF: No  If yes, type of TF/TPN and rate: none    BP 94/73 (BP Location: Left arm, Patient Position: Lying)   Pulse 88   Temp 97.3 °F (36.3 °C) (Oral)   Resp 17   Ht 5' 11" (1.803 m)   Wt 76.3 kg (168 lb 3 oz)   SpO2 (!) 90%   BMI 23.46 kg/m²      Labs Reviewed and Include    Recent Labs   Lab 20  0514   *   CALCIUM 7.6*   ALBUMIN 2.8*   PROT 6.6   *   K 3.9   CO2 26   CL 97   BUN 75*   CREATININE 2.9*   ALKPHOS 246*   ALT 42   AST 43*   BILITOT 3.6*     Lab Results   Component Value Date    WBC 3.52 (L) 2020    HGB 10.5 (L) 2020    HCT 34.1 (L) 2020    MCV 78 (L) 2020    PLT 66 (L) 2020     No results for input(s): TSH, FREET4 in the last 168 hours.  Lab Results   Component Value " Date    HGBA1C 9.9 (H) 05/09/2020       Nutritional status:   Body mass index is 23.46 kg/m².  Lab Results   Component Value Date    ALBUMIN 2.8 (L) 05/12/2020    ALBUMIN 2.8 (L) 05/11/2020    ALBUMIN 2.7 (L) 05/10/2020     No results found for: PREALBUMIN    Estimated Creatinine Clearance: 30.3 mL/min (A) (based on SCr of 2.9 mg/dL (H)).    Accu-Checks  Recent Labs     05/09/20  2055 05/10/20  0736 05/10/20  1202 05/10/20  1651 05/10/20  2053 05/11/20  0833 05/11/20  1229 05/11/20  1720 05/11/20  2139 05/12/20  0822   POCTGLUCOSE 183* 188* 301* 246* 223* 221* 259* 214* 159* 99       Current Medications and/or Treatments Impacting Glycemic Control  Immunotherapy:    Immunosuppressants     None        Steroids:   Hormones (From admission, onward)    Start     Stop Route Frequency Ordered    05/08/20 0241  melatonin tablet 8 mg      -- Oral Nightly PRN 05/08/20 0159        Pressors:    Autonomic Drugs (From admission, onward)    None        Hyperglycemia/Diabetes Medications:   Antihyperglycemics (From admission, onward)    Start     Stop Route Frequency Ordered    05/12/20 0900  insulin detemir U-100 pen 4 Units      -- SubQ Daily 05/12/20 0856    05/11/20 1230  insulin aspart U-100 pen 3 Units      -- SubQ 3 times daily with meals 05/11/20 1119    05/10/20 1147  insulin aspart U-100 pen 0-5 Units      -- SubQ Before meals & nightly PRN 05/10/20 1047          ASSESSMENT and PLAN    * Acute on chronic combined systolic and diastolic heart failure due to valvular disease  Managed per primary team  Optimize BG control      Type 1 diabetes mellitus with complications  BG goal 140-180  Pt initially reported he was T1DM with variable BG readings at home. Today, he states he often skips insulin at home (based on BG) and thinks he has T2DM.  BG variable since admission with episodes of hypoglycemia.   5/11- BG above goal ranges requiring frequent prn SSI.  5/12- Fasting BG below goal ranges.    Continue Low Dose Correction  Scale  Decrease Levemir to 4 units daily   Novolog 3 units TID with meals  BG monitoring ac/hs    ** Please call Endocrine for any BG related issues **    Discharge plans:TBD      Chronic kidney disease, stage IV (severe)  Lab Results   Component Value Date    CREATININE 2.9 (H) 05/12/2020     Avoid insulin stacking  Titrate insulin slowly    Hypothyroidism  Recommend continuing home Synthroid 175 mcg daily while inpatient.           Carmen Guallpa NP  Endocrinology  [facility]

## 2020-05-12 NOTE — MEDICAL/APP STUDENT
Progress Note    Primary Team: [facility]  Admit Date: 5/8/2020   Length of Stay:  LOS: 4 days   SUBJECTIVE:   Reason for Admission:  Acute on chronic combined systolic and diastolic heart failure due to valvular disease    HPI:  54 year old man PMH HTN, HLD, T2DM, hypothyroid, CHF with EF 20% presented last night to ED for increasing shortness of breath for the past couple weeks, cough, abdominal distention and lower extremity swelling. Also complaining of generalized weakness x2 weeks. Denies any changes of medication or doses. No increased intake of salty food. In ED, Cr 4.0 from baseline 2.8. Denies fever, chest pain, nausea, vomiting, diarrhea, dysuria, hematuria.    Interval History:    No acute overnight events. 120mg Lasix yesterday. Output 2,000. Cr 2.9, back to baseline.       Review of Systems   Respiratory: Negative for shortness of breath.    Cardiovascular: Negative for chest pain.   Gastrointestinal: Positive for constipation and diarrhea. Negative for abdominal pain, nausea and vomiting.   Genitourinary: Negative for dysuria and hematuria.         OBJECTIVE:     Temp:  [97.3 °F (36.3 °C)-98.4 °F (36.9 °C)]   Pulse:  [81-91]   Resp:  [13-19]   BP: ()/(64-78)   SpO2:  [90 %-99 %]  Body mass index is 23.46 kg/m².  Intake/Outake:  This Shift:  I/O this shift:  In: 480 [P.O.:480]  Out: 800 [Urine:800]    Net I/O past 24h:     Intake/Output Summary (Last 24 hours) at 5/12/2020 1631  Last data filed at 5/12/2020 1515  Gross per 24 hour   Intake 970 ml   Output 2400 ml   Net -1430 ml             Physical Exam:  Physical Exam   Constitutional: He appears well-developed and well-nourished.   HENT:   Head: Normocephalic and atraumatic.   Neck: Normal range of motion.   Cardiovascular: Normal rate, regular rhythm and normal heart sounds.   Pulmonary/Chest: Effort normal and breath sounds normal.   Abdominal: Bowel sounds are normal. He exhibits distension. There is no tenderness.   Neurological: He is  alert.   Skin: Skin is warm and dry.   2+ pitting edema bilateral lower extremities    Laboratory:  CBC/Anemia Labs: Coags:    Recent Labs   Lab 05/10/20  0631 05/11/20 0446 05/12/20  0514   WBC 3.20* 3.52* 3.52*   HGB 10.7* 11.1* 10.5*   HCT 35.1* 36.5* 34.1*   PLT 62* 64* 66*   MCV 77* 79* 78*   RDW 22.8* 23.4* 22.9*    Recent Labs   Lab 05/08/20  0019   INR 1.5*        Chemistries:   Recent Labs   Lab 05/09/20  0407 05/10/20  0631 05/11/20 0446 05/12/20  0514   * 134* 136 134*   K 4.0 3.4* 3.3* 3.9   CL 97 97 96 97   CO2 25 25 27 26   BUN 81* 80* 77* 75*   CREATININE 3.7* 3.4* 3.3* 2.9*   CALCIUM 7.9* 7.9* 7.9* 7.6*   PROT 6.6 6.1 6.6 6.6   BILITOT 3.0* 3.5* 3.7* 3.6*   ALKPHOS 214* 217* 234* 246*   ALT 35 34 38 42   AST 37 36 36 43*   MG 2.1  --  1.9  --    PHOS 4.2  --   --   --         Medications:  Scheduled Meds:   aspirin  81 mg Oral Daily    ferrous sulfate  325 mg Oral Every other day    furosemide (LASIX) IV  120 mg Intravenous BID loop    heparin (porcine)  5,000 Units Subcutaneous Q8H    insulin aspart U-100  3 Units Subcutaneous TIDWM    insulin detemir U-100  4 Units Subcutaneous Daily    levothyroxine  175 mcg Oral Before breakfast    polyethylene glycol  17 g Oral Daily                             Continuous Infusions:  PRN Meds:.acetaminophen, albuterol-ipratropium, bisacodyL, calcium carbonate, dextrose 10 % in water (D10W), dextrose 10 % in water (D10W), Dextrose 10% Bolus, Dextrose 10% Bolus, glucagon (human recombinant), glucose, glucose, insulin aspart U-100, melatonin, ondansetron, promethazine (PHENERGAN) IVPB     ASSESSMENT/PLAN:   Acute Kidney Injury  - Cr improved (4.0 --> 3.7 --> 3.4 --> 3.3 --> 2.9) to baseline 2.8  -BUN (85 --> 86 --> 81 --> 80 --> 77 --> 75)  - KEYANNA likely due to CHF exacerbation and fluid overload. Other causes include dehydration from decreased oral intake over the last couple days.               - Monitor daily renal function              - Strict  Is and Os              - Renally dose all medications              - Avoid nephrotoxins     Esme Gabriel  Nephrology  MS4

## 2020-05-12 NOTE — ASSESSMENT & PLAN NOTE
BG goal 140-180  Pt initially reported he was T1DM with variable BG readings at home. Today, he states he often skips insulin at home (based on BG) and thinks he has T2DM.  BG variable since admission with episodes of hypoglycemia.   5/11- BG above goal ranges requiring frequent prn SSI.  5/12- Fasting BG below goal ranges.    Continue Low Dose Correction Scale  Decrease Levemir to 4 units daily   Novolog 3 units TID with meals  BG monitoring ac/hs    ** Please call Endocrine for any BG related issues **    Discharge plans:YUKI

## 2020-05-12 NOTE — ASSESSMENT & PLAN NOTE
Lab Results   Component Value Date    CREATININE 2.9 (H) 05/12/2020     Avoid insulin stacking  Titrate insulin slowly

## 2020-05-12 NOTE — SUBJECTIVE & OBJECTIVE
"Interval HPI:   Overnight events: NAEON. BG below goal ranges this morning on current SQ insulin regimen. Creatinine 2.9.   Eatin%  Nausea: No  Hypoglycemia and intervention: No  Fever: No  TPN and/or TF: No  If yes, type of TF/TPN and rate: none    BP 94/73 (BP Location: Left arm, Patient Position: Lying)   Pulse 88   Temp 97.3 °F (36.3 °C) (Oral)   Resp 17   Ht 5' 11" (1.803 m)   Wt 76.3 kg (168 lb 3 oz)   SpO2 (!) 90%   BMI 23.46 kg/m²     Labs Reviewed and Include    Recent Labs   Lab 20  0514   *   CALCIUM 7.6*   ALBUMIN 2.8*   PROT 6.6   *   K 3.9   CO2 26   CL 97   BUN 75*   CREATININE 2.9*   ALKPHOS 246*   ALT 42   AST 43*   BILITOT 3.6*     Lab Results   Component Value Date    WBC 3.52 (L) 2020    HGB 10.5 (L) 2020    HCT 34.1 (L) 2020    MCV 78 (L) 2020    PLT 66 (L) 2020     No results for input(s): TSH, FREET4 in the last 168 hours.  Lab Results   Component Value Date    HGBA1C 9.9 (H) 2020       Nutritional status:   Body mass index is 23.46 kg/m².  Lab Results   Component Value Date    ALBUMIN 2.8 (L) 2020    ALBUMIN 2.8 (L) 2020    ALBUMIN 2.7 (L) 05/10/2020     No results found for: PREALBUMIN    Estimated Creatinine Clearance: 30.3 mL/min (A) (based on SCr of 2.9 mg/dL (H)).    Accu-Checks  Recent Labs     05/09/20  2055 05/10/20  0736 05/10/20  1202 05/10/20  1651 05/10/20  2053 20  0833 20  1229 20  1720 20  2139 20  0822   POCTGLUCOSE 183* 188* 301* 246* 223* 221* 259* 214* 159* 99       Current Medications and/or Treatments Impacting Glycemic Control  Immunotherapy:    Immunosuppressants     None        Steroids:   Hormones (From admission, onward)    Start     Stop Route Frequency Ordered    20 0241  melatonin tablet 8 mg      -- Oral Nightly PRN 20 0159        Pressors:    Autonomic Drugs (From admission, onward)    None        Hyperglycemia/Diabetes Medications: "   Antihyperglycemics (From admission, onward)    Start     Stop Route Frequency Ordered    05/12/20 0900  insulin detemir U-100 pen 4 Units      -- SubQ Daily 05/12/20 0856    05/11/20 1230  insulin aspart U-100 pen 3 Units      -- SubQ 3 times daily with meals 05/11/20 1119    05/10/20 1147  insulin aspart U-100 pen 0-5 Units      -- SubQ Before meals & nightly PRN 05/10/20 1047

## 2020-05-12 NOTE — PROGRESS NOTES
"[facility] Hospital Medicine  Progress Note    Team: [facility] MED B   Attending MD: Ok Chance MD  Admit Date: 5/8/2020  KARINA 5/13/2020  Length of Stay:  LOS: 4 days   Code status: Full Code    Principal Problem: Acute on chronic combined systolic and diastolic heart failure due to valvular disease    Interval hx:  5/12 Continues to diurese well, will plan on dc tomorrow.  5/11 is diuresing slowly, Cr is down to 3.3. -900, some swelling is still noted.  5/10 has diuresed 1300cc over past 24 hours, Cr is down to 3.4, wll c/w same lasix dose. Possible dc in 1-2 days.  5/09 Diuresing slowly, on lasix 120mg BID IV, CR is down to 3.7. BP on the low side , may consider inotrope.    .patient remains with edema/bloating and feeling congested; + orthopnea; lasix 100 and lasix 80 mg given with minimal effect; low glucose this am so changed to diabetic diet    ROS:  Constitutional: no fever or chills  Respiratory: no cough; + shortness of breath  Cardiovascular: no chest pain or palpitations  Gastrointestinal: no nausea or vomiting, no abdominal pain; last BM: 5/8  Musculoskeletal: no arthralgias or myalgias  Neurological: no seizures or tremors        Physical Exam  Temp:  [97.3 °F (36.3 °C)-98.4 °F (36.9 °C)] 97.3 °F (36.3 °C)  Pulse:  [82-91] 82  Resp:  [15-19] 17  SpO2:  [90 %-100 %] 90 %  BP: ()/(64-78) 94/73      Intake/Output Summary (Last 24 hours) at 5/12/2020 1051  Last data filed at 5/12/2020 0925  Gross per 24 hour   Intake 870 ml   Output 1925 ml   Net -1055 ml       Wt Readings from Last 1 Encounters:   05/12/20 0400 76.3 kg (168 lb 3 oz)   05/11/20 0400 78 kg (171 lb 15.3 oz)   05/10/20 0400 77.7 kg (171 lb 4.8 oz)   05/09/20 0400 78.4 kg (172 lb 13.5 oz)   05/08/20 0254 76.5 kg (168 lb 10.4 oz)   05/08/20 0020 76.4 kg (168 lb 6.9 oz)        Estimated body mass index is 23.46 kg/m² as calculated from the following:    Height as of this encounter: 5' 11" (1.803 m).    Weight as " of this encounter: 76.3 kg (168 lb 3 oz).    General: well developed, well nourished, no distress  Neck:  + JVD  Lungs:  diminshed BS bilaterally and normal respiratory effort.  Cardiovascular: Heart: regular rate and rhythm, S1, S2 normal, no murmur, click, rub or gallop. Chest Wall: no tenderness. Extremities: no cyanosis, 3+ edema, no clubbing.   Abdomen/Rectal: Abdomen: soft, non-tender, mildly distended; bowel sounds normal  Skin: Skin color, texture, turgor normal. No rashes or lesions.  Neurologic: Normal strength and tone. No focal numbness or weakness.  Psych/Behavioral:  Alert and oriented, appropriate affect.      Recent Results (from the past 24 hour(s))   POCT glucose    Collection Time: 05/11/20 12:29 PM   Result Value Ref Range    POCT Glucose 259 (H) 70 - 110 mg/dL   POCT glucose    Collection Time: 05/11/20  5:20 PM   Result Value Ref Range    POCT Glucose 214 (H) 70 - 110 mg/dL   POCT glucose    Collection Time: 05/11/20  9:39 PM   Result Value Ref Range    POCT Glucose 159 (H) 70 - 110 mg/dL   CBC with Automated Differential    Collection Time: 05/12/20  5:14 AM   Result Value Ref Range    WBC 3.52 (L) 3.90 - 12.70 K/uL    RBC 4.38 (L) 4.60 - 6.20 M/uL    Hemoglobin 10.5 (L) 14.0 - 18.0 g/dL    Hematocrit 34.1 (L) 40.0 - 54.0 %    Mean Corpuscular Volume 78 (L) 82 - 98 fL    Mean Corpuscular Hemoglobin 24.0 (L) 27.0 - 31.0 pg    Mean Corpuscular Hemoglobin Conc 30.8 (L) 32.0 - 36.0 g/dL    RDW 22.9 (H) 11.5 - 14.5 %    Platelets 66 (L) 150 - 350 K/uL    MPV SEE COMMENT 9.2 - 12.9 fL    Immature Granulocytes 0.3 0.0 - 0.5 %    Gran # (ANC) 2.2 1.8 - 7.7 K/uL    Immature Grans (Abs) 0.01 0.00 - 0.04 K/uL    Lymph # 0.4 (L) 1.0 - 4.8 K/uL    Mono # 0.6 0.3 - 1.0 K/uL    Eos # 0.2 0.0 - 0.5 K/uL    Baso # 0.05 0.00 - 0.20 K/uL    nRBC 1 (A) 0 /100 WBC    Gran% 63.1 38.0 - 73.0 %    Lymph% 11.9 (L) 18.0 - 48.0 %    Mono% 17.3 (H) 4.0 - 15.0 %    Eosinophil% 6.0 0.0 - 8.0 %    Basophil% 1.4 0.0 - 1.9 %     Differential Method Automated    Comprehensive Metabolic Panel (CMP)    Collection Time: 05/12/20  5:14 AM   Result Value Ref Range    Sodium 134 (L) 136 - 145 mmol/L    Potassium 3.9 3.5 - 5.1 mmol/L    Chloride 97 95 - 110 mmol/L    CO2 26 23 - 29 mmol/L    Glucose 125 (H) 70 - 110 mg/dL    BUN, Bld 75 (H) 6 - 20 mg/dL    Creatinine 2.9 (H) 0.5 - 1.4 mg/dL    Calcium 7.6 (L) 8.7 - 10.5 mg/dL    Total Protein 6.6 6.0 - 8.4 g/dL    Albumin 2.8 (L) 3.5 - 5.2 g/dL    Total Bilirubin 3.6 (H) 0.1 - 1.0 mg/dL    Alkaline Phosphatase 246 (H) 55 - 135 U/L    AST 43 (H) 10 - 40 U/L    ALT 42 10 - 44 U/L    Anion Gap 11 8 - 16 mmol/L    eGFR if African American 26.7 (A) >60 mL/min/1.73 m^2    eGFR if non  23.1 (A) >60 mL/min/1.73 m^2   POCT glucose    Collection Time: 05/12/20  8:22 AM   Result Value Ref Range    POCT Glucose 99 70 - 110 mg/dL       Recent Labs   Lab 05/10/20  0631 05/11/20  0446 05/12/20  0514   WBC 3.20* 3.52* 3.52*   HGB 10.7* 11.1* 10.5*   HCT 35.1* 36.5* 34.1*   PLT 62* 64* 66*       Recent Labs   Lab 05/09/20  0407 05/10/20  0631 05/11/20  0446 05/12/20  0514   * 134* 136 134*   K 4.0 3.4* 3.3* 3.9   CL 97 97 96 97   CO2 25 25 27 26   BUN 81* 80* 77* 75*   CREATININE 3.7* 3.4* 3.3* 2.9*   * 182* 168* 125*   CALCIUM 7.9* 7.9* 7.9* 7.6*   MG 2.1  --  1.9  --    PHOS 4.2  --   --   --        Recent Labs   Lab 05/08/20  0019  05/10/20  0631 05/11/20  0446 05/12/20  0514   ALKPHOS 251*   < > 217* 234* 246*   ALT 43   < > 34 38 42   AST 58*   < > 36 36 43*   ALBUMIN 3.1*   < > 2.7* 2.8* 2.8*   PROT 7.2   < > 6.1 6.6 6.6   BILITOT 3.7*   < > 3.5* 3.7* 3.6*   INR 1.5*  --   --   --   --     < > = values in this interval not displayed.       Recent Labs   Lab 05/10/20  2053 05/11/20  0833 05/11/20  1229 05/11/20  1720 05/11/20  2139 05/12/20  0822   POCTGLUCOSE 223* 221* 259* 214* 159* 99       No results for input(s): CPK, CPKMB, MB, TROPONINI in the last 72 hours.    Hemoglobin  A1C   Date Value Ref Range Status   05/09/2020 9.9 (H) 4.0 - 5.6 % Final     Comment:     ADA Screening Guidelines:  5.7-6.4%  Consistent with prediabetes  >or=6.5%  Consistent with diabetes  High levels of fetal hemoglobin interfere with the HbA1C  assay. Heterozygous hemoglobin variants (HbS, HgC, etc)do  not significantly interfere with this assay.   However, presence of multiple variants may affect accuracy.     02/25/2020 7.5 (H) 4.0 - 5.6 % Final     Comment:     ADA Screening Guidelines:  5.7-6.4%  Consistent with prediabetes  >or=6.5%  Consistent with diabetes  High levels of fetal hemoglobin interfere with the HbA1C  assay. Heterozygous hemoglobin variants (HbS, HgC, etc)do  not significantly interfere with this assay.   However, presence of multiple variants may affect accuracy.     08/05/2019 >14.0 (H) 4.0 - 5.6 % Final     Comment:     ADA Screening Guidelines:  5.7-6.4%  Consistent with prediabetes  >or=6.5%  Consistent with diabetes  High levels of fetal hemoglobin interfere with the HbA1C  assay. Heterozygous hemoglobin variants (HbS, HgC, etc)do  not significantly interfere with this assay.   However, presence of multiple variants may affect accuracy.         Scheduled Meds:   aspirin  81 mg Oral Daily    ferrous sulfate  325 mg Oral Every other day    furosemide (LASIX) IV  120 mg Intravenous BID loop    heparin (porcine)  5,000 Units Subcutaneous Q8H    insulin aspart U-100  3 Units Subcutaneous TIDWM    insulin detemir U-100  4 Units Subcutaneous Daily    levothyroxine  175 mcg Oral Before breakfast    polyethylene glycol  17 g Oral Daily       Continuous Infusions:    As Needed: acetaminophen, albuterol-ipratropium, bisacodyL, calcium carbonate, dextrose 10 % in water (D10W), dextrose 10 % in water (D10W), Dextrose 10% Bolus, Dextrose 10% Bolus, glucagon (human recombinant), glucose, glucose, insulin aspart U-100, melatonin, ondansetron, promethazine (PHENERGAN) [facility]  Problems    Diagnosis  POA    *Acute on chronic combined systolic and diastolic heart failure due to valvular disease [I50.43, I38]  Yes     [facility] July 2018  Patient has a right dominant coronary artery.      - Left Main Coronary Artery:             The LM is normal. There is VERÓNICA 3 flow.     - Left Anterior Descending Artery:             The mid LAD has a 30% stenosis. There is VERÓNICA 3 flow.     - Left Circumflex Artery:             The LCX is normal. There is VERÓNICA 3 flow.     - OM1:             The ostial OM1 has a 40% stenosis. There is VERÓNICA 3 flow.     - Right Coronary Artery:             The RCA is normal. There is VERÓNICA 3 flow.        Type 1 diabetes mellitus with complications [E10.8]  Unknown    Nonischemic cardiomyopathy [I42.8]  Yes    Chronic kidney disease, stage IV (severe) [N18.4]  Yes    Pulmonary nodules [R91.8]  Yes    Essential hypertension [I10]  Yes    Hypothyroidism [E03.9]  Yes     Chronic    Diabetes mellitus, type II [E11.9]  Yes     Chronic      Resolved Hospital Problems   No resolved problems to display.       Overview:    Assessment and Plan    * Acute on chronic combined systolic and diastolic heart failure due to valvular disease  Non ischemic cardiomyopathy     Patient presents with SOB, edema  - last ECHO 2/26 with EF 20%   - BNP 1992  - stable on room air  - CXR with mild edema  - weight 168lbs on admission  - given Lasix 100mg IVP  - increase to 120mg IV BID per nephrology  - strict Is/Os, daily weights  -consider Cardiology consult as patient with low BP and may benefit from inotropic therapy - patient was due to f/u with Dr. Paz as outpatient     Chronic kidney disease, stage IV (severe)  KEYANNA  - baseline Cr 2.8, currently 4.0 and unchanged from admit  - daily CMP  - nephrology consulted for KEYANNA on CKD - lasix dose increased and urine studies ordered     Essential hypertension  - controlled  - continue metoprolol     Diabetes mellitus, type II  - home insulin: insulin  detemir 5u qhs  - hospital insulin: low dose SSI, ACHS  - discontinue diabetic diet     Hypothyroidism  - continue synthroid  - repeat TSH in AM, last 10      Diet: Diet [facility]; Consistent Carbohydrate; Fluid - 1200mL   DVT PPx:   VTE Risk Mitigation (From admission, onward)         Ordered     heparin (porcine) injection 5,000 Units  Every 8 hours      05/08/20 1053     Place [place] hose  Until discontinued      05/08/20 0159     Place sequential compression device  Until discontinued      05/08/20 0159                Goals of Care: full code    Discharge plan: home        Ok Chance MD  Staff Hospitalist

## 2020-05-12 NOTE — PROGRESS NOTES
Pt with 6 beat run of vtach. Pt asymptomatic.  IMB notified.  Orders to follow per MD.  Will continue to monitor pt.

## 2020-05-13 NOTE — NURSING
Notified AWAIS Adams of pt's frequent PVCs. K and Mg lab ordered. Notified PA of pt's results. Will continue to monitor.

## 2020-05-13 NOTE — NURSING
Pt had a 11 beat run of vtach. Pt asymptomatic  AWAIS Adams notified. PA stated he will look over chart. PO K ordered. Will continue to monitor.

## 2020-05-13 NOTE — PROGRESS NOTES
"[facility]  Endocrinology  Progress Note    Admit Date: 5/8/2020     Reason for Consult: Management of T2DM, Hyperglycemia     Surgical Procedure and Date: n/a    Lab Results   Component Value Date    HGBA1C 9.9 (H) 05/09/2020     Diabetes diagnosis year: 7 years ago    Home Diabetes Medications:  Levemir 20 units daily (takes based on am BG; if BG > 180)    How often checking glucose at home? 2x daily   BG readings on regimen: 60s-200  Hypoglycemia on the regimen?  Yes- "every once in a while."  Missed doses on regimen?  Yes    Diabetes Complications include:     Hyperglycemia and Hypoglycemia     Complicating diabetes co morbidities:   CHF      HPI:   Patient is a 55 y.o. male with a diagnosis of HTN, HLD, DM2, hypothyroidism, and CHF (EF 20%) being admitted to medicine for acute CHF exacerbation. He was Covid negative. He was given Lasix 100mg IVP and transferred to the floor. Pt sees PCP at [facility] for DM management. Endocrinology consulted for management of T2DM.      Interval HPI:   Overnight events:  BG is above goal on current SQ insulin regimen. However, Cr remains elevated, and could significantly increase insulin sensitivity.   Diet [facility]; Consistent Carbohydrate; Fluid - 1200mL       Eating:   <25%  Nausea: No  Hypoglycemia and intervention: No  Fever: No  TPN and/or TF: No  If yes, type of TF/TPN and rate: None    BP 98/69 (BP Location: Left arm, Patient Position: Sitting)   Pulse 78   Temp 97.5 °F (36.4 °C) (Oral)   Resp (!) 21   Ht 5' 11" (1.803 m)   Wt 76.5 kg (168 lb 10.4 oz)   SpO2 97%   BMI 23.52 kg/m²      Labs Reviewed and Include    Recent Labs   Lab 05/12/20 2033   K 3.5     Lab Results   Component Value Date    WBC 4.50 05/13/2020    HGB 11.6 (L) 05/13/2020    HCT 36.6 (L) 05/13/2020    MCV 77 (L) 05/13/2020    PLT 79 (L) 05/13/2020     No results for input(s): TSH, FREET4 in the last 168 hours.  Lab Results   Component Value Date    " HGBA1C 9.9 (H) 05/09/2020       Nutritional status:   Body mass index is 23.52 kg/m².  Lab Results   Component Value Date    ALBUMIN 2.8 (L) 05/12/2020    ALBUMIN 2.8 (L) 05/11/2020    ALBUMIN 2.7 (L) 05/10/2020     No results found for: PREALBUMIN    Estimated Creatinine Clearance: 30.3 mL/min (A) (based on SCr of 2.9 mg/dL (H)).    Accu-Checks  Recent Labs     05/10/20  2053 05/11/20  0833 05/11/20  1229 05/11/20  1720 05/11/20  2139 05/12/20  0822 05/12/20  1203 05/12/20  1723 05/12/20  2129 05/13/20  0803   POCTGLUCOSE 223* 221* 259* 214* 159* 99 121* 160* 300* 201*       Current Medications and/or Treatments Impacting Glycemic Control  Immunotherapy:    Immunosuppressants     None        Steroids:   Hormones (From admission, onward)    Start     Stop Route Frequency Ordered    05/08/20 0241  melatonin tablet 8 mg      -- Oral Nightly PRN 05/08/20 0159        Pressors:    Autonomic Drugs (From admission, onward)    None        Hyperglycemia/Diabetes Medications:   Antihyperglycemics (From admission, onward)    Start     Stop Route Frequency Ordered    05/12/20 0900  insulin detemir U-100 pen 4 Units      -- SubQ Daily 05/12/20 0856    05/11/20 1230  insulin aspart U-100 pen 3 Units      -- SubQ 3 times daily with meals 05/11/20 1119    05/10/20 1147  insulin aspart U-100 pen 0-5 Units      -- SubQ Before meals & nightly PRN 05/10/20 1047          ASSESSMENT and PLAN    * Acute on chronic combined systolic and diastolic heart failure due to valvular disease  Managed per primary team  Optimize BG control      Diabetes mellitus, type II    BG goal 140 - 180     - Levemir 4 units daily  - Novolog 3 units TIDWM.   - Low Dose SQ Insulin Correction Scale.  - BG Monitoring AC/HS     ** Please call Endocrine for any BG related issues **  ** Please notify Endocrine for any change and/or advance in diet**    Discharge Planning:   - Spoke with patient via telephone. Instructed patient to take his Levemir 10 units BID post  discharge. Noncompliance of insulin regimen is most concerning issue at this time. Patient will need to reach goal of taking insulin as directed rather than skipping dosages. Patient agrees to take basal insulin BID, and to follow-up with his PCP as soon as possible.         Hypothyroidism  Recommend continuing home Synthroid 175 mcg daily while inpatient.           Adolfo Schultz NP  Endocrinology  [facility]

## 2020-05-13 NOTE — NURSING
Discharge instructions given and reviewed with pt and wife over phone. Reinforced to follow-up with primary care MD for heart failure management and diabetes management given A1C=9.9. Reinforced to take pm dose Lasix once arrives home per pt request. Reinforced to keep record of pt daily weight, blood glucoses and Insulin given and bring to MD appt for MD to review. Pt states has scale and all needed supplies to manage glucoses and Insulin. Verbalized understanding.

## 2020-05-13 NOTE — NURSING
AWAIS Adams reordered pt's lopressor for the PVCs since blood pressure was stable. PA ordered potassium bicarb disintegrating tablet. Pt refused. PA aware. PA stated that K is stable for tonight without replacement. PO K ordered for morning. Will continue to monitor.

## 2020-05-13 NOTE — MEDICAL/APP STUDENT
Progress Note    Primary Team: [facility]OSP MED B  Admit Date: 5/8/2020   Length of Stay:  LOS: 5 days   SUBJECTIVE:   Reason for Admission:  Acute on chronic combined systolic and diastolic heart failure due to valvular disease    HPI:  54 year old man PMH HTN, HLD, T2DM, hypothyroid, CHF with EF 20% presented last night to ED for increasing shortness of breath for the past couple weeks, cough, abdominal distention and lower extremity swelling. Also complaining of generalized weakness x2 weeks. Denies any changes of medication or doses. No increased intake of salty food. In ED, Cr 4.0 from baseline 2.8. Denies fever, chest pain, nausea, vomiting, diarrhea, dysuria, hematuria.    Interval History:    Remains on room air. Cr back to baseline. 120 Lasix BID yesterday. Good UOP.      OBJECTIVE:     Temp:  [97.4 °F (36.3 °C)-97.8 °F (36.6 °C)]   Pulse:  [75-89]   Resp:  [13-21]   BP: ()/(66-77)   SpO2:  [97 %-99 %]  Body mass index is 23.52 kg/m².  Intake/Outake:  This Shift:  I/O this shift:  In: -   Out: 125 [Urine:125]    Net I/O past 24h:     Intake/Output Summary (Last 24 hours) at 5/13/2020 0849  Last data filed at 5/13/2020 0740  Gross per 24 hour   Intake 1000 ml   Output 1400 ml   Net -400 ml               Laboratory:  CBC/Anemia Labs: Coags:    Recent Labs   Lab 05/11/20 0446 05/12/20 0514 05/13/20  0629   WBC 3.52* 3.52* 4.50   HGB 11.1* 10.5* 11.6*   HCT 36.5* 34.1* 36.6*   PLT 64* 66* 79*   MCV 79* 78* 77*   RDW 23.4* 22.9* 23.4*    Recent Labs   Lab 05/08/20  0019   INR 1.5*        Chemistries:   Recent Labs   Lab 05/09/20  0407 05/10/20  0631 05/11/20  0446 05/12/20  0514 05/12/20 2033 05/13/20  0629   * 134* 136 134*  --   --    K 4.0 3.4* 3.3* 3.9 3.5  --    CL 97 97 96 97  --   --    CO2 25 25 27 26  --   --    BUN 81* 80* 77* 75*  --   --    CREATININE 3.7* 3.4* 3.3* 2.9*  --   --    CALCIUM 7.9* 7.9* 7.9* 7.6*  --   --    PROT 6.6 6.1 6.6 6.6  --   --    BILITOT 3.0* 3.5* 3.7* 3.6*  --    --    ALKPHOS 214* 217* 234* 246*  --   --    ALT 35 34 38 42  --   --    AST 37 36 36 43*  --   --    MG 2.1  --  1.9  --  1.9 1.8   PHOS 4.2  --   --   --   --   --         Medications:  Scheduled Meds:   aspirin  81 mg Oral Daily    ferrous sulfate  325 mg Oral Every other day    furosemide (LASIX) IV  120 mg Intravenous BID loop    heparin (porcine)  5,000 Units Subcutaneous Q8H    insulin aspart U-100  3 Units Subcutaneous TIDWM    insulin detemir U-100  4 Units Subcutaneous Daily    levothyroxine  175 mcg Oral Before breakfast    metoprolol succinate  50 mg Oral Daily    polyethylene glycol  17 g Oral Daily                             Continuous Infusions:  PRN Meds:.acetaminophen, albuterol-ipratropium, bisacodyL, calcium carbonate, dextrose 10 % in water (D10W), dextrose 10 % in water (D10W), Dextrose 10% Bolus, Dextrose 10% Bolus, glucagon (human recombinant), glucose, glucose, insulin aspart U-100, melatonin, ondansetron, promethazine (PHENERGAN) IVPB     ASSESSMENT/PLAN:   Acute Kidney Injury  -Kidney function back to baseline  -Pt discharging tomorrow   -Nephrology signing off  -Please contact us with any additional questions    Esme Gabriel  Nephrology  MS4

## 2020-05-13 NOTE — ASSESSMENT & PLAN NOTE
BG goal 140 - 180     - Levemir 4 units daily  - Novolog 3 units TIDWM.   - Low Dose SQ Insulin Correction Scale.  - BG Monitoring AC/HS     ** Please call Endocrine for any BG related issues **  ** Please notify Endocrine for any change and/or advance in diet**    Discharge Planning:   - Spoke with patient via telephone. Instructed patient to take his Levemir 10 units BID post discharge. Noncompliance of insulin regimen is most concerning issue at this time. Patient will need to reach goal of taking insulin as directed rather than skipping dosages. Patient agrees to take basal insulin BID, and to follow-up with his PCP as soon as possible.

## 2020-05-13 NOTE — SUBJECTIVE & OBJECTIVE
"Interval HPI:   Overnight events:  BG is above goal on current SQ insulin regimen. However, Cr remains elevated, and could significantly increase insulin sensitivity.   [facility]; Consistent Carbohydrate; Fluid - 1200mL       Eating:   <25%  Nausea: No  Hypoglycemia and intervention: No  Fever: No  TPN and/or TF: No  If yes, type of TF/TPN and rate: None    BP 98/69 (BP Location: Left arm, Patient Position: Sitting)   Pulse 78   Temp 97.5 °F (36.4 °C) (Oral)   Resp (!) 21   Ht 5' 11" (1.803 m)   Wt 76.5 kg (168 lb 10.4 oz)   SpO2 97%   BMI 23.52 kg/m²     Labs Reviewed and Include    Recent Labs   Lab 05/12/20 2033   K 3.5     Lab Results   Component Value Date    WBC 4.50 05/13/2020    HGB 11.6 (L) 05/13/2020    HCT 36.6 (L) 05/13/2020    MCV 77 (L) 05/13/2020    PLT 79 (L) 05/13/2020     No results for input(s): TSH, FREET4 in the last 168 hours.  Lab Results   Component Value Date    HGBA1C 9.9 (H) 05/09/2020       Nutritional status:   Body mass index is 23.52 kg/m².  Lab Results   Component Value Date    ALBUMIN 2.8 (L) 05/12/2020    ALBUMIN 2.8 (L) 05/11/2020    ALBUMIN 2.7 (L) 05/10/2020     No results found for: PREALBUMIN    Estimated Creatinine Clearance: 30.3 mL/min (A) (based on SCr of 2.9 mg/dL (H)).    Accu-Checks  Recent Labs     05/10/20  2053 05/11/20  0833 05/11/20  1229 05/11/20  1720 05/11/20  2139 05/12/20  0822 05/12/20  1203 05/12/20  1723 05/12/20  2129 05/13/20  0803   POCTGLUCOSE 223* 221* 259* 214* 159* 99 121* 160* 300* 201*       Current Medications and/or Treatments Impacting Glycemic Control  Immunotherapy:    Immunosuppressants     None        Steroids:   Hormones (From admission, onward)    Start     Stop Route Frequency Ordered    05/08/20 0241  melatonin tablet 8 mg      -- Oral Nightly PRN 05/08/20 0159        Pressors:    Autonomic Drugs (From admission, onward)    None        Hyperglycemia/Diabetes Medications:   Antihyperglycemics (From admission, " onward)    Start     Stop Route Frequency Ordered    05/12/20 0900  insulin detemir U-100 pen 4 Units      -- SubQ Daily 05/12/20 0856    05/11/20 1230  insulin aspart U-100 pen 3 Units      -- SubQ 3 times daily with meals 05/11/20 1119    05/10/20 1147  insulin aspart U-100 pen 0-5 Units      -- SubQ Before meals & nightly PRN 05/10/20 1043

## 2020-05-22 NOTE — DISCHARGE SUMMARY
Discharge Summary  Hospital Medicine    Attending Provider on Discharge: Ok Chance MD  [facility] Hospital Medicine Team: [facility]OSP MED B  Date of Admission:  5/8/2020     Date of Discharge:  5/13/2020  Length of Stay:  LOS: 5 days   Code status: Full Code        Active Hospital Problems    Diagnosis  POA    *Acute on chronic combined systolic and diastolic heart failure due to valvular disease [I50.43, I38]  Yes     Nickysner July 2018  Patient has a right dominant coronary artery.      - Left Main Coronary Artery:             The LM is normal. There is VERÓNICA 3 flow.     - Left Anterior Descending Artery:             The mid LAD has a 30% stenosis. There is VERÓNICA 3 flow.     - Left Circumflex Artery:             The LCX is normal. There is VERÓNICA 3 flow.     - OM1:             The ostial OM1 has a 40% stenosis. There is VERÓNICA 3 flow.     - Right Coronary Artery:             The RCA is normal. There is VERÓNICA 3 flow.        Nonischemic cardiomyopathy [I42.8]  Yes    Chronic kidney disease, stage IV (severe) [N18.4]  Yes    Pulmonary nodules [R91.8]  Yes    Essential hypertension [I10]  Yes    Hypothyroidism [E03.9]  Yes     Chronic    Diabetes mellitus, type II [E11.9]  Yes     Chronic      Resolved Hospital Problems   No resolved problems to display.        HPI Patient is a 53yo male with a PMHx of HTN, HLD, DM2, hypothyroidism, and CHF (EF 20%) being admitted to medicine for acute CHF exacerbation. Patient reports worsening shortness of breath over the last 2 weeks. He reports associated productive cough, orthopnea, abdominal distension, and LE swelling. He denies dietary indiscretion and states he is compliant with his home medications. He denies fevers, chills, nausea, abdominal pain, and diarrhea. He denies sick contacts or known Covid exposures.     In the ED, vitals stable on room air. Intake labs remarkable for Cr 4.0 (baseline 2.8), BNP 1992, troponin 0.070, INR 1.5, Tbili 3.7, AST 58, alk phos 251. He was Covid  negative. He was given Lasix 100mg IVP and transferred to the floor.        Hospital Course patient had several days of IV diuresis.  He had a decent response to this in creatinine continue to improve.    Compliance was stressed with patient and prescriptions were refilled his also instructed on a low-salt diet he will follow-up with daughters of Viky.       No results for input(s): WBC, HGB, HCT, PLT in the last 168 hours.  No results for input(s): NA, K, CL, CO2, BUN, CREATININE, GLU, CALCIUM, MG, PHOS, LIPASE, AMYLASE in the last 168 hours.  No results for input(s): ALKPHOS, ALT, AST, ALBUMIN, PROT, BILITOT, INR in the last 168 hours.   No results for input(s): CPK, CPKMB, MB, TROPONINI in the last 72 hours.  No results for input(s): LACTATE in the last 72 hours.    No results for input(s): POCTGLUCOSE in the last 168 hours.   Hemoglobin A1C   Date Value Ref Range Status   05/09/2020 9.9 (H) 4.0 - 5.6 % Final     Comment:     ADA Screening Guidelines:  5.7-6.4%  Consistent with prediabetes  >or=6.5%  Consistent with diabetes  High levels of fetal hemoglobin interfere with the HbA1C  assay. Heterozygous hemoglobin variants (HbS, HgC, etc)do  not significantly interfere with this assay.   However, presence of multiple variants may affect accuracy.     02/25/2020 7.5 (H) 4.0 - 5.6 % Final     Comment:     ADA Screening Guidelines:  5.7-6.4%  Consistent with prediabetes  >or=6.5%  Consistent with diabetes  High levels of fetal hemoglobin interfere with the HbA1C  assay. Heterozygous hemoglobin variants (HbS, HgC, etc)do  not significantly interfere with this assay.   However, presence of multiple variants may affect accuracy.     08/05/2019 >14.0 (H) 4.0 - 5.6 % Final     Comment:     ADA Screening Guidelines:  5.7-6.4%  Consistent with prediabetes  >or=6.5%  Consistent with diabetes  High levels of fetal hemoglobin interfere with the HbA1C  assay. Heterozygous hemoglobin variants (HbS, HgC, etc)do  not  significantly interfere with this assay.   However, presence of multiple variants may affect accuracy.         Procedures: none    Consultants:     Discharge Medication List as of 5/13/2020  5:37 PM      CONTINUE these medications which have CHANGED    Details   furosemide (LASIX) 80 MG tablet Take 1 tablet (80 mg total) by mouth 2 (two) times daily., Starting Wed 5/13/2020, Until Thu 5/13/2021, Normal         CONTINUE these medications which have NOT CHANGED    Details   aspirin 81 MG Chew Take 1 tablet (81 mg total) by mouth once daily., Starting Sat 12/1/2018, Until Fri 5/8/2020, Normal      ferrous sulfate 325 (65 FE) MG EC tablet Take 1 tablet (325 mg total) by mouth every other day., Starting Tue 3/3/2020, Normal      insulin detemir U-100 (LEVEMIR) 100 unit/mL injection Inject 5 Units into the skin every evening., Starting Tue 3/3/2020, Normal      levothyroxine (SYNTHROID) 175 MCG tablet Take 1 tablet (175 mcg total) by mouth before breakfast., Starting Mon 12/24/2018, No Print      metoprolol succinate (TOPROL-XL) 50 MG 24 hr tablet Take 1 tablet (50 mg total) by mouth once daily., Starting Tue 3/3/2020, Until Wed 3/3/2021, Normal      acetaminophen (TYLENOL) 325 MG tablet Take 2 tablets (650 mg total) by mouth every 6 to 8 hours as needed., Starting Sat 7/21/2018, OTC      HYDROcodone-acetaminophen (NORCO) 5-325 mg per tablet Take 1 tablet by mouth every 6 (six) hours as needed for Pain., Starting Sat 9/21/2019, Print             Discharge Diet:regular diet with     Activity: activity as tolerated    Discharge Condition: Good    Disposition: Home or Self Care    Tests pending at the time of discharge: none      Time spent  on the discharge of the patient including review of hospital course with the patient. reviewing discharge medications and arranging follow-up care 35 minutes    Discharge examination Patient was seen and examined on the date of discharge and determined to be suitable for  discharge.    Discharge plan     No future appointments.    Ok Chance MD

## 2020-06-07 PROBLEM — R06.02 SHORTNESS OF BREATH: Status: ACTIVE | Noted: 2020-01-01

## 2020-06-07 NOTE — Clinical Note
The PA catheter is repositioned to the main pulmonary artery. Hemodynamics performed. O2 saturation measured at 51%.

## 2020-06-07 NOTE — Clinical Note
The PA catheter is repositioned to the pulmonary wedge. Hemodynamics performed. O2 saturation measured at 91%.

## 2020-06-07 NOTE — Clinical Note
The PA catheter is inserted into the superior vena cava. Hemodynamics performed. O2 saturation measured at 47%.

## 2020-06-08 PROBLEM — I50.43 ACUTE ON CHRONIC COMBINED SYSTOLIC AND DIASTOLIC CONGESTIVE HEART FAILURE: Status: ACTIVE | Noted: 2020-01-01

## 2020-06-08 PROBLEM — R57.0 CARDIOGENIC SHOCK: Status: ACTIVE | Noted: 2020-01-01

## 2020-06-08 NOTE — ED PROVIDER NOTES
"Encounter Date: 6/7/2020  Pt seen by provider at 10:06 PM     History     Chief Complaint   Patient presents with    Shortness of Breath     SOB & fatigue x1 day. Hx CHF +BLE edema. 100% RA     56 y/o MM wit hco-morbidities of HTN, HLD, DM2, hypothyroidism, and non-ischemic cardiomyopathy-  combined systolic and diastolic CHF (EF 20%) presents with increasing lower extremity swelling and shortness of breath. Per wife pt unable to ambulate on his own and even with help can only walk about 10 feet before becoming very short of breath.  Has 2 pillow orthopnea at baseline. No home O2.  She reports being compliant with his Lasix dose- 80mg once per day. Has had a dry cough. States he alsways has swelling in his LE but it has been getting worse and now up to his knees. No chest pain.    Per EMS 70/p manual on arrival repeat her SBP 120s. CBG >400.        Review of patient's allergies indicates:   Allergen Reactions    Lisinopril Other (See Comments)     acei cough       Past Medical History:   Diagnosis Date    CHF (congestive heart failure)     Diabetes mellitus type II     Essential hypertension, benign     Hyperlipidemia     Hypothyroidism     Pain and swelling of left wrist 8/5/2019    Ashish Mckeon is a 54 y.o. male with PMH of  presenting with CHF, IDDM, Hypothyroidism, HTN, HLD presenting with worsening L wrist pain for 1 day. Orthopedic surgery was consulted for septic joint r/o. Pt has been afebrile and has no elevated WBC. ESR 36, normal CRP. Xray shows no signs of trauma and pt has no hx of gout or septic arthritis. Due to atraumatic wrist pain and swelling w/out identifia    Undiagnosed cardiac murmurs      Past Surgical History:   Procedure Laterality Date    COLON SURGERY      "lower intestines removed"    ORTHOPEDIC SURGERY Left     wrist    THYROID SURGERY       Family History   Problem Relation Age of Onset    Cancer Father      Social History     Tobacco Use    Smoking status: Never Smoker " "   Smokeless tobacco: Never Used    Tobacco comment: cigars "every other week or so"   Substance Use Topics    Alcohol use: Not Currently     Alcohol/week: 3.0 standard drinks     Types: 3 Cans of beer per week    Drug use: Not Currently     Types: Marijuana     Review of Systems   Constitutional: Negative for fatigue and fever.   HENT: Negative for sore throat.    Eyes: Negative for visual disturbance.   Respiratory: Positive for cough and shortness of breath.    Cardiovascular: Positive for leg swelling. Negative for chest pain and palpitations.   Gastrointestinal: Positive for abdominal distention. Negative for abdominal pain, nausea and vomiting.   Genitourinary: Negative for dysuria.   Musculoskeletal: Positive for back pain.   Skin: Negative for rash.   Neurological: Positive for weakness. Negative for dizziness, syncope and headaches.       Physical Exam     Initial Vitals [06/07/20 2119]   BP Pulse Resp Temp SpO2   120/81 74 18 97.8 °F (36.6 °C) 100 %      MAP       --         Physical Exam    Nursing note and vitals reviewed.  Constitutional: He appears well-developed and well-nourished. He is not diaphoretic. No distress.   HENT:   Head: Normocephalic and atraumatic.   Mouth/Throat: No oropharyngeal exudate.   Eyes: EOM are normal. Pupils are equal, round, and reactive to light.   Neck: Neck supple. JVD present.   Cardiovascular: Normal rate and regular rhythm.   Murmur heard.  Pulmonary/Chest: No respiratory distress. He has rales.   Abdominal:   Softly distended non-tender   Musculoskeletal: He exhibits edema.   Neurological: He is alert and oriented to person, place, and time.   Skin: Skin is warm and dry. No rash noted.   Psychiatric: His mood appears not anxious.         ED Course   Critical Care  Date/Time: 6/7/2020 11:07 PM  Performed by: Meryl Reno MD  Authorized by: Meryl Reno MD   Direct patient critical care time: 20 minutes  Additional history critical care time: 5 " minutes  Ordering / reviewing critical care time: 5 minutes  Documentation critical care time: 5 minutes  Consulting other physicians critical care time: 5 minutes  Total critical care time (exclusive of procedural time) : 40 minutes  Critical care time was exclusive of separately billable procedures and treating other patients.  Critical care was necessary to treat or prevent imminent or life-threatening deterioration of the following conditions: cardiac failure and respiratory failure.  Critical care was time spent personally by me on the following activities: development of treatment plan with patient or surrogate, discussions with consultants, evaluation of patient's response to treatment, examination of patient, obtaining history from patient or surrogate, ordering and review of laboratory studies, ordering and review of radiographic studies, pulse oximetry and re-evaluation of patient's condition.        Labs Reviewed   CBC W/ AUTO DIFFERENTIAL - Abnormal; Notable for the following components:       Result Value    Mean Corpuscular Hemoglobin 26.4 (*)     Mean Corpuscular Hemoglobin Conc 31.5 (*)     RDW 26.6 (*)     Platelets 85 (*)     Lymph # 0.6 (*)     Lymph% 12.6 (*)     All other components within normal limits   COMPREHENSIVE METABOLIC PANEL - Abnormal; Notable for the following components:    Potassium 5.9 (*)     CO2 19 (*)     Glucose 351 (*)     BUN, Bld 80 (*)     Creatinine 3.5 (*)     Calcium 8.6 (*)     Albumin 3.0 (*)     Total Bilirubin 9.0 (*)     Alkaline Phosphatase 287 (*)     AST 48 (*)     Anion Gap 18 (*)     eGFR if  21.3 (*)     eGFR if non  18.4 (*)     All other components within normal limits   TROPONIN I - Abnormal; Notable for the following components:    Troponin I 0.057 (*)     All other components within normal limits   B-TYPE NATRIURETIC PEPTIDE - Abnormal; Notable for the following components:    BNP 2,568 (*)     All other components within  normal limits   PROTIME-INR - Abnormal; Notable for the following components:    Prothrombin Time 13.8 (*)     INR 1.4 (*)     All other components within normal limits   COMPREHENSIVE METABOLIC PANEL - Abnormal; Notable for the following components:    Sodium 133 (*)     CO2 20 (*)     Glucose 563 (*)     BUN, Bld 78 (*)     Creatinine 3.3 (*)     Calcium 7.8 (*)     Albumin 2.6 (*)     Total Bilirubin 6.9 (*)     Alkaline Phosphatase 233 (*)     eGFR if  22.9 (*)     eGFR if non  19.8 (*)     All other components within normal limits   CBC W/ AUTO DIFFERENTIAL - Abnormal; Notable for the following components:    WBC 3.84 (*)     Hemoglobin 12.4 (*)     Hematocrit 39.8 (*)     Mean Corpuscular Hemoglobin 26.2 (*)     Mean Corpuscular Hemoglobin Conc 31.2 (*)     RDW 26.3 (*)     Platelets 71 (*)     Lymph # 0.3 (*)     Gran% 78.8 (*)     Lymph% 7.6 (*)     All other components within normal limits   POCT GLUCOSE - Abnormal; Notable for the following components:    POCT Glucose 435 (*)     All other components within normal limits   SARS-COV-2 RNA AMPLIFICATION, QUAL   LACTIC ACID, PLASMA   MAGNESIUM   PHOSPHORUS   POCT GLUCOSE MONITORING CONTINUOUS     EKG Readings: (Independently Interpreted)   Sinus, 83, low voltage qrs, no acute ischemic changes       Imaging Results          X-Ray Chest AP Portable (Final result)  Result time 06/07/20 22:49:59    Final result by Jasper Almanzar MD (06/07/20 22:49:59)                 Impression:      Cardiomegaly with bilateral edema and small layering effusion left base and probable associated compressive atelectatic change left base.      Electronically signed by: Jasper Almanzar MD  Date:    06/07/2020  Time:    22:49             Narrative:    EXAMINATION:  XR CHEST AP PORTABLE    CLINICAL HISTORY:  CHF;    TECHNIQUE:  Single frontal view of the chest was performed.    COMPARISON:  May 8, 2020.    FINDINGS:  Cardiomegaly with bilateral  edema.    Small layering effusion left base with probable associated compressive atelectasis.    Right CP angle appears sharp.                              X-Rays:   Independently Interpreted Readings:   Chest X-Ray: Cardiomegaly, pulm edema     Medical Decision Making:   History:   I obtained history from: EMS provider and someone other than patient.       <> Summary of History: See hpi  Old Medical Records: I decided to obtain old medical records.  Old Records Summarized: records from previous admission(s).       <> Summary of Records: Echo 2/20: Severely decreased left ventricular systolic function. The estimated ejection fraction is 20%. Grade III (severe) left ventricular diastolic dysfunction consistent with restrictive physiology  Initial Assessment:   56 y/o M with increasing LE swelling and sob, volume overloaded  Ddx: CHF ex, ACS, KEYANNA, pneumonia, electrolyte anbty  Routine blood work, CXR  Lasix IV 80mg  Anticipate admission- rapid covid testing ordered  Independently Interpreted Test(s):   I have ordered and independently interpreted X-rays - see prior notes.  I have ordered and independently interpreted EKG Reading(s) - see prior notes  Clinical Tests:   Lab Tests: Ordered and Reviewed  Radiological Study: Ordered and Reviewed  Medical Tests: Ordered and Reviewed  ED Management:  11:10 PM  Discussed with cardiology. Will admit to CCU. Dobutamine and lasix drip ordered.  Other:   I have discussed this case with another health care provider.       <> Summary of the Discussion: cardiology         Clinical Impression:       ICD-10-CM ICD-9-CM   1. Acute on chronic combined systolic and diastolic congestive heart failure I50.43 428.43     428.0   2. Shortness of breath R06.02 786.05         Disposition:   Disposition: Admitted  Condition: Fair     ED Disposition Condition    Admit                           Meryl Reno MD  06/08/20 0612

## 2020-06-08 NOTE — H&P
[facility]  Cardiology  History and Physical     Patient Name: Ashish Mckeon  MRN: [id number]  Admission Date: 6/7/2020  Code Status: Full Code   Attending Provider: Meryl Reno MD   Primary Care Physician: [facility]  Principal Problem:<principal problem not specified>    Patient information was obtained from patient and past medical records.     Subjective:     Chief Complaint:  SOB     HPI:  54 y/o M with HFrEF 2/2 NICM, HTN, HLD, DM2 admitted with worsening SOB and BLE for 2 weeks. He was recently admitted for ADHF from 5/8/20 to 5/13/20. This is his 3rd admission this year. He reports that for the past 2 weeks he has been progressively more SOB. He feels like the fluid was not completely removed last admission. Takes furosemide 80 mg Po daily. Does not follow with cardiology. Unable to name his medications. Currently patient feels SOB and fatigued, able to answer all questions but falls asleep easily. Unable to lay flat. Uses two pillows at home.   TTE 2/26/20  · Moderate left atrial enlargement.  · Severely decreased left ventricular systolic function. The estimated ejection fraction is 20%.  · Grade III (severe) left ventricular diastolic dysfunction consistent with restrictive physiology.  · Mild-to-moderate mitral regurgitation.  · Global hypokinetic wall motion.  · Mild left ventricular enlargement.  · Moderate right ventricular enlargement.  · Moderate tricuspid regurgitation.  · Intermediate central venous pressure (8 mmHg).  · The estimated PA systolic pressure is 55 mmHg.  · Pulmonary hypertension present.  · Small posterolateral pericardial effusion.  · Mild mitral sclerosis.  · Mild aortic regurgitation.  Moderately reduced right ventricular systolic function.  [facility] 7/20/2018 - non obstructive CAD    Past Medical History:   Diagnosis Date    CHF (congestive heart failure)     Diabetes mellitus type II     Essential hypertension, benign      "Hyperlipidemia     Hypothyroidism     Pain and swelling of left wrist 8/5/2019    Ashish Mckeon is a 54 y.o. male with PMH of  presenting with CHF, IDDM, Hypothyroidism, HTN, HLD presenting with worsening L wrist pain for 1 day. Orthopedic surgery was consulted for septic joint r/o. Pt has been afebrile and has no elevated WBC. ESR 36, normal CRP. Xray shows no signs of trauma and pt has no hx of gout or septic arthritis. Due to atraumatic wrist pain and swelling w/out identifia    Undiagnosed cardiac murmurs        Past Surgical History:   Procedure Laterality Date    COLON SURGERY      "lower intestines removed"    ORTHOPEDIC SURGERY Left     wrist    THYROID SURGERY         Review of patient's allergies indicates:   Allergen Reactions    Lisinopril Other (See Comments)     acei cough         No current facility-administered medications on file prior to encounter.      Current Outpatient Medications on File Prior to Encounter   Medication Sig    acetaminophen (TYLENOL) 325 MG tablet Take 2 tablets (650 mg total) by mouth every 6 to 8 hours as needed. (Patient taking differently: Take 325 mg by mouth daily as needed for Pain. )    aspirin 81 MG Chew Take 1 tablet (81 mg total) by mouth once daily.    ferrous sulfate 325 (65 FE) MG EC tablet Take 1 tablet (325 mg total) by mouth every other day.    furosemide (LASIX) 80 MG tablet Take 1 tablet (80 mg total) by mouth 2 (two) times daily.    HYDROcodone-acetaminophen (NORCO) 5-325 mg per tablet Take 1 tablet by mouth every 6 (six) hours as needed for Pain.    insulin detemir U-100 (LEVEMIR) 100 unit/mL injection Inject 5 Units into the skin every evening.    levothyroxine (SYNTHROID) 175 MCG tablet Take 1 tablet (175 mcg total) by mouth before breakfast.    metoprolol succinate (TOPROL-XL) 50 MG 24 hr tablet Take 1 tablet (50 mg total) by mouth once daily.     Family History     Problem Relation (Age of Onset)    Cancer Father        Tobacco Use    " "Smoking status: Never Smoker    Smokeless tobacco: Never Used    Tobacco comment: cigars "every other week or so"   Substance and Sexual Activity    Alcohol use: Not Currently     Alcohol/week: 3.0 standard drinks     Types: 3 Cans of beer per week    Drug use: Not Currently     Types: Marijuana    Sexual activity: Yes     Partners: Female     Review of Systems   Constitution: Negative for chills and fever.   Cardiovascular: Positive for dyspnea on exertion and leg swelling. Negative for chest pain, palpitations and syncope.   Respiratory: Negative for cough and sleep disturbances due to breathing.    Musculoskeletal: Negative for back pain.   Gastrointestinal: Negative for abdominal pain, nausea and vomiting.   Neurological: Negative for dizziness and focal weakness.   Psychiatric/Behavioral: Negative for altered mental status.     Objective:     Vital Signs (Most Recent):  Temp: 97.8 °F (36.6 °C) (06/07/20 2119)  Pulse: 82 (06/07/20 2132)  Resp: 18 (06/07/20 2119)  BP: (!) 116/92 (06/07/20 2316)  SpO2: 97 % (06/07/20 2132) Vital Signs (24h Range):  Temp:  [97.8 °F (36.6 °C)] 97.8 °F (36.6 °C)  Pulse:  [74-82] 82  Resp:  [18] 18  SpO2:  [97 %-100 %] 97 %  BP: (116-122)/(81-96) 116/92     Weight: 76.5 kg (168 lb 10.4 oz)  Body mass index is 23.52 kg/m².    SpO2: 97 %  O2 Device (Oxygen Therapy): room air    No intake or output data in the 24 hours ending 06/08/20 0021    Lines/Drains/Airways     Peripheral Intravenous Line                 Peripheral IV - Single Lumen 06/08/20 0019 18 G Right Antecubital less than 1 day         Peripheral IV - Single Lumen 06/08/20 0019 20 G Right Hand less than 1 day                Physical Exam   Constitutional: He is oriented to person, place, and time. He appears well-developed and well-nourished. No distress.   HENT:   Head: Normocephalic and atraumatic.   Eyes: Conjunctivae and EOM are normal.   Neck: Normal range of motion. JVD (JVP to earlobe) present. No tracheal " deviation present.   Cardiovascular: Normal rate and regular rhythm.   Murmur (systolic murmur louder in LLSB) heard.  Pulmonary/Chest: No respiratory distress.   Bibasilar crackles   Abdominal: Soft. Bowel sounds are normal. He exhibits no distension. There is no tenderness.   Musculoskeletal: Normal range of motion. He exhibits edema (4+ pitting edema in BLE).   Neurological: He is alert and oriented to person, place, and time.   Skin: He is not diaphoretic.       Significant Labs:   CMP   Recent Labs   Lab 06/07/20  2142      K 5.9*      CO2 19*   *   BUN 80*   CREATININE 3.5*   CALCIUM 8.6*   PROT 7.9   ALBUMIN 3.0*   BILITOT 9.0*   ALKPHOS 287*   AST 48*   ALT 34   ANIONGAP 18*   ESTGFRAFRICA 21.3*   EGFRNONAA 18.4*   , CBC   Recent Labs   Lab 06/07/20 2142   WBC 4.61   HGB 14.7   HCT 46.7   PLT 85*   , INR   Recent Labs   Lab 06/07/20 2142   INR 1.4*    and Troponin   Recent Labs   Lab 06/07/20 2142   TROPONINI 0.057*       Significant Imaging: All pertinent images from the last 24h have been reviewed.      Assessment and Plan:     Acute on chronic combined systolic and diastolic heart failure  55 y.o M with HFrEF (EF 20%) 2/2 NICM. Multiple admission in the past few years and this is his third admission this year. Markedly volume overloaded on exam, high BNP, KEYANNA on CKD, congestive hepatopathy Reported history of non compliance in the past but currently he reports compliance to medications.     - Will admit to CCU for close monitoring  - Start  5 mcg/kg/min and furosemide 20 mg/hr   - Unable to lay flat for central line but if not improving in the next few hours will have to place line to check hemodyanamics      - Needs to establish with outpatient cardiology and eventually HTS.   - Needs evaluation for ICD as outpatient. He was seen by Dr. Paz on last admission but he did not follow up.     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 Unclear what his baseline is, kidney function has been deteriorating with every admission.   - K is 5.9, expect improvement with diuresis. Continue to monitor closely    Diabetes mellitus, type II  - Low dose sliding scale    Hypothyroidism  - Continue levothyroxine        VTE Risk Mitigation (From admission, onward)         Ordered     heparin (porcine) injection 5,000 Units  Every 8 hours      06/07/20 2346     IP VTE HIGH RISK PATIENT  Once      06/07/20 2346     Place sequential compression device  Until discontinued      06/07/20 2346                Doron Wallis MD  Cardiology   [facility]

## 2020-06-08 NOTE — HPI
54 y/o M with HFrEF 2/2 NICM, HTN, HLD, DM2 admitted with worsening SOB and BLE for 2 weeks. He was recently admitted for ADHF from 5/8/20 to 5/13/20. This is his 3rd admission this year. He reports that for the past 2 weeks he has been progressively more SOB. He feels like the fluid was not completely removed last admission. Takes furosemide 80 mg Po daily. Does not follow with cardiology. Unable to name his medications. Currently patient feels SOB and fatigued, able to answer all questions but falls asleep easily. Unable to lay flat. Uses two pillows at home.   TTE 2/26/20  · Moderate left atrial enlargement.  · Severely decreased left ventricular systolic function. The estimated ejection fraction is 20%.  · Grade III (severe) left ventricular diastolic dysfunction consistent with restrictive physiology.  · Mild-to-moderate mitral regurgitation.  · Global hypokinetic wall motion.  · Mild left ventricular enlargement.  · Moderate right ventricular enlargement.  · Moderate tricuspid regurgitation.  · Intermediate central venous pressure (8 mmHg).  · The estimated PA systolic pressure is 55 mmHg.  · Pulmonary hypertension present.  · Small posterolateral pericardial effusion.  · Mild mitral sclerosis.  · Mild aortic regurgitation.  Moderately reduced right ventricular systolic function.  [facility] 7/20/2018 - non obstructive CAD

## 2020-06-08 NOTE — ED NOTES
Pt care assumed.  Pt lying in stretcher, JANN. X4.  Pt on cardiac, blood pressure & pulse ox monitoring.  Lasix & Dobutamine infusing without difficulty.  Pt changed into hospital gown, request to leave pants on.  Pt denies any pain, states SOB has improved.  Pt updated on room status, states understanding.  Call button within reach, will continue to monitor.

## 2020-06-08 NOTE — CONSULTS
[facility]  Endocrinology  Diabetes Consult Note    Consult Requested by: Jose Enrique Gordon MD   Reason for admit: Acute on chronic combined systolic and diastolic heart failure    HISTORY OF PRESENT ILLNESS:  Reason for Consult: Management of T2DM, Hyperglycemia     Surgical Procedure and Date: n/a    Lab Results   Component Value Date    HGBA1C 9.9 (H) 05/09/2020     Diabetes diagnosis year: 7 years ago    Home Diabetes Medications:   Levemir 10 units BID (pt reports he doesn't take often)     How often checking glucose at home? every other day   BG readings on regimen: 300s  Hypoglycemia on the regimen?  No  Missed doses on regimen?  Yes    Diabetes Complications include:     Hyperglycemia    Complicating diabetes co morbidities:   CHF      HPI:   Patient is a 55 y.o. male with a diagnosis of HFrEF 2/2 NICM, HTN, HLD, DM2 admitted with worsening SOB and BLE for 2 weeks. He was recently admitted for ADHF from 5/8/20 to 5/13/20. This is his 3rd admission this year. He reports that for the past 2 weeks he has been progressively more SOB. Endocrinology consulted for management of T2DM.       Interval HPI:   Overnight events: Remains in ED. BG elevated above goal ranges overnight (>400). IV insulin infusion initiated at 3 u/hr per primary team.   Eating:   <25%  Nausea: No  Hypoglycemia and intervention: No  Fever: No  TPN and/or TF: No  If yes, type of TF/TPN and rate: none    PMH, PSH, FH, SH reviewed     ROS:  Constitutional: Negative for weight changes.  Eyes: Negative for visual disturbance.  Respiratory: Negative for cough.   Cardiovascular: Negative for chest pain.  Gastrointestinal: Negative for nausea.  Endocrine: Negative for polyuria, polydipsia.  Musculoskeletal: Negative for back pain.  Skin: Negative for rash.  Neurological: Negative for syncope.  Psychiatric/Behavioral: Negative for depression.    Review of Systems    Current Medications and/or Treatments Impacting Glycemic  Control  Immunotherapy:    Immunosuppressants     None        Steroids:   Hormones (From admission, onward)    None        Pressors:    Autonomic Drugs (From admission, onward)    None        Hyperglycemia/Diabetes Medications:   Antihyperglycemics (From admission, onward)    Start     Stop Route Frequency Ordered    06/08/20 0753  insulin regular 100 Units in sodium chloride 0.9% 100 mL infusion     Question:  Insulin Rate Adjustment (DO NOT MODIFY ANSWER)  Answer:  [email or web address] [id number]    -- IV Continuous 06/08/20 0757             PHYSICAL EXAMINATION:  Vitals:    06/08/20 1230   BP: 101/65   Pulse: 72   Resp: (!) 22   Temp:      Body mass index is 23.57 kg/m².    Physical Exam   Constitutional: Well developed, well nourished, NAD.  ENT: External ears no masses with nose patent; normal hearing.  Neck: Supple; trachea midline.  Cardiovascular: Normal heart sounds, 4+ pitting edema in BLE. DP +2 bilaterally.  Lungs: Normal effort; lungs anterior bilaterally clear to auscultation.  Abdomen: Soft, no masses, no hernias.  MS: No clubbing or cyanosis of nails noted; unable to assess gait.  Skin: No rashes, lesions, or ulcers; no nodules. Injection sites are ok. No lipo hypertropthy or atrophy.  Psychiatric: Good judgement and insight; normal mood and affect.  Neurological: Cranial nerves are grossly intact.   Foot: Nails in good condition, no amputations noted.          Labs Reviewed and Include   Recent Labs   Lab 06/08/20  0514 06/08/20  0821   * 328*   CALCIUM 7.8* 8.3*   ALBUMIN 2.6*  --    PROT 6.1  --    * 139   K 4.2 4.7   CO2 20* 17*    106   BUN 78* 81*   CREATININE 3.3* 3.2*   ALKPHOS 233*  --    ALT 26  --    AST 21  --    BILITOT 6.9*  --      Lab Results   Component Value Date    WBC 3.84 (L) 06/08/2020    HGB 12.4 (L) 06/08/2020    HCT 39.8 (L) 06/08/2020    MCV 84 06/08/2020    PLT 71 (L) 06/08/2020     Recent Labs   Lab  06/08/20  0821   TSH 8.449*   FREET4 0.74     Lab Results   Component Value Date    HGBA1C 7.9 (H) 06/08/2020       Nutritional status:   Body mass index is 23.57 kg/m².  Lab Results   Component Value Date    ALBUMIN 2.6 (L) 06/08/2020    ALBUMIN 3.0 (L) 06/07/2020    ALBUMIN 2.8 (L) 05/12/2020     No results found for: PREALBUMIN    Estimated Creatinine Clearance: 27.5 mL/min (A) (based on SCr of 3.2 mg/dL (H)).    Accu-Checks  Recent Labs     06/08/20  0043 06/08/20  0822 06/08/20  0929 06/08/20  1055 06/08/20  1208 06/08/20  1321   POCTGLUCOSE 435* 335* 302* 235* 269* 197*        ASSESSMENT and PLAN    * Acute on chronic combined systolic and diastolic heart failure  Managed per primary team  Optimize BG control      Diabetes mellitus, type II  BG goal 140 - 180   BG significantly elevated above goal ranges overnight. IV insulin infusion protocol initiated. Patient is on ADA/Cardia diet with poor appetite.    Patient admits to being noncompliant with insulin regimen at home.    Discontinue IV insulin infusion protocol  Start transition IV insulin infusion at 0.8 u/hr (0.5 u/kg dosing) with stepdown parameters  Low Dose Correction Scale  BG monitoring q 4 hrs (given poor appetite) Coordinate with meal.     ** Please call Endocrine for any BG related issues **    Discharge planning: TBD        Hypothyroidism  Recommend continuing home dose of Synthroid 175 mcg while inpatient.       KEYANNA (acute kidney injury)  Lab Results   Component Value Date    CREATININE 3.2 (H) 06/08/2020     Avoid insulin stacking  Titrate insulin slowly          Plan discussed with patient at bedside.     Carmen Guallpa NP  Endocrinology  [facility]

## 2020-06-08 NOTE — ED NOTES
Patient sleeping on stretcher, NAD noted, Urinal at bedside patient stated he does not need to go at this time. Will notify MD of findings per his request. Will continue to monitor. Side rails up x 2, call light within reach, bed low locked position

## 2020-06-08 NOTE — ED NOTES
"Patient peed all over the floor and his person, patient was changed, bedding was changed, patient stated "I thought I was at home and just couldn't make it"      NAD, no other needs at this time   "

## 2020-06-08 NOTE — SUBJECTIVE & OBJECTIVE
Interval HPI:   Overnight events: Remains in ED. BG elevated above goal ranges overnight (>400). IV insulin infusion initiated at 3 u/hr per primary team.   Eating:   <25%  Nausea: No  Hypoglycemia and intervention: No  Fever: No  TPN and/or TF: No  If yes, type of TF/TPN and rate: none    PMH, PSH, FH, SH reviewed     ROS:  Constitutional: Negative for weight changes.  Eyes: Negative for visual disturbance.  Respiratory: Negative for cough.   Cardiovascular: Negative for chest pain.  Gastrointestinal: Negative for nausea.  Endocrine: Negative for polyuria, polydipsia.  Musculoskeletal: Negative for back pain.  Skin: Negative for rash.  Neurological: Negative for syncope.  Psychiatric/Behavioral: Negative for depression.    Review of Systems    Current Medications and/or Treatments Impacting Glycemic Control  Immunotherapy:    Immunosuppressants     None        Steroids:   Hormones (From admission, onward)    None        Pressors:    Autonomic Drugs (From admission, onward)    None        Hyperglycemia/Diabetes Medications:   Antihyperglycemics (From admission, onward)    Start     Stop Route Frequency Ordered    06/08/20 0753  insulin regular 100 Units in sodium chloride 0.9% 100 mL infusion     Question:  Insulin Rate Adjustment (DO NOT MODIFY ANSWER)  Answer:  [email or web address]\InsulinDKA [id number]    -- IV Continuous 06/08/20 0757             PHYSICAL EXAMINATION:  Vitals:    06/08/20 1230   BP: 101/65   Pulse: 72   Resp: (!) 22   Temp:      Body mass index is 23.57 kg/m².    Physical Exam   Constitutional: Well developed, well nourished, NAD.  ENT: External ears no masses with nose patent; normal hearing.  Neck: Supple; trachea midline.  Cardiovascular: Normal heart sounds, 4+ pitting edema in BLE. DP +2 bilaterally.  Lungs: Normal effort; lungs anterior bilaterally clear to auscultation.  Abdomen: Soft, no masses, no hernias.  MS: No clubbing or cyanosis of nails noted;  unable to assess gait.  Skin: No rashes, lesions, or ulcers; no nodules. Injection sites are ok. No lipo hypertropthy or atrophy.  Psychiatric: Good judgement and insight; normal mood and affect.  Neurological: Cranial nerves are grossly intact.   Foot: Nails in good condition, no amputations noted.

## 2020-06-08 NOTE — ED NOTES
Bladder scan <460 spoke with Dr Wallis cardiology advised to call in an hour if he has not urinated to obtain additional orders [id number]

## 2020-06-08 NOTE — ED NOTES
LOC: Patient name and date of birth verified. The patient is awake, alert and aware of environment with an appropriate affect, the patient is oriented x 3 and speaking appropriately.   APPEARANCE: Patient resting comfortably, patient is clean and well groomed, patient's clothing is properly fastened.  SKIN: The skin is warm and dry, color consistent with ethnicity, patient has normal skin turgor and moist mucus membranes, skin intact, no breakdown or bruising noted.  MUSCULOSKELETAL: Patient moving all extremities well, no obvious swelling or deformities noted. Pt complains of back pain.  RESPIRATORY: Respirations are spontaneous, patient has a normal respiration rate, complains of shortness of breath, no accessory muscle use noted. Pt has a cough.  CARDIAC: Patient has a normal rate and rhythm, capillary refill < 3 seconds. Pt has leg swelling, 2+ edema.   ABDOMEN: Soft and non tender to palpation, pt's abdomen is distended symmetrically. Bowel sounds present in all four quadrants.  NEUROLOGIC: Eyes open spontaneously, behavior appropriate to situation, follows commands, facial expression symmetrical, bilateral hand grasp equal and even, purposeful motor response noted, normal sensation in all extremities when touched with a finger. Pt complains of generalized weakness.

## 2020-06-08 NOTE — HPI
Reason for Consult: Management of T2DM, Hyperglycemia     Surgical Procedure and Date: n/a    Lab Results   Component Value Date    HGBA1C 9.9 (H) 05/09/2020     Diabetes diagnosis year: 7 years ago    Home Diabetes Medications:   Levemir 10 units BID (pt reports he doesn't take often)     How often checking glucose at home? every other day   BG readings on regimen: 300s  Hypoglycemia on the regimen?  No  Missed doses on regimen?  Yes    Diabetes Complications include:     Hyperglycemia    Complicating diabetes co morbidities:   CHF      HPI:   Patient is a 55 y.o. male with a diagnosis of HFrEF 2/2 NICM, HTN, HLD, DM2 admitted with worsening SOB and BLE for 2 weeks. He was recently admitted for ADHF from 5/8/20 to 5/13/20. This is his 3rd admission this year. He reports that for the past 2 weeks he has been progressively more SOB. Endocrinology consulted for management of T2DM.

## 2020-06-08 NOTE — SUBJECTIVE & OBJECTIVE
"Past Medical History:   Diagnosis Date    CHF (congestive heart failure)     Diabetes mellitus type II     Essential hypertension, benign     Hyperlipidemia     Hypothyroidism     Pain and swelling of left wrist 8/5/2019    Ashish Mckeon is a 54 y.o. male with PMH of  presenting with CHF, IDDM, Hypothyroidism, HTN, HLD presenting with worsening L wrist pain for 1 day. Orthopedic surgery was consulted for septic joint r/o. Pt has been afebrile and has no elevated WBC. ESR 36, normal CRP. Xray shows no signs of trauma and pt has no hx of gout or septic arthritis. Due to atraumatic wrist pain and swelling w/out identifia    Undiagnosed cardiac murmurs        Past Surgical History:   Procedure Laterality Date    COLON SURGERY      "lower intestines removed"    ORTHOPEDIC SURGERY Left     wrist    THYROID SURGERY         Review of patient's allergies indicates:   Allergen Reactions    Lisinopril Other (See Comments)     acei cough         No current facility-administered medications on file prior to encounter.      Current Outpatient Medications on File Prior to Encounter   Medication Sig    acetaminophen (TYLENOL) 325 MG tablet Take 2 tablets (650 mg total) by mouth every 6 to 8 hours as needed. (Patient taking differently: Take 325 mg by mouth daily as needed for Pain. )    aspirin 81 MG Chew Take 1 tablet (81 mg total) by mouth once daily.    ferrous sulfate 325 (65 FE) MG EC tablet Take 1 tablet (325 mg total) by mouth every other day.    furosemide (LASIX) 80 MG tablet Take 1 tablet (80 mg total) by mouth 2 (two) times daily.    HYDROcodone-acetaminophen (NORCO) 5-325 mg per tablet Take 1 tablet by mouth every 6 (six) hours as needed for Pain.    insulin detemir U-100 (LEVEMIR) 100 unit/mL injection Inject 5 Units into the skin every evening.    levothyroxine (SYNTHROID) 175 MCG tablet Take 1 tablet (175 mcg total) by mouth before breakfast.    metoprolol succinate (TOPROL-XL) 50 MG 24 hr tablet " "Take 1 tablet (50 mg total) by mouth once daily.     Family History     Problem Relation (Age of Onset)    Cancer Father        Tobacco Use    Smoking status: Never Smoker    Smokeless tobacco: Never Used    Tobacco comment: cigars "every other week or so"   Substance and Sexual Activity    Alcohol use: Not Currently     Alcohol/week: 3.0 standard drinks     Types: 3 Cans of beer per week    Drug use: Not Currently     Types: Marijuana    Sexual activity: Yes     Partners: Female     Review of Systems   Constitution: Negative for chills and fever.   Cardiovascular: Positive for dyspnea on exertion and leg swelling. Negative for chest pain, palpitations and syncope.   Respiratory: Negative for cough and sleep disturbances due to breathing.    Musculoskeletal: Negative for back pain.   Gastrointestinal: Negative for abdominal pain, nausea and vomiting.   Neurological: Negative for dizziness and focal weakness.   Psychiatric/Behavioral: Negative for altered mental status.     Objective:     Vital Signs (Most Recent):  Temp: 97.8 °F (36.6 °C) (06/07/20 2119)  Pulse: 82 (06/07/20 2132)  Resp: 18 (06/07/20 2119)  BP: (!) 116/92 (06/07/20 2316)  SpO2: 97 % (06/07/20 2132) Vital Signs (24h Range):  Temp:  [97.8 °F (36.6 °C)] 97.8 °F (36.6 °C)  Pulse:  [74-82] 82  Resp:  [18] 18  SpO2:  [97 %-100 %] 97 %  BP: (116-122)/(81-96) 116/92     Weight: 76.5 kg (168 lb 10.4 oz)  Body mass index is 23.52 kg/m².    SpO2: 97 %  O2 Device (Oxygen Therapy): room air    No intake or output data in the 24 hours ending 06/08/20 0021    Lines/Drains/Airways     Peripheral Intravenous Line                 Peripheral IV - Single Lumen 06/08/20 0019 18 G Right Antecubital less than 1 day         Peripheral IV - Single Lumen 06/08/20 0019 20 G Right Hand less than 1 day                Physical Exam   Constitutional: He is oriented to person, place, and time. He appears well-developed and well-nourished. No distress.   HENT:   Head: " Normocephalic and atraumatic.   Eyes: Conjunctivae and EOM are normal.   Neck: Normal range of motion. JVD (JVP to earlobe) present. No tracheal deviation present.   Cardiovascular: Normal rate and regular rhythm.   Murmur (systolic murmur louder in LLSB) heard.  Pulmonary/Chest: No respiratory distress.   Bibasilar crackles   Abdominal: Soft. Bowel sounds are normal. He exhibits no distension. There is no tenderness.   Musculoskeletal: Normal range of motion. He exhibits edema (4+ pitting edema in BLE).   Neurological: He is alert and oriented to person, place, and time.   Skin: He is not diaphoretic.       Significant Labs:   CMP   Recent Labs   Lab 06/07/20 2142      K 5.9*      CO2 19*   *   BUN 80*   CREATININE 3.5*   CALCIUM 8.6*   PROT 7.9   ALBUMIN 3.0*   BILITOT 9.0*   ALKPHOS 287*   AST 48*   ALT 34   ANIONGAP 18*   ESTGFRAFRICA 21.3*   EGFRNONAA 18.4*   , CBC   Recent Labs   Lab 06/07/20 2142   WBC 4.61   HGB 14.7   HCT 46.7   PLT 85*   , INR   Recent Labs   Lab 06/07/20 2142   INR 1.4*    and Troponin   Recent Labs   Lab 06/07/20 2142   TROPONINI 0.057*       Significant Imaging: All pertinent images from the last 24h have been reviewed.

## 2020-06-09 PROBLEM — N18.9 ACUTE KIDNEY INJURY SUPERIMPOSED ON CKD: Status: ACTIVE | Noted: 2018-07-17

## 2020-06-09 PROBLEM — E80.6 HYPERBILIRUBINEMIA: Status: ACTIVE | Noted: 2020-01-01

## 2020-06-09 PROBLEM — J96.01 ACUTE RESPIRATORY FAILURE WITH HYPOXIA: Status: ACTIVE | Noted: 2020-01-01

## 2020-06-09 PROBLEM — N18.4 CKD (CHRONIC KIDNEY DISEASE), STAGE IV: Status: ACTIVE | Noted: 2020-01-01

## 2020-06-09 NOTE — HPI
Mr Mckeon is a 55 year old man with history of HFrEF (LVEF 20%), T2DM, HTN, HLD, who was admitted on 6/7 due to concern for heart failure exacerbation; hep consulted due to concern for ESLD.    He has had approximately 3 admissions for heart failure in 2020 including recent hospitalization in 5/2020. He presented to the hospital with 2 weeks of worsening edema and dyspnea. He was started on dobutamine and lasix gtt and has diuresed approximately 3L since admission. He reports ongoing edema but significantly improved since admission.    On admission labs notable for bilirubin of 9.0, which has improved to 6.9. On chart review, prior labs in 4/2019 he was jaundiced. RUQ with concern for cirrhotic morphology, normal spleen, normal duplex, +ve ascites.    Regarding his liver, he denies any prior history of liver disease. He does endorse history of ETOH use (12 beer/day for 'few years') but quit 3-4 years ago. Denies drug use. Denies smoking.     Social History  - ETOH: quit 3-4 years ago, 12 beer/day for 'few years'  - Prior ETOH-related hospitalizations: Denies  - Incarcerations/Law issues due to ETOH: Denies  - ETOH Rehab/AA: Denies  - Drug use: Denies  - Smoking: Denies  - Occupation: retired   - Live with: wife, 2 children (21M, 25M)  - Functional status: Fully autonomous prior to admission    Family History  - No family history of liver disease

## 2020-06-09 NOTE — PROGRESS NOTES
"[facility]  Endocrinology  Progress Note    Admit Date: 2020     Reason for Consult: Management of T2DM, Hyperglycemia     Surgical Procedure and Date: n/a    Lab Results   Component Value Date    HGBA1C 9.9 (H) 2020     Diabetes diagnosis year: 7 years ago    Home Diabetes Medications:   Levemir 10 units BID (pt reports he doesn't take often)     How often checking glucose at home? every other day   BG readings on regimen: 300s  Hypoglycemia on the regimen?  No  Missed doses on regimen?  Yes    Diabetes Complications include:     Hyperglycemia    Complicating diabetes co morbidities:   CHF      HPI:   Patient is a 55 y.o. male with a diagnosis of HFrEF 2/2 NICM, HTN, HLD, DM2 admitted with worsening SOB and BLE for 2 weeks. He was recently admitted for ADHF from 20 to 20. This is his 3rd admission this year. He reports that for the past 2 weeks he has been progressively more SOB. Endocrinology consulted for management of T2DM.       Interval HPI:   Overnight events: Remains in CSU. NAEON. BG at or below goal ranges on IV insulin infusion at 0.8 u/hr.   Eatin%  Nausea: No  Hypoglycemia and intervention: No  Fever: No  TPN and/or TF: No  If yes, type of TF/TPN and rate: none    /73 (BP Location: Left arm, Patient Position: Lying)   Pulse 78   Temp 97.7 °F (36.5 °C) (Oral)   Resp 14   Ht 5' 11" (1.803 m)   Wt 80 kg (176 lb 5.9 oz)   SpO2 99%   BMI 24.60 kg/m²      Labs Reviewed and Include    Recent Labs   Lab 20  0504   GLU 99  99   CALCIUM 8.2*  8.2*   ALBUMIN 2.4*   PROT 6.3     145   K 3.5  3.5   CO2 27  27     103   BUN 73*  73*   CREATININE 3.1*  3.1*   ALKPHOS 243*   ALT 28   AST 28   BILITOT 6.8*     Lab Results   Component Value Date    WBC 3.87 (L) 2020    HGB 12.5 (L) 2020    HCT 40.3 2020    MCV 83 2020    PLT 68 (L) 2020     Recent Labs   Lab 20  0821   TSH 8.449*   FREET4 0.74     Lab " Results   Component Value Date    HGBA1C 7.9 (H) 06/08/2020       Nutritional status:   Body mass index is 24.6 kg/m².  Lab Results   Component Value Date    ALBUMIN 2.4 (L) 06/09/2020    ALBUMIN 2.6 (L) 06/08/2020    ALBUMIN 3.0 (L) 06/07/2020     No results found for: PREALBUMIN    Estimated Creatinine Clearance: 28.3 mL/min (A) (based on SCr of 3.1 mg/dL (H)).    Accu-Checks  Recent Labs     06/08/20  0822 06/08/20  0929 06/08/20  1055 06/08/20  1208 06/08/20  1321 06/08/20  1436 06/08/20  1637 06/08/20  2218 06/08/20  2233 06/09/20  0324   POCTGLUCOSE 335* 302* 235* 269* 197* 217* 204* 125* 153* 141*       Current Medications and/or Treatments Impacting Glycemic Control  Immunotherapy:    Immunosuppressants     None        Steroids:   Hormones (From admission, onward)    None        Pressors:    Autonomic Drugs (From admission, onward)    None        Hyperglycemia/Diabetes Medications:   Antihyperglycemics (From admission, onward)    Start     Stop Route Frequency Ordered    06/08/20 1445  insulin regular 100 Units in sodium chloride 0.9% 100 mL infusion      -- IV Continuous 06/08/20 1339    06/08/20 1438  insulin aspart U-100 pen 0-4 Units      -- SubQ As needed (PRN) 06/08/20 1339          ASSESSMENT and PLAN    * Acute on chronic combined systolic and diastolic heart failure  Managed per primary team  Optimize BG control      Diabetes mellitus, type II  BG goal 140 - 180     Patient admits to being noncompliant with insulin regimen at home.    Decrease transition IV insulin infusion to 0.6  u/hr with stepdown parameters (rate decreased per protocol)   Low Dose Correction Scale  Change BG monitoring to ac/hs/0200    ** Please call Endocrine for any BG related issues **    Discharge planning: TBD        Hypothyroidism  Recommend continuing home dose of Synthroid 175 mcg while inpatient.       Acute kidney injury superimposed on CKD  Lab Results   Component Value Date    CREATININE 3.1 (H) 06/09/2020     CREATININE 3.1 (H) 06/09/2020     Avoid insulin stacking  Titrate insulin slowly          Carmen Guallpa NP  Endocrinology  [facility]

## 2020-06-09 NOTE — PLAN OF CARE
Problem: Adult Inpatient Plan of Care  Goal: Plan of Care Review  Outcome: Ongoing, Progressing    Pt remained free from falls/trauma/injuries. No complaints of CP/SOB/discomfort. Dobutamine gtt @ 5 mcg/kg/min, lasix gtt infusing per MAR, and insulin gtt infusing per MAR. VSS. Fall bundle in place. POC explained, no questions at this time. Pt tolerating care.

## 2020-06-09 NOTE — PLAN OF CARE
Pt maintained free from falls/ trauma/ injuries and skin breakdown. Pt denied pain or discomfort. He is on Lasix drip, dobutamine drip and insulin drip continuously. I and O accurately documented and Blood glucose check q4. Insulin drip was tirated down to 0.6unit/hr base on the step down nomogram. He had a 9 beats run of V-tach this am, K 3.5 and replaced. He had another 4 beats V-tach at shift change this pm. This shift, he started on Vit K IV. He was on 3L O2 this am but was able to wean him down to RA this am, tolerating well. Paracentesis is done at bedside. Plan of care reviewed. Pt verbalized understanding. All questions and concerns addressed. Pt's sister at bedside. Will continue to monitor.

## 2020-06-09 NOTE — SUBJECTIVE & OBJECTIVE
"Interval HPI:   Overnight events: Remains in CSU. NAEON. BG at or below goal ranges on IV insulin infusion at 0.8 u/hr.   Eatin%  Nausea: No  Hypoglycemia and intervention: No  Fever: No  TPN and/or TF: No  If yes, type of TF/TPN and rate: none    /73 (BP Location: Left arm, Patient Position: Lying)   Pulse 78   Temp 97.7 °F (36.5 °C) (Oral)   Resp 14   Ht 5' 11" (1.803 m)   Wt 80 kg (176 lb 5.9 oz)   SpO2 99%   BMI 24.60 kg/m²     Labs Reviewed and Include    Recent Labs   Lab 20  0504   GLU 99  99   CALCIUM 8.2*  8.2*   ALBUMIN 2.4*   PROT 6.3     145   K 3.5  3.5   CO2 27  27     103   BUN 73*  73*   CREATININE 3.1*  3.1*   ALKPHOS 243*   ALT 28   AST 28   BILITOT 6.8*     Lab Results   Component Value Date    WBC 3.87 (L) 2020    HGB 12.5 (L) 2020    HCT 40.3 2020    MCV 83 2020    PLT 68 (L) 2020     Recent Labs   Lab 20  0821   TSH 8.449*   FREET4 0.74     Lab Results   Component Value Date    HGBA1C 7.9 (H) 2020       Nutritional status:   Body mass index is 24.6 kg/m².  Lab Results   Component Value Date    ALBUMIN 2.4 (L) 2020    ALBUMIN 2.6 (L) 2020    ALBUMIN 3.0 (L) 2020     No results found for: PREALBUMIN    Estimated Creatinine Clearance: 28.3 mL/min (A) (based on SCr of 3.1 mg/dL (H)).    Accu-Checks  Recent Labs     20  0822 20  0929 20  1055 20  1208 20  1321 20  1436 20  1637 20  2218 20  2233 20  0324   POCTGLUCOSE 335* 302* 235* 269* 197* 217* 204* 125* 153* 141*       Current Medications and/or Treatments Impacting Glycemic Control  Immunotherapy:    Immunosuppressants     None        Steroids:   Hormones (From admission, onward)    None        Pressors:    Autonomic Drugs (From admission, onward)    None        Hyperglycemia/Diabetes Medications:   Antihyperglycemics (From admission, onward)    Start     Stop Route Frequency " Ordered    06/08/20 1445  insulin regular 100 Units in sodium chloride 0.9% 100 mL infusion      -- IV Continuous 06/08/20 1339    06/08/20 1438  insulin aspart U-100 pen 0-4 Units      -- SubQ As needed (PRN) 06/08/20 1337

## 2020-06-09 NOTE — SUBJECTIVE & OBJECTIVE
"Review of Systems   Constitutional: Negative for activity change, appetite change, chills, diaphoresis, fatigue, fever and unexpected weight change.   HENT: Negative for sore throat and trouble swallowing.    Eyes: Negative for visual disturbance.   Respiratory: Positive for shortness of breath. Negative for chest tightness.    Cardiovascular: Positive for leg swelling. Negative for chest pain.   Gastrointestinal: Negative for abdominal distention, abdominal pain, anal bleeding, blood in stool, constipation, diarrhea, nausea and vomiting.   Genitourinary: Negative for dysuria and hematuria.   Musculoskeletal: Negative for arthralgias and myalgias.   Skin: Negative for rash.   Neurological: Negative for dizziness, weakness, light-headedness and headaches.   Psychiatric/Behavioral: Negative for agitation and confusion.       Past Medical History:   Diagnosis Date    CHF (congestive heart failure)     Diabetes mellitus type II     Essential hypertension, benign     Hyperlipidemia     Hypothyroidism     Pain and swelling of left wrist 8/5/2019    Ashish Mckeon is a 54 y.o. male with PMH of  presenting with CHF, IDDM, Hypothyroidism, HTN, HLD presenting with worsening L wrist pain for 1 day. Orthopedic surgery was consulted for septic joint r/o. Pt has been afebrile and has no elevated WBC. ESR 36, normal CRP. Xray shows no signs of trauma and pt has no hx of gout or septic arthritis. Due to atraumatic wrist pain and swelling w/out identifia    Undiagnosed cardiac murmurs        Past Surgical History:   Procedure Laterality Date    COLON SURGERY      "lower intestines removed"    ORTHOPEDIC SURGERY Left     wrist    THYROID SURGERY         Family history of liver disease: No    Review of patient's allergies indicates:   Allergen Reactions    Lisinopril Other (See Comments)     acei cough         Tobacco Use    Smoking status: Never Smoker    Smokeless tobacco: Never Used    Tobacco comment: cigars "every " "other week or so"   Substance and Sexual Activity    Alcohol use: Not Currently     Alcohol/week: 3.0 standard drinks     Types: 3 Cans of beer per week    Drug use: Not Currently     Types: Marijuana    Sexual activity: Yes     Partners: Female       Medications Prior to Admission   Medication Sig Dispense Refill Last Dose    acetaminophen (TYLENOL) 325 MG tablet Take 2 tablets (650 mg total) by mouth every 6 to 8 hours as needed. (Patient taking differently: Take 325 mg by mouth daily as needed for Pain. )  0 Taking    aspirin 81 MG Chew Take 1 tablet (81 mg total) by mouth once daily. 360 tablet 0 5/7/2020    ferrous sulfate 325 (65 FE) MG EC tablet Take 1 tablet (325 mg total) by mouth every other day. 30 tablet 3 5/7/2020    furosemide (LASIX) 80 MG tablet Take 1 tablet (80 mg total) by mouth 2 (two) times daily. 60 tablet 11     HYDROcodone-acetaminophen (NORCO) 5-325 mg per tablet Take 1 tablet by mouth every 6 (six) hours as needed for Pain. 18 tablet 0 Unknown    insulin detemir U-100 (LEVEMIR) 100 unit/mL injection Inject 5 Units into the skin every evening. 10 mL 12 5/7/2020    levothyroxine (SYNTHROID) 175 MCG tablet Take 1 tablet (175 mcg total) by mouth before breakfast.   5/7/2020    metoprolol succinate (TOPROL-XL) 50 MG 24 hr tablet Take 1 tablet (50 mg total) by mouth once daily. 30 tablet 11 5/7/2020       Objective:     Vital Signs (Most Recent):  Temp: 97.7 °F (36.5 °C) (06/09/20 0734)  Pulse: 89 (06/09/20 0945)  Resp: 14 (06/09/20 0734)  BP: 105/73 (06/09/20 0734)  SpO2: 99 % (06/09/20 0734) Vital Signs (24h Range):  Temp:  [97.5 °F (36.4 °C)-98.3 °F (36.8 °C)] 97.7 °F (36.5 °C)  Pulse:  [62-89] 89  Resp:  [14-22] 14  SpO2:  [93 %-100 %] 99 %  BP: ()/(65-73) 105/73     Weight: 80 kg (176 lb 5.9 oz) (06/08/20 1924)  Body mass index is 24.6 kg/m².    Physical Exam   Constitutional: He is oriented to person, place, and time. No distress.   Sitting in bed, comfortable appearing, no " distress.   HENT:   Head: Normocephalic and atraumatic.   Mouth/Throat: Oropharynx is clear and moist. No oropharyngeal exudate.   Eyes: Conjunctivae are normal. Scleral icterus is present.   Neck: No JVD present.   Cardiovascular: Normal rate, regular rhythm, normal heart sounds and intact distal pulses.   On dobu and lasix gtt   Pulmonary/Chest: Effort normal and breath sounds normal. No respiratory distress.   Abdominal: Soft. Bowel sounds are normal. He exhibits distension. He exhibits no mass. There is no tenderness. There is no rebound and no guarding.   Soft, non-tender, ascites present.   Musculoskeletal: He exhibits edema (3+ bilateral pitting edema to thigh, +ve scrotal edema). He exhibits no tenderness.   Lymphadenopathy:     He has no cervical adenopathy.   Neurological: He is alert and oriented to person, place, and time.   Skin: Skin is warm. Capillary refill takes less than 2 seconds. He is not diaphoretic.   Psychiatric: He has a normal mood and affect. His behavior is normal. Judgment and thought content normal.   Nursing note and vitals reviewed.      MELD-Na score: 28 at 6/9/2020  5:04 AM  MELD score: 28 at 6/9/2020  5:04 AM  Calculated from:  Serum Creatinine: 3.1 mg/dL at 6/9/2020  5:04 AM  Serum Sodium: 145 mmol/L (Rounded to 137 mmol/L) at 6/9/2020  5:04 AM  Total Bilirubin: 6.8 mg/dL at 6/9/2020  5:04 AM  INR(ratio): 1.4 at 6/7/2020  9:42 PM  Age: 55 years    Significant Labs:  Labs within the past month have been reviewed.    Significant Imaging:  Labs: Reviewed

## 2020-06-09 NOTE — NURSING
Paracentesis done by Dr. Lubin and Dr. Bearden at bedside. Bedside time out completed by RN. Lidocaine HCL 1% 10mg/ml was override by RN and given by the Dr. Lubin.

## 2020-06-09 NOTE — PROCEDURES
"Ashish Mckeon is a 55 y.o. male patient.    Temp: 97.8 °F (36.6 °C) (20)  Pulse: 65 (20)  Resp: 16 (20)  BP: 106/70 (20)  SpO2: (!) 92 % (20)  Weight: 80 kg (176 lb 5.9 oz) (20)  Height: 5' 11" (180.3 cm) (20)       Paracentesis  Date/Time: 2020 4:00 PM  Location procedure was performed: [facility] CARDIOLOGY  Performed by: Reza Flores MD  Authorized by: Reza Flores MD   Assisting provider: Chris Bearden MD  Pre-operative diagnosis: ascites  Post-operative diagnosis: ascites  Consent Done: Yes  Consent: Verbal consent obtained. Written consent obtained.  Consent given by: patient  Patient understanding: patient states understanding of the procedure being performed  Patient consent: the patient's understanding of the procedure matches consent given  Procedure consent: procedure consent matches procedure scheduled  Relevant documents: relevant documents present and verified  Test results: test results available and properly labeled  Site marked: the operative site was marked  Imaging studies: imaging studies available  Patient identity confirmed: , MRN, name and verbally with patient  Time out: Immediately prior to procedure a "time out" was called to verify the correct patient, procedure, equipment, support staff and site/side marked as required.  Initial or subsequent exam: initial  Procedure purpose: diagnostic  Indications: new onset ascites  Anesthesia: local infiltration    Anesthesia:  Local Anesthetic: lidocaine 1% without epinephrine  Anesthetic total: 5 mL  Patient sedated: no  Preparation: Patient was prepped and draped in the usual sterile fashion.  Description of findings: 5 cm deep pocket of ascitic fluid - hepatomegaly   Needle gauge: 18  Ultrasound guidance: yes  Puncture site: right lower quadrant  Fluid removed: 0(ml)  Complications: No  Estimated blood loss (mL): 1  Specimens: No  Implants: No  Patient " tolerance: Patient tolerated the procedure well with no immediate complications  Comments: No return of ascitic fluid despite appropriate depth - very large respiratory variation of abdomen   Procedure ceased due to risks  Will consult IR for paracentesis on 6/10          Reza Flores  6/9/2020

## 2020-06-09 NOTE — PLAN OF CARE
06/09/20 1016   Discharge Assessment   Assessment Type Discharge Planning Assessment   Assessment information obtained from? Medical Record   Expected Length of Stay (days) 4   Prior to hospitilization cognitive status: Alert/Oriented   Prior to hospitalization functional status: Independent   Current cognitive status: Alert/Oriented   Current Functional Status: Needs Assistance   Lives With significant other   Able to Return to Prior Arrangements yes   Is patient able to care for self after discharge? Yes   Patient's perception of discharge disposition home or selfcare   Readmission Within the Last 30 Days previous discharge plan unsuccessful   If yes, most recent facility name[place] [facility]   Patient currently being followed by outpatient case management? No   Equipment Currently Used at Home none   Do you have any problems affording any of your prescribed medications? TBD   Is the patient taking medications as prescribed? yes   Does the patient have transportation home? Yes   Transportation Anticipated family or friend will provide   Does the patient receive services at the [facility]? No   Discharge Plan A Home   DME Needed Upon Discharge  none   Readmission Questionnaire   At the time of your discharge, did someone talk to you about what your health problems were? Yes   At the time of discharge, did someone talk to you about what to watch out for regarding worsening of your health problem? Yes   At the time of discharge, did someone talk to you about what to do if you experienced worsening of your health problem? Yes   At the time of discharge, did someone talk to you about which medication to take when you left the hospital and which ones to stop taking? Yes   At the time of discharge, did someone talk to you about when and where to follow up with a doctor after you left the hospital? Yes   How often do you need to have someone help you when you read instructions, pamphlets, or other written material from your  doctor or pharmacy? Sometimes   Do you have problems taking your medications as prescribed? Yes   Do you have any problems affording any of  your prescribed medications? To be determined   Do you have problems obtaining/receiving your medications? No   Does the patient have transportation to healthcare appointments? Yes   Living Arrangements apartment   Does the patient have family/friends to help with healtcare needs after discharge? yes   Does your caregiver provide all the help you need? Yes   Are you currently feeling confused? No   Are you currently having problems thinking? No   Are you currently having memory problems? No   Readmitted with CHF. Information obtained from EMR. On Lasix and  gtts. Plan is to DC home. May benefit from outpatient PT-OT (Medicaid) Will follow for needs.

## 2020-06-09 NOTE — CONSULTS
[facility]  Hepatology  Consult Note    Patient Name: Ashish Mckeon  MRN: [id number]  Admission Date: 6/7/2020  Hospital Length of Stay: 2 days  Attending Provider: Jose Enrique Gordon MD   Primary Care Physician: [facility]  Principal Problem:Acute on chronic combined systolic and diastolic heart failure    Inpatient consult to Hepatology  Consult performed by: Timothy Del Valle MD  Consult ordered by: Chris Bearden MD        Subjective:     Transplant status: No    HPI:  Mr Mckeon is a 55 year old man with history of HFrEF (LVEF 20%), T2DM, HTN, HLD, who was admitted on 6/7 due to concern for heart failure exacerbation; hep consulted due to concern for ESLD.    He has had approximately 3 admissions for heart failure in 2020 including recent hospitalization in 5/2020. He presented to the hospital with 2 weeks of worsening edema and dyspnea. He was started on dobutamine and lasix gtt and has diuresed approximately 3L since admission. He reports ongoing edema but significantly improved since admission.    On admission labs notable for bilirubin of 9.0, which has improved to 6.9. On chart review, prior labs in 4/2019 he was jaundiced. RUQ with concern for cirrhotic morphology, normal spleen, normal duplex, +ve ascites.    Regarding his liver, he denies any prior history of liver disease. He does endorse history of ETOH use (12 beer/day for 'few years') but quit 3-4 years ago. Denies drug use. Denies smoking.     Social History  - ETOH: quit 3-4 years ago, 12 beer/day for 'few years'  - Prior ETOH-related hospitalizations: Denies  - Incarcerations/Law issues due to ETOH: Denies  - ETOH Rehab/AA: Denies  - Drug use: Denies  - Smoking: Denies  - Occupation: retired   - Live with: wife, 2 children (21M, 25M)  - Functional status: Fully autonomous prior to admission    Family History  - No family history of liver disease      Review of Systems   Constitutional: Negative  "for activity change, appetite change, chills, diaphoresis, fatigue, fever and unexpected weight change.   HENT: Negative for sore throat and trouble swallowing.    Eyes: Negative for visual disturbance.   Respiratory: Positive for shortness of breath. Negative for chest tightness.    Cardiovascular: Positive for leg swelling. Negative for chest pain.   Gastrointestinal: Negative for abdominal distention, abdominal pain, anal bleeding, blood in stool, constipation, diarrhea, nausea and vomiting.   Genitourinary: Negative for dysuria and hematuria.   Musculoskeletal: Negative for arthralgias and myalgias.   Skin: Negative for rash.   Neurological: Negative for dizziness, weakness, light-headedness and headaches.   Psychiatric/Behavioral: Negative for agitation and confusion.       Past Medical History:   Diagnosis Date    CHF (congestive heart failure)     Diabetes mellitus type II     Essential hypertension, benign     Hyperlipidemia     Hypothyroidism     Pain and swelling of left wrist 8/5/2019    Ashish Mckeon is a 54 y.o. male with PMH of  presenting with CHF, IDDM, Hypothyroidism, HTN, HLD presenting with worsening L wrist pain for 1 day. Orthopedic surgery was consulted for septic joint r/o. Pt has been afebrile and has no elevated WBC. ESR 36, normal CRP. Xray shows no signs of trauma and pt has no hx of gout or septic arthritis. Due to atraumatic wrist pain and swelling w/out identifia    Undiagnosed cardiac murmurs        Past Surgical History:   Procedure Laterality Date    COLON SURGERY      "lower intestines removed"    ORTHOPEDIC SURGERY Left     wrist    THYROID SURGERY         Family history of liver disease: No    Review of patient's allergies indicates:   Allergen Reactions    Lisinopril Other (See Comments)     acei cough         Tobacco Use    Smoking status: Never Smoker    Smokeless tobacco: Never Used    Tobacco comment: cigars "every other week or so"   Substance and Sexual " Activity    Alcohol use: Not Currently     Alcohol/week: 3.0 standard drinks     Types: 3 Cans of beer per week    Drug use: Not Currently     Types: Marijuana    Sexual activity: Yes     Partners: Female       Medications Prior to Admission   Medication Sig Dispense Refill Last Dose    acetaminophen (TYLENOL) 325 MG tablet Take 2 tablets (650 mg total) by mouth every 6 to 8 hours as needed. (Patient taking differently: Take 325 mg by mouth daily as needed for Pain. )  0 Taking    aspirin 81 MG Chew Take 1 tablet (81 mg total) by mouth once daily. 360 tablet 0 5/7/2020    ferrous sulfate 325 (65 FE) MG EC tablet Take 1 tablet (325 mg total) by mouth every other day. 30 tablet 3 5/7/2020    furosemide (LASIX) 80 MG tablet Take 1 tablet (80 mg total) by mouth 2 (two) times daily. 60 tablet 11     HYDROcodone-acetaminophen (NORCO) 5-325 mg per tablet Take 1 tablet by mouth every 6 (six) hours as needed for Pain. 18 tablet 0 Unknown    insulin detemir U-100 (LEVEMIR) 100 unit/mL injection Inject 5 Units into the skin every evening. 10 mL 12 5/7/2020    levothyroxine (SYNTHROID) 175 MCG tablet Take 1 tablet (175 mcg total) by mouth before breakfast.   5/7/2020    metoprolol succinate (TOPROL-XL) 50 MG 24 hr tablet Take 1 tablet (50 mg total) by mouth once daily. 30 tablet 11 5/7/2020       Objective:     Vital Signs (Most Recent):  Temp: 97.7 °F (36.5 °C) (06/09/20 0734)  Pulse: 89 (06/09/20 0945)  Resp: 14 (06/09/20 0734)  BP: 105/73 (06/09/20 0734)  SpO2: 99 % (06/09/20 0734) Vital Signs (24h Range):  Temp:  [97.5 °F (36.4 °C)-98.3 °F (36.8 °C)] 97.7 °F (36.5 °C)  Pulse:  [62-89] 89  Resp:  [14-22] 14  SpO2:  [93 %-100 %] 99 %  BP: ()/(65-73) 105/73     Weight: 80 kg (176 lb 5.9 oz) (06/08/20 1924)  Body mass index is 24.6 kg/m².    Physical Exam   Constitutional: He is oriented to person, place, and time. No distress.   Sitting in bed, comfortable appearing, no distress.   HENT:   Head: Normocephalic  and atraumatic.   Mouth/Throat: Oropharynx is clear and moist. No oropharyngeal exudate.   Eyes: Conjunctivae are normal. Scleral icterus is present.   Neck: No JVD present.   Cardiovascular: Normal rate, regular rhythm, normal heart sounds and intact distal pulses.   On dobu and lasix gtt   Pulmonary/Chest: Effort normal and breath sounds normal. No respiratory distress.   Abdominal: Soft. Bowel sounds are normal. He exhibits distension. He exhibits no mass. There is no tenderness. There is no rebound and no guarding.   Soft, non-tender, ascites present.   Musculoskeletal: He exhibits edema (3+ bilateral pitting edema to thigh, +ve scrotal edema). He exhibits no tenderness.   Lymphadenopathy:     He has no cervical adenopathy.   Neurological: He is alert and oriented to person, place, and time.   Skin: Skin is warm. Capillary refill takes less than 2 seconds. He is not diaphoretic.   Psychiatric: He has a normal mood and affect. His behavior is normal. Judgment and thought content normal.   Nursing note and vitals reviewed.      MELD-Na score: 28 at 6/9/2020  5:04 AM  MELD score: 28 at 6/9/2020  5:04 AM  Calculated from:  Serum Creatinine: 3.1 mg/dL at 6/9/2020  5:04 AM  Serum Sodium: 145 mmol/L (Rounded to 137 mmol/L) at 6/9/2020  5:04 AM  Total Bilirubin: 6.8 mg/dL at 6/9/2020  5:04 AM  INR(ratio): 1.4 at 6/7/2020  9:42 PM  Age: 55 years    Significant Labs:  Labs within the past month have been reviewed.    Significant Imaging:  Labs: Reviewed    Assessment/Plan:     Hyperbilirubinemia  Mr Mckeon is a 55 year old man with history of HFrEF (LVEF 20%), T2DM, HTN, HLD, who was admitted on 6/7 due to concern for heart failure exacerbation; hep consulted due to concern for ESLD.    Unclear at this time if patient has underlying ESLD. He endorses a remote, reportedly brief, history of ETOH intake. Hep panel negative. He is thrombocytopenic and ultrasound shows some nodularity of liver, but no splenomegaly. Ascites and  liver changes could be secondary to passive congestion from heart failure. Will complete workup and plan for outpatient follow-up. Can consider outpatient fibroscan when euvolemic but may overstate stiffness. Anticipate bilirubin will continue to fall with diuresis.    Plan  - fluid overload. Management per cardiology team  - elevated INR. Please repeat in AM. Vit K 5mg IV x3 days  - Elevated bilirbuin. Monitor, anticipate will continue to resolve with diuresis  - Ascites. Please perform diagnostic paracentesis. Ensure send for alb and total protein to help characterize fluid  - ETOH history. Remote ETOH history, will check PETH          Thank you for your consult. I will follow-up with patient. Please contact us if you have any additional questions.    Timothy Del Valle MD  Hepatology  [facility]

## 2020-06-10 NOTE — PROGRESS NOTES
[facility]  Cardiology  Progress Note    Patient Name: Ashish Mckeon  MRN: [id number]  Admission Date: 6/7/2020  Hospital Length of Stay: 2 days  Code Status: Full Code   Attending Physician: Jose Enrique Gordon MD   Primary Care Physician: [facility]  Expected Discharge Date: 6/11/2020  Principal Problem:Acute on chronic combined systolic and diastolic heart failure    Subjective:     Hospital Course:   Patient admitted to Cardiology service on 6/8 with CHF excerbation, hyperglycemia, and hyperbilirubinemia. He was started on Lasix, dobutamine, and insulin infusion. US of the liver with Doppler was performed which revealed no ductal dilation but echogenic changes suggestive of cirrhosis and ascites. Endocrinology was consulted to assist in blood sugar management and glucose improved. He is diuresising well. Hepatology was consulted given concern for cirrhosis versus congestive hepatopathy. Paracentesis was attempted on 6/9 however was aborted secondary to variations in diaphragmatic breathing. Started on IV vitamin K on 6/9. Will consult IR regarding diagnostic paracentesis. Ongoing volume optimiziatio, liver injury work up, and management of blood sugar at this time.     Interval History: Patient seen and examined this AM. No acute events overnight. Diuresing well to Lasix infusion. Repleting electrolytes PRN. Glucose much better this morning, Endocrine titrating insulin down. Hepatology evaluated patient today and recommended initiation of IV vitamin K. Attempted paracentesis today however aborted secondary to variations in diaphragmatic breathing. Plan to discuss with IR. Follow up laboratory studies. Resuming dobutamine infusion for now.    Review of Systems   Constitution: Positive for malaise/fatigue. Negative for chills, diaphoresis, fever and night sweats.   Eyes: Negative for vision loss in left eye, vision loss in right eye and visual disturbance.    Cardiovascular: Positive for dyspnea on exertion, leg swelling and orthopnea. Negative for chest pain, irregular heartbeat and palpitations.   Respiratory: Positive for shortness of breath. Negative for cough and wheezing.    Musculoskeletal: Negative for back pain and joint pain.   Gastrointestinal: Positive for bloating. Negative for abdominal pain, constipation, diarrhea, melena, nausea and vomiting.   Genitourinary: Positive for frequency. Negative for dysuria.   Neurological: Positive for weakness. Negative for dizziness, headaches and light-headedness.   Psychiatric/Behavioral: The patient does not have insomnia and is not nervous/anxious.      Objective:     Vital Signs (Most Recent):  Temp: 98.2 °F (36.8 °C) (06/09/20 1953)  Pulse: 81 (06/09/20 1953)  Resp: 18 (06/09/20 1953)  BP: 103/65 (06/09/20 1953)  SpO2: 99 % (06/09/20 1953) Vital Signs (24h Range):  Temp:  [97.5 °F (36.4 °C)-98.6 °F (37 °C)] 98.2 °F (36.8 °C)  Pulse:  [65-89] 81  Resp:  [14-18] 18  SpO2:  [92 %-100 %] 99 %  BP: (103-110)/(65-73) 103/65     Weight: 80 kg (176 lb 5.9 oz)  Body mass index is 24.6 kg/m².     SpO2: 99 %  O2 Device (Oxygen Therapy): room air      Intake/Output Summary (Last 24 hours) at 6/9/2020 1957  Last data filed at 6/9/2020 1800  Gross per 24 hour   Intake 2000.25 ml   Output 6025 ml   Net -4024.75 ml       Lines/Drains/Airways     Drain            Male External Urinary Catheter 06/08/20 0713 Medium 1 day          Peripheral Intravenous Line                 Peripheral IV - Single Lumen 06/08/20 0019 18 G Right Antecubital 1 day         Peripheral IV - Single Lumen 06/08/20 0019 20 G Right Hand 1 day         Peripheral IV - Single Lumen 06/08/20 0820 20 G Left Forearm 1 day                Physical Exam   Constitutional: He is oriented to person, place, and time. He appears ill. No distress. Nasal cannula in place.   HENT:   Head: Normocephalic and atraumatic.   Mouth/Throat: Oropharynx is clear and moist. No  oropharyngeal exudate.   Eyes: EOM are normal. Scleral icterus is present.   Neck: Normal range of motion. Neck supple. No tracheal deviation present.   Cardiovascular: Normal rate. Exam reveals no gallop and no friction rub.   Murmur heard.  Grade 2 systolic murmur best heard over mitral region.   Pulmonary/Chest: Effort normal. He has no wheezes. He has rales.   Bibasilar rales.   Abdominal: Soft. Bowel sounds are normal. He exhibits distension and ascites. There is hepatomegaly. There is no tenderness.   Well healed midline surgical scar.   Genitourinary:   Genitourinary Comments: Condom cath with clear yellow urine output.   Musculoskeletal: He exhibits edema.   +3 bilateral pitting edema up to hips.   Neurological: He is alert and oriented to person, place, and time. He exhibits normal muscle tone.   Skin: Skin is dry. He is not diaphoretic.   Psychiatric: He has a normal mood and affect. His behavior is normal.   Nursing note and vitals reviewed.      Significant Labs:   Recent Lab Results       06/09/20  1718   06/09/20  1600   06/09/20  1147   06/09/20  0939   06/09/20  0851        Albumin               Alkaline Phosphatase               ALT               Anion Gap 12             AST               Baso #               Basophil%               BILIRUBIN TOTAL               BUN, Bld 65             Calcium 8.4             Chloride 100             CO2 28             Creatinine 3.0             Differential Method               eGFR if  25.7             eGFR if non  22.2  Comment:  Calculation used to obtain the estimated glomerular filtration  rate (eGFR) is the CKD-EPI equation.                Eos #               Eosinophil%               Glucose 160             Gran # (ANC)               Gran%               Hematocrit               Hemoglobin               Hep B Core Total Ab         Negative     Hep B S Ab         Negative     Immature Grans (Abs)               Immature  Granulocytes               Lymph #               Lymph%               Magnesium 2.2             MCH               MCHC               MCV               Mono #               Mono%               MPV               nRBC               Phosphorus               Platelets               POCT Glucose   190 123 95       Potassium 3.9             PROTEIN TOTAL               RBC               RDW               Sodium 140             WBC                                06/09/20  0739   06/09/20  0504   06/09/20  0324   06/08/20  2321   06/08/20  2233        Albumin   2.4           Alkaline Phosphatase   243           ALT   28           Anion Gap   15   13          15           AST   28           Baso #   0.03           Basophil%   0.8           BILIRUBIN TOTAL   6.8  Comment:  For infants and newborns, interpretation of results should be based  on gestational age, weight and in agreement with clinical  observations.  Premature Infant recommended reference ranges:  Up to 24 hours.............<8.0 mg/dL  Up to 48 hours............<12.0 mg/dL  3-5 days..................<15.0 mg/dL  6-29 days.................<15.0 mg/dL             BUN, Bld   73   71          73           Calcium   8.2   7.5          8.2           Chloride   103   108          103           CO2   27   20          27           Creatinine   3.1   2.9          3.1           Differential Method   Automated           eGFR if    24.7   26.7          24.7           eGFR if non    21.3  Comment:  Calculation used to obtain the estimated glomerular filtration  rate (eGFR) is the CKD-EPI equation.      23.1  Comment:  Calculation used to obtain the estimated glomerular filtration  rate (eGFR) is the CKD-EPI equation.             21.3  Comment:  Calculation used to obtain the estimated glomerular filtration  rate (eGFR) is the CKD-EPI equation.              Eos #   0.3           Eosinophil%   8.8           Glucose   99   138          99            Gran # (ANC)   2.8           Gran%   73.3           Hematocrit   40.3           Hemoglobin   12.5           Hep B Core Total Ab               Hep B S Ab               Immature Grans (Abs)   0.00  Comment:  Mild elevation in immature granulocytes is non specific and   can be seen in a variety of conditions including stress response,   acute inflammation, trauma and pregnancy. Correlation with other   laboratory and clinical findings is essential.             Immature Granulocytes   0.0           Lymph #   0.3           Lymph%   7.0           Magnesium   2.3           MCH   25.9           MCHC   31.0           MCV   83           Mono #   0.4           Mono%   10.1           MPV   SEE COMMENT  Comment:  Result not available.           nRBC   0           Phosphorus   3.9           Platelets   68           POCT Glucose 94   141   153     Potassium   3.5   3.9          3.5           PROTEIN TOTAL   6.3           RBC   4.83           RDW   26.0           Sodium   145   141          145           WBC   3.87                            06/08/20  2218   06/08/20 2016        Albumin         Alkaline Phosphatase         ALT         Anion Gap   12  Comment:  Corrected result; previously reported as 13 on 06/08/2020 at 21:44.[C]     AST         Baso #         Basophil%         BILIRUBIN TOTAL         BUN, Bld   67     Calcium   7.0     Chloride   92     CO2   20     Creatinine   2.9     Differential Method         eGFR if African American   26.7     eGFR if non    23.1  Comment:  Calculation used to obtain the estimated glomerular filtration  rate (eGFR) is the CKD-EPI equation.        Eos #         Eosinophil%         Glucose   792  Comment:  *Critical value -   Results called to and read back by: NICCI BONILLA RN       Gran # (ANC)         Gran%         Hematocrit         Hemoglobin         Hep B Core Total Ab         Hep B S Ab         Immature Grans (Abs)         Immature Granulocytes         Lymph #          Lymph%         Magnesium         MCH         MCHC         MCV         Mono #         Mono%         MPV         nRBC         Phosphorus         Platelets         POCT Glucose 125       Potassium   3.2     PROTEIN TOTAL         RBC         RDW         Sodium   124     WBC               Significant Imaging: I have reviewed all imagining in the past 24hrs.    Assessment and Plan:     Brief HPI: 54 y/o M with HFrEF 2/2 NICM, HTN, HLD, DM2 admitted with worsening SOB and BLE for 2 weeks. He was recently admitted for ADHF from 5/8/20 to 5/13/20. This is his 3rd admission this year. He reports that for the past 2 weeks he has been progressively more SOB. He feels like the fluid was not completely removed last admission. Takes furosemide 80 mg Po daily. Does not follow with cardiology. Unable to name his medications. Currently patient feels SOB and fatigued, able to answer all questions but falls asleep easily. Unable to lay flat. Uses two pillows at home.   TTE 2/26/20  · Moderate left atrial enlargement.  · Severely decreased left ventricular systolic function. The estimated ejection fraction is 20%.  · Grade III (severe) left ventricular diastolic dysfunction consistent with restrictive physiology.  · Mild-to-moderate mitral regurgitation.  · Global hypokinetic wall motion.  · Mild left ventricular enlargement.  · Moderate right ventricular enlargement.  · Moderate tricuspid regurgitation.  · Intermediate central venous pressure (8 mmHg).  · The estimated PA systolic pressure is 55 mmHg.  · Pulmonary hypertension present.  · Small posterolateral pericardial effusion.  · Mild mitral sclerosis.  · Mild aortic regurgitation.  Moderately reduced right ventricular systolic function.  [facility] 7/20/2018 - non obstructive CAD    * Acute on chronic combined systolic and diastolic heart failure  55 y.o M with HFrEF (EF 20%) 2/2 NICM. Multiple admission in the past few years and this is his third admission this year. Markedly volume  overloaded on exam, high BNP, KEYANNA on CKD, congestive hepatopathy Reported history of non compliance in the past but currently he reports compliance to medications.     TTE on 6/8:  · Severe left atrial enlargement.  · Mild left ventricular enlargement.  · Severely decreased left ventricular systolic function. The estimated ejection fraction is 20%.  · Severe global hypokinetic wall motion.  · Grade III (severe) left ventricular diastolic dysfunction consistent with restrictive physiology.  · Severe right atrial enlargement.  · Moderate right ventricular enlargement.  · Moderately reduced right ventricular systolic function.  · Moderate-to-severe mitral regurgitation.  · Mild to moderate tricuspid regurgitation.  · Pulmonary hypertension present.  · The estimated PA systolic pressure is 65 mmHg.  · Elevated central venous pressure (15 mmHg).  · Trivial pericardial effusion.    - resume dobutamine and Lasix infusions for now  - holding home dose Toprolol-XL 50 mg daily in light of decompensated heart failure and hypotension    Acute respiratory failure with hypoxia  Please see Acute on chronic combined systolic and diastolic heart failure.    CKD (chronic kidney disease), stage IV  Please see Acute kidney injury superimposed on CKD.    Hyperbilirubinemia  Patient presented with total bilirubin of 9, alkaline phosphatase 287, AST 48, and GGT 84. ALT within normal limits with a value of 34. He underwent US liver with Doppler upon presentation which showed unremarkable pancrease, enlarge liver with heterogenous echotexture nodularity suggestive of cirrhosis, cholelithiasis with gallbladder wall thickening but no CBD or intrahepatic dilation, and large volume ascites. Vasculature was patent with proper directional flow.      - Hepatology consulted for assistance and suspect studies reflective of congestive hepatopathy  - Acute hepatitis panel negative and serum ammonia within no limits  - Follow up laboratory studies  -  IR for paracentesis  - Resume vitamin K 5 mg IV QD for three doses (2/3 days)         Acute on chronic combined systolic and diastolic congestive heart failure  Please see Acute on chronic combined systolic and diastolic heart failure.    Shortness of breath  Please see Acute on chronic combined systolic and diastolic heart failure.    Decompensated heart failure  Please see Acute on chronic combined systolic and diastolic heart failure.    Hyperglycemia  Please see Diabetes mellitus, type II.    H/O thyroidectomy  Please see Hypothyroidism.    Essential hypertension  - patient on metoprolol succinate 50 mg daily as outpatient   - holding for now in light of hypotension    Acute kidney injury superimposed on CKD  - KEYANNA on CKD V with baseline creatinine ~3 presented with serum creatinine 3.3   - currently down at baseline, will continue to monitor    Non-rheumatic mitral regurgitation  Please see Acute on chronic combined systolic and diastolic heart failure.    Chronic combined systolic and diastolic congestive heart failure  Please see Acute on chronic combined systolic and diastolic heart failure.    Diabetes mellitus, type II  Patient on levemir 5 U daily as outpatient. Presented with blood glucose in the 300-500s and HgbA1c 7.9%. Anion gap within normal limits on presentation with a value of 12 (bicarbonate was 20). UA remarkable only for +1 protein, +1 occult blood (1 RBC/hpf) and 10 hyaline cast. There were no ketones or glucose. Serum osmolality was 331 but beta hydroxybutyrate was 0.2. VBG at that time showed pH 7.321, CO2 49.5, O2 22, and HCO3 25.6. He was subsequently started on insulin infusion and Endocrinology was consulted for assistance.    - Endocrinology consulted and following  - Resume insulin infusion per Endocrinology  - Diabetic cardiac renal diet with 1,500 mL volume restriction    Hypothyroidism  Patient with history of thyroidectomy on levothyroxine 175 mcg as outpatient. On admission  patient's TSH elevated with a value of 8.449 however free T4 0.74.    - Endocrinology consulted and following  - resume home dose Synthroid     Thrombocytopenia  Chronic issue, patient with history of thrombocytopenia dating back to December 2018. Suspect related to underlying hepatopathy. Please see Hyperbilirubinemia.        VTE Risk Mitigation (From admission, onward)         Ordered     heparin (porcine) injection 5,000 Units  Every 8 hours      06/09/20 1623     IP VTE HIGH RISK PATIENT  Once      06/07/20 2346     Place sequential compression device  Until discontinued      06/07/20 2346                Chris Bearden MD  Cardiology  [facility]

## 2020-06-10 NOTE — PLAN OF CARE
Plan of care discussed with patient. Patient is free of fall/trauma/injury. Denies CP, SOB, or pain/discomfort. All questions addressed. Insulin gtt d/c'd this AM. Bgs monitored ACHS and SSI administered per order.  and lasix gtts maintained per order. Paracentesis this AM - ~1L removed. Will continue to monitor

## 2020-06-10 NOTE — SUBJECTIVE & OBJECTIVE
"Interval HPI:   Overnight events: Remains in CSU. BG below goal ranges this morning on current SQ insulin regimen. IV insulin infusion titrated off per protocol. NPO this morning for procedure.   Eatin%  Nausea: No  Hypoglycemia and intervention: No  Fever: No  TPN and/or TF: No  If yes, type of TF/TPN and rate: none    BP 97/66 (Patient Position: Lying)   Pulse 85   Temp 98.3 °F (36.8 °C) (Oral)   Resp 20   Ht 5' 11" (1.803 m)   Wt 80 kg (176 lb 5.9 oz)   SpO2 96%   BMI 24.60 kg/m²     Labs Reviewed and Include    Recent Labs   Lab 06/10/20  0711   GLU 94   CALCIUM 8.9   ALBUMIN 2.7*   PROT 6.8      K 3.5   CO2 28   CL 98   BUN 61*   CREATININE 2.9*   ALKPHOS 293*   ALT 47*   AST 54*   BILITOT 8.3*     Lab Results   Component Value Date    WBC 3.68 (L) 06/10/2020    HGB 12.9 (L) 06/10/2020    HCT 39.4 (L) 06/10/2020    MCV 79 (L) 06/10/2020    PLT 63 (L) 06/10/2020     Recent Labs   Lab 20  0821   TSH 8.449*   FREET4 0.74     Lab Results   Component Value Date    HGBA1C 7.9 (H) 2020       Nutritional status:   Body mass index is 24.6 kg/m².  Lab Results   Component Value Date    ALBUMIN 2.7 (L) 06/10/2020    ALBUMIN 2.4 (L) 2020    ALBUMIN 2.6 (L) 2020     No results found for: PREALBUMIN    Estimated Creatinine Clearance: 30.3 mL/min (A) (based on SCr of 2.9 mg/dL (H)).    Accu-Checks  Recent Labs     20  2218 20  2233 20  0324 20  0739 20  0939 20  1147 20  1600 20  2205 06/10/20  0131 06/10/20  0723   POCTGLUCOSE 125* 153* 141* 94 95 123* 190* 150* 114* 95       Current Medications and/or Treatments Impacting Glycemic Control  Immunotherapy:    Immunosuppressants     None        Steroids:   Hormones (From admission, onward)    Start     Stop Route Frequency Ordered    20 1842  melatonin tablet 6 mg  (Medication Panel)      -- Oral Nightly PRN 20 1742        Pressors:    Autonomic Drugs (From admission, onward) "    None        Hyperglycemia/Diabetes Medications:   Antihyperglycemics (From admission, onward)    Start     Stop Route Frequency Ordered    06/08/20 1438  insulin aspart U-100 pen 0-4 Units      -- SubQ As needed (PRN) 06/08/20 1335

## 2020-06-10 NOTE — HOSPITAL COURSE
Patient admitted to Cardiology service on 6/8 with CHF excerbation, hyperglycemia, and hyperbilirubinemia. He was started on Lasix, dobutamine, and insulin infusion. US of the liver with Doppler was performed which revealed no ductal dilation but echogenic changes suggestive of cirrhosis and ascites. Endocrinology was consulted to assist in blood sugar management and glucose improved. He is diuresising well. Hepatology was consulted given concern for cirrhosis, congestive hepatopathy, versus ischemic hepatitis. Paracentesis was performed by Interventional Radiology on 6/10 along with transition to basal bolus insulin regimen.  He has completed three days IV vitamin K on 6/11.  He was started on hydralazine and isosorbide dinitrate on 6/13, increasing as tolearted. He was continued on cardiac/volume optimization with IV Lasix & dobutamine infusion. Respiratory status and renal function continueed to improve. On 6/16, IVC was noted to be small and collapsible. Lasix drip was discontinued, was restarted on home PO lasix regimen. RHC done on 6/17.  Low filling pressures following aggressive diuresis with very low output state and high SVR off . Holding Lasix for now given low filling pressures.

## 2020-06-10 NOTE — SUBJECTIVE & OBJECTIVE
Interval History: Patient seen and examined this AM. No acute events overnight. Diuresing well to Lasix infusion. Repleting electrolytes PRN. Glucose much better this morning, Endocrine titrating insulin down. Hepatology evaluated patient today and recommended initiation of IV vitamin K. Attempted paracentesis today however aborted secondary to variations in diaphragmatic breathing. Plan to discuss with IR. Follow up laboratory studies. Resuming dobutamine infusion for now.    Review of Systems   Constitution: Positive for malaise/fatigue. Negative for chills, diaphoresis, fever and night sweats.   Eyes: Negative for vision loss in left eye, vision loss in right eye and visual disturbance.   Cardiovascular: Positive for dyspnea on exertion, leg swelling and orthopnea. Negative for chest pain, irregular heartbeat and palpitations.   Respiratory: Positive for shortness of breath. Negative for cough and wheezing.    Musculoskeletal: Negative for back pain and joint pain.   Gastrointestinal: Positive for bloating. Negative for abdominal pain, constipation, diarrhea, melena, nausea and vomiting.   Genitourinary: Positive for frequency. Negative for dysuria.   Neurological: Positive for weakness. Negative for dizziness, headaches and light-headedness.   Psychiatric/Behavioral: The patient does not have insomnia and is not nervous/anxious.      Objective:     Vital Signs (Most Recent):  Temp: 98.2 °F (36.8 °C) (06/09/20 1953)  Pulse: 81 (06/09/20 1953)  Resp: 18 (06/09/20 1953)  BP: 103/65 (06/09/20 1953)  SpO2: 99 % (06/09/20 1953) Vital Signs (24h Range):  Temp:  [97.5 °F (36.4 °C)-98.6 °F (37 °C)] 98.2 °F (36.8 °C)  Pulse:  [65-89] 81  Resp:  [14-18] 18  SpO2:  [92 %-100 %] 99 %  BP: (103-110)/(65-73) 103/65     Weight: 80 kg (176 lb 5.9 oz)  Body mass index is 24.6 kg/m².     SpO2: 99 %  O2 Device (Oxygen Therapy): room air      Intake/Output Summary (Last 24 hours) at 6/9/2020 1957  Last data filed at 6/9/2020  1800  Gross per 24 hour   Intake 2000.25 ml   Output 6025 ml   Net -4024.75 ml       Lines/Drains/Airways     Drain            Male External Urinary Catheter 06/08/20 0713 Medium 1 day          Peripheral Intravenous Line                 Peripheral IV - Single Lumen 06/08/20 0019 18 G Right Antecubital 1 day         Peripheral IV - Single Lumen 06/08/20 0019 20 G Right Hand 1 day         Peripheral IV - Single Lumen 06/08/20 0820 20 G Left Forearm 1 day                Physical Exam   Constitutional: He is oriented to person, place, and time. He appears ill. No distress. Nasal cannula in place.   HENT:   Head: Normocephalic and atraumatic.   Mouth/Throat: Oropharynx is clear and moist. No oropharyngeal exudate.   Eyes: EOM are normal. Scleral icterus is present.   Neck: Normal range of motion. Neck supple. No tracheal deviation present.   Cardiovascular: Normal rate. Exam reveals no gallop and no friction rub.   Murmur heard.  Grade 2 systolic murmur best heard over mitral region.   Pulmonary/Chest: Effort normal. He has no wheezes. He has rales.   Bibasilar rales.   Abdominal: Soft. Bowel sounds are normal. He exhibits distension and ascites. There is hepatomegaly. There is no tenderness.   Well healed midline surgical scar.   Genitourinary:   Genitourinary Comments: Condom cath with clear yellow urine output.   Musculoskeletal: He exhibits edema.   +3 bilateral pitting edema up to hips.   Neurological: He is alert and oriented to person, place, and time. He exhibits normal muscle tone.   Skin: Skin is dry. He is not diaphoretic.   Psychiatric: He has a normal mood and affect. His behavior is normal.   Nursing note and vitals reviewed.      Significant Labs:   Recent Lab Results       06/09/20  1718   06/09/20  1600   06/09/20  1147   06/09/20  0939   06/09/20  0851        Albumin               Alkaline Phosphatase               ALT               Anion Gap 12             AST               Baso #                Basophil%               BILIRUBIN TOTAL               BUN, Bld 65             Calcium 8.4             Chloride 100             CO2 28             Creatinine 3.0             Differential Method               eGFR if  25.7             eGFR if non  22.2  Comment:  Calculation used to obtain the estimated glomerular filtration  rate (eGFR) is the CKD-EPI equation.                Eos #               Eosinophil%               Glucose 160             Gran # (ANC)               Gran%               Hematocrit               Hemoglobin               Hep B Core Total Ab         Negative     Hep B S Ab         Negative     Immature Grans (Abs)               Immature Granulocytes               Lymph #               Lymph%               Magnesium 2.2             MCH               MCHC               MCV               Mono #               Mono%               MPV               nRBC               Phosphorus               Platelets               POCT Glucose   190 123 95       Potassium 3.9             PROTEIN TOTAL               RBC               RDW               Sodium 140             WBC                                06/09/20  0739   06/09/20  0504   06/09/20  0324   06/08/20  2321   06/08/20  2233        Albumin   2.4           Alkaline Phosphatase   243           ALT   28           Anion Gap   15   13          15           AST   28           Baso #   0.03           Basophil%   0.8           BILIRUBIN TOTAL   6.8  Comment:  For infants and newborns, interpretation of results should be based  on gestational age, weight and in agreement with clinical  observations.  Premature Infant recommended reference ranges:  Up to 24 hours.............<8.0 mg/dL  Up to 48 hours............<12.0 mg/dL  3-5 days..................<15.0 mg/dL  6-29 days.................<15.0 mg/dL             BUN, Bld   73   71          73           Calcium   8.2   7.5          8.2           Chloride   103   108          103            CO2   27   20          27           Creatinine   3.1   2.9          3.1           Differential Method   Automated           eGFR if    24.7   26.7          24.7           eGFR if non    21.3  Comment:  Calculation used to obtain the estimated glomerular filtration  rate (eGFR) is the CKD-EPI equation.      23.1  Comment:  Calculation used to obtain the estimated glomerular filtration  rate (eGFR) is the CKD-EPI equation.             21.3  Comment:  Calculation used to obtain the estimated glomerular filtration  rate (eGFR) is the CKD-EPI equation.              Eos #   0.3           Eosinophil%   8.8           Glucose   99   138          99           Gran # (ANC)   2.8           Gran%   73.3           Hematocrit   40.3           Hemoglobin   12.5           Hep B Core Total Ab               Hep B S Ab               Immature Grans (Abs)   0.00  Comment:  Mild elevation in immature granulocytes is non specific and   can be seen in a variety of conditions including stress response,   acute inflammation, trauma and pregnancy. Correlation with other   laboratory and clinical findings is essential.             Immature Granulocytes   0.0           Lymph #   0.3           Lymph%   7.0           Magnesium   2.3           MCH   25.9           MCHC   31.0           MCV   83           Mono #   0.4           Mono%   10.1           MPV   SEE COMMENT  Comment:  Result not available.           nRBC   0           Phosphorus   3.9           Platelets   68           POCT Glucose 94   141   153     Potassium   3.5   3.9          3.5           PROTEIN TOTAL   6.3           RBC   4.83           RDW   26.0           Sodium   145   141          145           WBC   3.87                            06/08/20  2218   06/08/20 2016        Albumin         Alkaline Phosphatase         ALT         Anion Gap   12  Comment:  Corrected result; previously reported as 13 on 06/08/2020 at 21:44.[C]     AST         Cheriseo  #         Basophil%         BILIRUBIN TOTAL         BUN, Bld   67     Calcium   7.0     Chloride   92     CO2   20     Creatinine   2.9     Differential Method         eGFR if African American   26.7     eGFR if non    23.1  Comment:  Calculation used to obtain the estimated glomerular filtration  rate (eGFR) is the CKD-EPI equation.        Eos #         Eosinophil%         Glucose   792  Comment:  *Critical value -   Results called to and read back by: NICCI BONILLA RN       Gran # (ANC)         Gran%         Hematocrit         Hemoglobin         Hep B Core Total Ab         Hep B S Ab         Immature Grans (Abs)         Immature Granulocytes         Lymph #         Lymph%         Magnesium         MCH         MCHC         MCV         Mono #         Mono%         MPV         nRBC         Phosphorus         Platelets         POCT Glucose 125       Potassium   3.2     PROTEIN TOTAL         RBC         RDW         Sodium   124     WBC               Significant Imaging: I have reviewed all imagining in the past 24hrs.

## 2020-06-10 NOTE — PROCEDURES
Radiology Post-Procedure Note    Pre Op Diagnosis: Ascites  Post Op Diagnosis: Same    Procedure: Ultrasound Guided Paracentesis    Procedure performed by: Lucius LOZA, Daisy     Written Informed Consent Obtained: Yes  Specimen Removed: YES clear yellow  Estimated Blood Loss: Minimal    Findings:   Successful paracentesis.  Albumin administered PRN per protocol.    Patient tolerated procedure well.    Daisy Kan, APRN, FNP  Interventional Radiology  [phone] clinic

## 2020-06-10 NOTE — PHYSICIAN QUERY
PT Name: Ashish Mckeon  MR #: [id number]    Physician Query Form - Respiratory Condition Clarification      CDS/: James Ramirez RN              Contact information:    [email or web address]      This form is a permanent document in the medical record.    Query Date: Jessica 10, 2020    By submitting this query, we are merely seeking further clarification of documentation. Please utilize your independent clinical judgment when addressing the question(s) below.    The Medical record contains the following   Indicators   Supporting Clinical Findings Location in Medical Record   x   SOB, JEROME, Wheezing, Productive Cough, Use of Accessory Muscles, etc. presents with increasing lower extremity swelling and shortness of breath....Pulmonary/Chest: No respiratory distress. He has rales.     Pulmonary/Chest: No respiratory distress. Bibasilar crackles    Chest: unlabored breathing    6/7 ED Provider note      6/8 H&P, cardiology, Dr. Wallis/ Dr. Gordon    6/10 H&P: Interventional Radiology      Acute/Chronic Illness     x   Radiology Findings Impression:    Cardiomegaly with bilateral edema and small layering effusion left base and probable associated compressive atelectatic change left base.   6/7 CXR   x   Respiratory Distress or Failure Acute respiratory failure with hypoxia  Please see Acute on chronic combined systolic and diastolic heart failure.....Acute on chronic combined systolic and diastolic heart failure  55 y.o M with HFrEF (EF 20%) 2/2 NICM. Multiple admission in the past few years and this is his third admission this year...   6/9 PN, Cardiology,   Dr. Bearden/ Dr. Gordon      Hypoxia or Hypercapnia     x   RR         ABGs         O2 sat 6/7@2119 ED sats 100% on RA  6/8@0332  ED sats 100%  RA  6/8@1230  ED sats 100%,   RR: 22                                        6/7       6/7        6/8        6/8      6/8 6/9 6/9                         RR:          18                      20          " 16         16        18        18                 ED clinical summary   "  "             Flowsheet      BiPAP/Intubation        Supplemental O2        Home O2, Oxygen Dependence        Treatment     x   Other Paracentesis complete. 1000 mLs peritoneal fluid drained. 6/10 Care Update, Interventional Radiology- Maury SWAIN     Respiratory failure can be acute, chronic or both. It is generally further specified as hypoxic, hypercapnic or both. Lastly, it is important to identify an etiology, if known or suspected.   References::  https://www.acphospitalist.org/archives/2013/10/coding.htm http://[employer]/acute-respiratory-failure-know     The clinical guidelines noted below are only system guidelines, and do not replace the providers clinical judgment.    Provider, please specify diagnosis or diagnoses associated with above clinical findings.     Provider, please clarify respiratory condition associated with the above clinical findings:     [ X ] Acute Respiratory Failure with Hypoxia - ABG pO2 < 60 mmHg or O2 sat of 88% on RA and respiratory symptoms documented  Patient was not on room air in ED (4-5 L via nasal cannula), patient weaned to room air on 6/10 s/p Lasix infusion   [   ] Acute Respiratory Insufficiency - Generally describes less severe respiratory symptoms and measurements (pO2, SpO2, pH, and pCO2) NOT meeting criteria for respiratory failure     [   ] Hypoxia Only   [   ] Other Respiratory Diagnosis (please specify): _________________________________   [   ]  Clinically Undetermined       Please document in your progress notes daily for the duration of treatment until resolved and include in your discharge summary.    "

## 2020-06-10 NOTE — PROGRESS NOTES
External results entered for POCT glucose testing. Results not crossing over from accucheck. 0730 BG = 95. 1200 BG = 153. 1521 BG = 235.     1515 external result, where BG = 153, added on accident. Result meant to be added at 1200 not 1515.    Will continue to monitor.

## 2020-06-10 NOTE — CARE UPDATE
Paracentesis complete. 1000 mLs peritoneal fluid drained. Pt tolerated well. Dressing to abd clean, dry, and intact.  Specimens sent per lab order. Report called to floor nurse.

## 2020-06-10 NOTE — PROGRESS NOTES
"[facility]  Endocrinology  Progress Note    Admit Date: 2020     Reason for Consult: Management of T2DM, Hyperglycemia     Surgical Procedure and Date: n/a    Lab Results   Component Value Date    HGBA1C 9.9 (H) 2020     Diabetes diagnosis year: 7 years ago    Home Diabetes Medications:   Levemir 10 units BID (pt reports he doesn't take often)     How often checking glucose at home? every other day   BG readings on regimen: 300s  Hypoglycemia on the regimen?  No  Missed doses on regimen?  Yes    Diabetes Complications include:     Hyperglycemia    Complicating diabetes co morbidities:   CHF      HPI:   Patient is a 55 y.o. male with a diagnosis of HFrEF 2/2 NICM, HTN, HLD, DM2 admitted with worsening SOB and BLE for 2 weeks. He was recently admitted for ADHF from 20 to 20. This is his 3rd admission this year. He reports that for the past 2 weeks he has been progressively more SOB. Endocrinology consulted for management of T2DM.       Interval HPI:   Overnight events: Remains in CSU. BG below goal ranges this morning on current SQ insulin regimen. IV insulin infusion titrated off per protocol. NPO this morning for procedure.   Eatin%  Nausea: No  Hypoglycemia and intervention: No  Fever: No  TPN and/or TF: No  If yes, type of TF/TPN and rate: none    BP 97/66 (Patient Position: Lying)   Pulse 85   Temp 98.3 °F (36.8 °C) (Oral)   Resp 20   Ht 5' 11" (1.803 m)   Wt 80 kg (176 lb 5.9 oz)   SpO2 96%   BMI 24.60 kg/m²      Labs Reviewed and Include    Recent Labs   Lab 06/10/20  0711   GLU 94   CALCIUM 8.9   ALBUMIN 2.7*   PROT 6.8      K 3.5   CO2 28   CL 98   BUN 61*   CREATININE 2.9*   ALKPHOS 293*   ALT 47*   AST 54*   BILITOT 8.3*     Lab Results   Component Value Date    WBC 3.68 (L) 06/10/2020    HGB 12.9 (L) 06/10/2020    HCT 39.4 (L) 06/10/2020    MCV 79 (L) 06/10/2020    PLT 63 (L) 06/10/2020     Recent Labs   Lab 20  0821   TSH 8.449*   FREET4 " 0.74     Lab Results   Component Value Date    HGBA1C 7.9 (H) 06/08/2020       Nutritional status:   Body mass index is 24.6 kg/m².  Lab Results   Component Value Date    ALBUMIN 2.7 (L) 06/10/2020    ALBUMIN 2.4 (L) 06/09/2020    ALBUMIN 2.6 (L) 06/08/2020     No results found for: PREALBUMIN    Estimated Creatinine Clearance: 30.3 mL/min (A) (based on SCr of 2.9 mg/dL (H)).    Accu-Checks  Recent Labs     06/08/20  2218 06/08/20  2233 06/09/20  0324 06/09/20  0739 06/09/20  0939 06/09/20  1147 06/09/20  1600 06/09/20  2205 06/10/20  0131 06/10/20  0723   POCTGLUCOSE 125* 153* 141* 94 95 123* 190* 150* 114* 95       Current Medications and/or Treatments Impacting Glycemic Control  Immunotherapy:    Immunosuppressants     None        Steroids:   Hormones (From admission, onward)    Start     Stop Route Frequency Ordered    06/09/20 1842  melatonin tablet 6 mg  (Medication Panel)      -- Oral Nightly PRN 06/09/20 1742        Pressors:    Autonomic Drugs (From admission, onward)    None        Hyperglycemia/Diabetes Medications:   Antihyperglycemics (From admission, onward)    Start     Stop Route Frequency Ordered    06/08/20 1438  insulin aspart U-100 pen 0-4 Units      -- SubQ As needed (PRN) 06/08/20 1339          ASSESSMENT and PLAN    * Acute on chronic combined systolic and diastolic heart failure  Managed per primary team  Optimize BG control      Diabetes mellitus, type II  BG goal 140 - 180     Patient admits to being noncompliant with insulin regimen at home.    Discontinue transition IV insulin infusion  Start Levemir 10 units q HS (First dose 6/10) 50% reduction from home dose  Low Dose Correction Scale  Change BG monitoring to ac/hs/0200  Will monitor for prandial excursions    ** Please call Endocrine for any BG related issues **    Discharge planning: TBD        Hypothyroidism  Recommend continuing home dose of Synthroid 175 mcg while inpatient.       Acute kidney injury superimposed on CKD  Lab  Results   Component Value Date    CREATININE 2.9 (H) 06/10/2020     Avoid insulin stacking  Titrate insulin slowly          Carmen Guallpa NP  Endocrinology  [facility]

## 2020-06-10 NOTE — H&P
"Inpatient Radiology Pre-procedure Note    History of Present Illness:  Ashish Mckeon is a 55 y.o. male who presents for ultrasound guided paracentesis.  Admission H&P reviewed.  Past Medical History:   Diagnosis Date    CHF (congestive heart failure)     Diabetes mellitus type II     Essential hypertension, benign     Hyperlipidemia     Hypothyroidism     Pain and swelling of left wrist 8/5/2019    Ashish Mckeon is a 54 y.o. male with PMH of  presenting with CHF, IDDM, Hypothyroidism, HTN, HLD presenting with worsening L wrist pain for 1 day. Orthopedic surgery was consulted for septic joint r/o. Pt has been afebrile and has no elevated WBC. ESR 36, normal CRP. Xray shows no signs of trauma and pt has no hx of gout or septic arthritis. Due to atraumatic wrist pain and swelling w/out identifia    Undiagnosed cardiac murmurs      Past Surgical History:   Procedure Laterality Date    COLON SURGERY      "lower intestines removed"    ORTHOPEDIC SURGERY Left     wrist    THYROID SURGERY         Review of Systems:   As documented in primary team H&P    Home Meds:   Prior to Admission medications    Medication Sig Start Date End Date Taking? Authorizing Provider   acetaminophen (TYLENOL) 325 MG tablet Take 2 tablets (650 mg total) by mouth every 6 to 8 hours as needed.  Patient taking differently: Take 325 mg by mouth daily as needed for Pain.  7/21/18   Kristi Russell NP   aspirin 81 MG Chew Take 1 tablet (81 mg total) by mouth once daily. 12/1/18 5/8/20  Milton Lopez MD   ferrous sulfate 325 (65 FE) MG EC tablet Take 1 tablet (325 mg total) by mouth every other day. 3/3/20   Damion Whitman MD   furosemide (LASIX) 80 MG tablet Take 1 tablet (80 mg total) by mouth 2 (two) times daily. 5/13/20 5/13/21  Ok Chance MD   HYDROcodone-acetaminophen (NORCO) 5-325 mg per tablet Take 1 tablet by mouth every 6 (six) hours as needed for Pain. 9/21/19   Jake Reinoso MD   insulin detemir U-100 " (LEVEMIR) 100 unit/mL injection Inject 5 Units into the skin every evening. 3/3/20   Damion Whitman MD   levothyroxine (SYNTHROID) 175 MCG tablet Take 1 tablet (175 mcg total) by mouth before breakfast. 12/24/18   Yoselin Siddiqi MD   metoprolol succinate (TOPROL-XL) 50 MG 24 hr tablet Take 1 tablet (50 mg total) by mouth once daily. 3/3/20 3/3/21  Damion Whitman MD     Scheduled Meds:    aspirin  81 mg Oral Daily    levothyroxine  175 mcg Oral Before breakfast    phytonadione ((AQUA-MEPHYTON) IVPB  5 mg Intravenous Daily     Continuous Infusions:    DOBUTamine 5 mcg/kg/min (06/09/20 2158)    furosemide (LASIX) 2 mg/mL continuous infusion (non-titrating) 20 mg/hr (06/10/20 0302)     PRN Meds:acetaminophen, calcium carbonate, Dextrose 10% Bolus, Dextrose 10% Bolus, glucagon (human recombinant), glucose, glucose, insulin aspart U-100, melatonin, ondansetron, senna-docusate 8.6-50 mg, sodium chloride 0.9%  Anticoagulants/Antiplatelets: aspirin    Allergies:   Review of patient's allergies indicates:   Allergen Reactions    Lisinopril Other (See Comments)     acei cough       Sedation Hx: have not been any systemic reactions    Vitals:  Temp: 98.3 °F (36.8 °C) (06/10/20 0720)  Pulse: 88 (06/10/20 1026)  Resp: 18 (06/10/20 1026)  BP: 92/67 (06/10/20 1026)  SpO2: (!) 94 % (06/10/20 1026)     Physical Exam:  ASA: 3  Mallampati: n/a    General: no acute distress  Mental Status: alert and oriented to person, place and time  HEENT: normocephalic, atraumatic  Chest: unlabored breathing  Heart: regular heart rate  Abdomen: distended  Extremity: moves all extremities    Plan: ultrasound guided paracentesis  Sedation Plan: local    JAMIA Gallardo, FNP  Interventional Radiology  [phone] [facility]

## 2020-06-10 NOTE — TREATMENT PLAN
Hepatology Treatment Plan    Ashish Mckeon is a 55 y.o. male admitted to hospital 6/7/2020 (Hospital Day: 4) due to Acute on chronic combined systolic and diastolic heart failure.     Interval History  - paracentesis this morning 1L, studies pending  - continues to diurese, -6L  - crt stable 2.9, Bili david to 8.3    Objective  Temp:  [97.8 °F (36.6 °C)-98.6 °F (37 °C)] 98.3 °F (36.8 °C) (06/10 0720)  Pulse:  [62-90] 82 (06/10 1104)  BP: ()/(63-73) 99/68 (06/10 1104)  Resp:  [16-18] 18 (06/10 1104)  SpO2:  [92 %-99 %] 96 % (06/10 1104)    Laboratory    Lab Results   Component Value Date    WBC 3.68 (L) 06/10/2020    HGB 12.9 (L) 06/10/2020    HCT 39.4 (L) 06/10/2020    MCV 79 (L) 06/10/2020    PLT 63 (L) 06/10/2020       Lab Results   Component Value Date     06/10/2020    K 3.5 06/10/2020    CL 98 06/10/2020    CO2 28 06/10/2020    BUN 61 (H) 06/10/2020    CREATININE 2.9 (H) 06/10/2020    CALCIUM 8.9 06/10/2020       Lab Results   Component Value Date    ALBUMIN 2.7 (L) 06/10/2020    ALT 47 (H) 06/10/2020    AST 54 (H) 06/10/2020    GGT 84 (H) 06/08/2020    ALKPHOS 293 (H) 06/10/2020    BILITOT 8.3 (H) 06/10/2020       Lab Results   Component Value Date    INR 1.3 (H) 06/10/2020    INR 1.4 (H) 06/07/2020    INR 1.5 (H) 05/08/2020       MELD-Na score: 28 at 6/10/2020  7:11 AM  MELD score: 28 at 6/10/2020  7:11 AM  Calculated from:  Serum Creatinine: 2.9 mg/dL at 6/10/2020  7:11 AM  Serum Sodium: 137 mmol/L at 6/10/2020  7:11 AM  Total Bilirubin: 8.3 mg/dL at 6/10/2020  7:11 AM  INR(ratio): 1.3 at 6/10/2020  7:11 AM  Age: 55 years    Plan  - cont diuresis per cardiology  - will follow-up para results, other sreologies WNL  - please obtain daily CBC, BMP, LFT, INR    Thank you for involving us in the care of Ashish Mckeon. Please call with any additional concerns or questions.    Timothy Del Valle MD  Gastroenterology Fellow

## 2020-06-10 NOTE — SUBJECTIVE & OBJECTIVE
Interval History: Patient seen and examined this AM. No acute events overnight. Diuresing well to Lasix infusion and now maintaining saturations on room air. Patient notes improvement in shortness of breath and leg swelling. LFTs worse today. Repleting electrolytes PRN. Patient transition to basal bolus insulin regimen today per Endocrine. Day 2 of 3 IV vitamin K. Underwent paracentesis today per IR with 1L removed. Resuming dobutamine infusion for now. Discussed plan of care with wife, Laura, via phone today. All questions answered.     Review of Systems   Constitution: Positive for malaise/fatigue. Negative for chills, diaphoresis, fever and night sweats.   Eyes: Negative for vision loss in left eye, vision loss in right eye and visual disturbance.   Cardiovascular: Positive for dyspnea on exertion, leg swelling and orthopnea. Negative for chest pain, irregular heartbeat and palpitations.   Respiratory: Positive for shortness of breath. Negative for cough and wheezing.    Musculoskeletal: Negative for back pain and joint pain.   Gastrointestinal: Positive for bloating. Negative for abdominal pain, constipation, diarrhea, melena, nausea and vomiting.   Genitourinary: Positive for frequency. Negative for dysuria.   Neurological: Positive for weakness. Negative for dizziness, headaches and light-headedness.   Psychiatric/Behavioral: The patient does not have insomnia and is not nervous/anxious.      Objective:     Vital Signs (Most Recent):  Temp: 98.3 °F (36.8 °C) (06/10/20 1443)  Pulse: 85 (06/10/20 1500)  Resp: 20 (06/10/20 1443)  BP: 97/66 (06/10/20 1443)  SpO2: 96 % (06/10/20 1443) Vital Signs (24h Range):  Temp:  [97.7 °F (36.5 °C)-98.5 °F (36.9 °C)] 98.3 °F (36.8 °C)  Pulse:  [62-91] 85  Resp:  [18-20] 20  SpO2:  [94 %-99 %] 96 %  BP: ()/(63-71) 97/66     Weight: 80 kg (176 lb 5.9 oz)  Body mass index is 24.6 kg/m².     SpO2: 96 %  O2 Device (Oxygen Therapy): room air      Intake/Output Summary (Last 24  hours) at 6/10/2020 1941  Last data filed at 6/10/2020 1633  Gross per 24 hour   Intake 1062 ml   Output 7625 ml   Net -6563 ml       Lines/Drains/Airways     Drain            Male External Urinary Catheter 06/08/20 0713 Medium 2 days          Peripheral Intravenous Line                 Peripheral IV - Single Lumen 06/08/20 0019 18 G Right Antecubital 2 days         Peripheral IV - Single Lumen 06/08/20 0019 20 G Right Hand 2 days         Peripheral IV - Single Lumen 06/08/20 0820 20 G Left Forearm 2 days                Physical Exam   Constitutional: He is oriented to person, place, and time. He appears ill. No distress. Nasal cannula in place.   HENT:   Head: Normocephalic and atraumatic.   Mouth/Throat: Oropharynx is clear and moist. No oropharyngeal exudate.   Eyes: EOM are normal. Scleral icterus is present.   Neck: Normal range of motion. Neck supple. No tracheal deviation present.   Cardiovascular: Normal rate. Exam reveals no gallop and no friction rub.   Murmur heard.  Grade 2 systolic murmur best heard over mitral region.   Pulmonary/Chest: Effort normal. He has no wheezes. He has rales.   Bibasilar rales.   Abdominal: Soft. Bowel sounds are normal. He exhibits distension and ascites. There is hepatomegaly. There is no tenderness.   Well healed midline surgical scar.   Genitourinary:   Genitourinary Comments: Condom cath with clear yellow urine output.   Musculoskeletal: He exhibits edema.   +3 bilateral pitting edema up to hips.   Neurological: He is alert and oriented to person, place, and time. He exhibits normal muscle tone.   Skin: Skin is dry. He is not diaphoretic.   Psychiatric: He has a normal mood and affect. His behavior is normal.   Nursing note and vitals reviewed.      Significant Labs:   Recent Lab Results       06/10/20  1627   06/10/20  1521   06/10/20  1515   06/10/20  1200   06/10/20  1143        WBC, Body Fluid               Lymphs, Fluid               Segs, Fluid               Body  Fluid Type               Monocytes/Macrophages, Fluid               Body Fluid Source, Refrigerated               AFP               Albumin               Alkaline Phosphatase               ALT               MONICA PATTERN 1               MONICA Screen               MONICA Titer 1               Anion Gap 11             AST               Baso #               Basophil%               BILIRUBIN TOTAL               Body Fluid Albumin               Body Fluid Source, Albumin               Body Fluid Source, LDH               BUN, Bld 56             Calcium 8.7             Chloride 95             CO2 30             Creatinine 2.8             Differential Method               eGFR if  27.9             eGFR if non  24.1  Comment:  Calculation used to obtain the estimated glomerular filtration  rate (eGFR) is the CKD-EPI equation.                Eos #               Eosinophil%               Fluid Appearance               Fluid Color               Glucose 205             Gran # (ANC)               Gran%               Hematocrit               Hemoglobin               HIV 1/2 Ag/Ab               IgG - Serum               Immature Grans (Abs)               Immature Granulocytes               INR               LD, Fluid               Lymph #               Lymph%               Magnesium 2.2             MCH               MCHC               MCV               Mono #               Mono%               MPV               nRBC               Phosphorus               Platelets               POC Glucose     153 153       POCT Glucose   235     153     Potassium 3.8             PROTEIN TOTAL               Protime               RBC               RDW               Sodium 136             WBC                                06/10/20  1053   06/10/20  1051   06/10/20  0932   06/10/20  0730   06/10/20  0723        WBC, Body Fluid   59  Comment:  Reference ranges for body fluids not established.   Correlate clinically.              Lymphs, Fluid   10           Segs, Fluid   7           Body Fluid Type   Peritoneal Fluid           Monocytes/Macrophages, Fluid   83           Body Fluid Source, Refrigerated Peritoneal Fluid             AFP               Albumin               Alkaline Phosphatase               ALT               MONICA PATTERN 1               MONICA Screen               MONICA Titer 1               Anion Gap               AST               Baso #               Basophil%               BILIRUBIN TOTAL               Body Fluid Albumin   2.1  Comment:  Reference intervals have not been established for body fluids.   Comparison of this result with the concentration in the blood,  serum,or plasma is recommended.  The reference interval for albumin in serum/plasma is   3.5-5.2 g/dL. Please interpret in context with the clinical  picture.             Body Fluid Source, Albumin   Peritoneal Fluid           Body Fluid Source, LDH     Ascites         BUN, Bld               Calcium               Chloride               CO2               Creatinine               Differential Method               eGFR if                eGFR if non                Eos #               Eosinophil%               Fluid Appearance   Clear           Fluid Color   Yellow           Glucose               Gran # (ANC)               Gran%               Hematocrit               Hemoglobin               HIV 1/2 Ag/Ab               IgG - Serum               Immature Grans (Abs)               Immature Granulocytes               INR               LD, Fluid     158  Comment:  Reference intervals have not been established for body fluids.  Comparison of this result with the concentration in the blood,   serum,or plasma is recommended.  The reference interval for LDH in serum/plasma is   110-260 U/L. Please interpret in context with the clinical  picture.           Lymph #               Lymph%               Magnesium               MCH               MCHC                MCV               Mono #               Mono%               MPV               nRBC               Phosphorus               Platelets               POC Glucose       95       POCT Glucose         95     Potassium               PROTEIN TOTAL               Protime               RBC               RDW               Sodium               WBC                                06/10/20  0711   06/10/20  0131   06/09/20  2352   06/09/20  2205        WBC, Body Fluid             Lymphs, Fluid             Segs, Fluid             Body Fluid Type             Monocytes/Macrophages, Fluid             Body Fluid Source, Refrigerated             AFP     4.7       Albumin 2.7           Alkaline Phosphatase 293           ALT 47           MONIAC PATTERN 1     Homogeneous       MONICA Screen     Positive       MONICA Titer 1     1:320       Anion Gap 11           AST 54           Baso # 0.02           Basophil% 0.5           BILIRUBIN TOTAL 8.3  Comment:  For infants and newborns, interpretation of results should be based  on gestational age, weight and in agreement with clinical  observations.  Premature Infant recommended reference ranges:  Up to 24 hours.............<8.0 mg/dL  Up to 48 hours............<12.0 mg/dL  3-5 days..................<15.0 mg/dL  6-29 days.................<15.0 mg/dL             Body Fluid Albumin             Body Fluid Source, Albumin             Body Fluid Source, LDH             BUN, Bld 61           Calcium 8.9           Chloride 98           CO2 28           Creatinine 2.9           Differential Method Automated           eGFR if  26.7           eGFR if non  23.1  Comment:  Calculation used to obtain the estimated glomerular filtration  rate (eGFR) is the CKD-EPI equation.              Eos # 0.3           Eosinophil% 9.0           Fluid Appearance             Fluid Color             Glucose 94           Gran # (ANC) 2.7           Gran% 72.6           Hematocrit 39.4            Hemoglobin 12.9           HIV 1/2 Ag/Ab     Negative       IgG - Serum     1381  Comment:  IgG Cord Blood Reference Range: 650-1600 mg/dL.       Immature Grans (Abs) 0.01  Comment:  Mild elevation in immature granulocytes is non specific and   can be seen in a variety of conditions including stress response,   acute inflammation, trauma and pregnancy. Correlation with other   laboratory and clinical findings is essential.             Immature Granulocytes 0.3           INR 1.3  Comment:  Coumadin Therapy:  2.0 - 3.0 for INR for all indicators except mechanical heart valves  and antiphospholipid syndromes which should use 2.5 - 3.5.             LD, Fluid             Lymph # 0.3           Lymph% 7.3           Magnesium 2.1            2.1           MCH 26.0           MCHC 32.7           MCV 79           Mono # 0.4           Mono% 10.3           MPV SEE COMMENT  Comment:  Result not available.           nRBC 0           Phosphorus 3.2           Platelets 63           POC Glucose             POCT Glucose   114   150     Potassium 3.5           PROTEIN TOTAL 6.8           Protime 13.1           RBC 4.97           RDW 25.5           Sodium 137           WBC 3.68                 Significant Imaging: I have reviewed all imagining in the past 24hrs.Interval History:     Review of Systems   Constitution: Negative for chills, diaphoresis and fever.   Cardiovascular: Positive for leg swelling (notes improvement) and orthopnea (notes improvement). Negative for chest pain, irregular heartbeat and palpitations.   Respiratory: Positive for shortness of breath (notes improvement ). Negative for wheezing.    Musculoskeletal: Negative for back pain and joint pain.   Gastrointestinal: Positive for bloating. Negative for abdominal pain, constipation, diarrhea, nausea and vomiting.   Genitourinary: Positive for frequency. Negative for dysuria.        Patient being actively diuresed with IV Lasix infusion.   Neurological: Positive for  weakness. Negative for dizziness and headaches.   Psychiatric/Behavioral: The patient does not have insomnia.      Objective:     Vital Signs (Most Recent):  Temp: 98.3 °F (36.8 °C) (06/10/20 1443)  Pulse: 85 (06/10/20 1500)  Resp: 20 (06/10/20 1443)  BP: 97/66 (06/10/20 1443)  SpO2: 96 % (06/10/20 1443) Vital Signs (24h Range):  Temp:  [97.7 °F (36.5 °C)-98.5 °F (36.9 °C)] 98.3 °F (36.8 °C)  Pulse:  [62-91] 85  Resp:  [18-20] 20  SpO2:  [94 %-99 %] 96 %  BP: ()/(63-71) 97/66     Weight: 80 kg (176 lb 5.9 oz)  Body mass index is 24.6 kg/m².     SpO2: 96 %  O2 Device (Oxygen Therapy): room air      Intake/Output Summary (Last 24 hours) at 6/10/2020 1556  Last data filed at 6/10/2020 1500  Gross per 24 hour   Intake 1284.95 ml   Output 8625 ml   Net -7340.05 ml       Lines/Drains/Airways     Drain            Male External Urinary Catheter 06/08/20 0713 Medium 2 days          Peripheral Intravenous Line                 Peripheral IV - Single Lumen 06/08/20 0019 18 G Right Antecubital 2 days         Peripheral IV - Single Lumen 06/08/20 0019 20 G Right Hand 2 days         Peripheral IV - Single Lumen 06/08/20 0820 20 G Left Forearm 2 days                Physical Exam   Constitutional: He is oriented to person, place, and time. No distress.   HENT:   Head: Normocephalic and atraumatic.   Eyes: EOM are normal. Scleral icterus is present.   Neck: Normal range of motion. Neck supple. No tracheal deviation present.   Cardiovascular: Normal rate. Exam reveals no gallop and no friction rub.   Murmur heard.  Grade 2 systolic murmur best heard over mitral region.   Pulmonary/Chest: Effort normal. He has no wheezes. He has no rales.   Abdominal: Soft. Bowel sounds are normal. He exhibits distension and ascites. There is hepatomegaly. There is no tenderness.   Well healed midline surgical scar.   Genitourinary:   Genitourinary Comments: Condom cath with clear yellow urine output.   Musculoskeletal: He exhibits edema.   +3  bilateral pitting edema up to hips.   Neurological: He is alert and oriented to person, place, and time. He exhibits normal muscle tone.   Skin: Skin is warm and dry. He is not diaphoretic.   Psychiatric: He has a normal mood and affect. His behavior is normal.   Nursing note and vitals reviewed.      Significant Labs:   Recent Lab Results       06/10/20  1521   06/10/20  1515   06/10/20  1200   06/10/20  1053   06/10/20  1051        WBC, Body Fluid         59  Comment:  Reference ranges for body fluids not established.   Correlate clinically.       Lymphs, Fluid         10     Segs, Fluid         7     Body Fluid Type         Peritoneal Fluid     Monocytes/Macrophages, Fluid         83     Body Fluid Source, Refrigerated       Peritoneal Fluid       AFP               Albumin               Alkaline Phosphatase               ALT               Anion Gap               AST               Baso #               Basophil%               BILIRUBIN TOTAL               Body Fluid Albumin         2.1  Comment:  Reference intervals have not been established for body fluids.   Comparison of this result with the concentration in the blood,  serum,or plasma is recommended.  The reference interval for albumin in serum/plasma is   3.5-5.2 g/dL. Please interpret in context with the clinical  picture.       Body Fluid Source, Albumin         Peritoneal Fluid     Body Fluid Source, LDH               BUN, Bld               Calcium               Chloride               CO2               Creatinine               Differential Method               eGFR if                eGFR if non                Eos #               Eosinophil%               Fluid Appearance         Clear     Fluid Color         Yellow     Glucose               Gran # (ANC)               Gran%               Hematocrit               Hemoglobin               HIV 1/2 Ag/Ab               IgG - Serum               Immature Grans (Abs)                Immature Granulocytes               INR               LD, Fluid               Lymph #               Lymph%               Magnesium               MCH               MCHC               MCV               Mono #               Mono%               MPV               nRBC               Phosphorus               Platelets               POC Glucose   153 153         POCT Glucose 235             Potassium               PROTEIN TOTAL               Protime               RBC               RDW               Sodium               WBC                                06/10/20  0932   06/10/20  0730   06/10/20  0723   06/10/20  0711   06/10/20  0131        WBC, Body Fluid               Lymphs, Fluid               Segs, Fluid               Body Fluid Type               Monocytes/Macrophages, Fluid               Body Fluid Source, Refrigerated               AFP               Albumin       2.7       Alkaline Phosphatase       293       ALT       47       Anion Gap       11       AST       54       Baso #       0.02       Basophil%       0.5       BILIRUBIN TOTAL       8.3  Comment:  For infants and newborns, interpretation of results should be based  on gestational age, weight and in agreement with clinical  observations.  Premature Infant recommended reference ranges:  Up to 24 hours.............<8.0 mg/dL  Up to 48 hours............<12.0 mg/dL  3-5 days..................<15.0 mg/dL  6-29 days.................<15.0 mg/dL         Body Fluid Albumin               Body Fluid Source, Albumin               Body Fluid Source, LDH Ascites             BUN, Bld       61       Calcium       8.9       Chloride       98       CO2       28       Creatinine       2.9       Differential Method       Automated       eGFR if        26.7       eGFR if non        23.1  Comment:  Calculation used to obtain the estimated glomerular filtration  rate (eGFR) is the CKD-EPI equation.          Eos #       0.3       Eosinophil%        9.0       Fluid Appearance               Fluid Color               Glucose       94       Gran # (ANC)       2.7       Gran%       72.6       Hematocrit       39.4       Hemoglobin       12.9       HIV 1/2 Ag/Ab               IgG - Serum               Immature Grans (Abs)       0.01  Comment:  Mild elevation in immature granulocytes is non specific and   can be seen in a variety of conditions including stress response,   acute inflammation, trauma and pregnancy. Correlation with other   laboratory and clinical findings is essential.         Immature Granulocytes       0.3       INR       1.3  Comment:  Coumadin Therapy:  2.0 - 3.0 for INR for all indicators except mechanical heart valves  and antiphospholipid syndromes which should use 2.5 - 3.5.         LD, Fluid 158  Comment:  Reference intervals have not been established for body fluids.  Comparison of this result with the concentration in the blood,   serum,or plasma is recommended.  The reference interval for LDH in serum/plasma is   110-260 U/L. Please interpret in context with the clinical  picture.               Lymph #       0.3       Lymph%       7.3       Magnesium       2.1              2.1       MCH       26.0       MCHC       32.7       MCV       79       Mono #       0.4       Mono%       10.3       MPV       SEE COMMENT  Comment:  Result not available.       nRBC       0       Phosphorus       3.2       Platelets       63       POC Glucose   95           POCT Glucose     95   114     Potassium       3.5       PROTEIN TOTAL       6.8       Protime       13.1       RBC       4.97       RDW       25.5       Sodium       137       WBC       3.68                        06/09/20  2352   06/09/20  2205   06/09/20  1718   06/09/20  1600        WBC, Body Fluid             Lymphs, Fluid             Segs, Fluid             Body Fluid Type             Monocytes/Macrophages, Fluid             Body Fluid Source, Refrigerated             AFP 4.7           Albumin              Alkaline Phosphatase             ALT             Anion Gap     12       AST             Baso #             Basophil%             BILIRUBIN TOTAL             Body Fluid Albumin             Body Fluid Source, Albumin             Body Fluid Source, LDH             BUN, Bld     65       Calcium     8.4       Chloride     100       CO2     28       Creatinine     3.0       Differential Method             eGFR if      25.7       eGFR if non      22.2  Comment:  Calculation used to obtain the estimated glomerular filtration  rate (eGFR) is the CKD-EPI equation.          Eos #             Eosinophil%             Fluid Appearance             Fluid Color             Glucose     160       Gran # (ANC)             Gran%             Hematocrit             Hemoglobin             HIV 1/2 Ag/Ab Negative           IgG - Serum 1381  Comment:  IgG Cord Blood Reference Range: 650-1600 mg/dL.           Immature Grans (Abs)             Immature Granulocytes             INR             LD, Fluid             Lymph #             Lymph%             Magnesium     2.2       MCH             MCHC             MCV             Mono #             Mono%             MPV             nRBC             Phosphorus             Platelets             POC Glucose             POCT Glucose   150   190     Potassium     3.9       PROTEIN TOTAL             Protime             RBC             RDW             Sodium     140       WBC                   Significant Imaging: I have reviewed all imagining in the past 24hrs.

## 2020-06-10 NOTE — CARE UPDATE
Pt arrived to MPU bed 4 for a paracentesis.  Name verified using two identifiers.  Allergies verified.  Will continue to monitor.

## 2020-06-10 NOTE — PLAN OF CARE
Problem: Adult Inpatient Plan of Care  Goal: Plan of Care Review  Outcome: Ongoing, Progressing    Pt free of falls/trauma/injuries.  Denies c/o SOB, CP, or discomfort.  Generalized skin remains CDI.  Pt being diuresed with lasix gtt per MAR; diuresing well. Pt B; insulin gtt decreased to 0.2 units/hr per MAR, dobutamine gtt infusing @ 5 mcg/kg/min.  Pt tolerating plan of care.

## 2020-06-11 NOTE — PROGRESS NOTES
[facility]  Cardiology  Progress Note    Patient Name: Ashish Mckeon  MRN: [id number]  Admission Date: 6/7/2020  Hospital Length of Stay: 3 days  Code Status: Full Code   Attending Physician: Jose Enrique Gordon MD   Primary Care Physician: [facility]  Expected Discharge Date: 6/11/2020  Principal Problem:Acute on chronic combined systolic and diastolic heart failure    Subjective:     Hospital Course:   Patient admitted to Cardiology service on 6/8 with CHF excerbation, hyperglycemia, and hyperbilirubinemia. He was started on Lasix, dobutamine, and insulin infusion. US of the liver with Doppler was performed which revealed no ductal dilation but echogenic changes suggestive of cirrhosis and ascites. Endocrinology was consulted to assist in blood sugar management and glucose improved. He is diuresising well. Hepatology was consulted given concern for cirrhosis, congestive hepatopathy, versus ischemic hepatitis. Paracentesis was performed by Interventional Radiology on 6/10 along with transition to basal bolus insulin regimen.  He has completed two of three doses of IV vitamin K.  Ongoing cardiac optimization with IV Lasix & dobutamine infusion. Respiratory status and renal function improving.    Interval History: Patient seen and examined this AM. No acute events overnight. Diuresing well to Lasix infusion and now maintaining saturations on room air. Patient notes improvement in shortness of breath and leg swelling. LFTs worse today. Repleting electrolytes PRN. Patient transition to basal bolus insulin regimen today per Endocrine. Day 2 of 3 IV vitamin K. Underwent paracentesis today per IR with 1L removed. Resuming dobutamine infusion for now. Discussed plan of care with wife, Laura, via phone today. All questions answered.     Review of Systems   Constitution: Positive for malaise/fatigue. Negative for chills, diaphoresis, fever and night sweats.   Eyes: Negative for  vision loss in left eye, vision loss in right eye and visual disturbance.   Cardiovascular: Positive for dyspnea on exertion, leg swelling and orthopnea. Negative for chest pain, irregular heartbeat and palpitations.   Respiratory: Positive for shortness of breath. Negative for cough and wheezing.    Musculoskeletal: Negative for back pain and joint pain.   Gastrointestinal: Positive for bloating. Negative for abdominal pain, constipation, diarrhea, melena, nausea and vomiting.   Genitourinary: Positive for frequency. Negative for dysuria.   Neurological: Positive for weakness. Negative for dizziness, headaches and light-headedness.   Psychiatric/Behavioral: The patient does not have insomnia and is not nervous/anxious.      Objective:     Vital Signs (Most Recent):  Temp: 98.3 °F (36.8 °C) (06/10/20 1443)  Pulse: 85 (06/10/20 1500)  Resp: 20 (06/10/20 1443)  BP: 97/66 (06/10/20 1443)  SpO2: 96 % (06/10/20 1443) Vital Signs (24h Range):  Temp:  [97.7 °F (36.5 °C)-98.5 °F (36.9 °C)] 98.3 °F (36.8 °C)  Pulse:  [62-91] 85  Resp:  [18-20] 20  SpO2:  [94 %-99 %] 96 %  BP: ()/(63-71) 97/66     Weight: 80 kg (176 lb 5.9 oz)  Body mass index is 24.6 kg/m².     SpO2: 96 %  O2 Device (Oxygen Therapy): room air      Intake/Output Summary (Last 24 hours) at 6/10/2020 1941  Last data filed at 6/10/2020 1633  Gross per 24 hour   Intake 1062 ml   Output 7625 ml   Net -6563 ml       Lines/Drains/Airways     Drain            Male External Urinary Catheter 06/08/20 0713 Medium 2 days          Peripheral Intravenous Line                 Peripheral IV - Single Lumen 06/08/20 0019 18 G Right Antecubital 2 days         Peripheral IV - Single Lumen 06/08/20 0019 20 G Right Hand 2 days         Peripheral IV - Single Lumen 06/08/20 0820 20 G Left Forearm 2 days                Physical Exam   Constitutional: He is oriented to person, place, and time. He appears ill. No distress. Nasal cannula in place.   HENT:   Head: Normocephalic and  atraumatic.   Mouth/Throat: Oropharynx is clear and moist. No oropharyngeal exudate.   Eyes: EOM are normal. Scleral icterus is present.   Neck: Normal range of motion. Neck supple. No tracheal deviation present.   Cardiovascular: Normal rate. Exam reveals no gallop and no friction rub.   Murmur heard.  Grade 2 systolic murmur best heard over mitral region.   Pulmonary/Chest: Effort normal. He has no wheezes. He has rales.   Bibasilar rales.   Abdominal: Soft. Bowel sounds are normal. He exhibits distension and ascites. There is hepatomegaly. There is no tenderness.   Well healed midline surgical scar.   Genitourinary:   Genitourinary Comments: Condom cath with clear yellow urine output.   Musculoskeletal: He exhibits edema.   +3 bilateral pitting edema up to hips.   Neurological: He is alert and oriented to person, place, and time. He exhibits normal muscle tone.   Skin: Skin is dry. He is not diaphoretic.   Psychiatric: He has a normal mood and affect. His behavior is normal.   Nursing note and vitals reviewed.      Significant Labs:   Recent Lab Results       06/10/20  1627   06/10/20  1521   06/10/20  1515   06/10/20  1200   06/10/20  1143        WBC, Body Fluid               Lymphs, Fluid               Segs, Fluid               Body Fluid Type               Monocytes/Macrophages, Fluid               Body Fluid Source, Refrigerated               AFP               Albumin               Alkaline Phosphatase               ALT               MONICA PATTERN 1               MONICA Screen               MONICA Titer 1               Anion Gap 11             AST               Baso #               Basophil%               BILIRUBIN TOTAL               Body Fluid Albumin               Body Fluid Source, Albumin               Body Fluid Source, LDH               BUN, Bld 56             Calcium 8.7             Chloride 95             CO2 30             Creatinine 2.8             Differential Method               eGFR if   27.9             eGFR if non  24.1  Comment:  Calculation used to obtain the estimated glomerular filtration  rate (eGFR) is the CKD-EPI equation.                Eos #               Eosinophil%               Fluid Appearance               Fluid Color               Glucose 205             Gran # (ANC)               Gran%               Hematocrit               Hemoglobin               HIV 1/2 Ag/Ab               IgG - Serum               Immature Grans (Abs)               Immature Granulocytes               INR               LD, Fluid               Lymph #               Lymph%               Magnesium 2.2             MCH               MCHC               MCV               Mono #               Mono%               MPV               nRBC               Phosphorus               Platelets               POC Glucose     153 153       POCT Glucose   235     153     Potassium 3.8             PROTEIN TOTAL               Protime               RBC               RDW               Sodium 136             WBC                                06/10/20  1053   06/10/20  1051   06/10/20  0932   06/10/20  0730   06/10/20  0723        WBC, Body Fluid   59  Comment:  Reference ranges for body fluids not established.   Correlate clinically.             Lymphs, Fluid   10           Segs, Fluid   7           Body Fluid Type   Peritoneal Fluid           Monocytes/Macrophages, Fluid   83           Body Fluid Source, Refrigerated Peritoneal Fluid             AFP               Albumin               Alkaline Phosphatase               ALT               MONICA PATTERN 1               MONICA Screen               MONICA Titer 1               Anion Gap               AST               Baso #               Basophil%               BILIRUBIN TOTAL               Body Fluid Albumin   2.1  Comment:  Reference intervals have not been established for body fluids.   Comparison of this result with the concentration in the blood,  serum,or plasma is  recommended.  The reference interval for albumin in serum/plasma is   3.5-5.2 g/dL. Please interpret in context with the clinical  picture.             Body Fluid Source, Albumin   Peritoneal Fluid           Body Fluid Source, LDH     Ascites         BUN, Bld               Calcium               Chloride               CO2               Creatinine               Differential Method               eGFR if                eGFR if non                Eos #               Eosinophil%               Fluid Appearance   Clear           Fluid Color   Yellow           Glucose               Gran # (ANC)               Gran%               Hematocrit               Hemoglobin               HIV 1/2 Ag/Ab               IgG - Serum               Immature Grans (Abs)               Immature Granulocytes               INR               LD, Fluid     158  Comment:  Reference intervals have not been established for body fluids.  Comparison of this result with the concentration in the blood,   serum,or plasma is recommended.  The reference interval for LDH in serum/plasma is   110-260 U/L. Please interpret in context with the clinical  picture.           Lymph #               Lymph%               Magnesium               MCH               MCHC               MCV               Mono #               Mono%               MPV               nRBC               Phosphorus               Platelets               POC Glucose       95       POCT Glucose         95     Potassium               PROTEIN TOTAL               Protime               RBC               RDW               Sodium               WBC                                06/10/20  0711   06/10/20  0131   06/09/20  2352   06/09/20  2205        WBC, Body Fluid             Lymphs, Fluid             Segs, Fluid             Body Fluid Type             Monocytes/Macrophages, Fluid             Body Fluid Source, Refrigerated             AFP     4.7       Albumin 2.7            Alkaline Phosphatase 293           ALT 47           MONICA PATTERN 1     Homogeneous       MONICA Screen     Positive       MONICA Titer 1     1:320       Anion Gap 11           AST 54           Baso # 0.02           Basophil% 0.5           BILIRUBIN TOTAL 8.3  Comment:  For infants and newborns, interpretation of results should be based  on gestational age, weight and in agreement with clinical  observations.  Premature Infant recommended reference ranges:  Up to 24 hours.............<8.0 mg/dL  Up to 48 hours............<12.0 mg/dL  3-5 days..................<15.0 mg/dL  6-29 days.................<15.0 mg/dL             Body Fluid Albumin             Body Fluid Source, Albumin             Body Fluid Source, LDH             BUN, Bld 61           Calcium 8.9           Chloride 98           CO2 28           Creatinine 2.9           Differential Method Automated           eGFR if  26.7           eGFR if non  23.1  Comment:  Calculation used to obtain the estimated glomerular filtration  rate (eGFR) is the CKD-EPI equation.              Eos # 0.3           Eosinophil% 9.0           Fluid Appearance             Fluid Color             Glucose 94           Gran # (ANC) 2.7           Gran% 72.6           Hematocrit 39.4           Hemoglobin 12.9           HIV 1/2 Ag/Ab     Negative       IgG - Serum     1381  Comment:  IgG Cord Blood Reference Range: 650-1600 mg/dL.       Immature Grans (Abs) 0.01  Comment:  Mild elevation in immature granulocytes is non specific and   can be seen in a variety of conditions including stress response,   acute inflammation, trauma and pregnancy. Correlation with other   laboratory and clinical findings is essential.             Immature Granulocytes 0.3           INR 1.3  Comment:  Coumadin Therapy:  2.0 - 3.0 for INR for all indicators except mechanical heart valves  and antiphospholipid syndromes which should use 2.5 - 3.5.             LD, Fluid             Lymph #  0.3           Lymph% 7.3           Magnesium 2.1            2.1           MCH 26.0           MCHC 32.7           MCV 79           Mono # 0.4           Mono% 10.3           MPV SEE COMMENT  Comment:  Result not available.           nRBC 0           Phosphorus 3.2           Platelets 63           POC Glucose             POCT Glucose   114   150     Potassium 3.5           PROTEIN TOTAL 6.8           Protime 13.1           RBC 4.97           RDW 25.5           Sodium 137           WBC 3.68                 Significant Imaging: I have reviewed all imagining in the past 24hrs.Interval History:     Review of Systems   Constitution: Negative for chills, diaphoresis and fever.   Cardiovascular: Positive for leg swelling (notes improvement) and orthopnea (notes improvement). Negative for chest pain, irregular heartbeat and palpitations.   Respiratory: Positive for shortness of breath (notes improvement ). Negative for wheezing.    Musculoskeletal: Negative for back pain and joint pain.   Gastrointestinal: Positive for bloating. Negative for abdominal pain, constipation, diarrhea, nausea and vomiting.   Genitourinary: Positive for frequency. Negative for dysuria.        Patient being actively diuresed with IV Lasix infusion.   Neurological: Positive for weakness. Negative for dizziness and headaches.   Psychiatric/Behavioral: The patient does not have insomnia.      Objective:     Vital Signs (Most Recent):  Temp: 98.3 °F (36.8 °C) (06/10/20 1443)  Pulse: 85 (06/10/20 1500)  Resp: 20 (06/10/20 1443)  BP: 97/66 (06/10/20 1443)  SpO2: 96 % (06/10/20 1443) Vital Signs (24h Range):  Temp:  [97.7 °F (36.5 °C)-98.5 °F (36.9 °C)] 98.3 °F (36.8 °C)  Pulse:  [62-91] 85  Resp:  [18-20] 20  SpO2:  [94 %-99 %] 96 %  BP: ()/(63-71) 97/66     Weight: 80 kg (176 lb 5.9 oz)  Body mass index is 24.6 kg/m².     SpO2: 96 %  O2 Device (Oxygen Therapy): room air      Intake/Output Summary (Last 24 hours) at 6/10/2020 5082  Last data filed at  6/10/2020 1500  Gross per 24 hour   Intake 1284.95 ml   Output 8625 ml   Net -7340.05 ml       Lines/Drains/Airways     Drain            Male External Urinary Catheter 06/08/20 0713 Medium 2 days          Peripheral Intravenous Line                 Peripheral IV - Single Lumen 06/08/20 0019 18 G Right Antecubital 2 days         Peripheral IV - Single Lumen 06/08/20 0019 20 G Right Hand 2 days         Peripheral IV - Single Lumen 06/08/20 0820 20 G Left Forearm 2 days                Physical Exam   Constitutional: He is oriented to person, place, and time. No distress.   HENT:   Head: Normocephalic and atraumatic.   Eyes: EOM are normal. Scleral icterus is present.   Neck: Normal range of motion. Neck supple. No tracheal deviation present.   Cardiovascular: Normal rate. Exam reveals no gallop and no friction rub.   Murmur heard.  Grade 2 systolic murmur best heard over mitral region.   Pulmonary/Chest: Effort normal. He has no wheezes. He has no rales.   Abdominal: Soft. Bowel sounds are normal. He exhibits distension and ascites. There is hepatomegaly. There is no tenderness.   Well healed midline surgical scar.   Genitourinary:   Genitourinary Comments: Condom cath with clear yellow urine output.   Musculoskeletal: He exhibits edema.   +3 bilateral pitting edema up to hips.   Neurological: He is alert and oriented to person, place, and time. He exhibits normal muscle tone.   Skin: Skin is warm and dry. He is not diaphoretic.   Psychiatric: He has a normal mood and affect. His behavior is normal.   Nursing note and vitals reviewed.      Significant Labs:   Recent Lab Results       06/10/20  1521   06/10/20  1515   06/10/20  1200   06/10/20  1053   06/10/20  1051        WBC, Body Fluid         59  Comment:  Reference ranges for body fluids not established.   Correlate clinically.       Lymphs, Fluid         10     Segs, Fluid         7     Body Fluid Type         Peritoneal Fluid     Monocytes/Macrophages, Fluid          83     Body Fluid Source, Refrigerated       Peritoneal Fluid       AFP               Albumin               Alkaline Phosphatase               ALT               Anion Gap               AST               Baso #               Basophil%               BILIRUBIN TOTAL               Body Fluid Albumin         2.1  Comment:  Reference intervals have not been established for body fluids.   Comparison of this result with the concentration in the blood,  serum,or plasma is recommended.  The reference interval for albumin in serum/plasma is   3.5-5.2 g/dL. Please interpret in context with the clinical  picture.       Body Fluid Source, Albumin         Peritoneal Fluid     Body Fluid Source, LDH               BUN, Bld               Calcium               Chloride               CO2               Creatinine               Differential Method               eGFR if                eGFR if non                Eos #               Eosinophil%               Fluid Appearance         Clear     Fluid Color         Yellow     Glucose               Gran # (ANC)               Gran%               Hematocrit               Hemoglobin               HIV 1/2 Ag/Ab               IgG - Serum               Immature Grans (Abs)               Immature Granulocytes               INR               LD, Fluid               Lymph #               Lymph%               Magnesium               MCH               MCHC               MCV               Mono #               Mono%               MPV               nRBC               Phosphorus               Platelets               POC Glucose   153 153         POCT Glucose 235             Potassium               PROTEIN TOTAL               Protime               RBC               RDW               Sodium               WBC                                06/10/20  0932   06/10/20  0730   06/10/20  0723   06/10/20  0711   06/10/20  0131        WBC, Body Fluid               Lymphs, Fluid                Segs, Fluid               Body Fluid Type               Monocytes/Macrophages, Fluid               Body Fluid Source, Refrigerated               AFP               Albumin       2.7       Alkaline Phosphatase       293       ALT       47       Anion Gap       11       AST       54       Baso #       0.02       Basophil%       0.5       BILIRUBIN TOTAL       8.3  Comment:  For infants and newborns, interpretation of results should be based  on gestational age, weight and in agreement with clinical  observations.  Premature Infant recommended reference ranges:  Up to 24 hours.............<8.0 mg/dL  Up to 48 hours............<12.0 mg/dL  3-5 days..................<15.0 mg/dL  6-29 days.................<15.0 mg/dL         Body Fluid Albumin               Body Fluid Source, Albumin               Body Fluid Source, LDH Ascites             BUN, Bld       61       Calcium       8.9       Chloride       98       CO2       28       Creatinine       2.9       Differential Method       Automated       eGFR if        26.7       eGFR if non        23.1  Comment:  Calculation used to obtain the estimated glomerular filtration  rate (eGFR) is the CKD-EPI equation.          Eos #       0.3       Eosinophil%       9.0       Fluid Appearance               Fluid Color               Glucose       94       Gran # (ANC)       2.7       Gran%       72.6       Hematocrit       39.4       Hemoglobin       12.9       HIV 1/2 Ag/Ab               IgG - Serum               Immature Grans (Abs)       0.01  Comment:  Mild elevation in immature granulocytes is non specific and   can be seen in a variety of conditions including stress response,   acute inflammation, trauma and pregnancy. Correlation with other   laboratory and clinical findings is essential.         Immature Granulocytes       0.3       INR       1.3  Comment:  Coumadin Therapy:  2.0 - 3.0 for INR for all indicators except mechanical heart  valves  and antiphospholipid syndromes which should use 2.5 - 3.5.         LD, Fluid 158  Comment:  Reference intervals have not been established for body fluids.  Comparison of this result with the concentration in the blood,   serum,or plasma is recommended.  The reference interval for LDH in serum/plasma is   110-260 U/L. Please interpret in context with the clinical  picture.               Lymph #       0.3       Lymph%       7.3       Magnesium       2.1              2.1       MCH       26.0       MCHC       32.7       MCV       79       Mono #       0.4       Mono%       10.3       MPV       SEE COMMENT  Comment:  Result not available.       nRBC       0       Phosphorus       3.2       Platelets       63       POC Glucose   95           POCT Glucose     95   114     Potassium       3.5       PROTEIN TOTAL       6.8       Protime       13.1       RBC       4.97       RDW       25.5       Sodium       137       WBC       3.68                        06/09/20  2352   06/09/20  2205   06/09/20  1718   06/09/20  1600        WBC, Body Fluid             Lymphs, Fluid             Segs, Fluid             Body Fluid Type             Monocytes/Macrophages, Fluid             Body Fluid Source, Refrigerated             AFP 4.7           Albumin             Alkaline Phosphatase             ALT             Anion Gap     12       AST             Baso #             Basophil%             BILIRUBIN TOTAL             Body Fluid Albumin             Body Fluid Source, Albumin             Body Fluid Source, LDH             BUN, Bld     65       Calcium     8.4       Chloride     100       CO2     28       Creatinine     3.0       Differential Method             eGFR if      25.7       eGFR if non      22.2  Comment:  Calculation used to obtain the estimated glomerular filtration  rate (eGFR) is the CKD-EPI equation.          Eos #             Eosinophil%             Fluid Appearance             Fluid  Color             Glucose     160       Gran # (ANC)             Gran%             Hematocrit             Hemoglobin             HIV 1/2 Ag/Ab Negative           IgG - Serum 1381  Comment:  IgG Cord Blood Reference Range: 650-1600 mg/dL.           Immature Grans (Abs)             Immature Granulocytes             INR             LD, Fluid             Lymph #             Lymph%             Magnesium     2.2       MCH             MCHC             MCV             Mono #             Mono%             MPV             nRBC             Phosphorus             Platelets             POC Glucose             POCT Glucose   150   190     Potassium     3.9       PROTEIN TOTAL             Protime             RBC             RDW             Sodium     140       WBC                   Significant Imaging: I have reviewed all imagining in the past 24hrs.    Assessment and Plan:     Brief HPI: 56 y/o M with HFrEF 2/2 NICM, HTN, HLD, DM2 admitted with worsening SOB and BLE for 2 weeks. He was recently admitted for ADHF from 5/8/20 to 5/13/20. This is his 3rd admission this year. He reports that for the past 2 weeks he has been progressively more SOB. He feels like the fluid was not completely removed last admission. Takes furosemide 80 mg Po daily. Does not follow with cardiology. Unable to name his medications. Currently patient feels SOB and fatigued, able to answer all questions but falls asleep easily. Unable to lay flat. Uses two pillows at home.   TTE 2/26/20  · Moderate left atrial enlargement.  · Severely decreased left ventricular systolic function. The estimated ejection fraction is 20%.  · Grade III (severe) left ventricular diastolic dysfunction consistent with restrictive physiology.  · Mild-to-moderate mitral regurgitation.  · Global hypokinetic wall motion.  · Mild left ventricular enlargement.  · Moderate right ventricular enlargement.  · Moderate tricuspid regurgitation.  · Intermediate central venous pressure (8  mmHg).  · The estimated PA systolic pressure is 55 mmHg.  · Pulmonary hypertension present.  · Small posterolateral pericardial effusion.  · Mild mitral sclerosis.  · Mild aortic regurgitation.  Moderately reduced right ventricular systolic function.  [facility] 7/20/2018 - non obstructive CAD    * Acute on chronic combined systolic and diastolic heart failure  55 y.o M with HFrEF (EF 20%) 2/2 NICM. Multiple admission in the past few years and this is his third admission this year. Markedly volume overloaded on exam, high BNP, KEYANNA on CKD, congestive hepatopathy Reported history of non compliance in the past but currently he reports compliance to medications.     TTE on 6/8:  · Severe left atrial enlargement.  · Mild left ventricular enlargement.  · Severely decreased left ventricular systolic function. The estimated ejection fraction is 20%.  · Severe global hypokinetic wall motion.  · Grade III (severe) left ventricular diastolic dysfunction consistent with restrictive physiology.  · Severe right atrial enlargement.  · Moderate right ventricular enlargement.  · Moderately reduced right ventricular systolic function.  · Moderate-to-severe mitral regurgitation.  · Mild to moderate tricuspid regurgitation.  · Pulmonary hypertension present.  · The estimated PA systolic pressure is 65 mmHg.  · Elevated central venous pressure (15 mmHg).  · Trivial pericardial effusion.    - resume dobutamine and Lasix infusions for now  - holding home dose Toprolol-XL 50 mg daily in light of decompensated heart failure and hypotension    Acute respiratory failure with hypoxia  Please see Acute on chronic combined systolic and diastolic heart failure.    CKD (chronic kidney disease), stage IV  Please see Acute kidney injury superimposed on CKD.    Hyperbilirubinemia  Patient presented with total bilirubin of 9, alkaline phosphatase 287, AST 48, and GGT 84. ALT within normal limits with a value of 34. He underwent US liver with Doppler upon  presentation which showed unremarkable pancrease, enlarge liver with heterogenous echotexture nodularity suggestive of cirrhosis, cholelithiasis with gallbladder wall thickening but no CBD or intrahepatic dilation, and large volume ascites. Vasculature was patent with proper directional flow.      MELD-Na score: 27 at 6/10/2020  4:27 PM  MELD score: 27 at 6/10/2020  4:27 PM  Calculated from:  Serum Creatinine: 2.8 mg/dL at 6/10/2020  4:27 PM  Serum Sodium: 136 mmol/L at 6/10/2020  4:27 PM  Total Bilirubin: 8.3 mg/dL at 6/10/2020  7:11 AM  INR(ratio): 1.3 at 6/10/2020  7:11 AM  Age: 55 years    - Hepatology consulted for assistance, differential includes cirrhosis, congestive hepatopathy, and ischemic hepatitis  - s/p paracentesis with 1L removed per IR on 6/10  - Ascitic , WBC 59 (7% segs) albumin 2.1  - Ascitic glucose and protein still pending  - Hepatitis and HIV negative  - Serum ammonia, IgG, and AFP within normal limits  - PETH, anti-smooth muscle Ab, and MONICA still pending   - Resume vitamin K 5 mg IV QD for three doses (2 of 3 days)   - Follow up ascitic fluid cultures    Acute on chronic combined systolic and diastolic congestive heart failure  Please see Acute on chronic combined systolic and diastolic heart failure.    Shortness of breath  Please see Acute on chronic combined systolic and diastolic heart failure.    Decompensated heart failure  Please see Acute on chronic combined systolic and diastolic heart failure.    Hyperglycemia  Please see Diabetes mellitus, type II.    H/O thyroidectomy  Please see Hypothyroidism.    Essential hypertension  - patient on metoprolol succinate 50 mg daily as outpatient   - holding for now in light of hypotension    Acute kidney injury superimposed on CKD  - KEYANNA on CKD V with baseline creatinine ~3 presented with serum creatinine 2.8  - will continue to monitor    Non-rheumatic mitral regurgitation  Please see Acute on chronic combined systolic and diastolic  heart failure.    Chronic combined systolic and diastolic congestive heart failure  Please see Acute on chronic combined systolic and diastolic heart failure.    Diabetes mellitus, type II  Patient on levemir 5 U daily as outpatient. Presented with blood glucose in the 300-500s and HgbA1c 7.9%. Anion gap within normal limits on presentation with a value of 12 (bicarbonate was 20). UA remarkable only for +1 protein, +1 occult blood (1 RBC/hpf) and 10 hyaline cast. There were no ketones or glucose. Serum osmolality was 331 but beta hydroxybutyrate was 0.2. VBG at that time showed pH 7.321, CO2 49.5, O2 22, and HCO3 25.6. He was subsequently started on insulin infusion and Endocrinology was consulted for assistance.    - Endocrinology consulted and following  - Transitioned to determir 10 U QHS with LDSSI  - Diabetic cardiac renal diet with 1,500 mL volume restriction    Hypothyroidism  Patient with history of thyroidectomy on levothyroxine 175 mcg as outpatient. On admission patient's TSH elevated with a value of 8.449 however free T4 0.74.    - Endocrinology consulted and following  - resume home dose Synthroid     Thrombocytopenia  Chronic issue, patient with history of thrombocytopenia dating back to December 2018. Suspect related to underlying hepatopathy. Please see Hyperbilirubinemia.        VTE Risk Mitigation (From admission, onward)         Ordered     IP VTE HIGH RISK PATIENT  Once      06/07/20 2346     Place sequential compression device  Until discontinued      06/07/20 2346                Chris Bearden MD  Cardiology  [facility]

## 2020-06-11 NOTE — PHYSICIAN QUERY
PT Name: Ashish Mckeon  MR #: [id number]  CKD CLARIFICATION- stage ? Both 4 and 5 are documented      CDS/: James Ramirez RN               Contact information:   [email or web address]      This form is a permanent document in the medical record.     Query Date: June 11, 2020    By submitting this query, we are merely seeking further clarification of documentation. Please utilize your independent clinical judgment when addressing the question(s) below.    The Medical Record contains the following:     Indicators   Supporting Clinical Findings   Location in Medical Record   x CKD or Chronic Kidney (Renal) Failure / Disease CKD (chronic kidney disease), stage IV--Please see Acute kidney injury superimposed on CKD.......Acute kidney injury superimposed on CKD  - KEYANNA on CKD V with baseline creatinine ~3 presented with serum creatinine 2.8 .....   6/10  PN, Cardiology   x BUN/Creatinine                          GFR                                        6/7 6/8 6/8 6/8 6/8 6/8 2/27/2020  GFR: 25.8  3/2/2020  GFR: 26.7       Labs      Dehydration      Nausea / Vomiting      Dialysis / CRRT      Medication     x Treatment S/P IR guided paracentesis yesterday with 1 L fluid removed...Net negative 5.6 L over past 24 hours. Clinically feeling better - marked improvement in dyspnea and peripheral edema.      6/11 PN, cardiology,   Dr. Jeffries    Other Chronic Conditions     x Other          6/11 PN, cardiology,   Dr. Jeffries- kyler from notes     Provider, due to conflicting documentation, please clarify the stage of Chronic Kidney Disease (CKD).     [employer] Definitions     CKD Stage Description eGFR (mL/min)   [x   ]    IV Severely reduced kidney function 15-29   [   ]  V Kidney failure <15   [   ] Other (please specify): ____________    [   ]  Clinically Undetermined        Please document in your progress notes daily for  the duration of treatment until resolved and include in your discharge summary.

## 2020-06-11 NOTE — PLAN OF CARE
Pt remained free of injuries, falls, and trauma. VSS. Continuous Lasix and  infusing at prescribed rate. Insulin gtt d/c'd on day shift. Glucose monitored at bedtime, 253. SSI administered. Pt had paracentesis done today, 1 L removed. Dressing CDI. PRN Tylenol administered d/t soreness/ pt verbalized relief. Plan of care reviewed w/ pt. Pt verbalized relief. All questions and concerns addressed. No new complaints mentioned. Will continue to monitor.

## 2020-06-11 NOTE — SUBJECTIVE & OBJECTIVE
Interval History: S/P IR guided paracentesis yesterday with 1 L fluid removed. Patient continues to diurese well on  and Lasix infusions. Net negative 5.6 L over past 24 hours. Clinically feeling better - marked improvement in dyspnea and peripheral edema.       Review of Systems   Constitution: Positive for malaise/fatigue. Negative for chills, diaphoresis, fever and night sweats.   Eyes: Negative for vision loss in left eye, vision loss in right eye and visual disturbance.   Cardiovascular: Positive for dyspnea on exertion, leg swelling and orthopnea. Negative for chest pain, irregular heartbeat and palpitations.   Respiratory: Positive for shortness of breath. Negative for cough and wheezing.    Musculoskeletal: Negative for back pain and joint pain.   Gastrointestinal: Positive for bloating. Negative for abdominal pain, constipation, diarrhea, melena, nausea and vomiting.   Genitourinary: Positive for frequency. Negative for dysuria.   Neurological: Positive for weakness. Negative for dizziness, headaches and light-headedness.   Psychiatric/Behavioral: The patient does not have insomnia and is not nervous/anxious.      Objective:     Vital Signs (Most Recent):  Temp: 97.9 °F (36.6 °C) (06/11/20 0712)  Pulse: 64 (06/11/20 0712)  Resp: 18 (06/11/20 0712)  BP: 105/66 (06/11/20 0712)  SpO2: 99 % (06/11/20 0712) Vital Signs (24h Range):  Temp:  [97.7 °F (36.5 °C)-98.3 °F (36.8 °C)] 97.9 °F (36.6 °C)  Pulse:  [64-92] 64  Resp:  [14-20] 18  SpO2:  [94 %-100 %] 99 %  BP: ()/(64-74) 105/66     Weight: (pts. was to weak it was reported to nurse)  Body mass index is 24.6 kg/m².     SpO2: 99 %  O2 Device (Oxygen Therapy): room air      Intake/Output Summary (Last 24 hours) at 6/11/2020 0845  Last data filed at 6/11/2020 0800  Gross per 24 hour   Intake 1562 ml   Output 6200 ml   Net -4638 ml       Lines/Drains/Airways     Drain            Male External Urinary Catheter 06/08/20 0713 Medium 3 days           Peripheral Intravenous Line                 Peripheral IV - Single Lumen 06/08/20 0019 18 G Right Antecubital 3 days         Peripheral IV - Single Lumen 06/08/20 0019 20 G Right Hand 3 days         Peripheral IV - Single Lumen 06/08/20 0820 20 G Left Forearm 3 days                Physical Exam   Constitutional: He is oriented to person, place, and time. He appears ill. No distress. Nasal cannula in place.   HENT:   Head: Normocephalic and atraumatic.   Mouth/Throat: Oropharynx is clear and moist. No oropharyngeal exudate.   Eyes: EOM are normal. Scleral icterus is present.   Neck: Normal range of motion. Neck supple. No tracheal deviation present.   Cardiovascular: Normal rate. Exam reveals no gallop and no friction rub.   Murmur heard.  Grade 2 systolic murmur best heard over mitral region.   Pulmonary/Chest: Effort normal. He has no wheezes. He has rales.   Bibasilar rales.   Abdominal: Soft. Bowel sounds are normal. He exhibits distension and ascites. There is hepatomegaly. There is no tenderness.   Well healed midline surgical scar.   Genitourinary:   Genitourinary Comments: Condom cath with clear yellow urine output.   Musculoskeletal: He exhibits edema.   Neurological: He is alert and oriented to person, place, and time. He exhibits normal muscle tone.   Skin: Skin is dry. He is not diaphoretic.   Psychiatric: He has a normal mood and affect. His behavior is normal.   Nursing note and vitals reviewed.      Significant Labs:   Recent Lab Results       06/11/20  0714   06/11/20  0432   06/10/20  2005   06/10/20  1627   06/10/20  1521        WBC, Body Fluid               Lymphs, Fluid               Segs, Fluid               Body Fluid Type               Monocytes/Macrophages, Fluid               Body Fluid Source, Refrigerated               Aerobic Bacterial Culture               Albumin   2.6           Alkaline Phosphatase   290           ALT   53           Anaerobic Culture               Anion Gap   12   11        AST   53           Baso #   0.03           Basophil%   0.9           BILIRUBIN TOTAL   7.5  Comment:  For infants and newborns, interpretation of results should be based  on gestational age, weight and in agreement with clinical  observations.  Premature Infant recommended reference ranges:  Up to 24 hours.............<8.0 mg/dL  Up to 48 hours............<12.0 mg/dL  3-5 days..................<15.0 mg/dL  6-29 days.................<15.0 mg/dL             Body Fluid Albumin               Body Fluid Source, Albumin               Body Fluid Source, LDH               Body Fluid Source, Total Protein               Body Fluid, Protein               BUN, Bld   54   56       Calcium   8.7   8.7       Chloride   95   95       CO2   29   30       Creatinine   2.7   2.8       Differential Method   Automated           eGFR if    29.1   27.9       eGFR if non    25.2  Comment:  Calculation used to obtain the estimated glomerular filtration  rate (eGFR) is the CKD-EPI equation.      24.1  Comment:  Calculation used to obtain the estimated glomerular filtration  rate (eGFR) is the CKD-EPI equation.          Eos #   0.3           Eosinophil%   8.1           Fluid Appearance               Fluid Color               Glucose   149   205       Gran # (ANC)   2.5           Gran%   71.0           Hematocrit   38.5           Hemoglobin   12.4           Immature Grans (Abs)   0.01  Comment:  Mild elevation in immature granulocytes is non specific and   can be seen in a variety of conditions including stress response,   acute inflammation, trauma and pregnancy. Correlation with other   laboratory and clinical findings is essential.             Immature Granulocytes   0.3           LD, Fluid               Lymph #   0.4           Lymph%   10.1           Magnesium   2.0   2.2       MCH   25.8           MCHC   32.2           MCV   80           Mono #   0.3           Mono%   9.6           MPV   SEE  COMMENT  Comment:  Result not available.           nRBC   0           Phosphorus   3.2           Platelets   71           POC Glucose               POCT Glucose 110   253   235     Potassium   3.8   3.8       PROTEIN TOTAL   6.9           RBC   4.80           RDW   25.2           Sodium   136   136       WBC   3.45                            06/10/20  1515   06/10/20  1200   06/10/20  1143   06/10/20  1053   06/10/20  1051        WBC, Body Fluid         59  Comment:  Reference ranges for body fluids not established.   Correlate clinically.       Lymphs, Fluid         10     Segs, Fluid         7     Body Fluid Type         Peritoneal Fluid     Monocytes/Macrophages, Fluid         83     Body Fluid Source, Refrigerated       Peritoneal Fluid       Aerobic Bacterial Culture               Albumin               Alkaline Phosphatase               ALT               Anaerobic Culture               Anion Gap               AST               Baso #               Basophil%               BILIRUBIN TOTAL               Body Fluid Albumin         2.1  Comment:  Reference intervals have not been established for body fluids.   Comparison of this result with the concentration in the blood,  serum,or plasma is recommended.  The reference interval for albumin in serum/plasma is   3.5-5.2 g/dL. Please interpret in context with the clinical  picture.       Body Fluid Source, Albumin         Peritoneal Fluid     Body Fluid Source, LDH               Body Fluid Source, Total Protein               Body Fluid, Protein               BUN, Bld               Calcium               Chloride               CO2               Creatinine               Differential Method               eGFR if                eGFR if non                Eos #               Eosinophil%               Fluid Appearance         Clear     Fluid Color         Yellow     Glucose               Gran # (ANC)               Gran%               Hematocrit                Hemoglobin               Immature Grans (Abs)               Immature Granulocytes               LD, Fluid               Lymph #               Lymph%               Magnesium               MCH               MCHC               MCV               Mono #               Mono%               MPV               nRBC               Phosphorus               Platelets               POC Glucose 153 153           POCT Glucose     153         Potassium               PROTEIN TOTAL               RBC               RDW               Sodium               WBC                                06/10/20  1047   06/10/20  0932        WBC, Body Fluid         Lymphs, Fluid         Segs, Fluid         Body Fluid Type         Monocytes/Macrophages, Fluid         Body Fluid Source, Refrigerated         Aerobic Bacterial Culture No growth[P]       Albumin         Alkaline Phosphatase         ALT         Anaerobic Culture Culture in progress[P]       Anion Gap         AST         Baso #         Basophil%         BILIRUBIN TOTAL         Body Fluid Albumin         Body Fluid Source, Albumin         Body Fluid Source, LDH   Ascites     Body Fluid Source, Total Protein Peritoneal       Body Fluid, Protein 4.5  Comment:  Reference intervals have not been established for body fluids.  Comparison of this result with the concentration in the blood,   serum,or plasma is recommended.  The reference interval for total protein in serum/plasma is   0-3 years......5.4-7.4 g/dL  4-6 years......5.9-8.2 g/dL  >7 years.......6.0-8.4 g/dL  Please interpret in context with the clinical  picture.         BUN, Bld         Calcium         Chloride         CO2         Creatinine         Differential Method         eGFR if          eGFR if non          Eos #         Eosinophil%         Fluid Appearance         Fluid Color         Glucose         Gran # (ANC)         Gran%         Hematocrit         Hemoglobin         Immature Grans  (Abs)         Immature Granulocytes         LD, Fluid   158  Comment:  Reference intervals have not been established for body fluids.  Comparison of this result with the concentration in the blood,   serum,or plasma is recommended.  The reference interval for LDH in serum/plasma is   110-260 U/L. Please interpret in context with the clinical  picture.       Lymph #         Lymph%         Magnesium         MCH         MCHC         MCV         Mono #         Mono%         MPV         nRBC         Phosphorus         Platelets         POC Glucose         POCT Glucose         Potassium         PROTEIN TOTAL         RBC         RDW         Sodium         WBC               Significant Imaging: I have reviewed all imagining in the past 24hrs.Interval History:     Review of Systems   Constitution: Negative for chills, diaphoresis and fever.   Cardiovascular: Positive for leg swelling (notes improvement) and orthopnea (notes improvement). Negative for chest pain, irregular heartbeat and palpitations.   Respiratory: Positive for shortness of breath (notes improvement ). Negative for wheezing.    Musculoskeletal: Negative for back pain and joint pain.   Gastrointestinal: Positive for bloating. Negative for abdominal pain, constipation, diarrhea, nausea and vomiting.   Genitourinary: Positive for frequency. Negative for dysuria.        Patient being actively diuresed with IV Lasix infusion.   Neurological: Positive for weakness. Negative for dizziness and headaches.   Psychiatric/Behavioral: The patient does not have insomnia.      Objective:     Vital Signs (Most Recent):  Temp: 97.9 °F (36.6 °C) (06/11/20 0712)  Pulse: 64 (06/11/20 0712)  Resp: 18 (06/11/20 0712)  BP: 105/66 (06/11/20 0712)  SpO2: 99 % (06/11/20 0712) Vital Signs (24h Range):  Temp:  [97.7 °F (36.5 °C)-98.3 °F (36.8 °C)] 97.9 °F (36.6 °C)  Pulse:  [64-92] 64  Resp:  [14-20] 18  SpO2:  [94 %-100 %] 99 %  BP: ()/(64-74) 105/66     Weight: (pts. was to weak  it was reported to nurse)  Body mass index is 24.6 kg/m².     SpO2: 99 %  O2 Device (Oxygen Therapy): room air      Intake/Output Summary (Last 24 hours) at 6/11/2020 0845  Last data filed at 6/11/2020 0800  Gross per 24 hour   Intake 1562 ml   Output 6200 ml   Net -4638 ml       Lines/Drains/Airways     Drain            Male External Urinary Catheter 06/08/20 0713 Medium 3 days          Peripheral Intravenous Line                 Peripheral IV - Single Lumen 06/08/20 0019 18 G Right Antecubital 3 days         Peripheral IV - Single Lumen 06/08/20 0019 20 G Right Hand 3 days         Peripheral IV - Single Lumen 06/08/20 0820 20 G Left Forearm 3 days                Physical Exam   Constitutional: He is oriented to person, place, and time. No distress.   HENT:   Head: Normocephalic and atraumatic.   Eyes: EOM are normal. Scleral icterus is present.   Neck: Normal range of motion. Neck supple. No tracheal deviation present.   Cardiovascular: Normal rate. Exam reveals no gallop and no friction rub.   Murmur heard.  Grade 2 systolic murmur best heard over mitral region.   Pulmonary/Chest: Effort normal. He has no wheezes. He has no rales.   Abdominal: Soft. Bowel sounds are normal. He exhibits distension and ascites. There is hepatomegaly. There is no tenderness.   Well healed midline surgical scar.   Genitourinary:   Genitourinary Comments: Condom cath with clear yellow urine output.   Musculoskeletal: He exhibits edema.   Neurological: He is alert and oriented to person, place, and time. He exhibits normal muscle tone.   Skin: Skin is warm and dry. He is not diaphoretic.   Psychiatric: He has a normal mood and affect. His behavior is normal.   Nursing note and vitals reviewed.      Significant Labs:   Recent Lab Results       06/11/20  0714   06/11/20  0432   06/10/20  2005   06/10/20  1627   06/10/20  1521        WBC, Body Fluid               Lymphs, Fluid               Segs, Fluid               Body Fluid Type                Monocytes/Macrophages, Fluid               Body Fluid Source, Refrigerated               Aerobic Bacterial Culture               Albumin   2.6           Alkaline Phosphatase   290           ALT   53           Anaerobic Culture               Anion Gap   12   11       AST   53           Baso #   0.03           Basophil%   0.9           BILIRUBIN TOTAL   7.5  Comment:  For infants and newborns, interpretation of results should be based  on gestational age, weight and in agreement with clinical  observations.  Premature Infant recommended reference ranges:  Up to 24 hours.............<8.0 mg/dL  Up to 48 hours............<12.0 mg/dL  3-5 days..................<15.0 mg/dL  6-29 days.................<15.0 mg/dL             Body Fluid Albumin               Body Fluid Source, Albumin               Body Fluid Source, LDH               Body Fluid Source, Total Protein               Body Fluid, Protein               BUN, Bld   54   56       Calcium   8.7   8.7       Chloride   95   95       CO2   29   30       Creatinine   2.7   2.8       Differential Method   Automated           eGFR if    29.1   27.9       eGFR if non    25.2  Comment:  Calculation used to obtain the estimated glomerular filtration  rate (eGFR) is the CKD-EPI equation.      24.1  Comment:  Calculation used to obtain the estimated glomerular filtration  rate (eGFR) is the CKD-EPI equation.          Eos #   0.3           Eosinophil%   8.1           Fluid Appearance               Fluid Color               Glucose   149   205       Gran # (ANC)   2.5           Gran%   71.0           Hematocrit   38.5           Hemoglobin   12.4           Immature Grans (Abs)   0.01  Comment:  Mild elevation in immature granulocytes is non specific and   can be seen in a variety of conditions including stress response,   acute inflammation, trauma and pregnancy. Correlation with other   laboratory and clinical findings is essential.              Immature Granulocytes   0.3           LD, Fluid               Lymph #   0.4           Lymph%   10.1           Magnesium   2.0   2.2       MCH   25.8           MCHC   32.2           MCV   80           Mono #   0.3           Mono%   9.6           MPV   SEE COMMENT  Comment:  Result not available.           nRBC   0           Phosphorus   3.2           Platelets   71           POC Glucose               POCT Glucose 110   253   235     Potassium   3.8   3.8       PROTEIN TOTAL   6.9           RBC   4.80           RDW   25.2           Sodium   136   136       WBC   3.45                            06/10/20  1515   06/10/20  1200   06/10/20  1143   06/10/20  1053   06/10/20  1051        WBC, Body Fluid         59  Comment:  Reference ranges for body fluids not established.   Correlate clinically.       Lymphs, Fluid         10     Segs, Fluid         7     Body Fluid Type         Peritoneal Fluid     Monocytes/Macrophages, Fluid         83     Body Fluid Source, Refrigerated       Peritoneal Fluid       Aerobic Bacterial Culture               Albumin               Alkaline Phosphatase               ALT               Anaerobic Culture               Anion Gap               AST               Baso #               Basophil%               BILIRUBIN TOTAL               Body Fluid Albumin         2.1  Comment:  Reference intervals have not been established for body fluids.   Comparison of this result with the concentration in the blood,  serum,or plasma is recommended.  The reference interval for albumin in serum/plasma is   3.5-5.2 g/dL. Please interpret in context with the clinical  picture.       Body Fluid Source, Albumin         Peritoneal Fluid     Body Fluid Source, LDH               Body Fluid Source, Total Protein               Body Fluid, Protein               BUN, Bld               Calcium               Chloride               CO2               Creatinine               Differential Method               eGFR if   American               eGFR if non                Eos #               Eosinophil%               Fluid Appearance         Clear     Fluid Color         Yellow     Glucose               Gran # (ANC)               Gran%               Hematocrit               Hemoglobin               Immature Grans (Abs)               Immature Granulocytes               LD, Fluid               Lymph #               Lymph%               Magnesium               MCH               MCHC               MCV               Mono #               Mono%               MPV               nRBC               Phosphorus               Platelets               POC Glucose 153 153           POCT Glucose     153         Potassium               PROTEIN TOTAL               RBC               RDW               Sodium               WBC                                06/10/20  1047   06/10/20  0932        WBC, Body Fluid         Lymphs, Fluid         Segs, Fluid         Body Fluid Type         Monocytes/Macrophages, Fluid         Body Fluid Source, Refrigerated         Aerobic Bacterial Culture No growth[P]       Albumin         Alkaline Phosphatase         ALT         Anaerobic Culture Culture in progress[P]       Anion Gap         AST         Baso #         Basophil%         BILIRUBIN TOTAL         Body Fluid Albumin         Body Fluid Source, Albumin         Body Fluid Source, LDH   Ascites     Body Fluid Source, Total Protein Peritoneal       Body Fluid, Protein 4.5  Comment:  Reference intervals have not been established for body fluids.  Comparison of this result with the concentration in the blood,   serum,or plasma is recommended.  The reference interval for total protein in serum/plasma is   0-3 years......5.4-7.4 g/dL  4-6 years......5.9-8.2 g/dL  >7 years.......6.0-8.4 g/dL  Please interpret in context with the clinical  picture.         BUN, Bld         Calcium         Chloride         CO2         Creatinine         Differential Method          eGFR if          eGFR if non          Eos #         Eosinophil%         Fluid Appearance         Fluid Color         Glucose         Gran # (ANC)         Gran%         Hematocrit         Hemoglobin         Immature Grans (Abs)         Immature Granulocytes         LD, Fluid   158  Comment:  Reference intervals have not been established for body fluids.  Comparison of this result with the concentration in the blood,   serum,or plasma is recommended.  The reference interval for LDH in serum/plasma is   110-260 U/L. Please interpret in context with the clinical  picture.       Lymph #         Lymph%         Magnesium         MCH         MCHC         MCV         Mono #         Mono%         MPV         nRBC         Phosphorus         Platelets         POC Glucose         POCT Glucose         Potassium         PROTEIN TOTAL         RBC         RDW         Sodium         WBC               Significant Imaging: I have reviewed all imagining in the past 24hrs.

## 2020-06-11 NOTE — TREATMENT PLAN
Hepatology Treatment Plan    Ashish Mckeon is a 55 y.o. male admitted to hospital 6/7/2020 (Hospital Day: 5) due to Acute on chronic combined systolic and diastolic heart failure.     Interval History  - continues to diurese, -5.6L/24h  - diag para negative for SBP. Fluid studies with elevated TP, SAAG low (likely inaccurate due to diuresis) which suggestive cardiac etiology  - LFts improving, tb to 7.5  - Crt stable    Objective  Temp:  [97.7 °F (36.5 °C)-98.3 °F (36.8 °C)] 97.9 °F (36.6 °C) (06/11 0712)  Pulse:  [64-92] 64 (06/11 0712)  BP: ()/(64-74) 105/66 (06/11 0712)  Resp:  [14-20] 18 (06/11 0712)  SpO2:  [94 %-100 %] 99 % (06/11 0712)    Laboratory    Lab Results   Component Value Date    WBC 3.45 (L) 06/11/2020    HGB 12.4 (L) 06/11/2020    HCT 38.5 (L) 06/11/2020    MCV 80 (L) 06/11/2020    PLT 71 (L) 06/11/2020       Lab Results   Component Value Date     06/11/2020    K 3.8 06/11/2020    CL 95 06/11/2020    CO2 29 06/11/2020    BUN 54 (H) 06/11/2020    CREATININE 2.7 (H) 06/11/2020    CALCIUM 8.7 06/11/2020       Lab Results   Component Value Date    ALBUMIN 2.6 (L) 06/11/2020    ALT 53 (H) 06/11/2020    AST 53 (H) 06/11/2020    GGT 84 (H) 06/08/2020    ALKPHOS 290 (H) 06/11/2020    BILITOT 7.5 (H) 06/11/2020       Lab Results   Component Value Date    INR 1.3 (H) 06/10/2020    INR 1.4 (H) 06/07/2020    INR 1.5 (H) 05/08/2020       MELD-Na score: 26 at 6/11/2020  4:32 AM  MELD score: 26 at 6/11/2020  4:32 AM  Calculated from:  Serum Creatinine: 2.7 mg/dL at 6/11/2020  4:32 AM  Serum Sodium: 136 mmol/L at 6/11/2020  4:32 AM  Total Bilirubin: 7.5 mg/dL at 6/11/2020  4:32 AM  INR(ratio): 1.3 at 6/10/2020  7:11 AM  Age: 55 years    Plan  - LFTs improving with diuresis and ascitic fluid suggestive of congestive hepatopathy, continue diuresis per cardiology team  - unclear if underlying ESLD, will plan for outpatient follow-up, pending vol status may require liver biopsy vs fibroscan (outpatient)  -  will arrange outpatient follow-up, signing off  - please obtain daily CBC, BMP, LFT, INR  - Plan of care was discussed with primary team    Thank you for involving us in the care of Ashish Mckeon. Please call with any additional concerns or questions.    Timothy Del Valle MD  Gastroenterology Fellow

## 2020-06-11 NOTE — PROGRESS NOTES
[facility]  Cardiology  Progress Note    Patient Name: Ashish Mckeon  MRN: [id number]  Admission Date: 6/7/2020  Hospital Length of Stay: 4 days  Code Status: Full Code   Attending Physician: Jose Enrique Gordon MD   Primary Care Physician: [facility]  Expected Discharge Date: 6/11/2020  Principal Problem:Acute on chronic combined systolic and diastolic heart failure    Subjective:     Hospital Course:   Patient admitted to Cardiology service on 6/8 with CHF excerbation, hyperglycemia, and hyperbilirubinemia. He was started on Lasix, dobutamine, and insulin infusion. US of the liver with Doppler was performed which revealed no ductal dilation but echogenic changes suggestive of cirrhosis and ascites. Endocrinology was consulted to assist in blood sugar management and glucose improved. He is diuresising well. Hepatology was consulted given concern for cirrhosis, congestive hepatopathy, versus ischemic hepatitis. Paracentesis was performed by Interventional Radiology on 6/10 along with transition to basal bolus insulin regimen.  He has completed two of three doses of IV vitamin K.  Ongoing cardiac optimization with IV Lasix & dobutamine infusion. Respiratory status and renal function improving.    Interval History: S/P IR guided paracentesis yesterday with 1 L fluid removed. Patient continues to diurese well on  and Lasix infusions. Net negative 5.6 L over past 24 hours. Clinically feeling better - marked improvement in dyspnea and peripheral edema.     Cr trending down 2.7 from 2.8.    T bili 7.5 from 8.3.      Review of Systems   Constitution: Positive for malaise/fatigue. Negative for chills, diaphoresis, fever and night sweats.   Eyes: Negative for vision loss in left eye, vision loss in right eye and visual disturbance.   Cardiovascular: Positive for dyspnea on exertion, leg swelling and orthopnea. Negative for chest pain, irregular heartbeat and palpitations.    Respiratory: Positive for shortness of breath. Negative for cough and wheezing.    Musculoskeletal: Negative for back pain and joint pain.   Gastrointestinal: Positive for bloating. Negative for abdominal pain, constipation, diarrhea, melena, nausea and vomiting.   Genitourinary: Positive for frequency. Negative for dysuria.   Neurological: Positive for weakness. Negative for dizziness, headaches and light-headedness.   Psychiatric/Behavioral: The patient does not have insomnia and is not nervous/anxious.      Objective:     Vital Signs (Most Recent):  Temp: 97.9 °F (36.6 °C) (06/11/20 0712)  Pulse: 64 (06/11/20 0712)  Resp: 18 (06/11/20 0712)  BP: 105/66 (06/11/20 0712)  SpO2: 99 % (06/11/20 0712) Vital Signs (24h Range):  Temp:  [97.7 °F (36.5 °C)-98.3 °F (36.8 °C)] 97.9 °F (36.6 °C)  Pulse:  [64-92] 64  Resp:  [14-20] 18  SpO2:  [94 %-100 %] 99 %  BP: ()/(64-74) 105/66     Weight: (pts. was to weak it was reported to nurse)  Body mass index is 24.6 kg/m².     SpO2: 99 %  O2 Device (Oxygen Therapy): room air      Intake/Output Summary (Last 24 hours) at 6/11/2020 0845  Last data filed at 6/11/2020 0800  Gross per 24 hour   Intake 1562 ml   Output 6200 ml   Net -4638 ml       Lines/Drains/Airways     Drain            Male External Urinary Catheter 06/08/20 0713 Medium 3 days          Peripheral Intravenous Line                 Peripheral IV - Single Lumen 06/08/20 0019 18 G Right Antecubital 3 days         Peripheral IV - Single Lumen 06/08/20 0019 20 G Right Hand 3 days         Peripheral IV - Single Lumen 06/08/20 0820 20 G Left Forearm 3 days                Physical Exam   Constitutional: He is oriented to person, place, and time. He appears ill. No distress. Nasal cannula in place.   HENT:   Head: Normocephalic and atraumatic.   Mouth/Throat: Oropharynx is clear and moist. No oropharyngeal exudate.   Eyes: EOM are normal. Scleral icterus is present.   Neck: Normal range of motion. Neck supple. No  tracheal deviation present.   Cardiovascular: Normal rate. Exam reveals no gallop and no friction rub.   Murmur heard.  Grade 2 systolic murmur best heard over mitral region.   Pulmonary/Chest: Effort normal. He has no wheezes. He has rales.   Bibasilar rales.   Abdominal: Soft. Bowel sounds are normal. He exhibits distension and ascites. There is hepatomegaly. There is no tenderness.   Well healed midline surgical scar.   Genitourinary:   Genitourinary Comments: Condom cath with clear yellow urine output.   Musculoskeletal: He exhibits edema.   Neurological: He is alert and oriented to person, place, and time. He exhibits normal muscle tone.   Skin: Skin is dry. He is not diaphoretic.   Psychiatric: He has a normal mood and affect. His behavior is normal.   Nursing note and vitals reviewed.      Significant Labs:   Recent Lab Results       06/11/20  0714   06/11/20  0432   06/10/20  2005   06/10/20  1627   06/10/20  1521        WBC, Body Fluid               Lymphs, Fluid               Segs, Fluid               Body Fluid Type               Monocytes/Macrophages, Fluid               Body Fluid Source, Refrigerated               Aerobic Bacterial Culture               Albumin   2.6           Alkaline Phosphatase   290           ALT   53           Anaerobic Culture               Anion Gap   12   11       AST   53           Baso #   0.03           Basophil%   0.9           BILIRUBIN TOTAL   7.5  Comment:  For infants and newborns, interpretation of results should be based  on gestational age, weight and in agreement with clinical  observations.  Premature Infant recommended reference ranges:  Up to 24 hours.............<8.0 mg/dL  Up to 48 hours............<12.0 mg/dL  3-5 days..................<15.0 mg/dL  6-29 days.................<15.0 mg/dL             Body Fluid Albumin               Body Fluid Source, Albumin               Body Fluid Source, LDH               Body Fluid Source, Total Protein               Body  Fluid, Protein               BUN, Bld   54   56       Calcium   8.7   8.7       Chloride   95   95       CO2   29   30       Creatinine   2.7   2.8       Differential Method   Automated           eGFR if    29.1   27.9       eGFR if non    25.2  Comment:  Calculation used to obtain the estimated glomerular filtration  rate (eGFR) is the CKD-EPI equation.      24.1  Comment:  Calculation used to obtain the estimated glomerular filtration  rate (eGFR) is the CKD-EPI equation.          Eos #   0.3           Eosinophil%   8.1           Fluid Appearance               Fluid Color               Glucose   149   205       Gran # (ANC)   2.5           Gran%   71.0           Hematocrit   38.5           Hemoglobin   12.4           Immature Grans (Abs)   0.01  Comment:  Mild elevation in immature granulocytes is non specific and   can be seen in a variety of conditions including stress response,   acute inflammation, trauma and pregnancy. Correlation with other   laboratory and clinical findings is essential.             Immature Granulocytes   0.3           LD, Fluid               Lymph #   0.4           Lymph%   10.1           Magnesium   2.0   2.2       MCH   25.8           MCHC   32.2           MCV   80           Mono #   0.3           Mono%   9.6           MPV   SEE COMMENT  Comment:  Result not available.           nRBC   0           Phosphorus   3.2           Platelets   71           POC Glucose               POCT Glucose 110   253   235     Potassium   3.8   3.8       PROTEIN TOTAL   6.9           RBC   4.80           RDW   25.2           Sodium   136   136       WBC   3.45                            06/10/20  1515   06/10/20  1200   06/10/20  1143   06/10/20  1053   06/10/20  1051        WBC, Body Fluid         59  Comment:  Reference ranges for body fluids not established.   Correlate clinically.       Lymphs, Fluid         10     Segs, Fluid         7     Body Fluid Type          Peritoneal Fluid     Monocytes/Macrophages, Fluid         83     Body Fluid Source, Refrigerated       Peritoneal Fluid       Aerobic Bacterial Culture               Albumin               Alkaline Phosphatase               ALT               Anaerobic Culture               Anion Gap               AST               Baso #               Basophil%               BILIRUBIN TOTAL               Body Fluid Albumin         2.1  Comment:  Reference intervals have not been established for body fluids.   Comparison of this result with the concentration in the blood,  serum,or plasma is recommended.  The reference interval for albumin in serum/plasma is   3.5-5.2 g/dL. Please interpret in context with the clinical  picture.       Body Fluid Source, Albumin         Peritoneal Fluid     Body Fluid Source, LDH               Body Fluid Source, Total Protein               Body Fluid, Protein               BUN, Bld               Calcium               Chloride               CO2               Creatinine               Differential Method               eGFR if                eGFR if non                Eos #               Eosinophil%               Fluid Appearance         Clear     Fluid Color         Yellow     Glucose               Gran # (ANC)               Gran%               Hematocrit               Hemoglobin               Immature Grans (Abs)               Immature Granulocytes               LD, Fluid               Lymph #               Lymph%               Magnesium               MCH               MCHC               MCV               Mono #               Mono%               MPV               nRBC               Phosphorus               Platelets               POC Glucose 153 153           POCT Glucose     153         Potassium               PROTEIN TOTAL               RBC               RDW               Sodium               WBC                                06/10/20  1047   06/10/20  0932        WBC,  Body Fluid         Lymphs, Fluid         Segs, Fluid         Body Fluid Type         Monocytes/Macrophages, Fluid         Body Fluid Source, Refrigerated         Aerobic Bacterial Culture No growth[P]       Albumin         Alkaline Phosphatase         ALT         Anaerobic Culture Culture in progress[P]       Anion Gap         AST         Baso #         Basophil%         BILIRUBIN TOTAL         Body Fluid Albumin         Body Fluid Source, Albumin         Body Fluid Source, LDH   Ascites     Body Fluid Source, Total Protein Peritoneal       Body Fluid, Protein 4.5  Comment:  Reference intervals have not been established for body fluids.  Comparison of this result with the concentration in the blood,   serum,or plasma is recommended.  The reference interval for total protein in serum/plasma is   0-3 years......5.4-7.4 g/dL  4-6 years......5.9-8.2 g/dL  >7 years.......6.0-8.4 g/dL  Please interpret in context with the clinical  picture.         BUN, Bld         Calcium         Chloride         CO2         Creatinine         Differential Method         eGFR if          eGFR if non          Eos #         Eosinophil%         Fluid Appearance         Fluid Color         Glucose         Gran # (ANC)         Gran%         Hematocrit         Hemoglobin         Immature Grans (Abs)         Immature Granulocytes         LD, Fluid   158  Comment:  Reference intervals have not been established for body fluids.  Comparison of this result with the concentration in the blood,   serum,or plasma is recommended.  The reference interval for LDH in serum/plasma is   110-260 U/L. Please interpret in context with the clinical  picture.       Lymph #         Lymph%         Magnesium         MCH         MCHC         MCV         Mono #         Mono%         MPV         nRBC         Phosphorus         Platelets         POC Glucose         POCT Glucose         Potassium         PROTEIN TOTAL         RBC          RDW         Sodium         WBC               Significant Imaging: I have reviewed all imagining in the past 24hrs.Interval History:     Review of Systems   Constitution: Negative for chills, diaphoresis and fever.   Cardiovascular: Positive for leg swelling (notes improvement) and orthopnea (notes improvement). Negative for chest pain, irregular heartbeat and palpitations.   Respiratory: Positive for shortness of breath (notes improvement ). Negative for wheezing.    Musculoskeletal: Negative for back pain and joint pain.   Gastrointestinal: Positive for bloating. Negative for abdominal pain, constipation, diarrhea, nausea and vomiting.   Genitourinary: Positive for frequency. Negative for dysuria.        Patient being actively diuresed with IV Lasix infusion.   Neurological: Positive for weakness. Negative for dizziness and headaches.   Psychiatric/Behavioral: The patient does not have insomnia.      Objective:     Vital Signs (Most Recent):  Temp: 97.9 °F (36.6 °C) (06/11/20 0712)  Pulse: 64 (06/11/20 0712)  Resp: 18 (06/11/20 0712)  BP: 105/66 (06/11/20 0712)  SpO2: 99 % (06/11/20 0712) Vital Signs (24h Range):  Temp:  [97.7 °F (36.5 °C)-98.3 °F (36.8 °C)] 97.9 °F (36.6 °C)  Pulse:  [64-92] 64  Resp:  [14-20] 18  SpO2:  [94 %-100 %] 99 %  BP: ()/(64-74) 105/66     Weight: (pts. was to weak it was reported to nurse)  Body mass index is 24.6 kg/m².     SpO2: 99 %  O2 Device (Oxygen Therapy): room air      Intake/Output Summary (Last 24 hours) at 6/11/2020 0845  Last data filed at 6/11/2020 0800  Gross per 24 hour   Intake 1562 ml   Output 6200 ml   Net -4638 ml       Lines/Drains/Airways     Drain            Male External Urinary Catheter 06/08/20 0713 Medium 3 days          Peripheral Intravenous Line                 Peripheral IV - Single Lumen 06/08/20 0019 18 G Right Antecubital 3 days         Peripheral IV - Single Lumen 06/08/20 0019 20 G Right Hand 3 days         Peripheral IV - Single Lumen 06/08/20  0820 20 G Left Forearm 3 days                Physical Exam   Constitutional: He is oriented to person, place, and time. No distress.   HENT:   Head: Normocephalic and atraumatic.   Eyes: EOM are normal. Scleral icterus is present.   Neck: Normal range of motion. Neck supple. No tracheal deviation present.   Cardiovascular: Normal rate. Exam reveals no gallop and no friction rub.   Murmur heard.  Grade 2 systolic murmur best heard over mitral region.   Pulmonary/Chest: Effort normal. He has no wheezes. He has no rales.   Abdominal: Soft. Bowel sounds are normal. He exhibits distension and ascites. There is hepatomegaly. There is no tenderness.   Well healed midline surgical scar.   Genitourinary:   Genitourinary Comments: Condom cath with clear yellow urine output.   Musculoskeletal: He exhibits edema.   Neurological: He is alert and oriented to person, place, and time. He exhibits normal muscle tone.   Skin: Skin is warm and dry. He is not diaphoretic.   Psychiatric: He has a normal mood and affect. His behavior is normal.   Nursing note and vitals reviewed.      Significant Labs:   Recent Lab Results       06/11/20  0714   06/11/20  0432   06/10/20  2005   06/10/20  1627   06/10/20  1521        WBC, Body Fluid               Lymphs, Fluid               Segs, Fluid               Body Fluid Type               Monocytes/Macrophages, Fluid               Body Fluid Source, Refrigerated               Aerobic Bacterial Culture               Albumin   2.6           Alkaline Phosphatase   290           ALT   53           Anaerobic Culture               Anion Gap   12   11       AST   53           Baso #   0.03           Basophil%   0.9           BILIRUBIN TOTAL   7.5  Comment:  For infants and newborns, interpretation of results should be based  on gestational age, weight and in agreement with clinical  observations.  Premature Infant recommended reference ranges:  Up to 24 hours.............<8.0 mg/dL  Up to 48  hours............<12.0 mg/dL  3-5 days..................<15.0 mg/dL  6-29 days.................<15.0 mg/dL             Body Fluid Albumin               Body Fluid Source, Albumin               Body Fluid Source, LDH               Body Fluid Source, Total Protein               Body Fluid, Protein               BUN, Bld   54   56       Calcium   8.7   8.7       Chloride   95   95       CO2   29   30       Creatinine   2.7   2.8       Differential Method   Automated           eGFR if    29.1   27.9       eGFR if non    25.2  Comment:  Calculation used to obtain the estimated glomerular filtration  rate (eGFR) is the CKD-EPI equation.      24.1  Comment:  Calculation used to obtain the estimated glomerular filtration  rate (eGFR) is the CKD-EPI equation.          Eos #   0.3           Eosinophil%   8.1           Fluid Appearance               Fluid Color               Glucose   149   205       Gran # (ANC)   2.5           Gran%   71.0           Hematocrit   38.5           Hemoglobin   12.4           Immature Grans (Abs)   0.01  Comment:  Mild elevation in immature granulocytes is non specific and   can be seen in a variety of conditions including stress response,   acute inflammation, trauma and pregnancy. Correlation with other   laboratory and clinical findings is essential.             Immature Granulocytes   0.3           LD, Fluid               Lymph #   0.4           Lymph%   10.1           Magnesium   2.0   2.2       MCH   25.8           MCHC   32.2           MCV   80           Mono #   0.3           Mono%   9.6           MPV   SEE COMMENT  Comment:  Result not available.           nRBC   0           Phosphorus   3.2           Platelets   71           POC Glucose               POCT Glucose 110   253   235     Potassium   3.8   3.8       PROTEIN TOTAL   6.9           RBC   4.80           RDW   25.2           Sodium   136   136       WBC   3.45                             06/10/20  1515   06/10/20  1200   06/10/20  1143   06/10/20  1053   06/10/20  1051        WBC, Body Fluid         59  Comment:  Reference ranges for body fluids not established.   Correlate clinically.       Lymphs, Fluid         10     Segs, Fluid         7     Body Fluid Type         Peritoneal Fluid     Monocytes/Macrophages, Fluid         83     Body Fluid Source, Refrigerated       Peritoneal Fluid       Aerobic Bacterial Culture               Albumin               Alkaline Phosphatase               ALT               Anaerobic Culture               Anion Gap               AST               Baso #               Basophil%               BILIRUBIN TOTAL               Body Fluid Albumin         2.1  Comment:  Reference intervals have not been established for body fluids.   Comparison of this result with the concentration in the blood,  serum,or plasma is recommended.  The reference interval for albumin in serum/plasma is   3.5-5.2 g/dL. Please interpret in context with the clinical  picture.       Body Fluid Source, Albumin         Peritoneal Fluid     Body Fluid Source, LDH               Body Fluid Source, Total Protein               Body Fluid, Protein               BUN, Bld               Calcium               Chloride               CO2               Creatinine               Differential Method               eGFR if                eGFR if non                Eos #               Eosinophil%               Fluid Appearance         Clear     Fluid Color         Yellow     Glucose               Gran # (ANC)               Gran%               Hematocrit               Hemoglobin               Immature Grans (Abs)               Immature Granulocytes               LD, Fluid               Lymph #               Lymph%               Magnesium               MCH               MCHC               MCV               Mono #               Mono%               MPV               nRBC                Phosphorus               Platelets               POC Glucose 153 153           POCT Glucose     153         Potassium               PROTEIN TOTAL               RBC               RDW               Sodium               WBC                                06/10/20  1047   06/10/20  0932        WBC, Body Fluid         Lymphs, Fluid         Segs, Fluid         Body Fluid Type         Monocytes/Macrophages, Fluid         Body Fluid Source, Refrigerated         Aerobic Bacterial Culture No growth[P]       Albumin         Alkaline Phosphatase         ALT         Anaerobic Culture Culture in progress[P]       Anion Gap         AST         Baso #         Basophil%         BILIRUBIN TOTAL         Body Fluid Albumin         Body Fluid Source, Albumin         Body Fluid Source, LDH   Ascites     Body Fluid Source, Total Protein Peritoneal       Body Fluid, Protein 4.5  Comment:  Reference intervals have not been established for body fluids.  Comparison of this result with the concentration in the blood,   serum,or plasma is recommended.  The reference interval for total protein in serum/plasma is   0-3 years......5.4-7.4 g/dL  4-6 years......5.9-8.2 g/dL  >7 years.......6.0-8.4 g/dL  Please interpret in context with the clinical  picture.         BUN, Bld         Calcium         Chloride         CO2         Creatinine         Differential Method         eGFR if          eGFR if non          Eos #         Eosinophil%         Fluid Appearance         Fluid Color         Glucose         Gran # (ANC)         Gran%         Hematocrit         Hemoglobin         Immature Grans (Abs)         Immature Granulocytes         LD, Fluid   158  Comment:  Reference intervals have not been established for body fluids.  Comparison of this result with the concentration in the blood,   serum,or plasma is recommended.  The reference interval for LDH in serum/plasma is   110-260 U/L. Please interpret in context with the  clinical  picture.       Lymph #         Lymph%         Magnesium         MCH         MCHC         MCV         Mono #         Mono%         MPV         nRBC         Phosphorus         Platelets         POC Glucose         POCT Glucose         Potassium         PROTEIN TOTAL         RBC         RDW         Sodium         WBC               Significant Imaging: I have reviewed all imagining in the past 24hrs.    Assessment and Plan:       * Acute on chronic combined systolic and diastolic heart failure  55 y.o M with HFrEF (EF 20%) 2/2 NICM. Multiple admission in the past few years and this is his third admission this year. Markedly volume overloaded on exam, high BNP, KEYANNA on CKD, congestive hepatopathy Reported history of non compliance in the past but currently he reports compliance to medications.     TTE on 6/8:  · Severe left atrial enlargement.  · Mild left ventricular enlargement.  · Severely decreased left ventricular systolic function. The estimated ejection fraction is 20%.  · Severe global hypokinetic wall motion.  · Grade III (severe) left ventricular diastolic dysfunction consistent with restrictive physiology.  · Severe right atrial enlargement.  · Moderate right ventricular enlargement.  · Moderately reduced right ventricular systolic function.  · Moderate-to-severe mitral regurgitation.  · Mild to moderate tricuspid regurgitation.  · Pulmonary hypertension present.  · The estimated PA systolic pressure is 65 mmHg.  · Elevated central venous pressure (15 mmHg).  · Trivial pericardial effusion.    - patient with severe BiV failure and multiorgan dysfunction (renal failure, liver dysfunction)  - overall long term prognosis poor and patient not a candidate for advanced options   - continue with dobutamine and Lasix infusions today, still volume overloaded on exam  - strict I/Os and monitor renal function panel  - sodium and fluid restricted diet  - GDMT: holding due to decompensated state and low BPs      Acute respiratory failure with hypoxia  Please see Acute on chronic combined systolic and diastolic heart failure.    CKD (chronic kidney disease), stage IV  Please see Acute kidney injury superimposed on CKD.    Hyperbilirubinemia  Patient presented with total bilirubin of 9, alkaline phosphatase 287, AST 48, and GGT 84. ALT within normal limits with a value of 34. He underwent US liver with Doppler upon presentation which showed unremarkable pancrease, enlarge liver with heterogenous echotexture nodularity suggestive of cirrhosis, cholelithiasis with gallbladder wall thickening but no CBD or intrahepatic dilation, and large volume ascites. Vasculature was patent with proper directional flow.      MELD-Na score: 27 at 6/10/2020  4:27 PM  MELD score: 27 at 6/10/2020  4:27 PM  Calculated from:  Serum Creatinine: 2.8 mg/dL at 6/10/2020  4:27 PM  Serum Sodium: 136 mmol/L at 6/10/2020  4:27 PM  Total Bilirubin: 8.3 mg/dL at 6/10/2020  7:11 AM  INR(ratio): 1.3 at 6/10/2020  7:11 AM  Age: 55 years    - Hepatology consulted for assistance, differential includes cirrhosis, congestive hepatopathy, and ischemic hepatitis  - s/p paracentesis with 1L removed per IR on 6/10  - Ascitic , WBC 59 (7% segs) albumin 2.1  - Ascitic glucose and protein still pending  - Hepatitis and HIV negative  - Serum ammonia, IgG, and AFP within normal limits  - PETH, anti-smooth muscle Ab, and MONICA still pending   - Resume vitamin K 5 mg IV QD for three doses (2 of 3 days)     Acute on chronic combined systolic and diastolic congestive heart failure  Please see Acute on chronic combined systolic and diastolic heart failure.    Shortness of breath  Please see Acute on chronic combined systolic and diastolic heart failure.    Decompensated heart failure  Please see Acute on chronic combined systolic and diastolic heart failure.    Hyperglycemia  Please see Diabetes mellitus, type II.    H/O thyroidectomy  Please see  Hypothyroidism.    Essential hypertension  - patient on metoprolol succinate 50 mg daily as outpatient   - holding for now in light of hypotension    Acute kidney injury superimposed on CKD  - KEYANNA on CKD V with baseline creatinine ~3 presented with serum creatinine 2.8  - will continue to monitor    Non-rheumatic mitral regurgitation  Please see Acute on chronic combined systolic and diastolic heart failure.    Chronic combined systolic and diastolic congestive heart failure  Please see Acute on chronic combined systolic and diastolic heart failure.    Diabetes mellitus, type II  Patient on levemir 5 U daily as outpatient. Presented with blood glucose in the 300-500s and HgbA1c 7.9%. Anion gap within normal limits on presentation with a value of 12 (bicarbonate was 20). UA remarkable only for +1 protein, +1 occult blood (1 RBC/hpf) and 10 hyaline cast. There were no ketones or glucose. Serum osmolality was 331 but beta hydroxybutyrate was 0.2. VBG at that time showed pH 7.321, CO2 49.5, O2 22, and HCO3 25.6. He was subsequently started on insulin infusion and Endocrinology was consulted for assistance.    - Endocrinology consulted and following  - Transitioned to determir 10 U QHS with LDSSI  - Diabetic cardiac renal diet with 1,500 mL volume restriction    Hypothyroidism  Patient with history of thyroidectomy on levothyroxine 175 mcg as outpatient. On admission patient's TSH elevated with a value of 8.449 however free T4 0.74.    - Endocrinology consulted and following  - resume home dose Synthroid     Thrombocytopenia  Chronic issue, patient with history of thrombocytopenia dating back to December 2018. Suspect related to underlying hepatopathy. Please see Hyperbilirubinemia.        VTE Risk Mitigation (From admission, onward)         Ordered     IP VTE HIGH RISK PATIENT  Once      06/07/20 2346     Place sequential compression device  Until discontinued      06/07/20 2349                Karol Jeffries,  MD  Cardiology  [facility]

## 2020-06-11 NOTE — PLAN OF CARE
Plan of care discussed with patient. Patient is free of fall/trauma/injury. Denies CP, SOB, or pain/discomfort.  and lasix gtts maintained per order. VitK IV administered per order. BG monitored per order - SSI administered per order PRN. All questions addressed. Will continue to monitor

## 2020-06-11 NOTE — CARE UPDATE
BG goal 140 - 180     BG is reasonably controlled on current SQ insulin regimen. Endo will continue to follow, and assess insulin needs.     - Levemir 10 units HS  - Low Dose SQ Insulin Correction Scale TIDWM PRN BG excursions.   - BG Monitoring AC/HS     ** Please call Endocrine for any BG related issues **  ** Please notify Endocrine for any change and/or advance in diet**    Discharge Planning:   TBD. Please notify endocrinology prior to discharge. Compliance is primary concern for this patient. Will consider Basal insulin + Dpp4 for discharge.

## 2020-06-12 NOTE — PLAN OF CARE
Pt free of falls/trauma/injuries. A&O x4 VVS.  Denies c/o SOB; O2Sats remain stable on room air. SATs above 90%. Pt being diuresed with lasix IV; diuresing well. Wt trending down.  infusing at prescribed rate. Strict intake and output monitored. Blood glucose monitored. PRN tylenol administered.  Pt tolerating plan of care. Will continue to monitor.

## 2020-06-12 NOTE — SUBJECTIVE & OBJECTIVE
Interval History: Patient seen and examined this AM. No acute events overnight. Diuresing well to Lasix infusion and now maintaining saturations on room air. Continue improvement in shortness of breath and leg swelling. Creatinine down trending. Resuming dobutamine and Lasix infusion for now.     Review of Systems   Constitution: Negative for chills, diaphoresis and fever.   Cardiovascular: Positive for leg swelling (notes improvement). Negative for chest pain, irregular heartbeat, palpitations and paroxysmal nocturnal dyspnea.   Respiratory: Positive for cough. Negative for shortness of breath and wheezing.    Musculoskeletal: Negative for back pain and joint pain.   Gastrointestinal: Positive for bloating. Negative for abdominal pain, constipation, diarrhea, nausea and vomiting.   Genitourinary: Positive for frequency. Negative for dysuria.        Patient being actively diuresed with IV Lasix infusion.   Neurological: Negative for dizziness, headaches and light-headedness.   Psychiatric/Behavioral: The patient does not have insomnia.      Objective:     Vital Signs (Most Recent):  Temp: (!) 95.9 °F (35.5 °C) (06/12/20 1552)  Pulse: 87 (06/12/20 1552)  Resp: 17 (06/12/20 1552)  BP: 99/72 (06/12/20 1552)  SpO2: (!) 93 % (06/12/20 1552) Vital Signs (24h Range):  Temp:  [95.9 °F (35.5 °C)-98.2 °F (36.8 °C)] 95.9 °F (35.5 °C)  Pulse:  [80-93] 87  Resp:  [14-20] 17  SpO2:  [93 %-98 %] 93 %  BP: ()/(67-84) 99/72     Weight: (pts. was to weak it was reported to nurse)  Body mass index is 24.6 kg/m².     SpO2: (!) 93 %  O2 Device (Oxygen Therapy): room air      Intake/Output Summary (Last 24 hours) at 6/12/2020 1736  Last data filed at 6/12/2020 1622  Gross per 24 hour   Intake 1542 ml   Output 6350 ml   Net -4808 ml       Lines/Drains/Airways     Drain            Male External Urinary Catheter 06/08/20 0713 Medium 4 days          Peripheral Intravenous Line                 Peripheral IV - Single Lumen 06/08/20 0019  18 G Right Antecubital 4 days         Peripheral IV - Single Lumen 06/11/20 1822 20 G Anterior;Proximal;Right Forearm less than 1 day                Physical Exam   Constitutional: He is oriented to person, place, and time. No distress.   HENT:   Head: Normocephalic and atraumatic.   Eyes: EOM are normal. Scleral icterus is present.   Neck: Normal range of motion. Neck supple. No tracheal deviation present.   Cardiovascular: Normal rate. Exam reveals no gallop and no friction rub.   Murmur heard.  Grade 2 systolic murmur best heard over mitral region.   Pulmonary/Chest: Effort normal. He has no wheezes. He has no rales.   Abdominal: Soft. Bowel sounds are normal. He exhibits distension and ascites. There is hepatomegaly. There is no tenderness.   Well healed midline surgical scar.   Genitourinary:   Genitourinary Comments: Condom cath with clear yellow urine output.   Musculoskeletal: He exhibits edema.   Lower extremity edema improving.   Neurological: He is alert and oriented to person, place, and time. He exhibits normal muscle tone.   Skin: Skin is warm and dry. He is not diaphoretic.   Psychiatric: He has a normal mood and affect. His behavior is normal.   Nursing note and vitals reviewed.      Significant Labs:   Recent Lab Results       06/12/20  1629   06/12/20  1550   06/12/20  1110   06/12/20  0743   06/12/20  0425        Albumin         2.7     Alkaline Phosphatase         318     ALT         56     Anion Gap 13       16     AST         56     Baso #         0.03     Basophil%         1.0     BILIRUBIN TOTAL         7.6  Comment:  For infants and newborns, interpretation of results should be based  on gestational age, weight and in agreement with clinical  observations.  Premature Infant recommended reference ranges:  Up to 24 hours.............<8.0 mg/dL  Up to 48 hours............<12.0 mg/dL  3-5 days..................<15.0 mg/dL  6-29 days.................<15.0 mg/dL       BUN, Bld 48       50      Calcium 8.9       9.0     Chloride 90       91     CO2 31       27     Creatinine 2.6       2.6     Differential Method         Automated     eGFR if African American 30.5       30.5     eGFR if non  26.4  Comment:  Calculation used to obtain the estimated glomerular filtration  rate (eGFR) is the CKD-EPI equation.          26.4  Comment:  Calculation used to obtain the estimated glomerular filtration  rate (eGFR) is the CKD-EPI equation.        Eos #         0.3     Eosinophil%         11.1     Glucose 212       140     Gran # (ANC)         1.8     Gran%         61.8     Hematocrit         41.6     Hemoglobin         13.9     Immature Grans (Abs)         0.00  Comment:  Mild elevation in immature granulocytes is non specific and   can be seen in a variety of conditions including stress response,   acute inflammation, trauma and pregnancy. Correlation with other   laboratory and clinical findings is essential.       Immature Granulocytes         0.0     Lymph #         0.4     Lymph%         12.2     Magnesium 2.0       2.0     MCH         26.1     MCHC         33.4     MCV         78     Mono #         0.4     Mono%         13.9     MPV         SEE COMMENT  Comment:  Result not available.     nRBC         0     Phosphorus         3.2     Platelets         60     POCT Glucose   232 197 125       Potassium 3.7       3.6     PROTEIN TOTAL         7.3     RBC         5.32     RDW         24.6     Sodium 134       134     WBC         2.88                      06/11/20 2013        Albumin       Alkaline Phosphatase       ALT       Anion Gap       AST       Baso #       Basophil%       BILIRUBIN TOTAL       BUN, Bld       Calcium       Chloride       CO2       Creatinine       Differential Method       eGFR if        eGFR if non        Eos #       Eosinophil%       Glucose       Gran # (ANC)       Gran%       Hematocrit       Hemoglobin       Immature Grans (Abs)        Immature Granulocytes       Lymph #       Lymph%       Magnesium       MCH       MCHC       MCV       Mono #       Mono%       MPV       nRBC       Phosphorus       Platelets       POCT Glucose 215     Potassium       PROTEIN TOTAL       RBC       RDW       Sodium       WBC             Significant Imaging: I have reviewed all imagining in the past 24hrs.

## 2020-06-12 NOTE — PLAN OF CARE
Patient remains on lasix and dobutamine gtts and is not medically ready for discharge.     06/12/20 1443   Discharge Reassessment   Assessment Type Discharge Planning Reassessment   Discharge Plan A Home   DME Needed Upon Discharge    (TBD)   Anticipated Discharge Disposition Home   Post-Acute Status   Post-Acute Authorization Other  (home)   Other Status No Post-Acute Service Needs

## 2020-06-12 NOTE — PROGRESS NOTES
"[facility]  Endocrinology  Progress Note    Admit Date: 2020     Reason for Consult: Management of T2DM, Hyperglycemia     Surgical Procedure and Date: n/a    Lab Results   Component Value Date    HGBA1C 9.9 (H) 2020     Diabetes diagnosis year: 7 years ago    Home Diabetes Medications:   Levemir 10 units BID (pt reports he doesn't take often)     How often checking glucose at home? every other day   BG readings on regimen: 300s  Hypoglycemia on the regimen?  No  Missed doses on regimen?  Yes    Diabetes Complications include:     Hyperglycemia    Complicating diabetes co morbidities:   CHF      HPI:   Patient is a 55 y.o. male with a diagnosis of HFrEF 2/2 NICM, HTN, HLD, DM2 admitted with worsening SOB and BLE for 2 weeks. He was recently admitted for ADHF from 20 to 20. This is his 3rd admission this year. He reports that for the past 2 weeks he has been progressively more SOB. Endocrinology consulted for management of T2DM.       Interval HPI:   Overnight events:   BG is reasonably controlled on current SQ insulin regimen. However, prandial BG excursions noted, and patient is responding well to SQ insulin correction scale.   Diet diabetic [facility];  Calorie; Cardiac (Low Na/Chol), Low Sodium,2gm, Renal; Fluid - 1500mL       Eatin% - 100%  Nausea: No  Hypoglycemia and intervention: No  Fever: No  TPN and/or TF: No  If yes, type of TF/TPN and rate: None    /84 (BP Location: Left arm, Patient Position: Lying)   Pulse 82   Temp 97.8 °F (36.6 °C) (Oral)   Resp 16   Ht 5' 11" (1.803 m)   Wt 80 kg (176 lb 5.9 oz)   SpO2 98%   BMI 24.60 kg/m²      Labs Reviewed and Include    Recent Labs   Lab 20  0425   *   CALCIUM 9.0   ALBUMIN 2.7*   PROT 7.3   *   K 3.6   CO2 27   CL 91*   BUN 50*   CREATININE 2.6*   ALKPHOS 318*   ALT 56*   AST 56*   BILITOT 7.6*     Lab Results   Component Value Date    WBC 2.88 (L) 2020    HGB 13.9 (L) " 06/12/2020    HCT 41.6 06/12/2020    MCV 78 (L) 06/12/2020    PLT 60 (L) 06/12/2020     Recent Labs   Lab 06/08/20  0821   TSH 8.449*   FREET4 0.74     Lab Results   Component Value Date    HGBA1C 7.9 (H) 06/08/2020       Nutritional status:   Body mass index is 24.6 kg/m².  Lab Results   Component Value Date    ALBUMIN 2.7 (L) 06/12/2020    ALBUMIN 2.6 (L) 06/11/2020    ALBUMIN 2.7 (L) 06/10/2020     No results found for: PREALBUMIN    Estimated Creatinine Clearance: 33.8 mL/min (A) (based on SCr of 2.6 mg/dL (H)).    Accu-Checks  Recent Labs     06/10/20  0131 06/10/20  0723 06/10/20  1143 06/10/20  1521 06/10/20  2005 06/11/20  0714 06/11/20  1120 06/11/20  1556 06/11/20 2013 06/12/20  0743   POCTGLUCOSE 114* 95 153* 235* 253* 110 170* 181* 215* 125*       Current Medications and/or Treatments Impacting Glycemic Control  Immunotherapy:    Immunosuppressants     None        Steroids:   Hormones (From admission, onward)    Start     Stop Route Frequency Ordered    06/09/20 1842  melatonin tablet 6 mg  (Medication Panel)      -- Oral Nightly PRN 06/09/20 1742        Pressors:    Autonomic Drugs (From admission, onward)    None        Hyperglycemia/Diabetes Medications:   Antihyperglycemics (From admission, onward)    Start     Stop Route Frequency Ordered    06/10/20 2100  insulin detemir U-100 pen 10 Units      -- SubQ Nightly 06/10/20 1524    06/10/20 1619  insulin aspart U-100 pen 0-5 Units      -- SubQ Before meals & nightly PRN 06/10/20 1519          ASSESSMENT and PLAN    * Acute on chronic combined systolic and diastolic heart failure  Managed per primary team  Optimize BG control      Diabetes mellitus, type II  BG goal 140 - 180     - Continue Levemir 10 units q HS   - Start Novolog 2 units TIDWM. Prandial excursions noted. Aprox 0.2 units/kg/d dosing.   - Low Dose Correction Scale  - BG Monitoring AC/HS     ** Please call Endocrine for any BG related issues **  ** Please notify Endocrine for any change  and/or advance in diet**      Discharge planning:   TBD. Please notify endocrinology prior to discharge.           Hypothyroidism  Recommend continuing home dose of Synthroid 175 mcg while inpatient.           Adolfo Schultz NP  Endocrinology  [facility]

## 2020-06-12 NOTE — SUBJECTIVE & OBJECTIVE
"Interval HPI:   Overnight events:   BG is reasonably controlled on current SQ insulin regimen. However, prandial BG excursions noted, and patient is responding well to SQ insulin correction scale.   Diet diabetic [facility];  Calorie; Cardiac (Low Na/Chol), Low Sodium,2gm, Renal; Fluid - 1500mL       Eatin% - 100%  Nausea: No  Hypoglycemia and intervention: No  Fever: No  TPN and/or TF: No  If yes, type of TF/TPN and rate: None    /84 (BP Location: Left arm, Patient Position: Lying)   Pulse 82   Temp 97.8 °F (36.6 °C) (Oral)   Resp 16   Ht 5' 11" (1.803 m)   Wt 80 kg (176 lb 5.9 oz)   SpO2 98%   BMI 24.60 kg/m²     Labs Reviewed and Include    Recent Labs   Lab 20  0425   *   CALCIUM 9.0   ALBUMIN 2.7*   PROT 7.3   *   K 3.6   CO2 27   CL 91*   BUN 50*   CREATININE 2.6*   ALKPHOS 318*   ALT 56*   AST 56*   BILITOT 7.6*     Lab Results   Component Value Date    WBC 2.88 (L) 2020    HGB 13.9 (L) 2020    HCT 41.6 2020    MCV 78 (L) 2020    PLT 60 (L) 2020     Recent Labs   Lab 20  0821   TSH 8.449*   FREET4 0.74     Lab Results   Component Value Date    HGBA1C 7.9 (H) 2020       Nutritional status:   Body mass index is 24.6 kg/m².  Lab Results   Component Value Date    ALBUMIN 2.7 (L) 2020    ALBUMIN 2.6 (L) 2020    ALBUMIN 2.7 (L) 06/10/2020     No results found for: PREALBUMIN    Estimated Creatinine Clearance: 33.8 mL/min (A) (based on SCr of 2.6 mg/dL (H)).    Accu-Checks  Recent Labs     06/10/20  0131 06/10/20  0723 06/10/20  1143 06/10/20  1521 06/10/20  2005 06/11/20  0714 20  1120 20  1556 20  0743   POCTGLUCOSE 114* 95 153* 235* 253* 110 170* 181* 215* 125*       Current Medications and/or Treatments Impacting Glycemic Control  Immunotherapy:    Immunosuppressants     None        Steroids:   Hormones (From admission, onward)    Start     Stop Route Frequency Ordered    " 06/09/20 1842  melatonin tablet 6 mg  (Medication Panel)      -- Oral Nightly PRN 06/09/20 1742        Pressors:    Autonomic Drugs (From admission, onward)    None        Hyperglycemia/Diabetes Medications:   Antihyperglycemics (From admission, onward)    Start     Stop Route Frequency Ordered    06/10/20 2100  insulin detemir U-100 pen 10 Units      -- SubQ Nightly 06/10/20 1524    06/10/20 1619  insulin aspart U-100 pen 0-5 Units      -- SubQ Before meals & nightly PRN 06/10/20 1519

## 2020-06-12 NOTE — PLAN OF CARE
Pt remained free of injuries, falls, and trauma. VSS. Continuous Lasix and  infusing at prescribed rate. Strict intake and output being monitored. Glucose monitored at bedtime, 215. SSI administered.  PRN Tylenol administered. No new complaints mentioned. Will continue to monitor.

## 2020-06-13 NOTE — SUBJECTIVE & OBJECTIVE
"Interval HPI:   Overnight events:  BG is below goal on current SQ insulin regimen.   Diet diabetic [facility];  Calorie; Cardiac (Low Na/Chol), Low Sodium,2gm, Renal; Fluid - 1500mL       Eatin%  Nausea: No  Hypoglycemia and intervention: No  Fever: No  TPN and/or TF: No  If yes, type of TF/TPN and rate: None    /81 (BP Location: Right arm, Patient Position: Lying)   Pulse 89   Temp 98.4 °F (36.9 °C) (Oral)   Resp 16   Ht 5' 11" (1.803 m)   Wt 80 kg (176 lb 5.9 oz)   SpO2 98%   BMI 24.60 kg/m²     Labs Reviewed and Include    Recent Labs   Lab 20  0509   GLU 98   CALCIUM 8.9   ALBUMIN 2.9*   PROT 7.7   *   K 3.5   CO2 28   CL 91*   BUN 53*   CREATININE 2.4*   ALKPHOS 335*   ALT 74*   AST 78*   BILITOT 7.0*     Lab Results   Component Value Date    WBC 3.16 (L) 2020    HGB 14.1 2020    HCT 43.1 2020    MCV 80 (L) 2020    PLT 66 (L) 2020     Recent Labs   Lab 20  0821   TSH 8.449*   FREET4 0.74     Lab Results   Component Value Date    HGBA1C 7.9 (H) 2020       Nutritional status:   Body mass index is 24.6 kg/m².  Lab Results   Component Value Date    ALBUMIN 2.9 (L) 2020    ALBUMIN 2.7 (L) 2020    ALBUMIN 2.6 (L) 2020     No results found for: PREALBUMIN    Estimated Creatinine Clearance: 36.6 mL/min (A) (based on SCr of 2.4 mg/dL (H)).    Accu-Checks  Recent Labs     06/10/20  2005 06/11/20  0714 20  1120 20  1556 20  0743 20  1110 20  1550 20  2123 20  0803   POCTGLUCOSE 253* 110 170* 181* 215* 125* 197* 232* 188* 80       Current Medications and/or Treatments Impacting Glycemic Control  Immunotherapy:    Immunosuppressants     None        Steroids:   Hormones (From admission, onward)    Start     Stop Route Frequency Ordered    20 1842  melatonin tablet 6 mg  (Medication Panel)      -- Oral Nightly PRN 20 1742        Pressors:    Autonomic Drugs " (From admission, onward)    None        Hyperglycemia/Diabetes Medications:   Antihyperglycemics (From admission, onward)    Start     Stop Route Frequency Ordered    06/12/20 1715  insulin aspart U-100 pen 2 Units      -- SubQ 3 times daily with meals 06/12/20 1708    06/10/20 2100  insulin detemir U-100 pen 10 Units      -- SubQ Nightly 06/10/20 1524    06/10/20 1619  insulin aspart U-100 pen 0-5 Units      -- SubQ Before meals & nightly PRN 06/10/20 1519

## 2020-06-13 NOTE — PLAN OF CARE
Pt free of falls/trauma/injuries. A&Ox4 VVS. Denies c/o SOB; O2Sats remain stable on room air. SATs above 90%. Pt being diuresed with lasix IV; diuresing well. Wt trending down. Dobutamine infusing as prescribed. Pt ran 7 beats of V-Tach, MD notified. Call light in reach. Pt tolerating plan of care. Will continue to monitor.

## 2020-06-13 NOTE — PLAN OF CARE
Patient remains free from fall and injury. Does not complain of any pain at this time. VS WDR. Has Lasix and Dobutamine infusing. Patient is in normal sinus rhythm on continuous cardiac monitoring. No S/S of cardiac distress noted at this time. Patient is diuresing and adhering to fluid restrictions. Bed is in lowest locked position. Call light within reach. Will continue to monitor.

## 2020-06-13 NOTE — PROGRESS NOTES
[facility]  Cardiology  Progress Note    Patient Name: Ashish Mckeon  MRN: [id number]  Admission Date: 6/7/2020  Hospital Length of Stay: 6 days  Code Status: Full Code   Attending Physician: Artemio Mullen MD   Primary Care Physician: [facility]  Expected Discharge Date: 6/18/2020  Principal Problem:Acute on chronic combined systolic and diastolic heart failure    Subjective:     Hospital Course:   Patient admitted to Cardiology service on 6/8 with CHF excerbation, hyperglycemia, and hyperbilirubinemia. He was started on Lasix, dobutamine, and insulin infusion. US of the liver with Doppler was performed which revealed no ductal dilation but echogenic changes suggestive of cirrhosis and ascites. Endocrinology was consulted to assist in blood sugar management and glucose improved. He is diuresising well. Hepatology was consulted given concern for cirrhosis, congestive hepatopathy, versus ischemic hepatitis. Paracentesis was performed by Interventional Radiology on 6/10 along with transition to basal bolus insulin regimen.  He has completed three days IV vitamin K on 6/11.  He was started on hydralazine and isosorbide dinitrate 10 mg TID on 6/13. Ongoing cardiac/volume optimization with IV Lasix & dobutamine infusion. Respiratory status and renal function continues to improve.    Interval History: Patient seen and examined this AM. No acute events overnight. Reports some chest wall and back pain from coughing this morning. Diuresing well to Lasix infusion. Continue improvement in shortness of breath and leg swelling. Creatinine continues to down trend with a value of 2.4 this AM. Total bilirubin also down trending down to 7 this AM from 7.6. LFTs marginally increased from yesterday. Repleting electrolytes PRN. Resuming dobutamine and Lasix infusion for now given continued improvement. Started patient on GDMT today with hydralazine & isosorbide dinitrate 10 mg  TID.    Review of Systems   Constitution: Negative for chills, diaphoresis and fever.   Cardiovascular: Positive for chest pain and leg swelling (notes improvement). Negative for irregular heartbeat, palpitations and paroxysmal nocturnal dyspnea.   Respiratory: Positive for cough (non-productive). Negative for shortness of breath, sputum production and wheezing.    Musculoskeletal: Positive for back pain. Negative for joint pain.   Gastrointestinal: Positive for bloating. Negative for abdominal pain, constipation, diarrhea, nausea and vomiting.   Genitourinary: Positive for frequency. Negative for dysuria.        Patient being actively diuresed with IV Lasix infusion.   Neurological: Negative for dizziness, headaches and light-headedness.   Psychiatric/Behavioral: The patient does not have insomnia.      Objective:     Vital Signs (Most Recent):  Temp: 98.1 °F (36.7 °C) (06/13/20 1242)  Pulse: 90 (06/13/20 1242)  Resp: 16 (06/13/20 1242)  BP: 102/67 (06/13/20 1242)  SpO2: 98 % (06/13/20 1242) Vital Signs (24h Range):  Temp:  [95.9 °F (35.5 °C)-98.4 °F (36.9 °C)] 98.1 °F (36.7 °C)  Pulse:  [86-96] 90  Resp:  [16-17] 16  SpO2:  [93 %-98 %] 98 %  BP: ()/(66-81) 102/67     Weight: (pts. was to weak it was reported to nurse)  Body mass index is 24.6 kg/m².     SpO2: 98 %  O2 Device (Oxygen Therapy): room air      Intake/Output Summary (Last 24 hours) at 6/13/2020 1354  Last data filed at 6/13/2020 1249  Gross per 24 hour   Intake 2731.5 ml   Output 2600 ml   Net 131.5 ml       Lines/Drains/Airways     Drain            Male External Urinary Catheter 06/08/20 0713 Medium 5 days          Peripheral Intravenous Line                 Peripheral IV - Single Lumen 06/08/20 0019 18 G Right Antecubital 5 days         Peripheral IV - Single Lumen 06/11/20 1822 20 G Anterior;Proximal;Right Forearm 1 day                Physical Exam   Constitutional: He is oriented to person, place, and time. No distress.   HENT:   Head:  Normocephalic and atraumatic.   Eyes: EOM are normal. Scleral icterus is present.   Neck: Normal range of motion. Neck supple. No tracheal deviation present.   Cardiovascular: Normal rate. Exam reveals no gallop and no friction rub.   Murmur heard.  Grade 2 systolic murmur best heard over mitral region.   Pulmonary/Chest: Effort normal and breath sounds normal. He has no wheezes. He has no rales.   Abdominal: Soft. Bowel sounds are normal. He exhibits distension and ascites. There is hepatomegaly. There is no abdominal tenderness.   Well healed midline surgical scar.   Genitourinary:    Genitourinary Comments: Condom cath with clear yellow urine output.     Musculoskeletal:         General: Edema present.      Comments: Lower extremity edema continues to improve, now 1-2+.   Neurological: He is alert and oriented to person, place, and time. He exhibits normal muscle tone.   Skin: Skin is warm and dry. He is not diaphoretic.   Psychiatric: He has a normal mood and affect. His behavior is normal.   Nursing note and vitals reviewed.      Significant Labs:   Recent Lab Results       06/13/20  1125   06/13/20  0803   06/13/20  0509   06/12/20  2123   06/12/20  1629        Albumin     2.9         Alkaline Phosphatase     335         ALT     74         Anion Gap     13   13     AST     78         Baso #     0.05         Basophil%     1.6         BILIRUBIN TOTAL     7.0  Comment:  For infants and newborns, interpretation of results should be based  on gestational age, weight and in agreement with clinical  observations.  Premature Infant recommended reference ranges:  Up to 24 hours.............<8.0 mg/dL  Up to 48 hours............<12.0 mg/dL  3-5 days..................<15.0 mg/dL  6-29 days.................<15.0 mg/dL           BUN, Bld     53   48     Calcium     8.9   8.9     Chloride     91   90     CO2     28   31     Creatinine     2.4   2.6     Differential Method     Automated         eGFR if       33.6   30.5     eGFR if non      29.1  Comment:  Calculation used to obtain the estimated glomerular filtration  rate (eGFR) is the CKD-EPI equation.      26.4  Comment:  Calculation used to obtain the estimated glomerular filtration  rate (eGFR) is the CKD-EPI equation.        Eos #     0.4         Eosinophil%     11.4         Glucose     98   212     Gran # (ANC)     1.8         Gran%     58.3         Hematocrit     43.1         Hemoglobin     14.1         Immature Grans (Abs)     0.01  Comment:  Mild elevation in immature granulocytes is non specific and   can be seen in a variety of conditions including stress response,   acute inflammation, trauma and pregnancy. Correlation with other   laboratory and clinical findings is essential.           Immature Granulocytes     0.3         Lymph #     0.4         Lymph%     12.3         Magnesium     2.0   2.0     MCH     26.0         MCHC     32.7         MCV     80         Mono #     0.5         Mono%     16.1         MPV     SEE COMMENT  Comment:  Result not available.         nRBC     0         Phosphorus     2.9         Platelets     66         POCT Glucose 152 80   188       Potassium     3.5   3.7     PROTEIN TOTAL     7.7         RBC     5.42         RDW     24.4         Sodium     132   134     WBC     3.16                          06/12/20  1550        Albumin       Alkaline Phosphatase       ALT       Anion Gap       AST       Baso #       Basophil%       BILIRUBIN TOTAL       BUN, Bld       Calcium       Chloride       CO2       Creatinine       Differential Method       eGFR if        eGFR if non        Eos #       Eosinophil%       Glucose       Gran # (ANC)       Gran%       Hematocrit       Hemoglobin       Immature Grans (Abs)       Immature Granulocytes       Lymph #       Lymph%       Magnesium       MCH       MCHC       MCV       Mono #       Mono%       MPV       nRBC       Phosphorus       Platelets        POCT Glucose 232     Potassium       PROTEIN TOTAL       RBC       RDW       Sodium       WBC             Significant Imaging: I have reviewed all imagining in the past 24hrs.    Assessment and Plan:     Brief HPI: 56 y/o M with HFrEF 2/2 NICM, HTN, HLD, DM2 admitted with worsening SOB and BLE for 2 weeks. He was recently admitted for ADHF from 5/8/20 to 5/13/20. This is his 3rd admission this year. He reports that for the past 2 weeks he has been progressively more SOB. He feels like the fluid was not completely removed last admission. Takes furosemide 80 mg Po daily. Does not follow with cardiology. Unable to name his medications. Currently patient feels SOB and fatigued, able to answer all questions but falls asleep easily. Unable to lay flat. Uses two pillows at home.   TTE 2/26/20   · Moderate left atrial enlargement.   · Severely decreased left ventricular systolic function. The estimated ejection fraction is 20%.   · Grade III (severe) left ventricular diastolic dysfunction consistent with restrictive physiology.   · Mild-to-moderate mitral regurgitation.   · Global hypokinetic wall motion.   · Mild left ventricular enlargement.   · Moderate right ventricular enlargement.   · Moderate tricuspid regurgitation.   · Intermediate central venous pressure (8 mmHg).   · The estimated PA systolic pressure is 55 mmHg.   · Pulmonary hypertension present.   · Small posterolateral pericardial effusion.   · Mild mitral sclerosis.   · Mild aortic regurgitation.  Moderately reduced right ventricular systolic function.   [facility] 7/20/2018 - non obstructive CAD    * Acute on chronic combined systolic and diastolic heart failure  55 y.o M with HFrEF (EF 20%) 2/2 NICM. Multiple admission in the past few years and this is his third admission this year. Markedly volume overloaded on exam, high BNP, KEYANNA on CKD, congestive hepatopathy Reported history of non compliance in the past but currently he reports compliance to medications.      TTE on 6/8:  · Severe left atrial enlargement.  · Mild left ventricular enlargement.  · Severely decreased left ventricular systolic function. The estimated ejection fraction is 20%.  · Severe global hypokinetic wall motion.  · Grade III (severe) left ventricular diastolic dysfunction consistent with restrictive physiology.  · Severe right atrial enlargement.  · Moderate right ventricular enlargement.  · Moderately reduced right ventricular systolic function.  · Moderate-to-severe mitral regurgitation.  · Mild to moderate tricuspid regurgitation.  · Pulmonary hypertension present.  · The estimated PA systolic pressure is 65 mmHg.  · Elevated central venous pressure (15 mmHg).  · Trivial pericardial effusion.    - resume dobutamine and Lasix infusions for now  - holding home dose Toprolol-XL 50 mg daily in light of decompensated heart failure and hypotension  - started on hydralazine isosorbide dinitrate (BiDil) 10 mg TID on 6/13    Acute respiratory failure with hypoxia  Please see Acute on chronic combined systolic and diastolic heart failure.    CKD (chronic kidney disease), stage IV  Please see Acute kidney injury superimposed on CKD.    Hyperbilirubinemia  Patient presented with total bilirubin of 9, alkaline phosphatase 287, AST 48, and GGT 84. ALT within normal limits with a value of 34. He underwent US liver with Doppler upon presentation which showed unremarkable pancrease, enlarge liver with heterogenous echotexture nodularity suggestive of cirrhosis, cholelithiasis with gallbladder wall thickening but no CBD or intrahepatic dilation, and large volume ascites. Vasculature was patent with proper directional flow.      MELD-Na score: 27 at 6/12/2020  4:29 PM  MELD score: 26 at 6/12/2020  4:29 PM  Calculated from:  Serum Creatinine: 2.6 mg/dL at 6/12/2020  4:29 PM  Serum Sodium: 134 mmol/L at 6/12/2020  4:29 PM  Total Bilirubin: 7.6 mg/dL at 6/12/2020  4:25 AM  INR(ratio): 1.3 at 6/10/2020  7:11 AM  Age:  55 years 3 months    - Hepatology consulted for assistance, differential includes cirrhosis, congestive hepatopathy, and ischemic hepatitis (deferred to outpatient for further work-up)  - s/p paracentesis with 1L removed per IR on 6/10  - Ascitic , WBC 59 (7% segs) albumin 2.1 (SAAG 0.6), glucose 118, and protein 4.5  - Ascitic fluid cultures still pending/without growth so far  - Hepatitis and HIV negative  - Serum ammonia, IgG, and AFP within normal limits  - Anti-smooth muscle Ab, Anti-SSA Ab, Anti-SSB Ab, and Anti-Sm/RNP negative  - PETH still pending   - s/p vitamin K 5 mg IV QD for three doses (completed on 6/11)    Acute on chronic combined systolic and diastolic congestive heart failure  Please see Acute on chronic combined systolic and diastolic heart failure.    Shortness of breath  Please see Acute on chronic combined systolic and diastolic heart failure.    Decompensated heart failure  Please see Acute on chronic combined systolic and diastolic heart failure.    Hyperglycemia  Please see Diabetes mellitus, type II.    H/O thyroidectomy  Please see Hypothyroidism.    Essential hypertension  - patient on metoprolol succinate 50 mg daily as outpatient, can't resume beta blocker while on dobutamine infusion  - started on hydralazine isosorbide dinitrate 10 mg TID on 6/13    Acute kidney injury superimposed on CKD  - KEYANNA on CKD V with unclear baseline creatinine   - presented with serum creatinine 3.5  - serum creatinine 2.4 this AM  - will continue to monitor    Non-rheumatic mitral regurgitation  Please see Acute on chronic combined systolic and diastolic heart failure.    Chronic combined systolic and diastolic congestive heart failure  Please see Acute on chronic combined systolic and diastolic heart failure.    Diabetes mellitus, type II  Patient on levemir 5 U daily as outpatient. Presented with blood glucose in the 300-500s and HgbA1c 7.9%. Anion gap within normal limits on presentation with a  value of 12 (bicarbonate was 20). UA remarkable only for +1 protein, +1 occult blood (1 RBC/hpf) and 10 hyaline cast. There were no ketones or glucose. Serum osmolality was 331 but beta hydroxybutyrate was 0.2. VBG at that time showed pH 7.321, CO2 49.5, O2 22, and HCO3 25.6. He was subsequently started on insulin infusion and Endocrinology was consulted for assistance.    - Endocrinology consulted and following  - Resume determir 10 U QHS and aspart 2 U TID WM with LDSSI  - Accuchecks ACHS  - Diabetic cardiac renal diet with 1,500 mL volume restriction    Hypothyroidism  Patient with history of thyroidectomy on levothyroxine 175 mcg as outpatient. On admission patient's TSH elevated with a value of 8.449 however free T4 0.74.    - Endocrinology consulted and following  - resume home dose Synthroid     Thrombocytopenia  Chronic issue, patient with history of thrombocytopenia dating back to December 2018. Suspect related to underlying hepatopathy. Please see Hyperbilirubinemia.        VTE Risk Mitigation (From admission, onward)         Ordered     IP VTE HIGH RISK PATIENT  Once      06/07/20 2346     Place sequential compression device  Until discontinued      06/07/20 2346                Chris Bearden MD  Cardiology  [facility]

## 2020-06-13 NOTE — PROGRESS NOTES
"[facility]  Endocrinology  Progress Note    Admit Date: 2020     Reason for Consult: Management of T2DM, Hyperglycemia     Surgical Procedure and Date: n/a    Lab Results   Component Value Date    HGBA1C 9.9 (H) 2020     Diabetes diagnosis year: 7 years ago    Home Diabetes Medications:   Levemir 10 units BID (pt reports he doesn't take often)     How often checking glucose at home? every other day   BG readings on regimen: 300s  Hypoglycemia on the regimen?  No  Missed doses on regimen?  Yes    Diabetes Complications include:     Hyperglycemia    Complicating diabetes co morbidities:   CHF      HPI:   Patient is a 55 y.o. male with a diagnosis of HFrEF 2/2 NICM, HTN, HLD, DM2 admitted with worsening SOB and BLE for 2 weeks. He was recently admitted for ADHF from 20 to 20. This is his 3rd admission this year. He reports that for the past 2 weeks he has been progressively more SOB. Endocrinology consulted for management of T2DM.       Interval HPI:   Overnight events:  BG is below goal on current SQ insulin regimen.   Diet diabetic [facility];  Calorie; Cardiac (Low Na/Chol), Low Sodium,2gm, Renal; Fluid - 1500mL       Eatin%  Nausea: No  Hypoglycemia and intervention: No  Fever: No  TPN and/or TF: No  If yes, type of TF/TPN and rate: None    /81 (BP Location: Right arm, Patient Position: Lying)   Pulse 89   Temp 98.4 °F (36.9 °C) (Oral)   Resp 16   Ht 5' 11" (1.803 m)   Wt 80 kg (176 lb 5.9 oz)   SpO2 98%   BMI 24.60 kg/m²     Labs Reviewed and Include    Recent Labs   Lab 20  0509   GLU 98   CALCIUM 8.9   ALBUMIN 2.9*   PROT 7.7   *   K 3.5   CO2 28   CL 91*   BUN 53*   CREATININE 2.4*   ALKPHOS 335*   ALT 74*   AST 78*   BILITOT 7.0*     Lab Results   Component Value Date    WBC 3.16 (L) 2020    HGB 14.1 2020    HCT 43.1 2020    MCV 80 (L) 2020    PLT 66 (L) 2020     Recent Labs   Lab 20  0821 "   TSH 8.449*   FREET4 0.74     Lab Results   Component Value Date    HGBA1C 7.9 (H) 06/08/2020       Nutritional status:   Body mass index is 24.6 kg/m².  Lab Results   Component Value Date    ALBUMIN 2.9 (L) 06/13/2020    ALBUMIN 2.7 (L) 06/12/2020    ALBUMIN 2.6 (L) 06/11/2020     No results found for: PREALBUMIN    Estimated Creatinine Clearance: 36.6 mL/min (A) (based on SCr of 2.4 mg/dL (H)).    Accu-Checks  Recent Labs     06/10/20  2005 06/11/20  0714 06/11/20  1120 06/11/20  1556 06/11/20 2013 06/12/20  0743 06/12/20  1110 06/12/20  1550 06/12/20  2123 06/13/20  0803   POCTGLUCOSE 253* 110 170* 181* 215* 125* 197* 232* 188* 80       Current Medications and/or Treatments Impacting Glycemic Control  Immunotherapy:    Immunosuppressants     None        Steroids:   Hormones (From admission, onward)    Start     Stop Route Frequency Ordered    06/09/20 1842  melatonin tablet 6 mg  (Medication Panel)      -- Oral Nightly PRN 06/09/20 1742        Pressors:    Autonomic Drugs (From admission, onward)    None        Hyperglycemia/Diabetes Medications:   Antihyperglycemics (From admission, onward)    Start     Stop Route Frequency Ordered    06/12/20 1715  insulin aspart U-100 pen 2 Units      -- SubQ 3 times daily with meals 06/12/20 1708    06/10/20 2100  insulin detemir U-100 pen 10 Units      -- SubQ Nightly 06/10/20 1524    06/10/20 1619  insulin aspart U-100 pen 0-5 Units      -- SubQ Before meals & nightly PRN 06/10/20 1519          ASSESSMENT and PLAN    * Acute on chronic combined systolic and diastolic heart failure  Managed per primary team  Optimize BG control      Diabetes mellitus, type II  BG goal 140 - 180     -  Levemir to 8 units q HS. BG is below goal at time of consult.   -  Novolog 2 units TIDWM. Prandial excursions noted. Aprox 0.2 units/kg/d dosing.   - Low Dose Correction Scale  - BG Monitoring AC/HS      ** Please call Endocrine for any BG related issues **  ** Please notify Endocrine for any  change and/or advance in diet**      Discharge planning:   TBD. Please notify endocrinology prior to discharge.           Hypothyroidism  Recommend continuing home dose of Synthroid 175 mcg while inpatient.           Adolfo Schultz NP  Endocrinology  [facility]

## 2020-06-13 NOTE — SUBJECTIVE & OBJECTIVE
Interval History: Patient seen and examined this AM. No acute events overnight. Reports some chest wall and back pain from coughing this morning. Diuresing well to Lasix infusion. Continue improvement in shortness of breath and leg swelling. Creatinine continues to down trend with a value of 2.4 this AM. Total bilirubin also down trending down to 7 this AM from 7.6. LFTs marginally increased from yesterday. Repleting electrolytes PRN. Resuming dobutamine and Lasix infusion for now given continued improvement. Started patient on GDMT today with hydralazine & isosorbide dinitrate 10 mg TID.    Review of Systems   Constitution: Negative for chills, diaphoresis and fever.   Cardiovascular: Positive for chest pain and leg swelling (notes improvement). Negative for irregular heartbeat, palpitations and paroxysmal nocturnal dyspnea.   Respiratory: Positive for cough (non-productive). Negative for shortness of breath, sputum production and wheezing.    Musculoskeletal: Positive for back pain. Negative for joint pain.   Gastrointestinal: Positive for bloating. Negative for abdominal pain, constipation, diarrhea, nausea and vomiting.   Genitourinary: Positive for frequency. Negative for dysuria.        Patient being actively diuresed with IV Lasix infusion.   Neurological: Negative for dizziness, headaches and light-headedness.   Psychiatric/Behavioral: The patient does not have insomnia.      Objective:     Vital Signs (Most Recent):  Temp: 98.1 °F (36.7 °C) (06/13/20 1242)  Pulse: 90 (06/13/20 1242)  Resp: 16 (06/13/20 1242)  BP: 102/67 (06/13/20 1242)  SpO2: 98 % (06/13/20 1242) Vital Signs (24h Range):  Temp:  [95.9 °F (35.5 °C)-98.4 °F (36.9 °C)] 98.1 °F (36.7 °C)  Pulse:  [86-96] 90  Resp:  [16-17] 16  SpO2:  [93 %-98 %] 98 %  BP: ()/(66-81) 102/67     Weight: (pts. was to weak it was reported to nurse)  Body mass index is 24.6 kg/m².     SpO2: 98 %  O2 Device (Oxygen Therapy): room air      Intake/Output Summary  (Last 24 hours) at 6/13/2020 1354  Last data filed at 6/13/2020 1249  Gross per 24 hour   Intake 2731.5 ml   Output 2600 ml   Net 131.5 ml       Lines/Drains/Airways     Drain            Male External Urinary Catheter 06/08/20 0713 Medium 5 days          Peripheral Intravenous Line                 Peripheral IV - Single Lumen 06/08/20 0019 18 G Right Antecubital 5 days         Peripheral IV - Single Lumen 06/11/20 1822 20 G Anterior;Proximal;Right Forearm 1 day                Physical Exam   Constitutional: He is oriented to person, place, and time. No distress.   HENT:   Head: Normocephalic and atraumatic.   Eyes: EOM are normal. Scleral icterus is present.   Neck: Normal range of motion. Neck supple. No tracheal deviation present.   Cardiovascular: Normal rate. Exam reveals no gallop and no friction rub.   Murmur heard.  Grade 2 systolic murmur best heard over mitral region.   Pulmonary/Chest: Effort normal and breath sounds normal. He has no wheezes. He has no rales.   Abdominal: Soft. Bowel sounds are normal. He exhibits distension and ascites. There is hepatomegaly. There is no abdominal tenderness.   Well healed midline surgical scar.   Genitourinary:    Genitourinary Comments: Condom cath with clear yellow urine output.     Musculoskeletal:         General: Edema present.      Comments: Lower extremity edema continues to improve, now 1-2+.   Neurological: He is alert and oriented to person, place, and time. He exhibits normal muscle tone.   Skin: Skin is warm and dry. He is not diaphoretic.   Psychiatric: He has a normal mood and affect. His behavior is normal.   Nursing note and vitals reviewed.      Significant Labs:   Recent Lab Results       06/13/20  1125   06/13/20  0803   06/13/20  0509   06/12/20  2123   06/12/20  1629        Albumin     2.9         Alkaline Phosphatase     335         ALT     74         Anion Gap     13   13     AST     78         Baso #     0.05         Basophil%     1.6          BILIRUBIN TOTAL     7.0  Comment:  For infants and newborns, interpretation of results should be based  on gestational age, weight and in agreement with clinical  observations.  Premature Infant recommended reference ranges:  Up to 24 hours.............<8.0 mg/dL  Up to 48 hours............<12.0 mg/dL  3-5 days..................<15.0 mg/dL  6-29 days.................<15.0 mg/dL           BUN, Bld     53   48     Calcium     8.9   8.9     Chloride     91   90     CO2     28   31     Creatinine     2.4   2.6     Differential Method     Automated         eGFR if      33.6   30.5     eGFR if non      29.1  Comment:  Calculation used to obtain the estimated glomerular filtration  rate (eGFR) is the CKD-EPI equation.      26.4  Comment:  Calculation used to obtain the estimated glomerular filtration  rate (eGFR) is the CKD-EPI equation.        Eos #     0.4         Eosinophil%     11.4         Glucose     98   212     Gran # (ANC)     1.8         Gran%     58.3         Hematocrit     43.1         Hemoglobin     14.1         Immature Grans (Abs)     0.01  Comment:  Mild elevation in immature granulocytes is non specific and   can be seen in a variety of conditions including stress response,   acute inflammation, trauma and pregnancy. Correlation with other   laboratory and clinical findings is essential.           Immature Granulocytes     0.3         Lymph #     0.4         Lymph%     12.3         Magnesium     2.0   2.0     MCH     26.0         MCHC     32.7         MCV     80         Mono #     0.5         Mono%     16.1         MPV     SEE COMMENT  Comment:  Result not available.         nRBC     0         Phosphorus     2.9         Platelets     66         POCT Glucose 152 80   188       Potassium     3.5   3.7     PROTEIN TOTAL     7.7         RBC     5.42         RDW     24.4         Sodium     132   134     WBC     3.16                          06/12/20  1550        Albumin        Alkaline Phosphatase       ALT       Anion Gap       AST       Baso #       Basophil%       BILIRUBIN TOTAL       BUN, Bld       Calcium       Chloride       CO2       Creatinine       Differential Method       eGFR if        eGFR if non        Eos #       Eosinophil%       Glucose       Gran # (ANC)       Gran%       Hematocrit       Hemoglobin       Immature Grans (Abs)       Immature Granulocytes       Lymph #       Lymph%       Magnesium       MCH       MCHC       MCV       Mono #       Mono%       MPV       nRBC       Phosphorus       Platelets       POCT Glucose 232     Potassium       PROTEIN TOTAL       RBC       RDW       Sodium       WBC             Significant Imaging: I have reviewed all imagining in the past 24hrs.

## 2020-06-14 NOTE — PROGRESS NOTES
[facility]  Cardiology  Progress Note    Patient Name: Ashish Mckeon  MRN: [id number]  Admission Date: 6/7/2020  Hospital Length of Stay: 7 days  Code Status: Full Code   Attending Physician: Artemio Mullen MD   Primary Care Physician: [facility]  Expected Discharge Date: 6/18/2020  Principal Problem:Acute on chronic combined systolic and diastolic heart failure    Subjective:     Hospital Course:   Patient admitted to Cardiology service on 6/8 with CHF excerbation, hyperglycemia, and hyperbilirubinemia. He was started on Lasix, dobutamine, and insulin infusion. US of the liver with Doppler was performed which revealed no ductal dilation but echogenic changes suggestive of cirrhosis and ascites. Endocrinology was consulted to assist in blood sugar management and glucose improved. He is diuresising well. Hepatology was consulted given concern for cirrhosis, congestive hepatopathy, versus ischemic hepatitis. Paracentesis was performed by Interventional Radiology on 6/10 along with transition to basal bolus insulin regimen.  He has completed three days IV vitamin K on 6/11.  He was started on hydralazine and isosorbide dinitrate 10 mg TID on 6/13. Ongoing cardiac/volume optimization with IV Lasix & dobutamine infusion. Respiratory status and renal function continues to improve.    Interval History: Patient seen and examined this AM. No acute events overnight. Diuresing well to Lasix infusion, net negative 2.3 L. Continue improvement in shortness of breath saturating +94% on room air. Creatinine continues to down trend with a value of 2.3 this AM. Total bilirubin also continues to down trend with a value of 6.3 this AM from 7.0. LFTs remain mostly stable. Repleting electrolytes PRN. Resuming dobutamine and Lasix infusion for now given continued improvement. Resuming hydralazine & isosorbide dinitrate 10 mg TID.    Review of Systems   Constitution: Negative for chills,  diaphoresis and fever.   Cardiovascular: Positive for leg swelling (notes continued improvement ). Negative for chest pain, irregular heartbeat, palpitations and paroxysmal nocturnal dyspnea.   Respiratory: Positive for cough (non-productive). Negative for shortness of breath, sputum production and wheezing.    Musculoskeletal: Negative for joint pain.   Gastrointestinal: Negative for bloating, abdominal pain, constipation, diarrhea, nausea and vomiting.   Genitourinary: Positive for frequency. Negative for dysuria.        Patient being actively diuresed with IV Lasix infusion.   Neurological: Negative for dizziness, headaches and light-headedness.   Psychiatric/Behavioral: The patient does not have insomnia.      Objective:     Vital Signs (Most Recent):  Temp: 97.2 °F (36.2 °C) (06/14/20 1107)  Pulse: 88 (06/14/20 1107)  Resp: 18 (06/14/20 1107)  BP: (!) 87/67 (06/14/20 1107)  SpO2: 96 % (06/14/20 1107) Vital Signs (24h Range):  Temp:  [97.2 °F (36.2 °C)-98.2 °F (36.8 °C)] 97.2 °F (36.2 °C)  Pulse:  [66-96] 88  Resp:  [16-18] 18  SpO2:  [94 %-99 %] 96 %  BP: ()/(62-72) 87/67     Weight: (pts. was to weak it was reported to nurse)  Body mass index is 24.6 kg/m².     SpO2: 96 %  O2 Device (Oxygen Therapy): room air      Intake/Output Summary (Last 24 hours) at 6/14/2020 1354  Last data filed at 6/14/2020 1300  Gross per 24 hour   Intake 1352 ml   Output 2850 ml   Net -1498 ml       Lines/Drains/Airways     Drain            Male External Urinary Catheter 06/08/20 0713 Medium 6 days          Peripheral Intravenous Line                 Peripheral IV - Single Lumen 06/08/20 0019 18 G Right Antecubital 6 days         Peripheral IV - Single Lumen 06/11/20 1822 20 G Anterior;Proximal;Right Forearm 2 days                Physical Exam   Constitutional: He is oriented to person, place, and time. No distress.   HENT:   Head: Normocephalic and atraumatic.   Eyes: EOM are normal. Scleral icterus is present.   Neck: Normal  range of motion. Neck supple. No tracheal deviation present.   Cardiovascular: Normal rate. Exam reveals no gallop and no friction rub.   Murmur heard.  Grade 2 systolic murmur best heard over mitral region.   Pulmonary/Chest: Effort normal and breath sounds normal. He has no wheezes. He has no rales.   Abdominal: Soft. Bowel sounds are normal. He exhibits distension and ascites. There is hepatomegaly. There is no abdominal tenderness.   Well healed midline surgical scar.   Genitourinary:    Genitourinary Comments: Condom cath with clear yellow urine output.     Musculoskeletal:         General: Edema present.      Comments: Lower extremity edema continues to improve, now 1-2+.   Neurological: He is alert and oriented to person, place, and time. He exhibits normal muscle tone.   Skin: Skin is warm and dry. He is not diaphoretic.   Psychiatric: He has a normal mood and affect. His behavior is normal.   Nursing note and vitals reviewed.      Significant Labs:   Recent Lab Results       06/14/20  1109   06/14/20  0802   06/14/20  0440   06/13/20  2109   06/13/20  1947        Albumin     2.9         Alkaline Phosphatase     333         ALT     78         Anion Gap     15   14     Aniso     Slight         AST     75         Baso #     0.03         Basophil%     0.8         BILIRUBIN TOTAL     6.3  Comment:  For infants and newborns, interpretation of results should be based  on gestational age, weight and in agreement with clinical  observations.  Premature Infant recommended reference ranges:  Up to 24 hours.............<8.0 mg/dL  Up to 48 hours............<12.0 mg/dL  3-5 days..................<15.0 mg/dL  6-29 days.................<15.0 mg/dL           BUN, Bld     56   53     Calcium     8.2   8.4     Chloride     90   90     CO2     26   27     Creatinine     2.3   2.5     Differential Method     Automated         eGFR if      35.4   32.0     eGFR if non       30.6  Comment:  Calculation used to obtain the estimated glomerular filtration  rate (eGFR) is the CKD-EPI equation.      27.7  Comment:  Calculation used to obtain the estimated glomerular filtration  rate (eGFR) is the CKD-EPI equation.        Eos #     0.3         Eosinophil%     7.3         Glucose     164   294     Gran # (ANC)     2.5         Gran%     67.5         Hematocrit     42.0         Hemoglobin     13.7         Hypo     Occasional         Immature Grans (Abs)     0.01  Comment:  Mild elevation in immature granulocytes is non specific and   can be seen in a variety of conditions including stress response,   acute inflammation, trauma and pregnancy. Correlation with other   laboratory and clinical findings is essential.           Immature Granulocytes     0.3         Lymph #     0.3         Lymph%     7.0         Magnesium     1.9   1.9     MCH     26.2         MCHC     32.6         MCV     80         Mono #     0.6         Mono%     17.1         MPV     SEE COMMENT  Comment:  Result not available.         nRBC     0         Ovalocytes     Occasional         Phosphorus     3.1         Platelet Estimate     Decreased         Platelets     70         POCT Glucose 159 148   275       Poik     Slight         Potassium     3.5   3.6     PROTEIN TOTAL     7.8         RBC     5.23         RDW     23.5         Sodium     131   131     WBC     3.69                          06/13/20  1402        Albumin       Alkaline Phosphatase       ALT       Anion Gap 14     Aniso       AST       Baso #       Basophil%       BILIRUBIN TOTAL       BUN, Bld 54     Calcium 8.6     Chloride 90     CO2 28     Creatinine 2.4     Differential Method       eGFR if African American 33.6     eGFR if non  29.1  Comment:  Calculation used to obtain the estimated glomerular filtration  rate (eGFR) is the CKD-EPI equation.        Eos #       Eosinophil%       Glucose 173     Gran # (ANC)       Gran%       Hematocrit        Hemoglobin       Hypo       Immature Grans (Abs)       Immature Granulocytes       Lymph #       Lymph%       Magnesium 1.9     MCH       MCHC       MCV       Mono #       Mono%       MPV       nRBC       Ovalocytes       Phosphorus       Platelet Estimate       Platelets       POCT Glucose       Poik       Potassium 3.6     PROTEIN TOTAL       RBC       RDW       Sodium 132     WBC             Significant Imaging: I have reviewed all imagining in the past 24hrs.    Assessment and Plan:     Brief HPI: 54 y/o M with HFrEF 2/2 NICM, HTN, HLD, DM2 admitted with worsening SOB and BLE for 2 weeks. He was recently admitted for ADHF from 5/8/20 to 5/13/20. This is his 3rd admission this year. He reports that for the past 2 weeks he has been progressively more SOB. He feels like the fluid was not completely removed last admission. Takes furosemide 80 mg Po daily. Does not follow with cardiology. Unable to name his medications. Currently patient feels SOB and fatigued, able to answer all questions but falls asleep easily. Unable to lay flat. Uses two pillows at home.   TTE 2/26/20   · Moderate left atrial enlargement.   · Severely decreased left ventricular systolic function. The estimated ejection fraction is 20%.   · Grade III (severe) left ventricular diastolic dysfunction consistent with restrictive physiology.   · Mild-to-moderate mitral regurgitation.   · Global hypokinetic wall motion.   · Mild left ventricular enlargement.   · Moderate right ventricular enlargement.   · Moderate tricuspid regurgitation.   · Intermediate central venous pressure (8 mmHg).   · The estimated PA systolic pressure is 55 mmHg.   · Pulmonary hypertension present.   · Small posterolateral pericardial effusion.   · Mild mitral sclerosis.   · Mild aortic regurgitation.  Moderately reduced right ventricular systolic function.   [facility] 7/20/2018 - non obstructive CAD    * Acute on chronic combined systolic and diastolic heart failure  55 y.o M with  HFrEF (EF 20%) 2/2 NICM. Multiple admission in the past few years and this is his third admission this year. Markedly volume overloaded on exam, high BNP, KEYANNA on CKD, congestive hepatopathy Reported history of non compliance in the past but currently he reports compliance to medications.     TTE on 6/8:  · Severe left atrial enlargement.  · Mild left ventricular enlargement.  · Severely decreased left ventricular systolic function. The estimated ejection fraction is 20%.  · Severe global hypokinetic wall motion.  · Grade III (severe) left ventricular diastolic dysfunction consistent with restrictive physiology.  · Severe right atrial enlargement.  · Moderate right ventricular enlargement.  · Moderately reduced right ventricular systolic function.  · Moderate-to-severe mitral regurgitation.  · Mild to moderate tricuspid regurgitation.  · Pulmonary hypertension present.  · The estimated PA systolic pressure is 65 mmHg.  · Elevated central venous pressure (15 mmHg).  · Trivial pericardial effusion.    - resume dobutamine and Lasix infusions for now  - holding home dose Toprolol-XL 50 mg daily in light of decompensated heart failure and hypotension  - started on hydralazine isosorbide dinitrate (BiDil) 10 mg TID on 6/13    Acute respiratory failure with hypoxia  Please see Acute on chronic combined systolic and diastolic heart failure.    CKD (chronic kidney disease), stage IV  Please see Acute kidney injury superimposed on CKD.    Hyperbilirubinemia  Patient presented with total bilirubin of 9, alkaline phosphatase 287, AST 48, and GGT 84. ALT within normal limits with a value of 34. He underwent US liver with Doppler upon presentation which showed unremarkable pancrease, enlarge liver with heterogenous echotexture nodularity suggestive of cirrhosis, cholelithiasis with gallbladder wall thickening but no CBD or intrahepatic dilation, and large volume ascites. Vasculature was patent with proper directional flow.       MELD-Na score: 27 at 6/12/2020  4:29 PM  MELD score: 26 at 6/12/2020  4:29 PM  Calculated from:  Serum Creatinine: 2.6 mg/dL at 6/12/2020  4:29 PM  Serum Sodium: 134 mmol/L at 6/12/2020  4:29 PM  Total Bilirubin: 7.6 mg/dL at 6/12/2020  4:25 AM  INR(ratio): 1.3 at 6/10/2020  7:11 AM  Age: 55 years 3 months    - Hepatology consulted for assistance, differential includes cirrhosis, congestive hepatopathy, and ischemic hepatitis (deferred to outpatient for further work-up)  - s/p paracentesis with 1L removed per IR on 6/10  - Ascitic , WBC 59 (7% segs) albumin 2.1 (SAAG 0.6), glucose 118, and protein 4.5  - Ascitic fluid cultures still pending/without growth so far  - Hepatitis and HIV negative  - Serum ammonia, IgG, and AFP within normal limits  - Anti-smooth muscle Ab, Anti-SSA Ab, Anti-SSB Ab, and Anti-Sm/RNP negative  - PETH still pending   - s/p vitamin K 5 mg IV QD for three doses (completed on 6/11)    Acute on chronic combined systolic and diastolic congestive heart failure  Please see Acute on chronic combined systolic and diastolic heart failure.    Shortness of breath  Please see Acute on chronic combined systolic and diastolic heart failure.    Decompensated heart failure  Please see Acute on chronic combined systolic and diastolic heart failure.    Hyperglycemia  Please see Diabetes mellitus, type II.    H/O thyroidectomy  Please see Hypothyroidism.    Essential hypertension  - patient on metoprolol succinate 50 mg daily as outpatient, can't resume beta blocker while on dobutamine infusion  - started on hydralazine isosorbide dinitrate 10 mg TID on 6/13    Acute kidney injury superimposed on CKD  - KEYANNA on CKD V with unclear baseline creatinine   - presented with serum creatinine 3.5  - serum creatinine 2.3 this AM  - will continue to monitor    Non-rheumatic mitral regurgitation  Please see Acute on chronic combined systolic and diastolic heart failure.    Chronic combined systolic and  diastolic congestive heart failure  Please see Acute on chronic combined systolic and diastolic heart failure.    Diabetes mellitus, type II  Patient on levemir 5 U daily as outpatient. Presented with blood glucose in the 300-500s and HgbA1c 7.9%. Anion gap within normal limits on presentation with a value of 12 (bicarbonate was 20). UA remarkable only for +1 protein, +1 occult blood (1 RBC/hpf) and 10 hyaline cast. There were no ketones or glucose. Serum osmolality was 331 but beta hydroxybutyrate was 0.2. VBG at that time showed pH 7.321, CO2 49.5, O2 22, and HCO3 25.6. He was subsequently started on insulin infusion and Endocrinology was consulted for assistance.    - Endocrinology consulted and following  - Resume determir 10 U QHS and aspart 3 U TID WM with LDSSI  - Accuchecks ACHS  - Diabetic cardiac renal diet with 1,500 mL volume restriction    Hypothyroidism  Patient with history of thyroidectomy on levothyroxine 175 mcg as outpatient. On admission patient's TSH elevated with a value of 8.449 however free T4 0.74.    - Endocrinology consulted and following  - resume home dose Synthroid     Thrombocytopenia  Chronic issue, patient with history of thrombocytopenia dating back to December 2018. Suspect related to underlying hepatopathy. Please see Hyperbilirubinemia.        VTE Risk Mitigation (From admission, onward)         Ordered     IP VTE HIGH RISK PATIENT  Once      06/07/20 2346     Place sequential compression device  Until discontinued      06/07/20 2346                Chris Bearden MD  Cardiology  [facility]

## 2020-06-14 NOTE — PROGRESS NOTES
"[facility]  Endocrinology  Progress Note    Admit Date: 2020     Reason for Consult: Management of T2DM, Hyperglycemia     Surgical Procedure and Date: n/a    Lab Results   Component Value Date    HGBA1C 9.9 (H) 2020     Diabetes diagnosis year: 7 years ago    Home Diabetes Medications:   Levemir 10 units BID (pt reports he doesn't take often)     How often checking glucose at home? every other day   BG readings on regimen: 300s  Hypoglycemia on the regimen?  No  Missed doses on regimen?  Yes    Diabetes Complications include:     Hyperglycemia    Complicating diabetes co morbidities:   CHF      HPI:   Patient is a 55 y.o. male with a diagnosis of HFrEF 2/2 NICM, HTN, HLD, DM2 admitted with worsening SOB and BLE for 2 weeks. He was recently admitted for ADHF from 20 to 20. This is his 3rd admission this year. He reports that for the past 2 weeks he has been progressively more SOB. Endocrinology consulted for management of T2DM.       Interval HPI:   Overnight events:  BG is within goal on current SQ insulin regimen.   Diet diabetic [facility];  Calorie; Cardiac (Low Na/Chol), Low Sodium,2gm, Renal; Fluid - 1500mL       Eatin%  Nausea: No  Hypoglycemia and intervention: No  Fever: No  TPN and/or TF: No  If yes, type of TF/TPN and rate: None    /71 (BP Location: Left arm, Patient Position: Lying)   Pulse 94   Temp 98.2 °F (36.8 °C) (Oral)   Resp 18   Ht 5' 11" (1.803 m)   Wt 80 kg (176 lb 5.9 oz)   SpO2 95%   BMI 24.60 kg/m²     Labs Reviewed and Include    Recent Labs   Lab 20  0440   *   CALCIUM 8.2*   ALBUMIN 2.9*   PROT 7.8   *   K 3.5   CO2 26   CL 90*   BUN 56*   CREATININE 2.3*   ALKPHOS 333*   ALT 78*   AST 75*   BILITOT 6.3*     Lab Results   Component Value Date    WBC 3.69 (L) 2020    HGB 13.7 (L) 2020    HCT 42.0 2020    MCV 80 (L) 2020    PLT 70 (L) 2020     Recent Labs   Lab " 06/08/20  0821   TSH 8.449*   FREET4 0.74     Lab Results   Component Value Date    HGBA1C 7.9 (H) 06/08/2020       Nutritional status:   Body mass index is 24.6 kg/m².  Lab Results   Component Value Date    ALBUMIN 2.9 (L) 06/14/2020    ALBUMIN 2.9 (L) 06/13/2020    ALBUMIN 2.7 (L) 06/12/2020     No results found for: PREALBUMIN    Estimated Creatinine Clearance: 38.2 mL/min (A) (based on SCr of 2.3 mg/dL (H)).    Accu-Checks  Recent Labs     06/11/20  1120 06/11/20  1556 06/11/20  2013 06/12/20  0743 06/12/20  1110 06/12/20  1550 06/12/20  2123 06/13/20  0803 06/13/20  1125 06/13/20  2109   POCTGLUCOSE 170* 181* 215* 125* 197* 232* 188* 80 152* 275*       Current Medications and/or Treatments Impacting Glycemic Control  Immunotherapy:    Immunosuppressants     None        Steroids:   Hormones (From admission, onward)    Start     Stop Route Frequency Ordered    06/09/20 1842  melatonin tablet 6 mg  (Medication Panel)      -- Oral Nightly PRN 06/09/20 1742        Pressors:    Autonomic Drugs (From admission, onward)    None        Hyperglycemia/Diabetes Medications:   Antihyperglycemics (From admission, onward)    Start     Stop Route Frequency Ordered    06/13/20 2100  insulin detemir U-100 pen 8 Units      -- SubQ Nightly 06/13/20 0916    06/12/20 1715  insulin aspart U-100 pen 2 Units      -- SubQ 3 times daily with meals 06/12/20 1708    06/10/20 1619  insulin aspart U-100 pen 0-5 Units      -- SubQ Before meals & nightly PRN 06/10/20 1519          ASSESSMENT and PLAN    * Acute on chronic combined systolic and diastolic heart failure  Managed per primary team  Optimize BG control      Diabetes mellitus, type II  BG goal 140 - 180     -  Levemir to 8 units q HS. BG is below goal at time of consult.   -  Increase Novolog to 3 units TIDWM. Prandial excursions noted. Aprox 0.2 units/kg/d dosing.   -  Low Dose Correction Scale.  -  BG Monitoring AC/HS.      ** Please call Endocrine for any BG related issues **  **  Please notify Endocrine for any change and/or advance in diet**      Discharge planning:   TBD. Please notify endocrinology prior to discharge.           Hypothyroidism  Recommend continuing home dose of Synthroid 175 mcg while inpatient.           Adolfo Schultz NP  Endocrinology  [facility]

## 2020-06-14 NOTE — SUBJECTIVE & OBJECTIVE
Interval History: Patient seen and examined this AM. No acute events overnight. Diuresing well to Lasix infusion, net negative 2.3 L. Continue improvement in shortness of breath saturating +94% on room air. Creatinine continues to down trend with a value of 2.3 this AM. Total bilirubin also continues to down trend with a value of 6.3 this AM from 7.0. LFTs remain mostly stable. Repleting electrolytes PRN. Resuming dobutamine and Lasix infusion for now given continued improvement. Resuming hydralazine & isosorbide dinitrate 10 mg TID.    Review of Systems   Constitution: Negative for chills, diaphoresis and fever.   Cardiovascular: Positive for leg swelling (notes continued improvement ). Negative for chest pain, irregular heartbeat, palpitations and paroxysmal nocturnal dyspnea.   Respiratory: Positive for cough (non-productive). Negative for shortness of breath, sputum production and wheezing.    Musculoskeletal: Negative for joint pain.   Gastrointestinal: Negative for bloating, abdominal pain, constipation, diarrhea, nausea and vomiting.   Genitourinary: Positive for frequency. Negative for dysuria.        Patient being actively diuresed with IV Lasix infusion.   Neurological: Negative for dizziness, headaches and light-headedness.   Psychiatric/Behavioral: The patient does not have insomnia.      Objective:     Vital Signs (Most Recent):  Temp: 97.2 °F (36.2 °C) (06/14/20 1107)  Pulse: 88 (06/14/20 1107)  Resp: 18 (06/14/20 1107)  BP: (!) 87/67 (06/14/20 1107)  SpO2: 96 % (06/14/20 1107) Vital Signs (24h Range):  Temp:  [97.2 °F (36.2 °C)-98.2 °F (36.8 °C)] 97.2 °F (36.2 °C)  Pulse:  [66-96] 88  Resp:  [16-18] 18  SpO2:  [94 %-99 %] 96 %  BP: ()/(62-72) 87/67     Weight: (pts. was to weak it was reported to nurse)  Body mass index is 24.6 kg/m².     SpO2: 96 %  O2 Device (Oxygen Therapy): room air      Intake/Output Summary (Last 24 hours) at 6/14/2020 1989  Last data filed at 6/14/2020 1300  Gross per 24  hour   Intake 1352 ml   Output 2850 ml   Net -1498 ml       Lines/Drains/Airways     Drain            Male External Urinary Catheter 06/08/20 0713 Medium 6 days          Peripheral Intravenous Line                 Peripheral IV - Single Lumen 06/08/20 0019 18 G Right Antecubital 6 days         Peripheral IV - Single Lumen 06/11/20 1822 20 G Anterior;Proximal;Right Forearm 2 days                Physical Exam   Constitutional: He is oriented to person, place, and time. No distress.   HENT:   Head: Normocephalic and atraumatic.   Eyes: EOM are normal. Scleral icterus is present.   Neck: Normal range of motion. Neck supple. No tracheal deviation present.   Cardiovascular: Normal rate. Exam reveals no gallop and no friction rub.   Murmur heard.  Grade 2 systolic murmur best heard over mitral region.   Pulmonary/Chest: Effort normal and breath sounds normal. He has no wheezes. He has no rales.   Abdominal: Soft. Bowel sounds are normal. He exhibits distension and ascites. There is hepatomegaly. There is no abdominal tenderness.   Well healed midline surgical scar.   Genitourinary:    Genitourinary Comments: Condom cath with clear yellow urine output.     Musculoskeletal:         General: Edema present.      Comments: Lower extremity edema continues to improve, now 1-2+.   Neurological: He is alert and oriented to person, place, and time. He exhibits normal muscle tone.   Skin: Skin is warm and dry. He is not diaphoretic.   Psychiatric: He has a normal mood and affect. His behavior is normal.   Nursing note and vitals reviewed.      Significant Labs:   Recent Lab Results       06/14/20  1109   06/14/20  0802   06/14/20  0440   06/13/20  2109   06/13/20  1947        Albumin     2.9         Alkaline Phosphatase     333         ALT     78         Anion Gap     15   14     Aniso     Slight         AST     75         Baso #     0.03         Basophil%     0.8         BILIRUBIN TOTAL     6.3  Comment:  For infants and newborns,  interpretation of results should be based  on gestational age, weight and in agreement with clinical  observations.  Premature Infant recommended reference ranges:  Up to 24 hours.............<8.0 mg/dL  Up to 48 hours............<12.0 mg/dL  3-5 days..................<15.0 mg/dL  6-29 days.................<15.0 mg/dL           BUN, Bld     56   53     Calcium     8.2   8.4     Chloride     90   90     CO2     26   27     Creatinine     2.3   2.5     Differential Method     Automated         eGFR if      35.4   32.0     eGFR if non      30.6  Comment:  Calculation used to obtain the estimated glomerular filtration  rate (eGFR) is the CKD-EPI equation.      27.7  Comment:  Calculation used to obtain the estimated glomerular filtration  rate (eGFR) is the CKD-EPI equation.        Eos #     0.3         Eosinophil%     7.3         Glucose     164   294     Gran # (ANC)     2.5         Gran%     67.5         Hematocrit     42.0         Hemoglobin     13.7         Hypo     Occasional         Immature Grans (Abs)     0.01  Comment:  Mild elevation in immature granulocytes is non specific and   can be seen in a variety of conditions including stress response,   acute inflammation, trauma and pregnancy. Correlation with other   laboratory and clinical findings is essential.           Immature Granulocytes     0.3         Lymph #     0.3         Lymph%     7.0         Magnesium     1.9   1.9     MCH     26.2         MCHC     32.6         MCV     80         Mono #     0.6         Mono%     17.1         MPV     SEE COMMENT  Comment:  Result not available.         nRBC     0         Ovalocytes     Occasional         Phosphorus     3.1         Platelet Estimate     Decreased         Platelets     70         POCT Glucose 159 148   275       Poik     Slight         Potassium     3.5   3.6     PROTEIN TOTAL     7.8         RBC     5.23         RDW     23.5         Sodium     131   131     WBC     3.69                           06/13/20  1402        Albumin       Alkaline Phosphatase       ALT       Anion Gap 14     Aniso       AST       Baso #       Basophil%       BILIRUBIN TOTAL       BUN, Bld 54     Calcium 8.6     Chloride 90     CO2 28     Creatinine 2.4     Differential Method       eGFR if African American 33.6     eGFR if non  29.1  Comment:  Calculation used to obtain the estimated glomerular filtration  rate (eGFR) is the CKD-EPI equation.        Eos #       Eosinophil%       Glucose 173     Gran # (ANC)       Gran%       Hematocrit       Hemoglobin       Hypo       Immature Grans (Abs)       Immature Granulocytes       Lymph #       Lymph%       Magnesium 1.9     MCH       MCHC       MCV       Mono #       Mono%       MPV       nRBC       Ovalocytes       Phosphorus       Platelet Estimate       Platelets       POCT Glucose       Poik       Potassium 3.6     PROTEIN TOTAL       RBC       RDW       Sodium 132     WBC             Significant Imaging: I have reviewed all imagining in the past 24hrs.

## 2020-06-14 NOTE — PLAN OF CARE
Pt free of falls/trauma/injuries. A&Ox4 VVS. Denies c/o SOB; O2Sats remain stable on room air. SATs above 95%. Glucose monitored, insulin given per MD orders. Pt being diuresed with lasix; diuresing well.  maintained per orders. K+ replaced. Pt tolerating plan of care. Will continue to monitor.

## 2020-06-14 NOTE — SUBJECTIVE & OBJECTIVE
"Interval HPI:   Overnight events:  BG is within goal on current SQ insulin regimen.   Diet diabetic [facility];  Calorie; Cardiac (Low Na/Chol), Low Sodium,2gm, Renal; Fluid - 1500mL       Eatin%  Nausea: No  Hypoglycemia and intervention: No  Fever: No  TPN and/or TF: No  If yes, type of TF/TPN and rate: None    /71 (BP Location: Left arm, Patient Position: Lying)   Pulse 94   Temp 98.2 °F (36.8 °C) (Oral)   Resp 18   Ht 5' 11" (1.803 m)   Wt 80 kg (176 lb 5.9 oz)   SpO2 95%   BMI 24.60 kg/m²     Labs Reviewed and Include    Recent Labs   Lab 20  0440   *   CALCIUM 8.2*   ALBUMIN 2.9*   PROT 7.8   *   K 3.5   CO2 26   CL 90*   BUN 56*   CREATININE 2.3*   ALKPHOS 333*   ALT 78*   AST 75*   BILITOT 6.3*     Lab Results   Component Value Date    WBC 3.69 (L) 2020    HGB 13.7 (L) 2020    HCT 42.0 2020    MCV 80 (L) 2020    PLT 70 (L) 2020     Recent Labs   Lab 20  0821   TSH 8.449*   FREET4 0.74     Lab Results   Component Value Date    HGBA1C 7.9 (H) 2020       Nutritional status:   Body mass index is 24.6 kg/m².  Lab Results   Component Value Date    ALBUMIN 2.9 (L) 2020    ALBUMIN 2.9 (L) 2020    ALBUMIN 2.7 (L) 2020     No results found for: PREALBUMIN    Estimated Creatinine Clearance: 38.2 mL/min (A) (based on SCr of 2.3 mg/dL (H)).    Accu-Checks  Recent Labs     20  1120 20  1556 20  0743 20  1110 20  1550 20  2123 20  0803 20  1125 20  2109   POCTGLUCOSE 170* 181* 215* 125* 197* 232* 188* 80 152* 275*       Current Medications and/or Treatments Impacting Glycemic Control  Immunotherapy:    Immunosuppressants     None        Steroids:   Hormones (From admission, onward)    Start     Stop Route Frequency Ordered    20 1842  melatonin tablet 6 mg  (Medication Panel)      -- Oral Nightly PRN 20 6772        Pressors:  "   Autonomic Drugs (From admission, onward)    None        Hyperglycemia/Diabetes Medications:   Antihyperglycemics (From admission, onward)    Start     Stop Route Frequency Ordered    06/13/20 2100  insulin detemir U-100 pen 8 Units      -- SubQ Nightly 06/13/20 0916    06/12/20 1715  insulin aspart U-100 pen 2 Units      -- SubQ 3 times daily with meals 06/12/20 1708    06/10/20 1619  insulin aspart U-100 pen 0-5 Units      -- SubQ Before meals & nightly PRN 06/10/20 1519

## 2020-06-14 NOTE — PLAN OF CARE
Patient remained free of falls, trauma, injury, and skin breakdown. VSS; no patient complaints at this time. Glucose monitored; insulin administered per orders.   and lasix gtt maintained per MAR.  Fall precautions maintained; education provided.   Plan of care reviewed; patient verbalized understanding.  All questions and concerns addressed; will continue to monitor.

## 2020-06-15 NOTE — PROGRESS NOTES
"[facility]  Cardiology  Progress Note    Patient Name: Ashish Mckeon  MRN: [id number]  Admission Date: 6/7/2020  Hospital Length of Stay: 8 days  Code Status: Full Code   Attending Physician: Artemio Mullen MD   Primary Care Physician: [facility]  Expected Discharge Date: 6/18/2020  Principal Problem:Acute on chronic combined systolic and diastolic heart failure    Subjective:     Hospital Course:   Patient admitted to Cardiology service on 6/8 with CHF excerbation, hyperglycemia, and hyperbilirubinemia. He was started on Lasix, dobutamine, and insulin infusion. US of the liver with Doppler was performed which revealed no ductal dilation but echogenic changes suggestive of cirrhosis and ascites. Endocrinology was consulted to assist in blood sugar management and glucose improved. He is diuresising well. Hepatology was consulted given concern for cirrhosis, congestive hepatopathy, versus ischemic hepatitis. Paracentesis was performed by Interventional Radiology on 6/10 along with transition to basal bolus insulin regimen.  He has completed three days IV vitamin K on 6/11.  He was started on hydralazine and isosorbide dinitrate 10 mg TID on 6/13. Ongoing cardiac/volume optimization with IV Lasix & dobutamine infusion. Respiratory status and renal function continues to improve. Plan for RHC once euvolemic and off dobutamine     Interval History:   No acute overnight events. Continued on lasix and dobutamine drips. 3.4L UOP yesterday, net neg 25L for admit. Patient reports his SOB is much improved and he feels close to his baseline. No chest pain overnight. "runs of VT" noted in nursing note, this appears to be artifact. No palpitations.     Review of Systems   Constitution: Negative for chills, diaphoresis and fever.   Cardiovascular: Positive for leg swelling (notes continued improvement ). Negative for chest pain, irregular heartbeat, palpitations and paroxysmal " nocturnal dyspnea.   Respiratory: Positive for cough (non-productive). Negative for shortness of breath, sputum production and wheezing.    Musculoskeletal: Negative for joint pain.   Gastrointestinal: Negative for bloating, abdominal pain, constipation, diarrhea, nausea and vomiting.   Genitourinary: Positive for frequency. Negative for dysuria.        Patient being actively diuresed with IV Lasix infusion.   Neurological: Negative for dizziness, headaches and light-headedness.   Psychiatric/Behavioral: The patient does not have insomnia.      Objective:     Vital Signs (Most Recent):  Temp: 97.4 °F (36.3 °C) (06/15/20 1122)  Pulse: 94 (06/15/20 1122)  Resp: 18 (06/15/20 1122)  BP: 102/70 (06/15/20 1122)  SpO2: 99 % (06/15/20 1122) Vital Signs (24h Range):  Temp:  [97.3 °F (36.3 °C)-98.4 °F (36.9 °C)] 97.4 °F (36.3 °C)  Pulse:  [] 94  Resp:  [15-18] 18  SpO2:  [95 %-99 %] 99 %  BP: (100-110)/(62-75) 102/70     Weight: (pts. was to weak it was reported to nurse)  Body mass index is 24.6 kg/m².     SpO2: 99 %  O2 Device (Oxygen Therapy): room air      Intake/Output Summary (Last 24 hours) at 6/15/2020 1201  Last data filed at 6/15/2020 1012  Gross per 24 hour   Intake 480 ml   Output 3875 ml   Net -3395 ml       Lines/Drains/Airways     Drain            Male External Urinary Catheter 06/08/20 0713 Medium 7 days          Peripheral Intravenous Line                 Peripheral IV - Single Lumen 06/08/20 0019 18 G Right Antecubital 7 days         Peripheral IV - Single Lumen 06/11/20 1822 20 G Anterior;Proximal;Right Forearm 3 days                Physical Exam   Constitutional: He is oriented to person, place, and time. No distress.   HENT:   Head: Normocephalic and atraumatic.   Eyes: EOM are normal. Scleral icterus is present.   Neck: Normal range of motion. Neck supple. No tracheal deviation present.   Cardiovascular: Normal rate. Exam reveals no gallop and no friction rub.   Murmur heard.  Grade 2 systolic  murmur best heard over mitral region.   Pulmonary/Chest: Effort normal and breath sounds normal. He has no wheezes. He has no rales.   Abdominal: Soft. Bowel sounds are normal. He exhibits distension and ascites. There is hepatomegaly. There is no abdominal tenderness.   Well healed midline surgical scar.   Genitourinary:    Genitourinary Comments: Condom cath with clear yellow urine output.     Musculoskeletal:         General: Edema (trace pitting pedal edmea) present.   Neurological: He is alert and oriented to person, place, and time. He exhibits normal muscle tone.   Skin: Skin is warm and dry. He is not diaphoretic.   Psychiatric: He has a normal mood and affect. His behavior is normal.   Nursing note and vitals reviewed.      Significant Labs:   CMP   Recent Labs   Lab 06/14/20  0440 06/14/20  1535 06/14/20  2358 06/15/20  0400   * 130* 131* 130*   K 3.5 3.5 3.5 3.8   CL 90* 89* 90* 90*   CO2 26 27 27 26   * 199* 187* 170*   BUN 56* 54* 53* 53*   CREATININE 2.3* 2.2* 2.1* 2.0*   CALCIUM 8.2* 8.4* 8.6* 8.8   PROT 7.8  --   --  7.6   ALBUMIN 2.9*  --   --  2.8*   BILITOT 6.3*  --   --  5.6*   ALKPHOS 333*  --   --  302*   AST 75*  --   --  65*   ALT 78*  --   --  74*   ANIONGAP 15 14 14 14   ESTGFRAFRICA 35.4* 37.3* 39.5* 41.9*   EGFRNONAA 30.6* 32.3* 34.2* 36.2*   , CBC   Recent Labs   Lab 06/14/20  0440 06/15/20  0400   WBC 3.69* 3.69*   HGB 13.7* 13.0*   HCT 42.0 40.6   PLT 70* 74*    and All pertinent lab results from the last 24 hours have been reviewed.      Assessment and Plan:     * Acute on chronic combined systolic and diastolic heart failure  55 y.o M with HFrEF (EF 20%) 2/2 NICM. Multiple admission in the past few years and this is his third admission this year. Markedly volume overloaded on exam, high BNP, KEYANNA on CKD, congestive hepatopathy Reported history of non compliance in the past but currently he reports compliance to medications.     TTE on 6/8:  · Severe left atrial  enlargement.  · Mild left ventricular enlargement.  · Severely decreased left ventricular systolic function. The estimated ejection fraction is 20%.  · Severe global hypokinetic wall motion.  · Grade III (severe) left ventricular diastolic dysfunction consistent with restrictive physiology.  · Severe right atrial enlargement.  · Moderate right ventricular enlargement.  · Moderately reduced right ventricular systolic function.  · Moderate-to-severe mitral regurgitation.  · Mild to moderate tricuspid regurgitation.  · Pulmonary hypertension present.  · The estimated PA systolic pressure is 65 mmHg.  · Elevated central venous pressure (15 mmHg).  · Trivial pericardial effusion.    - continue dobutamine and Lasix infusions for now  - holding home dose Toprolol-XL 50 mg daily in light of decompensated heart failure and hypotension  - started on low dose bidil on 6/13. increased to hydralazine 25mg and isosorbide dinitrate 20mg     Acute respiratory failure with hypoxia  Please see Acute on chronic combined systolic and diastolic heart failure.    CKD (chronic kidney disease), stage IV  Please see Acute kidney injury superimposed on CKD.    Hyperbilirubinemia  Patient presented with total bilirubin of 9, alkaline phosphatase 287, AST 48, and GGT 84. ALT within normal limits with a value of 34. He underwent US liver with Doppler upon presentation which showed unremarkable pancrease, enlarge liver with heterogenous echotexture nodularity suggestive of cirrhosis, cholelithiasis with gallbladder wall thickening but no CBD or intrahepatic dilation, and large volume ascites. Vasculature was patent with proper directional flow.      MELD-Na score: 27 at 6/12/2020  4:29 PM  MELD score: 26 at 6/12/2020  4:29 PM  Calculated from:  Serum Creatinine: 2.6 mg/dL at 6/12/2020  4:29 PM  Serum Sodium: 134 mmol/L at 6/12/2020  4:29 PM  Total Bilirubin: 7.6 mg/dL at 6/12/2020  4:25 AM  INR(ratio): 1.3 at 6/10/2020  7:11 AM  Age: 55 years 3  months      - liver function improving   - Hepatology consulted for assistance, differential includes cirrhosis, congestive hepatopathy, and ischemic hepatitis (deferred to outpatient for further work-up)  - s/p paracentesis with 1L removed per IR on 6/10  - Ascitic , WBC 59 (7% segs) albumin 2.1 (SAAG 0.6), glucose 118, and protein 4.5  - Ascitic fluid cultures still pending/without growth so far  - Hepatitis and HIV negative  - Serum ammonia, IgG, and AFP within normal limits  - Anti-smooth muscle Ab, Anti-SSA Ab, Anti-SSB Ab, and Anti-Sm/RNP negative  - PETH still pending   - s/p vitamin K 5 mg IV QD for three doses (completed on 6/11)    Acute on chronic combined systolic and diastolic congestive heart failure  Please see Acute on chronic combined systolic and diastolic heart failure.    Shortness of breath  Please see Acute on chronic combined systolic and diastolic heart failure.    Decompensated heart failure  Please see Acute on chronic combined systolic and diastolic heart failure.    Hyperglycemia  Please see Diabetes mellitus, type II.    H/O thyroidectomy  Please see Hypothyroidism.    Essential hypertension  - patient on metoprolol succinate 50 mg daily as outpatient, can't resume beta blocker while on dobutamine infusion  - started on low dose bidil on 6/13. increased to hydralazine 25mg and isosorbide dinitrate 20mg     Acute kidney injury superimposed on CKD  - KEYANNA on CKD V with unclear baseline creatinine   - presented with serum creatinine 3.5  - continuing to improve, serum creatinine 2.0 this AM  - will continue to monitor    Non-rheumatic mitral regurgitation  Please see Acute on chronic combined systolic and diastolic heart failure.    Chronic combined systolic and diastolic congestive heart failure  Please see Acute on chronic combined systolic and diastolic heart failure.    Diabetes mellitus, type II  Patient on levemir 5 U daily as outpatient. Presented with blood glucose in the  300-500s and HgbA1c 7.9%. Anion gap within normal limits on presentation with a value of 12 (bicarbonate was 20). UA remarkable only for +1 protein, +1 occult blood (1 RBC/hpf) and 10 hyaline cast. There were no ketones or glucose. Serum osmolality was 331 but beta hydroxybutyrate was 0.2. VBG at that time showed pH 7.321, CO2 49.5, O2 22, and HCO3 25.6. He was subsequently started on insulin infusion and Endocrinology was consulted for assistance.    - Endocrinology consulted and following  - Resume determir 8 U QHS and aspart 3 U TID WM with LDSSI  - Accuchecks ACHS  - Diabetic cardiac renal diet with 1,500 mL volume restriction    Hypothyroidism  Patient with history of thyroidectomy on levothyroxine 175 mcg as outpatient. On admission patient's TSH elevated with a value of 8.449 however free T4 0.74.    - Endocrinology consulted and following  - resume home dose Synthroid     Thrombocytopenia  Chronic issue, patient with history of thrombocytopenia dating back to December 2018. Suspect related to underlying hepatopathy. Please see Hyperbilirubinemia.        VTE Risk Mitigation (From admission, onward)         Ordered     IP VTE HIGH RISK PATIENT  Once      06/07/20 2346     Place sequential compression device  Until discontinued      06/07/20 2346                Sha Contreras MD  Cardiology  [facility]

## 2020-06-15 NOTE — PROGRESS NOTES
Telemetry box malfunction today, called telemetry multiple times, changed the box twice and the leads twice. [employer] came to the bedside, stated that there is a problem with the connection in the room. The patient has been awake, alert and oriented today with vitals WNL. Will continue to monitor.

## 2020-06-15 NOTE — PROGRESS NOTES
"Pt complaining of "eye pain and pressure" pt states that he has pain behind his eyes and is causing a headache.  RN offered tylenol and pt states he wants to try eyedrops first.  RN contacted MD Bang and artifical tears ordered.  RN to administer once received from pharmacy.  Will continue to monitor.   "

## 2020-06-15 NOTE — SUBJECTIVE & OBJECTIVE
"Interval History:   No acute overnight events. Continued on lasix and dobutamine drips. Patient reports his SOB is much improved and he feels close to his baseline. No chest pain overnight. "runs of VT" noted in nursing note, this appears to be artifact. No palpitations.     Review of Systems   Constitution: Negative for chills, diaphoresis and fever.   Cardiovascular: Positive for leg swelling (notes continued improvement ). Negative for chest pain, irregular heartbeat, palpitations and paroxysmal nocturnal dyspnea.   Respiratory: Positive for cough (non-productive). Negative for shortness of breath, sputum production and wheezing.    Musculoskeletal: Negative for joint pain.   Gastrointestinal: Negative for bloating, abdominal pain, constipation, diarrhea, nausea and vomiting.   Genitourinary: Positive for frequency. Negative for dysuria.        Patient being actively diuresed with IV Lasix infusion.   Neurological: Negative for dizziness, headaches and light-headedness.   Psychiatric/Behavioral: The patient does not have insomnia.      Objective:     Vital Signs (Most Recent):  Temp: 97.4 °F (36.3 °C) (06/15/20 1122)  Pulse: 94 (06/15/20 1122)  Resp: 18 (06/15/20 1122)  BP: 102/70 (06/15/20 1122)  SpO2: 99 % (06/15/20 1122) Vital Signs (24h Range):  Temp:  [97.3 °F (36.3 °C)-98.4 °F (36.9 °C)] 97.4 °F (36.3 °C)  Pulse:  [] 94  Resp:  [15-18] 18  SpO2:  [95 %-99 %] 99 %  BP: (100-110)/(62-75) 102/70     Weight: (pts. was to weak it was reported to nurse)  Body mass index is 24.6 kg/m².     SpO2: 99 %  O2 Device (Oxygen Therapy): room air      Intake/Output Summary (Last 24 hours) at 6/15/2020 1201  Last data filed at 6/15/2020 1012  Gross per 24 hour   Intake 480 ml   Output 3875 ml   Net -3395 ml       Lines/Drains/Airways     Drain            Male External Urinary Catheter 06/08/20 0713 Medium 7 days          Peripheral Intravenous Line                 Peripheral IV - Single Lumen 06/08/20 0019 18 G Right " Antecubital 7 days         Peripheral IV - Single Lumen 06/11/20 1822 20 G Anterior;Proximal;Right Forearm 3 days                Physical Exam   Constitutional: He is oriented to person, place, and time. No distress.   HENT:   Head: Normocephalic and atraumatic.   Eyes: EOM are normal. Scleral icterus is present.   Neck: Normal range of motion. Neck supple. No tracheal deviation present.   Cardiovascular: Normal rate. Exam reveals no gallop and no friction rub.   Murmur heard.  Grade 2 systolic murmur best heard over mitral region.   Pulmonary/Chest: Effort normal and breath sounds normal. He has no wheezes. He has no rales.   Abdominal: Soft. Bowel sounds are normal. He exhibits distension and ascites. There is hepatomegaly. There is no abdominal tenderness.   Well healed midline surgical scar.   Genitourinary:    Genitourinary Comments: Condom cath with clear yellow urine output.     Musculoskeletal:         General: Edema (trace pitting pedal edmea) present.   Neurological: He is alert and oriented to person, place, and time. He exhibits normal muscle tone.   Skin: Skin is warm and dry. He is not diaphoretic.   Psychiatric: He has a normal mood and affect. His behavior is normal.   Nursing note and vitals reviewed.      Significant Labs:   CMP   Recent Labs   Lab 06/14/20  0440 06/14/20  1535 06/14/20  2358 06/15/20  0400   * 130* 131* 130*   K 3.5 3.5 3.5 3.8   CL 90* 89* 90* 90*   CO2 26 27 27 26   * 199* 187* 170*   BUN 56* 54* 53* 53*   CREATININE 2.3* 2.2* 2.1* 2.0*   CALCIUM 8.2* 8.4* 8.6* 8.8   PROT 7.8  --   --  7.6   ALBUMIN 2.9*  --   --  2.8*   BILITOT 6.3*  --   --  5.6*   ALKPHOS 333*  --   --  302*   AST 75*  --   --  65*   ALT 78*  --   --  74*   ANIONGAP 15 14 14 14   ESTGFRAFRICA 35.4* 37.3* 39.5* 41.9*   EGFRNONAA 30.6* 32.3* 34.2* 36.2*   , CBC   Recent Labs   Lab 06/14/20  0440 06/15/20  0400   WBC 3.69* 3.69*   HGB 13.7* 13.0*   HCT 42.0 40.6   PLT 70* 74*    and All pertinent  lab results from the last 24 hours have been reviewed.

## 2020-06-15 NOTE — PROGRESS NOTES
RN contacted MD Bang regarding pt unable to take potassium pills.  Pt states that the potassium he received during the day was crushed; however, the MAR states to take the medication whole.  MD stated to administer 40meq of potassium IV.  Orders place; will administer once verified.  Will continue to monitor.

## 2020-06-15 NOTE — PLAN OF CARE
Plan of care discussed with patient. Patient is free of fall/trauma/injury. Denies CP, SOB, or pain/discomfort. Has Dobutamine drip at 5 mcg/kg/min and Lasix drip at 2 mg/ml. Vitals WNL and BS WNL. Potassium 3.8 today, replaced with 40 mEq capsules. All questions addressed. Will continue to monitor.

## 2020-06-15 NOTE — PLAN OF CARE
Patient remained free of falls, trauma, injury, and skin breakdown.  Multiple runs of VT; pt asymptomatic and potassium replaced.  VSS; pt complaint of eye pain- PRN medications ordered and administered.  Glucose monitored and insulin administered per orders.  Fall precautions maintained; education provided.  Plan of care reviewed; patient verbalized understanding.  All questions and concerns addressed; will continue to monitor.

## 2020-06-15 NOTE — PROGRESS NOTES
RN contacted MD Guardado regarding pt's multiple runs of VT.  Longest run was 7 consecutive beats; however, pt had frequent short runs for approximately 2 minutes.  Pt remained asymptomatic and VSS. Additional lab orders place; will continue to monitor.

## 2020-06-15 NOTE — PROGRESS NOTES
RN contacted MD Bang regarding pt's K of 3.5.  Telephone readback orders for 40 of potassium PO.  Orders placed; RN to administer once verified.  Will continue to monitor.

## 2020-06-16 PROBLEM — R73.9 HYPERGLYCEMIA: Status: RESOLVED | Noted: 2018-12-22 | Resolved: 2020-01-01

## 2020-06-16 NOTE — PLAN OF CARE
Pt remained free of injuries, falls, and trauma. VSS. Pt c/o eye pain. PRN Tylenol and eye drops administered. Continuous Lasix and Dobutamine infusing at prescribed rate. Strict intake and output being monitored. Fluid restriction maintained. Electrolytes being replaced as needed. Glucose monitored at bedtime, 204. SSI and scheduled Levemir administered.  Plan of care reviewed with pt. Pt verbalized understanding. All questions addressed. No new complaints mentioned.

## 2020-06-16 NOTE — SUBJECTIVE & OBJECTIVE
Interval History:   No acute events overnight. SOB improved overall, with minimal LE edema. IVC IVC diameter ~1.5-1.6 that is collapsible on sniff suggesting RA pressure ~ 3 mmHg. Will dc lasix drip and start home regimen of PO 80mg BID. Continue dobutamine until RHC tomorrow. 2L UOP over past 24 hours. Net neg 26L for admission. Patient appears to have depressed affect each morning.     Review of Systems   Constitution: Negative for chills, diaphoresis and fever.   Cardiovascular: Positive for leg swelling (notes continued improvement ). Negative for chest pain, irregular heartbeat, palpitations and paroxysmal nocturnal dyspnea.   Respiratory: Positive for cough (non-productive). Negative for shortness of breath, sputum production and wheezing.    Musculoskeletal: Negative for joint pain.   Gastrointestinal: Negative for bloating, abdominal pain, constipation, diarrhea, nausea and vomiting.   Genitourinary: Negative for dysuria.   Neurological: Negative for dizziness, headaches and light-headedness.   Psychiatric/Behavioral: The patient does not have insomnia.      Objective:     Vital Signs (Most Recent):  Temp: 98.1 °F (36.7 °C) (06/16/20 1123)  Pulse: 91 (06/16/20 1123)  Resp: 14 (06/16/20 1123)  BP: 102/69 (06/16/20 1123)  SpO2: 97 % (06/16/20 1123) Vital Signs (24h Range):  Temp:  [97.6 °F (36.4 °C)-98.8 °F (37.1 °C)] 98.1 °F (36.7 °C)  Pulse:  [68-96] 91  Resp:  [14-20] 14  SpO2:  [94 %-99 %] 97 %  BP: ()/(59-74) 102/69     Weight: 67 kg (147 lb 11.3 oz)  Body mass index is 20.6 kg/m².     SpO2: 97 %  O2 Device (Oxygen Therapy): room air      Intake/Output Summary (Last 24 hours) at 6/16/2020 1125  Last data filed at 6/16/2020 1114  Gross per 24 hour   Intake 1038 ml   Output 1825 ml   Net -787 ml       Lines/Drains/Airways     Drain            Male External Urinary Catheter 06/08/20 0713 Medium 8 days          Peripheral Intravenous Line                 Peripheral IV - Single Lumen 06/08/20 0019 18 G  Right Antecubital 8 days         Peripheral IV - Single Lumen 06/11/20 1822 20 G Anterior;Proximal;Right Forearm 4 days                Physical Exam   Constitutional: He is oriented to person, place, and time. No distress.   HENT:   Head: Normocephalic and atraumatic.   Eyes: EOM are normal. Scleral icterus is present.   Neck: Normal range of motion. Neck supple. No tracheal deviation present.   Cardiovascular: Normal rate. Exam reveals no gallop and no friction rub.   Murmur heard.  Grade 2 systolic murmur best heard over mitral region. JVD improved, approx 1/3 up neck   Pulmonary/Chest: Effort normal and breath sounds normal. He has no wheezes. He has no rales.   Abdominal: Soft. Bowel sounds are normal. He exhibits distension and ascites. There is hepatomegaly. There is no abdominal tenderness.   Well healed midline surgical scar.   Genitourinary:    Genitourinary Comments: Condom cath with clear yellow urine output.     Musculoskeletal:         General: Edema (trace pitting pedal edmea) present.   Neurological: He is alert and oriented to person, place, and time. He exhibits normal muscle tone.   Skin: Skin is warm and dry. He is not diaphoretic.   Psychiatric: He has a normal mood and affect. His behavior is normal.   Nursing note and vitals reviewed.      Significant Labs:   CMP   Recent Labs   Lab 06/15/20  0400 06/15/20  1534 06/16/20  0454   * 131* 132*   K 3.8 3.9 4.2   CL 90* 91* 93*   CO2 26 25 24   * 130* 136*   BUN 53* 54* 54*   CREATININE 2.0* 2.1* 2.1*   CALCIUM 8.8 8.9 9.2   PROT 7.6  --  7.9   ALBUMIN 2.8*  --  2.9*   BILITOT 5.6*  --  5.6*   ALKPHOS 302*  --  315*   AST 65*  --  65*   ALT 74*  --  74*   ANIONGAP 14 15 15   ESTGFRAFRICA 41.9* 39.5* 39.5*   EGFRNONAA 36.2* 34.2* 34.2*   , CBC   Recent Labs   Lab 06/15/20  0400 06/16/20  0454   WBC 3.69* 3.80*   HGB 13.0* 15.2   HCT 40.6 47.1   PLT 74* 94*    and All pertinent lab results from the last 24 hours have been reviewed.

## 2020-06-16 NOTE — PLAN OF CARE
06/16/20 0806   Discharge Reassessment   Assessment Type Discharge Planning Reassessment   Do you have any problems affording any of your prescribed medications? TBD   Discharge Plan A Home   DME Needed Upon Discharge  none

## 2020-06-16 NOTE — PROGRESS NOTES
Spoke to MD Nesha about patient's /70 MAP 79. Ordered to still give increased doses of hydralazine and isorbside. MD ordered to decrease dobutamine drip from 5 mcg/kg/hr to 2.5 mcg/kg/hr. Notified MD that patient refused magnesium and BNP labs to be drawn. Will continue to monitor.

## 2020-06-16 NOTE — CARE UPDATE
BG goal 140 -180     BG is reasonably controlled on current SQ insulin regimen.     - Levemir 8 units daily   - Novolog 4 units TIDWM.   - BG Monitoring AC/HS     ** Please call Endocrine for any BG related issues **  ** Please notify Endocrine for any change and/or advance in diet**    Discharge Planning:   TBD. Please notify endocrinology prior to discharge.

## 2020-06-16 NOTE — CARE UPDATE
CCU update plan of care  6/16/20, 8 AM      Limited bedside echo shows IVC diameter ~1.5-1.6 that is collapsible on sniff suggesting RA pressure ~ 3 mmHg.     Will transition off Lasix infusion to PO regimen today. Continue  infusion for now. Tolerating hydral/isordil TID.    Karol Jeffries MD   Cardiology Fellow, PGY-5

## 2020-06-16 NOTE — PROGRESS NOTES
[facility]  Cardiology  Progress Note    Patient Name: Ashsih Mckeon  MRN: [id number]  Admission Date: 6/7/2020  Hospital Length of Stay: 9 days  Code Status: Full Code   Attending Physician: Artemio Mullen MD   Primary Care Physician: [facility]  Expected Discharge Date: 6/18/2020  Principal Problem:Acute on chronic combined systolic and diastolic heart failure    Subjective:     Hospital Course:   Patient admitted to Cardiology service on 6/8 with CHF excerbation, hyperglycemia, and hyperbilirubinemia. He was started on Lasix, dobutamine, and insulin infusion. US of the liver with Doppler was performed which revealed no ductal dilation but echogenic changes suggestive of cirrhosis and ascites. Endocrinology was consulted to assist in blood sugar management and glucose improved. He is diuresising well. Hepatology was consulted given concern for cirrhosis, congestive hepatopathy, versus ischemic hepatitis. Paracentesis was performed by Interventional Radiology on 6/10 along with transition to basal bolus insulin regimen.  He has completed three days IV vitamin K on 6/11.  He was started on hydralazine and isosorbide dinitrate on 6/13, increasing as tolearted. He was continued on cardiac/volume optimization with IV Lasix & dobutamine infusion. Respiratory status and renal function continueed to improve. On 6/16, IVC was noted to be small and collapsible. Lasix drip was discontinued, restarted home PO lasix regimen. Plan for RHC tomorrow     Interval History:   No acute events overnight. SOB improved overall, with minimal LE edema. IVC IVC diameter ~1.5-1.6 that is collapsible on sniff suggesting RA pressure ~ 3 mmHg. Will dc lasix drip and start home regimen of PO 80mg BID. Continue dobutamine until RHC tomorrow. 2L UOP over past 24 hours. Net neg 26L for admission. Patient appears to have depressed affect each morning.     Review of Systems   Constitution: Negative for  chills, diaphoresis and fever.   Cardiovascular: Positive for leg swelling (notes continued improvement ). Negative for chest pain, irregular heartbeat, palpitations and paroxysmal nocturnal dyspnea.   Respiratory: Positive for cough (non-productive). Negative for shortness of breath, sputum production and wheezing.    Musculoskeletal: Negative for joint pain.   Gastrointestinal: Negative for bloating, abdominal pain, constipation, diarrhea, nausea and vomiting.   Genitourinary: Negative for dysuria.   Neurological: Negative for dizziness, headaches and light-headedness.   Psychiatric/Behavioral: The patient does not have insomnia.      Objective:     Vital Signs (Most Recent):  Temp: 98.1 °F (36.7 °C) (06/16/20 1123)  Pulse: 91 (06/16/20 1123)  Resp: 14 (06/16/20 1123)  BP: 102/69 (06/16/20 1123)  SpO2: 97 % (06/16/20 1123) Vital Signs (24h Range):  Temp:  [97.6 °F (36.4 °C)-98.8 °F (37.1 °C)] 98.1 °F (36.7 °C)  Pulse:  [68-96] 91  Resp:  [14-20] 14  SpO2:  [94 %-99 %] 97 %  BP: ()/(59-74) 102/69     Weight: 67 kg (147 lb 11.3 oz)  Body mass index is 20.6 kg/m².     SpO2: 97 %  O2 Device (Oxygen Therapy): room air      Intake/Output Summary (Last 24 hours) at 6/16/2020 1125  Last data filed at 6/16/2020 1114  Gross per 24 hour   Intake 1038 ml   Output 1825 ml   Net -787 ml       Lines/Drains/Airways     Drain            Male External Urinary Catheter 06/08/20 0713 Medium 8 days          Peripheral Intravenous Line                 Peripheral IV - Single Lumen 06/08/20 0019 18 G Right Antecubital 8 days         Peripheral IV - Single Lumen 06/11/20 1822 20 G Anterior;Proximal;Right Forearm 4 days                Physical Exam   Constitutional: He is oriented to person, place, and time. No distress.   HENT:   Head: Normocephalic and atraumatic.   Eyes: EOM are normal. Scleral icterus is present.   Neck: Normal range of motion. Neck supple. No tracheal deviation present.   Cardiovascular: Normal rate. Exam  reveals no gallop and no friction rub.   Murmur heard.  Grade 2 systolic murmur best heard over mitral region. JVD improved, approx 1/3 up neck   Pulmonary/Chest: Effort normal and breath sounds normal. He has no wheezes. He has no rales.   Abdominal: Soft. Bowel sounds are normal. He exhibits distension and ascites. There is hepatomegaly. There is no abdominal tenderness.   Well healed midline surgical scar.   Genitourinary:    Genitourinary Comments: Condom cath with clear yellow urine output.     Musculoskeletal:         General: Edema (trace pitting pedal edmea) present.   Neurological: He is alert and oriented to person, place, and time. He exhibits normal muscle tone.   Skin: Skin is warm and dry. He is not diaphoretic.   Psychiatric: He has a normal mood and affect. His behavior is normal.   Nursing note and vitals reviewed.      Significant Labs:   CMP   Recent Labs   Lab 06/15/20  0400 06/15/20  1534 06/16/20  0454   * 131* 132*   K 3.8 3.9 4.2   CL 90* 91* 93*   CO2 26 25 24   * 130* 136*   BUN 53* 54* 54*   CREATININE 2.0* 2.1* 2.1*   CALCIUM 8.8 8.9 9.2   PROT 7.6  --  7.9   ALBUMIN 2.8*  --  2.9*   BILITOT 5.6*  --  5.6*   ALKPHOS 302*  --  315*   AST 65*  --  65*   ALT 74*  --  74*   ANIONGAP 14 15 15   ESTGFRAFRICA 41.9* 39.5* 39.5*   EGFRNONAA 36.2* 34.2* 34.2*   , CBC   Recent Labs   Lab 06/15/20  0400 06/16/20  0454   WBC 3.69* 3.80*   HGB 13.0* 15.2   HCT 40.6 47.1   PLT 74* 94*    and All pertinent lab results from the last 24 hours have been reviewed.        Assessment and Plan:     * Acute on chronic combined systolic and diastolic heart failure  55 y.o M with HFrEF (EF 20%) 2/2 NICM. Multiple admission in the past few years and this is his third admission this year. Markedly volume overloaded on exam, high BNP, KEYANNA on CKD, congestive hepatopathy Reported history of non compliance in the past but currently he reports compliance to medications.     TTE on 6/8:  · Severe left atrial  enlargement.  · Mild left ventricular enlargement.  · Severely decreased left ventricular systolic function. The estimated ejection fraction is 20%.  · Severe global hypokinetic wall motion.  · Grade III (severe) left ventricular diastolic dysfunction consistent with restrictive physiology.  · Severe right atrial enlargement.  · Moderate right ventricular enlargement.  · Moderately reduced right ventricular systolic function.  · Moderate-to-severe mitral regurgitation.  · Mild to moderate tricuspid regurgitation.  · Pulmonary hypertension present.  · The estimated PA systolic pressure is 65 mmHg.  · Elevated central venous pressure (15 mmHg).  · Trivial pericardial effusion.    - On 6/16, IVC was noted to be small and collapsible. Lasix drip was discontinued, restarted home PO lasix regimen. Plan for RHC tomorrow   - holding home dose Toprolol-XL 50 mg daily in light of decompensated heart failure and hypotension  - started on low dose bidil on 6/13. increased to hydralazine 50mg and isosorbide dinitrate 40mg     CKD (chronic kidney disease), stage IV  Please see Acute kidney injury superimposed on CKD.    Hyperbilirubinemia  Patient presented with total bilirubin of 9, alkaline phosphatase 287, AST 48, and GGT 84. ALT within normal limits with a value of 34. He underwent US liver with Doppler upon presentation which showed unremarkable pancrease, enlarge liver with heterogenous echotexture nodularity suggestive of cirrhosis, cholelithiasis with gallbladder wall thickening but no CBD or intrahepatic dilation, and large volume ascites. Vasculature was patent with proper directional flow.        - liver function improving   - Hepatology consulted for assistance, differential includes cirrhosis, congestive hepatopathy, and ischemic hepatitis (deferred to outpatient for further work-up)  - s/p paracentesis with 1L removed per IR on 6/10  - Ascitic , WBC 59 (7% segs) albumin 2.1 (SAAG 0.6), glucose 118, and  protein 4.5  - Ascitic fluid cultures still pending/without growth so far  - Hepatitis and HIV negative  - Serum ammonia, IgG, and AFP within normal limits  - Anti-smooth muscle Ab, Anti-SSA Ab, Anti-SSB Ab, and Anti-Sm/RNP negative  - PETH still pending   - s/p vitamin K 5 mg IV QD for three doses (completed on 6/11)    Essential hypertension  - patient on metoprolol succinate 50 mg daily as outpatient, can't resume beta blocker while on dobutamine infusion  - started on low dose bidil on 6/13. increased to hydralazine 50mg and isosorbide dinitrate 40mg     Acute kidney injury superimposed on CKD  - KEYANNA on CKD V with unclear baseline creatinine   - presented with serum creatinine 3.5  - continuing to improve with diuresis, serum creatinine 2.1 this AM  - will continue to monitor    Chronic combined systolic and diastolic congestive heart failure  Please see Acute on chronic combined systolic and diastolic heart failure.    Diabetes mellitus, type II  Presented with blood glucose in the 300-500s and HgbA1c 7.9%. He was subsequently started on insulin infusion and Endocrinology was consulted for assistance.    - Endocrinology consulted and following  - Resume determir 8 U QHS and aspart 4 U TID WM with LDSSI  - Accuchecks ACHS  - Diabetic cardiac renal diet with 1,500 mL volume restriction    Hypothyroidism  Patient with history of thyroidectomy on levothyroxine 175 mcg as outpatient. On admission patient's TSH elevated with a value of 8.449 however free T4 0.74.    - Endocrinology consulted and following  - resume home dose Synthroid     Thrombocytopenia  Chronic issue, patient with history of thrombocytopenia dating back to December 2018. Suspect related to underlying hepatopathy. Please see Hyperbilirubinemia.        VTE Risk Mitigation (From admission, onward)         Ordered     IP VTE HIGH RISK PATIENT  Once      06/07/20 2346     Place sequential compression device  Until discontinued      06/07/20 2346                 Sha Garyor, MD  Cardiology  [facility]

## 2020-06-16 NOTE — PROGRESS NOTES
Patient had 5 beat run of VTACH, notified MD Nesha. Patient alert, awake and oriented. BP 99/65 MAP 78. Potassium 4.2 and Mag 2.3. No orders given. MD okay with patient getting Hydralazine and Isorbiside this morning. Will continue to monitor.

## 2020-06-16 NOTE — PLAN OF CARE
Plan of care discussed with patient. Patient is free of fall/trauma/injury. Denies CP, SOB, or pain/discomfort. Patient going for right heart cath tomorrow. Vitals WNL. Patient refused afternoon labs for Magnesium and BNP. MD Nesha notified. Dobutamine drip decreased from 5 mcg/kg/min to 2.5 mcg/kg/min. Lasix drip discontinued today. All questions addressed. Will continue to monitor.

## 2020-06-17 NOTE — PLAN OF CARE
Patient has been accepted by [facility] and [facility]. Per the patient's Care Team, patient can be admitted on Sunday by [facility]. SW will continue to follow.      06/17/20 1510   Post-Acute Status   Post-Acute Authorization Home Health;Medications   Home Health Status Set-up Complete   Medication Status Pending Prior Authorization     Abby Pollock LMSW   - [facility]  Ext. [phone]

## 2020-06-17 NOTE — PLAN OF CARE
[facility]  [address] [place] [address]     HOME  HEALTH ORDERS      Admit to Home Health      NEEDS NURSING AIDE    Diagnosis:   Patient Active Problem List   Diagnosis    Thrombocytopenia    Megaloblastic anemia due to vitamin B12 deficiency    Hypothyroidism    Diabetes mellitus, type II    Chronic combined systolic and diastolic congestive heart failure - Stage IV     Anemia of chronic disease    Acute kidney injury superimposed on CKD    Essential hypertension    Substance use disorder    NSVT (nonsustained ventricular tachycardia)    Essential (primary) hypertension    Other proteinuria    right upper lobe mass    Pulmonary nodules    Blurry vision, bilateral    Hyperlipidemia    Pseudogout of left wrist    Elevated serum creatinine    Septic arthritis    Crystals present in synovial fluid obtained by arthrocentesis    Pain in left wrist    Acute on chronic combined systolic and diastolic heart failure    Anemia    Thrombocytopenia, unspecified    Chronic kidney disease, stage IV (severe)    Nonischemic cardiomyopathy    SOB (shortness of breath)    Cardiogenic shock    Hyperbilirubinemia    CKD (chronic kidney disease), stage IV        Patient is homebound due to:   Diet: Low Sodium  Acitivities: As Tolerated    Nursing:   SN to complete comprehensive assessment including routine vital signs. Instruct on disease process and s/s of complications to report to MD. Review/verify medication list sent home with the patient at time of discharge  and instruct patient/caregiver as needed. Frequency may be adjusted depending on start of care date.    Notify MD if SBP > 160 or < 90; DBP > 90 or < 50; HR > 120 or < 50; Temp > 101; Weight gain >3lbs in 1 day or 5lbs in 1 week.      HOME INFUSION THERAPY: Dobutamine 5 mcg/kg/min (dosing weight 54.2 kg)  SN to perform Infusion Therapy/Central Line Care.  Review Central Line Care & Central Line Flush with  "patient.    Administer (drug and dose): Dobutamine 5 mcg/kg/min (dosing weight 54.2 kg)      Central line care:  Scrub the Hub: Prior to accessing the line, always perform a 30 second alcohol scrub  Each lumen of the central line is to be flushed at least daily with 10 mL Normal Saline and 3 mL Heparin flush (100 units/mL)  Skilled Nurse (SN) may draw blood from IV access  Blood Draw Procedure:   - Aspirate at least 5 mL of blood   - Discard   - Obtain specimen   - Change posiflow cap   - Flush with 20 mL Normal Saline followed by a                 3-5 mL Heparin flush (100 units/mL)  Central :   - Sterile dressing changes are done weekly and as needed.   - Use chlor-hexadine scrub to cleanse site, apply Biopatch to insertion site,       apply securement device dressing   - Posi-flow caps are changed weekly and after EVERY lab draw.   - If sterile gauze is under dressing to control oozing,                 dressing change must be performed every 24 hours until gauze is not needed.        I have seen and examined this patient face to face today. My clinical findings that support the need for the home health skilled services and home bound status are the following:  Weakness/numbness causing balance and gait disturbance due to Weakness/Debility making it taxing to leave home.    Allergies:  Review of patient's allergies indicates:   Allergen Reactions    Lisinopril Other (See Comments)     acei cough         Diet: 2 gram sodium diet    Activities: activity as tolerated      Medications: Review discharge medications with patient and family and provide education.           Medication List      START taking these medications    BD ULTRA-FINE CONNIE PEN NEEDLE 32 gauge x 5/32" Ndle  Generic drug: pen needle, diabetic  Use to inject insulin four times daily before meals and nightly.     DOBUTamine 500 mg/250 mL (2,000 mcg/mL)  Commonly known as: DOBUTREX  Inject 271 mcg/min into the vein continuous.   "   hydrALAZINE 50 MG tablet  Commonly known as: APRESOLINE  Take 1.5 tablets (75 mg total) by mouth 3 (three) times daily.     isosorbide dinitrate 20 MG tablet  Commonly known as: ISORDIL  Take 2 tablets (40 mg total) by mouth 3 (three) times daily.     lancing device Misc  Use to test blood glucose four times daily before meals and nightly.     LEVEMIR FLEXTOUCH U-100 INSULN 100 unit/mL (3 mL) Inpn pen  Generic drug: insulin detemir U-100  Inject 8 Units into the skin every evening.  Replaces: LEVEMIR U-100 INSULIN 100 unit/mL injection     NovoLOG Flexpen U-100 Insulin 100 unit/mL (3 mL) Inpn pen  Generic drug: insulin aspart U-100  Inject 4 Units into the skin 3 (three) times daily with meals.     TRUE METRIX GLUCOSE METER Misc  Generic drug: blood-glucose meter  Use as instructed     TRUE METRIX GLUCOSE TEST STRIP Strp  Generic drug: blood sugar diagnostic  Use to test blood glucose four times daily before meals and nightly.     TRUEPLUS LANCETS 30 gauge Misc  Generic drug: lancets  Use to test blood glucose four times daily before meals and nightly.        CHANGE how you take these medications    acetaminophen 325 MG tablet  Commonly known as: TYLENOL  Take 2 tablets (650 mg total) by mouth every 6 to 8 hours as needed.  What changed:   · how much to take  · when to take this  · reasons to take this        CONTINUE taking these medications    aspirin 81 MG Chew  Take 1 tablet (81 mg total) by mouth once daily.     atorvastatin 40 MG tablet  Commonly known as: LIPITOR     ferrous sulfate 325 (65 FE) MG EC tablet  Take 1 tablet (325 mg total) by mouth every other day.     furosemide 80 MG tablet  Commonly known as: LASIX  Take 1 tablet (80 mg total) by mouth 2 (two) times daily.     HYDROcodone-acetaminophen 5-325 mg per tablet  Commonly known as: NORCO  Take 1 tablet by mouth every 6 (six) hours as needed for Pain.     levothyroxine 175 MCG tablet  Commonly known as: SYNTHROID  Take 1 tablet (175 mcg total) by  "mouth before breakfast.        STOP taking these medications    LEVEMIR U-100 INSULIN 100 unit/mL injection  Generic drug: insulin detemir U-100  Replaced by: LEVEMIR FLEXTOUCH U-100 INSULN 100 unit/mL (3 mL) Inpn pen     metoprolol succinate 50 MG 24 hr tablet  Commonly known as: TOPROL-XL           Where to Get Your Medications      These medications were sent to [facility] [address], [place] [address]    Hours: Mon-Fri 7a-7p, Sat 10a-4p, Sun 10a-4p Phone: [phone]   · BD ULTRA-FINE CONNIE PEN NEEDLE 32 gauge x 5/32" Ndle  · furosemide 80 MG tablet  · hydrALAZINE 50 MG tablet  · isosorbide dinitrate 20 MG tablet  · lancing device Misc  · LEVEMIR FLEXTOUCH U-100 INSULN 100 unit/mL (3 mL) Inpn pen  · NovoLOG Flexpen U-100 Insulin 100 unit/mL (3 mL) Inpn pen  · TRUE METRIX GLUCOSE METER Misc  · TRUE METRIX GLUCOSE TEST STRIP Strp  · TRUEPLUS LANCETS 30 gauge Misc     Information about where to get these medications is not yet available    Ask your nurse or doctor about these medications  · DOBUTamine 500 mg/250 mL (2,000 mcg/mL)         I certify that this patient is confined to his home and needs physical therapy, occupational therapy, nursing aide.       "

## 2020-06-17 NOTE — PROGRESS NOTES
"[facility]  Adult Nutrition  Progress Note    SUMMARY       Recommendations  1. Continue diabetic cardiac diet as tolerated, with fluid restriction per MD.   2. RD to monitor.    Goals: Adequate PO intake to meet > 75% EEN/EPN by RD follow up  Nutrition Goal Status: new  Communication of RD Recs: other (comment)(POC)    Reason for Assessment    Reason For Assessment: length of stay  Diagnosis: cardiac disease(CHF)  Relevant Medical History: NICM, HTN, HLD, DM  Interdisciplinary Rounds: did not attend  General Information Comments: Pt CALEB for procedure this AM, nutrition hx obtained from wife at bedside. Wife reports pt w/ good appetite currently, consumed 100% of breakfast this AM. % intake confirmed per RN documentation. She reports pt w/ some decreased intake in the first few days of admission, which has since improved. She otherwise reports pt w/ good PO intake PTA and states he has been trying to lose wt PTA. Wife reports pt's UBW is 160#. Wt gain noted PTA per chart, with significant wt loss since admission. Fluid likely contributing to wt loss, pt s/p paracentesis 6/10 and -27.6L since admission. Pt does not meet criteria for malnutrition at this time 2/2 good PO intake, no wt loss PTA.  Nutrition Discharge Planning: d/c on diabetic cardiac diet    Nutrition Risk Screen    Nutrition Risk Screen: no indicators present    Nutrition/Diet History         Anthropometrics    Temp: 98.1 °F (36.7 °C)  Height Method: Stated  Height: 5' 11" (180.3 cm)  Height (inches): 71 in  Weight Method: Standard Scale  Weight: 54.2 kg (119 lb 7.8 oz)  Weight (lb): 119.49 lb  Ideal Body Weight (IBW), Male: 172 lb  % Ideal Body Weight, Male (lb): 102.54 %  BMI (Calculated): 16.7    Lab/Procedures/Meds    Pertinent Labs Reviewed: reviewed  Pertinent Labs Comments: Na 131, BUN 59, Cr 2.3, GFR 35.4, Alb 2.8, AST 45, ALT 56  Pertinent Medications Reviewed: reviewed  Pertinent Medications Comments: calcium " carbonate, dobutamine, insulin, ondansetron, senna-docusate    Estimated/Assessed Needs    Weight Used For Calorie Calculations: 54.2 kg (119 lb 7.8 oz)  Energy Calorie Requirements (kcal): 1897 kcal/day  Energy Need Method: Kcal/kg(35)  Protein Requirements: 71-81 g/day(1.3-1.5 g/kg)  Weight Used For Protein Calculations: 54.2 kg (119 lb 7.8 oz)  Fluid Requirements (mL): per MD or 1 mL/kcal     RDA Method (mL): 1897  CHO Requirement: 237 g/day    Nutrition Prescription Ordered    Current Diet Order: Diabetic cardiac  Nutrition Order Comments: 1500mL FR    Evaluation of Received Nutrient/Fluid Intake    I/O: -27.6L since admit  Comments: LBM 6/16  % Intake of Estimated Energy Needs: 75 - 100 %  % Meal Intake: 75 - 100 %    Nutrition Risk    Level of Risk/Frequency of Follow-up: low(1x/week)     Assessment and Plan    No nutrition diagnosis at this time.    Monitor and Evaluation    Food and Nutrient Intake: energy intake, food and beverage intake  Food and Nutrient Adminstration: diet order  Anthropometric Measurements: weight, weight change, body mass index  Biochemical Data, Medical Tests and Procedures: electrolyte and renal panel, gastrointestinal profile, glucose/endocrine profile, inflammatory profile, lipid profile  Nutrition-Focused Physical Findings: overall appearance     Nutrition Follow-Up    RD Follow-up?: Yes

## 2020-06-17 NOTE — H&P
"Inpatient Radiology Pre-procedure Note    History of Present Illness:    54 y/o M with HFrEF 2/2 NICM, HTN, HLD, DM2 admitted with worsening SOB and BLE, now requiring PICC for home dobutamine therapy. IR consulted for tunneled PICC placement.    Admission H&P reviewed.  Past Medical History:   Diagnosis Date    CHF (congestive heart failure)     Diabetes mellitus type II     Essential hypertension, benign     Hyperlipidemia     Hypothyroidism     Pain and swelling of left wrist 8/5/2019    Ashish Mckeon is a 54 y.o. male with PMH of  presenting with CHF, IDDM, Hypothyroidism, HTN, HLD presenting with worsening L wrist pain for 1 day. Orthopedic surgery was consulted for septic joint r/o. Pt has been afebrile and has no elevated WBC. ESR 36, normal CRP. Xray shows no signs of trauma and pt has no hx of gout or septic arthritis. Due to atraumatic wrist pain and swelling w/out identifia    Undiagnosed cardiac murmurs      Past Surgical History:   Procedure Laterality Date    COLON SURGERY      "lower intestines removed"    ORTHOPEDIC SURGERY Left     wrist    THYROID SURGERY         Review of Systems:   As documented in primary team H&P    Home Meds:   Prior to Admission medications    Medication Sig Start Date End Date Taking? Authorizing Provider   acetaminophen (TYLENOL) 325 MG tablet Take 2 tablets (650 mg total) by mouth every 6 to 8 hours as needed.  Patient taking differently: Take 325 mg by mouth daily as needed for Pain.  7/21/18   Kristi Russell NP   aspirin 81 MG Chew Take 1 tablet (81 mg total) by mouth once daily. 12/1/18 5/8/20  Milton Lopez MD   DOBUTamine (DOBUTREX) 500 mg/250 mL (2,000 mcg/mL) Inject 271 mcg/min into the vein continuous. 6/17/20 9/15/20  Sha Contreras MD   ferrous sulfate 325 (65 FE) MG EC tablet Take 1 tablet (325 mg total) by mouth every other day. 3/3/20   Damion Whitman MD   furosemide (LASIX) 80 MG tablet Take 1 tablet (80 mg total) by mouth 2 (two) times " daily. 5/13/20 5/13/21  Ok Chance MD   HYDROcodone-acetaminophen (NORCO) 5-325 mg per tablet Take 1 tablet by mouth every 6 (six) hours as needed for Pain. 9/21/19   Jake Reinoso MD   insulin aspart U-100 (NOVOLOG) 100 unit/mL (3 mL) InPn pen Inject 4 Units into the skin 3 (three) times daily. 6/17/20 6/17/21  Sha Contreras MD   insulin detemir U-100 (LEVEMIR FLEXTOUCH) 100 unit/mL (3 mL) SubQ InPn pen Inject 8 Units into the skin every evening. 6/17/20 6/17/21  Sha Contreras MD   isosorbide-hydrALAZINE 20-37.5 mg (BIDIL) 20-37.5 mg Tab Take 2 tablets by mouth 3 (three) times daily. 6/17/20 6/17/21  Sha Contreras MD   levothyroxine (SYNTHROID) 175 MCG tablet Take 1 tablet (175 mcg total) by mouth before breakfast. 12/24/18   Yoselin Siddiqi MD   insulin detemir U-100 (LEVEMIR) 100 unit/mL injection Inject 5 Units into the skin every evening. 3/3/20 6/17/20  Damion Whitman MD   metoprolol succinate (TOPROL-XL) 50 MG 24 hr tablet Take 1 tablet (50 mg total) by mouth once daily. 3/3/20 6/17/20  Damion Whitman MD     Scheduled Meds:    aspirin  81 mg Oral Daily    hydrALAZINE  50 mg Oral TID    insulin aspart U-100  4 Units Subcutaneous TIDWM    insulin detemir U-100  8 Units Subcutaneous QHS    isosorbide dinitrate  40 mg Oral TID    levothyroxine  175 mcg Oral Before breakfast     Continuous Infusions:    sodium chloride 0.9%      DOBUTamine 5 mcg/kg/min (06/17/20 1108)     PRN Meds:sodium chloride 0.9%, acetaminophen, artificial tears, calcium carbonate, Dextrose 10% Bolus, Dextrose 10% Bolus, glucagon (human recombinant), glucose, glucose, insulin aspart U-100, melatonin, ondansetron, senna-docusate 8.6-50 mg, sodium chloride 0.9%  Anticoagulants/Antiplatelets: aspirin    Allergies:   Review of patient's allergies indicates:   Allergen Reactions    Lisinopril Other (See Comments)     acei cough       Sedation Hx: have not been any systemic reactions    Labs:  No results for  input(s): INR in the last 168 hours.    Invalid input(s):  PT,  PTT    Recent Labs   Lab 06/17/20 0313   WBC 7.21   HGB 12.7*   HCT 38.8*   MCV 82   PLT 82*      Recent Labs   Lab 06/17/20 0313   *   *   K 3.9   CL 90*   CO2 23   BUN 59*   CREATININE 2.3*   CALCIUM 9.1   MG 2.2   ALT 56*   AST 45*   ALBUMIN 2.8*   BILITOT 6.2*         Vitals:  Temp: 98.1 °F (36.7 °C) (06/17/20 1513)  Pulse: 94 (06/17/20 1619)  Resp: 17 (06/17/20 1513)  BP: 118/63 (06/17/20 1513)  SpO2: 95 % (06/17/20 1513)     Physical Exam:  ASA: 2  Mallampati: 2    General: no acute distress  Mental Status: alert and oriented to person, place and time  HEENT: normocephalic, atraumatic  Chest: unlabored breathing  Heart: regular heart rate  Abdomen: nondistended  Extremity: moves all extremities    Plan: Tunneled PICC placement tomorrow.    Sedation Plan: Gregorio Faria MD  Radiology, PGY - IV

## 2020-06-17 NOTE — PROGRESS NOTES
06/17/20 0734 06/17/20 0741 06/17/20 0906   Vital Signs   BP (!) 89/61 (!) 89/68 (!) 90/56   MAP (mmHg) 69 74 68     BP been low. CCU contacted and aware of holding hydralazine and isosorbide this am.

## 2020-06-17 NOTE — SUBJECTIVE & OBJECTIVE
Interval History:   After  was paused this morning, MAPs dropped to low 70s but patient asymptomatic and also mild increase in Ct 2.1-->2.3. Pt was taken for RHC today. Tolerated well. See above for details. Holding lasix today. Still having okay UOP with 1.2L yesterday     Review of Systems   Constitution: Negative for chills, diaphoresis and fever.   Cardiovascular: Positive for leg swelling (notes continued improvement ). Negative for chest pain, irregular heartbeat, palpitations and paroxysmal nocturnal dyspnea.   Respiratory: Positive for cough (non-productive). Negative for shortness of breath, sputum production and wheezing.    Musculoskeletal: Negative for joint pain.   Gastrointestinal: Negative for bloating, abdominal pain, constipation, diarrhea, nausea and vomiting.   Genitourinary: Negative for dysuria.   Neurological: Negative for dizziness, headaches and light-headedness.   Psychiatric/Behavioral: The patient does not have insomnia.      Objective:     Vital Signs (Most Recent):  Temp: 98.1 °F (36.7 °C) (06/17/20 1513)  Pulse: 97 (06/17/20 1513)  Resp: 17 (06/17/20 1513)  BP: 118/63 (06/17/20 1513)  SpO2: 95 % (06/17/20 1513) Vital Signs (24h Range):  Temp:  [97.8 °F (36.6 °C)-98.8 °F (37.1 °C)] 98.1 °F (36.7 °C)  Pulse:  [] 97  Resp:  [16-20] 17  SpO2:  [94 %-99 %] 95 %  BP: ()/(52-70) 118/63     Weight: 54.2 kg (119 lb 7.8 oz)  Body mass index is 16.67 kg/m².     SpO2: 95 %  O2 Device (Oxygen Therapy): room air      Intake/Output Summary (Last 24 hours) at 6/17/2020 1520  Last data filed at 6/17/2020 1200  Gross per 24 hour   Intake 480 ml   Output 875 ml   Net -395 ml       Lines/Drains/Airways     Drain            Male External Urinary Catheter 06/08/20 0713 Medium 9 days          Peripheral Intravenous Line                 Peripheral IV - Single Lumen 06/16/20 1405 20 G;1 3/4 in Right Forearm 1 day                Physical Exam   Constitutional: He is oriented to person, place, and  time. No distress.   HENT:   Head: Normocephalic and atraumatic.   Eyes: EOM are normal. Scleral icterus is present.   Neck: Normal range of motion. Neck supple. No tracheal deviation present.   Cardiovascular: Normal rate. Exam reveals no gallop and no friction rub.   Murmur heard.  Grade 2 systolic murmur best heard over mitral region. JVD improved   Pulmonary/Chest: Effort normal and breath sounds normal. He has no wheezes. He has no rales.   Abdominal: Soft. Bowel sounds are normal. He exhibits distension and ascites. There is hepatomegaly. There is no abdominal tenderness.   Well healed midline surgical scar.   Musculoskeletal:         General: Edema (trace pitting pedal edmea) present.   Neurological: He is alert and oriented to person, place, and time. He exhibits normal muscle tone.   Skin: Skin is warm and dry. He is not diaphoretic.   Psychiatric: He has a normal mood and affect. His behavior is normal.   Nursing note and vitals reviewed.      Significant Labs:   CMP   Recent Labs   Lab 06/15/20  1534 06/16/20  0454 06/17/20  0313   * 132* 131*   K 3.9 4.2 3.9   CL 91* 93* 90*   CO2 25 24 23   * 136* 132*   BUN 54* 54* 59*   CREATININE 2.1* 2.1* 2.3*   CALCIUM 8.9 9.2 9.1   PROT  --  7.9 7.8   ALBUMIN  --  2.9* 2.8*   BILITOT  --  5.6* 6.2*   ALKPHOS  --  315* 308*   AST  --  65* 45*   ALT  --  74* 56*   ANIONGAP 15 15 18*   ESTGFRAFRICA 39.5* 39.5* 35.4*   EGFRNONAA 34.2* 34.2* 30.6*   , CBC   Recent Labs   Lab 06/16/20  0454 06/17/20  0313   WBC 3.80* 7.21   HGB 15.2 12.7*   HCT 47.1 38.8*   PLT 94* 82*    and All pertinent lab results from the last 24 hours have been reviewed.

## 2020-06-17 NOTE — PROGRESS NOTES
"[facility]  Endocrinology  Progress Note    Admit Date: 2020     Reason for Consult: Management of T2DM, Hyperglycemia     Surgical Procedure and Date: n/a    Lab Results   Component Value Date    HGBA1C 9.9 (H) 2020     Diabetes diagnosis year: 7 years ago    Home Diabetes Medications:   Levemir 10 units BID (pt reports he doesn't take often)     How often checking glucose at home? every other day   BG readings on regimen: 300s  Hypoglycemia on the regimen?  No  Missed doses on regimen?  Yes    Diabetes Complications include:     Hyperglycemia    Complicating diabetes co morbidities:   CHF      HPI:   Patient is a 55 y.o. male with a diagnosis of HFrEF 2/2 NICM, HTN, HLD, DM2 admitted with worsening SOB and BLE for 2 weeks. He was recently admitted for ADHF from 20 to 20. This is his 3rd admission this year. He reports that for the past 2 weeks he has been progressively more SOB. Endocrinology consulted for management of T2DM.       Interval HPI:   Overnight events:   BG is within or slightly below goal on current SQ insulin regimen. SAMSON.   Diet diabetic [facility];  Calorie; Cardiac (Low Na/Chol), Low Sodium,2gm, Renal; Fluid - 1500mL  Day of Surgery - Insertion of Catheter, right heart 1000    Eatin%  Nausea: No  Hypoglycemia and intervention: No  Fever: No  TPN and/or TF: No  If yes, type of TF/TPN and rate: None    BP (!) 89/68   Pulse 92   Temp 98.2 °F (36.8 °C) (Oral)   Resp 16   Ht 5' 11" (1.803 m)   Wt 54.2 kg (119 lb 7.8 oz)   SpO2 95%   BMI 16.67 kg/m²     Labs Reviewed and Include    Recent Labs   Lab 20  0313   *   CALCIUM 9.1   ALBUMIN 2.8*   PROT 7.8   *   K 3.9   CO2 23   CL 90*   BUN 59*   CREATININE 2.3*   ALKPHOS 308*   ALT 56*   AST 45*   BILITOT 6.2*     Lab Results   Component Value Date    WBC 7.21 2020    HGB 12.7 (L) 2020    HCT 38.8 (L) 2020    MCV 82 2020    PLT 82 (L) 2020 "     No results for input(s): TSH, FREET4 in the last 168 hours.  Lab Results   Component Value Date    HGBA1C 7.9 (H) 06/08/2020       Nutritional status:   Body mass index is 16.67 kg/m².  Lab Results   Component Value Date    ALBUMIN 2.8 (L) 06/17/2020    ALBUMIN 2.9 (L) 06/16/2020    ALBUMIN 2.8 (L) 06/15/2020     No results found for: PREALBUMIN    Estimated Creatinine Clearance: 27.5 mL/min (A) (based on SCr of 2.3 mg/dL (H)).    Accu-Checks  Recent Labs     06/14/20  2134 06/15/20  0736 06/15/20  1120 06/15/20  1511 06/15/20  2012 06/16/20  0728 06/16/20  1121 06/16/20  1532 06/16/20  2120 06/17/20  0738   POCTGLUCOSE 217* 135* 129* 146* 204* 152* 170* 212* 162* 125*       Current Medications and/or Treatments Impacting Glycemic Control  Immunotherapy:    Immunosuppressants     None        Steroids:   Hormones (From admission, onward)    Start     Stop Route Frequency Ordered    06/09/20 1842  melatonin tablet 6 mg  (Medication Panel)      -- Oral Nightly PRN 06/09/20 1742        Pressors:    Autonomic Drugs (From admission, onward)    None        Hyperglycemia/Diabetes Medications:   Antihyperglycemics (From admission, onward)    Start     Stop Route Frequency Ordered    06/15/20 1130  insulin aspart U-100 pen 4 Units      -- SubQ 3 times daily with meals 06/15/20 0937    06/13/20 2100  insulin detemir U-100 pen 8 Units      -- SubQ Nightly 06/13/20 0916    06/10/20 1619  insulin aspart U-100 pen 0-5 Units      -- SubQ Before meals & nightly PRN 06/10/20 1519          ASSESSMENT and PLAN    * Acute on chronic combined systolic and diastolic heart failure  Managed per primary team  Optimize BG control      Diabetes mellitus, type II  BG goal 140 - 180     - Levemir 8 units daily.  - Novolog 4 units TIDWM.   - Low Dose SQ Insulin Correction Scale.  - BG Monitoring AC/HS.      ** Please call Endocrine for any BG related issues **  ** Please notify Endocrine for any change and/or advance in diet**      Discharge  planning:   TBD. Please notify endocrinology prior to discharge.           Hypothyroidism  Recommend continuing home dose of Synthroid 175 mcg while inpatient.           Adolfo Schultz NP  Endocrinology  [facility]

## 2020-06-17 NOTE — CARE UPDATE
[facility]  [address] [place] [address]     HOME  HEALTH ORDERS      Admit to Home Health    Diagnosis:   Patient Active Problem List   Diagnosis    Thrombocytopenia    Megaloblastic anemia due to vitamin B12 deficiency    Hypothyroidism    Diabetes mellitus, type II    Chronic combined systolic and diastolic congestive heart failure    Anemia of chronic disease    Acute kidney injury superimposed on CKD    Essential hypertension    Substance use disorder    NSVT (nonsustained ventricular tachycardia)    Essential (primary) hypertension    Other proteinuria    right upper lobe mass    Pulmonary nodules    Blurry vision, bilateral    Hyperlipidemia    Pseudogout of left wrist    Elevated serum creatinine    Septic arthritis    Crystals present in synovial fluid obtained by arthrocentesis    Pain in left wrist    Acute on chronic combined systolic and diastolic heart failure    Anemia    Thrombocytopenia, unspecified    Chronic kidney disease, stage IV (severe)    Nonischemic cardiomyopathy    SOB (shortness of breath)    Cardiogenic shock    Hyperbilirubinemia    CKD (chronic kidney disease), stage IV       Patient is homebound due to:   Diet: Low Sodium  Acitivities: As Tolerated    Nursing:   SN to complete comprehensive assessment including routine vital signs. Instruct on disease process and s/s of complications to report to MD. Review/verify medication list sent home with the patient at time of discharge  and instruct patient/caregiver as needed. Frequency may be adjusted depending on start of care date.    Notify MD if SBP > 160 or < 90; DBP > 90 or < 50; HR > 120 or < 50; Temp > 101; Weight gain >3lbs in 1 day or 5lbs in 1 week.      HOME INFUSION THERAPY: Dobutamine 5 mcg/kg/min (dosing weight 54.2 kg)  SN to perform Infusion Therapy/Central Line Care.  Review Central Line Care & Central Line Flush with patient.    Administer (drug and dose):  Dobutamine 5 mcg/kg/min (dosing weight 54.2 kg)      Central line care:  Scrub the Hub: Prior to accessing the line, always perform a 30 second alcohol scrub  Each lumen of the central line is to be flushed at least daily with 10 mL Normal Saline and 3 mL Heparin flush (100 units/mL)  Skilled Nurse (SN) may draw blood from IV access  Blood Draw Procedure:   - Aspirate at least 5 mL of blood   - Discard   - Obtain specimen   - Change posiflow cap   - Flush with 20 mL Normal Saline followed by a                 3-5 mL Heparin flush (100 units/mL)  Central :   - Sterile dressing changes are done weekly and as needed.   - Use chlor-hexadine scrub to cleanse site, apply Biopatch to insertion site,       apply securement device dressing   - Posi-flow caps are changed weekly and after EVERY lab draw.   - If sterile gauze is under dressing to control oozing,                 dressing change must be performed every 24 hours until gauze is not needed.        I have seen and examined this patient face to face today. My clinical findings that support the need for the home health skilled services and home bound status are the following:  Weakness/numbness causing balance and gait disturbance due to Weakness/Debility making it taxing to leave home.    Allergies:  Review of patient's allergies indicates:   Allergen Reactions    Lisinopril Other (See Comments)     acei cough         Diet: 2 gram sodium diet    Activities: activity as tolerated      Medications: Review discharge medications with patient and family and provide education.      Current Discharge Medication List      START taking these medications    Details   DOBUTamine (DOBUTREX) 500 mg/250 mL (2,000 mcg/mL) Inject 271 mcg/min into the vein continuous.  Qty: 250 mL, Refills: 10      insulin aspart U-100 (NOVOLOG) 100 unit/mL (3 mL) InPn pen Inject 4 Units into the skin 3 (three) times daily.  Refills: 0      insulin detemir U-100 (LEVEMIR FLEXTOUCH)  100 unit/mL (3 mL) SubQ InPn pen Inject 8 Units into the skin every evening.  Refills: 0      isosorbide-hydrALAZINE 20-37.5 mg (BIDIL) 20-37.5 mg Tab Take 2 tablets by mouth 3 (three) times daily.  Qty: 180 tablet, Refills: 11         CONTINUE these medications which have NOT CHANGED    Details   acetaminophen (TYLENOL) 325 MG tablet Take 2 tablets (650 mg total) by mouth every 6 to 8 hours as needed.  Refills: 0      aspirin 81 MG Chew Take 1 tablet (81 mg total) by mouth once daily.  Qty: 360 tablet, Refills: 0      ferrous sulfate 325 (65 FE) MG EC tablet Take 1 tablet (325 mg total) by mouth every other day.  Qty: 30 tablet, Refills: 3      furosemide (LASIX) 80 MG tablet Take 1 tablet (80 mg total) by mouth 2 (two) times daily.  Qty: 60 tablet, Refills: 11      HYDROcodone-acetaminophen (NORCO) 5-325 mg per tablet Take 1 tablet by mouth every 6 (six) hours as needed for Pain.  Qty: 18 tablet, Refills: 0    Comments: Quantity prescribed more than 7 day supply? No      levothyroxine (SYNTHROID) 175 MCG tablet Take 1 tablet (175 mcg total) by mouth before breakfast.         STOP taking these medications       insulin detemir U-100 (LEVEMIR) 100 unit/mL injection Comments:   Reason for Stopping:         metoprolol succinate (TOPROL-XL) 50 MG 24 hr tablet Comments:   Reason for Stopping:               I certify that this patient is confined to his home and needs physical therapy and occupational therapy.

## 2020-06-17 NOTE — PROGRESS NOTES
[facility]  Cardiology  Progress Note    Patient Name: Ashish Mckeon  MRN: [id number]  Admission Date: 6/7/2020  Hospital Length of Stay: 10 days  Code Status: Full Code   Attending Physician: Artemio Mullen MD   Primary Care Physician: [facility]  Expected Discharge Date: 6/19/2020  Principal Problem:Acute on chronic combined systolic and diastolic heart failure    Subjective:     Hospital Course:   Patient admitted to Cardiology service on 6/8 with CHF excerbation, hyperglycemia, and hyperbilirubinemia. He was started on Lasix, dobutamine, and insulin infusion. US of the liver with Doppler was performed which revealed no ductal dilation but echogenic changes suggestive of cirrhosis and ascites. Endocrinology was consulted to assist in blood sugar management and glucose improved. He is diuresising well. Hepatology was consulted given concern for cirrhosis, congestive hepatopathy, versus ischemic hepatitis. Paracentesis was performed by Interventional Radiology on 6/10 along with transition to basal bolus insulin regimen.  He has completed three days IV vitamin K on 6/11.  He was started on hydralazine and isosorbide dinitrate on 6/13, increasing as tolearted. He was continued on cardiac/volume optimization with IV Lasix & dobutamine infusion. Respiratory status and renal function continueed to improve. On 6/16, IVC was noted to be small and collapsible. Lasix drip was discontinued, was restarted on home PO lasix regimen. RHC done on 6/17.  Low filling pressures following aggressive diuresis with very low output state and high SVR off . Holding Lasix for now given low filling pressures.     Interval History:   After  was paused this morning, MAPs dropped to low 70s but patient asymptomatic and also mild increase in Ct 2.1-->2.3. Pt was taken for RHC today. Tolerated well. See above for details. Holding lasix today. Still having okay UOP with 1.2L yesterday      Review of Systems   Constitution: Negative for chills, diaphoresis and fever.   Cardiovascular: Positive for leg swelling (notes continued improvement ). Negative for chest pain, irregular heartbeat, palpitations and paroxysmal nocturnal dyspnea.   Respiratory: Positive for cough (non-productive). Negative for shortness of breath, sputum production and wheezing.    Musculoskeletal: Negative for joint pain.   Gastrointestinal: Negative for bloating, abdominal pain, constipation, diarrhea, nausea and vomiting.   Genitourinary: Negative for dysuria.   Neurological: Negative for dizziness, headaches and light-headedness.   Psychiatric/Behavioral: The patient does not have insomnia.      Objective:     Vital Signs (Most Recent):  Temp: 98.1 °F (36.7 °C) (06/17/20 1513)  Pulse: 97 (06/17/20 1513)  Resp: 17 (06/17/20 1513)  BP: 118/63 (06/17/20 1513)  SpO2: 95 % (06/17/20 1513) Vital Signs (24h Range):  Temp:  [97.8 °F (36.6 °C)-98.8 °F (37.1 °C)] 98.1 °F (36.7 °C)  Pulse:  [] 97  Resp:  [16-20] 17  SpO2:  [94 %-99 %] 95 %  BP: ()/(52-70) 118/63     Weight: 54.2 kg (119 lb 7.8 oz)  Body mass index is 16.67 kg/m².     SpO2: 95 %  O2 Device (Oxygen Therapy): room air      Intake/Output Summary (Last 24 hours) at 6/17/2020 1520  Last data filed at 6/17/2020 1200  Gross per 24 hour   Intake 480 ml   Output 875 ml   Net -395 ml       Lines/Drains/Airways     Drain            Male External Urinary Catheter 06/08/20 0713 Medium 9 days          Peripheral Intravenous Line                 Peripheral IV - Single Lumen 06/16/20 1405 20 G;1 3/4 in Right Forearm 1 day                Physical Exam   Constitutional: He is oriented to person, place, and time. No distress.   HENT:   Head: Normocephalic and atraumatic.   Eyes: EOM are normal. Scleral icterus is present.   Neck: Normal range of motion. Neck supple. No tracheal deviation present.   Cardiovascular: Normal rate. Exam reveals no gallop and no friction rub.    Murmur heard.  Grade 2 systolic murmur best heard over mitral region. JVD improved   Pulmonary/Chest: Effort normal and breath sounds normal. He has no wheezes. He has no rales.   Abdominal: Soft. Bowel sounds are normal. He exhibits distension and ascites. There is hepatomegaly. There is no abdominal tenderness.   Well healed midline surgical scar.   Musculoskeletal:         General: Edema (trace pitting pedal edmea) present.   Neurological: He is alert and oriented to person, place, and time. He exhibits normal muscle tone.   Skin: Skin is warm and dry. He is not diaphoretic.   Psychiatric: He has a normal mood and affect. His behavior is normal.   Nursing note and vitals reviewed.      Significant Labs:   CMP   Recent Labs   Lab 06/15/20  1534 06/16/20  0454 06/17/20  0313   * 132* 131*   K 3.9 4.2 3.9   CL 91* 93* 90*   CO2 25 24 23   * 136* 132*   BUN 54* 54* 59*   CREATININE 2.1* 2.1* 2.3*   CALCIUM 8.9 9.2 9.1   PROT  --  7.9 7.8   ALBUMIN  --  2.9* 2.8*   BILITOT  --  5.6* 6.2*   ALKPHOS  --  315* 308*   AST  --  65* 45*   ALT  --  74* 56*   ANIONGAP 15 15 18*   ESTGFRAFRICA 39.5* 39.5* 35.4*   EGFRNONAA 34.2* 34.2* 30.6*   , CBC   Recent Labs   Lab 06/16/20  0454 06/17/20  0313   WBC 3.80* 7.21   HGB 15.2 12.7*   HCT 47.1 38.8*   PLT 94* 82*    and All pertinent lab results from the last 24 hours have been reviewed.      Assessment and Plan:         * Acute on chronic combined systolic and diastolic heart failure  55 y.o M with HFrEF (EF 20%) 2/2 NICM. Multiple admission in the past few years and this is his third admission this year. Markedly volume overloaded on exam, high BNP, KEYANNA on CKD, congestive hepatopathy Reported history of non compliance in the past but currently he reports compliance to medications.     TTE on 6/8:  · Severe left atrial enlargement.  · Mild left ventricular enlargement.  · Severely decreased left ventricular systolic function. The estimated ejection fraction is  20%.  · Severe global hypokinetic wall motion.  · Grade III (severe) left ventricular diastolic dysfunction consistent with restrictive physiology.  · Severe right atrial enlargement.  · Moderate right ventricular enlargement.  · Moderately reduced right ventricular systolic function.  · Moderate-to-severe mitral regurgitation.  · Mild to moderate tricuspid regurgitation.  · Pulmonary hypertension present.  · The estimated PA systolic pressure is 65 mmHg.  · Elevated central venous pressure (15 mmHg).  · Trivial pericardial effusion.    - RHC on 6/17: RA: 0/ 1/ 1 RV: 44/ -3/ 0 PA: 52/ 20/ 30 PWP: 17/ 15/ 13 . Cardiac output was 2.65 by Dagmar. Cardiac index is 1.57 L/min/m2. O2 Sat: PA 51%.  - given bump in Cr and low CI/CO while off  for short period of time, will need home  infusion. Also, will need wheeler cath placed instead of PICC to preserve access given CKD   - given low filling pressures holding Lasix for now   - continue hydralazine 50mg and isosorbide dinitrate 40mg   - holding home dose Toprolol-XL 50 mg daily in light of decompensated heart failure and hypotension    CKD (chronic kidney disease), stage IV  Please see Acute kidney injury superimposed on CKD.    Hyperbilirubinemia  Patient presented with total bilirubin of 9, alkaline phosphatase 287, AST 48, and GGT 84. ALT within normal limits with a value of 34. He underwent US liver with Doppler upon presentation which showed unremarkable pancrease, enlarge liver with heterogenous echotexture nodularity suggestive of cirrhosis, cholelithiasis with gallbladder wall thickening but no CBD or intrahepatic dilation, and large volume ascites. Vasculature was patent with proper directional flow.        - liver function improving   - Hepatology consulted for assistance, differential includes cirrhosis, congestive hepatopathy, and ischemic hepatitis (deferred to outpatient for further work-up)  - s/p paracentesis with 1L removed per IR on 6/10  - Ascitic  , WBC 59 (7% segs) albumin 2.1 (SAAG 0.6), glucose 118, and protein 4.5  - Ascitic fluid cultures still pending/without growth so far  - Hepatitis and HIV negative  - Serum ammonia, IgG, and AFP within normal limits  - Anti-smooth muscle Ab, Anti-SSA Ab, Anti-SSB Ab, and Anti-Sm/RNP negative  - PETH still pending   - s/p vitamin K 5 mg IV QD for three doses (completed on 6/11)    Essential hypertension  - patient on metoprolol succinate 50 mg daily as outpatient, can't resume beta blocker while on dobutamine infusion  - started on low dose bidil on 6/13. increased to hydralazine 50mg and isosorbide dinitrate 40mg     Acute kidney injury superimposed on CKD  - KEYANNA on CKD V with unclear baseline creatinine   - presented with serum creatinine 3.5  - continuing to improve with diuresis, serum creatinine 2.1 this AM  - will continue to monitor    Chronic combined systolic and diastolic congestive heart failure  Please see Acute on chronic combined systolic and diastolic heart failure.    Diabetes mellitus, type II  Presented with blood glucose in the 300-500s and HgbA1c 7.9%. He was subsequently started on insulin infusion and Endocrinology was consulted for assistance.    - Endocrinology consulted and following  - Resume determir 8 U QHS and aspart 4 U TID WM with LDSSI  - Accuchecks ACHS  - Diabetic cardiac renal diet with 1,500 mL volume restriction    Hypothyroidism  Patient with history of thyroidectomy on levothyroxine 175 mcg as outpatient. On admission patient's TSH elevated with a value of 8.449 however free T4 0.74.    - Endocrinology consulted and following  - resume home dose Synthroid     Thrombocytopenia  Chronic issue, patient with history of thrombocytopenia dating back to December 2018. Suspect related to underlying hepatopathy. Please see Hyperbilirubinemia.        VTE Risk Mitigation (From admission, onward)         Ordered     IP VTE HIGH RISK PATIENT  Once      06/07/20 2346     Place  sequential compression device  Until discontinued      06/07/20 0513                Sha Garyor, MD  Cardiology  [facility]

## 2020-06-17 NOTE — PLAN OF CARE
Pt free of falls and injury during shift. POC reviewed with pt. VS stable. SR on telemetry.  infusing. CC in place. No acute events noted at this time. No complaints. Educated pt why they are at risk for falls and to use call light for assistance ambulating. Yellow non-slip socks on pt. Bed low and locked, call light with in reach. Will continue to monitor.

## 2020-06-17 NOTE — CONSULTS
NIAS informed RN and attempted to inform Cards team that Nephrology did not clear patient to receive PICC line.  Nephrology suggested wheeler placement for home inotrope infusion.

## 2020-06-17 NOTE — PLAN OF CARE
Recommendations  1. Continue diabetic cardiac diet as tolerated, with fluid restriction per MD.   2. RD to monitor.     Goals: Adequate PO intake to meet > 75% EEN/EPN by RD follow up  Nutrition Goal Status: new  Communication of RD Recs: other (comment)(POC)

## 2020-06-17 NOTE — SUBJECTIVE & OBJECTIVE
"Interval HPI:   Overnight events:   BG is within or slightly below goal on current SQ insulin regimen. SAMSON.   Diet diabetic [facility];  Calorie; Cardiac (Low Na/Chol), Low Sodium,2gm, Renal; Fluid - 1500mL  Day of Surgery - Insertion of Catheter, right heart 1000    Eatin%  Nausea: No  Hypoglycemia and intervention: No  Fever: No  TPN and/or TF: No  If yes, type of TF/TPN and rate: None    BP (!) 89/68   Pulse 92   Temp 98.2 °F (36.8 °C) (Oral)   Resp 16   Ht 5' 11" (1.803 m)   Wt 54.2 kg (119 lb 7.8 oz)   SpO2 95%   BMI 16.67 kg/m²     Labs Reviewed and Include    Recent Labs   Lab 20  0313   *   CALCIUM 9.1   ALBUMIN 2.8*   PROT 7.8   *   K 3.9   CO2 23   CL 90*   BUN 59*   CREATININE 2.3*   ALKPHOS 308*   ALT 56*   AST 45*   BILITOT 6.2*     Lab Results   Component Value Date    WBC 7.21 2020    HGB 12.7 (L) 2020    HCT 38.8 (L) 2020    MCV 82 2020    PLT 82 (L) 2020     No results for input(s): TSH, FREET4 in the last 168 hours.  Lab Results   Component Value Date    HGBA1C 7.9 (H) 2020       Nutritional status:   Body mass index is 16.67 kg/m².  Lab Results   Component Value Date    ALBUMIN 2.8 (L) 2020    ALBUMIN 2.9 (L) 2020    ALBUMIN 2.8 (L) 06/15/2020     No results found for: PREALBUMIN    Estimated Creatinine Clearance: 27.5 mL/min (A) (based on SCr of 2.3 mg/dL (H)).    Accu-Checks  Recent Labs     20  2134 06/15/20  0736 06/15/20  1120 06/15/20  1511 06/15/20  2012 06/16/20  0728 20  1121 20  1532 20  2120 20  0738   POCTGLUCOSE 217* 135* 129* 146* 204* 152* 170* 212* 162* 125*       Current Medications and/or Treatments Impacting Glycemic Control  Immunotherapy:    Immunosuppressants     None        Steroids:   Hormones (From admission, onward)    Start     Stop Route Frequency Ordered    20 1842  melatonin tablet 6 mg  (Medication Panel)      -- Oral Nightly PRN 20 " 1742        Pressors:    Autonomic Drugs (From admission, onward)    None        Hyperglycemia/Diabetes Medications:   Antihyperglycemics (From admission, onward)    Start     Stop Route Frequency Ordered    06/15/20 1130  insulin aspart U-100 pen 4 Units      -- SubQ 3 times daily with meals 06/15/20 0937    06/13/20 2100  insulin detemir U-100 pen 8 Units      -- SubQ Nightly 06/13/20 0916    06/10/20 1619  insulin aspart U-100 pen 0-5 Units      -- SubQ Before meals & nightly PRN 06/10/20 1510

## 2020-06-17 NOTE — CARE UPDATE
CCU update plan of care  6/17/20, 10:50 AM      RHC numbers reviewed with Dr Rodgers. Low filling pressures following aggressive diuresis with very low output state and high SVR off .     Reordered  5 mcg/kg/min.     Holding Lasix for now given low filling pressures.     Continue GDMT (hydral/isordil to help reduce SVR)      Karol Jeffries MD   Cardiology Fellow, PGY-5

## 2020-06-17 NOTE — CONSULTS
IR consult received. 56 y/o M with HFrEF 2/2 NICM, HTN, HLD, DM2 admitted with worsening SOB and BLE, now requiring PICC for home dobutamine therapy. IR consulted for tunneled PICC placement. Case discussed with staff, meds and labs reviewed. Will plan for tunneled PICC tomorrow. NPO at midnight. See H&P to follow.    Alden Faria MD  Radiology, PGY - IV

## 2020-06-17 NOTE — PLAN OF CARE
SW faxed HH Orders to [facility] and [facility] via  for review. Patient is scheduled to be d/c tomorrow, SW will continue to follow.      06/17/20 2158   Post-Acute Status   Post-Acute Authorization Home Health;Medications   Home Health Status Referrals Sent   Medication Status Pending Prior Authorization     Abby Pollock LMSW   - [facility]  Ext. [phone]

## 2020-06-18 NOTE — PROGRESS NOTES
CCU update plan of care  6/18/20, 1:00 PM - delayed entry     Met with the patient and his wife at bedside this afternoon. Discussed risk of developing potentially fatal sustained ventricular arrhythmias in patients with a diagnosis of chronic systolic heart failure / heart failure with reduced ejection fraction. Discussed option of either fitting him with a LifeVest vs possible ICD implantation for primary prevention prior to discharge. Patient reports he had a LifeVest before but was noncompliant due to discomfort, and expressed he is not interested in trying LifeVest again. After extensive discussion and thought, patient also declined ICD implantation. He is aware of risk of potentially dying from fatal ventricular rhythm(s) with his current heart condition. All questions/concerns addressed.       Karol Jeffries MD   Cardiology Fellow, PGY-5

## 2020-06-18 NOTE — PROGRESS NOTES
"[facility]  Endocrinology  Progress Note    Admit Date: 2020     Reason for Consult: Management of T2DM, Hyperglycemia     Surgical Procedure and Date: n/a    Lab Results   Component Value Date    HGBA1C 9.9 (H) 2020     Diabetes diagnosis year: 7 years ago    Home Diabetes Medications:   Levemir 10 units BID (pt reports he doesn't take often)     How often checking glucose at home? every other day   BG readings on regimen: 300s  Hypoglycemia on the regimen?  No  Missed doses on regimen?  Yes    Diabetes Complications include:     Hyperglycemia    Complicating diabetes co morbidities:   CHF      HPI:   Patient is a 55 y.o. male with a diagnosis of HFrEF 2/2 NICM, HTN, HLD, DM2 admitted with worsening SOB and BLE for 2 weeks. He was recently admitted for ADHF from 20 to 20. This is his 3rd admission this year. He reports that for the past 2 weeks he has been progressively more SOB. Endocrinology consulted for management of T2DM.       Interval HPI:   Overnight events: BG is within goal ranges on current SQ insulin regimen. Creatinine 2.3.  Eatin%  Nausea: No  Hypoglycemia and intervention: No  Fever: No  TPN and/or TF: No  If yes, type of TF/TPN and rate: none    /69 (BP Location: Right arm, Patient Position: Lying)   Pulse 88   Temp 97.7 °F (36.5 °C) (Oral)   Resp 18   Ht 5' 11" (1.803 m)   Wt 54.2 kg (119 lb 7.8 oz)   SpO2 97%   BMI 16.67 kg/m²     Labs Reviewed and Include    Recent Labs   Lab 20  0349   *   CALCIUM 9.0   ALBUMIN 2.7*   PROT 7.5   *   K 3.5   CO2 26   CL 91*   BUN 69*   CREATININE 2.3*   ALKPHOS 310*   ALT 50*   AST 43*   BILITOT 5.2*     Lab Results   Component Value Date    WBC 7.20 2020    HGB 11.8 (L) 2020    HCT 36.0 (L) 2020    MCV 80 (L) 2020    PLT 93 (L) 2020     No results for input(s): TSH, FREET4 in the last 168 hours.  Lab Results   Component Value Date    HGBA1C 7.9 (H) " 06/08/2020       Nutritional status:   Body mass index is 16.67 kg/m².  Lab Results   Component Value Date    ALBUMIN 2.7 (L) 06/18/2020    ALBUMIN 2.8 (L) 06/17/2020    ALBUMIN 2.9 (L) 06/16/2020     No results found for: PREALBUMIN    Estimated Creatinine Clearance: 27.5 mL/min (A) (based on SCr of 2.3 mg/dL (H)).    Accu-Checks  Recent Labs     06/16/20  0728 06/16/20  1121 06/16/20  1532 06/16/20  2120 06/17/20  0738 06/17/20  1105 06/17/20  1610 06/17/20  2108 06/18/20  0959 06/18/20  1132   POCTGLUCOSE 152* 170* 212* 162* 125* 128* 130* 196* 112* 165*       Current Medications and/or Treatments Impacting Glycemic Control  Immunotherapy:    Immunosuppressants     None        Steroids:   Hormones (From admission, onward)    Start     Stop Route Frequency Ordered    06/09/20 1842  melatonin tablet 6 mg  (Medication Panel)      -- Oral Nightly PRN 06/09/20 1742        Pressors:    Autonomic Drugs (From admission, onward)    None        Hyperglycemia/Diabetes Medications:   Antihyperglycemics (From admission, onward)    Start     Stop Route Frequency Ordered    06/15/20 1130  insulin aspart U-100 pen 4 Units      -- SubQ 3 times daily with meals 06/15/20 0937    06/13/20 2100  insulin detemir U-100 pen 8 Units      -- SubQ Nightly 06/13/20 0916    06/10/20 1619  insulin aspart U-100 pen 0-5 Units      -- SubQ Before meals & nightly PRN 06/10/20 1519          ASSESSMENT and PLAN    * Acute on chronic combined systolic and diastolic heart failure  Managed per primary team  Optimize BG control      Diabetes mellitus, type II  BG goal 140 - 180     - Levemir 8 units daily.  - Novolog 4 units TIDWM.   - Low Dose SQ Insulin Correction Scale.  - BG Monitoring AC/HS.      ** Please call Endocrine for any BG related issues **  ** Please notify Endocrine for any change and/or advance in diet**      Discharge planning:   Please notify endocrine prior to discharge. Tenatively recommend discharge on hospital MDI  regimen:  Levemir 8 units daily  Novolog 4 units TID with meals  Novolog Correction Scale (150-200/+1) prn ac/hs    Patient provided with blood glucose logs at bedside and instructions to document BG 4x daily. Instructions given to patient to call with BG consistently > 200 or < 80.     Discharge Teaching:    Reviewed topics related to DM including: the need for insulin, how insulin works, what makes it a high risk medication, the importance of follow up with either PCP or endocrine provider, importance of and how to check BG, how to record BG on logs, how to administer insulin, appropriate insulin administration sites, importance of rotating injection sites, hyper/hypoglycemia, how and when to treat hypoglycemia, when to hold insulin, how the correction scale works, importance of storing unused insulin in the refrigerator, and when to seek medical attention.  Patient verbalized understanding, answered all questions to patient's satisfaction.            Hypothyroidism  Recommend continuing home dose of Synthroid 175 mcg while inpatient.       Acute kidney injury superimposed on CKD  Lab Results   Component Value Date    CREATININE 2.3 (H) 06/18/2020     Avoid insulin stacking  Titrate insulin slowly          Carmen Guallpa, DAGO  Endocrinology  [facility]

## 2020-06-18 NOTE — NURSING
Tunneled PICC placed, pt tolerated well, dressing CDI, report called to pts nurse in 306 and bedside report given to Denisha SWAIN

## 2020-06-18 NOTE — NURSING
Pt transferred to [facility] awake, alert and oriented x4, vitals stable, telemetry in place, normal sinus rhythm, dobutamine gtt infusing to right forearm without diff, no c/o pain, transferred via hospital transport on stretcher.

## 2020-06-18 NOTE — PLAN OF CARE
Plan of care discussed with patient. Patient is free of fall/trauma/injury. Denies CP, SOB, or pain/discomfort. Right chest wall tunneled catheter placed today.  gtt still infusing. All questions addressed. Will continue to monitor

## 2020-06-18 NOTE — PROCEDURES
Vascular and Interventional Radiology Post-Procedure Note    Pre Op Diagnosis:  Heart failure  Post Op Diagnosis:  Same    Procedure(s) Performed: Right chest tunneled PICC  Complications:  None    Interventionalist(s):  Stefano Lynn MD (attending)  Assistant(s):  Joesph Greene MD (resident)    Written Informed Consent Obtained?:  Yes  Specimen Removed:  NO  Estimated Blood Loss:  Less than 50 mL    Description of Procedure/Findings:             RIJV access under US guidance. 6F right chest tunneled PICC placed. Tip in SVC. Dual lumen. 20 cm. Flushed and aspirated with saline). Secured with 3-0 silk.  Patient tolerated procedure well. No immediate complications. See imaging report for details.    Plan:  Cath ready for use.      Joesph Greene MD  R4, Diagnostic and Interventional Radiology  Pager: [phone]

## 2020-06-18 NOTE — PROGRESS NOTES
[facility]  Cardiology  Progress Note    Patient Name: Ashish Mckeon  MRN: [id number]  Admission Date: 6/7/2020  Hospital Length of Stay: 11 days  Code Status: Full Code   Attending Physician: Artemio Mullen MD   Primary Care Physician: [facility]  Expected Discharge Date: 6/19/2020  Principal Problem:Acute on chronic combined systolic and diastolic heart failure    Subjective:     Hospital Course:   Patient admitted to Cardiology service on 6/8 with CHF excerbation, hyperglycemia, and hyperbilirubinemia. He was started on Lasix, dobutamine, and insulin infusion. US of the liver with Doppler was performed which revealed no ductal dilation but echogenic changes suggestive of cirrhosis and ascites. Endocrinology was consulted to assist in blood sugar management and glucose improved. He is diuresising well. Hepatology was consulted given concern for cirrhosis, congestive hepatopathy, versus ischemic hepatitis. Paracentesis was performed by Interventional Radiology on 6/10 along with transition to basal bolus insulin regimen.  He has completed three days IV vitamin K on 6/11.  He was started on hydralazine and isosorbide dinitrate on 6/13, increasing as tolearted. He was continued on cardiac/volume optimization with IV Lasix & dobutamine infusion. Respiratory status and renal function continueed to improve. On 6/16, IVC was noted to be small and collapsible. Lasix drip was discontinued, was restarted on home PO lasix regimen. RHC done on 6/17.  Low filling pressures following aggressive diuresis with very low output state and high SVR off . Holding Lasix for now given low filling pressures.     Interval History: Doing better since  infusion restarted yesterday morning after completing RHC. Still appears clinically dry. Cr stable at 2.3. T bili remains elevated but trending down slowly.    He is s/p IR Lee catheter placement this morning.        Review of Systems    All other systems reviewed and are negative.    Objective:     Vital Signs (Most Recent):  Temp: 97.7 °F (36.5 °C) (06/18/20 0943)  Pulse: 88 (06/18/20 0943)  Resp: 18 (06/18/20 0943)  BP: 102/69 (06/18/20 0943)  SpO2: 97 % (06/18/20 0943) Vital Signs (24h Range):  Temp:  [96.4 °F (35.8 °C)-98.3 °F (36.8 °C)] 97.7 °F (36.5 °C)  Pulse:  [78-97] 88  Resp:  [15-20] 18  SpO2:  [95 %-100 %] 97 %  BP: ()/(55-76) 102/69     Weight: 54.2 kg (119 lb 7.8 oz)  Body mass index is 16.67 kg/m².     SpO2: 97 %  O2 Device (Oxygen Therapy): room air      Intake/Output Summary (Last 24 hours) at 6/18/2020 1200  Last data filed at 6/18/2020 0600  Gross per 24 hour   Intake 1176 ml   Output 775 ml   Net 401 ml       Lines/Drains/Airways     Central Venous Catheter Line            Tunneled Central Line Insertion/Assessment - Double Lumen  06/18/20 0817 right internal jugular less than 1 day          Drain            Male External Urinary Catheter 06/08/20 0713 Medium 10 days          Peripheral Intravenous Line                 Peripheral IV - Single Lumen 06/16/20 1405 20 G;1 3/4 in Right Forearm 1 day         Peripheral IV - Single Lumen 06/18/20 0647 24 G Left Antecubital less than 1 day                Physical Exam   Constitutional: He is oriented to person, place, and time. No distress.   HENT:   Head: Normocephalic and atraumatic.   Eyes: EOM are normal. Scleral icterus is present.   Neck: Normal range of motion. Neck supple. No tracheal deviation present.   Cardiovascular: Normal rate. Exam reveals no gallop and no friction rub.   Murmur heard.  Grade 2 systolic murmur best heard over mitral region. JVD improved   Pulmonary/Chest: Effort normal and breath sounds normal. He has no wheezes. He has no rales.   Abdominal: Soft. Bowel sounds are normal. He exhibits ascites. There is hepatomegaly. There is no abdominal tenderness.   Well healed midline surgical scar.   Musculoskeletal:         General: Edema: trace pitting pedal  edmea.   Neurological: He is alert and oriented to person, place, and time. He exhibits normal muscle tone.   Skin: Skin is warm and dry. He is not diaphoretic.   Psychiatric: He has a normal mood and affect. His behavior is normal.   Nursing note and vitals reviewed.      Significant Labs:   CMP   Recent Labs   Lab 06/17/20  0313 06/17/20  1548 06/18/20  0349   * 132* 129*   K 3.9 3.8 3.5   CL 90* 91* 91*   CO2 23 26 26   * 122* 158*   BUN 59* 66* 69*   CREATININE 2.3* 2.3* 2.3*   CALCIUM 9.1 9.2 9.0   PROT 7.8  --  7.5   ALBUMIN 2.8*  --  2.7*   BILITOT 6.2*  --  5.2*   ALKPHOS 308*  --  310*   AST 45*  --  43*   ALT 56*  --  50*   ANIONGAP 18* 15 12   ESTGFRAFRICA 35.4* 35.4* 35.4*   EGFRNONAA 30.6* 30.6* 30.6*   , CBC   Recent Labs   Lab 06/17/20  0313 06/18/20  0349   WBC 7.21 7.20   HGB 12.7* 11.8*   HCT 38.8* 36.0*   PLT 82* 93*    and All pertinent lab results from the last 24 hours have been reviewed.      Assessment and Plan:       * Acute on chronic combined systolic and diastolic heart failure  55 y.o M with HFrEF (EF 20%) 2/2 NICM. Multiple admission in the past few years and this is his third admission this year. Markedly volume overloaded on exam, high BNP, KEYANNA on CKD, congestive hepatopathy Reported history of non compliance in the past but currently he reports compliance to medications.     TTE on 6/8:  · Severe left atrial enlargement.  · Mild left ventricular enlargement.  · Severely decreased left ventricular systolic function. The estimated ejection fraction is 20%.  · Severe global hypokinetic wall motion.  · Grade III (severe) left ventricular diastolic dysfunction consistent with restrictive physiology.  · Severe right atrial enlargement.  · Moderate right ventricular enlargement.  · Moderately reduced right ventricular systolic function.  · Moderate-to-severe mitral regurgitation.  · Mild to moderate tricuspid regurgitation.  · Pulmonary hypertension present.  · The estimated PA  systolic pressure is 65 mmHg.  · Elevated central venous pressure (15 mmHg).  · Trivial pericardial effusion.    - RHC on 6/17: RA: 0/ 1/ 1 RV: 44/ -3/ 0 PA: 52/ 20/ 30 PWP: 17/ 15/ 13 . Cardiac output was 2.65 by Dagmar. Cardiac index is 1.57 L/min/m2. O2 Sat: PA 51%.  - continue  5 mcg/kg/min via Lee catheter, will require home   - continue to hold diuretic regimen given low filling pressures from RHC   - increase hydralazine to 75 mg and continue isosorbide dinitrate 40mg   - holding home dose Toprolol-XL 50 mg daily in light of decompensated heart failure and need for inotropic support    CKD (chronic kidney disease), stage IV  Please see Acute kidney injury superimposed on CKD.    Hyperbilirubinemia  Patient presented with total bilirubin of 9, alkaline phosphatase 287, AST 48, and GGT 84. ALT within normal limits with a value of 34. He underwent US liver with Doppler upon presentation which showed unremarkable pancrease, enlarge liver with heterogenous echotexture nodularity suggestive of cirrhosis, cholelithiasis with gallbladder wall thickening but no CBD or intrahepatic dilation, and large volume ascites. Vasculature was patent with proper directional flow.        - liver function improving   - Hepatology consulted for assistance, differential includes cirrhosis, congestive hepatopathy, and ischemic hepatitis (deferred to outpatient for further work-up)  - s/p paracentesis with 1L removed per IR on 6/10  - Ascitic , WBC 59 (7% segs) albumin 2.1 (SAAG 0.6), glucose 118, and protein 4.5  - Ascitic fluid cultures still pending/without growth so far  - Hepatitis and HIV negative  - Serum ammonia, IgG, and AFP within normal limits  - Anti-smooth muscle Ab, Anti-SSA Ab, Anti-SSB Ab, and Anti-Sm/RNP negative  - PETH still pending   - s/p vitamin K 5 mg IV QD for three doses (completed on 6/11)    Essential hypertension  - patient on metoprolol succinate 50 mg daily as outpatient, can't resume beta  blocker while on dobutamine infusion  - started on low dose bidil on 6/13. increased to hydralazine 75mg and isosorbide dinitrate 40mg     Acute kidney injury superimposed on CKD  - KEYANNA on CKD V with unclear baseline creatinine   - presented with serum creatinine 3.5  - serum creatinine 2.3 this AM  - will continue to monitor    Chronic combined systolic and diastolic congestive heart failure  Please see Acute on chronic combined systolic and diastolic heart failure.    Diabetes mellitus, type II  Presented with blood glucose in the 300-500s and HgbA1c 7.9%. He was subsequently started on insulin infusion and Endocrinology was consulted for assistance.    - Endocrinology consulted and following  - Resume determir 8 U QHS and aspart 4 U TID WM with LDSSI  - Accuchecks ACHS  - Diabetic cardiac renal diet with 1,500 mL volume restriction    Hypothyroidism  Patient with history of thyroidectomy on levothyroxine 175 mcg as outpatient. On admission patient's TSH elevated with a value of 8.449 however free T4 0.74.    - Endocrinology consulted and following  - resume home dose Synthroid     Thrombocytopenia  Chronic issue, patient with history of thrombocytopenia dating back to December 2018. Suspect related to underlying hepatopathy. Please see Hyperbilirubinemia.        VTE Risk Mitigation (From admission, onward)         Ordered     IP VTE HIGH RISK PATIENT  Once      06/07/20 2346     Place sequential compression device  Until discontinued      06/07/20 2346                Karol Jeffries MD  Cardiology  [facility]

## 2020-06-18 NOTE — PLAN OF CARE
Pt maintained free from falls/ trauma/ injuries and skin breakdown. He went to a right heart cath this shift and IJ removed, no hematoma noted, dressing due to be removed tomorrow noon. He had some pain at the IJ site, tylenol given. Pt is on  drip going at 5mcg/kg/min, PICC linen due to be placed by IR tomorrow. Blood glucose monitoring this shift, insulin coverage given as appropriate. Pt verbalized understanding. All questions and concerns addressed. Will continue to monitor.

## 2020-06-18 NOTE — SUBJECTIVE & OBJECTIVE
Interval History: Doing better since  infusion restarted yesterday following RHC. Still appears clinically dry. Cr stable at 2.3. T bili remains elevated but trending down slowly.       Review of Systems   Constitution: Negative for chills, diaphoresis and fever.   Cardiovascular: Positive for leg swelling (notes continued improvement ). Negative for chest pain, irregular heartbeat, palpitations and paroxysmal nocturnal dyspnea.   Respiratory: Positive for cough (non-productive). Negative for shortness of breath, sputum production and wheezing.    Musculoskeletal: Negative for joint pain.   Gastrointestinal: Negative for bloating, abdominal pain, constipation, diarrhea, nausea and vomiting.   Genitourinary: Negative for dysuria.   Neurological: Negative for dizziness, headaches and light-headedness.   Psychiatric/Behavioral: The patient does not have insomnia.    All other systems reviewed and are negative.    Objective:     Vital Signs (Most Recent):  Temp: 97.7 °F (36.5 °C) (06/18/20 0943)  Pulse: 88 (06/18/20 0943)  Resp: 18 (06/18/20 0943)  BP: 102/69 (06/18/20 0943)  SpO2: 97 % (06/18/20 0943) Vital Signs (24h Range):  Temp:  [96.4 °F (35.8 °C)-98.3 °F (36.8 °C)] 97.7 °F (36.5 °C)  Pulse:  [78-97] 88  Resp:  [15-20] 18  SpO2:  [95 %-100 %] 97 %  BP: ()/(55-76) 102/69     Weight: 54.2 kg (119 lb 7.8 oz)  Body mass index is 16.67 kg/m².     SpO2: 97 %  O2 Device (Oxygen Therapy): room air      Intake/Output Summary (Last 24 hours) at 6/18/2020 1200  Last data filed at 6/18/2020 0600  Gross per 24 hour   Intake 1176 ml   Output 775 ml   Net 401 ml       Lines/Drains/Airways     Central Venous Catheter Line            Tunneled Central Line Insertion/Assessment - Double Lumen  06/18/20 0817 right internal jugular less than 1 day          Drain            Male External Urinary Catheter 06/08/20 0713 Medium 10 days          Peripheral Intravenous Line                 Peripheral IV - Single Lumen 06/16/20 3764  20 G;1 3/4 in Right Forearm 1 day         Peripheral IV - Single Lumen 06/18/20 0647 24 G Left Antecubital less than 1 day                Physical Exam   Constitutional: He is oriented to person, place, and time. No distress.   HENT:   Head: Normocephalic and atraumatic.   Eyes: EOM are normal. Scleral icterus is present.   Neck: Normal range of motion. Neck supple. No tracheal deviation present.   Cardiovascular: Normal rate. Exam reveals no gallop and no friction rub.   Murmur heard.  Grade 2 systolic murmur best heard over mitral region. JVD improved   Pulmonary/Chest: Effort normal and breath sounds normal. He has no wheezes. He has no rales.   Abdominal: Soft. Bowel sounds are normal. He exhibits ascites. There is hepatomegaly. There is no abdominal tenderness.   Well healed midline surgical scar.   Musculoskeletal:         General: Edema: trace pitting pedal edmea.   Neurological: He is alert and oriented to person, place, and time. He exhibits normal muscle tone.   Skin: Skin is warm and dry. He is not diaphoretic.   Psychiatric: He has a normal mood and affect. His behavior is normal.   Nursing note and vitals reviewed.      Significant Labs:   CMP   Recent Labs   Lab 06/17/20 0313 06/17/20  1548 06/18/20  0349   * 132* 129*   K 3.9 3.8 3.5   CL 90* 91* 91*   CO2 23 26 26   * 122* 158*   BUN 59* 66* 69*   CREATININE 2.3* 2.3* 2.3*   CALCIUM 9.1 9.2 9.0   PROT 7.8  --  7.5   ALBUMIN 2.8*  --  2.7*   BILITOT 6.2*  --  5.2*   ALKPHOS 308*  --  310*   AST 45*  --  43*   ALT 56*  --  50*   ANIONGAP 18* 15 12   ESTGFRAFRICA 35.4* 35.4* 35.4*   EGFRNONAA 30.6* 30.6* 30.6*   , CBC   Recent Labs   Lab 06/17/20 0313 06/18/20  0349   WBC 7.21 7.20   HGB 12.7* 11.8*   HCT 38.8* 36.0*   PLT 82* 93*    and All pertinent lab results from the last 24 hours have been reviewed.

## 2020-06-18 NOTE — NURSING TRANSFER
Nursing Transfer Note      6/18/2020     Transfer From: IR    Transfer via stretcher    Transfer with cardiac monitoring    Transported by escort    Medicines sent: GARTH gtts

## 2020-06-18 NOTE — PROGRESS NOTES
Pt arrived to [facility] bed 3 for 1 hour post PICC recovery. Report received from Karol SWAIN. Pt oriented to unit and staff.  Stretcher locked and placed in lowest position. Calming environment promoted. Comfort measures provided. Pt resting comfortably.

## 2020-06-18 NOTE — SUBJECTIVE & OBJECTIVE
"Interval HPI:   Overnight events: BG is within goal ranges on current SQ insulin regimen. Creatinine 2.3.  Eatin%  Nausea: No  Hypoglycemia and intervention: No  Fever: No  TPN and/or TF: No  If yes, type of TF/TPN and rate: none    /69 (BP Location: Right arm, Patient Position: Lying)   Pulse 88   Temp 97.7 °F (36.5 °C) (Oral)   Resp 18   Ht 5' 11" (1.803 m)   Wt 54.2 kg (119 lb 7.8 oz)   SpO2 97%   BMI 16.67 kg/m²     Labs Reviewed and Include    Recent Labs   Lab 20  0349   *   CALCIUM 9.0   ALBUMIN 2.7*   PROT 7.5   *   K 3.5   CO2 26   CL 91*   BUN 69*   CREATININE 2.3*   ALKPHOS 310*   ALT 50*   AST 43*   BILITOT 5.2*     Lab Results   Component Value Date    WBC 7.20 2020    HGB 11.8 (L) 2020    HCT 36.0 (L) 2020    MCV 80 (L) 2020    PLT 93 (L) 2020     No results for input(s): TSH, FREET4 in the last 168 hours.  Lab Results   Component Value Date    HGBA1C 7.9 (H) 2020       Nutritional status:   Body mass index is 16.67 kg/m².  Lab Results   Component Value Date    ALBUMIN 2.7 (L) 2020    ALBUMIN 2.8 (L) 2020    ALBUMIN 2.9 (L) 2020     No results found for: PREALBUMIN    Estimated Creatinine Clearance: 27.5 mL/min (A) (based on SCr of 2.3 mg/dL (H)).    Accu-Checks  Recent Labs     20  0728 20  1121 20  1532 20  2120 20  0738 20  1105 20  1610 20  2108 20  0959 20  1132   POCTGLUCOSE 152* 170* 212* 162* 125* 128* 130* 196* 112* 165*       Current Medications and/or Treatments Impacting Glycemic Control  Immunotherapy:    Immunosuppressants     None        Steroids:   Hormones (From admission, onward)    Start     Stop Route Frequency Ordered    20 1842  melatonin tablet 6 mg  (Medication Panel)      -- Oral Nightly PRN 20 1742        Pressors:    Autonomic Drugs (From admission, onward)    None        Hyperglycemia/Diabetes Medications: "   Antihyperglycemics (From admission, onward)    Start     Stop Route Frequency Ordered    06/15/20 1130  insulin aspart U-100 pen 4 Units      -- SubQ 3 times daily with meals 06/15/20 0937    06/13/20 2100  insulin detemir U-100 pen 8 Units      -- SubQ Nightly 06/13/20 0916    06/10/20 1619  insulin aspart U-100 pen 0-5 Units      -- SubQ Before meals & nightly PRN 06/10/20 1511

## 2020-06-18 NOTE — NURSING
Pt arrived to IR Room 189 for tunneled PICC placement.  Pt oriented to unit and staff. Plan of care reviewed with patient, patient verbalizes understanding. Comfort measures utilized. Pt safely transferred from stretcher to procedural table. Fall risk reviewed with patient, fall risk interventions maintained. Safety strap applied, positioner pillows utilized to minimize pressure points. Blankets applied. Pt prepped and draped utilizing standard sterile technique. Patient placed on continuous monitoring, as required by sedation policy. Timeouts completed utilizing standard universal time-out, per department and facility policy. RN to remain at bedside, continuous monitoring maintained. Pt resting comfortably. Denies pain/discomfort. Will continue to monitor. See flow sheets for monitoring, medication administration, and updates.

## 2020-06-19 PROBLEM — Z51.5 PALLIATIVE CARE ENCOUNTER: Status: ACTIVE | Noted: 2020-01-01

## 2020-06-19 NOTE — ASSESSMENT & PLAN NOTE
- Continue levothyroxine  
- KEYANNA on CKD V with baseline creatinine ~3 presented with serum creatinine 2.8  - will continue to monitor  
- KEYANNA on CKD V with baseline creatinine ~3 presented with serum creatinine 3.3   - currently down at baseline, will continue to monitor  
- KEYANNA on CKD V with unclear baseline creatinine   - presented with serum creatinine 3.5  - continuing to improve with diuresis, serum creatinine 2.1 this AM  - will continue to monitor  
- KEYANNA on CKD V with unclear baseline creatinine   - presented with serum creatinine 3.5  - continuing to improve with diuresis, serum creatinine 2.1 this AM  - will continue to monitor  
- KEYANNA on CKD V with unclear baseline creatinine   - presented with serum creatinine 3.5  - continuing to improve, serum creatinine 2.0 this AM  - will continue to monitor  
- KEYANNA on CKD V with unclear baseline creatinine   - presented with serum creatinine 3.5  - serum creatinine 2.3 this AM  - will continue to monitor  
- KEYANNA on CKD V with unclear baseline creatinine   - presented with serum creatinine 3.5  - serum creatinine 2.4 this AM  - will continue to monitor  
- KEYANNA on CKD V with unclear baseline creatinine (presumed ~3) presented with serum creatinine 2.8  - will continue to monitor  
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 Unclear what his baseline is, kidney function has been deteriorating with every admission.   - K is 5.9, expect improvement with diuresis. Continue to monitor closely  
- Low dose sliding scale  
- Only on metoprolol XL at home, will hold for now  - Consider hydralazine/isosorbide if BP remains elevated  - No ACEi.ARB due to CKD  
- patient on metoprolol succinate 50 mg daily as outpatient   - holding for now in light of hypotension  
- patient on metoprolol succinate 50 mg daily as outpatient, can't resume beta blocker while on dobutamine infusion  - started on hydralazine isosorbide dinitrate 10 mg TID on 6/13  
- patient on metoprolol succinate 50 mg daily as outpatient, can't resume beta blocker while on dobutamine infusion  - started on hydralazine isosorbide dinitrate 10 mg TID on 6/13  
- patient on metoprolol succinate 50 mg daily as outpatient, can't resume beta blocker while on dobutamine infusion  - started on low dose bidil on 6/13. increased to hydralazine 25mg and isosorbide dinitrate 20mg   
- patient on metoprolol succinate 50 mg daily as outpatient, can't resume beta blocker while on dobutamine infusion  - started on low dose bidil on 6/13. increased to hydralazine 50mg and isosorbide dinitrate 40mg   
- patient on metoprolol succinate 50 mg daily as outpatient, can't resume beta blocker while on dobutamine infusion  - started on low dose bidil on 6/13. increased to hydralazine 50mg and isosorbide dinitrate 40mg   
55 y.o M with HFrEF (EF 20%) 2/2 NICM. Multiple admission in the past few years and this is his third admission this year. Markedly volume overloaded on exam, high BNP, KEYANNA on CKD, congestive hepatopathy Reported history of non compliance in the past but currently he reports compliance to medications.     - Will admit to CCU for close monitoring  - Start  5 mcg/kg/min and furosemide 20 mg/hr   - Unable to lay flat for central line but if not improving in the next few hours will have to place line to check hemodyanamics      - Needs to establish with outpatient cardiology and eventually HTS.   - Needs evaluation for ICD as outpatient. He was seen by Dr. Paz on last admission but he did not follow up.   
55 y.o M with HFrEF (EF 20%) 2/2 NICM. Multiple admission in the past few years and this is his third admission this year. Markedly volume overloaded on exam, high BNP, KEYANNA on CKD, congestive hepatopathy Reported history of non compliance in the past but currently he reports compliance to medications.     TTE on 6/8:  · Severe left atrial enlargement.  · Mild left ventricular enlargement.  · Severely decreased left ventricular systolic function. The estimated ejection fraction is 20%.  · Severe global hypokinetic wall motion.  · Grade III (severe) left ventricular diastolic dysfunction consistent with restrictive physiology.  · Severe right atrial enlargement.  · Moderate right ventricular enlargement.  · Moderately reduced right ventricular systolic function.  · Moderate-to-severe mitral regurgitation.  · Mild to moderate tricuspid regurgitation.  · Pulmonary hypertension present.  · The estimated PA systolic pressure is 65 mmHg.  · Elevated central venous pressure (15 mmHg).  · Trivial pericardial effusion.    - On 6/16, IVC was noted to be small and collapsible. Lasix drip was discontinued, restarted home PO lasix regimen. Plan for RHC tomorrow   - holding home dose Toprolol-XL 50 mg daily in light of decompensated heart failure and hypotension  - started on low dose bidil on 6/13. increased to hydralazine 50mg and isosorbide dinitrate 40mg   
55 y.o M with HFrEF (EF 20%) 2/2 NICM. Multiple admission in the past few years and this is his third admission this year. Markedly volume overloaded on exam, high BNP, KEYANNA on CKD, congestive hepatopathy Reported history of non compliance in the past but currently he reports compliance to medications.     TTE on 6/8:  · Severe left atrial enlargement.  · Mild left ventricular enlargement.  · Severely decreased left ventricular systolic function. The estimated ejection fraction is 20%.  · Severe global hypokinetic wall motion.  · Grade III (severe) left ventricular diastolic dysfunction consistent with restrictive physiology.  · Severe right atrial enlargement.  · Moderate right ventricular enlargement.  · Moderately reduced right ventricular systolic function.  · Moderate-to-severe mitral regurgitation.  · Mild to moderate tricuspid regurgitation.  · Pulmonary hypertension present.  · The estimated PA systolic pressure is 65 mmHg.  · Elevated central venous pressure (15 mmHg).  · Trivial pericardial effusion.    - RHC on 6/17: RA: 0/ 1/ 1 RV: 44/ -3/ 0 PA: 52/ 20/ 30 PWP: 17/ 15/ 13 . Cardiac output was 2.65 by Dagmar. Cardiac index is 1.57 L/min/m2. O2 Sat: PA 51%.  - given bump in Cr and low CI/CO while off  for short period of time, will need home  infusion. Also, will need wheeler cath placed instead of PICC to preserve access given CKD   - given low filling pressures holding Lasix for now   - continue hydralazine 50mg and isosorbide dinitrate 40mg   - holding home dose Toprolol-XL 50 mg daily in light of decompensated heart failure and hypotension  
55 y.o M with HFrEF (EF 20%) 2/2 NICM. Multiple admission in the past few years and this is his third admission this year. Markedly volume overloaded on exam, high BNP, KEYANNA on CKD, congestive hepatopathy Reported history of non compliance in the past but currently he reports compliance to medications.     TTE on 6/8:  · Severe left atrial enlargement.  · Mild left ventricular enlargement.  · Severely decreased left ventricular systolic function. The estimated ejection fraction is 20%.  · Severe global hypokinetic wall motion.  · Grade III (severe) left ventricular diastolic dysfunction consistent with restrictive physiology.  · Severe right atrial enlargement.  · Moderate right ventricular enlargement.  · Moderately reduced right ventricular systolic function.  · Moderate-to-severe mitral regurgitation.  · Mild to moderate tricuspid regurgitation.  · Pulmonary hypertension present.  · The estimated PA systolic pressure is 65 mmHg.  · Elevated central venous pressure (15 mmHg).  · Trivial pericardial effusion.    - resume dobutamine and Lasix infusions for now  - holding home dose Toprolol-XL 50 mg daily in light of decompensated heart failure and hypotension  
55 y.o M with HFrEF (EF 20%) 2/2 NICM. Multiple admission in the past few years and this is his third admission this year. Markedly volume overloaded on exam, high BNP, KEYANNA on CKD, congestive hepatopathy Reported history of non compliance in the past but currently he reports compliance to medications.     TTE on 6/8:  · Severe left atrial enlargement.  · Mild left ventricular enlargement.  · Severely decreased left ventricular systolic function. The estimated ejection fraction is 20%.  · Severe global hypokinetic wall motion.  · Grade III (severe) left ventricular diastolic dysfunction consistent with restrictive physiology.  · Severe right atrial enlargement.  · Moderate right ventricular enlargement.  · Moderately reduced right ventricular systolic function.  · Moderate-to-severe mitral regurgitation.  · Mild to moderate tricuspid regurgitation.  · Pulmonary hypertension present.  · The estimated PA systolic pressure is 65 mmHg.  · Elevated central venous pressure (15 mmHg).  · Trivial pericardial effusion.    - resume dobutamine and Lasix infusions for now  - holding home dose Toprolol-XL 50 mg daily in light of decompensated heart failure and hypotension  - started on hydralazine isosorbide dinitrate (BiDil) 10 mg TID on 6/13  
55 y.o M with HFrEF (EF 20%) 2/2 NICM. Multiple admission in the past few years and this is his third admission this year. Markedly volume overloaded on exam, high BNP, KEYANNA on CKD, congestive hepatopathy Reported history of non compliance in the past but currently he reports compliance to medications.     TTE on 6/8:  · Severe left atrial enlargement.  · Mild left ventricular enlargement.  · Severely decreased left ventricular systolic function. The estimated ejection fraction is 20%.  · Severe global hypokinetic wall motion.  · Grade III (severe) left ventricular diastolic dysfunction consistent with restrictive physiology.  · Severe right atrial enlargement.  · Moderate right ventricular enlargement.  · Moderately reduced right ventricular systolic function.  · Moderate-to-severe mitral regurgitation.  · Mild to moderate tricuspid regurgitation.  · Pulmonary hypertension present.  · The estimated PA systolic pressure is 65 mmHg.  · Elevated central venous pressure (15 mmHg).  · Trivial pericardial effusion.    - resume dobutamine and Lasix infusions for now  - holding home dose Toprolol-XL 50 mg daily in light of decompensated heart failure and hypotension  - started on hydralazine isosorbide dinitrate (BiDil) 10 mg TID on 6/13  
55 y.o M with HFrEF (EF 20%) 2/2 NICM. Multiple admission in the past few years and this is his third admission this year. Markedly volume overloaded on exam, high BNP, KEYNANA on CKD, congestive hepatopathy Reported history of non compliance in the past but currently he reports compliance to medications.     TTE on 6/8:  · Severe left atrial enlargement.  · Mild left ventricular enlargement.  · Severely decreased left ventricular systolic function. The estimated ejection fraction is 20%.  · Severe global hypokinetic wall motion.  · Grade III (severe) left ventricular diastolic dysfunction consistent with restrictive physiology.  · Severe right atrial enlargement.  · Moderate right ventricular enlargement.  · Moderately reduced right ventricular systolic function.  · Moderate-to-severe mitral regurgitation.  · Mild to moderate tricuspid regurgitation.  · Pulmonary hypertension present.  · The estimated PA systolic pressure is 65 mmHg.  · Elevated central venous pressure (15 mmHg).  · Trivial pericardial effusion.    - continue dobutamine and Lasix infusions for now  - holding home dose Toprolol-XL 50 mg daily in light of decompensated heart failure and hypotension  - started on low dose bidil on 6/13. increased to hydralazine 25mg and isosorbide dinitrate 20mg   
BG goal 140 - 180     -  Levemir to 8 units q HS. BG is below goal at time of consult.   -  Increase Novolog to 3 units TIDWM. Prandial excursions noted. Aprox 0.2 units/kg/d dosing.   -  Low Dose Correction Scale.  -  BG Monitoring AC/HS.      ** Please call Endocrine for any BG related issues **  ** Please notify Endocrine for any change and/or advance in diet**      Discharge planning:   TBD. Please notify endocrinology prior to discharge.         
BG goal 140 - 180     -  Levemir to 8 units q HS. BG is below goal at time of consult.   -  Novolog 2 units TIDWM. Prandial excursions noted. Aprox 0.2 units/kg/d dosing.   - Low Dose Correction Scale  - BG Monitoring AC/HS      ** Please call Endocrine for any BG related issues **  ** Please notify Endocrine for any change and/or advance in diet**      Discharge planning:   TBD. Please notify endocrinology prior to discharge.         
BG goal 140 - 180     - Continue Levemir 10 units q HS   - Start Novolog 2 units TIDWM. Prandial excursions noted. Aprox 0.2 units/kg/d dosing.   - Low Dose Correction Scale  - BG Monitoring AC/HS     ** Please call Endocrine for any BG related issues **  ** Please notify Endocrine for any change and/or advance in diet**      Discharge planning:   TBD. Please notify endocrinology prior to discharge.         
BG goal 140 - 180     - Levemir 8 units daily.  - Novolog 4 units TIDWM.   - Low Dose SQ Insulin Correction Scale.  - BG Monitoring AC/HS.      ** Please call Endocrine for any BG related issues **  ** Please notify Endocrine for any change and/or advance in diet**      Discharge planning:   Please notify endocrine prior to discharge. Tenatively recommend discharge on hospital MDI regimen:  Levemir 8 units daily  Novolog 4 units TID with meals  Novolog Correction Scale (150-200/+1) prn ac/hs    Patient provided with blood glucose logs at bedside and instructions to document BG 4x daily. Instructions given to patient to call with BG consistently > 200 or < 80.     Discharge Teaching:    Reviewed topics related to DM including: the need for insulin, how insulin works, what makes it a high risk medication, the importance of follow up with either PCP or endocrine provider, importance of and how to check BG, how to record BG on logs, how to administer insulin, appropriate insulin administration sites, importance of rotating injection sites, hyper/hypoglycemia, how and when to treat hypoglycemia, when to hold insulin, how the correction scale works, importance of storing unused insulin in the refrigerator, and when to seek medical attention.  Patient verbalized understanding, answered all questions to patient's satisfaction.          
BG goal 140 - 180     - Levemir 8 units daily.  - Novolog 4 units TIDWM.   - Low Dose SQ Insulin Correction Scale.  - BG Monitoring AC/HS.      ** Please call Endocrine for any BG related issues **  ** Please notify Endocrine for any change and/or advance in diet**      Discharge planning:   TBD. Please notify endocrinology prior to discharge.         
BG goal 140 - 180     Patient admits to being noncompliant with insulin regimen at home.    Decrease transition IV insulin infusion to 0.6  u/hr with stepdown parameters (rate decreased per protocol)   Low Dose Correction Scale  Change BG monitoring to /hs/0200    ** Please call Endocrine for any BG related issues **    Discharge planning: [facility]      
BG goal 140 - 180     Patient admits to being noncompliant with insulin regimen at home.    Discontinue transition IV insulin infusion  Start Levemir 10 units q HS (First dose 6/10) 50% reduction from home dose  Low Dose Correction Scale  Change BG monitoring to ac/hs/0200  Will monitor for prandial excursions    ** Please call Endocrine for any BG related issues **    Discharge planning: TBD      
BG goal 140 - 180   BG significantly elevated above goal ranges overnight. IV insulin infusion protocol initiated. Patient is on ADA/Cardia diet with poor appetite.    Patient admits to being noncompliant with insulin regimen at home.    Discontinue IV insulin infusion protocol  Start transition IV insulin infusion at 0.8 u/hr (0.5 u/kg dosing) with stepdown parameters  Low Dose Correction Scale  BG monitoring q 4 hrs (given poor appetite) Coordinate with meal.     ** Please call Endocrine for any BG related issues **    Discharge planning: [facility]      
Chronic issue, patient with history of thrombocytopenia dating back to December 2018. Suspect related to underlying hepatopathy. Please see Hyperbilirubinemia.  
Impression: Pt is a 56 y/o male with  Bi-ventricular HF, CHF. Pt is on Dobutamine IV. Pt is alert, oriented to person, place, and situation. Pt is a full code.     Reason for consult: advance care planning. Per MD, pt to d/c home with home health and Dobutamine today.  Pt has advanced heart failure and not a candidate for advanced options.     Goals of care/advance care planning:  Met with pt along with Indy Villalpando LCSW. Pt has moderate insight into his disease trajectory. Pt is aware he has advanced heart failure and not a candidate for advanced options. Per pt, plan is to go home with Dobutamine and home health. Per pt, he will follow-up with isaac ESPARZA.   Spoke to pt about his advanced heart failure. Pt open to comfort care as he declines further. Per pt, he is not ready to accept hospice care yet. Pt open to following up on palliative care clinic for continued advance care planning.     Advance care planning:   Code status: Full at this time. Had discussion with pt concerning code status. Pt agreeable to DNR status. Per pt, he would not want CPR or be put on vent if heart stopped. Spoke to Dr. Dalton concerning pt's wishes. Recommend LAPOST be completed and signed by  Isaac ESPARZA.  Will speak to pt about.     Symptom  Management:   Pt denies pain, anxiety, SOB.     Pt is on Dobutamine IV. Education done with pt on role of Dobutamine.     Plan:   Pt to d/c home with home health/Dobutamine.   Pt agreeable to DNR status.   Pt open to Palliative care clinic visits for continued care planning needs.   Will follow.     Spoke to Dr. Dalton with isaac about above conversation with pt.     
Lab Results   Component Value Date    CREATININE 2.3 (H) 06/18/2020     Avoid insulin stacking  Titrate insulin slowly    
Lab Results   Component Value Date    CREATININE 2.9 (H) 06/10/2020     Avoid insulin stacking  Titrate insulin slowly    
Lab Results   Component Value Date    CREATININE 3.1 (H) 06/09/2020    CREATININE 3.1 (H) 06/09/2020     Avoid insulin stacking  Titrate insulin slowly    
Lab Results   Component Value Date    CREATININE 3.2 (H) 06/08/2020     Avoid insulin stacking  Titrate insulin slowly    
Managed per primary team  Optimize BG control    
Mr Mckeon is a 55 year old man with history of HFrEF (LVEF 20%), T2DM, HTN, HLD, who was admitted on 6/7 due to concern for heart failure exacerbation; hep consulted due to concern for ESLD.    Unclear at this time if patient has underlying ESLD. He endorses a remote, reportedly brief, history of ETOH intake. Hep panel negative. He is thrombocytopenic and ultrasound shows some nodularity of liver, but no splenomegaly. Ascites and liver changes could be secondary to passive congestion from heart failure. Will complete workup and plan for outpatient follow-up. Can consider outpatient fibroscan when euvolemic but may overstate stiffness. Anticipate bilirubin will continue to fall with diuresis.    Plan  - fluid overload. Management per cardiology team  - elevated INR. Please repeat in AM. Vit K 5mg IV x3 days  - Elevated bilirbuin. Monitor, anticipate will continue to resolve with diuresis  - Ascites. Please perform diagnostic paracentesis. Ensure send for alb and total protein to help characterize fluid  - ETOH history. Remote ETOH history, will check PET    
Patient on levemir 5 U daily as outpatient. Presented with blood glucose in the 300-500s and HgbA1c 7.9%. Anion gap within normal limits on presentation with a value of 12 (bicarbonate was 20). UA remarkable only for +1 protein, +1 occult blood (1 RBC/hpf) and 10 hyaline cast. There were no ketones or glucose. Serum osmolality was 331 but beta hydroxybutyrate was 0.2. VBG at that time showed pH 7.321, CO2 49.5, O2 22, and HCO3 25.6. He was subsequently started on insulin infusion and Endocrinology was consulted for assistance.    - Endocrinology consulted and following  - Resume determir 10 U QHS and aspart 2 U TID WM with LDSSI  - Accuchecks ACHS  - Diabetic cardiac renal diet with 1,500 mL volume restriction  
Patient on levemir 5 U daily as outpatient. Presented with blood glucose in the 300-500s and HgbA1c 7.9%. Anion gap within normal limits on presentation with a value of 12 (bicarbonate was 20). UA remarkable only for +1 protein, +1 occult blood (1 RBC/hpf) and 10 hyaline cast. There were no ketones or glucose. Serum osmolality was 331 but beta hydroxybutyrate was 0.2. VBG at that time showed pH 7.321, CO2 49.5, O2 22, and HCO3 25.6. He was subsequently started on insulin infusion and Endocrinology was consulted for assistance.    - Endocrinology consulted and following  - Resume determir 10 U QHS and aspart 2 U TID WM with LDSSI  - Accuchecks ACHS  - Diabetic cardiac renal diet with 1,500 mL volume restriction  
Patient on levemir 5 U daily as outpatient. Presented with blood glucose in the 300-500s and HgbA1c 7.9%. Anion gap within normal limits on presentation with a value of 12 (bicarbonate was 20). UA remarkable only for +1 protein, +1 occult blood (1 RBC/hpf) and 10 hyaline cast. There were no ketones or glucose. Serum osmolality was 331 but beta hydroxybutyrate was 0.2. VBG at that time showed pH 7.321, CO2 49.5, O2 22, and HCO3 25.6. He was subsequently started on insulin infusion and Endocrinology was consulted for assistance.    - Endocrinology consulted and following  - Resume determir 10 U QHS with LDSSI  - Diabetic cardiac renal diet with 1,500 mL volume restriction  
Patient on levemir 5 U daily as outpatient. Presented with blood glucose in the 300-500s and HgbA1c 7.9%. Anion gap within normal limits on presentation with a value of 12 (bicarbonate was 20). UA remarkable only for +1 protein, +1 occult blood (1 RBC/hpf) and 10 hyaline cast. There were no ketones or glucose. Serum osmolality was 331 but beta hydroxybutyrate was 0.2. VBG at that time showed pH 7.321, CO2 49.5, O2 22, and HCO3 25.6. He was subsequently started on insulin infusion and Endocrinology was consulted for assistance.    - Endocrinology consulted and following  - Resume determir 8 U QHS and aspart 3 U TID WM with LDSSI  - Accuchecks ACHS  - Diabetic cardiac renal diet with 1,500 mL volume restriction  
Patient on levemir 5 U daily as outpatient. Presented with blood glucose in the 300-500s and HgbA1c 7.9%. Anion gap within normal limits on presentation with a value of 12 (bicarbonate was 20). UA remarkable only for +1 protein, +1 occult blood (1 RBC/hpf) and 10 hyaline cast. There were no ketones or glucose. Serum osmolality was 331 but beta hydroxybutyrate was 0.2. VBG at that time showed pH 7.321, CO2 49.5, O2 22, and HCO3 25.6. He was subsequently started on insulin infusion and Endocrinology was consulted for assistance.    - Endocrinology consulted and following  - Resume insulin infusion per Endocrinology  - Diabetic cardiac renal diet with 1,500 mL volume restriction  
Patient on levemir 5 U daily as outpatient. Presented with blood glucose in the 300-500s and HgbA1c 7.9%. Anion gap within normal limits on presentation with a value of 12 (bicarbonate was 20). UA remarkable only for +1 protein, +1 occult blood (1 RBC/hpf) and 10 hyaline cast. There were no ketones or glucose. Serum osmolality was 331 but beta hydroxybutyrate was 0.2. VBG at that time showed pH 7.321, CO2 49.5, O2 22, and HCO3 25.6. He was subsequently started on insulin infusion and Endocrinology was consulted for assistance.    - Endocrinology consulted and following  - Transitioned to determir 10 U QHS with LDSSI  - Diabetic cardiac renal diet with 1,500 mL volume restriction  
Patient presented with total bilirubin of 9, alkaline phosphatase 287, AST 48, and GGT 84. ALT within normal limits with a value of 34. He underwent US liver with Doppler upon presentation which showed unremarkable pancrease, enlarge liver with heterogenous echotexture nodularity suggestive of cirrhosis, cholelithiasis with gallbladder wall thickening but no CBD or intrahepatic dilation, and large volume ascites. Vasculature was patent with proper directional flow.        - liver function improving   - Hepatology consulted for assistance, differential includes cirrhosis, congestive hepatopathy, and ischemic hepatitis (deferred to outpatient for further work-up)  - s/p paracentesis with 1L removed per IR on 6/10  - Ascitic , WBC 59 (7% segs) albumin 2.1 (SAAG 0.6), glucose 118, and protein 4.5  - Ascitic fluid cultures still pending/without growth so far  - Hepatitis and HIV negative  - Serum ammonia, IgG, and AFP within normal limits  - Anti-smooth muscle Ab, Anti-SSA Ab, Anti-SSB Ab, and Anti-Sm/RNP negative  - PETH still pending   - s/p vitamin K 5 mg IV QD for three doses (completed on 6/11)  
Patient presented with total bilirubin of 9, alkaline phosphatase 287, AST 48, and GGT 84. ALT within normal limits with a value of 34. He underwent US liver with Doppler upon presentation which showed unremarkable pancrease, enlarge liver with heterogenous echotexture nodularity suggestive of cirrhosis, cholelithiasis with gallbladder wall thickening but no CBD or intrahepatic dilation, and large volume ascites. Vasculature was patent with proper directional flow.        - liver function improving   - Hepatology consulted for assistance, differential includes cirrhosis, congestive hepatopathy, and ischemic hepatitis (deferred to outpatient for further work-up)  - s/p paracentesis with 1L removed per IR on 6/10  - Ascitic , WBC 59 (7% segs) albumin 2.1 (SAAG 0.6), glucose 118, and protein 4.5  - Ascitic fluid cultures still pending/without growth so far  - Hepatitis and HIV negative  - Serum ammonia, IgG, and AFP within normal limits  - Anti-smooth muscle Ab, Anti-SSA Ab, Anti-SSB Ab, and Anti-Sm/RNP negative  - PETH still pending   - s/p vitamin K 5 mg IV QD for three doses (completed on 6/11)  
Patient presented with total bilirubin of 9, alkaline phosphatase 287, AST 48, and GGT 84. ALT within normal limits with a value of 34. He underwent US liver with Doppler upon presentation which showed unremarkable pancrease, enlarge liver with heterogenous echotexture nodularity suggestive of cirrhosis, cholelithiasis with gallbladder wall thickening but no CBD or intrahepatic dilation, and large volume ascites. Vasculature was patent with proper directional flow.      - Hepatology consulted for assistance and suspect studies reflective of congestive hepatopathy  - Acute hepatitis panel negative and serum ammonia within no limits  - Follow up laboratory studies  - IR for paracentesis  - Resume vitamin K 5 mg IV QD for three doses (2/3 days)       
Patient presented with total bilirubin of 9, alkaline phosphatase 287, AST 48, and GGT 84. ALT within normal limits with a value of 34. He underwent US liver with Doppler upon presentation which showed unremarkable pancrease, enlarge liver with heterogenous echotexture nodularity suggestive of cirrhosis, cholelithiasis with gallbladder wall thickening but no CBD or intrahepatic dilation, and large volume ascites. Vasculature was patent with proper directional flow.      MELD-Na score: 27 at 6/10/2020  4:27 PM  MELD score: 27 at 6/10/2020  4:27 PM  Calculated from:  Serum Creatinine: 2.8 mg/dL at 6/10/2020  4:27 PM  Serum Sodium: 136 mmol/L at 6/10/2020  4:27 PM  Total Bilirubin: 8.3 mg/dL at 6/10/2020  7:11 AM  INR(ratio): 1.3 at 6/10/2020  7:11 AM  Age: 55 years    - Hepatology consulted for assistance, differential includes cirrhosis, congestive hepatopathy, and ischemic hepatitis  - s/p paracentesis with 1L removed per IR on 6/10  - Ascitic , WBC 59 (7% segs) albumin 2.1  - Ascitic glucose and protein still pending  - Hepatitis and HIV negative  - Serum ammonia, IgG, and AFP within normal limits  - PETH, anti-smooth muscle Ab, and MONICA still pending   - Resume vitamin K 5 mg IV QD for three doses (2 of 3 days)   
Patient presented with total bilirubin of 9, alkaline phosphatase 287, AST 48, and GGT 84. ALT within normal limits with a value of 34. He underwent US liver with Doppler upon presentation which showed unremarkable pancrease, enlarge liver with heterogenous echotexture nodularity suggestive of cirrhosis, cholelithiasis with gallbladder wall thickening but no CBD or intrahepatic dilation, and large volume ascites. Vasculature was patent with proper directional flow.      MELD-Na score: 27 at 6/12/2020  4:29 PM  MELD score: 26 at 6/12/2020  4:29 PM  Calculated from:  Serum Creatinine: 2.6 mg/dL at 6/12/2020  4:29 PM  Serum Sodium: 134 mmol/L at 6/12/2020  4:29 PM  Total Bilirubin: 7.6 mg/dL at 6/12/2020  4:25 AM  INR(ratio): 1.3 at 6/10/2020  7:11 AM  Age: 55 years    - Hepatology consulted for assistance, differential includes cirrhosis, congestive hepatopathy, and ischemic hepatitis (deferred to outpatient for further work-up)  - s/p paracentesis with 1L removed per IR on 6/10  - Ascitic , WBC 59 (7% segs) albumin 2.1 (SAAG 0.6), glucose 118, and protein 4.5  - Hepatitis and HIV negative  - Serum ammonia, IgG, and AFP within normal limits  - PETH, anti-smooth muscle Ab, and MONICA still pending   - s/p vitamin K 5 mg IV QD for three doses (completed on 6/11)  
Patient presented with total bilirubin of 9, alkaline phosphatase 287, AST 48, and GGT 84. ALT within normal limits with a value of 34. He underwent US liver with Doppler upon presentation which showed unremarkable pancrease, enlarge liver with heterogenous echotexture nodularity suggestive of cirrhosis, cholelithiasis with gallbladder wall thickening but no CBD or intrahepatic dilation, and large volume ascites. Vasculature was patent with proper directional flow.      MELD-Na score: 27 at 6/12/2020  4:29 PM  MELD score: 26 at 6/12/2020  4:29 PM  Calculated from:  Serum Creatinine: 2.6 mg/dL at 6/12/2020  4:29 PM  Serum Sodium: 134 mmol/L at 6/12/2020  4:29 PM  Total Bilirubin: 7.6 mg/dL at 6/12/2020  4:25 AM  INR(ratio): 1.3 at 6/10/2020  7:11 AM  Age: 55 years 3 months      - liver function improving   - Hepatology consulted for assistance, differential includes cirrhosis, congestive hepatopathy, and ischemic hepatitis (deferred to outpatient for further work-up)  - s/p paracentesis with 1L removed per IR on 6/10  - Ascitic , WBC 59 (7% segs) albumin 2.1 (SAAG 0.6), glucose 118, and protein 4.5  - Ascitic fluid cultures still pending/without growth so far  - Hepatitis and HIV negative  - Serum ammonia, IgG, and AFP within normal limits  - Anti-smooth muscle Ab, Anti-SSA Ab, Anti-SSB Ab, and Anti-Sm/RNP negative  - PETH still pending   - s/p vitamin K 5 mg IV QD for three doses (completed on 6/11)  
Patient presented with total bilirubin of 9, alkaline phosphatase 287, AST 48, and GGT 84. ALT within normal limits with a value of 34. He underwent US liver with Doppler upon presentation which showed unremarkable pancrease, enlarge liver with heterogenous echotexture nodularity suggestive of cirrhosis, cholelithiasis with gallbladder wall thickening but no CBD or intrahepatic dilation, and large volume ascites. Vasculature was patent with proper directional flow.      MELD-Na score: 27 at 6/12/2020  4:29 PM  MELD score: 26 at 6/12/2020  4:29 PM  Calculated from:  Serum Creatinine: 2.6 mg/dL at 6/12/2020  4:29 PM  Serum Sodium: 134 mmol/L at 6/12/2020  4:29 PM  Total Bilirubin: 7.6 mg/dL at 6/12/2020  4:25 AM  INR(ratio): 1.3 at 6/10/2020  7:11 AM  Age: 55 years 3 months    - Hepatology consulted for assistance, differential includes cirrhosis, congestive hepatopathy, and ischemic hepatitis (deferred to outpatient for further work-up)  - s/p paracentesis with 1L removed per IR on 6/10  - Ascitic , WBC 59 (7% segs) albumin 2.1 (SAAG 0.6), glucose 118, and protein 4.5  - Ascitic fluid cultures still pending/without growth so far  - Hepatitis and HIV negative  - Serum ammonia, IgG, and AFP within normal limits  - Anti-smooth muscle Ab, Anti-SSA Ab, Anti-SSB Ab, and Anti-Sm/RNP negative  - PETH still pending   - s/p vitamin K 5 mg IV QD for three doses (completed on 6/11)  
Patient presented with total bilirubin of 9, alkaline phosphatase 287, AST 48, and GGT 84. ALT within normal limits with a value of 34. He underwent US liver with Doppler upon presentation which showed unremarkable pancrease, enlarge liver with heterogenous echotexture nodularity suggestive of cirrhosis, cholelithiasis with gallbladder wall thickening but no CBD or intrahepatic dilation, and large volume ascites. Vasculature was patent with proper directional flow.      MELD-Na score: 27 at 6/12/2020  4:29 PM  MELD score: 26 at 6/12/2020  4:29 PM  Calculated from:  Serum Creatinine: 2.6 mg/dL at 6/12/2020  4:29 PM  Serum Sodium: 134 mmol/L at 6/12/2020  4:29 PM  Total Bilirubin: 7.6 mg/dL at 6/12/2020  4:25 AM  INR(ratio): 1.3 at 6/10/2020  7:11 AM  Age: 55 years 3 months    - Hepatology consulted for assistance, differential includes cirrhosis, congestive hepatopathy, and ischemic hepatitis (deferred to outpatient for further work-up)  - s/p paracentesis with 1L removed per IR on 6/10  - Ascitic , WBC 59 (7% segs) albumin 2.1 (SAAG 0.6), glucose 118, and protein 4.5  - Ascitic fluid cultures still pending/without growth so far  - Hepatitis and HIV negative  - Serum ammonia, IgG, and AFP within normal limits  - Anti-smooth muscle Ab, Anti-SSA Ab, Anti-SSB Ab, and Anti-Sm/RNP negative  - PETH still pending   - s/p vitamin K 5 mg IV QD for three doses (completed on 6/11)  
Patient with history of thyroidectomy on levothyroxine 175 mcg as outpatient. On admission patient's TSH elevated with a value of 8.449 however free T4 0.74.    - Endocrinology consulted and following  - resume home dose Synthroid   
Please see Acute kidney injury superimposed on CKD.  
Please see Acute on chronic combined systolic and diastolic heart failure.  
Please see Diabetes mellitus, type II.  
Please see Hypothyroidism.  
Presented with blood glucose in the 300-500s and HgbA1c 7.9%. He was subsequently started on insulin infusion and Endocrinology was consulted for assistance.    - Endocrinology consulted and following  - Resume determir 8 U QHS and aspart 4 U TID WM with LDSSI  - Accuchecks ACHS  - Diabetic cardiac renal diet with 1,500 mL volume restriction  
Presented with blood glucose in the 300-500s and HgbA1c 7.9%. He was subsequently started on insulin infusion and Endocrinology was consulted for assistance.    - Endocrinology consulted and following  - Resume determir 8 U QHS and aspart 4 U TID WM with LDSSI  - Accuchecks ACHS  - Diabetic cardiac renal diet with 1,500 mL volume restriction  
Recommend continuing home dose of Synthroid 175 mcg while inpatient.     
 used

## 2020-06-19 NOTE — PLAN OF CARE
COLLIN informed by Patricia with [facility] that pt and wife have been taught and meds are at the bedside but requested to be notified when d/c order placed so dobutamine drip can be hooked-up prior to pt leaving.  Patricia to relay this to pt's nurse.    UPDATE 1:05 PM  COLLIN informed Patricia with [facility] of d/c order who reported that she has on her way back to the hospital to hook up pt's drip.  BRYNN Hirsch notified of the above.    Indy Saxena LMSW  [facility]  [id number]

## 2020-06-19 NOTE — CONSULTS
[facility]  Palliative Medicine  Consult Note    Patient Name: Ashish Mckeon  MRN: [id number]  Admission Date: 6/7/2020  Hospital Length of Stay: 12 days  Code Status: Full Code   Attending Provider: Artemio Mullen MD  Consulting Provider: TORIE Fernando  Primary Care Physician: [facility]  Principal Problem:Acute on chronic combined systolic and diastolic heart failure    Patient information was obtained from patient and ER records.      Inpatient consult to Palliative Care  Consult performed by: TORIE Powell  Consult ordered by: Sha Contreras MD        Assessment/Plan:     Palliative care encounter  Impression: Pt is a 54 y/o male with  Bi-ventricular HF, CHF. Pt is on Dobutamine IV. Pt is alert, oriented to person, place, and situation. Pt is a full code.     Reason for consult: advance care planning. Per MD, pt to d/c home with home health and Dobutamine today.  Pt has advanced heart failure and not a candidate for advanced options.     Goals of care/advance care planning:  Met with pt along with Indy Villalpando LCSW. Pt has moderate insight into his disease trajectory. Pt is aware he has advanced heart failure and not a candidate for advanced options. Per pt, plan is to go home with Dobutamine and home health. Per pt, he will follow-up with isaac ESPARZA.   Spoke to pt about his advanced heart failure. Pt open to comfort care as he declines further. Per pt, he is not ready to accept hospice care yet. Pt open to following up on palliative care clinic for continued advance care planning.     Advance care planning:   Code status: Full at this time. Had discussion with pt concerning code status. Pt agreeable to DNR status. Per pt, he would not want CPR or be put on vent if heart stopped. Spoke to Dr. Dalton concerning pt's wishes. Recommend LAPOST be completed and signed by  Isaac ESPARZA.  Will speak to pt about.     Symptom  Management:   Pt denies pain,  anxiety, SOB.     Pt is on Dobutamine IV. Education done with pt on role of Dobutamine.     Plan:   Pt to d/c home with home health/Dobutamine.   Pt agreeable to DNR status.   Pt open to Palliative care clinic visits for continued care planning needs.   Will follow.     Spoke to Dr. Dalton with cards about above conversation with pt.           Thank you for your consult. I will follow-up with patient. Please contact us if you have any additional questions.    Subjective:     HPI:   Patient is a 54 y/o male admitted to Cardiology service on 6/8 with CHF excerbation, hyperglycemia, and hyperbilirubinemia. He was started on Lasix, dobutamine, and insulin infusion. Per chart review,  US of the liver with Doppler was performed which revealed no ductal dilation but echogenic changes suggestive of cirrhosis and ascites. Endocrinology was consulted to assist in blood sugar management and glucose improved. He is diuresising well. Hepatology was consulted given concern for cirrhosis, congestive hepatopathy, versus ischemic hepatitis. Paracentesis was performed by Interventional Radiology on 6/10 along with transition to basal bolus insulin regimen.  He has completed three days IV vitamin K on 6/11.  He was started on hydralazine and isosorbide dinitrate on 6/13, increasing as tolearted. He was continued on cardiac/volume optimization with IV Lasix & dobutamine infusion. Respiratory status and renal function continueed to improve. On 6/16, IVC was noted to be small and collapsible. Lasix drip was discontinued, was restarted on home PO lasix regimen. RHC done on 6/17.  Low filling pressures following aggressive diuresis with very low output state and high SVR off . Holding Lasix for now given low filling pressures.      Interval History: Doing better since  infusion restarted yesterday morning after completing RHC. Still appears clinically dry. Cr stable at 2.3. T bili remains elevated but trending down slowly. Pt to d/c  "home with home health.      He is s/p IR Lee catheter placement this morning.     Hospital Course:  No notes on file    Interval History: Pt to d/c home with home health on Dobutamine.     Past Medical History:   Diagnosis Date    CHF (congestive heart failure)     Diabetes mellitus type II     Essential hypertension, benign     Hyperlipidemia     Hypothyroidism     Pain and swelling of left wrist 8/5/2019    Ashish Mckeon is a 54 y.o. male with PMH of  presenting with CHF, IDDM, Hypothyroidism, HTN, HLD presenting with worsening L wrist pain for 1 day. Orthopedic surgery was consulted for septic joint r/o. Pt has been afebrile and has no elevated WBC. ESR 36, normal CRP. Xray shows no signs of trauma and pt has no hx of gout or septic arthritis. Due to atraumatic wrist pain and swelling w/out identifia    Undiagnosed cardiac murmurs        Past Surgical History:   Procedure Laterality Date    COLON SURGERY      "lower intestines removed"    ORTHOPEDIC SURGERY Left     wrist    RIGHT HEART CATHETERIZATION Right 6/17/2020    Procedure: INSERTION, CATHETER, RIGHT HEART;  Surgeon: Kimberly Rodgers MD;  Location: [facility] CATH LAB;  Service: Cardiology;  Laterality: Right;    THYROID SURGERY         Review of patient's allergies indicates:   Allergen Reactions    Lisinopril Other (See Comments)     acei cough         Medications:  Continuous Infusions:   sodium chloride 0.9%      DOBUTamine 5 mcg/kg/min (06/18/20 1003)     Scheduled Meds:   aspirin  81 mg Oral Daily    hydrALAZINE  50 mg Oral TID    insulin aspart U-100  4 Units Subcutaneous TIDWM    insulin detemir U-100  8 Units Subcutaneous QHS    isosorbide dinitrate  20 mg Oral TID    levothyroxine  175 mcg Oral Before breakfast    potassium chloride  30 mEq Oral Daily     PRN Meds:sodium chloride 0.9%, acetaminophen, artificial tears, calcium carbonate, Dextrose 10% Bolus, Dextrose 10% Bolus, glucagon (human recombinant), glucose, glucose, " "insulin aspart U-100, melatonin, ondansetron, senna-docusate 8.6-50 mg, sodium chloride 0.9%    Family History     Problem Relation (Age of Onset)    Cancer Father        Tobacco Use    Smoking status: Never Smoker    Smokeless tobacco: Never Used    Tobacco comment: cigars "every other week or so"   Substance and Sexual Activity    Alcohol use: Not Currently     Alcohol/week: 3.0 standard drinks     Types: 3 Cans of beer per week    Drug use: Not Currently     Types: Marijuana    Sexual activity: Yes     Partners: Female       Review of Systems   Constitutional: Positive for activity change and fatigue.   Respiratory: Negative for chest tightness and shortness of breath.    Gastrointestinal: Negative.    Genitourinary: Negative.    Musculoskeletal: Negative.    Neurological: Negative.    Hematological: Negative.    Psychiatric/Behavioral: Negative.      Objective:     Vital Signs (Most Recent):  Temp: 98.5 °F (36.9 °C) (06/19/20 0710)  Pulse: 97 (06/19/20 0710)  Resp: 18 (06/19/20 0710)  BP: 98/61 (06/19/20 0710)  SpO2: 95 % (06/19/20 0710) Vital Signs (24h Range):  Temp:  [97.7 °F (36.5 °C)-98.9 °F (37.2 °C)] 98.5 °F (36.9 °C)  Pulse:  [] 97  Resp:  [18] 18  SpO2:  [94 %-97 %] 95 %  BP: ()/(57-67) 98/61     Weight: 55.2 kg (121 lb 11.1 oz)  Body mass index is 16.97 kg/m².    Review of Symptoms  Symptom Assessment (ESAS 0-10 scale)   ESAS 0 1 2 3 4 5 6 7 8 9 10   Pain X             Dyspnea X             Anxiety              Nausea              Depression               Anorexia              Fatigue              Insomnia              Restlessness               Agitation              CAM / Delirium __ --  ___+   Constipation     __ --  ___+   Diarrhea           __ --  ___+  Bowel Management Plan (BMP): Yes- prnX      Pain Assessment:  No pain noted      Performance Status: 80    ECOG Performance Status Grade: 2 - Ambulates, capable of self care only    Physical Exam    Constitutional:  Pt is alert " and cooperative  HENT:      Head: Normocephalic and atraumatic.   Eyes:      General: Lids are normal.      Conjunctiva/sclera: Conjunctivae normal.   Neck:      Musculoskeletal: Normal range of motion.   Cardiovascular:      Comments: Pt on Dobutamine IV.   Pulmonary:      Effort: Pulmonary effort is normal.   :  Li cath in place  Musculoskeletal: Normal range of motion.   Skin:     General: Skin is warm and dry.   Neurological:      Mental Status: He is alert and oriented to person, place, and time.   Psychiatric:         Mood and Affect: Mood normal.         Speech: Speech normal.         Behavior: Behavior is cooperative.         Significant Labs: All pertinent labs within the past 24 hours have been reviewed.  CBC:   Recent Labs   Lab 06/19/20  0404   WBC 7.22   HGB 11.0*   HCT 34.3*   MCV 83   *     BMP:  Recent Labs   Lab 06/19/20  0404   *   *   K 4.3   CL 95   CO2 26   BUN 60*   CREATININE 2.1*   CALCIUM 8.7   MG 2.3     LFT:  Lab Results   Component Value Date    AST 50 (H) 06/19/2020    GGT 84 (H) 06/08/2020    ALKPHOS 298 (H) 06/19/2020    BILITOT 4.4 (H) 06/19/2020     Albumin:   Albumin   Date Value Ref Range Status   06/19/2020 2.6 (L) 3.5 - 5.2 g/dL Final     Protein:   Total Protein   Date Value Ref Range Status   06/19/2020 7.2 6.0 - 8.4 g/dL Final     Lactic acid:   Lab Results   Component Value Date    LACTATE 2.1 06/08/2020       Significant Imaging: I have reviewed all pertinent imaging results/findings within the past 24 hours.    Advance Care Planning   Advanced Directives::  Living Will: No  LaPOST: No  Do Not Resuscitate Status: No  Medical Power of : Pt's wife, Laura is next of kin.     Decision-Making Capacity: Patient answered questions       Living Arrangements: Lives with spouse    Psychosocial/Cultural:  Pt is  to wife for nine years. Pt has two sons and one step-daughter.       Spiritual: yes    F- Oriana and Belief: Sikhism    I -  Importance:   .  C - Community:     A - Address in Care:       20 minutes of advanced care planning done with pt concerning goals of care, code status.       Maty Coates, CNS  Palliative Medicine  [facility]

## 2020-06-19 NOTE — PLAN OF CARE
Pt remained free of injuries, falls, and trauma. VS stable. No complaints of pain or sob. Pt on RA with O2 sats above 96%. Pt in NSR/ST on telemetry with HR between 90s to 110s. Pt on  gtt per MAR orders. Pt tolerating well. Plan of care reviewed with pt. Pt verbalized understanding. Will continue to monitor.

## 2020-06-19 NOTE — DISCHARGE SUMMARY
[facility]  Cardiology  Discharge Summary      Patient Name: Ashish Mckeon  MRN: [id number]  Admission Date: 6/7/2020  Hospital Length of Stay: 12 days  Discharge Date and Time:  06/19/2020 11:29 AM  Attending Physician: Artemio Mullen MD    Discharging Provider: Sha Contreras MD  Primary Care Physician: [facility]    HPI:   56 y/o M with HFrEF 2/2 NICM, HTN, HLD, DM2 admitted with worsening SOB and BLE for 2 weeks. He was recently admitted for ADHF from 5/8/20 to 5/13/20. This is his 3rd admission this year. He reports that for the past 2 weeks he has been progressively more SOB. He feels like the fluid was not completely removed last admission. Takes furosemide 80 mg Po daily. Does not follow with cardiology. Unable to name his medications. Currently patient feels SOB and fatigued, able to answer all questions but falls asleep easily. Unable to lay flat. Uses two pillows at home.   TTE 2/26/20  · Moderate left atrial enlargement.  · Severely decreased left ventricular systolic function. The estimated ejection fraction is 20%.  · Grade III (severe) left ventricular diastolic dysfunction consistent with restrictive physiology.  · Mild-to-moderate mitral regurgitation.  · Global hypokinetic wall motion.  · Mild left ventricular enlargement.  · Moderate right ventricular enlargement.  · Moderate tricuspid regurgitation.  · Intermediate central venous pressure (8 mmHg).  · The estimated PA systolic pressure is 55 mmHg.  · Pulmonary hypertension present.  · Small posterolateral pericardial effusion.  · Mild mitral sclerosis.  · Mild aortic regurgitation.  Moderately reduced right ventricular systolic function.  [facility] 7/20/2018 - non obstructive CAD    Procedure(s) (LRB):  INSERTION, CATHETER, RIGHT HEART (Right)     Indwelling Lines/Drains at time of discharge:  Lines/Drains/Airways     Central Venous Catheter Line            Tunneled Central Line Insertion/Assessment -  Double Lumen  06/18/20 0817 right internal jugular 1 day          Drain            Male External Urinary Catheter 06/08/20 0713 Medium 11 days                Hospital Course:  Patient admitted to Cardiology service on 6/8 with CHF excerbation, hyperglycemia, and hyperbilirubinemia. He was started on Lasix, dobutamine, and insulin infusion. US of the liver with Doppler was performed which revealed no ductal dilation but echogenic changes suggestive of cirrhosis and ascites. Endocrinology was consulted to assist in blood sugar management and glucose improved. He is diuresising well. Hepatology was consulted given concern for cirrhosis, congestive hepatopathy, versus ischemic hepatitis. Paracentesis was performed by Interventional Radiology on 6/10 along with transition to basal bolus insulin regimen.  He has completed three days IV vitamin K on 6/11.  He was started on hydralazine and isosorbide dinitrate on 6/13, increasing as tolearted. He was continued on cardiac/volume optimization with IV Lasix & dobutamine infusion. Respiratory status and renal function continued to improve. On 6/16, IVC was noted to be small and collapsible. Lasix drip was discontinued, was restarted on home PO lasix regimen. RHC done on 6/17 showing low filling pressures following aggressive diuresis with very low output state and high SVR off . Given this, patient would benefit for outpt dobutamine. Long discussion about lifevest and ICD which patient refused. Palliative care was consulted and wants oupt follow up, no hospice at this time. On 6/19, he was then discharged home with  infusion. Follow up with cardiology and palliative care as outpt. Patient comfortable and agrees with plan       Vital Signs (Most Recent):  Temp: 98.9 °F (37.2 °C) (06/19/20 1110)  Pulse: 102 (06/19/20 1110)  Resp: 18 (06/19/20 1110)  BP: 117/69 (06/19/20 1110)  SpO2: (!) 94 % (06/19/20 1110) Vital Signs (24h Range):  Temp:  [97.7 °F (36.5 °C)-98.9 °F (37.2  °C)] 98.9 °F (37.2 °C)  Pulse:  [] 102  Resp:  [18] 18  SpO2:  [94 %-97 %] 94 %  BP: ()/(57-69) 117/69     Weight: 55.2 kg (121 lb 11.1 oz)  Body mass index is 16.97 kg/m².     SpO2: (!) 94 %  O2 Device (Oxygen Therapy): room air      Intake/Output Summary (Last 24 hours) at 6/19/2020 1138  Last data filed at 6/19/2020 0716  Gross per 24 hour   Intake 664.8 ml   Output 1500 ml   Net -835.2 ml       Lines/Drains/Airways     Central Venous Catheter Line            Tunneled Central Line Insertion/Assessment - Double Lumen  06/18/20 0817 right internal jugular 1 day          Drain            Male External Urinary Catheter 06/08/20 0713 Medium 11 days          Peripheral Intravenous Line                 Peripheral IV - Single Lumen 06/18/20 0647 24 G Left Antecubital 1 day                Physical Exam   Constitutional: He is oriented to person, place, and time. No distress.   HENT:   Head: Normocephalic and atraumatic.   Eyes: EOM are normal. Scleral icterus is present.   Neck: Normal range of motion. Neck supple. No tracheal deviation present.   Cardiovascular: Normal rate. Exam reveals no gallop and no friction rub.   Murmur heard.  Grade 2 systolic murmur best heard over mitral region. JVD improved   Pulmonary/Chest: Effort normal and breath sounds normal. He has no wheezes. He has no rales.   Abdominal: Soft. Bowel sounds are normal. He exhibits ascites. There is hepatomegaly. There is no abdominal tenderness.   Well healed midline surgical scar.   Musculoskeletal:         General: Edema: trace pitting pedal edmea.   Neurological: He is alert and oriented to person, place, and time. He exhibits normal muscle tone.   Skin: Skin is warm and dry. He is not diaphoretic.   Psychiatric: He has a normal mood and affect. His behavior is normal.   Nursing note and vitals reviewed.        Consults:   Consults (From admission, onward)        Status Ordering Provider     Inpatient consult to Endocrinology  Once      Provider:  (Not yet assigned)    Completed JOSE FOWLER     Inpatient consult to Hepatology  Once     Provider:  (Not yet assigned)    Completed MUMTAZ SCOTT     Inpatient consult to Interventional Radiology  Once     Provider:  (Not yet assigned)    Completed DEAN ESCALONA     Inpatient consult to Palliative Care  Once     Provider:  (Not yet assigned)    Completed DEAN ESCALONA     Inpatient consult to PICC team ([facility])  Once     Provider:  (Not yet assigned)    Completed JANET VELOZ     Inpatient consult to PICC team ([facility])  Once     Provider:  (Not yet assigned)    Completed DEAN ESCALONA              Pending Diagnostic Studies:     Procedure Component Value Units Date/Time    Albumin, Body Fluid (Reference Lab or [facility]) [[id number]] Collected: 06/10/20 0932    Order Status: Sent Lab Status: In process Updated: 06/10/20 0933    Specimen: Body Fluid     Glucose, Body Fluid ([facility] Lab or [facility]) [[id number]] Collected: 06/10/20 0932    Order Status: Sent Lab Status: In process Updated: 06/10/20 0933    Specimen: Body Fluid     Protein, Body Fluid ([facility] Lab or [facility]) [[id number]] Collected: 06/10/20 0932    Order Status: Sent Lab Status: In process Updated: 06/10/20 0933    Specimen: Body Fluid           Final Active Diagnoses:    Diagnosis Date Noted POA    PRINCIPAL PROBLEM:  Acute on chronic combined systolic and diastolic heart failure [I50.43] 02/25/2020 Yes    Palliative care encounter [Z51.5] 06/19/2020 Not Applicable    Goals of care, counseling/discussion [Z71.89]  Not Applicable    Advance care planning [Z71.89]  Not Applicable    Hyperbilirubinemia [E80.6] 06/09/2020 Yes    CKD (chronic kidney disease), stage IV [N18.4] 06/09/2020 Yes    Acute kidney injury superimposed on CKD [N17.9, N18.9] 07/17/2018 Yes    Essential hypertension [I10] 07/17/2018 Yes    Chronic combined systolic and diastolic congestive heart failure [I50.42] 01/16/2017 Yes     Thrombocytopenia [D69.6] 09/06/2013 Yes    Diabetes mellitus, type II [E11.9]  Yes    Hypothyroidism [E03.9]  Yes      Problems Resolved During this Admission:    Diagnosis Date Noted Date Resolved POA    Hyperglycemia [R73.9] 12/22/2018 06/16/2020 Yes     No new Assessment & Plan notes have been filed under this hospital service since the last note was generated.  Service: Cardiology      Discharged Condition: good    Disposition: Home or Self Care    Follow Up:  Follow-up Information     Nithin Castellanos - Cardiology.    Specialty: Cardiology  Contact information:  Shahla Castellanos  [facility] [address] [phone]  Additional information:  3rd floor           [facility] In 1 week.    Specialty: Palliative Medicine  Contact information:  [address]  [facility] [address] [phone]  Additional information:  [facility] [place], 9th Floor           [facility] In 1 week.    Specialties: Behavioral Health, Psychiatry  Contact information:  [address] [place] [address]  [phone]                 Patient Instructions:      Ambulatory referral/consult to Podiatry   Standing Status: Future   Referral Priority: Routine Referral Type: Consultation   Referral Reason: Specialty Services Required   Requested Specialty: Podiatry   Number of Visits Requested: 1     Ambulatory referral/consult to Congestive Heart Failure Clinic   Standing Status: Future   Referral Priority: Routine Referral Type: Consultation   Referral Reason: Specialty Services Required   Requested Specialty: Cardiology   Number of Visits Requested: 1     Ambulatory referral/consult to Palliative Care   Standing Status: Future   Referral Priority: Routine Referral Type: Consultation   Requested Specialty: Hospice and Palliative Medicine   Number of Visits Requested: 1     Medications:  Reconciled Home Medications:      Medication List      START taking these medications    blood  "sugar diagnostic Strp  Use to test blood glucose four times daily before meals and nightly.     blood-glucose meter kit  Use as instructed     DOBUTamine 500 mg/250 mL (2,000 mcg/mL)  Commonly known as: DOBUTREX  Inject 271 mcg/min into the vein continuous.     hydrALAZINE 50 MG tablet  Commonly known as: APRESOLINE  Take 1.5 tablets (75 mg total) by mouth 3 (three) times daily.     insulin aspart U-100 100 unit/mL (3 mL) Inpn pen  Commonly known as: NovoLOG  Inject 4 Units into the skin 3 (three) times daily with meals.     insulin detemir U-100 100 unit/mL (3 mL) Inpn pen  Commonly known as: LEVEMIR FLEXTOUCH  Inject 8 Units into the skin every evening.  Replaces: LEVEMIR U-100 INSULIN 100 unit/mL injection     isosorbide dinitrate 20 MG tablet  Commonly known as: ISORDIL  Take 2 tablets (40 mg total) by mouth 3 (three) times daily.     lancets Misc  Use to test blood glucose four times daily before meals and nightly.     lancing device Misc  Use to test blood glucose four times daily before meals and nightly.     pen needle, diabetic 32 gauge x 5/32" Ndle  Use to inject insulin four times daily before meals and nightly.        CHANGE how you take these medications    acetaminophen 325 MG tablet  Commonly known as: TYLENOL  Take 2 tablets (650 mg total) by mouth every 6 to 8 hours as needed.  What changed:   · how much to take  · when to take this  · reasons to take this        CONTINUE taking these medications    aspirin 81 MG Chew  Take 1 tablet (81 mg total) by mouth once daily.     ferrous sulfate 325 (65 FE) MG EC tablet  Take 1 tablet (325 mg total) by mouth every other day.     furosemide 80 MG tablet  Commonly known as: LASIX  Take 1 tablet (80 mg total) by mouth 2 (two) times daily.     HYDROcodone-acetaminophen 5-325 mg per tablet  Commonly known as: NORCO  Take 1 tablet by mouth every 6 (six) hours as needed for Pain.     levothyroxine 175 MCG tablet  Commonly known as: SYNTHROID  Take 1 tablet (175 mcg " total) by mouth before breakfast.        STOP taking these medications    LEVEMIR U-100 INSULIN 100 unit/mL injection  Generic drug: insulin detemir U-100  Replaced by: insulin detemir U-100 100 unit/mL (3 mL) Inpn pen     metoprolol succinate 50 MG 24 hr tablet  Commonly known as: TOPROL-XL        ASK your doctor about these medications    OPW TEST CLAIM - DO NOT FILL  OPW test claim. Do not fill.            Time spent on the discharge of patient: 30 minutes    Sha Contreras MD  Cardiology  [facility]

## 2020-06-19 NOTE — SUBJECTIVE & OBJECTIVE
"Interval History: Pt to d/c home with home health on Dobutamine.     Past Medical History:   Diagnosis Date    CHF (congestive heart failure)     Diabetes mellitus type II     Essential hypertension, benign     Hyperlipidemia     Hypothyroidism     Pain and swelling of left wrist 8/5/2019    Ashish Mckeon is a 54 y.o. male with PMH of  presenting with CHF, IDDM, Hypothyroidism, HTN, HLD presenting with worsening L wrist pain for 1 day. Orthopedic surgery was consulted for septic joint r/o. Pt has been afebrile and has no elevated WBC. ESR 36, normal CRP. Xray shows no signs of trauma and pt has no hx of gout or septic arthritis. Due to atraumatic wrist pain and swelling w/out identifia    Undiagnosed cardiac murmurs        Past Surgical History:   Procedure Laterality Date    COLON SURGERY      "lower intestines removed"    ORTHOPEDIC SURGERY Left     wrist    RIGHT HEART CATHETERIZATION Right 6/17/2020    Procedure: INSERTION, CATHETER, RIGHT HEART;  Surgeon: Kimberly Rodgers MD;  Location[facility] CATH LAB;  Service: Cardiology;  Laterality: Right;    THYROID SURGERY         Review of patient's allergies indicates:   Allergen Reactions    Lisinopril Other (See Comments)     acei cough         Medications:  Continuous Infusions:   sodium chloride 0.9%      DOBUTamine 5 mcg/kg/min (06/18/20 1003)     Scheduled Meds:   aspirin  81 mg Oral Daily    hydrALAZINE  50 mg Oral TID    insulin aspart U-100  4 Units Subcutaneous TIDWM    insulin detemir U-100  8 Units Subcutaneous QHS    isosorbide dinitrate  20 mg Oral TID    levothyroxine  175 mcg Oral Before breakfast    potassium chloride  30 mEq Oral Daily     PRN Meds:sodium chloride 0.9%, acetaminophen, artificial tears, calcium carbonate, Dextrose 10% Bolus, Dextrose 10% Bolus, glucagon (human recombinant), glucose, glucose, insulin aspart U-100, melatonin, ondansetron, senna-docusate 8.6-50 mg, sodium chloride 0.9%    Family History     Problem " "Relation (Age of Onset)    Cancer Father        Tobacco Use    Smoking status: Never Smoker    Smokeless tobacco: Never Used    Tobacco comment: cigars "every other week or so"   Substance and Sexual Activity    Alcohol use: Not Currently     Alcohol/week: 3.0 standard drinks     Types: 3 Cans of beer per week    Drug use: Not Currently     Types: Marijuana    Sexual activity: Yes     Partners: Female       Review of Systems   Constitutional: Positive for activity change and fatigue.   Respiratory: Negative for chest tightness and shortness of breath.    Gastrointestinal: Negative.    Genitourinary: Negative.    Musculoskeletal: Negative.    Neurological: Negative.    Hematological: Negative.    Psychiatric/Behavioral: Negative.      Objective:     Vital Signs (Most Recent):  Temp: 98.5 °F (36.9 °C) (06/19/20 0710)  Pulse: 97 (06/19/20 0710)  Resp: 18 (06/19/20 0710)  BP: 98/61 (06/19/20 0710)  SpO2: 95 % (06/19/20 0710) Vital Signs (24h Range):  Temp:  [97.7 °F (36.5 °C)-98.9 °F (37.2 °C)] 98.5 °F (36.9 °C)  Pulse:  [] 97  Resp:  [18] 18  SpO2:  [94 %-97 %] 95 %  BP: ()/(57-67) 98/61     Weight: 55.2 kg (121 lb 11.1 oz)  Body mass index is 16.97 kg/m².    Review of Symptoms  Symptom Assessment (ESAS 0-10 scale)   ESAS 0 1 2 3 4 5 6 7 8 9 10   Pain X             Dyspnea X             Anxiety              Nausea              Depression               Anorexia              Fatigue              Insomnia              Restlessness               Agitation              CAM / Delirium __ --  ___+   Constipation     __ --  ___+   Diarrhea           __ --  ___+  Bowel Management Plan (BMP): Yes- prnX      Pain Assessment:  No pain noted      Performance Status: 80    ECOG Performance Status Grade: 2 - Ambulates, capable of self care only    Physical Exam  HENT:      Head: Normocephalic and atraumatic.   Eyes:      General: Lids are normal.      Conjunctiva/sclera: Conjunctivae normal.   Neck:      " Musculoskeletal: Normal range of motion.   Cardiovascular:      Comments: Pt on Dobutamine IV.   Pulmonary:      Effort: Pulmonary effort is normal.   Musculoskeletal: Normal range of motion.   Skin:     General: Skin is warm and dry.   Neurological:      Mental Status: He is alert and oriented to person, place, and time.   Psychiatric:         Mood and Affect: Mood normal.         Speech: Speech normal.         Behavior: Behavior is cooperative.         Significant Labs: All pertinent labs within the past 24 hours have been reviewed.  CBC:   Recent Labs   Lab 06/19/20  0404   WBC 7.22   HGB 11.0*   HCT 34.3*   MCV 83   *     BMP:  Recent Labs   Lab 06/19/20  0404   *   *   K 4.3   CL 95   CO2 26   BUN 60*   CREATININE 2.1*   CALCIUM 8.7   MG 2.3     LFT:  Lab Results   Component Value Date    AST 50 (H) 06/19/2020    GGT 84 (H) 06/08/2020    ALKPHOS 298 (H) 06/19/2020    BILITOT 4.4 (H) 06/19/2020     Albumin:   Albumin   Date Value Ref Range Status   06/19/2020 2.6 (L) 3.5 - 5.2 g/dL Final     Protein:   Total Protein   Date Value Ref Range Status   06/19/2020 7.2 6.0 - 8.4 g/dL Final     Lactic acid:   Lab Results   Component Value Date    LACTATE 2.1 06/08/2020       Significant Imaging: I have reviewed all pertinent imaging results/findings within the past 24 hours.    Advance Care Planning   Advanced Directives::  Living Will: No  LaPOST: No  Do Not Resuscitate Status: No  Medical Power of : Pt's wife, Laura is next of kin.     Decision-Making Capacity: Patient answered questions       Living Arrangements: Lives with spouse    Psychosocial/Cultural:  Pt is  to wife for nine years. Pt has two sons and one step-daughter.       Spiritual: yes    F- Oriana and Belief: Gnosticism    I - Importance:   .  C - Community:     A - Address in Care:

## 2020-06-19 NOTE — DISCHARGE INSTRUCTIONS
Please seek emergent care for chest pain, increased SOB, or any other concerns.    Radiology will call to schedule MRI cervical and lumbar spine.

## 2020-06-19 NOTE — HPI
Patient is a 54 y/o male admitted to Cardiology service on 6/8 with CHF excerbation, hyperglycemia, and hyperbilirubinemia. He was started on Lasix, dobutamine, and insulin infusion. Per chart review,  US of the liver with Doppler was performed which revealed no ductal dilation but echogenic changes suggestive of cirrhosis and ascites. Endocrinology was consulted to assist in blood sugar management and glucose improved. He is diuresising well. Hepatology was consulted given concern for cirrhosis, congestive hepatopathy, versus ischemic hepatitis. Paracentesis was performed by Interventional Radiology on 6/10 along with transition to basal bolus insulin regimen.  He has completed three days IV vitamin K on 6/11.  He was started on hydralazine and isosorbide dinitrate on 6/13, increasing as tolearted. He was continued on cardiac/volume optimization with IV Lasix & dobutamine infusion. Respiratory status and renal function continueed to improve. On 6/16, IVC was noted to be small and collapsible. Lasix drip was discontinued, was restarted on home PO lasix regimen. RHC done on 6/17.  Low filling pressures following aggressive diuresis with very low output state and high SVR off . Holding Lasix for now given low filling pressures.      Interval History: Doing better since  infusion restarted yesterday morning after completing RHC. Still appears clinically dry. Cr stable at 2.3. T bili remains elevated but trending down slowly. Pt to d/c home with home health.      He is s/p IR Lee catheter placement this morning.

## 2020-06-19 NOTE — PLAN OF CARE
[facility]  Palliative Care   Psychosocial Assessment    Patient Name: Ashish Mckeon  MRN: [id number]  Admission Date: 6/7/2020  Hospital Length of Stay: 12 days  Code Status: Full Code   Attending Provider: Artemio Mullen MD  Palliative Care Provider: CHRISTINE Cisneros, APRN, AGCNS  Primary Care Physician: [facility]  Principal Problem:Acute on chronic combined systolic and diastolic heart failure    Reason for Referral: assistance with advance directives  Consult Order Date: 6/19/20  Primary CM/SW: Carmen Davis RN    Present during Interview: patient and Pal Care APRN and this SW.      Primary Language:English   Needed: no      Past Medical Situation:   PMH:   Past Medical History:   Diagnosis Date    CHF (congestive heart failure)     Diabetes mellitus type II     Essential hypertension, benign     Hyperlipidemia     Hypothyroidism     Pain and swelling of left wrist 8/5/2019    Ashish Mckeon is a 54 y.o. male with PMH of  presenting with CHF, IDDM, Hypothyroidism, HTN, HLD presenting with worsening L wrist pain for 1 day. Orthopedic surgery was consulted for septic joint r/o. Pt has been afebrile and has no elevated WBC. ESR 36, normal CRP. Xray shows no signs of trauma and pt has no hx of gout or septic arthritis. Due to atraumatic wrist pain and swelling w/out identifia    Undiagnosed cardiac murmurs      Mental Health/Substance Use History: n/a  Risk of Abuse, neglect or exploitation: n/a  Current or Previous Trauma and/or evidence of PTSD: none noted  Non-traditional Health practices:     Understanding of diagnosis and prognosis: Pt understands that his diagnosis is a progressive illness and he will continue to decline. Pt prefers to take one day at a time. Pt would benefit from continued follow up regarding his prognosis. Pt open to follow up with [facility].    Experience/Comfort level with health care system:    Patients  "Mental Status: alert and oriented    Socio-Economic Factors/Resources:  Address: [address] [place] [id number]  Phone Number: [phone] (home)     Marital Status:  x 9 years  Perceived impact on household composition: Lives with with wife.  Children: 2 sons from previous relationship. 1 step-daughter     Patient/Family perceptions about Caregiving Needs; availability and capacity: Return home with wife with     Family Dynamics/Relationships: Pt's main support is his wife Laura. Pt has two sons from previous relationship that live outside of the home. Pt stated that his wife Laura has a daughter that lives in [place].     Pt has a brother and sister who live in the area for support    Patient/Family Strengths/Resilience: family support  Patient/Family Coping Strategies: Pt stated that he takes it "day by day".    Activities of Daily Living: independent prior  Support Systems-Family & Community (Home Health, HME etc):  upon discharge    Transportation:  yes    Work/Education History: Pt worked as a  prior to disability  Self-Care Activities/Hobbies: enjoys fishing     History: no    Financial Resources:Medicaid      Advanced Care Planning & Legal Concerns:   Advanced Directives/Living Will: no  [place]/POLST: no   Planning:  no    Power of : no. Provided pt with Advance Directives. Pt stated that his wife of 9 years would be his decision-maker. Explained that we could complete a POA form if pt is not legally  to confirm that Laura would be his decision-maker. Pt stated that they are legally . Provided form for review.  Surrogate Decision Maker: Wife    Emergency Contacts:  Wife: Laura [facility]: [phone]    Spirituality, Culture & Coping Mechanisms:  F- Oriana and Belief: Orthodoxy     I - Importance: Pt believes in God.    C - Community/Culture Values:     A - Address in Care: Spiritual care to follow      Goals/Hopes/Expectations: Wants to " go home with wife.   Fears/Anxiety/Concerns: Understands he will need hospice in future but is not ready to here it.         Preferences about EOL Environment: (own bed, family nearby, pets, music, etc)  Home    Complicated Bereavement Risk Assessment Tool (CBRAT)  Reference:  [facility] Clinical Practice Group (May 2016). Bereavement Risk Screening and Management Guidelines.  Retrieved from: http://www.Reduxiocc.com.au/wp-content/uploads//JYWNC-Jxmkkpbsjhe-Sgztbibaw-and-Management-Guideline-2016.pdf      Bereaved Client Characteristics   ? Under 18      no  ? Was a Twin   no  ? Young Spouse   no  ? Elderly Spouse    no  ? Isolated    no  ? Lacks Meaningful Social Support   no  ? Dissatisfied with help available during illness   no  ? New to [employer] no  ? New to Decision-Making   no    Illness  ? Inherited Disorder   no  ? Stigmatized Disease in the family/community   no  ? Lengthy/Burdensome   no     Bereaved Client's History of Loss   ? Cumulative Multiple Losses   no  ? Previous Mental Health Illnesses   no  ? Current Mental Health Illness   no  ? Other Significant Health Issues   no   ? Migrant/Refugee   no Death  ? Sudden or Unexpected   no  ? Traumatic Circumstances Associated with Death   no  ? Significant Cultural/Social Burdens as a result of Death   no   Relationship with   ? Profound Lifelong Partner   no  ? Highly Dependent    no  ? Antagonistic   no  ? Ambivalent   no  ? Deeply Connected   yes  ? Culturally Defined   no   Risk Factors Scores  0-2  Low  3-5  Moderate  5+  High  All persons scoring moderate to high presume to be at risk**    (** It is acknowledged that protective factors and resilience may outweigh apparent risk factors.      Total Risk Factors Score:   Mild to Moderate    Will benefit from continued follow up and evaluation for resources and support.      Discharge Planning Needs/Plan of Care:       Visit to bedside  to discuss advance directive.  Pt is aware of his advanced heart failure and that he is not a candidate for advanced options.  Plan is for pt to discharge with  and home health today.  \[facility]\"" Care APRN provided education on  and its purpose.     \[facility]\"" Care spoke with pt about his advance care planning. Explained importance of discussing wishes with his decision-maker/wife. Encouraged pt to review Advance Directive Form and begin speaking with his wife regarding what his wishes are if he is unable to speak for himself.    Pt open to comfort care as he declines further. Pt stated he is not ready to accept hospice care yet. Pt open to following up in the Palliative Care Clinic for continued advance care planning.     Plan is for pt to go home with  IV.      Will follow as appropriate.    Indy Villalpando, LORIN, ACHP-SW

## 2020-06-19 NOTE — NURSING
Patient is ready for discharge. Patient stable alert and oriented. Tele,and PIVs removed. Right chest wall tunneled catheter left in place for home  infusion. Home supplies delivered to bedside,and patient safely connected and educated by Infusion personnel. Prescribed medication delivered to bedside by [facility]. No complaints of pain. Discussed discharge plan. Reviewed medications and side effects, appointments, and answered questions with patient and spouse.

## 2020-06-19 NOTE — TELEPHONE ENCOUNTER
Attempted to reach pt in regard to scheduling an appointment with Palliative Medicine  Received a referral from Lorene Freeman NP

## 2020-06-21 NOTE — PLAN OF CARE
06/21/20 0714   Final Note   Assessment Type Final Discharge Note   Anticipated Discharge Disposition IV Therapy

## 2020-06-22 NOTE — TELEPHONE ENCOUNTER
6/19/20  3:20 pm:  Received notification from [facility] appt coordinator : RAHUL Davis, RN sent a message pt was being discharged from the hospital and would need follow up with [facility] clinic pt being discharged with home Dobutamine.  I reviewed d/c summary and notes during hospital stay up to this point-pt was never seen by anyone with the HTS team. ( other than for RHC)  Seems Cardiology managed pt during this stay.   D/C summary clearly stated follow up with Cardiology and Palliative Care ( who also saw pt during his stay, but at this time pt not interested in hospice)  6/19/20  4:50 pm:  It was brought to my attention by Cardiology clinic nurse Penny, further past d/c summary there is an outpatient ambulatory consult to Heart Transplant Service.  Told her I would arrange for pt to have an appt in this department, but clearly pt also needs follow up for not in Cardiology.   Said she would work on that as well    6/22/20  3:00 pm  Sent message to  Luis JADE ( out of the office today)   To contact pt tomorrow and arrange for pt to have an initial consult visit with an [facility] provider this week and have labs:  BMP and BNP.

## 2020-06-23 NOTE — TELEPHONE ENCOUNTER
Attempted to reach pt in regards to scheduleing an appointment  with palliative medicine this is the second attempt left voicemail

## 2020-06-24 NOTE — PROGRESS NOTES
Subjective:    Patient ID:  Ashish Mckeon is a 55 y.o. male who presents for evaluation of Consult and Congestive Heart Failure      HPI  56 y/o M with HFrEF 2/2 NICM, HTN, HLD, DM2 here after discharge from the hospital.     DOing much better  No fluid retention at all JEROME FC II NYHA on DObutamine          HPI:   56 y/o M with HFrEF 2/2 NICM, HTN, HLD, DM2 admitted with worsening SOB and BLE for 2 weeks. He was recently admitted for ADHF from 5/8/20 to 5/13/20. This is his 3rd admission this year. He reports that for the past 2 weeks he has been progressively more SOB. He feels like the fluid was not completely removed last admission. Takes furosemide 80 mg Po daily. Does not follow with cardiology. Unable to name his medications. Currently patient feels SOB and fatigued, able to answer all questions but falls asleep easily. Unable to lay flat. Uses two pillows at home.   TTE 2/26/20  · Moderate left atrial enlargement.  · Severely decreased left ventricular systolic function. The estimated ejection fraction is 20%.  · Grade III (severe) left ventricular diastolic dysfunction consistent with restrictive physiology.  · Mild-to-moderate mitral regurgitation.  · Global hypokinetic wall motion.  · Mild left ventricular enlargement.  · Moderate right ventricular enlargement.  · Moderate tricuspid regurgitation.  · Intermediate central venous pressure (8 mmHg).  · The estimated PA systolic pressure is 55 mmHg.  · Pulmonary hypertension present.  · Small posterolateral pericardial effusion.  · Mild mitral sclerosis.  · Mild aortic regurgitation.  Moderately reduced right ventricular systolic function.  [facility] 7/20/2018 - non obstructive CAD           Hospital Course:  Patient admitted to Cardiology service on 6/8 with CHF excerbation, hyperglycemia, and hyperbilirubinemia. He was started on Lasix, dobutamine, and insulin infusion. US of the liver with Doppler was performed which revealed no ductal dilation but echogenic  changes suggestive of cirrhosis and ascites. Endocrinology was consulted to assist in blood sugar management and glucose improved. He is diuresising well. Hepatology was consulted given concern for cirrhosis, congestive hepatopathy, versus ischemic hepatitis. Paracentesis was performed by Interventional Radiology on 6/10 along with transition to basal bolus insulin regimen.  He has completed three days IV vitamin K on 6/11.  He was started on hydralazine and isosorbide dinitrate on 6/13, increasing as tolearted. He was continued on cardiac/volume optimization with IV Lasix & dobutamine infusion. Respiratory status and renal function continued to improve. On 6/16, IVC was noted to be small and collapsible. Lasix drip was discontinued, was restarted on home PO lasix regimen. RHC done on 6/17 showing low filling pressures following aggressive diuresis with very low output state and high SVR off . Given this, patient would benefit for outpt dobutamine. Long discussion about lifevest and ICD which patient refused. Palliative care was consulted and wants oupt follow up, no hospice at this time. On 6/19, he was then discharged home with  infusion. Follow up with cardiology and palliative care as outpt. Patient comfortable and agrees with plan     Review of Systems   Constitution: Negative for chills, decreased appetite, diaphoresis, fever, malaise/fatigue, night sweats, weight gain and weight loss.   Cardiovascular: Negative for chest pain, claudication, cyanosis, dyspnea on exertion, irregular heartbeat, leg swelling, near-syncope, orthopnea and palpitations.   Respiratory: Negative for cough, hemoptysis, shortness of breath, sleep disturbances due to breathing, snoring, sputum production and wheezing.    Gastrointestinal: Negative for abdominal pain and diarrhea.   Neurological: Negative for weakness.        Objective:    Physical Exam   Constitutional: He is oriented to person, place, and time. He appears  well-developed and well-nourished.   HENT:   Head: Normocephalic and atraumatic.   Eyes: Pupils are equal, round, and reactive to light. Conjunctivae and EOM are normal.   Neck: Normal range of motion. Neck supple. Hepatojugular reflux and JVD present.   Cardiovascular: Normal rate and regular rhythm. PMI is displaced. Exam reveals no gallop and no friction rub.   Murmur heard.  High-pitched blowing holosystolic murmur is present with a grade of 3/6 at the apex.  Pulmonary/Chest: Breath sounds normal. No respiratory distress. He has no wheezes. He has no rales. He exhibits no tenderness.   Abdominal: Soft. Bowel sounds are normal. He exhibits no distension and no mass. There is no hepatosplenomegaly, splenomegaly or hepatomegaly. There is no abdominal tenderness. There is no rebound, no guarding and no CVA tenderness.   No hepatoslenomegaly   Musculoskeletal: Normal range of motion.         General: No tenderness or edema.   Neurological: He is alert and oriented to person, place, and time. He has normal reflexes. No cranial nerve deficit. He exhibits normal muscle tone. Coordination normal.   Skin: Skin is warm and dry.   Psychiatric: He has a normal mood and affect.         Assessment:       1. Nonischemic cardiomyopathy    2. NSVT (nonsustained ventricular tachycardia)    3. Essential hypertension    4. Chronic combined systolic and diastolic congestive heart failure    5. CKD (chronic kidney disease), stage IV    6. Megaloblastic anemia due to vitamin B12 deficiency    7. Advance care planning         Plan:       HFrEF FC II NYHA on Dobutamine     RTC 1 month I am not sure I am clear why he is not a candidate for advance options we will follow in 1 month     May need liver biospy

## 2020-06-24 NOTE — TELEPHONE ENCOUNTER
Spoke with patient on behalf of [facility] post procedure Covid 19 system tracker    Patient denies any Covid 19 symptoms since procedure

## 2020-06-26 NOTE — TELEPHONE ENCOUNTER
6/26/20 - Received call from Rafi GONZALEZ reporting 6 lb weight gain over 2 days (weight today 138).  Mild swelling in lower extremities and mild crackles to bilaterally.  Patient states he takes Lasix 80 mg daily, but is documented in Med Card as Lasix 80 mg twice a day.  Lasix noted in 6/24/20 clinic note by RIKA Bustos M.D., as patient taking Lasix 80 mg daily.  Discussed/Reviewed with RIKA Bustos M.D.  VORB: RIKA Bustos M.D./DEVI Dubois RN: Take Lasix 80 mg BID today and tomorrow, KINSEYN to see patient Monday and draw a BMP and BNP.  Above orders given to Rafi.  Rafi read orders back and verbalized understanding to all orders given.

## 2020-06-30 NOTE — PROGRESS NOTES
"6/24/2020 1:30-1:50 pm  Pts first visit in the Advanced Cardiomyopathy Clinic  [facility]  Pt very pleasant and seems very interested in information pertaining to his illness  Provided pt with verbal HF education as well as the 8 step to Managing your Heart Failure Booklet   Reviewed with pt different type of medications he takes for this condition, including the continuous IV Dobtuamine medication he is currently connected to  Pt said since being on this medicine he feels better  Educated pt on diet: 2000 mg sodium restricted diet /24 hours as well as 1 1/2 liter/24 hour fluid restriction  Discussed never to add salt, how to reach labels for "Mg of sodium and what per serving means"  Warned pt of salt substitues, and foods high in sodium that may be obvious , but also those that are not  Discussed this 1 1/2 =50 ounces of "all Liquids  And he understook  Discussed weighing daily, writing it down and to report wt gain >3 pds over night and/or > 5 pds in a week.  Told pt weigh in am  , right after emptys bladder   Also discussed of s/s of fluid to look out for and report    Explained to pt the flow of this clinic and how to reach us via my [facility] and/or by phone  Provided pt with my business card and this department phone number    Pt voiced understanding of all    Encouraged pt to call for any concerns.    "

## 2020-07-01 NOTE — TELEPHONE ENCOUNTER
Pt had procedure on 06/17/2020 and had a Home Health visit within the 10-14 day post-procedure timeframe. Per Symptom Tracker Policy pt doesn't require outreach at this time. No contact made per policy.

## 2020-07-07 NOTE — PROGRESS NOTES
20 5:00 pm:  F/U on yesterdays routine inotrope lab results  CMP/BNP and Mg ordered  BNP pending results  CMP stable:  Na/K+:   140/3.8      Bun/Cr:  37/1.9  T.Bili:   Elevated, 2.5 ( was 2.4     3.3      4.4      M.8

## 2020-07-12 NOTE — TELEPHONE ENCOUNTER
Called from HH with labs results from last Monday (pBNP 6000)  Pt reports wt gain but no SOB/swelling    HH scheduled to draw new labs tomorrow.  Pt seen once by Dr Bustos 6/24, does not have f/u scheduled    Will ask HF nurses to f/u with pt and schedule appt within a week or so as well to f.u on this weeks labs

## 2020-07-16 NOTE — PROGRESS NOTES
"Subjective:     HPI:  Mr. Mckeon is a very pleasant 56 y/o M with stage D HFrEF 2/2 NICM, HTN, HLD, DM2 who was recently  admitted for ADHF from 5/8/20 to 5/13/20. This is his 3rd admission this year. This is his 1st visit with me. He was previously seen byy my partner Dr. Bustos. He is on home . Gets weekly labs. Comes today for a F/U. Reports feeeling well. NYHA class II symptoms, No PND or orthopnea. His HF regimen includes;  Hydralazine 75 mg TID/Isordil 40 mg TID, Lasix 80 mg BID and  5 mcg/Kg/min    Past Medical History:   Diagnosis Date    CHF (congestive heart failure)     Diabetes mellitus type II     Essential hypertension, benign     Hyperlipidemia     Hypothyroidism     Pain and swelling of left wrist 8/5/2019    Ashish Mckeon is a 54 y.o. male with PMH of  presenting with CHF, IDDM, Hypothyroidism, HTN, HLD presenting with worsening L wrist pain for 1 day. Orthopedic surgery was consulted for septic joint r/o. Pt has been afebrile and has no elevated WBC. ESR 36, normal CRP. Xray shows no signs of trauma and pt has no hx of gout or septic arthritis. Due to atraumatic wrist pain and swelling w/out identifia    Undiagnosed cardiac murmurs      Past Surgical History:   Procedure Laterality Date    COLON SURGERY      "lower intestines removed"    ORTHOPEDIC SURGERY Left     wrist    RIGHT HEART CATHETERIZATION Right 6/17/2020    Procedure: INSERTION, CATHETER, RIGHT HEART;  Surgeon: Kimberly Rodgers MD;  Location: [facility] C[facility] LAB;  Service: Cardiology;  Laterality: Right;    THYROID SURGERY         Review of Systems   Constitution: Negative. Negative for chills, decreased appetite, diaphoresis, fever, malaise/fatigue, night sweats, weight gain and weight loss.   Eyes: Negative.    Cardiovascular: Positive for dyspnea on exertion. Negative for chest pain, claudication, cyanosis, irregular heartbeat, leg swelling, near-syncope, orthopnea, palpitations, paroxysmal nocturnal dyspnea and syncope. " "  Respiratory: Negative for cough, hemoptysis and shortness of breath.    Endocrine: Negative.    Hematologic/Lymphatic: Negative.    Skin: Negative for color change, dry skin and nail changes.   Musculoskeletal: Negative.    Gastrointestinal: Negative.    Genitourinary: Negative.    Neurological: Negative for weakness.       Objective:   Blood pressure 103/69, pulse 92, height 5' 11" (1.803 m), weight 64 kg (141 lb 1.5 oz).body mass index is 19.68 kg/m².  Physical Exam   Constitutional: He appears well-developed.   /69 (BP Location: Right arm, Patient Position: Sitting, BP Method: Medium (Automatic))   Pulse 92   Ht 5' 11" (1.803 m)   Wt 64 kg (141 lb 1.5 oz)   BMI 19.68 kg/m²      HENT:   Head: Normocephalic.   Neck: No JVD present. Carotid bruit is not present.   Cardiovascular: Regular rhythm. Tachycardia present. PMI is displaced. Exam reveals gallop.   No murmur heard.  Pulmonary/Chest: Effort normal and breath sounds normal. No respiratory distress. He has no wheezes. He has no rales.   Abdominal: Soft. Bowel sounds are normal. He exhibits no distension. There is no abdominal tenderness. There is no rebound.   Musculoskeletal:         General: Edema present.   Neurological: He is alert.   Skin: Skin is warm.   Vitals reviewed.      Labs:    Chemistry        Component Value Date/Time     07/13/2020 1659    K 3.5 07/13/2020 1659     07/13/2020 1659    CO2 19 (L) 07/13/2020 1659    BUN 41 (H) 07/13/2020 1659    CREATININE 2.0 (H) 07/13/2020 1659     (H) 07/13/2020 1659        Component Value Date/Time    CALCIUM 7.5 (L) 07/13/2020 1659    ALKPHOS 206 (H) 07/13/2020 1448    AST 34 07/13/2020 1448    ALT 26 07/13/2020 1448    BILITOT 1.8 (H) 07/13/2020 1448    ESTGFRAFRICA 41.9 (A) 07/13/2020 1659    EGFRNONAA 36.2 (A) 07/13/2020 1659          Magnesium   Date Value Ref Range Status   07/13/2020 1.6 1.6 - 2.6 mg/dL Final     Lab Results   Component Value Date    WBC 7.22 06/19/2020    " HGB 11.0 (L) 06/19/2020    HCT 34.3 (L) 06/19/2020     (L) 06/19/2020     Lab Results   Component Value Date    INR 1.3 (H) 06/10/2020    INR 1.4 (H) 06/07/2020    INR 1.5 (H) 05/08/2020     BNP   Date Value Ref Range Status   07/13/2020 1,850 (H) 0 - 99 pg/mL Final     Comment:     Values of less than 100 pg/ml are consistent with non-CHF populations.   06/29/2020 2,184 (H) 0 - 99 pg/mL Final     Comment:     Values of less than 100 pg/ml are consistent with non-CHF populations.   06/24/2020 2,765 (H) 0 - 99 pg/mL Final     Comment:     Values of less than 100 pg/ml are consistent with non-CHF populations.     LD   Date Value Ref Range Status   09/05/2013 4,914 (H) 110 - 260 U/L Final     Comment:     Results are increased in hemolyzed samples.       Assessment:      1. Chronic combined systolic and diastolic congestive heart failure    2. Nonischemic cardiomyopathy    3. Essential hypertension    4. Mixed hyperlipidemia        Plan:   Clinically doing well on home . NYHA class II symptoms. Euvolemic on exam.   Continue current HF regimen.   Not sure why he was not evaluated for advanced HF options but my guess it was because of his renal function (repviosu creatinine was 3.4 now down to 2.0, likely cardiorenal syndrome and now improved renal function with ), normal LV  on previous Echo (LVEDD=5.6 cm) and was thought to have liver cirrhosis). Recomemend to repeat an Echo in 2 months and Hepatology consult if needed as he may be a candidate for MCS if his LV has remodeled and size has increased.   Recommend 2 gram sodium restriction and 1500cc fluid restriction.  Encourage physical activity with graded exercise program.  Requested patient to weigh themselves daily, and to notify us if their weight increases by more than 3 lbs in 1 day or 5 lbs in 1 week.   RTC in 2 months with labs.    Gustavo Gardner MD

## 2020-07-31 PROBLEM — Z78.9 PROBLEM WITH VASCULAR ACCESS: Status: ACTIVE | Noted: 2020-01-01

## 2020-07-31 NOTE — ED NOTES
LOC: The patient is awake, alert and aware of environment with an appropriate affect, the patient is oriented x 3 and speaking appropriately.  APPEARANCE: Patient resting comfortably and in no acute distress, patient is clean and well groomed. Pt is ambulatory. Wearing mask. Central line biopatch with noted dried blood.   SKIN: The skin is warm and dry, color consistent with ethnicity  MUSKULOSKELETAL: Patient moving all extremities well  RESPIRATORY: Airway is open and patent, respirations are spontaneous, patient has a normal effort and rate, no accessory muscle use noted.   NEUROLOGIC: Eyes open spontaneously, behavior appropriate to situation, follows commands

## 2020-07-31 NOTE — ED NOTES
"The patient came to the ER today with c/o blood to his central line that he noticed this morning. Pt denies pain. Denies any trouble with his Dobutamine infusion. Pt states the last dressing change to the central line was Monday. Pt states "It looks like it started bleeding then it stopped". Pt states it could have gotten pulled getting in/out of the bed  "

## 2020-08-01 NOTE — HPI
54 y/o M with stage D HFrEF 2/2 NICM, HTN, HLD, DM2 who was last admitted for ADHF from 6/7/20 to 6/19/20. This is his 4th admission this year. He is on home . Last seen in clinic by Dr. Gardner on 7/16/20 where he presented for follow up and was hemodynamically stable and NYHA class II symptoms. No PND or orthopnea. His HF regimen includes Hydralazine 75 mg TID/Isordil 40 mg TID, Lasix 80 mg BID and  5 mcg/Kg/min. Admitted to [facility] after accidental pull of his Right chest tunneled PICC (placed on 6/18/20). He has continued with infusion of  without issues. He noted blood surrounding port access and came to ER for further evaluation. A CXR shows the catheter has a coiled tip in the supraclavicular right lower neck, overlying the course of the IJ and EJ in the right lower neck. He denies pain or swelling. No fever. Lives at home with wife and denies sick contacts. He is at his dry wt of around 140 lbs. Denies HF symptoms.

## 2020-08-01 NOTE — SUBJECTIVE & OBJECTIVE
Interval History: No issues overnight. Denies neck/chest pain. No swelling or mass over tunneled catheter. No fever. Blood cultures negative thus far. Noted JVP on exam and will start IVP Lasix to dry him up while we address port issues. Will consult IR to see if they can see him over the weekend.     Continuous Infusions:   DOBUTamine IV infusion (non-titrating) 5 mcg/kg/min (07/31/20 2235)     Scheduled Meds:   aspirin  81 mg Oral Daily    atorvastatin  40 mg Oral Daily    ferrous sulfate  325 mg Oral Every other day    furosemide (LASIX) IV  160 mg Intravenous BID    hydrALAZINE  75 mg Oral TID    insulin aspart U-100  4 Units Subcutaneous TID WM    insulin detemir U-100  8 Units Subcutaneous QHS    isosorbide dinitrate  40 mg Oral TID    levothyroxine  175 mcg Oral Before breakfast    polyethylene glycol  17 g Oral Daily    senna-docusate 8.6-50 mg  1 tablet Oral BID     PRN Meds:acetaminophen, dextrose 50%, dextrose 50%, glucagon (human recombinant), glucose, glucose, HYDROcodone-acetaminophen, insulin aspart U-100, melatonin, ondansetron, sodium chloride 0.9%    Review of patient's allergies indicates:   Allergen Reactions    Lisinopril Other (See Comments)     acei cough       Objective:     Vital Signs (Most Recent):  Temp: 97.7 °F (36.5 °C) (08/01/20 0826)  Pulse: 83 (08/01/20 1113)  Resp: 15 (08/01/20 0826)  BP: 110/79 (08/01/20 0826)  SpO2: 98 % (08/01/20 0826) Vital Signs (24h Range):  Temp:  [97.7 °F (36.5 °C)-98.2 °F (36.8 °C)] 97.7 °F (36.5 °C)  Pulse:  [] 83  Resp:  [14-20] 15  SpO2:  [96 %-100 %] 98 %  BP: (105-116)/(72-83) 110/79     Patient Vitals for the past 72 hrs (Last 3 readings):   Weight   08/01/20 0317 64 kg (141 lb 1.5 oz)   08/01/20 0159 64 kg (141 lb 1.5 oz)   07/31/20 1846 63.5 kg (140 lb)     Body mass index is 19.68 kg/m².      Intake/Output Summary (Last 24 hours) at 8/1/2020 1130  Last data filed at 8/1/2020 1100  Gross per 24 hour   Intake 519.51 ml   Output  1200 ml   Net -680.49 ml       Hemodynamic Parameters:       Telemetry: No events     Physical Exam  Vitals signs and nursing note reviewed.   Constitutional:       Appearance: Normal appearance.   Neck:      Musculoskeletal: Normal range of motion and neck supple.   Cardiovascular:      Rate and Rhythm: Normal rate and regular rhythm.      Comments: JVP to angle of jaw  Pulmonary:      Effort: Pulmonary effort is normal.      Breath sounds: No stridor. No wheezing or rhonchi.   Musculoskeletal: Normal range of motion.         General: No swelling.   Skin:     General: Skin is warm and dry.      Capillary Refill: Capillary refill takes 2 to 3 seconds.   Neurological:      General: No focal deficit present.      Mental Status: He is alert and oriented to person, place, and time.         Significant Labs:  CBC:  Recent Labs   Lab 07/31/20 2139 08/01/20  0638   WBC 3.99  3.99 3.66*   RBC 3.98*  3.98* 3.48*   HGB 11.2*  11.2* 9.9*   HCT 37.4*  37.4* 32.4*   *  138* 104*   MCV 94  94 93   MCH 28.1  28.1 28.4   MCHC 29.9*  29.9* 30.6*     BNP:  Recent Labs   Lab 07/27/20 1054 07/31/20 2139   BNP 1,975* 1,601*     CMP:  Recent Labs   Lab 07/27/20  1054 07/31/20 2139 08/01/20  0638   * 122*  122* 165*   CALCIUM 7.6* 8.2*  8.2* 7.8*   ALBUMIN 2.9* 3.5  3.5 3.0*   PROT 6.6 8.0  8.0 6.9    137  137 138   K 3.4* 3.6  3.6 3.4*   CO2 17* 17*  17* 20*   * 108  108 108   BUN 47* 42*  42* 41*   CREATININE 2.1* 2.2*  2.2* 2.1*   ALKPHOS 189* 239*  239* 207*   ALT 18 29  29 23   AST 16 30  30 23   BILITOT 1.5* 1.7*  1.7* 1.5*      Coagulation:   No results for input(s): PT, INR, APTT in the last 168 hours.  LDH:  No results for input(s): LDH in the last 72 hours.  Microbiology:  Microbiology Results (last 7 days)     Procedure Component Value Units Date/Time    Blood culture [[id number]] Collected: 08/01/20 0638    Order Status: Sent Specimen: Blood Updated: 08/01/20 0702    Blood  culture [[id number]] Collected: 08/01/20 0638    Order Status: Sent Specimen: Blood Updated: 08/01/20 0702    Blood Culture #2 **CANNOT BE ORDERED STAT** [[id number]] Collected: 07/31/20 2139    Order Status: Completed Specimen: Blood from Peripheral, Forearm, Left Updated: 08/01/20 0515     Blood Culture, Routine No Growth to date    Blood Culture #1 **CANNOT BE ORDERED STAT** [[id number]] Collected: 07/31/20 2139    Order Status: Completed Specimen: Blood from Peripheral, Antecubital, Left Updated: 08/01/20 0515     Blood Culture, Routine No Growth to date          I have reviewed all pertinent labs within the past 24 hours.    Estimated Creatinine Clearance: 35.6 mL/min (A) (based on SCr of 2.1 mg/dL (H)).    Diagnostic Results:  I have reviewed all pertinent imaging results/findings within the past 24 hours.

## 2020-08-01 NOTE — PLAN OF CARE
AA&Ox4. VSS on visi. NSR on tele. Afebrile. 98% on RA. Held scheduled hydralazine - -110. BG monitored ACHS. Ambulates independently. Voids per urinal. Diuresed with 160mg IV Lasix. Dobutamine gtt infusing to left AC 18G @ 5mcg/kg/min - 9.5cc/hr. Blood cx negative to date. IR consulted - plan to place new line on Monday, 8/3. K+ 3.4 - PO KCl supp given x2. Wife at the bedside and attentive to pt. Instructed to call for assistance. Will continue to monitor.

## 2020-08-01 NOTE — H&P
[facility]  Heart Transplant  H&P    Patient Name: Ashish Mckeon  MRN: [id number]  Admission Date: 7/31/2020  Attending Physician: Joesph Vargas MD  Primary Care Provider: [facility]  Principal Problem:Problem with vascular access    Subjective:     History of Present Illness:  54 y/o M with stage D HFrEF 2/2 NICM, HTN, HLD, DM2 who was last admitted for ADHF from 6/7/20 to 6/19/20. This is his 4th admission this year. He is on home . Last seen in clinic by Dr. Gardner on 7/16/20 where he presented for follow up and was hemodynamically stable and NYHA class II symptoms. No PND or orthopnea. His HF regimen includes Hydralazine 75 mg TID/Isordil 40 mg TID, Lasix 80 mg BID and  5 mcg/Kg/min. Admitted to [facility] after accidental pull of his Right chest tunneled PICC (placed on 6/18/20). He has continued with infusion of  without issues. He noted blood surrounding port access and came to ER for further evaluation. A CXR shows the catheter has a coiled tip in the supraclavicular right lower neck, overlying the course of the IJ and EJ in the right lower neck. He denies pain or swelling. No fever. Lives at home with wife and denies sick contacts. He is at his dry wt of around 140 lbs. Denies HF symptoms.     Past Medical History:   Diagnosis Date    CHF (congestive heart failure)     Diabetes mellitus type II     Essential hypertension, benign     Hyperlipidemia     Hypothyroidism     Pain and swelling of left wrist 8/5/2019    Ashish Mckeon is a 54 y.o. male with PMH of  presenting with CHF, IDDM, Hypothyroidism, HTN, HLD presenting with worsening L wrist pain for 1 day. Orthopedic surgery was consulted for septic joint r/o. Pt has been afebrile and has no elevated WBC. ESR 36, normal CRP. Xray shows no signs of trauma and pt has no hx of gout or septic arthritis. Due to atraumatic wrist pain and swelling w/out identifia    Undiagnosed cardiac murmurs        Past  "Surgical History:   Procedure Laterality Date    COLON SURGERY      "lower intestines removed"    ORTHOPEDIC SURGERY Left     wrist    RIGHT HEART CATHETERIZATION Right 6/17/2020    Procedure: INSERTION, CATHETER, RIGHT HEART;  Surgeon: Kimberly Rodgers MD;  Location: [facility] CATH LAB;  Service: Cardiology;  Laterality: Right;    THYROID SURGERY         Review of patient's allergies indicates:   Allergen Reactions    Lisinopril Other (See Comments)     acei cough         Current Facility-Administered Medications   Medication    acetaminophen tablet 650 mg    aspirin chewable tablet 81 mg    atorvastatin tablet 40 mg    dextrose 50% injection 12.5 g    dextrose 50% injection 25 g    DOBUTamine 500mg in D5W 250mL infusion (premix) (NON-TITRATING)    ferrous sulfate EC tablet 325 mg    furosemide tablet 80 mg    glucagon (human recombinant) injection 1 mg    glucose chewable tablet 16 g    glucose chewable tablet 24 g    hydrALAZINE tablet 75 mg    HYDROcodone-acetaminophen 5-325 mg per tablet 1 tablet    insulin aspart U-100 pen 0-5 Units    insulin aspart U-100 pen 4 Units    insulin detemir U-100 pen 8 Units    isosorbide dinitrate tablet 40 mg    levothyroxine tablet 175 mcg    melatonin tablet 6 mg    ondansetron injection 4 mg    polyethylene glycol packet 17 g    senna-docusate 8.6-50 mg per tablet 1 tablet    sodium chloride 0.9% flush 10 mL     Family History     Problem Relation (Age of Onset)    Cancer Father        Tobacco Use    Smoking status: Never Smoker    Smokeless tobacco: Never Used    Tobacco comment: cigars "every other week or so"   Substance and Sexual Activity    Alcohol use: Not Currently     Alcohol/week: 3.0 standard drinks     Types: 3 Cans of beer per week    Drug use: Not Currently     Types: Marijuana    Sexual activity: Yes     Partners: Female     Review of Systems   Constitutional: Negative.    HENT: Negative.    Eyes: Negative.    Respiratory: Negative. "    Cardiovascular: Negative.    Gastrointestinal: Negative.    Endocrine: Negative.    Genitourinary: Negative.    Musculoskeletal: Negative.    Allergic/Immunologic: Negative.    Neurological: Negative.    Hematological: Negative.      Objective:     Vital Signs (Most Recent):  Temp: 98.2 °F (36.8 °C) (08/01/20 0015)  Pulse: 85 (08/01/20 0015)  Resp: 15 (08/01/20 0015)  BP: 114/81 (08/01/20 0015)  SpO2: 99 % (08/01/20 0015) Vital Signs (24h Range):  Temp:  [97.9 °F (36.6 °C)-98.2 °F (36.8 °C)] 98.2 °F (36.8 °C)  Pulse:  [] 85  Resp:  [15-20] 15  SpO2:  [96 %-100 %] 99 %  BP: (105-116)/(73-83) 114/81     Patient Vitals for the past 72 hrs (Last 3 readings):   Weight   08/01/20 0159 64 kg (141 lb 1.5 oz)   07/31/20 1846 63.5 kg (140 lb)     Body mass index is 19.68 kg/m².      Intake/Output Summary (Last 24 hours) at 8/1/2020 0209  Last data filed at 8/1/2020 0000  Gross per 24 hour   Intake 123.96 ml   Output --   Net 123.96 ml       Physical Exam  Constitutional:       General: He is not in acute distress.     Appearance: Normal appearance.   HENT:      Nose: Nose normal.   Neck:      Musculoskeletal: Normal range of motion and neck supple.   Cardiovascular:      Rate and Rhythm: Normal rate and regular rhythm.      Pulses: Normal pulses.      Heart sounds: No murmur.   Pulmonary:      Effort: Pulmonary effort is normal.      Breath sounds: No stridor. No wheezing.   Abdominal:      General: There is no distension.      Palpations: There is no mass.   Musculoskeletal:         General: No swelling or signs of injury.      Left lower leg: No edema.   Neurological:      General: No focal deficit present.      Mental Status: He is alert and oriented to person, place, and time.         Significant Labs:  CBC:  Recent Labs   Lab 07/31/20  2139   WBC 3.99  3.99   RBC 3.98*  3.98*   HGB 11.2*  11.2*   HCT 37.4*  37.4*   *  138*   MCV 94  94   MCH 28.1  28.1   MCHC 29.9*  29.9*     BNP:  Recent Labs    Lab 07/27/20  1054 07/31/20 2139   BNP 1,975* 1,601*     CMP:  Recent Labs   Lab 07/27/20  1054 07/31/20 2139   * 122*  122*   CALCIUM 7.6* 8.2*  8.2*   ALBUMIN 2.9* 3.5  3.5   PROT 6.6 8.0  8.0    137  137   K 3.4* 3.6  3.6   CO2 17* 17*  17*   * 108  108   BUN 47* 42*  42*   CREATININE 2.1* 2.2*  2.2*   ALKPHOS 189* 239*  239*   ALT 18 29  29   AST 16 30  30   BILITOT 1.5* 1.7*  1.7*      Coagulation:   No results for input(s): PT, INR, APTT in the last 168 hours.  LDH:  No results for input(s): LDH in the last 72 hours.  Microbiology:  Microbiology Results (last 7 days)     Procedure Component Value Units Date/Time    Blood Culture #1 **CANNOT BE ORDERED STAT** [[id number]] Collected: 07/31/20 2139    Order Status: Sent Specimen: Blood from Peripheral, Antecubital, Left Updated: 07/31/20 2150    Blood Culture #2 **CANNOT BE ORDERED STAT** [[id number]] Collected: 07/31/20 2139    Order Status: Sent Specimen: Blood from Peripheral, Forearm, Left Updated: 07/31/20 2149          I have reviewed all pertinent labs within the past 24 hours.    Diagnostic Results:  I have reviewed all pertinent imaging results/findings within the past 24 hours.    Assessment/Plan:     * Problem with vascular access  Continue with  infusion per peripheral IV and place a L-IJ  -Consultation to IR on Monday for PICC line exchange  -Blood culture and monitor for signs of infection    Chronic combined systolic and diastolic congestive heart failure  HFrEF 2/2 NICM. Hemodynamically stable. Admitted for PICC line issues.  -Continue with home medications  -Continue follow up as OP with Dr. Gardner in 1-2 months with TTE   -Scr at baseline     Diabetes mellitus, type II  Continue with home medications    Hypothyroidism  Clinically euthyroid  -Continue with home medication        Saul Alcantar MD  Heart Transplant  [facility]

## 2020-08-01 NOTE — SUBJECTIVE & OBJECTIVE
"Past Medical History:   Diagnosis Date    CHF (congestive heart failure)     Diabetes mellitus type II     Essential hypertension, benign     Hyperlipidemia     Hypothyroidism     Pain and swelling of left wrist 8/5/2019    Ashish Mckeon is a 54 y.o. male with PMH of  presenting with CHF, IDDM, Hypothyroidism, HTN, HLD presenting with worsening L wrist pain for 1 day. Orthopedic surgery was consulted for septic joint r/o. Pt has been afebrile and has no elevated WBC. ESR 36, normal CRP. Xray shows no signs of trauma and pt has no hx of gout or septic arthritis. Due to atraumatic wrist pain and swelling w/out identifia    Undiagnosed cardiac murmurs        Past Surgical History:   Procedure Laterality Date    COLON SURGERY      "lower intestines removed"    ORTHOPEDIC SURGERY Left     wrist    RIGHT HEART CATHETERIZATION Right 6/17/2020    Procedure: INSERTION, CATHETER, RIGHT HEART;  Surgeon: Kimberly Rodgers MD;  Location: [facility] CATH LAB;  Service: Cardiology;  Laterality: Right;    THYROID SURGERY         Review of patient's allergies indicates:   Allergen Reactions    Lisinopril Other (See Comments)     acei cough         Current Facility-Administered Medications   Medication    acetaminophen tablet 650 mg    aspirin chewable tablet 81 mg    atorvastatin tablet 40 mg    dextrose 50% injection 12.5 g    dextrose 50% injection 25 g    DOBUTamine 500mg in D5W 250mL infusion (premix) (NON-TITRATING)    ferrous sulfate EC tablet 325 mg    furosemide tablet 80 mg    glucagon (human recombinant) injection 1 mg    glucose chewable tablet 16 g    glucose chewable tablet 24 g    hydrALAZINE tablet 75 mg    HYDROcodone-acetaminophen 5-325 mg per tablet 1 tablet    insulin aspart U-100 pen 0-5 Units    insulin aspart U-100 pen 4 Units    insulin detemir U-100 pen 8 Units    isosorbide dinitrate tablet 40 mg    levothyroxine tablet 175 mcg    melatonin tablet 6 mg    ondansetron injection 4 mg " "   polyethylene glycol packet 17 g    senna-docusate 8.6-50 mg per tablet 1 tablet    sodium chloride 0.9% flush 10 mL     Family History     Problem Relation (Age of Onset)    Cancer Father        Tobacco Use    Smoking status: Never Smoker    Smokeless tobacco: Never Used    Tobacco comment: cigars "every other week or so"   Substance and Sexual Activity    Alcohol use: Not Currently     Alcohol/week: 3.0 standard drinks     Types: 3 Cans of beer per week    Drug use: Not Currently     Types: Marijuana    Sexual activity: Yes     Partners: Female     Review of Systems   Constitutional: Negative.    HENT: Negative.    Eyes: Negative.    Respiratory: Negative.    Cardiovascular: Negative.    Gastrointestinal: Negative.    Endocrine: Negative.    Genitourinary: Negative.    Musculoskeletal: Negative.    Allergic/Immunologic: Negative.    Neurological: Negative.    Hematological: Negative.      Objective:     Vital Signs (Most Recent):  Temp: 98.2 °F (36.8 °C) (08/01/20 0015)  Pulse: 85 (08/01/20 0015)  Resp: 15 (08/01/20 0015)  BP: 114/81 (08/01/20 0015)  SpO2: 99 % (08/01/20 0015) Vital Signs (24h Range):  Temp:  [97.9 °F (36.6 °C)-98.2 °F (36.8 °C)] 98.2 °F (36.8 °C)  Pulse:  [] 85  Resp:  [15-20] 15  SpO2:  [96 %-100 %] 99 %  BP: (105-116)/(73-83) 114/81     Patient Vitals for the past 72 hrs (Last 3 readings):   Weight   08/01/20 0159 64 kg (141 lb 1.5 oz)   07/31/20 1846 63.5 kg (140 lb)     Body mass index is 19.68 kg/m².      Intake/Output Summary (Last 24 hours) at 8/1/2020 0209  Last data filed at 8/1/2020 0000  Gross per 24 hour   Intake 123.96 ml   Output --   Net 123.96 ml       Physical Exam  Constitutional:       General: He is not in acute distress.     Appearance: Normal appearance.   HENT:      Nose: Nose normal.   Neck:      Musculoskeletal: Normal range of motion and neck supple.   Cardiovascular:      Rate and Rhythm: Normal rate and regular rhythm.      Pulses: Normal pulses.      " Heart sounds: No murmur.   Pulmonary:      Effort: Pulmonary effort is normal.      Breath sounds: No stridor. No wheezing.   Abdominal:      General: There is no distension.      Palpations: There is no mass.   Musculoskeletal:         General: No swelling or signs of injury.      Left lower leg: No edema.   Neurological:      General: No focal deficit present.      Mental Status: He is alert and oriented to person, place, and time.         Significant Labs:  CBC:  Recent Labs   Lab 07/31/20 2139   WBC 3.99  3.99   RBC 3.98*  3.98*   HGB 11.2*  11.2*   HCT 37.4*  37.4*   *  138*   MCV 94  94   MCH 28.1  28.1   MCHC 29.9*  29.9*     BNP:  Recent Labs   Lab 07/27/20  1054 07/31/20 2139   BNP 1,975* 1,601*     CMP:  Recent Labs   Lab 07/27/20  1054 07/31/20 2139   * 122*  122*   CALCIUM 7.6* 8.2*  8.2*   ALBUMIN 2.9* 3.5  3.5   PROT 6.6 8.0  8.0    137  137   K 3.4* 3.6  3.6   CO2 17* 17*  17*   * 108  108   BUN 47* 42*  42*   CREATININE 2.1* 2.2*  2.2*   ALKPHOS 189* 239*  239*   ALT 18 29  29   AST 16 30  30   BILITOT 1.5* 1.7*  1.7*      Coagulation:   No results for input(s): PT, INR, APTT in the last 168 hours.  LDH:  No results for input(s): LDH in the last 72 hours.  Microbiology:  Microbiology Results (last 7 days)     Procedure Component Value Units Date/Time    Blood Culture #1 **CANNOT BE ORDERED STAT** [[id number]] Collected: 07/31/20 2139    Order Status: Sent Specimen: Blood from Peripheral, Antecubital, Left Updated: 07/31/20 2150    Blood Culture #2 **CANNOT BE ORDERED STAT** [[id number]] Collected: 07/31/20 2139    Order Status: Sent Specimen: Blood from Peripheral, Forearm, Left Updated: 07/31/20 2149          I have reviewed all pertinent labs within the past 24 hours.    Diagnostic Results:  I have reviewed all pertinent imaging results/findings within the past 24 hours.

## 2020-08-01 NOTE — HPI
Mr. Mckeon is a very pleasant 55 y/oM, Cardiology consulted for PICC malfunctioning ( running). Patient well known to [facility], PMhx of stage D HFrEF 2/2 NICM on home , HTN, HLD, DM2 who was recently  admitted for ADHF from 5/8/20 to 5/13/20.  He is on home . Gets weekly labs. Comes today for a F/U. Reports feeeling well. NYHA class II symptoms, No PND or orthopnea. His HF regimen includes; Hydralazine 75 mg TID/Isordil 40 mg TID, Lasix 80 mg BID and  5 mcg/Kg/min.       1. Chronic combined systolic and diastolic congestive heart failure    2. Nonischemic cardiomyopathy    3. Essential hypertension    4. Mixed hyperlipidemia            Clinically doing well on home . NYHA class II symptoms. Euvolemic on exam.   Continue current HF regimen.   Not sure why he was not evaluated for advanced HF options but my guess it was because of his renal function (repviosu creatinine was 3.4 now down to 2.0, likely cardiorenal syndrome and now improved renal function with ), normal LV  on previous Echo (LVEDD=5.6 cm) and was thought to have liver cirrhosis). Recomemend to repeat an Echo in 2 months and Hepatology consult if needed as he may be a candidate for MCS if his LV has remodeled and size has increased.   Recommend 2 gram sodium restriction and 1500cc fluid restriction.  Encourage physical activity with graded exercise program.  Requested patient to weigh themselves daily, and to notify us if their weight increases by more than 3 lbs in 1 day or 5 lbs in 1 week.   RTC in 2 months with labs.

## 2020-08-01 NOTE — ED NOTES
Tele box  applied to pt.  in war room states able to see pt on monitor, rhythm normal sinus  with HR 82

## 2020-08-01 NOTE — ASSESSMENT & PLAN NOTE
Continue with  infusion per peripheral IV and place a L-IJ  -Consultation to IR on Monday for PICC line exchange  -Blood culture and monitor for signs of infection

## 2020-08-01 NOTE — PROGRESS NOTES
[facility]  Heart Transplant  Progress Note    Patient Name: Ashish Mckeon  MRN: [id number]  Admission Date: 7/31/2020  Hospital Length of Stay: 1 days  Attending Physician: Joesph Vargas MD  Primary Care Provider: [facility]  Principal Problem:Problem with vascular access    Subjective:     Interval History: No issues overnight. Denies neck/chest pain. No swelling or mass over tunneled catheter. No fever. Blood cultures negative thus far. Noted JVP on exam and will start IVP Lasix to dry him up while we address port issues. Will consult IR to see if they can see him over the weekend.     Continuous Infusions:   DOBUTamine IV infusion (non-titrating) 5 mcg/kg/min (07/31/20 2235)     Scheduled Meds:   aspirin  81 mg Oral Daily    atorvastatin  40 mg Oral Daily    ferrous sulfate  325 mg Oral Every other day    furosemide (LASIX) IV  160 mg Intravenous BID    hydrALAZINE  75 mg Oral TID    insulin aspart U-100  4 Units Subcutaneous TID WM    insulin detemir U-100  8 Units Subcutaneous QHS    isosorbide dinitrate  40 mg Oral TID    levothyroxine  175 mcg Oral Before breakfast    polyethylene glycol  17 g Oral Daily    senna-docusate 8.6-50 mg  1 tablet Oral BID     PRN Meds:acetaminophen, dextrose 50%, dextrose 50%, glucagon (human recombinant), glucose, glucose, HYDROcodone-acetaminophen, insulin aspart U-100, melatonin, ondansetron, sodium chloride 0.9%    Review of patient's allergies indicates:   Allergen Reactions    Lisinopril Other (See Comments)     acei cough       Objective:     Vital Signs (Most Recent):  Temp: 97.7 °F (36.5 °C) (08/01/20 0826)  Pulse: 83 (08/01/20 1113)  Resp: 15 (08/01/20 0826)  BP: 110/79 (08/01/20 0826)  SpO2: 98 % (08/01/20 0826) Vital Signs (24h Range):  Temp:  [97.7 °F (36.5 °C)-98.2 °F (36.8 °C)] 97.7 °F (36.5 °C)  Pulse:  [] 83  Resp:  [14-20] 15  SpO2:  [96 %-100 %] 98 %  BP: (105-116)/(72-83) 110/79     Patient  Vitals for the past 72 hrs (Last 3 readings):   Weight   08/01/20 0317 64 kg (141 lb 1.5 oz)   08/01/20 0159 64 kg (141 lb 1.5 oz)   07/31/20 1846 63.5 kg (140 lb)     Body mass index is 19.68 kg/m².      Intake/Output Summary (Last 24 hours) at 8/1/2020 1130  Last data filed at 8/1/2020 1100  Gross per 24 hour   Intake 519.51 ml   Output 1200 ml   Net -680.49 ml       Hemodynamic Parameters:       Telemetry: No events     Physical Exam  Vitals signs and nursing note reviewed.   Constitutional:       Appearance: Normal appearance.   Neck:      Musculoskeletal: Normal range of motion and neck supple.   Cardiovascular:      Rate and Rhythm: Normal rate and regular rhythm.      Comments: JVP to angle of jaw  Pulmonary:      Effort: Pulmonary effort is normal.      Breath sounds: No stridor. No wheezing or rhonchi.   Musculoskeletal: Normal range of motion.         General: No swelling.   Skin:     General: Skin is warm and dry.      Capillary Refill: Capillary refill takes 2 to 3 seconds.   Neurological:      General: No focal deficit present.      Mental Status: He is alert and oriented to person, place, and time.         Significant Labs:  CBC:  Recent Labs   Lab 07/31/20 2139 08/01/20  0638   WBC 3.99  3.99 3.66*   RBC 3.98*  3.98* 3.48*   HGB 11.2*  11.2* 9.9*   HCT 37.4*  37.4* 32.4*   *  138* 104*   MCV 94  94 93   MCH 28.1  28.1 28.4   MCHC 29.9*  29.9* 30.6*     BNP:  Recent Labs   Lab 07/27/20 1054 07/31/20 2139   BNP 1,975* 1,601*     CMP:  Recent Labs   Lab 07/27/20  1054 07/31/20 2139 08/01/20  0638   * 122*  122* 165*   CALCIUM 7.6* 8.2*  8.2* 7.8*   ALBUMIN 2.9* 3.5  3.5 3.0*   PROT 6.6 8.0  8.0 6.9    137  137 138   K 3.4* 3.6  3.6 3.4*   CO2 17* 17*  17* 20*   * 108  108 108   BUN 47* 42*  42* 41*   CREATININE 2.1* 2.2*  2.2* 2.1*   ALKPHOS 189* 239*  239* 207*   ALT 18 29  29 23   AST 16 30  30 23   BILITOT 1.5* 1.7*  1.7* 1.5*      Coagulation:   No  results for input(s): PT, INR, APTT in the last 168 hours.  LDH:  No results for input(s): LDH in the last 72 hours.  Microbiology:  Microbiology Results (last 7 days)     Procedure Component Value Units Date/Time    Blood culture [[id number]] Collected: 08/01/20 0638    Order Status: Sent Specimen: Blood Updated: 08/01/20 0702    Blood culture [[id number]] Collected: 08/01/20 0638    Order Status: Sent Specimen: Blood Updated: 08/01/20 0702    Blood Culture #2 **CANNOT BE ORDERED STAT** [[id number]] Collected: 07/31/20 2139    Order Status: Completed Specimen: Blood from Peripheral, Forearm, Left Updated: 08/01/20 0515     Blood Culture, Routine No Growth to date    Blood Culture #1 **CANNOT BE ORDERED STAT** [[id number]] Collected: 07/31/20 2139    Order Status: Completed Specimen: Blood from Peripheral, Antecubital, Left Updated: 08/01/20 0515     Blood Culture, Routine No Growth to date          I have reviewed all pertinent labs within the past 24 hours.    Estimated Creatinine Clearance: 35.6 mL/min (A) (based on SCr of 2.1 mg/dL (H)).    Diagnostic Results:  I have reviewed all pertinent imaging results/findings within the past 24 hours.    Assessment and Plan:     56 y/o M with stage D HFrEF 2/2 NICM, HTN, HLD, DM2 who was last admitted for ADHF from 6/7/20 to 6/19/20. This is his 4th admission this year. He is on home . Last seen in clinic by Dr. Gardner on 7/16/20 where he presented for follow up and was hemodynamically stable and NYHA class II symptoms. No PND or orthopnea. His HF regimen includes Hydralazine 75 mg TID/Isordil 40 mg TID, Lasix 80 mg BID and  5 mcg/Kg/min. Admitted to [facility] after accidental pull of his Right chest tunneled PICC (placed on 6/18/20). He has continued with infusion of  without issues. He noted blood surrounding port access and came to ER for further evaluation. A CXR shows the catheter has a coiled tip in the supraclavicular right lower neck, overlying the course of the IJ  and EJ in the right lower neck. He denies pain or swelling. No fever. Lives at home with wife and denies sick contacts. He is at his dry wt of around 140 lbs. Denies HF symptoms.     * Problem with vascular access  Continue with  infusion per peripheral IV and place a L-IJ  -Consultation to IR on Monday for PICC line exchange  -Blood culture and monitor for signs of infection    Chronic combined systolic and diastolic congestive heart failure  HFrEF 2/2 NICM. Hemodynamically stable. Admitted for PICC line issues. Volume up on exam though asymptomatic   -Start Lasix  mg and follow up with UOP  -Electrolytes BID  -Continue follow up as OP with Dr. Gardner in 1-2 months with TTE   -Scr at baseline     Diabetes mellitus, type II  Continue with home medications    Hypothyroidism  Clinically euthyroid  -Continue with home medication        Saul Alcantar MD  Heart Transplant  [facility]

## 2020-08-01 NOTE — ED PROVIDER NOTES
"Encounter Date: 7/31/2020       History     Chief Complaint   Patient presents with    Vascular Access Problem     blood around PICC line/ concerned he may have "pulled on it" Dobutamine infusing- no alarms on pump- no apparent swelling or tenderness     8:03 PM  Mr. Mckeon is a very pleasant 54 y/o M with stage D HFrEF 2/2 NICM, HTN, HLD, DM2 who presents to the ED for central line problem.  He is on a  drip.  Is concerned because there is dried blood around his central line.  He thinks that he may have "pulled on it" but is unsure.  He denies any pain at the line or in general including his neck.  He denies noticing any alerts from his infusion monitor.    Tunneled Central Line Insertion/Assessment - Double Lumen  Inserted on 06/18/20 0817 in the right internal jugular aa.    Future Appointments  9/21/2020  10:30 AM   [facility]   [facility]  9/21/2020  11:00 AM   ECHO, [facility] ECHOSTR   Nithin Castellanos  9/21/2020  1:30 PM    Gustavo Gardner MD          [facility] HEARTTX   [facility]          Review of patient's allergies indicates:   Allergen Reactions    Lisinopril Other (See Comments)     acei cough       Past Medical History:   Diagnosis Date    CHF (congestive heart failure)     Diabetes mellitus type II     Essential hypertension, benign     Hyperlipidemia     Hypothyroidism     Pain and swelling of left wrist 8/5/2019    Ashish Mckeon is a 54 y.o. male with PMH of  presenting with CHF, IDDM, Hypothyroidism, HTN, HLD presenting with worsening L wrist pain for 1 day. Orthopedic surgery was consulted for septic joint r/o. Pt has been afebrile and has no elevated WBC. ESR 36, normal CRP. Xray shows no signs of trauma and pt has no hx of gout or septic arthritis. Due to atraumatic wrist pain and swelling w/out identifia    Undiagnosed cardiac murmurs      Past Surgical History:   Procedure Laterality Date    COLON SURGERY      "lower intestines removed"    ORTHOPEDIC " "SURGERY Left     wrist    RIGHT HEART CATHETERIZATION Right 6/17/2020    Procedure: INSERTION, CATHETER, RIGHT HEART;  Surgeon: Kimberly Rodgers MD;  Location: [facility] CATH LAB;  Service: Cardiology;  Laterality: Right;    THYROID SURGERY       Family History   Problem Relation Age of Onset    Cancer Father      Social History     Tobacco Use    Smoking status: Never Smoker    Smokeless tobacco: Never Used    Tobacco comment: cigars "every other week or so"   Substance Use Topics    Alcohol use: Not Currently     Alcohol/week: 3.0 standard drinks     Types: 3 Cans of beer per week    Drug use: Not Currently     Types: Marijuana     Review of Systems   Constitutional: Negative for chills and fever.   HENT: Negative for sore throat.    Eyes: Negative for photophobia.   Respiratory: Negative for shortness of breath.    Cardiovascular: Negative for chest pain.   Gastrointestinal: Negative for nausea.   Genitourinary: Negative for dysuria.   Musculoskeletal: Negative for back pain and neck pain.   Skin: Positive for wound. Negative for rash.   Neurological: Negative for weakness.   Hematological: Does not bruise/bleed easily.       Physical Exam     Initial Vitals [07/31/20 1846]   BP Pulse Resp Temp SpO2   113/79 100 17 97.9 °F (36.6 °C) 100 %      MAP       --         Physical Exam    Vitals reviewed.  Constitutional: He appears well-developed and well-nourished. He is not diaphoretic. He is cooperative.  Non-toxic appearance. He does not have a sickly appearance. He does not appear ill. Face mask in place.   HENT:   Head: Normocephalic and atraumatic.   Nose: Nose normal.   Eyes: Conjunctivae and EOM are normal.   Neck: Normal range of motion. No muscular tenderness present. JVD (without underlying tenderness) present.   Cardiovascular: Normal rate.   Pulmonary/Chest: No respiratory distress. He exhibits no bony tenderness, no edema and no swelling.   Central line with dried blood around cath.   Abdominal: He " exhibits no distension.   Musculoskeletal: Normal range of motion.   Neurological: He is alert. He has normal strength.   Skin: Skin is warm and dry. No erythema.         ED Course   Procedures  Labs Reviewed   CULTURE, BLOOD   CULTURE, BLOOD   B-TYPE NATRIURETIC PEPTIDE   CBC W/ AUTO DIFFERENTIAL   COMPREHENSIVE METABOLIC PANEL   SARS-COV-2 RNA AMPLIFICATION, QUAL   COMPREHENSIVE METABOLIC PANEL   MAGNESIUM   PHOSPHORUS   CBC W/ AUTO DIFFERENTIAL   POCT GLUCOSE MONITORING CONTINUOUS          Imaging Results           X-Ray Chest AP Portable (Final result)  Result time 07/31/20 20:36:10    Final result by Jasper Almanzar MD (07/31/20 20:36:10)                 Impression:      Right subclavian catheter courses cephalad with its tip coiled in the supraclavicular right lower neck overlying the course of the internal and external jugular veins in the right lower neck.    No pneumothorax.    Cardiomegaly with mild edema, improved compared to prior study.    This report was flagged in Epic as abnormal.      Electronically signed by: Jasper Almanzar MD  Date:    07/31/2020  Time:    20:36             Narrative:    EXAMINATION:  XR CHEST AP PORTABLE    CLINICAL HISTORY:  central line problem;    TECHNIQUE:  Single frontal view of the chest was performed.    COMPARISON:  June 7, 2020.    FINDINGS:  Right subclavian catheter courses cephalad with its tip coiled in the supraclavicular right lower neck overlying the course of the internal and external jugular veins in the right lower neck.    No consolidation, pleural effusion or pneumothorax.    Cardiomegaly with mild edema, improved compared to prior study.                                 Medical Decision Making:   History:   Old Medical Records: I decided to obtain old medical records.  Old Records Summarized: records from clinic visits and records from previous admission(s).  Initial Assessment:   Mr. Mckeon is a very pleasant 54 y/o M with stage D HFrEF 2/2 NICM, HTN, HLD,  DM2 who presents to the ED for central line problem.    Differential Diagnosis:   Includes but is not limited to dislodged central line, central line in place, healing wound.  Clinical Tests:   Lab Tests: Ordered  Radiological Study: Ordered and Reviewed  ED Management:  Chest x-ray shows right subclavian catheter coiled in the supraclavicular right lower neck.    On chart review, IR procedure note on 06/17/2020 states that catheter tip was in cavoatrial junction.    Case was discussed with Interventional Radiology on call who agrees that patient should come into the hospital for re-evaluation of his central line.  Case was discussed with Cardiology on-call who will admit to their service for further evaluation and management.    I have reviewed patient's chart and discussed this case with my supervising MD.     Megha Lee PA-C  Emergent Department  [facility]  Spectralink #[id number] or #[phone]                Attending Attestation:     Physician Attestation Statement for NP/PA:   I have conducted a face to face encounter with this patient in addition to the NP/PA, due to Medical Complexity    Other NP/PA Attestation Additions:    History of Present Illness: 55-year-old man on a continuous dobutamine infusion for his heart failure presents with bleeding around the insertion site of his subclavian line.  He states that he woke up this morning and believes he may have pulled on it accidentally because that is when he noticed the blood.  He has had no difficulty with the infusion.  He has no complaints.   Physical Exam: There is dried blood under the dressing at the insertion point of the right subclavian at the right anterior chest.   Medical Decision Making: We were concerned about possible dislodgement of the line and therefore obtained a chest x-ray.  This showed that the line is no longer in the vena cava but is now in the neck, possibly in the IJ.  We have discussed this with Cardiology and they will  admit the patient.    I have independently interpreted the chest x-ray which shows that the central line is now coiled in the neck on the right.                               Clinical Impression:       ICD-10-CM ICD-9-CM   1. Problem with vascular access  Z78.9 V49.9         Disposition:   Disposition: Admitted  Condition: Stable     ED Disposition Condition    Admit                           Patricia Juan MD  08/01/20 1910

## 2020-08-01 NOTE — CONSULTS
"Radiology Consult    Ashish Mckeon is a 55 y.o. male with stage D HFrEF 2/2 NICM, HTN, HLD, DM2 who was last admitted for ADHF from 6/7/20 to 6/19/20.    [facility] after accidental pull of his Right chest tunneled PICC (placed on 6/18/20). He has continued with infusion of  without issues. He noted blood surrounding port access and came to ER for further evaluation. A CXR shows the catheter has a coiled tip in the supraclavicular right lower neck, overlying the course of the IJ and EJ in the right lower neck.    IR planning to place tunneled dual lumen PICC for dobutamine infusion Monday 8/3.    Past Medical History:   Diagnosis Date    CHF (congestive heart failure)     Diabetes mellitus type II     Essential hypertension, benign     Hyperlipidemia     Hypothyroidism     Pain and swelling of left wrist 8/5/2019    Ashish Mckeon is a 54 y.o. male with PMH of  presenting with CHF, IDDM, Hypothyroidism, HTN, HLD presenting with worsening L wrist pain for 1 day. Orthopedic surgery was consulted for septic joint r/o. Pt has been afebrile and has no elevated WBC. ESR 36, normal CRP. Xray shows no signs of trauma and pt has no hx of gout or septic arthritis. Due to atraumatic wrist pain and swelling w/out identifia    Undiagnosed cardiac murmurs      Past Surgical History:   Procedure Laterality Date    COLON SURGERY      "lower intestines removed"    ORTHOPEDIC SURGERY Left     wrist    RIGHT HEART CATHETERIZATION Right 6/17/2020    Procedure: INSERTION, CATHETER, RIGHT HEART;  Surgeon: Kimberly Rodgers MD;  Location: [facility] CATH LAB;  Service: Cardiology;  Laterality: Right;    THYROID SURGERY         Discussed with primary team, Dr. Alcantar.    Imaging reviewed with Radiology staff, Dr. Downing.     Procedure: tunneled PICC    Scheduled Meds:    aspirin  81 mg Oral Daily    atorvastatin  40 mg Oral Daily    ferrous sulfate  325 mg Oral Every other day    furosemide (LASIX) IV  160 mg Intravenous BID "    hydrALAZINE  75 mg Oral TID    insulin aspart U-100  4 Units Subcutaneous TID WM    insulin detemir U-100  8 Units Subcutaneous QHS    isosorbide dinitrate  40 mg Oral TID    levothyroxine  175 mcg Oral Before breakfast    polyethylene glycol  17 g Oral Daily    potassium chloride  40 mEq Oral Q2H    potassium chloride  40 mEq Oral BID    senna-docusate 8.6-50 mg  1 tablet Oral BID     Continuous Infusions:    DOBUTamine IV infusion (non-titrating) 5 mcg/kg/min (07/31/20 2235)     PRN Meds:acetaminophen, dextrose 50%, dextrose 50%, glucagon (human recombinant), glucose, glucose, HYDROcodone-acetaminophen, insulin aspart U-100, melatonin, ondansetron, sodium chloride 0.9%    Allergies:   Review of patient's allergies indicates:   Allergen Reactions    Lisinopril Other (See Comments)     acei cough         Labs:  No results for input(s): INR in the last 168 hours.    Invalid input(s):  PT,  PTT    Recent Labs   Lab 08/01/20  0638   WBC 3.66*   HGB 9.9*   HCT 32.4*   MCV 93   *      Recent Labs   Lab 08/01/20  0638   *      K 3.4*      CO2 20*   BUN 41*   CREATININE 2.1*   CALCIUM 7.8*   MG 1.7   ALT 23   AST 23   ALBUMIN 3.0*   BILITOT 1.5*         Vitals (Most Recent):  Temp: 97.5 °F (36.4 °C) (08/01/20 1231)  Pulse: 83 (08/01/20 1113)  Resp: 15 (08/01/20 0826)  BP: 110/79 (08/01/20 0826)  SpO2: 98 % (08/01/20 0826)    Plan:   1. NPO after midnight Sunday 8/2/20.  2. Hold anticoagulants.  3. Tunneled dual lumen PICC placement.      Ritesh Sheehan MD, MS  Radiology  PGY-3  Pager: [phone]

## 2020-08-01 NOTE — ASSESSMENT & PLAN NOTE
HFrEF 2/2 NICM. Hemodynamically stable. Admitted for PICC line issues.  -Continue with home medications  -Continue follow up as OP with Dr. Gardner in 1-2 months with TTE   -Scr at baseline

## 2020-08-01 NOTE — NURSING
Left IJ central line placement procedure began. Consents signed. Time-out complete. VSS. NSR with occasional PVCs on tele. Will monitor throughout procedure.

## 2020-08-01 NOTE — ASSESSMENT & PLAN NOTE
HFrEF 2/2 NICM. Hemodynamically stable. Admitted for PICC line issues. Volume up on exam though asymptomatic   -Start Lasix  mg and follow up with UOP  -Electrolytes BID  -Continue follow up as OP with Dr. Gardner in 1-2 months with TTE   -Scr at baseline

## 2020-08-02 NOTE — PLAN OF CARE
AA&Ox4. VSS. NSR w/ PVCs on tele - team aware - 3g Mg sulfate IVPB infusing for Mg of 1.6. Afebrile. 98% on RA. Held hydralazine today due to SBP <110. BG monitored ACHS w/ mealtime and sliding scale novolog. Denies pain. Ambulates independently. CVP 14. Diuresing pt w/ 160mg IV Lasix BID - 1200cc u/o so far this shift. Dobutamine infusing to left IJ @ 5mcg/kg/min; dosing weight 63.5kg = 9.5ml/hr. IR to place new line tomorrow and remove left IJ and right tunneled cath. Instructed to call for assistance. Will continue to monitor.

## 2020-08-02 NOTE — NURSING
Pt had 17 beat run of v-tach. Cardiology resident notified. Mg sulfate currently infusing. Will continue to monitor.

## 2020-08-02 NOTE — SUBJECTIVE & OBJECTIVE
Interval History: No issues overnight. Increased diuresis with IV Lasix. Denies neck/chest pain. No swelling or mass over tunneled catheter and IR plans to exchange tomorrow. NPO after midnight. Blood cultures negative. Continues to have +JVD on exam and will continue IVP Lasix as tolerated. Will be replacing magnesium for low Mg level in setting of NSVT noted on telemetry.      Continuous Infusions:   DOBUTamine IV infusion (non-titrating) 5 mcg/kg/min (08/01/20 2040)     Scheduled Meds:   aspirin  81 mg Oral Daily    atorvastatin  40 mg Oral Daily    ferrous sulfate  325 mg Oral Every other day    furosemide (LASIX) IV  160 mg Intravenous BID    hydrALAZINE  75 mg Oral TID    insulin aspart U-100  4 Units Subcutaneous TID WM    insulin detemir U-100  8 Units Subcutaneous QHS    isosorbide dinitrate  40 mg Oral TID    levothyroxine  175 mcg Oral Before breakfast    magnesium sulfate IVPB  3 g Intravenous Once    polyethylene glycol  17 g Oral Daily    potassium chloride  40 mEq Oral BID    senna-docusate 8.6-50 mg  1 tablet Oral BID     PRN Meds:acetaminophen, dextrose 50%, dextrose 50%, glucagon (human recombinant), glucose, glucose, HYDROcodone-acetaminophen, insulin aspart U-100, melatonin, ondansetron, sodium chloride 0.9%    Review of patient's allergies indicates:   Allergen Reactions    Lisinopril Other (See Comments)     acei cough       Objective:     Vital Signs (Most Recent):  Temp: 97.9 °F (36.6 °C) (08/02/20 1200)  Pulse: 86 (08/02/20 1200)  Resp: 18 (08/02/20 1200)  BP: 100/64 (08/02/20 1200)  SpO2: 98 % (08/02/20 1200) Vital Signs (24h Range):  Temp:  [97.5 °F (36.4 °C)-97.9 °F (36.6 °C)] 97.9 °F (36.6 °C)  Pulse:  [71-93] 86  Resp:  [16-20] 18  SpO2:  [95 %-98 %] 98 %  BP: ()/(61-71) 100/64     Patient Vitals for the past 72 hrs (Last 3 readings):   Weight   08/02/20 0500 58.5 kg (128 lb 15.5 oz)   08/01/20 0317 64 kg (141 lb 1.5 oz)   08/01/20 0159 64 kg (141 lb 1.5 oz)      Body mass index is 17.99 kg/m².      Intake/Output Summary (Last 24 hours) at 8/2/2020 1421  Last data filed at 8/2/2020 1221  Gross per 24 hour   Intake 816.39 ml   Output 3860 ml   Net -3043.61 ml       Hemodynamic Parameters:  CVP:  [14 mmHg] 14 mmHg    Telemetry: No events     Physical Exam  Vitals signs and nursing note reviewed.   Constitutional:       Appearance: Normal appearance.   Neck:      Musculoskeletal: Normal range of motion and neck supple.   Cardiovascular:      Rate and Rhythm: Normal rate and regular rhythm.      Comments: JVP to angle of jaw  Pulmonary:      Effort: Pulmonary effort is normal.      Breath sounds: No stridor. No wheezing or rhonchi.   Musculoskeletal: Normal range of motion.         General: No swelling.   Skin:     General: Skin is warm and dry.      Capillary Refill: Capillary refill takes 2 to 3 seconds.   Neurological:      General: No focal deficit present.      Mental Status: He is alert and oriented to person, place, and time.         Significant Labs:  CBC:  Recent Labs   Lab 07/31/20 2139 08/01/20 0638 08/02/20  0530   WBC 3.99  3.99 3.66* 3.97   RBC 3.98*  3.98* 3.48* 3.09*   HGB 11.2*  11.2* 9.9* 8.7*   HCT 37.4*  37.4* 32.4* 27.4*   *  138* 104* 115*   MCV 94  94 93 89   MCH 28.1  28.1 28.4 28.2   MCHC 29.9*  29.9* 30.6* 31.8*     BNP:  Recent Labs   Lab 07/27/20  1054 07/31/20 2139   BNP 1,975* 1,601*     CMP:  Recent Labs   Lab 07/31/20 2139 08/01/20 0638 08/01/20 2053 08/02/20  0530 08/02/20  0843   *  122* 165* 150* 90 124*   CALCIUM 8.2*  8.2* 7.8* 7.6* 7.5* 7.5*   ALBUMIN 3.5  3.5 3.0*  --  2.8*  --    PROT 8.0  8.0 6.9  --  6.4  --      137 138 138 137 135*   K 3.6  3.6 3.4* 3.9 3.6 3.7   CO2 17*  17* 20* 20* 22* 23     108 108 107 106 104   BUN 42*  42* 41* 40* 41* 41*   CREATININE 2.2*  2.2* 2.1* 2.0* 1.8* 1.9*   ALKPHOS 239*  239* 207*  --  179*  --    ALT 29  29 23  --  19  --    AST 30  30 23  --  19   --    BILITOT 1.7*  1.7* 1.5*  --  1.5*  --       Coagulation:   No results for input(s): PT, INR, APTT in the last 168 hours.  LDH:  No results for input(s): LDH in the last 72 hours.  Microbiology:  Microbiology Results (last 7 days)     Procedure Component Value Units Date/Time    Blood culture [[id number]] Collected: 08/01/20 0638    Order Status: Completed Specimen: Blood Updated: 08/02/20 1012     Blood Culture, Routine No Growth to date      No Growth to date    Blood culture [[id number]] Collected: 08/01/20 0638    Order Status: Completed Specimen: Blood Updated: 08/02/20 1012     Blood Culture, Routine No Growth to date      No Growth to date    Blood Culture #1 **CANNOT BE ORDERED STAT** [[id number]] Collected: 07/31/20 2139    Order Status: Completed Specimen: Blood from Peripheral, Antecubital, Left Updated: 08/01/20 2312     Blood Culture, Routine No Growth to date      No Growth to date    Blood Culture #2 **CANNOT BE ORDERED STAT** [[id number]] Collected: 07/31/20 2139    Order Status: Completed Specimen: Blood from Peripheral, Forearm, Left Updated: 08/01/20 2312     Blood Culture, Routine No Growth to date      No Growth to date          I have reviewed all pertinent labs within the past 24 hours.    Estimated Creatinine Clearance: 35.9 mL/min (A) (based on SCr of 1.9 mg/dL (H)).    Diagnostic Results:  I have reviewed all pertinent imaging results/findings within the past 24 hours.

## 2020-08-02 NOTE — ASSESSMENT & PLAN NOTE
HFrEF 2/2 NICM. Hemodynamically stable. Admitted for PICC line issues. Volume up on exam though asymptomatic   -Continue Lasix  mg and follow up with UOP  -Electrolytes BID  -Continue follow up as OP with Dr. Gardner in 1-2 months with TTE   -Scr at baseline

## 2020-08-02 NOTE — SUBJECTIVE & OBJECTIVE
Interval History: ***    Continuous Infusions:   DOBUTamine IV infusion (non-titrating) 5 mcg/kg/min (08/01/20 2040)     Scheduled Meds:   aspirin  81 mg Oral Daily    atorvastatin  40 mg Oral Daily    ferrous sulfate  325 mg Oral Every other day    furosemide (LASIX) IV  160 mg Intravenous BID    hydrALAZINE  75 mg Oral TID    insulin aspart U-100  4 Units Subcutaneous TID WM    insulin detemir U-100  8 Units Subcutaneous QHS    isosorbide dinitrate  40 mg Oral TID    levothyroxine  175 mcg Oral Before breakfast    magnesium sulfate IVPB  3 g Intravenous Once    polyethylene glycol  17 g Oral Daily    potassium chloride  40 mEq Oral BID    senna-docusate 8.6-50 mg  1 tablet Oral BID     PRN Meds:acetaminophen, dextrose 50%, dextrose 50%, glucagon (human recombinant), glucose, glucose, HYDROcodone-acetaminophen, insulin aspart U-100, melatonin, ondansetron, sodium chloride 0.9%    Review of patient's allergies indicates:   Allergen Reactions    Lisinopril Other (See Comments)     acei cough       Objective:     Vital Signs (Most Recent):  Temp: 97.9 °F (36.6 °C) (08/02/20 1200)  Pulse: 86 (08/02/20 1200)  Resp: 18 (08/02/20 1200)  BP: 100/64 (08/02/20 1200)  SpO2: 98 % (08/02/20 1200) Vital Signs (24h Range):  Temp:  [97.5 °F (36.4 °C)-97.9 °F (36.6 °C)] 97.9 °F (36.6 °C)  Pulse:  [71-93] 86  Resp:  [16-20] 18  SpO2:  [95 %-98 %] 98 %  BP: ()/(61-71) 100/64     Patient Vitals for the past 72 hrs (Last 3 readings):   Weight   08/02/20 0500 58.5 kg (128 lb 15.5 oz)   08/01/20 0317 64 kg (141 lb 1.5 oz)   08/01/20 0159 64 kg (141 lb 1.5 oz)     Body mass index is 17.99 kg/m².      Intake/Output Summary (Last 24 hours) at 8/2/2020 1420  Last data filed at 8/2/2020 1221  Gross per 24 hour   Intake 816.39 ml   Output 3860 ml   Net -3043.61 ml       Hemodynamic Parameters:  CVP:  [14 mmHg] 14 mmHg    Telemetry: ***    Physical Exam  Constitutional:       Appearance: He is normal weight.          Significant Labs:  CBC:  Recent Labs   Lab 07/31/20 2139 08/01/20 0638 08/02/20  0530   WBC 3.99  3.99 3.66* 3.97   RBC 3.98*  3.98* 3.48* 3.09*   HGB 11.2*  11.2* 9.9* 8.7*   HCT 37.4*  37.4* 32.4* 27.4*   *  138* 104* 115*   MCV 94  94 93 89   MCH 28.1  28.1 28.4 28.2   MCHC 29.9*  29.9* 30.6* 31.8*     BNP:  Recent Labs   Lab 07/27/20  1054 07/31/20 2139   BNP 1,975* 1,601*     CMP:  Recent Labs   Lab 07/31/20 2139 08/01/20 0638 08/01/20 2053 08/02/20  0530 08/02/20  0843   *  122* 165* 150* 90 124*   CALCIUM 8.2*  8.2* 7.8* 7.6* 7.5* 7.5*   ALBUMIN 3.5  3.5 3.0*  --  2.8*  --    PROT 8.0  8.0 6.9  --  6.4  --      137 138 138 137 135*   K 3.6  3.6 3.4* 3.9 3.6 3.7   CO2 17*  17* 20* 20* 22* 23     108 108 107 106 104   BUN 42*  42* 41* 40* 41* 41*   CREATININE 2.2*  2.2* 2.1* 2.0* 1.8* 1.9*   ALKPHOS 239*  239* 207*  --  179*  --    ALT 29  29 23  --  19  --    AST 30  30 23  --  19  --    BILITOT 1.7*  1.7* 1.5*  --  1.5*  --       Coagulation:   No results for input(s): PT, INR, APTT in the last 168 hours.  LDH:  No results for input(s): LDH in the last 72 hours.  Microbiology:  Microbiology Results (last 7 days)     Procedure Component Value Units Date/Time    Blood culture [[id number]] Collected: 08/01/20 0638    Order Status: Completed Specimen: Blood Updated: 08/02/20 1012     Blood Culture, Routine No Growth to date      No Growth to date    Blood culture [[id number]] Collected: 08/01/20 0638    Order Status: Completed Specimen: Blood Updated: 08/02/20 1012     Blood Culture, Routine No Growth to date      No Growth to date    Blood Culture #1 **CANNOT BE ORDERED STAT** [[id number]] Collected: 07/31/20 2139    Order Status: Completed Specimen: Blood from Peripheral, Antecubital, Left Updated: 08/01/20 2312     Blood Culture, Routine No Growth to date      No Growth to date    Blood Culture #2 **CANNOT BE ORDERED STAT** [[id number]] Collected:  "07/31/20 2139    Order Status: Completed Specimen: Blood from Peripheral, Forearm, Left Updated: 08/01/20 2312     Blood Culture, Routine No Growth to date      No Growth to date          {Select Labs:94518::"I have reviewed all pertinent labs within the past 24 hours."}    Estimated Creatinine Clearance: 35.9 mL/min (A) (based on SCr of 1.9 mg/dL (H)).    Diagnostic Results:  {Imaging Review:[id number]}  "

## 2020-08-02 NOTE — PLAN OF CARE
Notified Dr. Beach of patient's 12 beat run of ventricular tachycardia. Patient asymptomatic lying and talking  To RN when event took place. AM labs to be drawn before 0600 hrs. No new orders received.

## 2020-08-02 NOTE — PROCEDURES
"Ashish Mckeon is a 55 y.o. male patient.    Temp: 97.7 °F (36.5 °C) (08/01/20 2000)  Pulse: 71 (08/01/20 2000)  Resp: 18 (08/01/20 2000)  BP: 113/70 (08/01/20 2000)  SpO2: 96 % (08/01/20 2000)  Weight: 64 kg (141 lb 1.5 oz) (08/01/20 0317)  Height: 5' 11" (180.3 cm) (08/01/20 0317)       Central Line    Date/Time: 8/1/2020 9:44 PM  [facility]  Performed by: Stephie Stroud MD  Consent Done: Yes  Indications: hemodynamic monitoring  Preparation: skin prepped with ChloraPrep  Location details: left internal jugular  Ultrasound guidance: yes  Manometry: Yes  Number of attempts: 1  Assessment: placement verified by x-ray  Complications: none  Post-procedure: line sutured,  chlorhexidine patch,  sterile dressing applied and blood return through all ports  Complications: No          Stephie Stroud  8/1/2020    "

## 2020-08-02 NOTE — PROGRESS NOTES
[facility]  Heart Transplant  Progress Note    Patient Name: Ashish Mckeon  MRN: [id number]  Admission Date: 7/31/2020  Hospital Length of Stay: 2 days  Attending Physician: Joesph Vargas MD  Primary Care Provider: [facility]  Principal Problem:Problem with vascular access    Subjective:     Interval History: No issues overnight. Increased diuresis with IV Lasix. Denies neck/chest pain. No swelling or mass over tunneled catheter and IR plans to exchange tomorrow. NPO after midnight. Blood cultures negative. Continues to have +JVD on exam and will continue IVP Lasix as tolerated. Will be replacing magnesium for low Mg level in setting of NSVT noted on telemetry.      Continuous Infusions:   DOBUTamine IV infusion (non-titrating) 5 mcg/kg/min (08/01/20 2040)     Scheduled Meds:   aspirin  81 mg Oral Daily    atorvastatin  40 mg Oral Daily    ferrous sulfate  325 mg Oral Every other day    furosemide (LASIX) IV  160 mg Intravenous BID    hydrALAZINE  75 mg Oral TID    insulin aspart U-100  4 Units Subcutaneous TID WM    insulin detemir U-100  8 Units Subcutaneous QHS    isosorbide dinitrate  40 mg Oral TID    levothyroxine  175 mcg Oral Before breakfast    magnesium sulfate IVPB  3 g Intravenous Once    polyethylene glycol  17 g Oral Daily    potassium chloride  40 mEq Oral BID    senna-docusate 8.6-50 mg  1 tablet Oral BID     PRN Meds:acetaminophen, dextrose 50%, dextrose 50%, glucagon (human recombinant), glucose, glucose, HYDROcodone-acetaminophen, insulin aspart U-100, melatonin, ondansetron, sodium chloride 0.9%    Review of patient's allergies indicates:   Allergen Reactions    Lisinopril Other (See Comments)     acei cough       Objective:     Vital Signs (Most Recent):  Temp: 97.9 °F (36.6 °C) (08/02/20 1200)  Pulse: 86 (08/02/20 1200)  Resp: 18 (08/02/20 1200)  BP: 100/64 (08/02/20 1200)  SpO2: 98 % (08/02/20 1200) Vital Signs (24h  Range):  Temp:  [97.5 °F (36.4 °C)-97.9 °F (36.6 °C)] 97.9 °F (36.6 °C)  Pulse:  [71-93] 86  Resp:  [16-20] 18  SpO2:  [95 %-98 %] 98 %  BP: ()/(61-71) 100/64     Patient Vitals for the past 72 hrs (Last 3 readings):   Weight   08/02/20 0500 58.5 kg (128 lb 15.5 oz)   08/01/20 0317 64 kg (141 lb 1.5 oz)   08/01/20 0159 64 kg (141 lb 1.5 oz)     Body mass index is 17.99 kg/m².      Intake/Output Summary (Last 24 hours) at 8/2/2020 1421  Last data filed at 8/2/2020 1221  Gross per 24 hour   Intake 816.39 ml   Output 3860 ml   Net -3043.61 ml       Hemodynamic Parameters:  CVP:  [14 mmHg] 14 mmHg    Telemetry: No events     Physical Exam  Vitals signs and nursing note reviewed.   Constitutional:       Appearance: Normal appearance.   Neck:      Musculoskeletal: Normal range of motion and neck supple.   Cardiovascular:      Rate and Rhythm: Normal rate and regular rhythm.      Comments: JVP to angle of jaw  Pulmonary:      Effort: Pulmonary effort is normal.      Breath sounds: No stridor. No wheezing or rhonchi.   Musculoskeletal: Normal range of motion.         General: No swelling.   Skin:     General: Skin is warm and dry.      Capillary Refill: Capillary refill takes 2 to 3 seconds.   Neurological:      General: No focal deficit present.      Mental Status: He is alert and oriented to person, place, and time.         Significant Labs:  CBC:  Recent Labs   Lab 07/31/20  2139 08/01/20  0638 08/02/20  0530   WBC 3.99  3.99 3.66* 3.97   RBC 3.98*  3.98* 3.48* 3.09*   HGB 11.2*  11.2* 9.9* 8.7*   HCT 37.4*  37.4* 32.4* 27.4*   *  138* 104* 115*   MCV 94  94 93 89   MCH 28.1  28.1 28.4 28.2   MCHC 29.9*  29.9* 30.6* 31.8*     BNP:  Recent Labs   Lab 07/27/20  1054 07/31/20 2139   BNP 1,975* 1,601*     CMP:  Recent Labs   Lab 07/31/20  2139 08/01/20  0638 08/01/20 2053 08/02/20  0530 08/02/20  0843   *  122* 165* 150* 90 124*   CALCIUM 8.2*  8.2* 7.8* 7.6* 7.5* 7.5*   ALBUMIN 3.5  3.5 3.0*   --  2.8*  --    PROT 8.0  8.0 6.9  --  6.4  --      137 138 138 137 135*   K 3.6  3.6 3.4* 3.9 3.6 3.7   CO2 17*  17* 20* 20* 22* 23     108 108 107 106 104   BUN 42*  42* 41* 40* 41* 41*   CREATININE 2.2*  2.2* 2.1* 2.0* 1.8* 1.9*   ALKPHOS 239*  239* 207*  --  179*  --    ALT 29  29 23  --  19  --    AST 30  30 23  --  19  --    BILITOT 1.7*  1.7* 1.5*  --  1.5*  --       Coagulation:   No results for input(s): PT, INR, APTT in the last 168 hours.  LDH:  No results for input(s): LDH in the last 72 hours.  Microbiology:  Microbiology Results (last 7 days)     Procedure Component Value Units Date/Time    Blood culture [[id number]] Collected: 08/01/20 0638    Order Status: Completed Specimen: Blood Updated: 08/02/20 1012     Blood Culture, Routine No Growth to date      No Growth to date    Blood culture [[id number]] Collected: 08/01/20 0638    Order Status: Completed Specimen: Blood Updated: 08/02/20 1012     Blood Culture, Routine No Growth to date      No Growth to date    Blood Culture #1 **CANNOT BE ORDERED STAT** [[id number]] Collected: 07/31/20 2139    Order Status: Completed Specimen: Blood from Peripheral, Antecubital, Left Updated: 08/01/20 2312     Blood Culture, Routine No Growth to date      No Growth to date    Blood Culture #2 **CANNOT BE ORDERED STAT** [[id number]] Collected: 07/31/20 2139    Order Status: Completed Specimen: Blood from Peripheral, Forearm, Left Updated: 08/01/20 2312     Blood Culture, Routine No Growth to date      No Growth to date          I have reviewed all pertinent labs within the past 24 hours.    Estimated Creatinine Clearance: 35.9 mL/min (A) (based on SCr of 1.9 mg/dL (H)).    Diagnostic Results:  I have reviewed all pertinent imaging results/findings within the past 24 hours.    Assessment and Plan:     54 y/o M with stage D HFrEF 2/2 NICM, HTN, HLD, DM2 who was last admitted for ADHF from 6/7/20 to 6/19/20. This is his 4th admission this year. He is  on home . Last seen in clinic by Dr. Gardner on 7/16/20 where he presented for follow up and was hemodynamically stable and NYHA class II symptoms. No PND or orthopnea. His HF regimen includes Hydralazine 75 mg TID/Isordil 40 mg TID, Lasix 80 mg BID and  5 mcg/Kg/min. [place] [facility] [place] Right chest tunneled PICC (placed on 6/18/20). He has continued with infusion of  without issues. He noted blood surrounding port access and came to ER for further evaluation. A CXR shows the catheter has a coiled tip in the supraclavicular right lower neck, overlying the course of the IJ and EJ in the right lower neck. He denies pain or swelling. No fever. Lives at home with wife and denies sick contacts. He is at his dry wt of around 140 lbs. Denies HF symptoms.     * Problem with vascular access  Continue with  infusion per L-IJ  -Consultation to IR on Monday for PICC line exchange  -Blood culture and monitor for signs of infection    Chronic combined systolic and diastolic congestive heart failure  HFrEF 2/2 NICM. Hemodynamically stable. Admitted for PICC line issues. Volume up on exam though asymptomatic   -Continue Lasix  mg and follow up with UOP  -Electrolytes BID  -Continue follow up as OP with Dr. Gardner in 1-2 months with TTE   -Scr at baseline     Diabetes mellitus, type II  Continue with home medications    Hypothyroidism  Clinically euthyroid  -Continue with home medication        Saul Alcantar MD  Heart Transplant  [facility]

## 2020-08-02 NOTE — ASSESSMENT & PLAN NOTE
Continue with  infusion per L-IJ  -Consultation to IR on Monday for PICC line exchange  -Blood culture and monitor for signs of infection

## 2020-08-02 NOTE — PLAN OF CARE
Correct placement of patient's new left IJ TLC (for home Dobutamine infusion) verified via chest xray; O.K'ed to use per Dr. Beach. Informed MD that patient still has old right S/C tunneled catheter. Instructed to leave in the old tunneled catheter; and that Interventional radiology would address this old line on Monday.

## 2020-08-03 PROBLEM — N17.9 ACUTE KIDNEY INJURY SUPERIMPOSED ON CKD: Status: RESOLVED | Noted: 2018-07-17 | Resolved: 2020-01-01

## 2020-08-03 PROBLEM — T82.9XXA CENTRAL LINE COMPLICATION: Status: ACTIVE | Noted: 2020-01-01

## 2020-08-03 PROBLEM — N18.30 STAGE 3 CHRONIC KIDNEY DISEASE: Status: ACTIVE | Noted: 2020-01-01

## 2020-08-03 PROBLEM — N18.9 ACUTE KIDNEY INJURY SUPERIMPOSED ON CKD: Status: RESOLVED | Noted: 2018-07-17 | Resolved: 2020-01-01

## 2020-08-03 PROBLEM — R57.0 CARDIOGENIC SHOCK: Status: RESOLVED | Noted: 2020-01-01 | Resolved: 2020-01-01

## 2020-08-03 NOTE — ASSESSMENT & PLAN NOTE
Continue with  infusion per new port  -post IR PICC line exchange  -Blood culture and monitor for signs of infection

## 2020-08-03 NOTE — PLAN OF CARE
[facility]   Heart Transplant/[facility]   [address] [place] [address]   [phone] after hours [phone] fax   HOME HEALTH ORDERS     Admit to Home Health   Diagnosis:  Patient Active Problem List   Diagnosis    Thrombocytopenia    Megaloblastic anemia due to vitamin B12 deficiency    Hypothyroidism    Diabetes mellitus, type II    Chronic combined systolic and diastolic congestive heart failure    Anemia of chronic disease    Acute kidney injury superimposed on CKD    Essential hypertension    Substance use disorder    NSVT (nonsustained ventricular tachycardia)    Essential (primary) hypertension    Other proteinuria    right upper lobe mass    Pulmonary nodules    Blurry vision, bilateral    Hyperlipidemia    Pseudogout of left wrist    Elevated serum creatinine    Septic arthritis    Crystals present in synovial fluid obtained by arthrocentesis    Pain in left wrist    Acute on chronic combined systolic and diastolic heart failure    Anemia    Thrombocytopenia, unspecified    Chronic kidney disease, stage IV (severe)    Nonischemic cardiomyopathy    SOB (shortness of breath)    Cardiogenic shock    Hyperbilirubinemia    CKD (chronic kidney disease), stage IV    Palliative care encounter    Goals of care, counseling/discussion    Advance care planning    Problem with vascular access       Send follow up questions to [phone] or [phone].     Diet: Cardiac diet    Acitivities: As tolerated    Nursing:   SN to complete comprehensive assessment including routine vital signs. Instruct on disease process and s/s of complications to report to MD. Review/verify medication list sent home with the patient at time of discharge and instruct patient/caregiver as needed. Frequency may be adjusted depending on start of care date.     Notify MD if SBP > 160 or < 90; DBP > 90 or < 50; HR > 120 or < 50; Temp > 101; Weight gain  >3lbs in 1 day or 5lbs in 1 week.     HOME INFUSION THERAPY:     ** Do not flush/draw back or manipulate line at any time. Epoprostenol/Treprostinil infusion should not be interrupted for any reason**    SN to perform Infusion Therapy/Central Line Care.   Review Central Line Care    Administer (drug and dose):     Dose Calculation Information:      5 mcg/kg/min × 63.5 kg (Weight as of Fri Jul 31, 2020 1846)   = 317.5 mcg/min   Administration Dose: 317.5 mcg/min        **For questions or concerns, please call [phone] and ask for Pulmonary Hypertension clinic, M-F 8-5. After hours, weekends, call [phone] and ask for the Heart Transplant Cardiologist on call.**     Central Line Care and Maintenance:   - Sterile dressing changes are done weekly and as needed.   - Use chlor-hexadine scrub to cleanse site, apply Biopatch to insertion site,   apply securement device dressing   - Posi-flow caps are changed weekly.  - If sterile gauze is under dressing to control oozing,   dressing change must be performed every 24 hours until gauze is not needed.       CONSULTS:   to evaluate for community resources/long-range planning.   Send initial Home Health orders to HTS attending physician on call.      to evaluate for community resources/long-range planning.     Send follow up questions to [phone] or [phone].

## 2020-08-03 NOTE — H&P
"Vascular and Interventional Radiology History & Physical    Date:  8/3/2020    Chief Complaint:   Right tunneled PICC dislodged    History of Present Illness:  Ashish Mckeon is a 55 y.o. male with stage D HFrEF 2/2 NICM, HTN, HLD, DM2 who was last admitted for ADHF from 6/7/20 to 6/19/20.     Admitted to [facility] after accidental pull of his Right chest tunneled PICC (placed on 6/18/20). He has continued with infusion of  without issues. He noted blood surrounding port access and came to ER for further evaluation. A CXR shows the catheter has a coiled tip in the supraclavicular right lower neck, overlying the course of the IJ and EJ in the right lower neck.     IR planning to replace tunneled dual lumen PICC for dobutamine infusion Monday 8/3.    Past Medical History:  Past Medical History:   Diagnosis Date    CHF (congestive heart failure)     Diabetes mellitus type II     Essential hypertension, benign     Hyperlipidemia     Hypothyroidism     Pain and swelling of left wrist 8/5/2019    Ashish Mckeon is a 54 y.o. male with PMH of  presenting with CHF, IDDM, Hypothyroidism, HTN, HLD presenting with worsening L wrist pain for 1 day. Orthopedic surgery was consulted for septic joint r/o. Pt has been afebrile and has no elevated WBC. ESR 36, normal CRP. Xray shows no signs of trauma and pt has no hx of gout or septic arthritis. Due to atraumatic wrist pain and swelling w/out identifia    Undiagnosed cardiac murmurs      Past Surgical History:  Past Surgical History:   Procedure Laterality Date    COLON SURGERY      "lower intestines removed"    ORTHOPEDIC SURGERY Left     wrist    RIGHT HEART CATHETERIZATION Right 6/17/2020    Procedure: INSERTION, CATHETER, RIGHT HEART;  Surgeon: Kimberly Rodgers MD;  Location: [facility] CATH LAB;  Service: Cardiology;  Laterality: Right;    THYROID SURGERY        Sedation History:    Denies any adverse reactions.  Denies problems laying flat.    Social History:  Social History " "    Tobacco Use    Smoking status: Never Smoker    Smokeless tobacco: Never Used    Tobacco comment: cigars "every other week or so"   Substance Use Topics    Alcohol use: Not Currently     Alcohol/week: 3.0 standard drinks     Types: 3 Cans of beer per week    Drug use: Not Currently     Types: Marijuana        Home Medications:   Prior to Admission medications    Medication Sig Start Date End Date Taking? Authorizing Provider   acetaminophen (TYLENOL) 325 MG tablet Take 2 tablets (650 mg total) by mouth every 6 to 8 hours as needed.  Patient taking differently: Take 325 mg by mouth daily as needed for Pain.  7/21/18  Yes Kristi Russell NP   atorvastatin (LIPITOR) 40 MG tablet Take 40 mg by mouth once daily.   Yes Historical Provider, MD   blood sugar diagnostic Strp Use to test blood glucose four times daily before meals and nightly. 6/19/20  Yes Sha Contreras MD   blood-glucose meter Misc Use as instructed 6/19/20  Yes Sha Contreras MD   DOBUTamine (DOBUTREX) 500 mg/250 mL (2,000 mcg/mL) Inject 271 mcg/min into the vein continuous. 6/17/20 9/15/20 Yes Sha Contreras MD   ferrous sulfate 325 (65 FE) MG EC tablet Take 1 tablet (325 mg total) by mouth every other day. 3/3/20  Yes Damion Whitman MD   furosemide (LASIX) 80 MG tablet Take 1 tablet (80 mg total) by mouth 2 (two) times daily. 6/19/20 6/19/21 Yes Sha Contreras MD   hydrALAZINE (APRESOLINE) 50 MG tablet Take 1.5 tablets (75 mg total) by mouth 3 (three) times daily. 6/19/20 6/19/21 Yes Sha Contreras MD   HYDROcodone-acetaminophen (NORCO) 5-325 mg per tablet Take 1 tablet by mouth every 6 (six) hours as needed for Pain. 9/21/19  Yes Jake Reinoso MD   isosorbide dinitrate (ISORDIL) 20 MG tablet Take 2 tablets (40 mg total) by mouth 3 (three) times daily. 6/19/20 6/19/21 Yes Sha Contreras MD   lancets 30 gauge Misc Use to test blood glucose four times daily before meals and nightly. 6/19/20  Yes Sha Contreras MD   lancing device Misc Use " "to test blood glucose four times daily before meals and nightly. 6/19/20  Yes Sha Contreras MD   levothyroxine (SYNTHROID) 175 MCG tablet Take 1 tablet (175 mcg total) by mouth before breakfast. 12/24/18  Yes Yoselin Siddiqi MD   pen needle, diabetic 32 gauge x 5/32" Ndle Use to inject insulin four times daily before meals and nightly. 6/19/20  Yes Sha Contreras MD   aspirin 81 MG Chew Take 1 tablet (81 mg total) by mouth once daily. 12/1/18 6/24/20  Milton Lopez MD   dextrose 5 % infusion  6/18/20   Historical Provider, MD   DOBUTamine (DOBUTREX) 250 mg/20 mL (12.5 mg/mL) injection  6/18/20   Historical Provider, MD   insulin aspart U-100 (NOVOLOG) 100 unit/mL (3 mL) InPn pen Inject 4 Units into the skin 3 (three) times daily with meals. 6/19/20 7/19/20  Sha Contreras MD   insulin detemir U-100 (LEVEMIR FLEXTOUCH) 100 unit/mL (3 mL) SubQ InPn pen Inject 8 Units into the skin every evening. 6/19/20 7/19/20  Sha Contreras MD       Inpatient Medications:    Current Facility-Administered Medications:     acetaminophen tablet 650 mg, 650 mg, Oral, Q4H PRN, Saul Alcantar MD    aspirin chewable tablet 81 mg, 81 mg, Oral, Daily, Marisol Beach MD, 81 mg at 08/02/20 0815    atorvastatin tablet 40 mg, 40 mg, Oral, Daily, Marisol Beach MD, 40 mg at 08/02/20 0814    dextrose 50% injection 12.5 g, 12.5 g, Intravenous, PRN, Marisol Beach MD    dextrose 50% injection 25 g, 25 g, Intravenous, PRN, Marisol Beach MD    DOBUTamine 500mg in D5W 250mL infusion (premix) (NON-TITRATING), 5 mcg/kg/min, Intravenous, Continuous, Marisol Beach MD, Last Rate: 9.5 mL/hr at 08/02/20 1941, 5 mcg/kg/min at 08/02/20 1941    ferrous sulfate EC tablet 325 mg, 325 mg, Oral, Every other day, Marisol Beach MD, 325 mg at 08/01/20 0831    furosemide (LASIX) 160 mg in dextrose 5 % 50 mL IVPB, 160 mg, Intravenous, BID, Saul Alcantar MD, Last Rate: 50 mL/hr at 08/02/20 1934, 160 mg at 08/02/20 " 1934    glucagon (human recombinant) injection 1 mg, 1 mg, Intramuscular, PRN, Marisol Beach MD    glucose chewable tablet 16 g, 16 g, Oral, PRN, Marisol Beach MD    glucose chewable tablet 24 g, 24 g, Oral, PRN, Marisol Beach MD    hydrALAZINE tablet 75 mg, 75 mg, Oral, TID, Marisol Beach MD, Stopped at 08/01/20 0831    HYDROcodone-acetaminophen 5-325 mg per tablet 1 tablet, 1 tablet, Oral, Q6H PRN, Marisol Beach MD, 1 tablet at 08/01/20 2153    insulin aspart U-100 pen 0-5 Units, 0-5 Units, Subcutaneous, QID (AC + HS) PRN, Marisol Beach MD, 1 Units at 08/01/20 2101    insulin aspart U-100 pen 4 Units, 4 Units, Subcutaneous, TID WM, Marisol Beach MD, 4 Units at 08/02/20 1719    insulin detemir U-100 pen 8 Units, 8 Units, Subcutaneous, QHS, Marisol Beach MD, 8 Units at 08/02/20 2205    isosorbide dinitrate tablet 40 mg, 40 mg, Oral, TID, Marisol Beach MD, 40 mg at 08/02/20 1546    levothyroxine tablet 175 mcg, 175 mcg, Oral, Before breakfast, Marisol Beach MD, 175 mcg at 08/03/20 0616    melatonin tablet 6 mg, 6 mg, Oral, Nightly PRN, Marisol Beach MD    ondansetron injection 4 mg, 4 mg, Intravenous, Q8H PRN, Marisol Beach MD    polyethylene glycol packet 17 g, 17 g, Oral, Daily, Marisol Beach MD, Stopped at 08/01/20 0900    potassium chloride SA CR tablet 40 mEq, 40 mEq, Oral, BID, Saul Alcantar MD, 40 mEq at 08/02/20 2044    senna-docusate 8.6-50 mg per tablet 1 tablet, 1 tablet, Oral, BID, Marisol Beach MD, Stopped at 08/01/20 0900    sodium chloride 0.9% flush 10 mL, 10 mL, Intravenous, PRN, Marisol Beach MD, 10 mL at 08/02/20 0541     Anticoagulants/Antiplatelets:   aspirin    Allergies:   Review of patient's allergies indicates:   Allergen Reactions    Lisinopril Other (See Comments)     acei cough         Review of Systems:   As documented in primary provider H&P.    Vital  Signs (Most Recent):  Temp: 97.4 °F (36.3 °C) (08/02/20 2030)  Pulse: 85 (08/03/20 0326)  Resp: 20 (08/03/20 0326)  BP: 102/64 (08/03/20 0326)  SpO2: 96 % (08/03/20 0326)    Physical Exam:  No acute distress, laying comfortably in bed, pleasant and cooperative  Regular rate and rhythm  Breathing unlabored  Abdomen benign  Extremities warm and well perfused    Sedation Exam:  ASA: II - Patient appears to have mild systemic disease, adequately controlled   Mallampati: II (hard and soft palate, upper portion of tonsils anduvula visible)    Laboratory:  Lab Results   Component Value Date    INR 1.2 08/03/2020       Lab Results   Component Value Date    WBC 3.50 (L) 08/03/2020    HGB 9.0 (L) 08/03/2020    HCT 28.1 (L) 08/03/2020    MCV 89 08/03/2020     (L) 08/03/2020      Lab Results   Component Value Date     (H) 08/02/2020     (L) 08/02/2020    K 3.4 (L) 08/02/2020     08/02/2020    CO2 22 (L) 08/02/2020    BUN 38 (H) 08/02/2020    CREATININE 1.8 (H) 08/02/2020    CALCIUM 7.5 (L) 08/02/2020    MG 2.5 08/02/2020    ALT 19 08/02/2020    AST 19 08/02/2020    ALBUMIN 2.8 (L) 08/02/2020    BILITOT 1.5 (H) 08/02/2020    BILIDIR 1.5 (H) 12/23/2018       Imaging:  Reviewed.    ASSESSMENT/PLAN:                     Sedation Plan: Moderate  Patient will undergo: Right tunneled PICC replacement    Joshua Salazar MD  Radiology R2

## 2020-08-03 NOTE — PROGRESS NOTES
Pt arrived to [facility] bed 2 for 1 hour post tunneled PICC line recovery. Report received from SMOOTH Mansfield RN. Pt oriented to unit and staff.  Stretcher locked and placed in lowest position. Calming environment promoted. Comfort measures provided. Pt resting comfortably.

## 2020-08-03 NOTE — SUBJECTIVE & OBJECTIVE
Interval History: No issues overnight. Had IR tunneled catheter placed today. He still is in volume overload. Will DC IVP and start PO torsemide 100 mg QD and start Aldactone 25 mg. Will plan to D/C tomorrow if diuresis is effective with PO.     Continuous Infusions:   DOBUTamine IV infusion (non-titrating) 5 mcg/kg/min (08/02/20 1941)     Scheduled Meds:   aspirin  81 mg Oral Daily    atorvastatin  40 mg Oral Daily    ferrous sulfate  325 mg Oral Every other day    hydrALAZINE  75 mg Oral TID    insulin aspart U-100  4 Units Subcutaneous TID WM    insulin detemir U-100  8 Units Subcutaneous QHS    isosorbide dinitrate  40 mg Oral TID    levothyroxine  175 mcg Oral Before breakfast    polyethylene glycol  17 g Oral Daily    senna-docusate 8.6-50 mg  1 tablet Oral BID    spironolactone  25 mg Oral Daily    torsemide  100 mg Oral Daily     PRN Meds:acetaminophen, dextrose 50%, dextrose 50%, glucagon (human recombinant), glucose, glucose, HYDROcodone-acetaminophen, insulin aspart U-100, melatonin, ondansetron, sodium chloride 0.9%    Review of patient's allergies indicates:   Allergen Reactions    Lisinopril Other (See Comments)     acei cough      Cefazolin Nausea And Vomiting     Objective:     Vital Signs (Most Recent):  Temp: 97.1 °F (36.2 °C) (08/03/20 1000)  Pulse: 84 (08/03/20 1230)  Resp: 16 (08/03/20 1230)  BP: 103/72 (08/03/20 1230)  SpO2: 95 % (08/03/20 1230) Vital Signs (24h Range):  Temp:  [97.1 °F (36.2 °C)-98.1 °F (36.7 °C)] 97.1 °F (36.2 °C)  Pulse:  [67-90] 84  Resp:  [11-22] 16  SpO2:  [95 %-100 %] 95 %  BP: (102-115)/(53-77) 103/72     Patient Vitals for the past 72 hrs (Last 3 readings):   Weight   08/03/20 0616 57.6 kg (126 lb 15.8 oz)   08/02/20 0500 58.5 kg (128 lb 15.5 oz)   08/01/20 0317 64 kg (141 lb 1.5 oz)     Body mass index is 17.71 kg/m².      Intake/Output Summary (Last 24 hours) at 8/3/2020 1551  Last data filed at 8/3/2020 1500  Gross per 24 hour   Intake 1120.73 ml    Output 1710 ml   Net -589.27 ml       Hemodynamic Parameters:       Telemetry: No events     Physical Exam  Vitals signs and nursing note reviewed.   Constitutional:       Appearance: Normal appearance.   Neck:      Musculoskeletal: Normal range of motion and neck supple.   Cardiovascular:      Rate and Rhythm: Normal rate and regular rhythm.      Comments: JVP to angle of jaw  Pulmonary:      Effort: Pulmonary effort is normal.      Breath sounds: No stridor. No wheezing or rhonchi.   Musculoskeletal: Normal range of motion.         General: No swelling.   Skin:     General: Skin is warm and dry.      Capillary Refill: Capillary refill takes 2 to 3 seconds.   Neurological:      General: No focal deficit present.      Mental Status: He is alert and oriented to person, place, and time.         Significant Labs:  CBC:  Recent Labs   Lab 08/01/20  0638 08/02/20  0530 08/03/20  0600   WBC 3.66* 3.97 3.50*   RBC 3.48* 3.09* 3.16*   HGB 9.9* 8.7* 9.0*   HCT 32.4* 27.4* 28.1*   * 115* 111*   MCV 93 89 89   MCH 28.4 28.2 28.5   MCHC 30.6* 31.8* 32.0     BNP:  Recent Labs   Lab 07/31/20  2139   BNP 1,601*     CMP:  Recent Labs   Lab 08/01/20  0638  08/02/20  0530  08/02/20  1747 08/03/20  0600 08/03/20  1316   *   < > 90   < > 138* 68* 205*   CALCIUM 7.8*   < > 7.5*   < > 7.5* 7.5* 7.5*   ALBUMIN 3.0*  --  2.8*  --   --  2.9*  --    PROT 6.9  --  6.4  --   --  6.5  --       < > 137   < > 133* 136 134*   K 3.4*   < > 3.6   < > 3.4* 3.7 4.0   CO2 20*   < > 22*   < > 22* 25 26      < > 106   < > 100 102 100   BUN 41*   < > 41*   < > 38* 40* 41*   CREATININE 2.1*   < > 1.8*   < > 1.8* 1.8* 1.9*   ALKPHOS 207*  --  179*  --   --  184*  --    ALT 23  --  19  --   --  20  --    AST 23  --  19  --   --  22  --    BILITOT 1.5*  --  1.5*  --   --  1.5*  --     < > = values in this interval not displayed.      Coagulation:   Recent Labs   Lab 08/03/20  0600   INR 1.2     LDH:  No results for input(s): LDH in  the last 72 hours.  Microbiology:  Microbiology Results (last 7 days)     Procedure Component Value Units Date/Time    Blood culture [[id number]] Collected: 08/01/20 0638    Order Status: Completed Specimen: Blood Updated: 08/03/20 1012     Blood Culture, Routine No Growth to date      No Growth to date      No Growth to date    Blood culture [[id number]] Collected: 08/01/20 0638    Order Status: Completed Specimen: Blood Updated: 08/03/20 1012     Blood Culture, Routine No Growth to date      No Growth to date      No Growth to date    Blood Culture #2 **CANNOT BE ORDERED STAT** [[id number]] Collected: 07/31/20 2139    Order Status: Completed Specimen: Blood from Peripheral, Forearm, Left Updated: 08/02/20 2312     Blood Culture, Routine No Growth to date      No Growth to date      No Growth to date    Blood Culture #1 **CANNOT BE ORDERED STAT** [[id number]] Collected: 07/31/20 2139    Order Status: Completed Specimen: Blood from Peripheral, Antecubital, Left Updated: 08/02/20 2312     Blood Culture, Routine No Growth to date      No Growth to date      No Growth to date          I have reviewed all pertinent labs within the past 24 hours.    Estimated Creatinine Clearance: 35.4 mL/min (A) (based on SCr of 1.9 mg/dL (H)).    Diagnostic Results:  I have reviewed all pertinent imaging results/findings within the past 24 hours.

## 2020-08-03 NOTE — PLAN OF CARE
Right tunneled PICC replacement completed. Pt vomited after receiving cefazolin 1 gm. Dressing applied, CDI.  PT to be transferred to [facility]. Report given at bedside. Report called to primary nurse.

## 2020-08-03 NOTE — PROGRESS NOTES
Admit/Discharge Note     SWI accompanied by WARREN Gaines LCSW met with patient to assess needs. Patient is a 55 y.o.  male, admitted for Problem with vascular access [Z78.9], per medical record.      Patient admitted from ED on 7/31/2020 .  At this time, patient presents as alert and oriented x 4, good eye contact, calm, communicative, cooperative and asking and answering questions appropriately.  At this time, patients caregiver not present.    Household/Family Systems     Patient resides with patient's wife, at    [address] [place] [id number].    Pt's Cell: [phone]  Pt unable to provide wife's ph. #    Support system includes wife, Laura Mckeon, and two adult children (Ok 27y/o and Ashish 24y/o).  Patient does not have dependents that are need of being cared for.    During admission, patient's caregiver plans to stay at home.  Confirmed patient and patients caregivers do have access to reliable transportation.    Cognitive Status/Learning     Patient reports reading ability as 9th grade and states patient does not have difficulty with reading, writing, seeing, hearing and memory.  Patient reports patient learns best by reading.   Needed: No.   Highest education level: High School (9-12) or GED    Vocation/Disability   .  Working for Income: No  If no, reason not working: Disability  Patient is disabled since 2004.  Prior to disability, patient  was employed as a .    Adherence     Patient reports a high level of adherence to patients health care regimen.  Adherence counseling and education provided. Patient verbalizes understanding.    Substance Use    Patient reports the following substance usage.    Tobacco: none, patient denies any use.  Alcohol: none, patient denies any use.  Illicit Drugs/Non-prescribed Medications: none, patient denies any use.  Patient states clear understanding of the potential impact of substance use.  Substance abstinence/cessation  counseling, education and resources provided and reviewed.     Services Utilizing/ADLS    Infusion Service: Prior to admission, patient utilizing? yes, [facility] ph [phone]  Home Health: Prior to admission, patient utilizing? yes, [facility]  DME: Prior to admission, yes, BP cuff and scale  Pulmonary/Cardiac Rehab: Prior to admission, no  Dialysis:  Prior to admission, no  Transplant Specialty Pharmacy:  Prior to admission, no.    Prior to admission, patient reports patient was independent with ADLS and was driving.  Patient reports patient is able to care for self at this time..  Patient indicates a willingness to care for self once medically cleared to do so.    Insurance/Medications    Insured by   Payor/Plan Subscr  Sex Relation Sub. Ins. ID Effective Group Num   1. [place] - [place]* ASHER KAUFFMAN 1965 Male  [id number] 20 [id number]                                   [address]      Primary Insurance (for UNOS reporting): Public Insurance - Medicaid  Secondary Insurance (for UNOS reporting): None    Patient reports patient is able to obtain and afford medications at this time and at time of discharge.    Living Will/Healthcare Power of     Patient states patient does not have a LW and/or HCPA.   provided education regarding LW and HCPA and the completion of forms.    Coping/Mental Health    Patient is coping well with the aid of  family members. Patient denies mental health difficulties.     Discharge Planning    At time of discharge, patient plans to return to patient's home under the care of wife and self.  Patients wife will transport patient.  Per rounds today, expected discharge date is 8/3/20. Patient verbalizes understanding and is involved in treatment planning and discharge process.    Additional Concerns    Patient is being followed for needs, education, resources, information, emotional support, supportive counseling, and for supportive and skilled discharge  plan of care.  providing ongoing psychosocial support, education, resources and d/c planning as needed.  SW remains available. Patient denies additional needs and/or concerns at this time.

## 2020-08-03 NOTE — PROCEDURES
Radiology Post-Procedure Note    Pre Op Diagnosis: CHF    Post Op Diagnosis: Same    Procedure: right tunneled PICC placement    Procedure performed by: Nitin Guerrero MD    Written Informed Consent Obtained: Yes    Specimen Removed: No    Estimated Blood Loss: less than 50     Findings:   Using realtime U/S guidance a 5 Fr tunneled PICC catheter was placed into the right IJV with tip of the catheter in the SVC.  Old partially dislodged right PICC was removed.    PICC is ready for use.     Nitin Guerrero MD  Staff Radiologist  Department of Radiology  Pager: [phone]

## 2020-08-03 NOTE — PLAN OF CARE
Patient AAOx4, vitals WDL, afebrile.   R forearm 18g & L forearm 18g in place, saline flushed and locked, dressing CDI.   R subclavian IJ double lumen placed today, dressing with old blood, saline locked and flushed.   Dobutamine infusing @ 5mcg/kg/min w/ DW 63.5 kg = 9.5 mL/hr.   ACHS monitoring followed. Morning BG check = 56. Patient was asymptomatic. 118 mL orange juice given and cheerios. Rechecked in 15 min BG=90. Gave another 118 mL orange juice.   L IJ removed, pressure held for 10 min. Patient aware of precautions. Occlusive gauze dressing in place.   Able to ambulate independently in room, remains free from injury for shift.  SHEA.

## 2020-08-03 NOTE — ASSESSMENT & PLAN NOTE
HFrEF 2/2 NICM. Hemodynamically stable. Admitted for PICC line issues. Volume up on exam though asymptomatic   -Discontinue Lasix  mg and start Torsemide 100 mg QD and Aldactone 25mg  -Scr at baseline   -Continue OP HF follow up for advanced options evaluation

## 2020-08-03 NOTE — PROGRESS NOTES
[facility]  Heart Transplant  Progress Note    Patient Name: Ashish Mckeon  MRN: [id number]  Admission Date: 7/31/2020  Hospital Length of Stay: 3 days  Attending Physician: Joesph Vargas MD  Primary Care Provider: [facility]  Principal Problem:Problem with vascular access    Subjective:     Interval History: No issues overnight. Had IR tunneled catheter placed today. He still is in volume overload. Will DC IVP and start PO torsemide 100 mg QD and start Aldactone 25 mg. Will plan to D/C tomorrow if diuresis is effective with PO.     Continuous Infusions:   DOBUTamine IV infusion (non-titrating) 5 mcg/kg/min (08/02/20 1941)     Scheduled Meds:   aspirin  81 mg Oral Daily    atorvastatin  40 mg Oral Daily    ferrous sulfate  325 mg Oral Every other day    hydrALAZINE  75 mg Oral TID    insulin aspart U-100  4 Units Subcutaneous TID WM    insulin detemir U-100  8 Units Subcutaneous QHS    isosorbide dinitrate  40 mg Oral TID    levothyroxine  175 mcg Oral Before breakfast    polyethylene glycol  17 g Oral Daily    senna-docusate 8.6-50 mg  1 tablet Oral BID    spironolactone  25 mg Oral Daily    torsemide  100 mg Oral Daily     PRN Meds:acetaminophen, dextrose 50%, dextrose 50%, glucagon (human recombinant), glucose, glucose, HYDROcodone-acetaminophen, insulin aspart U-100, melatonin, ondansetron, sodium chloride 0.9%    Review of patient's allergies indicates:   Allergen Reactions    Lisinopril Other (See Comments)     acei cough      Cefazolin Nausea And Vomiting     Objective:     Vital Signs (Most Recent):  Temp: 97.1 °F (36.2 °C) (08/03/20 1000)  Pulse: 84 (08/03/20 1230)  Resp: 16 (08/03/20 1230)  BP: 103/72 (08/03/20 1230)  SpO2: 95 % (08/03/20 1230) Vital Signs (24h Range):  Temp:  [97.1 °F (36.2 °C)-98.1 °F (36.7 °C)] 97.1 °F (36.2 °C)  Pulse:  [67-90] 84  Resp:  [11-22] 16  SpO2:  [95 %-100 %] 95 %  BP: (102-115)/(53-77) 103/72     Patient  Vitals for the past 72 hrs (Last 3 readings):   Weight   08/03/20 0616 57.6 kg (126 lb 15.8 oz)   08/02/20 0500 58.5 kg (128 lb 15.5 oz)   08/01/20 0317 64 kg (141 lb 1.5 oz)     Body mass index is 17.71 kg/m².      Intake/Output Summary (Last 24 hours) at 8/3/2020 1551  Last data filed at 8/3/2020 1500  Gross per 24 hour   Intake 1120.73 ml   Output 1710 ml   Net -589.27 ml       Hemodynamic Parameters:       Telemetry: No events     Physical Exam  Vitals signs and nursing note reviewed.   Constitutional:       Appearance: Normal appearance.   Neck:      Musculoskeletal: Normal range of motion and neck supple.   Cardiovascular:      Rate and Rhythm: Normal rate and regular rhythm.      Comments: JVP to angle of jaw  Pulmonary:      Effort: Pulmonary effort is normal.      Breath sounds: No stridor. No wheezing or rhonchi.   Musculoskeletal: Normal range of motion.         General: No swelling.   Skin:     General: Skin is warm and dry.      Capillary Refill: Capillary refill takes 2 to 3 seconds.   Neurological:      General: No focal deficit present.      Mental Status: He is alert and oriented to person, place, and time.         Significant Labs:  CBC:  Recent Labs   Lab 08/01/20  0638 08/02/20  0530 08/03/20  0600   WBC 3.66* 3.97 3.50*   RBC 3.48* 3.09* 3.16*   HGB 9.9* 8.7* 9.0*   HCT 32.4* 27.4* 28.1*   * 115* 111*   MCV 93 89 89   MCH 28.4 28.2 28.5   MCHC 30.6* 31.8* 32.0     BNP:  Recent Labs   Lab 07/31/20  2139   BNP 1,601*     CMP:  Recent Labs   Lab 08/01/20  0638  08/02/20  0530  08/02/20  1747 08/03/20  0600 08/03/20  1316   *   < > 90   < > 138* 68* 205*   CALCIUM 7.8*   < > 7.5*   < > 7.5* 7.5* 7.5*   ALBUMIN 3.0*  --  2.8*  --   --  2.9*  --    PROT 6.9  --  6.4  --   --  6.5  --       < > 137   < > 133* 136 134*   K 3.4*   < > 3.6   < > 3.4* 3.7 4.0   CO2 20*   < > 22*   < > 22* 25 26      < > 106   < > 100 102 100   BUN 41*   < > 41*   < > 38* 40* 41*   CREATININE 2.1*    < > 1.8*   < > 1.8* 1.8* 1.9*   ALKPHOS 207*  --  179*  --   --  184*  --    ALT 23  --  19  --   --  20  --    AST 23  --  19  --   --  22  --    BILITOT 1.5*  --  1.5*  --   --  1.5*  --     < > = values in this interval not displayed.      Coagulation:   Recent Labs   Lab 08/03/20  0600   INR 1.2     LDH:  No results for input(s): LDH in the last 72 hours.  Microbiology:  Microbiology Results (last 7 days)     Procedure Component Value Units Date/Time    Blood culture [[id number]] Collected: 08/01/20 0638    Order Status: Completed Specimen: Blood Updated: 08/03/20 1012     Blood Culture, Routine No Growth to date      No Growth to date      No Growth to date    Blood culture [[id number]] Collected: 08/01/20 0638    Order Status: Completed Specimen: Blood Updated: 08/03/20 1012     Blood Culture, Routine No Growth to date      No Growth to date      No Growth to date    Blood Culture #2 **CANNOT BE ORDERED STAT** [[id number]] Collected: 07/31/20 2139    Order Status: Completed Specimen: Blood from Peripheral, Forearm, Left Updated: 08/02/20 2312     Blood Culture, Routine No Growth to date      No Growth to date      No Growth to date    Blood Culture #1 **CANNOT BE ORDERED STAT** [[id number]] Collected: 07/31/20 2139    Order Status: Completed Specimen: Blood from Peripheral, Antecubital, Left Updated: 08/02/20 2312     Blood Culture, Routine No Growth to date      No Growth to date      No Growth to date          I have reviewed all pertinent labs within the past 24 hours.    Estimated Creatinine Clearance: 35.4 mL/min (A) (based on SCr of 1.9 mg/dL (H)).    Diagnostic Results:  I have reviewed all pertinent imaging results/findings within the past 24 hours.    Assessment and Plan:     56 y/o M with stage D HFrEF 2/2 NICM, HTN, HLD, DM2 who was last admitted for ADHF from 6/7/20 to 6/19/20. This is his 4th admission this year. He is on home . Last seen in clinic by Dr. Gardner on 7/16/20 where he presented for  follow up and was hemodynamically stable and NYHA class II symptoms. No PND or orthopnea. His HF regimen includes Hydralazine 75 mg TID/Isordil 40 mg TID, Lasix 80 mg BID and  5 mcg/Kg/min. [place] [facility] [place] of his Right chest tunneled PICC (placed on 6/18/20). He has continued with infusion of  without issues. He noted blood surrounding port access and came to ER for further evaluation. A CXR shows the catheter has a coiled tip in the supraclavicular right lower neck, overlying the course of the IJ and EJ in the right lower neck. He denies pain or swelling. No fever. Lives at home with wife and denies sick contacts. He is at his dry wt of around 140 lbs. Denies HF symptoms.     * Problem with vascular access  Continue with  infusion per new port  -post IR PICC line exchange  -Blood culture and monitor for signs of infection    Chronic combined systolic and diastolic congestive heart failure  HFrEF 2/2 NICM. Hemodynamically stable. Admitted for PICC line issues. Volume up on exam though asymptomatic   -Discontinue Lasix  mg and start Torsemide 100 mg QD and Aldactone 25mg  -Scr at baseline   -Continue OP HF follow up for advanced options evaluation    Diabetes mellitus, type II  Continue with home medications    Hypothyroidism  Clinically euthyroid  -Continue with home medication        Saul Alcantar MD  Heart Transplant  [facility]

## 2020-08-04 NOTE — ASSESSMENT & PLAN NOTE
HFrEF 2/2 NICM. Hemodynamically stable. Admitted for PICC line issues. Volume up on exam though asymptomatic   -Continue Torsemide 100 mg QD and Aldactone 25mg  -Scr at baseline and will reassess OP in 2 days and in clinic in 2 weeks   -Continue OP HF follow up for advanced options evaluation

## 2020-08-04 NOTE — SUBJECTIVE & OBJECTIVE
Interval History: No issues overnight. Tolerated Torsemide and Aldactone well. IR tunneled catheter exchanged and working well. Will plan to D/C and follow up with labs as OP in 2 days with clinic visit in 2 weeks with labs.     Continuous Infusions:   DOBUTamine IV infusion (non-titrating) 5 mcg/kg/min (08/03/20 2209)     Scheduled Meds:   aspirin  81 mg Oral Daily    atorvastatin  40 mg Oral Daily    ferrous sulfate  325 mg Oral Every other day    hydrALAZINE  75 mg Oral TID    insulin aspart U-100  4 Units Subcutaneous TID WM    insulin detemir U-100  8 Units Subcutaneous QHS    isosorbide dinitrate  40 mg Oral TID    levothyroxine  175 mcg Oral Before breakfast    polyethylene glycol  17 g Oral Daily    senna-docusate 8.6-50 mg  1 tablet Oral BID    spironolactone  25 mg Oral Daily    torsemide  100 mg Oral Daily     PRN Meds:acetaminophen, dextrose 50%, dextrose 50%, glucagon (human recombinant), glucose, glucose, HYDROcodone-acetaminophen, insulin aspart U-100, melatonin, ondansetron, sodium chloride 0.9%    Review of patient's allergies indicates:   Allergen Reactions    Lisinopril Other (See Comments)     acei cough      Cefazolin Nausea And Vomiting     Objective:     Vital Signs (Most Recent):  Temp: 97.2 °F (36.2 °C) (08/04/20 1055)  Pulse: 85 (08/04/20 1104)  Resp: 18 (08/04/20 1055)  BP: 94/62 (08/04/20 1055)  SpO2: 98 % (08/04/20 1055) Vital Signs (24h Range):  Temp:  [97.2 °F (36.2 °C)-98.4 °F (36.9 °C)] 97.2 °F (36.2 °C)  Pulse:  [83-92] 85  Resp:  [16-18] 18  SpO2:  [95 %-98 %] 98 %  BP: ()/(56-70) 94/62     Patient Vitals for the past 72 hrs (Last 3 readings):   Weight   08/04/20 0615 62.2 kg (137 lb 3.8 oz)   08/03/20 0616 57.6 kg (126 lb 15.8 oz)   08/02/20 0500 58.5 kg (128 lb 15.5 oz)     Body mass index is 19.14 kg/m².      Intake/Output Summary (Last 24 hours) at 8/4/2020 1448  Last data filed at 8/4/2020 1400  Gross per 24 hour   Intake 1567.85 ml   Output 2500 ml   Net  -932.15 ml       Hemodynamic Parameters:       Telemetry: No events     Physical Exam  Vitals signs and nursing note reviewed.   Constitutional:       Appearance: Normal appearance.   Neck:      Musculoskeletal: Normal range of motion and neck supple.   Cardiovascular:      Rate and Rhythm: Normal rate and regular rhythm.      Comments: JVP to angle of jaw  Pulmonary:      Effort: Pulmonary effort is normal.      Breath sounds: No stridor. No wheezing or rhonchi.   Musculoskeletal: Normal range of motion.         General: No swelling.   Skin:     General: Skin is warm and dry.      Capillary Refill: Capillary refill takes 2 to 3 seconds.   Neurological:      General: No focal deficit present.      Mental Status: He is alert and oriented to person, place, and time.         Significant Labs:  CBC:  Recent Labs   Lab 08/02/20  0530 08/03/20  0600 08/04/20  0613   WBC 3.97 3.50* 3.56*   RBC 3.09* 3.16* 3.00*   HGB 8.7* 9.0* 8.5*   HCT 27.4* 28.1* 26.7*   * 111* 114*   MCV 89 89 89   MCH 28.2 28.5 28.3   MCHC 31.8* 32.0 31.8*     BNP:  Recent Labs   Lab 07/31/20  2139   BNP 1,601*     CMP:  Recent Labs   Lab 08/02/20  0530  08/03/20  0600 08/03/20  1316 08/03/20  1935 08/04/20  0613   GLU 90   < > 68* 205* 111* 78   CALCIUM 7.5*   < > 7.5* 7.5* 7.4* 7.3*   ALBUMIN 2.8*  --  2.9*  --   --  2.9*   PROT 6.4  --  6.5  --   --  6.5      < > 136 134* 132* 131*   K 3.6   < > 3.7 4.0 3.9 4.4   CO2 22*   < > 25 26 25 23      < > 102 100 97 98   BUN 41*   < > 40* 41* 43* 45*   CREATININE 1.8*   < > 1.8* 1.9* 2.0* 2.0*   ALKPHOS 179*  --  184*  --   --  186*   ALT 19  --  20  --   --  17   AST 19  --  22  --   --  20   BILITOT 1.5*  --  1.5*  --   --  1.4*    < > = values in this interval not displayed.      Coagulation:   Recent Labs   Lab 08/03/20  0600   INR 1.2     LDH:  No results for input(s): LDH in the last 72 hours.  Microbiology:  Microbiology Results (last 7 days)     Procedure Component Value Units  Date/Time    Blood culture [[id number]] Collected: 08/01/20 0638    Order Status: Completed Specimen: Blood Updated: 08/04/20 1014     Blood Culture, Routine No Growth to date      No Growth to date      No Growth to date      No Growth to date    Blood culture [[id number]] Collected: 08/01/20 0638    Order Status: Completed Specimen: Blood Updated: 08/04/20 1014     Blood Culture, Routine No Growth to date      No Growth to date      No Growth to date      No Growth to date    Blood Culture #2 **CANNOT BE ORDERED STAT** [[id number]] Collected: 07/31/20 2139    Order Status: Completed Specimen: Blood from Peripheral, Forearm, Left Updated: 08/03/20 2312     Blood Culture, Routine No Growth to date      No Growth to date      No Growth to date      No Growth to date    Blood Culture #1 **CANNOT BE ORDERED STAT** [[id number]] Collected: 07/31/20 2139    Order Status: Completed Specimen: Blood from Peripheral, Antecubital, Left Updated: 08/03/20 2312     Blood Culture, Routine No Growth to date      No Growth to date      No Growth to date      No Growth to date          I have reviewed all pertinent labs within the past 24 hours.    Estimated Creatinine Clearance: 36.3 mL/min (A) (based on SCr of 2 mg/dL (H)).    Diagnostic Results:  I have reviewed all pertinent imaging results/findings within the past 24 hours.

## 2020-08-04 NOTE — PROGRESS NOTES
[facility]  Heart Transplant  Progress Note    Patient Name: Ashish Mckeon  MRN: [id number]  Admission Date: 7/31/2020  Hospital Length of Stay: 3 days  Attending Physician: Ruben Butler Jr.,*  Primary Care Provider: [facility]  Principal Problem:Acute on chronic combined systolic and diastolic heart failure    Subjective:     Interval History: No issues overnight. Tolerated Torsemide and Aldactone well. IR tunneled catheter exchanged and working well. Will plan to D/C and follow up with labs as OP in 2 days with clinic visit in 2 weeks with labs.     Continuous Infusions:   DOBUTamine IV infusion (non-titrating) 5 mcg/kg/min (08/03/20 2209)     Scheduled Meds:   aspirin  81 mg Oral Daily    atorvastatin  40 mg Oral Daily    ferrous sulfate  325 mg Oral Every other day    hydrALAZINE  75 mg Oral TID    insulin aspart U-100  4 Units Subcutaneous TID WM    insulin detemir U-100  8 Units Subcutaneous QHS    isosorbide dinitrate  40 mg Oral TID    levothyroxine  175 mcg Oral Before breakfast    polyethylene glycol  17 g Oral Daily    senna-docusate 8.6-50 mg  1 tablet Oral BID    spironolactone  25 mg Oral Daily    torsemide  100 mg Oral Daily     PRN Meds:acetaminophen, dextrose 50%, dextrose 50%, glucagon (human recombinant), glucose, glucose, HYDROcodone-acetaminophen, insulin aspart U-100, melatonin, ondansetron, sodium chloride 0.9%    Review of patient's allergies indicates:   Allergen Reactions    Lisinopril Other (See Comments)     acei cough      Cefazolin Nausea And Vomiting     Objective:     Vital Signs (Most Recent):  Temp: 97.2 °F (36.2 °C) (08/04/20 1055)  Pulse: 85 (08/04/20 1104)  Resp: 18 (08/04/20 1055)  BP: 94/62 (08/04/20 1055)  SpO2: 98 % (08/04/20 1055) Vital Signs (24h Range):  Temp:  [97.2 °F (36.2 °C)-98.4 °F (36.9 °C)] 97.2 °F (36.2 °C)  Pulse:  [83-92] 85  Resp:  [16-18] 18  SpO2:  [95 %-98 %] 98 %  BP: ()/(56-70) 94/62      Patient Vitals for the past 72 hrs (Last 3 readings):   Weight   08/04/20 0615 62.2 kg (137 lb 3.8 oz)   08/03/20 0616 57.6 kg (126 lb 15.8 oz)   08/02/20 0500 58.5 kg (128 lb 15.5 oz)     Body mass index is 19.14 kg/m².      Intake/Output Summary (Last 24 hours) at 8/4/2020 1448  Last data filed at 8/4/2020 1400  Gross per 24 hour   Intake 1567.85 ml   Output 2500 ml   Net -932.15 ml       Hemodynamic Parameters:       Telemetry: No events     Physical Exam  Vitals signs and nursing note reviewed.   Constitutional:       Appearance: Normal appearance.   Neck:      Musculoskeletal: Normal range of motion and neck supple.   Cardiovascular:      Rate and Rhythm: Normal rate and regular rhythm.      Comments: JVP to angle of jaw  Pulmonary:      Effort: Pulmonary effort is normal.      Breath sounds: No stridor. No wheezing or rhonchi.   Musculoskeletal: Normal range of motion.         General: No swelling.   Skin:     General: Skin is warm and dry.      Capillary Refill: Capillary refill takes 2 to 3 seconds.   Neurological:      General: No focal deficit present.      Mental Status: He is alert and oriented to person, place, and time.         Significant Labs:  CBC:  Recent Labs   Lab 08/02/20  0530 08/03/20  0600 08/04/20  0613   WBC 3.97 3.50* 3.56*   RBC 3.09* 3.16* 3.00*   HGB 8.7* 9.0* 8.5*   HCT 27.4* 28.1* 26.7*   * 111* 114*   MCV 89 89 89   MCH 28.2 28.5 28.3   MCHC 31.8* 32.0 31.8*     BNP:  Recent Labs   Lab 07/31/20  2139   BNP 1,601*     CMP:  Recent Labs   Lab 08/02/20  0530  08/03/20  0600 08/03/20  1316 08/03/20  1935 08/04/20  0613   GLU 90   < > 68* 205* 111* 78   CALCIUM 7.5*   < > 7.5* 7.5* 7.4* 7.3*   ALBUMIN 2.8*  --  2.9*  --   --  2.9*   PROT 6.4  --  6.5  --   --  6.5      < > 136 134* 132* 131*   K 3.6   < > 3.7 4.0 3.9 4.4   CO2 22*   < > 25 26 25 23      < > 102 100 97 98   BUN 41*   < > 40* 41* 43* 45*   CREATININE 1.8*   < > 1.8* 1.9* 2.0* 2.0*   ALKPHOS 179*  --   184*  --   --  186*   ALT 19  --  20  --   --  17   AST 19  --  22  --   --  20   BILITOT 1.5*  --  1.5*  --   --  1.4*    < > = values in this interval not displayed.      Coagulation:   Recent Labs   Lab 08/03/20  0600   INR 1.2     LDH:  No results for input(s): LDH in the last 72 hours.  Microbiology:  Microbiology Results (last 7 days)     Procedure Component Value Units Date/Time    Blood culture [[id number]] Collected: 08/01/20 0638    Order Status: Completed Specimen: Blood Updated: 08/04/20 1014     Blood Culture, Routine No Growth to date      No Growth to date      No Growth to date      No Growth to date    Blood culture [[id number]] Collected: 08/01/20 0638    Order Status: Completed Specimen: Blood Updated: 08/04/20 1014     Blood Culture, Routine No Growth to date      No Growth to date      No Growth to date      No Growth to date    Blood Culture #2 **CANNOT BE ORDERED STAT** [[id number]] Collected: 07/31/20 2139    Order Status: Completed Specimen: Blood from Peripheral, Forearm, Left Updated: 08/03/20 2312     Blood Culture, Routine No Growth to date      No Growth to date      No Growth to date      No Growth to date    Blood Culture #1 **CANNOT BE ORDERED STAT** [[id number]] Collected: 07/31/20 2139    Order Status: Completed Specimen: Blood from Peripheral, Antecubital, Left Updated: 08/03/20 2312     Blood Culture, Routine No Growth to date      No Growth to date      No Growth to date      No Growth to date          I have reviewed all pertinent labs within the past 24 hours.    Estimated Creatinine Clearance: 36.3 mL/min (A) (based on SCr of 2 mg/dL (H)).    Diagnostic Results:  I have reviewed all pertinent imaging results/findings within the past 24 hours.    Assessment and Plan:     56 y/o M with stage D HFrEF 2/2 NICM, HTN, HLD, DM2 who was last admitted for ADHF from 6/7/20 to 6/19/20. This is his 4th admission this year. He is on home . Last seen in clinic by Dr. Gardner on 7/16/20 where  he presented for follow up and was hemodynamically stable and NYHA class II symptoms. No PND or orthopnea. His HF regimen includes Hydralazine 75 mg TID/Isordil 40 mg TID, Lasix 80 mg BID and  5 mcg/Kg/min. Admitted to [facility] after accidental pull of his Right chest tunneled PICC (placed on 6/18/20). He has continued with infusion of  without issues. He noted blood surrounding port access and came to ER for further evaluation. A CXR shows the catheter has a coiled tip in the supraclavicular right lower neck, overlying the course of the IJ and EJ in the right lower neck. He denies pain or swelling. No fever. Lives at home with wife and denies sick contacts. He is at his dry wt of around 140 lbs. Denies HF symptoms.     * Acute on chronic combined systolic and diastolic heart failure  HFrEF 2/2 NICM. Hemodynamically stable. Admitted for PICC line issues. Volume up on exam though asymptomatic   -Continue Torsemide 100 mg QD and Aldactone 25mg  -Scr at baseline and will reassess OP in 2 days and in clinic in 2 weeks   -Continue OP HF follow up for advanced options evaluation    Central line complication  Continue with  infusion per new port  -post IR PICC line exchange  -Blood culture and monitor for signs of infection    Diabetes mellitus, type II  Continue with home medications    Hypothyroidism  Clinically euthyroid  -Continue with home medication      Saul Alcantar MD  Heart Transplant  [facility]

## 2020-08-04 NOTE — DISCHARGE SUMMARY
[facility]  Heart Transplant  Discharge Summary      Patient Name: Ashish Mckeon  MRN: [id number]  Admission Date: 7/31/2020  Hospital Length of Stay: 3 days  Discharge Date and Time: 08/04/2020 3:05 PM  Attending Physician: No att. providers found   Discharging Provider: Saul Alcantar MD  Primary Care Provider: [facility]     HPI: 56 y/o M with stage D HFrEF 2/2 NICM, HTN, HLD, DM2 who was last admitted for ADHF from 6/7/20 to 6/19/20. This is his 4th admission this year. He is on home . Last seen in clinic by Dr. Gardner on 7/16/20 where he presented for follow up and was hemodynamically stable and NYHA class II symptoms. No PND or orthopnea. His HF regimen includes Hydralazine 75 mg TID/Isordil 40 mg TID, Lasix 80 mg BID and  5 mcg/Kg/min. Admitted to [facility] after accidental pull of his Right chest tunneled PICC (placed on 6/18/20). He has continued with infusion of  without issues. He noted blood surrounding port access and came to ER for further evaluation. A CXR shows the catheter has a coiled tip in the supraclavicular right lower neck, overlying the course of the IJ and EJ in the right lower neck. He denies pain or swelling. No fever. Lives at home with wife and denies sick contacts. He is at his dry wt of around 140 lbs. Denies HF symptoms.     * No surgery found *     Hospital Course: Admitted for central line complication and was exchanged by IR 8/3/20. Blood culture and negative and no signs of infection. Was continued on  5 mcg/kg/min per home dose. Was noticed with volume overload based on clinical exam and NYHA symptoms class II-III, although patient seems to have little issues and was eager to be discharged home quickly. He metnions good medications and dietary compliance. Was treated with IV Lasix for 2 days and then transitioned to Torsemide 100 mg QD and Aldactone 25mg. Volume status improved. Importantly his response to Torsemide was better when  compared to PO Lasix and was continued for outpatient. Will have lab check in 2 days then in 2 weeks when he comes to follow up clinic.     Consults (From admission, onward)        Status Ordering Provider     Inpatient consult to Interventional Radiology  Once     Provider:  (Not yet assigned)    Completed LULY BOLDEN          Significant Diagnostic Studies: Labs:   BMP:   Recent Labs   Lab 08/02/20  1747  08/03/20  1316 08/03/20  1935 08/04/20  0613   *   < > 205* 111* 78   *   < > 134* 132* 131*   K 3.4*   < > 4.0 3.9 4.4      < > 100 97 98   CO2 22*   < > 26 25 23   BUN 38*   < > 41* 43* 45*   CREATININE 1.8*   < > 1.9* 2.0* 2.0*   CALCIUM 7.5*   < > 7.5* 7.4* 7.3*   MG 2.5  --  2.1  --  2.1    < > = values in this interval not displayed.       Pending Diagnostic Studies:     Procedure Component Value Units Date/Time    IR PICC Tunneled with Port (xpd) [[id number]]     Order Status: Sent Lab Status: No result         Final Active Diagnoses:    Diagnosis Date Noted POA    PRINCIPAL PROBLEM:  Acute on chronic combined systolic and diastolic heart failure [I50.43] 02/25/2020 Yes    Central line complication [T82.9XXA] 07/31/2020 Yes    CKD (chronic kidney disease) stage 3, GFR 30-59 ml/min [N18.3] 06/09/2020 Yes    Nonischemic cardiomyopathy [I42.8] 02/29/2020 Yes    Diabetes mellitus, type II [E11.9]  Yes    Hypothyroidism [E03.9]  Yes      Problems Resolved During this Admission:      Discharged Condition: good    Disposition: Home or Self Care    Follow Up:    Patient Instructions:      BASIC METABOLIC PANEL   Standing Status: Future Standing Exp. Date: 10/03/21     Magnesium   Standing Status: Future Standing Exp. Date: 10/03/21     Medications:  Reconciled Home Medications:      Medication List      START taking these medications    spironolactone 25 MG tablet  Commonly known as: ALDACTONE  Take 1 tablet (25 mg total) by mouth once daily.  Start taking on: August 5, 2020    "  torsemide 100 MG Tab  Commonly known as: DEMADEX  Take 1 tablet (100 mg total) by mouth once daily.  Start taking on: August 5, 2020        CHANGE how you take these medications    acetaminophen 325 MG tablet  Commonly known as: TYLENOL  Take 2 tablets (650 mg total) by mouth every 6 to 8 hours as needed.  What changed:   · how much to take  · when to take this  · reasons to take this        CONTINUE taking these medications    aspirin 81 MG Chew  Take 1 tablet (81 mg total) by mouth once daily.     atorvastatin 40 MG tablet  Commonly known as: LIPITOR  Take 40 mg by mouth once daily.     BD ULTRA-FINE CONNIE PEN NEEDLE 32 gauge x 5/32" Ndle  Generic drug: pen needle, diabetic  Use to inject insulin four times daily before meals and nightly.     dextrose 5 % infusion     DOBUTamine 250 mg/20 mL (12.5 mg/mL) injection  Commonly known as: DOBUTREX     DOBUTamine 500 mg/250 mL (2,000 mcg/mL)  Commonly known as: DOBUTREX  Inject 271 mcg/min into the vein continuous.     ferrous sulfate 325 (65 FE) MG EC tablet  Take 1 tablet (325 mg total) by mouth every other day.     hydrALAZINE 50 MG tablet  Commonly known as: APRESOLINE  Take 1.5 tablets (75 mg total) by mouth 3 (three) times daily.     HYDROcodone-acetaminophen 5-325 mg per tablet  Commonly known as: NORCO  Take 1 tablet by mouth every 6 (six) hours as needed for Pain.     insulin aspart U-100 100 unit/mL (3 mL) Inpn pen  Commonly known as: NovoLOG  Inject 4 Units into the skin 3 (three) times daily with meals.     isosorbide dinitrate 20 MG tablet  Commonly known as: ISORDIL  Take 2 tablets (40 mg total) by mouth 3 (three) times daily.     lancing device Misc  Use to test blood glucose four times daily before meals and nightly.     LEVEMIR FLEXTOUCH U-100 INSULN 100 unit/mL (3 mL) Inpn pen  Generic drug: insulin detemir U-100  Inject 8 Units into the skin every evening.     levothyroxine 175 MCG tablet  Commonly known as: SYNTHROID  Take 1 tablet (175 mcg total) " by mouth before breakfast.     TRUE METRIX GLUCOSE METER Misc  Generic drug: blood-glucose meter  Use as instructed     TRUE METRIX GLUCOSE TEST STRIP Strp  Generic drug: blood sugar diagnostic  Use to test blood glucose four times daily before meals and nightly.     TRUEPLUS LANCETS 30 gauge Misc  Generic drug: lancets  Use to test blood glucose four times daily before meals and nightly.        STOP taking these medications    furosemide 80 MG tablet  Commonly known as: TRISTAN Alcantar MD  Heart Transplant  [facility]

## 2020-08-04 NOTE — HOSPITAL COURSE
Admitted for central line complication and was exchanged by IR 8/3/20. Blood culture and negative and no signs of infection. Was continued on  5 mcg/kg/min per home dose. Was noticed with volume overload based on clinical exam and NYHA symptoms class II-III, although patient seems to have little issues and was eager to be discharged home quickly. He metnions good medications and dietary compliance. Was treated with IV Lasix for 2 days and then transitioned to Torsemide 100 mg QD and Aldactone 25mg. Volume status improved. Importantly his response to Torsemide was better when compared to PO Lasix and was continued for outpatient. Will have lab check in 2 days then in 2 weeks when he comes to follow up clinic.

## 2020-08-04 NOTE — NURSING
Discharge instructions reviewed with patient, who verbalized understanding. Peripheral IV discontinued. Dobutamine home drip initiated. Patient awaiting transport via wheelchair.

## 2020-08-04 NOTE — PROGRESS NOTES
Discharge    Pt presents in room alone, aaox4 with open affect. Pt states in agreement with plan to discharge to home today with resumption of care from [facility]  [phone] and [facility] . Both agencies are aware. Pt's wife will transport. Pt reports coping well and denies further needs, questions, concerns at this time and none indicated. Providing psychosocial and counseling support, education, resources, assistance and discharge planning as indicated. SW remains available.

## 2020-08-05 NOTE — TELEPHONE ENCOUNTER
"1015 am:  Message reviewed  Sent message at this time to HF Luis JADE asking she schedule pt for labs tomorrow ( cmp and Bnp) per Dr. Butler-[facility] d/c yesterday, med changes and watching him closely  As well as his usual [facility] labs Monday 8/10  ( if not scheduled for 8/10-will need additional labs that day)        ----- Message from Ruben Butler Jr., MD sent at 8/4/2020  5:23 PM CDT -----  Patient admitted with dislodged PICC line but also felt to be volume overloaded.  Diuresed and yesterday PICC line replaced.  He has chronic CHF and on exam prominent V wave to angle of jaw.  He is comfortable and would prefer to go home to "fine tune" volume.  He had nice response to torsemide 100 mg x 1 dose last night so will send home on this treatment and aldactone 25 mg qd.  He will need very close f/u BMP with first check Thursday and repeat again likely Monday to avoid over-diuresis.    Telma, he is on  so likely having weekly lab as well.      For some reason I cannot get [facility] Heart Failure pool so sent tp you      "

## 2020-08-11 NOTE — PROGRESS NOTES
0830 am:  F/U on yesterdays routine [facility] weekly nurse lab phone review   Labs results after hours:  CMP/BNP and Mg  Results are stable and are in epic  Were drawn

## 2020-08-18 PROBLEM — T82.9XXA CENTRAL LINE COMPLICATION: Status: RESOLVED | Noted: 2020-01-01 | Resolved: 2020-01-01

## 2020-08-18 PROBLEM — R93.2 ABNORMAL LIVER ULTRASOUND: Status: ACTIVE | Noted: 2020-01-01

## 2020-08-18 PROBLEM — I50.42 CHRONIC COMBINED SYSTOLIC AND DIASTOLIC HEART FAILURE: Status: ACTIVE | Noted: 2020-01-01

## 2020-08-18 NOTE — Clinical Note
He needs f/u with Dr. Monahan; I could not find fibroscan order so if you know please order so Taniya will have; please set up  visit next time

## 2020-08-18 NOTE — TELEPHONE ENCOUNTER
PRE-EDUCATION BOOKLET NOTE:    Met with Ashish Mckeon and had a brief discussion regarding the advanced heart failure evaluation process including options for heart transplantation and/or left ventricular assist device (LVAD) implantation.     Heart Transplant Educational Booklet given to patient, which included the following handouts:  · Treatment options for Advanced Heart Failure: A Referral Guide for patients  · Pre Heart Transplant Coordinator Team Contact Information   · Recipient Informed Consent   · Wellness Contract  · Multiple Listing Protocol and UNOS toll free numbers   · VAD Education Packet   · Advanced Directives  · 8 Step Plan for Heart Failure Patients     Significant History:  · Prior sternotomies: no  · ICD (if yes, indicate brand of device): No  · Blood transfusions (if yes, enter date and location): No  · Angiography (if yes, enter date and location): Yes: At [facility],  Unknown date  · Colonoscopy (if yes, enter date and location): No  · Tobacco history (if yes, enter amount and date quit if applicable): no  · Stroke history:  no  · Thromboembolism history:  no    Question and answer session was conducted.  Patient was advised to bring the educational packet to future appointments and/or admissions.  Patient was encouraged to contact the coordinator team with any questions or concerns.  Understanding verbalized.

## 2020-08-18 NOTE — PROGRESS NOTES
"Subjective:   Transplant status: opening referral--see Dr. Gardner's note June 2020 and my assessment below    HPI:  Mr. Mckeon is a 55 y.o. year old Black or  male who previously saw Dr. Gardner for CHF and consideration of advanced options.  He has stage D CHF, NICM, HTN, HLD, DM2 on home . He has been readmitted several times this year, last earlier this month with both dislodged PICC and ADHF.  I saw him at discharge and noted the following:  Patient admitted with dislodged PICC line but also felt to be volume overloaded.  Diuresed and yesterday PICC line replaced.  He has chronic CHF and on exam prominent V wave to angle of jaw.  He is comfortable and would prefer to go home to "fine tune" volume.  He had nice response to torsemide 100 mg x 1 dose last night so will send home on this treatment and aldactone 25 mg qd.  He will need very close f/u BMP with first check Thursday and repeat again likely Monday to avoid over-diuresis.  I am perplexed by reported RHC results 6/17/20 of RA mean 1 with mean wedge of 13.  Closest 2 BNP's were 6/7/2020 of 2568 and 6/24/2020 2765.    He comes today for f/u visit.  He is tolerating meds and able on  to walk around, keep wt 126#.  At this time NYHA Class 4 stage D on home  but functioning as advanced NYHA class 3.  No fever, chills, bleeding.    Past Medical History:   Diagnosis Date    CHF (congestive heart failure)     Diabetes mellitus type II     Essential hypertension, benign     Hyperlipidemia     Hypothyroidism     Pain and swelling of left wrist 8/5/2019    Ashish Mckeon is a 54 y.o. male with PMH of  presenting with CHF, IDDM, Hypothyroidism, HTN, HLD presenting with worsening L wrist pain for 1 day. Orthopedic surgery was consulted for septic joint r/o. Pt has been afebrile and has no elevated WBC. ESR 36, normal CRP. Xray shows no signs of trauma and pt has no hx of gout or septic arthritis. Due to atraumatic wrist pain and swelling w/out " "identifia    Stage 3 chronic kidney disease 6/9/2020    Undiagnosed cardiac murmurs      Past Surgical History:   Procedure Laterality Date    COLON SURGERY      "lower intestines removed"    ORTHOPEDIC SURGERY Left     wrist    RIGHT HEART CATHETERIZATION Right 6/17/2020    Procedure: INSERTION, CATHETER, RIGHT HEART;  Surgeon: Kimberly Rodgers MD;  [facility] C[facility] LAB;  Service: Cardiology;  Laterality: Right;    THYROID SURGERY          Social History     Tobacco Use    Smoking status: Never Smoker    Smokeless tobacco: Never Used    Tobacco comment: cigars "every other week or so"   Substance Use Topics    Alcohol use: Not Currently     Alcohol/week: 3.0 standard drinks     Types: 3 Cans of beer per week    Drug use: Not Currently     Types: Marijuana   he says that he never drank regularly or heavy but on hep consult note of 12 beers/day for "few years" quit 2016    ROS not repeated    Objective:   Blood pressure 103/70, pulse 82, height 5' 11" (1.803 m), weight 60.1 kg (132 lb 7.9 oz).body mass index is 18.48 kg/m².  Physical Exam   Constitutional: He is oriented to person, place, and time. No distress.   /70 (BP Location: Right arm, Patient Position: Sitting, BP Method: Medium (Automatic))   Pulse 82   Ht 5' 11" (1.803 m)   Wt 60.1 kg (132 lb 7.9 oz)   BMI 18.48 kg/m²   Chronically ill appearing BM in NAD   HENT:   Head: Normocephalic and atraumatic.   Eyes: Conjunctivae are normal. Right eye exhibits no discharge. Left eye exhibits no discharge. No scleral icterus.   Neck: JVD (V wave 3-4 fingers above clavicle sitting; external jugular collapse below clavicle sitting) present. No thyromegaly present.   Cardiovascular: Normal rate and regular rhythm. Exam reveals gallop.   Murmur (Grade 2-3/6 holosystolic murmur over precordium) heard.  Pulmonary/Chest: Effort normal. No respiratory distress.   Diminished BS right base   Abdominal: Soft. Bowel sounds are normal. He exhibits " distension (mildly distended; liver span 16 cm). He exhibits no mass. There is no abdominal tenderness. There is no rebound and no guarding.   Musculoskeletal:         General: Edema (chronic stasis changes with 0.5+ pitting edema) present. No tenderness.   Neurological: He is alert and oriented to person, place, and time.   Skin: Skin is warm and dry. He is not diaphoretic.   Psychiatric: He has a normal mood and affect. His behavior is normal. Judgment and thought content normal.         He had CMP and BNP yesterday still pending under lab tab--I think from ; CBC yesterday stable H/H and plt but CMP and BNP pending still so wonder if lost specimen?    Mg today 2.1  BMP today:  Lab Results   Component Value Date     (L) 08/18/2020    K 3.8 08/18/2020    CL 99 08/18/2020    CO2 26 08/18/2020    BUN 60 (H) 08/18/2020    CREATININE 2.4 (H) 08/18/2020    CALCIUM 8.8 08/18/2020    ANIONGAP 10 08/18/2020    ESTGFRAFRICA 33.6 (A) 08/18/2020    EGFRNONAA 29.1 (A) 08/18/2020 6/8/2020 Abdominal ultrasound FINDINGS:  The visualized portion of the pancreas is unremarkable.     The liver is enlarged measuring 18.6 cm.  The liver demonstrates heterogenous echotexture with nodular contour consistent with cirrhosis.  No focal hepatic lesions are seen.  The gallbladder demonstrates an echogenic focus at the neck of the gallbladder likely representing cholelithiasis.  The common duct is not dilated, measuring 4 mm.  The gallbladder wall is thickened.There is no sonographic Villarreal sign.  No dilated intrahepatic radicles are seen.  The spleen is normal in size measuring 10 x 4.2 cm with a homogeneous echotexture.  The aorta tapers normally.  The kidneys are normal in size without focal abnormality or evidence of hydronephrosis.  There is large volume of ascites.  The main portal vein, right portal vein, left portal vein, middle hepatic vein, right hepatic vein, left hepatic vein, SMV, and IVC are patent with proper  directional flow.  The main hepatic artery is patent. The umbilical vein is not patent.  Impression:  Sequela of cirrhosis including heterogenous echotexture, nodular contour of the liver, and large volume ascites.  Cholelithiasis without evidence of cholecystitis.    6/8/2020 ECHO Echocardiography Findings  Left Ventricle Severe decreased ejection fraction at 20%.  Mild left ventricular enlargement. Reported LVEDD 5.7 and LVESD 4.9.  Normal wall thickness observed. Grade III (severe) left ventricular diastolic dysfunction consistent with restrictive physiology. Elevated left atrial pressure. Severe global hypokinesis.   Right Ventricle The right ventricle is moderately dilated. The systolic function is moderately reduced. Catheter present in the ventricle.   Left Atrium There is severe left atrial enlargement. Left atrial volume index is 71.7 mL/m2.   Right Atrium There is severe right atrial enlargement.   Aortic Valve The aortic valve appears structurally normal. The valve is trileaflet. There is normal leaflet mobility.   Mitral Valve The mitral valve appears structurally normal. There is posterior leaflet tethering. Moderate to severe regurgitation. The jet is posterior directed and is eccentric.   Tricuspid Valve The tricuspid valve appears structurally normal. Mild to moderate regurgitation. Pulmonary hypertension present. There is normal leaflet mobility. The estimated PA systolic pressure is 65 mmHg.   IVC/SVC Elevated central venous pressure (15 mm Hg).   Pericardium Trivial pericardial effusion.       Assessment:      1. Chronic combined systolic and diastolic heart failure    2. Abnormal liver ultrasound    3. Nonischemic cardiomyopathy    4. CKD (chronic kidney disease) stage 3, GFR 30-59 ml/min    5. Thrombocytopenia, unspecified    6. Type 2 diabetes mellitus with stage 4 chronic kidney disease, with long-term current use of insulin      Plan:   Dr. Gardner had planned for hepatology consult re: possible  cirrhosis and to proceed with evaluation for advanced options if no cirrhosis.  He did see Dr. Monahan when in-patient 6/9/20 and they recommended fibroscan and outpatient f/u.  He was readmitted with PICC dislodgement and ADHF before this could be arranged.  I spoke with patient today and he is not sure that he would be interested but agreed to f/u on hepatology consult re: abnormal ultrasound with question of cirrhosis.     He met with pre-coordinator to obtain information on LVAD though at this time concern would be right heart function, renal function, small LV as well as question of cirrhosis.  Heart transplant similar concerns except for LV size.  In addition I am concerned over ongoing weight loss, lack of muscle mass, debility, cachexia thus if we are going to offer him anything we need to proceed with evaluation now or risk missing window of opportunity.    Continue current treatment and weekly lab on  with     RTC with HTS when he sees Hepatology; arrange for  to assess and meet with patient and wife/caregiver at that time.    Patient is now NYHA IV on home  functioning advanced NYHA class 3    Recommend 2 gram sodium restriction and 1500cc fluid restriction.  Encourage physical activity with graded exercise program.  Requested patient to weigh themselves daily, and to notify us if their weight increases by more than 3 lbs in 1 day or 5 lbs in 1 week.     Listed for transplant: No    UNOS Patient Status  Functional Status: 50% - Requires considerable assistance and frequent medical care  Physical Capacity: No Limitations  Working for Income: No  If no, reason not working: Disability    Transplant candidacy:  Patient is a 55 y.o. year old male with heart failure is being seen for possible LVAD and OHT.     Discussed with the patient and family [facility] outcomes as reported in INTERMACS (Interagency Registry for Mechanically Assisted Circulatory  Support):    1 year survival = 92.7%  2 year survival = 86.7%  3 year survival = 86.7%    Patient and family acknowledged receipt of this information and all questions were answered.     Patient met with pre-transplant coordinator at the end of this visit.

## 2020-08-19 NOTE — TELEPHONE ENCOUNTER
Returned call to Marva with home health ([phone]) regarding a SW visit. Left message with Candy to please have Marva call back. HTS scheduled for SW in the clinic setting not as a home visit.

## 2020-08-19 NOTE — PROGRESS NOTES
Fibroscan requested to be scheduled. Dr Monahan's response to request noted below.    FibroScan is contraindicated in patients with heart failure as FibroScan will give high elasticity due to liver congestion from heart failure. He will need to be evaluated by Dr River and we will order appropriate tests.     Rebecca Monahan MD   Transplant Hepatology     Dr Butler notified electronically of above.

## 2020-08-19 NOTE — PROGRESS NOTES
Spoke to patient. Confirmed appointments as scheduled 9/14/20 and 9/21/20. Understanding verbalized. Call PRN.

## 2020-08-20 NOTE — TELEPHONE ENCOUNTER
Spoke to Marva with [facility]. Patient was discharged on 8/4/20 with direction for Infusion and HH services. According to Marva, patient's insurance does not cover home health and that particular service will not be provided. Services to care for Dobutamine will continue.    Patient is scheduled to meet with the  here in our clinic next month.     Discharging physician and  notified electronically of above. I requested to be advised of anything further needed in this matter.

## 2020-09-14 NOTE — TELEPHONE ENCOUNTER
Spoke to Joselito with [facility] . Reports patient with 4 lb weight gain in 1 week with 1+ pitting edema to bilateral ankles. Joselito says no shortness of breath and breath sounds were clear.  Called to check on patient. Patient states no shortness of breath. No discomfort or pain. Patient reports drinking a little more water than he should be. States not consuming salt. Diet discussed.  States taking all medications as directed. Instructed patient to monitor weight and notify us of any further weight gain, shortness of breath, swelling or concerns. Patient verbalized understanding. Confirmed appointment with Dr Gardner on 9/21/20. Patient to see us sooner as needed. Call PRN.
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
## 2020-09-21 NOTE — LETTER
September 21, 2020    Ashish Mckeon  [address] [facility] [address]      Dear Ashish Mckeon:    Your doctor has referred you to the [facility]. We are sending this letter to remind you to make an appointment with us to complete the referral process.     Please call us at [phone] to schedule an appointment. We look forward to seeing you soon.     If you received a call and have been scheduled, please disregard this letter.       Sincerely,        [facility]   [facility] [place] [address] [phone]

## 2020-09-21 NOTE — TELEPHONE ENCOUNTER
Called to check on and reschedule today's missed appointment and also last week missed appointment. No answer. Left message along with contact information to please call with check in and to reschedule appointments.

## 2020-09-21 NOTE — NURSING
Pt records reviewed.   Pt will be referred to Hepatology.  Abnormal Liver imaging  Initial referral received  from the workque.   Referring Provider/diagnosis  Ruben Butler Jr., MD      Referral letter sent to patient.

## 2020-09-23 NOTE — LETTER
September 23, 2020      Ruben Butler Jr., MD  [address]  [facility] [address]           [facility] Trans[facility] 1st Fl  [address]  [facility] [address]  Phone: [phone]  Fax: [phone]          Patient: Ashish Mckeon   MR Number: [id number]   YOB: 1965   Date of Visit: 9/23/2020       Dear Dr. Ruben Butler Jr.:    Thank you for referring Ashish Mckeon to me for evaluation. Attached you will find relevant portions of my assessment and plan of care.    If you have questions, please do not hesitate to call me. I look forward to following Ashish Mckeon along with you.    Sincerely,    Rebecca Monahan MD    Enclosure  CC:  Gustavo Gardner MD    If you would like to receive this communication electronically, please contact [email or web address] or [phone] to request more information on [employer] Link access.    For providers and/or their staff who would like to refer a patient to [facility], please contact us through our one-stop-shop provider referral line, [facility], at [phone].    If you feel you have received this communication in error or would no longer like to receive these types of communications, please e-mail [email or web address]

## 2020-09-23 NOTE — TELEPHONE ENCOUNTER
Patient seen in clinic today 9/23/20 with Dr Rebecca Monahan. New orders to follow. The patient will need a TJ Liver Biopsy. Patient agreed to do the Procedure.    Pre and Post Procedure teaching done with the patient.  Written TJ Liver Biopsy instructions given to the patient.  Will have his wife to drop him off and pick him up.  Voiced understanding.  [facility].    Patient would like to do the Liver Biopsy in about 2 weeks, Wed 10/7/20 with an Early AM Check in Time.    Will send message to IR.  Heart Tx Candidate, Right Chest Wall Picc Line with Dobutamine Drip, on low dose ASA, Diabetic.

## 2020-09-23 NOTE — PROGRESS NOTES
"Hepatology Consult Note    Referring provider: Dr. Ruben Butler*    Chief complaint:   Chief Complaint   Patient presents with    Abnormal Abdominal/Liver Imaging       HPI:  Ashish Mckeon is a pleasant 55 y.o. malewho was referred to Hepatology Clinic for consultation of had concerns including Abnormal Abdominal/Liver Imaging..      Patient has hx of end-stage heart failure - EF 20% on  . He also has moderate pulmonary hypertension. He is being followed in [facility] clinic and planning for advanced options by Dr Gardner and Dr Butler.  His previous liver imaging showed "nodular" liver. He developed ascites.  6/10/20: low SAAG and  High peritoneal protein. Mixed pattern - nephrotic/CHF. Does not correlate with portal hypertension.    He used to drink 6-pack of beer on weekends, but never did daily drinking alcohol. He does not consider himself a daily drinker.  Chronic viral hepatitis panel was negative.    Patient Active Problem List   Diagnosis    Thrombocytopenia    Megaloblastic anemia due to vitamin B12 deficiency    Hypothyroidism    Diabetes mellitus, type II    Anemia of chronic disease    Essential hypertension    Substance use disorder    NSVT (nonsustained ventricular tachycardia)    Other proteinuria    right upper lobe mass    Pulmonary nodules    Blurry vision, bilateral    Hyperlipidemia    Pseudogout of left wrist    Elevated serum creatinine    Septic arthritis    Crystals present in synovial fluid obtained by arthrocentesis    Pain in left wrist    Chronic combined systolic and diastolic heart failure    Anemia    Thrombocytopenia, unspecified    Nonischemic cardiomyopathy    SOB (shortness of breath)    Hyperbilirubinemia    CKD (chronic kidney disease) stage 3, GFR 30-59 ml/min    Palliative care encounter    Goals of care, counseling/discussion    Advance care planning    Abnormal liver ultrasound       Past Medical History:   Diagnosis Date    CHF (congestive " "heart failure)     Diabetes mellitus type II     Essential hypertension, benign     Hyperlipidemia     Hypothyroidism     Pain and swelling of left wrist 8/5/2019    Ashish Mckeon is a 54 y.o. male with PMH of  presenting with CHF, IDDM, Hypothyroidism, HTN, HLD presenting with worsening L wrist pain for 1 day. Orthopedic surgery was consulted for septic joint r/o. Pt has been afebrile and has no elevated WBC. ESR 36, normal CRP. Xray shows no signs of trauma and pt has no hx of gout or septic arthritis. Due to atraumatic wrist pain and swelling w/out identifia    Stage 3 chronic kidney disease 6/9/2020    Undiagnosed cardiac murmurs        Past Surgical History:   Procedure Laterality Date    COLON SURGERY      "lower intestines removed"    ORTHOPEDIC SURGERY Left     wrist    RIGHT HEART CATHETERIZATION Right 6/17/2020    Procedure: INSERTION, CATHETER, RIGHT HEART;  Surgeon: Kimberly Rodgers MD;  Location: [facility] CATH LAB;  Service: Cardiology;  Laterality: Right;    THYROID SURGERY         Family History   Problem Relation Age of Onset    Cancer Father        Social History     Socioeconomic History    Marital status:      Spouse name: Not on file    Number of children: Not on file    Years of education: Not on file    Highest education level: Not on file   Occupational History    Not on file   Social Needs    Financial resource strain: Not on file    Food insecurity     Worry: Not on file     Inability: Not on file    Transportation needs     Medical: Not on file     Non-medical: Not on file   Tobacco Use    Smoking status: Never Smoker    Smokeless tobacco: Never Used    Tobacco comment: cigars "every other week or so"   Substance and Sexual Activity    Alcohol use: Not Currently     Alcohol/week: 3.0 standard drinks     Types: 3 Cans of beer per week    Drug use: Not Currently     Types: Marijuana    Sexual activity: Yes     Partners: Female   Lifestyle    Physical activity     " "Days per week: Not on file     Minutes per session: Not on file    Stress: Not on file   Relationships    Social connections     Talks on phone: Not on file     Gets together: Not on file     Attends Latter day service: Not on file     Active member of club or organization: Not on file     Attends meetings of clubs or organizations: Not on file     Relationship status: Not on file   Other Topics Concern    Not on file   Social History Narrative    Not on file       Current Outpatient Medications   Medication Sig Dispense Refill    acetaminophen (TYLENOL) 325 MG tablet Take 2 tablets (650 mg total) by mouth every 6 to 8 hours as needed. (Patient taking differently: Take 325 mg by mouth daily as needed for Pain. )  0    atorvastatin (LIPITOR) 40 MG tablet Take 40 mg by mouth once daily.      blood sugar diagnostic Strp Use to test blood glucose four times daily before meals and nightly. 200 strip 11    blood-glucose meter Misc Use as instructed 1 each 0    dextrose 5 % infusion       DOBUTamine (DOBUTREX) 250 mg/20 mL (12.5 mg/mL) injection       ferrous sulfate 325 (65 FE) MG EC tablet Take 1 tablet (325 mg total) by mouth every other day. 30 tablet 3    hydrALAZINE (APRESOLINE) 50 MG tablet Take 1.5 tablets (75 mg total) by mouth 3 (three) times daily. 135 tablet 4    HYDROcodone-acetaminophen (NORCO) 5-325 mg per tablet Take 1 tablet by mouth every 6 (six) hours as needed for Pain. 18 tablet 0    isosorbide dinitrate (ISORDIL) 20 MG tablet Take 2 tablets (40 mg total) by mouth 3 (three) times daily. 90 tablet 4    lancets 30 gauge Misc Use to test blood glucose four times daily before meals and nightly. 200 each 11    lancing device Misc Use to test blood glucose four times daily before meals and nightly. 1 each 0    levothyroxine (SYNTHROID) 175 MCG tablet Take 1 tablet (175 mcg total) by mouth before breakfast.      pen needle, diabetic 32 gauge x 5/32" Ndle Use to inject insulin four times daily " "before meals and nightly. 200 each 11    spironolactone (ALDACTONE) 25 MG tablet Take 1 tablet (25 mg total) by mouth once daily. 30 tablet 11    torsemide (DEMADEX) 100 MG Tab Take 1 tablet (100 mg total) by mouth once daily. 30 tablet 11    aspirin 81 MG Chew Take 1 tablet (81 mg total) by mouth once daily. 360 tablet 0    insulin aspart U-100 (NOVOLOG) 100 unit/mL (3 mL) InPn pen Inject 4 Units into the skin 3 (three) times daily with meals. 6 mL 4    insulin detemir U-100 (LEVEMIR FLEXTOUCH) 100 unit/mL (3 mL) SubQ InPn pen Inject 8 Units into the skin every evening. 3 mL 4     No current facility-administered medications for this visit.        Review of patient's allergies indicates:   Allergen Reactions    Lisinopril Other (See Comments)     acei cough      Cefazolin Nausea And Vomiting       Review of Systems   Constitutional: Positive for malaise/fatigue and weight loss.   HENT: Negative for congestion.    Eyes: Negative for blurred vision.   Respiratory: Positive for shortness of breath.    Cardiovascular: Positive for orthopnea and leg swelling.   Gastrointestinal: Negative for abdominal pain.   Musculoskeletal: Negative for myalgias.   Neurological: Negative for focal weakness.   Endo/Heme/Allergies: Does not bruise/bleed easily.   Psychiatric/Behavioral: Negative for substance abuse.       Vitals:    09/23/20 0750   BP: 108/74   Pulse: 88   SpO2: 98%   Weight: 65 kg (143 lb 4.8 oz)   Height: 5' 11" (1.803 m)       Physical Exam  Vitals signs and nursing note reviewed.   Constitutional:       Appearance: He is ill-appearing.   Eyes:      General: Scleral icterus present.   Cardiovascular:      Rate and Rhythm: Normal rate.   Pulmonary:      Effort: Pulmonary effort is normal. No respiratory distress.   Abdominal:      General: There is distension.      Tenderness: There is no abdominal tenderness.      Hernia: No hernia is present.   Skin:     Coloration: Skin is jaundiced.   Neurological:      " Mental Status: He is alert.         LABS: I personally reviewed pertinent laboratory findings.    Lab Results   Component Value Date    ALT 18 09/21/2020    AST 18 09/21/2020    GGT 84 (H) 06/08/2020    ALKPHOS 206 (H) 09/21/2020    BILITOT 1.1 (H) 09/21/2020       Lab Results   Component Value Date    WBC 3.98 08/17/2020    HGB 9.5 (L) 08/17/2020    HCT 32.4 (L) 08/17/2020    MCV 97 08/17/2020     (L) 08/17/2020       Lab Results   Component Value Date     09/21/2020    K 4.4 09/21/2020     09/21/2020    CO2 20 (L) 09/21/2020    BUN 60 (H) 09/21/2020    CREATININE 2.5 (H) 09/21/2020    CALCIUM 8.4 (L) 09/21/2020    ANIONGAP 14 09/21/2020    ESTGFRAFRICA 32.0 (A) 09/21/2020    EGFRNONAA 27.7 (A) 09/21/2020       Lab Results   Component Value Date    HEPAIGM Negative 06/08/2020    HEPBIGM Negative 06/08/2020    HEPBCAB Negative 06/09/2020    HEPCAB Negative 06/08/2020       Lab Results   Component Value Date    SMOOTHMUSCAB Positive 1:40 (A) 06/09/2020       I personally reviewed all recent lab results.  I personally reviewed imaging studies.    Assessment:  55 y.o. male presenting with     1. Abnormal liver ultrasound    2. Hyperbilirubinemia      Patient has CHF w/ EF 20% and moderate pulmonary HTN.    VCTE fibroscan today - with increased liver stiffness measurements 17.5 kPa - unclear etiology: stiffness secondary to hepatic congestion vs cirrhosis.  His ascitic fluid analysis was not c/w portal hypertension.  I think that his nodular-appearing liver is secondary to chronic hepatic congestion.  He is on dobutamine. He is being consdiered for advanced heart failure treatment options.    Recommendation(s):  - will obtain Transjugular liver biopsy with hepatic venous pressuremeasurements to rule out cirrhosis and portal hypertension  - risk/benefit of TJ biopsy explained. He will need to hold anti-platelets for 5 days prior to the procedure      A total of 45 minutes were spent face-to-face with  the patient during this encounter and over half of that time was spent on counseling and coordination of care.  We discussed in depth the nature of the patient's liver disease, the management plan in details. I also educated the patient about lifestyle modifications which may improve hepatic steatosis, overweight/obesity, insulin resistance and high blood pressure issues. I have provided the patient with an opportunity to ask questions and have all questions answered to his satisfaction.     I have sent communication to the referring physician and/or primary care provider.    Rebecca Monahan MD  Staff Physician  Hepatology and Liver Transplant  [facility]  [facility] [place]

## 2020-09-23 NOTE — TELEPHONE ENCOUNTER
2nd call placed to the patient called [phone] h/m #. No Answer.  Call placed to JOSE Sanchez [phone]. She stated he was resting. She got patient on the phone.  Spoke with the patient. Informed that his date was approved by IR to do the TJ Liver Biopsy on Wed 10/7/20 at 7 am.  You will stop taking the ASA 5 days before the Biopsy. Stop on 10/2/20 and you can resume on 10/8/20.  Voiced understanding. All instructions and appt letter placed in the mail.  Appt confirmed with AMEE/Natacha.

## 2020-09-23 NOTE — TELEPHONE ENCOUNTER
Received message from AMEE/Natacha.  Patient can do the TJ Liver Biopsy on Wed 10/7/20 at 7 am. Must be off of the ASA for 5 days before the Biopsy.    Patient called [phone] h/m number. No Answer.  Left VM message. This is Aparna we discussed the Liver Biopsy in Clinic. Your date was OK'd to do the Liver Biopsy on Wed 10/7/20. Check in Time is 7 am.  Stop taking ASA on Fri 10/2/20. Your may restart on Thurs 10/8/20.  Please call me back at the clinic, Dr Monahan's Ofc when you receive this message at [phone] ask for Aparna.

## 2020-09-23 NOTE — PROCEDURES
FibroScan (Vibration Controlled Transient Elastography)    Date/Time: 9/23/2020 8:30 AM  Performed by: Rebecca Monahan MD  Authorized by: Rebecca Monahan MD     Probe:  M    Universal Protocol: Patient's identity, procedure and site were verified, confirmatory pause was performed.  Discussed procedure including risks and potential complications.  Questions answered.  Patient verbalizes understanding and wishes to proceed with VCTE.     Procedure: After providing explanations of the procedure, patient was placed in the supine position with right arm in maximum abduction to allow optimal exposure of right lateral abdomen.  Patient was briefly assessed, Testing was performed in the mid-axillary location, 50Hz Shear Wave pulses were applied and the resulting Shear Wave and Propagation Speed detected with a 3.5 MHz ultrasonic signal, using the FibroScan probe, Skin to liver capsule distance and liver parenchyma were accessed during the entire examination with the FibroScan probe, Patient was instructed to breathe normally and to abstain from sudden movements during the procedure, allowing for random measurements of liver stiffness. At least 10 Shear Waves were produced, Individual measurements of each Shear Wave were calculated.  Patient tolerated the procedure well with no complications.  Meets discharge criteria as was dismissed.  Rates pain 0 out of 10.  Patient will follow up with ordering provider to review results.      Findings  Median liver stiffness score:  17.5  CAP Reading: dB/m:  100    IQR/med %:  12  Interpretation  Fibrosis interpretation is based on medial liver stiffness - Kilopascal (kPa).    Fibrosis Stage:  F4  Steatosis interpretation is based on controlled attenuation parameter - (dB/m).    Steatosis Grade:  <S1

## 2020-10-01 NOTE — TELEPHONE ENCOUNTER
Called to schedule CBC and BNP for in 2 weeks and to schedule follow up appointment in clinic. No answer. Left message along with contact information to please call back to schedule.

## 2020-10-07 NOTE — PROGRESS NOTES
Pt arrived to [facility] via stretcher on RA, pt alert, right IJ dressing CDI, no bleeding, no hematoma noted, pt denies any pain or SOB, report received from [facility] SWAIN, pt is to recover for 2hrs then dc home per MD @ 2206

## 2020-10-07 NOTE — H&P
"Radiology History & Physical      SUBJECTIVE:     Chief Complaint: abnormal liver enzymes    History of Present Illness:  Ashish Mckeon is a 55 y.o. male who presents for transjugular liver biopsy    Past Medical History:   Diagnosis Date    CHF (congestive heart failure)     Diabetes mellitus type II     Essential hypertension, benign     Hyperlipidemia     Hypothyroidism     Pain and swelling of left wrist 8/5/2019    Ashish Mckeon is a 54 y.o. male with PMH of  presenting with CHF, IDDM, Hypothyroidism, HTN, HLD presenting with worsening L wrist pain for 1 day. Orthopedic surgery was consulted for septic joint r/o. Pt has been afebrile and has no elevated WBC. ESR 36, normal CRP. Xray shows no signs of trauma and pt has no hx of gout or septic arthritis. Due to atraumatic wrist pain and swelling w/out identifia    Stage 3 chronic kidney disease 6/9/2020    Undiagnosed cardiac murmurs      Past Surgical History:   Procedure Laterality Date    COLON SURGERY      "lower intestines removed"    ORTHOPEDIC SURGERY Left     wrist    RIGHT HEART CATHETERIZATION Right 6/17/2020    Procedure: INSERTION, CATHETER, RIGHT HEART;  Surgeon: Kimberly Rodgers MD;  Location: [facility] CATH LAB;  Service: Cardiology;  Laterality: Right;    THYROID SURGERY         Home Meds:   Prior to Admission medications    Medication Sig Start Date End Date Taking? Authorizing Provider   acetaminophen (TYLENOL) 325 MG tablet Take 2 tablets (650 mg total) by mouth every 6 to 8 hours as needed.  Patient taking differently: Take 325 mg by mouth daily as needed for Pain.  7/21/18   Kristi Russell NP   aspirin 81 MG Chew Take 1 tablet (81 mg total) by mouth once daily. 12/1/18 8/18/20  Milton Lopez MD   atorvastatin (LIPITOR) 40 MG tablet Take 40 mg by mouth once daily.    Historical Provider   blood sugar diagnostic Strp Use to test blood glucose four times daily before meals and nightly. 6/19/20   Sha Contreras MD   blood-glucose " "meter Misc Use as instructed 6/19/20   Sha Contreras MD   dextrose 5 % infusion  6/18/20   Historical Provider   DOBUTamine (DOBUTREX) 250 mg/20 mL (12.5 mg/mL) injection  6/18/20   Historical Provider   ferrous sulfate 325 (65 FE) MG EC tablet Take 1 tablet (325 mg total) by mouth every other day. 3/3/20   Damion Whitman MD   hydrALAZINE (APRESOLINE) 50 MG tablet Take 1.5 tablets (75 mg total) by mouth 3 (three) times daily. 6/19/20 6/19/21  Sha Contreras MD   HYDROcodone-acetaminophen (NORCO) 5-325 mg per tablet Take 1 tablet by mouth every 6 (six) hours as needed for Pain. 9/21/19   Jake Reinoso MD   insulin aspart U-100 (NOVOLOG) 100 unit/mL (3 mL) InPn pen Inject 4 Units into the skin 3 (three) times daily with meals. 6/19/20 8/18/20  Sha Contreras MD   insulin detemir U-100 (LEVEMIR FLEXTOUCH) 100 unit/mL (3 mL) SubQ InPn pen Inject 8 Units into the skin every evening. 6/19/20 8/18/20  Sha Contreras MD   isosorbide dinitrate (ISORDIL) 20 MG tablet Take 2 tablets (40 mg total) by mouth 3 (three) times daily. 6/19/20 6/19/21  Sha Contreras MD   lancets 30 gauge Misc Use to test blood glucose four times daily before meals and nightly. 6/19/20   Sha Cnotreras MD   lancing device Misc Use to test blood glucose four times daily before meals and nightly. 6/19/20   Sha Contreras MD   levothyroxine (SYNTHROID) 175 MCG tablet Take 1 tablet (175 mcg total) by mouth before breakfast. 12/24/18   Yoselin Siddiqi MD   pen needle, diabetic 32 gauge x 5/32" Ndle Use to inject insulin four times daily before meals and nightly. 6/19/20   Sha Contreras MD   spironolactone (ALDACTONE) 25 MG tablet Take 1 tablet (25 mg total) by mouth once daily. 9/6/20 9/6/21  Saul Alcantar MD   torsemide (DEMADEX) 100 MG Tab Take 1 tablet (100 mg total) by mouth once daily. 8/5/20 8/5/21  Saul Alcantar MD     Anticoagulants/Antiplatelets: aspirin    Allergies:   Review of patient's allergies indicates:   Allergen Reactions    " Lisinopril Other (See Comments)     acei cough      Cefazolin Nausea And Vomiting     Sedation History:  have not been any systemic reactions    Review of Systems:   Hematological: no known coagulopathies  Respiratory: no shortness of breath  Cardiovascular: no chest pain  Gastrointestinal: no abdominal pain  Genito-Urinary: no dysuria  Musculoskeletal: negative  Neurological: no TIA or stroke symptoms         OBJECTIVE:     Vital Signs (Most Recent)       Physical Exam:  ASA: 3  Mallampati: 3    General: no acute distress  Mental Status: alert and oriented to person, place and time  HEENT: normocephalic, atraumatic  Chest: unlabored breathing  Heart: regular heart rate  Abdomen: nondistended  Extremity: moves all extremities    Laboratory  Lab Results   Component Value Date    INR 1.2 10/07/2020       Lab Results   Component Value Date    WBC 4.77 10/05/2020    HGB 11.5 (L) 10/05/2020    HCT 36.5 (L) 10/05/2020    MCV 85 10/05/2020     (L) 10/05/2020      Lab Results   Component Value Date     (H) 10/05/2020     10/05/2020    K 3.6 10/05/2020    CL 99 10/05/2020    CO2 22 (L) 10/05/2020    BUN 60 (H) 10/05/2020    CREATININE 2.4 (H) 10/05/2020    CALCIUM 8.7 10/05/2020    MG 2.0 10/05/2020    ALT 13 10/05/2020    AST 18 10/05/2020    ALBUMIN 3.7 10/05/2020    BILITOT 1.3 (H) 10/05/2020    BILIDIR 1.5 (H) 12/23/2018       ASSESSMENT/PLAN:     Sedation Plan: up to moderate  Patient will undergo transjugular liver biopsy.    Corey Hoskins MD  PGY-II, Radiology

## 2020-10-07 NOTE — PLAN OF CARE
Pt rec'd to IR procedure room 189 for transjugular liver biopsy, dobutamine home infusion in progress to port, allergies reviewed, preparing for procedure

## 2020-10-07 NOTE — PROGRESS NOTES
Procedure complete, pt tolerated well and without event, Rt neck dressing is clean, dry, intact, and without bleeding or hematoma, awaiting [facility] availability, will start recovery in procedure room 189

## 2020-10-07 NOTE — PROGRESS NOTES
Pt escorted to [facility] for 2 hour recovery per Dr. Pham , bedside handoff provided to FELICIANO Acosta RN, family updated per telephone

## 2020-10-07 NOTE — PROCEDURES
"  Pre Op Diagnosis: liver dysfunction  Post Op Diagnosis: Same    Procedure: Xjug    Procedure performed by: Ankit    Written Informed Consent Obtained: Yes  Specimen Removed: YES 3 cores  Estimated Blood Loss: Minimal    Findings:   Successful Xjug with 13 cores removed.    Pressures:  RA:22mmHG  Pre: 16  Wedge: 20  Pressure believed to be secondary to shunting and heart dysfunction    Patient tolerated procedure well.    Adolfo Pham MD ([facility])  Interventional Radiology  [phone]        "

## 2020-10-07 NOTE — DISCHARGE INSTRUCTIONS
"For scheduling: Call Terri at [phone]      After hours and weekends: Call [phone] and ask for "Radiology Resident on Call'    For immediate concerns that are not emergent, you may call our radiology clinic at [phone]  "

## 2020-10-07 NOTE — NURSING
Pt received dc instructions, piv dcd, right IJ dressing CDI, no bleeding, no hematoma noted, pt denies any pain or SOB, pt left via WC with escort in NAD to meet his wife

## 2020-10-07 NOTE — DISCHARGE SUMMARY
"Radiology Discharge Summary      Hospital Course: No complications    Admit Date: 10/7/2020  Discharge Date: 10/7/2020     Instructions Given to Patient: Yes  Diet: Resume prior diet  Activity: activity as tolerated and no driving for today    Description of Condition on Discharge: Stable  Vital Signs (Most Recent): Pulse: 79 (10/07/20 1115)  Resp: 14 (10/07/20 1115)  BP: 121/82 (10/07/20 1115)  SpO2: 99 % (10/07/20 1115)    Discharge Disposition: Home    Discharge Diagnosis: liver dysfunction     Follow-up: as scheduled    Adolfo Pham MD ([place])  Interventional Radiology  [phone]        "

## 2020-10-12 NOTE — TELEPHONE ENCOUNTER
Called the patient and reviewed result of TJ Liver biopsy.    HVP mm Hg - no evidence of portal hypertension.  Liver biopsy: sinusoidal dilation - suggestive of congestive heart failure, stage 1 fibrosis (out of 4).     Patient was reassured that he does not have significant liver scarring. He will follow up with Heart Failure team.    No contraindication for advanced heart procedures or OHT from liver perspective.    I will send message to Dr Butler.

## 2020-10-15 NOTE — TELEPHONE ENCOUNTER
Spoke to patient. Confirmed appointment as scheduled in [facility] tomorrow 10/16/20. Informed social work visit to be added to appointment and asked to bring caregiver. Informed patient of importance of answering/ returning calls. Patient apologetic and pleasant and states will do so.Patient verbalized understanding. Call PRN.

## 2020-10-15 NOTE — PROGRESS NOTES
Message from Dr Monahan (Hepatology) to Dr Butler.    From: Rebecca Monahan MD   Sent: 10/12/2020  10:38 AM CDT   To: Ruben Butler Jr., MD     ----- Message from Rebecca Monahan MD sent at 10/12/2020 10:38 AM CDT -----   Hi Dr Butler,   This patient's hepatic venous pressure gradient is 4 mmHg, no evidence of portal hypertension. His liver biopsy showed stage 1 fibrosis and congestive hepatopathy. No contraindication for advanced heart procedures or OHT from liver perspective.

## 2020-10-16 NOTE — PROGRESS NOTES
"Subjective:   Transplant status: active    HPI:  Mr. Mckeon is a very pleasant 56 y/o M with stage D HFrEF 2/2 NICM, HTN, HLD, DM2 who was recently  admitted for ADHF last August (4th admission this year) on home . Comes today for a F/U. Reports feeling well. NYHA class II symptoms, No PND or orthopnea. His HF regimen includes;  Hydralazine 50 mg TID/Isordil 20 mg TID, Torsemide 100 mg daily , aldactone 25 mg daily and  5 mcg/Kg/min. His creatinine has been ranging from 2-3-2.8 lately. Of note, he recently underwent a liver biopsy with no evidence of cirrhosis. Hepatology has cleared him for advanced HF options.    Past Medical History:   Diagnosis Date    CHF (congestive heart failure)     Diabetes mellitus type II     Essential hypertension, benign     Hyperlipidemia     Hypothyroidism     Pain and swelling of left wrist 8/5/2019    Ashish Mckeon is a 54 y.o. male with PMH of  presenting with CHF, IDDM, Hypothyroidism, HTN, HLD presenting with worsening L wrist pain for 1 day. Orthopedic surgery was consulted for septic joint r/o. Pt has been afebrile and has no elevated WBC. ESR 36, normal CRP. Xray shows no signs of trauma and pt has no hx of gout or septic arthritis. Due to atraumatic wrist pain and swelling w/out identifia    Stage 3 chronic kidney disease 6/9/2020    Undiagnosed cardiac murmurs      Past Surgical History:   Procedure Laterality Date    COLON SURGERY      "lower intestines removed"    ORTHOPEDIC SURGERY Left     wrist    RIGHT HEART CATHETERIZATION Right 6/17/2020    Procedure: INSERTION, CATHETER, RIGHT HEART;  Surgeon: Kimberly Rodgers MD;  [facility] LAB;  Service: Cardiology;  Laterality: Right;    THYROID SURGERY         Review of Systems   Constitution: Negative. Negative for chills, decreased appetite, diaphoresis, fever, malaise/fatigue, night sweats, weight gain and weight loss.   Eyes: Negative.    Cardiovascular: Positive for dyspnea on exertion, orthopnea and " "paroxysmal nocturnal dyspnea. Negative for chest pain, claudication, cyanosis, irregular heartbeat, leg swelling, near-syncope, palpitations and syncope.   Respiratory: Negative for cough, hemoptysis and shortness of breath.    Endocrine: Negative.    Hematologic/Lymphatic: Negative.    Skin: Negative for color change, dry skin and nail changes.   Musculoskeletal: Negative.    Gastrointestinal: Negative.    Genitourinary: Negative.    Neurological: Negative for weakness.       Objective:   Blood pressure 104/76, pulse 91, height 5' 11" (1.803 m), weight 66.5 kg (146 lb 9.7 oz).body mass index is 20.45 kg/m².  Physical Exam   Constitutional: He appears well-developed.   /76 (BP Location: Left arm, Patient Position: Sitting, BP Method: Medium (Automatic))   Pulse 91   Ht 5' 11" (1.803 m)   Wt 66.5 kg (146 lb 9.7 oz)   BMI 20.45 kg/m²      HENT:   Head: Normocephalic.   Neck: JVD present. Carotid bruit is not present.   Cardiovascular: Regular rhythm and normal pulses. PMI is displaced. Exam reveals gallop.   No murmur heard.  prominent V wave to angle of jaw (chronic)   Pulmonary/Chest: Effort normal and breath sounds normal. No respiratory distress. He has no wheezes. He has no rales.   Abdominal: Soft. Bowel sounds are normal. He exhibits no distension. There is no abdominal tenderness. There is no rebound.   Musculoskeletal:         General: Edema present.   Neurological: He is alert.   Skin: Skin is warm.   Vitals reviewed.      Labs:    Chemistry        Component Value Date/Time     10/16/2020 1253    K 4.1 10/16/2020 1253     10/16/2020 1253    CO2 24 10/16/2020 1253    BUN 58 (H) 10/16/2020 1253    CREATININE 2.9 (H) 10/16/2020 1253     (H) 10/16/2020 1253        Component Value Date/Time    CALCIUM 8.7 10/16/2020 1253    ALKPHOS 198 (H) 10/16/2020 1253    AST 29 10/16/2020 1253    ALT 25 10/16/2020 1253    BILITOT 1.3 (H) 10/16/2020 1253    ESTGFRAFRICA 26.7 (A) 10/16/2020 1253    " EGFRNONAA 23.1 (A) 10/16/2020 1253          Magnesium   Date Value Ref Range Status   10/05/2020 2.0 1.6 - 2.6 mg/dL Final     Lab Results   Component Value Date    WBC 4.78 10/16/2020    HGB 11.7 (L) 10/16/2020    HCT 37.6 (L) 10/16/2020     (L) 10/16/2020     Lab Results   Component Value Date    INR 1.2 10/07/2020    INR 1.2 08/03/2020    INR 1.3 (H) 06/10/2020     BNP   Date Value Ref Range Status   10/16/2020 1,953 (H) 0 - 99 pg/mL Final     Comment:     Values of less than 100 pg/ml are consistent with non-CHF populations.   10/14/2020 1,505 (H) 0 - 99 pg/mL Final     Comment:     Values of less than 100 pg/ml are consistent with non-CHF populations.   10/05/2020 1,636 (H) 0 - 99 pg/mL Final     Comment:     Values of less than 100 pg/ml are consistent with non-CHF populations.     LD   Date Value Ref Range Status   09/05/2013 4,914 (H) 110 - 260 U/L Final     Comment:     Results are increased in hemolyzed samples.     Assessment:      1. Chronic combined systolic and diastolic heart failure    2. Nonischemic cardiomyopathy    3. NSVT (nonsustained ventricular tachycardia)        Plan:   Patient states he is doing well on home  and reports NYHA class II symptoms but appears with significant volume overload on exam.  Will give Lasix 80 mg IV in clinic and repeat labs with home health next week.    I am worried about his renal function (today is 2.8). Also his LV is normal size by Echo (LVEDD-5.6 cm) back in June. I recommend to repeat his Echo. He could be candidate for MCS if his LV size is >6 cm and if his renal function improves (which I think it could improve if we admit him and add more support ie. IABP)  He did meet with our  today and I am told that he is currently not a candidate for Tx as he does not have a secondary caregiver.  Continue current HF regimen.   Recommend 2 gram sodium restriction and 1500cc fluid restriction.  Encourage physical activity with graded exercise  program.  Requested patient to weigh themselves daily, and to notify us if their weight increases by more than 3 lbs in 1 day or 5 lbs in 1 week.   RTC in 1 month.      Gustavo Gardner MD

## 2020-10-16 NOTE — PROGRESS NOTES
Left Ventricular Assist Device (LVAD) and Transplant Recipient Adult Psychosocial Assessment    Encounter Date: 10/16/2020    Ashish Mckeon  [address] [address]  Telephone Information:   Mobile [phone]      met with Pt & spouse Laura Mckeon in clinic this date.    Sex: male  YOB: 1965  Age: 55 y.o.    [place] Citizen: yes  Primary Language: English   Needed: no    Emergency Contact:  Name: Laura Mckeon  Relationship: spouse  Address: [address]  Phone Numbers: [phone] (mobile)    Family/Social Support:   Number of dependents/: None  Marital history: Pt has been  x2,  x1.   Other family dynamics: Pt's father  from cancer, his mother  from illness. Pt has 3 brothers and 4 sisters, all of whom are local but none of whom are involved in his health care. Pt has 2 sons Ashish (age 25) from his first marriage and Ok (age 28) from a different mother.     Household Composition: Pt lives with spouse. No other residents in home.    Do you and your caregivers have access to reliable transportation? yes  PRIMARY CAREGIVER: Laura Mckeon will be primary caregiver, phone number [phone].      provided in-depth information to Patient and Caregiver regarding  regarding pre- and post-LVAD and pre- and post-transplant caregiver role.   strongly encourages Patient and Caregiver to have concrete plan regarding post-transplant care giving, including back-up caregiver(s) to ensure care giving needs are met as needed.    Caregiver states understanding all aspects of caregiver role/commitment and is able/willing/committed to being caregiver to the fullest extent necessary. .      Patient and Caregiver verbalizes understanding of the education provided today and caregiver responsibilities.       remains available. Patient and Caregiver agree to contact  in a timely manner if concerns arise.      Able to  take time off work without financial concerns: Caregiver does not work. - Pt's spouse was employed at [employer] for many years but stopped working Summer 2020 in order to stay home and take care of Pt.    Additional Significant Others who will Assist with LVAD/Transplant:    Pt and spouse unable to identify backup caregiver(s) at this time. Pt's spouse advises that they have a neighbor who is a nurse who may be willing to serve in role, but they have not asked her and do not have her contact information. SW encouraged Pt & spouse to discuss backup caregiving with family/friends, advising purpose of backup caregiving plan is having someone who can be called to quickly assist if Primary caregiver needs to step away (ie for illness or errands). Pt's spouse inquired if caregiver will be paid - SW explained that insurance does not offer reimbursement for caregiving as there is no skilled need, and that LVAD dressing change is taught to family member/friends for home maintenance. Pt & spouse v/u and will be speaking to neighbor/friends to determine plan. SW asked Pt to call clinic with backup caregiver's contact information for verification & education once plan is in place.    Advance Care Planning     Living Will: no  Healthcare Power of : no  Advance Directives on file: <<no information> per medical record.  Verbally reviewed LW/HCPA information. Provided education that Pt's spouse would be legal decision maker if Pt could not make his own decisions, and discussed utility of having secondary/terciary HCPA named, as well as how LW can guide care.  provided patient with copy of LW/HCPA documents and provided education on completion of forms.      Highest Education Level: High School (9-12) or GED - Pt completed 9th grade  Reading Ability: 9th grade  Reports difficulty with: seeing - Pt wears glasses to read. Otherwise no issues with sight, hearing, reading, writing, comprehension,  "learning, or memory.  Learns Best By:  Watching video and listening to instructions     Status: no  VA Benefits: no     Working for Income: No  If no, reason not working: Disability  Spouse/Significant Other Employment: Pt's spouse stopped working Summer 2020 to take care of Pt full time.    Disabled: yes: date disability began: May 2020, due to: CHF.    Monthly Income:  SS Disability: $1200  Able to afford all costs now and if transplanted or receives LVAD, including medications: Medicaid covers Pt's care  Pt reports secure power source? yes  Pt reports ability to afford monthly electric bill? no - Pt's spouse reports recent issues with paying for electric bill; they utilized assistance from a local organization to pay the bill. SW discussed the importance of secure power source for LVAD implant d/t devices reliance on electricity. Encouraged fundraising in order to have a financial "cushion" so that there are no concerns with paying electric bill.  Pt reports ability to afford LVAD dressing supplies? yes  Patient and Caregiver verbalizes understanding of personal responsibilities related to LVAD and transplant costs and the importance of having a financial plan to ensure that patients LVAD and transplant costs are fully covered.       provided fundraising information/education.  Patient and Caregiver verbalizes understanding.   remains available.    Insurance:   Payor/Plan Subscr  Sex Relation Sub. Ins. ID Effective Group Num   1. [place]* ASHER KAUFFMAN 1965 Male  [id number] 20 [id number]                                   [address]     Primary Insurance (for UNOS reporting): Public Insurance - Medicaid  Secondary Insurance (for UNOS reporting): None  Patient and Caregiver verbalizes clear understanding that patient may experience difficulty obtaining and/or be denied insurance coverage post-surgery. This includes and is not limited to disability insurance, life " insurance, health insurance, burial insurance, long term care insurance, and other insurances.      Patient and Caregiver also reports understanding that future health concerns   related to or unrelated to LVAD or transplantation may not be covered by patient's insurance.  Resources and information provided and reviewed.  Encouraged Pt to engage in Fundraising and provided education on setting up a fundraising account.    Patient and Caregiver provides verbal permission to release any necessary information to outside resources for patient care and discharge planning.  Resources and information provided are reviewed.      Dialysis History: N/A    Infusion Service: patient utilizing? yes - Pt is current with [facility] ([phone]  F: [phone]) for home .  Home Health: patient utilizing? yes - [email or web address] ([phone]) - Pt is with [email or web address].  DME: yes - Pt has BP Cuff, Scale, and a walker (which he does not use)  Pulmonary/Cardiac Rehab: Pt attended [facility] ~2 years ago.  ADLS:  Pt report he is independent and drives, though spouse reports that she does most of the driving now.    Adherence:   Pt reports a high level of adherence to medical regimen. Pt endorses seldomly missing a dose of medication (he usually remembers later in the day if he forgets a dose. Pt denies missing scheduled medical appointments. Pt & spouse endorse following a low sodium diet. Adherence education and counseling provided.     Per History Section:  Past Medical History:   Diagnosis Date    CHF (congestive heart failure)     Diabetes mellitus type II     Essential hypertension, benign     Hyperlipidemia     Hypothyroidism     Pain and swelling of left wrist 8/5/2019    Ashish Mckeon is a 54 y.o. male with PMH of  presenting with CHF, IDDM, Hypothyroidism, HTN, HLD presenting with worsening L wrist pain for 1 day. Orthopedic surgery was consulted for septic joint r/o. Pt  "has been afebrile and has no elevated WBC. ESR 36, normal CRP. Xray shows no signs of trauma and pt has no hx of gout or septic arthritis. Due to atraumatic wrist pain and swelling w/out identifia    Stage 3 chronic kidney disease 6/9/2020    Undiagnosed cardiac murmurs      Social History     Tobacco Use    Smoking status: Never Smoker    Smokeless tobacco: Never Used    Tobacco comment: cigars "every other week or so"   Substance Use Topics    Alcohol use: Not Currently     Alcohol/week: 3.0 standard drinks     Types: 3 Cans of beer per week     Social History     Substance and Sexual Activity   Drug Use Not Currently    Types: Marijuana     Social History     Substance and Sexual Activity   Sexual Activity Yes    Partners: Female       Per Today's Psychosocial:  Tobacco: none, patient denies any use.  Alcohol: Pt endorses enjoying perhaps 1 glass of wine/week, but notes that d/t his health this has become much rarer.  Illicit Drugs/Non-prescribed Medications: none, patient denies any use.    Patient and Caregiver states clear understanding of the potential impact of substance use as it relates to LVAD and transplant candidacy and is aware of possible random substance screening.  Substance abstinence/cessation counseling, education and resources provided and reviewed.     Arrests/DWI/Treatment/Rehab: patient denies    Psychiatric History:    Mental Health: Patient denies any mental health concerns at this time.  Psychiatrist/Counselor: Pt denies having utilized counseling currently or in the past  Medications:  Pt denies  Suicide/Homicide Issues: Pt denies SI/HI   Safety at home: Pt endorses feeling safe at home    Knowledge: Patient and Caregiver states having clear understanding and realistic expectations regarding the potential risks and potential benefits LVAD implantation and organ transplantation and organ donation and agrees to discuss with health care team members and support system members, as " well as to utilize available resources and express questions and/or concerns in order to further facilitate the pt informed decision-making.  Resources and information provided and reviewed.     Patient and Caregiver is aware of [facility]'s affiliation and/or partnership with agencies in home health care, LTAC, SNF, [facility], and other hospitals and clinics.    Understanding: Patient and Caregiver reports having a clear understanding of the many lifetime commitments involved with being an LVAD and transplant recipient, including costs, compliance, medications, lab work, procedures, appointments, concrete and financial planning, preparedness, timely and appropriate communication of concerns, abstinence (ETOH, tobacco, illicit non-prescribed drugs), adherence to all health care team recommendations, support system and caregiver involvement, appropriate and timely resource utilization and follow-through, mental health counseling as needed/recommended, and patient and caregiver responsibilities.  Social Service Handbook, resources and detailed educational information provided and reviewed.  Educational information provided.    Patient and Caregiver also reports current and expected compliance with health care regime and states having a clear understanding of the importance of compliance.       Patient and Caregiver reports a clear understanding that risks and benefits may be involved with LVAD heart failure treatment and organ transplantation and with organ donation.     Patient and Caregiver also reports clear understanding that psychosocial risk factors may affect patient, and include but are not limited to feelings of depression, generalized anxiety, anxiety regarding dependence on others, post traumatic stress disorder, feelings of guilt and other emotional and/or mental concerns, and/or exacerbation of existing mental health concerns.  Detailed resources provided and discussed.      Patient and Caregiver agrees to access  "appropriate resources in a timely manner as needed and/or as recommended, and to communicate concerns appropriately.     Patient and Caregiver also reports a clear understanding of treatment options available.      Feelings or Concerns: At this time, Pt voices hesitation about agreeing to LVAD implantation. Pt is unsure if LVAD will fit his lifestyle, and voices preference to "go straight to transplant." SW explored various scenarios with Pt r/t LVAD & OHT surgeries. Encouraged Pt to speak to providers about his preference and to consider his options based on his health needs. SW also advised that meeting with a current LVAD patient can be arranged if Pt so chooses. SW encouraged Pt to participate in workup so that he has a clear understanding of his options, and then decide which therapy is best for him having all information at his disposal. Pt v/u.    Coping: Identify Patient & Caregiver Strategies to [place]:   1. Currently & Pre-transplant - Watching tv, positive attitude and politeness.    2. At the time of surgery - Watching tv & family support   3. During post-Transplant & Recovery Period - Family support, patience.    Goals: Patient would like to return to "normal life." He enjoys fishing and would like to go on fishing trips again.    Interview Behavior: Patient and Caregiver (Spouse Laura Mckeon) presents as alert and oriented x 4, pleasant, good eye contact, well groomed, recall good, concentration/judgement good, calm, communicative, cooperative and asking and answering questions appropriately.          Transplant Social Work - Candidacy  Assessment/Plan:     Psychosocial Suitability: Patient presents as a suitable candidate for LVAD at this time. Pt presents as an unsuitable candidate for transplant at this time. Based on psychosocial risk factors, patient presents as high risk, due to no backup caregiving plan, limited income, current concerns with affording electric bill. Protective factors include " concrete primary caregiving plan, adequate coping skills, high level of compliance with healthcare regimen (Pt has been successful on home ).     Recommendations/Additional Comments: Patient to identify a backup caregiving plan and notify  of person(s) chosen so that education can be provided. If Pt is willing, Pt should have the opportunity to meet with a current LVAD patient in order to ask questions. Discussed finances and fundraising at length today - Pt should consider fundraising to have financial security for paying for electric bill as well as costs not covered by insurance. Pt has utilized local resources to assist with utilities in the past & is willing to utilize resources as available.     Sparkle [place], LORIN

## 2020-10-28 NOTE — PROGRESS NOTES
Called patient to confirm appointments as scheduled 11/16/20. No answer. Left message along with contact information to please call back and confirm/ reschedule appointments. Call PRN. Appointment letter placed in mail.

## 2020-11-05 PROBLEM — I50.23 ACUTE ON CHRONIC SYSTOLIC HEART FAILURE: Status: ACTIVE | Noted: 2020-01-01

## 2020-11-05 PROBLEM — R74.8 ELEVATED LIVER ENZYMES: Status: ACTIVE | Noted: 2020-01-01

## 2020-11-05 NOTE — TELEPHONE ENCOUNTER
Spoke to patient. Reports feeling well. States no swelling other than stomach and he believes it is from eating a lot. No complaint of shortness of breath. Advised patient to go to ED. Patient states will have wife drop him off today. Dr Gardner aware.  Per Dr Gardner, patient not to be worked up for OHT/ VAD evaluation at this time.

## 2020-11-05 NOTE — ED PROVIDER NOTES
Encounter Date: 11/5/2020       History     Chief Complaint   Patient presents with    Abnormal Lab     Pt sent to ED for abnormal liver enzymes.       HPI   Mr. Mckeon is a very pleasant 56 y/o M with stage D HFrEF 2/2 NICM, HTN, HLD, DM2 who presents for evaluation for abnormal liver enzymes and acute on chronic heart failure.  He is followed by heart transplant team with multiple admissions last year for heart failure exacerbations.  He is currently on home dobutamine infusions.  He was instructed to present to the ED for elevated creatinine, LFTs and worsening heart failure symptoms.  He reports intermittent non adherence to his medication including beta blocker.  He recently underwent a liver biopsy with no evidence of cirrhosis.  He denies any current chest pain, palpitations, shortness of breath and able to complete his ADLs.  Denies any orthopnea, or PND but does endorse some dyspnea on exertion.  Denies any falls, trauma, lightheadedness or dizziness at this time.  Denies any fevers or chills or any infectious etiology.  Denies any hematochezia, melena or hemoptysis.  He does endorse bilateral lower extremity swelling.    Review of patient's allergies indicates:   Allergen Reactions    Lisinopril Other (See Comments)     acei cough      Cefazolin Nausea And Vomiting     Past Medical History:   Diagnosis Date    CHF (congestive heart failure)     Diabetes mellitus type II     Essential hypertension, benign     Hyperlipidemia     Hypothyroidism     Pain and swelling of left wrist 8/5/2019    Ashish Mckeon is a 54 y.o. male with PMH of  presenting with CHF, IDDM, Hypothyroidism, HTN, HLD presenting with worsening L wrist pain for 1 day. Orthopedic surgery was consulted for septic joint r/o. Pt has been afebrile and has no elevated WBC. ESR 36, normal CRP. Xray shows no signs of trauma and pt has no hx of gout or septic arthritis. Due to atraumatic wrist pain and swelling w/out identifia    Stage 3  "chronic kidney disease 6/9/2020    Undiagnosed cardiac murmurs      Past Surgical History:   Procedure Laterality Date    COLON SURGERY      "lower intestines removed"    ORTHOPEDIC SURGERY Left     wrist    RIGHT HEART CATHETERIZATION Right 6/17/2020    Procedure: INSERTION, CATHETER, RIGHT HEART;  Surgeon: Kimberly Rodgers MD;  Location: [facility] CATH LAB;  Service: Cardiology;  Laterality: Right;    THYROID SURGERY       Family History   Problem Relation Age of Onset    Cancer Father      Social History     Tobacco Use    Smoking status: Never Smoker    Smokeless tobacco: Never Used    Tobacco comment: cigars "every other week or so"   Substance Use Topics    Alcohol use: Not Currently     Alcohol/week: 3.0 standard drinks     Types: 3 Cans of beer per week    Drug use: Not Currently     Types: Marijuana     Review of Systems  Constitutional: Negative for appetite change, chills, fatigue and fever.   HENT: Negative.    Eyes: Negative.    Respiratory: Negative for chest tightness, shortness of breath and wheezing.    Cardiovascular: Positive for leg swelling. Negative for chest pain and palpitations.   Gastrointestinal: Negative for abdominal distention and abdominal pain.   Endocrine: Negative for cold intolerance and heat intolerance.   Genitourinary: Negative.    Musculoskeletal: Negative.    Skin: Negative.    Neurological: Negative.    Hematological: Negative.    Psychiatric/Behavioral: Negative for agitation and sleep disturbance.   Physical Exam     Initial Vitals [11/05/20 1603]   BP Pulse Resp Temp SpO2   (!) 124/54 76 16 97.8 °F (36.6 °C) 98 %      MAP       --         Physical Exam  Gen/Constitutional: Interactive.  Chronically ill-appearing.  Head: Normocephalic, Atraumatic  Neck: supple, no masses or LAD, positive JVD  Eyes: PERRLA, conjunctiva clear  Ears, Nose and Throat: No rhinorrhea or stridor.  Cardiac:  Regular rate, Reg Rhythm, there is a holosystolic murmur.  Pulmonary: CTA Bilat, no " wheezes, rhonchi, rales.  Faint bibasilar crackles.  GI: Abdomen soft, non-tender, slightly distended abdomen.; no rebound or guarding  : No CVA tenderness.  Musculoskeletal: Extremities warm, well perfused, no erythema, 1+ bilateral lower extremity edema to the knees.  Skin: No rashes, skin is dry and cool to the extremities.  Neuro: Alert and Oriented x 3; No focal motor or sensory deficits.    Psych: Normal affect    ED Course   Critical Care    Date/Time: 11/5/2020 5:45 PM  Performed by: Jeremy Nuñez DO  Authorized by: Jeremy Nuñez DO   Direct patient critical care time: 15 minutes  Additional history critical care time: 15 minutes  Ordering / reviewing critical care time: 15 minutes  Documentation critical care time: 10 minutes  Consulting other physicians critical care time: 10 minutes  Total critical care time (exclusive of procedural time) : 65 minutes  Critical care was necessary to treat or prevent imminent or life-threatening deterioration of the following conditions: cardiac failure and renal failure.  Critical care was time spent personally by me on the following activities: blood draw for specimens, development of treatment plan with patient or surrogate, discussions with consultants, evaluation of patient's response to treatment, examination of patient, obtaining history from patient or surrogate, ordering and performing treatments and interventions, ordering and review of laboratory studies, pulse oximetry, re-evaluation of patient's condition and review of old charts.        Labs Reviewed   CBC W/ AUTO DIFFERENTIAL - Abnormal; Notable for the following components:       Result Value    RBC 3.87 (*)     Hemoglobin 9.4 (*)     Hematocrit 31.2 (*)     MCV 81 (*)     MCH 24.3 (*)     MCHC 30.1 (*)     RDW 17.2 (*)     Platelets 96 (*)     MPV 13.9 (*)     Lymph # 0.4 (*)     Lymph % 10.0 (*)     All other components within normal limits   COMPREHENSIVE METABOLIC PANEL - Abnormal; Notable for  the following components:    Sodium 133 (*)     CO2 16 (*)     Glucose 188 (*)     BUN 65 (*)     Creatinine 2.7 (*)     Calcium 8.0 (*)     Albumin 3.3 (*)     Total Bilirubin 1.3 (*)     Alkaline Phosphatase 176 (*)     eGFR if  29.1 (*)     eGFR if non  25.2 (*)     All other components within normal limits   B-TYPE NATRIURETIC PEPTIDE - Abnormal; Notable for the following components:    BNP 1,293 (*)     All other components within normal limits   TROPONIN I - Abnormal; Notable for the following components:    Troponin I 0.069 (*)     All other components within normal limits   MAGNESIUM   LACTIC ACID, PLASMA   SARS-COV-2 RNA AMPLIFICATION, QUAL   HEMOGLOBIN A1C     EKG Readings: (Independently Interpreted)   Initial Reading: No STEMI. Previous EKG: Compared with most recent EKG Rhythm: Normal Sinus Rhythm. Heart Rate: 83. Conduction: 1st Degree AV Block. Axis: Right Axis Deviation.     ECG Results          EKG 12-lead (Final result)  Result time 11/06/20 13:48:37    Final result by Interface, Lab In [facility] (11/06/20 13:48:37)                 Narrative:    Test Reason : R74.8,    Vent. Rate : 083 BPM     Atrial Rate : 083 BPM     P-R Int : 280 ms          QRS Dur : 100 ms      QT Int : 406 ms       P-R-T Axes : 059 147 019 degrees     QTc Int : 477 ms    Sinus rhythm with 1st degree A-V block  Low QRS voltage in limb leads  Right axis deviation  Incorrect electrode placement (likely misplaced V4 and V5)  Abnormal ECG  When compared with ECG of 02-AUG-2020 16:28,  Criteria for anterolateral infarct no longer present  Confirmed by Andrew Sandoval MD ([id number]) on 11/6/2020 1:48:21 PM    Referred By: [email or web address]   SELF           Confirmed By:Andrew Sandoval MD                            Imaging Results    None          Medical Decision Making:   History:   Old Medical Records: I decided to obtain old medical records.  Old Records Summarized: records from clinic visits.  Initial Assessment:    Mr. Mckeon is a very pleasant 56 y/o M with stage D HFrEF 2/2 NICM, HTN, HLD, DM2 who presents for evaluation for abnormal liver enzymes and acute on chronic heart failure.  Differential Diagnosis:   Heart failure exacerbation, KEYANNA, hepatitis, thrombocytopenia, ACS, lower extremity edema  Independently Interpreted Test(s):   I have ordered and independently interpreted EKG Reading(s) - see prior notes  Clinical Tests:   Lab Tests: Ordered and Reviewed  Medical Tests: Ordered and Reviewed  Other:   I have discussed this case with another health care provider.       <> Summary of the Discussion: Cardiology    Emergent evaluation of patient currently on home dobutamine for heart failure presenting with elevated kidney function and elevated LFT evaluation with worsening edema.  He is afebrile vital signs stable.  Physical exam findings remarkable for positive JVD, lower extremity edema and fine crackles.  Suspect likely acute on chronic systolic heart failure.  His last EF of 20% secondary nonischemic cardiomyopathy.  ECG obtained with no signs of ischemia or STEMI on my read.  Placed on cardiac and telemetry monitoring.  Labs were obtained.  Discussed case with Cardiology who will admit the patient to Heart failure Service with IV Lasix bolus 80 mg IV followed by Lasix 10 mg an hour drip.  Continue home dobutamine at 5 micrograms/kilogram per minute.  Plan for echocardiogram during this visit.  Patient agreeable to admission plan.  Do not suspect ACS, labs normal doubt hepatitis, recent biopsy of liver does not show evidence of cirrhosis.  He does have thrombocytopenia, likely acquired, with lower extremity edema and symptoms concerning for acute on chronic heart failure.  He continues to have acute kidney injury secondary to chronic kidney disease.  Will continue to monitor during diuresis.  Patient agreeable to admission plan.  Please see critical care note for critical care time, given need for aggressive diuresing,  pressor support for left ventricular failure.    Complexity:  Critical care                           Clinical Impression:     ICD-10-CM ICD-9-CM   1. Elevated liver enzymes  R74.8 790.5   2. Heart failure  I50.9 428.9   3. Edema  R60.9 782.3   4. Acute on chronic combined systolic and diastolic heart failure  I50.43 428.43   5. Acute kidney injury superimposed on CKD  N17.9 866.00    N18.9 585.9   6. Acquired hypothyroidism  E03.9 244.9   7. Thrombocytopenia  D69.6 287.5   8. Edema, unspecified type  R60.9 782.3   9. Other specified hypothyroidism  E03.8 244.8   10. Nipple pain  N64.4 611.71                      Disposition:   Disposition: Admitted  Condition: Serious     ED Disposition Condition    Admit              Jeremy Nuñez DO, FAAEM  Emergency Staff Physician   Dept of Emergency Medicine   [facility]  Spectralink: [id number]                 Jeremy Nuñez DO  11/07/20 1413

## 2020-11-05 NOTE — TELEPHONE ENCOUNTER
Called to check on patient, review lab and confirm appointments 11/16/20. No answer. Left message along with contact information to please call back.    11/2/20  Creatinine: 3.2  BNP: 1434  T Bili: 1.3    Will plan to call patient again later.

## 2020-11-06 PROBLEM — I50.43 ACUTE ON CHRONIC COMBINED SYSTOLIC AND DIASTOLIC HEART FAILURE: Status: ACTIVE | Noted: 2020-01-01

## 2020-11-06 NOTE — SUBJECTIVE & OBJECTIVE
** Interval History: Complains of left nipple soreness/swelling (on Aldactone PTA, currently on hold 2/2 KEYANNA), and edema RLE, otherwise, feels fine. Unable to get a good CVP waveform from RIJ tunneled line. Net -ve only 714 cc since admit on Lasix 10 mg/hr - will re bolus and increase to 20 mg/hr    Continuous Infusions:   DOBUTamine IV infusion (non-titrating) 5 mcg/kg/min (11/05/20 2231)    furosemide (LASIX) 10 mg/mL infusion (non-titrating) 10 mg/hr (11/05/20 2130)     Scheduled Meds:   aspirin  81 mg Oral Daily    atorvastatin  40 mg Oral Daily    ferrous sulfate  325 mg Oral Every other day    heparin (porcine)  5,000 Units Subcutaneous Q8H    hydrALAZINE  75 mg Oral TID    insulin detemir U-100  8 Units Subcutaneous QHS    isosorbide dinitrate  40 mg Oral TID    levothyroxine  175 mcg Oral Before breakfast    polyethylene glycol  17 g Oral Daily     PRN Meds:acetaminophen, dextrose 50%, dextrose 50%, insulin aspart U-100, sodium chloride 0.9%    Review of patient's allergies indicates:   Allergen Reactions    Lisinopril Other (See Comments)     acei cough      Cefazolin Nausea And Vomiting     Objective:     Vital Signs (Most Recent):  Temp: 97.4 °F (36.3 °C) (11/06/20 1011)  Pulse: 83 (11/06/20 1011)  Resp: 15 (11/06/20 1011)  BP: 114/78 (11/06/20 1011)  SpO2: 98 % (11/06/20 1011) Vital Signs (24h Range):  Temp:  [97.4 °F (36.3 °C)-97.8 °F (36.6 °C)] 97.4 °F (36.3 °C)  Pulse:  [76-89] 83  Resp:  [15-20] 15  SpO2:  [97 %-98 %] 98 %  BP: ()/(54-87) 114/78     Patient Vitals for the past 72 hrs (Last 3 readings):   Weight   11/06/20 0600 60.9 kg (134 lb 4.2 oz)   11/05/20 2100 63 kg (138 lb 14.2 oz)   11/05/20 1603 66.2 kg (146 lb)     Body mass index is 18.73 kg/m².      Intake/Output Summary (Last 24 hours) at 11/6/2020 1037  Last data filed at 11/6/2020 1027  Gross per 24 hour   Intake 285.92 ml   Output 1300 ml   Net -1014.08 ml       Hemodynamic Parameters:       Telemetry:  with  "NSVT    Physical Exam  Constitutional:       Appearance: Normal appearance.   HENT:      Head: Normocephalic and atraumatic.      Mouth/Throat:      Mouth: Mucous membranes are moist.      Pharynx: Oropharynx is clear.   Eyes:      Extraocular Movements: Extraocular movements intact.      Conjunctiva/sclera: Conjunctivae normal.      Pupils: Pupils are equal, round, and reactive to light.   Neck:      Musculoskeletal: Normal range of motion and neck supple.      Comments: + JVD, "v" waves radiating up to neck  Cardiovascular:      Rate and Rhythm: Normal rate and regular rhythm.      Comments: Grade III/VI systolic murmur at apex, + S3  Pulmonary:      Effort: Pulmonary effort is normal.      Breath sounds: Normal breath sounds.   Abdominal:      General: Bowel sounds are normal.      Palpations: Abdomen is soft.   Musculoskeletal: Normal range of motion.      Comments: 1-2+ edema RLE, no edema LLE   Skin:     Capillary Refill: Capillary refill takes 2 to 3 seconds.   Neurological:      General: No focal deficit present.      Mental Status: He is alert and oriented to person, place, and time.   Psychiatric:         Mood and Affect: Mood normal.         Behavior: Behavior normal.         Thought Content: Thought content normal.         Judgment: Judgment normal.         Significant Labs:  CBC:  Recent Labs   Lab 11/02/20  1640 11/05/20  1800   WBC 3.68* 4.02   RBC 4.22* 3.87*   HGB 10.1* 9.4*   HCT 33.3* 31.2*   * 96*   MCV 79* 81*   MCH 23.9* 24.3*   MCHC 30.3* 30.1*     BNP:  Recent Labs   Lab 11/02/20  1640 11/05/20  1800   BNP 1,434* 1,293*     CMP:  Recent Labs   Lab 11/02/20  1640 11/05/20  1800   * 188*   CALCIUM 8.2* 8.0*   ALBUMIN 3.6 3.3*   PROT 7.7 7.3   * 133*   K 4.3 4.0   CO2 16* 16*    102   BUN 79* 65*   CREATININE 3.2* 2.7*   ALKPHOS 191* 176*   ALT 21 18   AST 21 19   BILITOT 1.3* 1.3*      Coagulation:   No results for input(s): PT, INR, APTT in the last 168 " hours.  LDH:  No results for input(s): LDH in the last 72 hours.  Microbiology:  Microbiology Results (last 7 days)     ** No results found for the last 168 hours. **          I have reviewed all pertinent labs within the past 24 hours.    Estimated Creatinine Clearance: 26.3 mL/min (A) (based on SCr of 2.7 mg/dL (H)).    Diagnostic Results:  I have reviewed all pertinent imaging results/findings within the past 24 hours.

## 2020-11-06 NOTE — NURSING
"CVP unable to be done. Line doesn't draw back. Multiple inaccurate readings from 40s-50s. Patient says, "they've never been able to do one on me." Notified AUTUMN Cortez NP. No further instructions.   "

## 2020-11-06 NOTE — HPI
Mr. Mckeon is a 56 y/o M with stage D HFrEF 2/2 NICM, HTN, HLD, DM2 who presents in setting of elevated Cr concerning for acute on chronic heart failure. Patient followed by Dr. Garnder, per last clinic note multiple admissions in last year, continued on home  (patient said 2.5 but appears to be on 5 mcg/kg/min as planned). Following routine labs with elevated Cr 3.2 (baseline 2.3-2.6).  LFTs, TBili at baseline. Continued NYHA class II symptoms, no PND or orthopnea. He reports intermittent med nonadherence, taking bidil only daily. He recently underwent a liver biopsy with no evidence of cirrhosis. Hepatology has cleared him for advanced HF options. Otherwise denies chest pain, palpitations or shortness of breath. Able to complete ADLs independently, ambulate approximately 50 yards before symptoms of SOB, able to drive to grocery store.     TTE 6/8/2020  · Severe left atrial enlargement.  · Mild left ventricular enlargement.  · Severely decreased left ventricular systolic function. The estimated ejection fraction is 20%.  · Severe global hypokinetic wall motion.  · Grade III (severe) left ventricular diastolic dysfunction consistent with restrictive physiology.  · Severe right atrial enlargement.  · Moderate right ventricular enlargement.  · Moderately reduced right ventricular systolic function.  · Moderate-to-severe mitral regurgitation.  · Mild to moderate tricuspid regurgitation.  · Pulmonary hypertension present.  · The estimated PA systolic pressure is 65 mmHg.  · Elevated central venous pressure (15 mmHg).  · Trivial pericardial effusion.

## 2020-11-06 NOTE — ASSESSMENT & PLAN NOTE
- Chronic, noted on chart review; pancytopenic likely 2/2 to anemia of chronic disease  - May warrant peripheral blood smear, out patient hematology evaluation   - Continue ASA  - Continue to monitor

## 2020-11-06 NOTE — PROGRESS NOTES
Pt aaox3 while sitting up in bed with no family at bedside. Pt denies any needs at this time. Pt is doing well, bedisde RN and charge at bedside attempting to obtain a CVP. Then the team came and rounded on patient. Kidney function needs improvement. Gave patient another information folder and told him I will come see him and talk about the VAD while he is here.  Pt verbalizes understanding.  Encouraged pt to notify nurse if they have any questions, problems or concerns for LVAD coordinator.  Pt verbalized understanding and in agreement of plan. Will follow up with pt soon, however patient has been determined to not be a canidate for Advanced options at this time, see NP note.

## 2020-11-06 NOTE — ED NOTES
LOC: The patient is awake, alert and aware of environment with an appropriate affect, the patient is oriented x 3 and speaking appropriately.  APPEARANCE: Patient resting comfortably and in no acute distress, patient is clean and well groomed. Pt is ambulatory. Wearing mask. Pt has dobutamine drip   SKIN: The skin is warm and dry, color consistent with ethnicity  MUSKULOSKELETAL: Patient moving all extremities well  RESPIRATORY: Airway is open and patent, respirations are spontaneous, patient has a normal effort and rate, no accessory muscle use noted.   NEUROLOGIC: Eyes open spontaneously, behavior appropriate to situation, follows commands

## 2020-11-06 NOTE — PROGRESS NOTES
[facility]  Heart Transplant  Progress Note    Patient Name: Ashish Mckeon  MRN: [id number]  Admission Date: 11/5/2020  Hospital Length of Stay: 1 days  Attending Physician: Gustavo Gardner MD  Primary Care Provider: [facility]  Principal Problem:Acute on chronic combined systolic and diastolic heart failure    Subjective:     ** Interval History: Complains of left nipple soreness/swelling (on Aldactone PTA, currently on hold 2/2 KEYANNA), and edema RLE, otherwise, feels fine. Unable to get a good CVP waveform from RIJ tunneled line. Net -ve only 714 cc since admit on Lasix 10 mg/hr - will re bolus and increase to 20 mg/hr    Continuous Infusions:   DOBUTamine IV infusion (non-titrating) 5 mcg/kg/min (11/05/20 2231)    furosemide (LASIX) 10 mg/mL infusion (non-titrating) 10 mg/hr (11/05/20 2130)     Scheduled Meds:   aspirin  81 mg Oral Daily    atorvastatin  40 mg Oral Daily    ferrous sulfate  325 mg Oral Every other day    heparin (porcine)  5,000 Units Subcutaneous Q8H    hydrALAZINE  75 mg Oral TID    insulin detemir U-100  8 Units Subcutaneous QHS    isosorbide dinitrate  40 mg Oral TID    levothyroxine  175 mcg Oral Before breakfast    polyethylene glycol  17 g Oral Daily     PRN Meds:acetaminophen, dextrose 50%, dextrose 50%, insulin aspart U-100, sodium chloride 0.9%    Review of patient's allergies indicates:   Allergen Reactions    Lisinopril Other (See Comments)     acei cough      Cefazolin Nausea And Vomiting     Objective:     Vital Signs (Most Recent):  Temp: 97.4 °F (36.3 °C) (11/06/20 1011)  Pulse: 83 (11/06/20 1011)  Resp: 15 (11/06/20 1011)  BP: 114/78 (11/06/20 1011)  SpO2: 98 % (11/06/20 1011) Vital Signs (24h Range):  Temp:  [97.4 °F (36.3 °C)-97.8 °F (36.6 °C)] 97.4 °F (36.3 °C)  Pulse:  [76-89] 83  Resp:  [15-20] 15  SpO2:  [97 %-98 %] 98 %  BP: ()/(54-87) 114/78     Patient Vitals for the past 72 hrs (Last 3 readings):   Weight  "  11/06/20 0600 60.9 kg (134 lb 4.2 oz)   11/05/20 2100 63 kg (138 lb 14.2 oz)   11/05/20 1603 66.2 kg (146 lb)     Body mass index is 18.73 kg/m².      Intake/Output Summary (Last 24 hours) at 11/6/2020 1037  Last data filed at 11/6/2020 1027  Gross per 24 hour   Intake 285.92 ml   Output 1300 ml   Net -1014.08 ml       Hemodynamic Parameters:       Telemetry: SR with NSVT    Physical Exam  Constitutional:       Appearance: Normal appearance.   HENT:      Head: Normocephalic and atraumatic.      Mouth/Throat:      Mouth: Mucous membranes are moist.      Pharynx: Oropharynx is clear.   Eyes:      Extraocular Movements: Extraocular movements intact.      Conjunctiva/sclera: Conjunctivae normal.      Pupils: Pupils are equal, round, and reactive to light.   Neck:      Musculoskeletal: Normal range of motion and neck supple.      Comments: + JVD, "v" waves radiating up to neck  Cardiovascular:      Rate and Rhythm: Normal rate and regular rhythm.      Comments: Grade III/VI systolic murmur at apex, + S3  Pulmonary:      Effort: Pulmonary effort is normal.      Breath sounds: Normal breath sounds.   Abdominal:      General: Bowel sounds are normal.      Palpations: Abdomen is soft.   Musculoskeletal: Normal range of motion.      Comments: 1-2+ edema RLE, no edema LLE   Skin:     Capillary Refill: Capillary refill takes 2 to 3 seconds.   Neurological:      General: No focal deficit present.      Mental Status: He is alert and oriented to person, place, and time.   Psychiatric:         Mood and Affect: Mood normal.         Behavior: Behavior normal.         Thought Content: Thought content normal.         Judgment: Judgment normal.         Significant Labs:  CBC:  Recent Labs   Lab 11/02/20  1640 11/05/20  1800   WBC 3.68* 4.02   RBC 4.22* 3.87*   HGB 10.1* 9.4*   HCT 33.3* 31.2*   * 96*   MCV 79* 81*   MCH 23.9* 24.3*   MCHC 30.3* 30.1*     BNP:  Recent Labs   Lab 11/02/20  1640 11/05/20  1800   BNP 1,434* 1,293* "     CMP:  Recent Labs   Lab 11/02/20  1640 11/05/20  1800   * 188*   CALCIUM 8.2* 8.0*   ALBUMIN 3.6 3.3*   PROT 7.7 7.3   * 133*   K 4.3 4.0   CO2 16* 16*    102   BUN 79* 65*   CREATININE 3.2* 2.7*   ALKPHOS 191* 176*   ALT 21 18   AST 21 19   BILITOT 1.3* 1.3*      Coagulation:   No results for input(s): PT, INR, APTT in the last 168 hours.  LDH:  No results for input(s): LDH in the last 72 hours.  Microbiology:  Microbiology Results (last 7 days)     ** No results found for the last 168 hours. **          I have reviewed all pertinent labs within the past 24 hours.    Estimated Creatinine Clearance: 26.3 mL/min (A) (based on SCr of 2.7 mg/dL (H)).    Diagnostic Results:  I have reviewed all pertinent imaging results/findings within the past 24 hours.    Assessment and Plan:     Mr. Mckeon is a 56 y/o M with stage D HFrEF 2/2 NICM, HTN, HLD, DM2 who presents in setting of elevated Cr concerning for acute on chronic heart failure. Patient followed by Dr. Gardner, per last clinic note multiple admissions in last year, continued on home  (patient said 2.5 but appears to be on 5 mcg/kg/min as planned). Following routine labs with elevated Cr 3.2 (baseline 2.3-2.6).  LFTs, TBili at baseline. Continued NYHA class II symptoms, no PND or orthopnea. He reports intermittent med nonadherence, taking bidil only daily. He recently underwent a liver biopsy with no evidence of cirrhosis. Hepatology has cleared him for advanced HF options. Otherwise denies chest pain, palpitations or shortness of breath. Able to complete ADLs independently, ambulate approximately 50 yards before symptoms of SOB, able to drive to grocery store.     TTE 6/8/2020  · Severe left atrial enlargement.  · Mild left ventricular enlargement.  · Severely decreased left ventricular systolic function. The estimated ejection fraction is 20%.  · Severe global hypokinetic wall motion.  · Grade III (severe) left ventricular diastolic dysfunction  consistent with restrictive physiology.  · Severe right atrial enlargement.  · Moderate right ventricular enlargement.  · Moderately reduced right ventricular systolic function.  · Moderate-to-severe mitral regurgitation.  · Mild to moderate tricuspid regurgitation.  · Pulmonary hypertension present.  · The estimated PA systolic pressure is 65 mmHg.  · Elevated central venous pressure (15 mmHg).  Trivial pericardial effusion.    * Acute on chronic combined systolic and diastolic heart failure  LVEF 20% on TTE 6/8/2020, LVIDD 5.6 cm, secondary to nonischemic cardiomyopathy  - Net -ve only 714 cc since admit. Will rebolus with Lasix 80 mg X 1, then increase gtt to 20 mg/hr  - continue home dobutamine 5 mcg/kg/min  - ACE/ARB contraindicated for renal function. Aldactone on hold  - Continue home Hydralazine 75 mg every 8 hours plus Isordil 40 mg every 8 hours (patient was taking only daily)  - Repeat TTE this admission  - maintain K>4 Mg>2  - Has no ICD as he is not currently a candidate for advanced options (smallish LV, non compliance)    Elevated liver enzymes  - AST/ALT wnl; Tbili at baseline slightly elevated at 1.3  - Continue to monitor    Acute kidney injury superimposed on CKD3  - sCr elevated to 3.2 on admit from baseline of 2.2-2.8. Was trending down with diuresis - sCr this morning pending  - Multi-factorial from stage D heart failure vs diabetic nephropathy  - Will diuresis, strict intake and out put  - Continue to monitor    Diabetes mellitus, type II  - Insulin dependent, last A1c 7.9  - Continue home Detemir 8 units PM  - Moderate intensity bolus SSI with meals  - Repeat A1c, may benefit from Endocrine consult this admission for better glycemic control     Hypothyroidism  - Continue home synthroid 175 mcg daily  - Repeat 2.408    Thrombocytopenia  - Chronic, noted on chart review; pancytopenic likely 2/2 to anemia of chronic disease  - May warrant peripheral blood smear, out patient hematology evaluation    - Continue ASA  - Continue to monitor    Sore swollen left nipple       - Will get US. Aldactone currently on hold    Edema        - Has right JAMARI, none JAMARI. Will get venous US to R/[place] [id number]  Heart Transplant  [facility]

## 2020-11-06 NOTE — NURSING
Patient experienced 8-beat run of V. Tach; asymptomatic. MD Jono notified. No orders at this time. Will continue to monitor.

## 2020-11-06 NOTE — NURSING
Patient admitted to CSU. Patient arrived to floor from ED. No evidence of distress; patient AAO x3 at this time. Patient placed on tele and ViSi. Vital signs obtained. Patient voices no complaints at this time. Plan of care initiated with patient. Bed in lowest position, locked, SR up x2, call bell in reach. Will continue to monitor patient.

## 2020-11-06 NOTE — H&P
[facility]  Heart Transplant  H&P    Patient Name: Ashish Mckeon  MRN: [id number]  Admission Date: 11/5/2020  Attending Physician: Gustavo Gardner MD  Primary Care Provider: [facility]  Principal Problem:Acute on chronic systolic heart failure    Subjective:     History of Present Illness:  Mr. Mckeon is a 54 y/o M with stage D HFrEF 2/2 NICM, HTN, HLD, DM2 who presents in setting of elevated Cr concerning for acute on chronic heart failure. Patient followed by Dr. Gardner, per last clinic note multiple admissions in last year, continued on home  (patient said 2.5 but appears to be on 5 mcg/kg/min as planned). Following routine labs with elevated Cr 3.2 (baseline 2.3-2.6).  LFTs, TBili at baseline. Continued NYHA class II symptoms, no PND or orthopnea. He reports intermittent med nonadherence, taking bidil only daily. He recently underwent a liver biopsy with no evidence of cirrhosis. Hepatology has cleared him for advanced HF options. Otherwise denies chest pain, palpitations or shortness of breath. Able to complete ADLs independently, ambulate approximately 50 yards before symptoms of SOB, able to drive to grocery store.     TTE 6/8/2020  · Severe left atrial enlargement.  · Mild left ventricular enlargement.  · Severely decreased left ventricular systolic function. The estimated ejection fraction is 20%.  · Severe global hypokinetic wall motion.  · Grade III (severe) left ventricular diastolic dysfunction consistent with restrictive physiology.  · Severe right atrial enlargement.  · Moderate right ventricular enlargement.  · Moderately reduced right ventricular systolic function.  · Moderate-to-severe mitral regurgitation.  · Mild to moderate tricuspid regurgitation.  · Pulmonary hypertension present.  · The estimated PA systolic pressure is 65 mmHg.  · Elevated central venous pressure (15 mmHg).  Trivial pericardial effusion.    Past Medical History:   Diagnosis Date     "CHF (congestive heart failure)     Diabetes mellitus type II     Essential hypertension, benign     Hyperlipidemia     Hypothyroidism     Pain and swelling of left wrist 8/5/2019    Ashish Mckeon is a 54 y.o. male with PMH of  presenting with CHF, IDDM, Hypothyroidism, HTN, HLD presenting with worsening L wrist pain for 1 day. Orthopedic surgery was consulted for septic joint r/o. Pt has been afebrile and has no elevated WBC. ESR 36, normal CRP. Xray shows no signs of trauma and pt has no hx of gout or septic arthritis. Due to atraumatic wrist pain and swelling w/out identifia    Stage 3 chronic kidney disease 6/9/2020    Undiagnosed cardiac murmurs      Past Surgical History:   Procedure Laterality Date    COLON SURGERY      "lower intestines removed"    ORTHOPEDIC SURGERY Left     wrist    RIGHT HEART CATHETERIZATION Right 6/17/2020    Procedure: INSERTION, CATHETER, RIGHT HEART;  Surgeon: Kimberly Rodgers MD;  [facility] C[facility] LAB;  Service: Cardiology;  Laterality: Right;    THYROID SURGERY         Review of patient's allergies indicates:   Allergen Reactions    Lisinopril Other (See Comments)     acei cough      Cefazolin Nausea And Vomiting       Current Facility-Administered Medications   Medication    acetaminophen tablet 650 mg    [START ON 11/6/2020] aspirin chewable tablet 81 mg    [START ON 11/6/2020] atorvastatin tablet 40 mg    dextrose 50% injection 12.5 g    dextrose 50% injection 25 g    DOBUtamine 1000 mg in D5W 250 mL infusion    [START ON 11/6/2020] ferrous sulfate EC tablet 325 mg    furosemide (LASIX) 500 mg infusion (conc: 10 mg/mL)    heparin (porcine) injection 5,000 Units    [START ON 11/6/2020] hydrALAZINE tablet 75 mg    insulin aspart U-100 pen 1-10 Units    insulin detemir U-100 pen 8 Units    [START ON 11/6/2020] isosorbide dinitrate tablet 40 mg    [START ON 11/6/2020] levothyroxine tablet 175 mcg    [START ON 11/6/2020] polyethylene glycol packet 17 g " "   sodium chloride 0.9% flush 10 mL     Family History     Problem Relation (Age of Onset)    Cancer Father        Tobacco Use    Smoking status: Never Smoker    Smokeless tobacco: Never Used    Tobacco comment: cigars "every other week or so"   Substance and Sexual Activity    Alcohol use: Not Currently     Alcohol/week: 3.0 standard drinks     Types: 3 Cans of beer per week    Drug use: Not Currently     Types: Marijuana    Sexual activity: Yes     Partners: Female     Review of Systems   Constitutional: Negative for appetite change, chills, fatigue and fever.   HENT: Negative.    Eyes: Negative.    Respiratory: Negative for chest tightness, shortness of breath and wheezing.    Cardiovascular: Positive for leg swelling. Negative for chest pain and palpitations.   Gastrointestinal: Negative for abdominal distention and abdominal pain.   Endocrine: Negative for cold intolerance and heat intolerance.   Genitourinary: Negative.    Musculoskeletal: Negative.    Skin: Negative.    Neurological: Negative.    Hematological: Negative.    Psychiatric/Behavioral: Negative for agitation and sleep disturbance.     Objective:     Vital Signs (Most Recent):  Temp: 97.5 °F (36.4 °C) (11/05/20 2200)  Pulse: 86 (11/05/20 2200)  Resp: 17 (11/05/20 2200)  BP: 109/87 (11/05/20 2200)  SpO2: 97 % (11/05/20 2200) Vital Signs (24h Range):  Temp:  [97.5 °F (36.4 °C)-97.8 °F (36.6 °C)] 97.5 °F (36.4 °C)  Pulse:  [76-86] 86  Resp:  [16-17] 17  SpO2:  [97 %-98 %] 97 %  BP: (109-124)/(54-87) 109/87     Patient Vitals for the past 72 hrs (Last 3 readings):   Weight   11/05/20 2100 63 kg (138 lb 14.2 oz)   11/05/20 1603 66.2 kg (146 lb)     Body mass index is 19.37 kg/m².    No intake or output data in the 24 hours ending 11/05/20 2309    Physical Exam  Vitals signs and nursing note reviewed.   Constitutional:       Appearance: He is ill-appearing.   HENT:      Head: Normocephalic and atraumatic.      Nose: Nose normal.      Mouth/Throat: "      Mouth: Mucous membranes are moist.   Eyes:      Extraocular Movements: Extraocular movements intact.      Pupils: Pupils are equal, round, and reactive to light.   Neck:      Musculoskeletal: Normal range of motion.      Comments: JVD to mid neck  Cardiovascular:      Rate and Rhythm: Normal rate.      Pulses: Normal pulses.      Comments: holosystolic murmur   Pulmonary:      Effort: Pulmonary effort is normal. No respiratory distress.   Abdominal:      General: There is distension.      Palpations: Abdomen is soft.   Musculoskeletal: Normal range of motion.         General: Swelling present.      Comments: 1+ pitting edema to knees bilaterally   Skin:     General: Skin is dry.      Comments: Cool bilateral LE   Neurological:      General: No focal deficit present.      Mental Status: He is alert and oriented to person, place, and time.   Psychiatric:         Mood and Affect: Mood normal.         Behavior: Behavior normal.       Significant Labs:  CBC:  Recent Labs   Lab 11/02/20  1640 11/05/20  1800   WBC 3.68* 4.02   RBC 4.22* 3.87*   HGB 10.1* 9.4*   HCT 33.3* 31.2*   * 96*   MCV 79* 81*   MCH 23.9* 24.3*   MCHC 30.3* 30.1*     BNP:  Recent Labs   Lab 11/02/20  1640 11/05/20  1800   BNP 1,434* 1,293*     CMP:  Recent Labs   Lab 11/02/20  1640 11/05/20  1800   * 188*   CALCIUM 8.2* 8.0*   ALBUMIN 3.6 3.3*   PROT 7.7 7.3   * 133*   K 4.3 4.0   CO2 16* 16*    102   BUN 79* 65*   CREATININE 3.2* 2.7*   ALKPHOS 191* 176*   ALT 21 18   AST 21 19   BILITOT 1.3* 1.3*      I have reviewed all pertinent labs within the past 24 hours.    Diagnostic Results:  I have reviewed and interpreted all pertinent imaging results/findings within the past 24 hours.    Assessment/Plan:     * Acute on chronic systolic heart failure  LVEF 20% on TTE 6/8/2020, LVIDD 5.6 cm, secondary to nonischemic cardiomyopathy, Profile B  - IV Lasix bolus 80mg x 1 followed by lasix 10mg/hr  - continue home dobutamine 5  mcg/kg/min  - ACE/ARB contraindicated for renal function  - Continue home bidil 75-40 tid (patient was taking only daily)  - Repeat TTE this admission, advanced options if patient is candidate   - maintain K>4 Mg>2    Elevated liver enzymes  - AST/ALT wnl; Tbili at baseline slightly elevated at 1.3  - Continue to monitor    Acute kidney injury superimposed on CKD3  - sCr elevated to 3.2 from baseline of 2.2-2.8  - Multi-factorial from stage D heart failure vs diabetic nephropathy  - Will diuresis, strict intake and out put  - Continue to monitor    Diabetes mellitus, type II  - Insulin dependent, last A1c 7.9  - Continue home Detemir 8 units PM  - Moderate intensity bolus SSI with meals  - Repeat A1c, may benefit from Endocrine consult this admission for better glycemic control     Hypothyroidism  - Continue home synthroid 175 mcg daily  - Repeat TSH    Thrombocytopenia  - Chronic, noted on chart review; pancytopenic likely 2/2 to anemia of chronic disease  - May warrant peripheral blood smear, out patient hematology evaluation   - Continue ASA  - Continue to monitor      Keyshawn De La Cruz MD  Heart Transplant  [facility]

## 2020-11-06 NOTE — ASSESSMENT & PLAN NOTE
- sCr elevated to 3.2 from baseline of 2.2-2.8  - Multi-factorial from stage D heart failure vs diabetic nephropathy  - Will diuresis, strict intake and out put  - Continue to monitor

## 2020-11-06 NOTE — CONSULTS
[facility]  Cardiology  Consult Note    Patient Name: Ashish Mckeon  MRN: [id number]  Admission Date: 11/5/2020  Hospital Length of Stay: 0 days  Code Status: Prior   Attending Provider: Jeremy Nuñez DO   Consulting Provider: Chris Giles MD  Primary Care Physician: [facility]  Principal Problem:<principal problem not specified>    Patient information was obtained from patient, past medical records and ER records.     Inpatient consult to Cardiology  Consult performed by: Chris Giles MD  Consult ordered by: Jeremy Nuñez DO        Subjective:     Chief Complaint:  Acute on chronic heart failure     HPI:   Mr. Mckeon is a 56 y/o M with stage D HFrEF 2/2 NICM, HTN, HLD, DM2 who presents in setting of elevated Cr concerning for acute on chronic heart failure. Patient followed by Dr. Gardner, per last clinic note multiple admissions in last year, continued on home  (patient said 2.5 but appears to be on 5 mcg/kg/min as planned). Following routine labs with elevated Cr 3.2 (baseline 2.3-2.6).  LFTs, TBili at baseline. Continued NYHA class II symptoms, no PND or orthopnea. He reports intermittent med nonadherence, taking bidil only daily. He recently underwent a liver biopsy with no evidence of cirrhosis. Hepatology has cleared him for advanced HF options. Otherwise denies chest pain, palpitations or shortness of breath.         Past Medical History:   Diagnosis Date    CHF (congestive heart failure)     Diabetes mellitus type II     Essential hypertension, benign     Hyperlipidemia     Hypothyroidism     Pain and swelling of left wrist 8/5/2019    Ashish Mckeon is a 54 y.o. male with PMH of  presenting with CHF, IDDM, Hypothyroidism, HTN, HLD presenting with worsening L wrist pain for 1 day. Orthopedic surgery was consulted for septic joint r/o. Pt has been afebrile and has no elevated WBC. ESR 36, normal CRP. Xray shows no signs of trauma and pt has no hx  "of gout or septic arthritis. Due to atraumatic wrist pain and swelling w/out identifia    Stage 3 chronic kidney disease 6/9/2020    Undiagnosed cardiac murmurs        Past Surgical History:   Procedure Laterality Date    COLON SURGERY      "lower intestines removed"    ORTHOPEDIC SURGERY Left     wrist    RIGHT HEART CATHETERIZATION Right 6/17/2020    Procedure: INSERTION, CATHETER, RIGHT HEART;  Surgeon: Kimberly Rodgers MD;  Location: [facility] CATH LAB;  Service: Cardiology;  Laterality: Right;    THYROID SURGERY         Review of patient's allergies indicates:   Allergen Reactions    Lisinopril Other (See Comments)     acei cough      Cefazolin Nausea And Vomiting       No current facility-administered medications on file prior to encounter.      Current Outpatient Medications on File Prior to Encounter   Medication Sig    acetaminophen (TYLENOL) 325 MG tablet Take 2 tablets (650 mg total) by mouth every 6 to 8 hours as needed. (Patient taking differently: Take 325 mg by mouth daily as needed for Pain. )    aspirin 81 MG Chew Take 1 tablet (81 mg total) by mouth once daily.    atorvastatin (LIPITOR) 40 MG tablet Take 40 mg by mouth once daily.    blood sugar diagnostic Strp Use to test blood glucose four times daily before meals and nightly.    blood-glucose meter Misc Use as instructed    dextrose 5 % infusion     DOBUTamine (DOBUTREX) 250 mg/20 mL (12.5 mg/mL) injection     ferrous sulfate 325 (65 FE) MG EC tablet Take 1 tablet (325 mg total) by mouth every other day.    hydrALAZINE (APRESOLINE) 50 MG tablet Take 1.5 tablets (75 mg total) by mouth 3 (three) times daily.    HYDROcodone-acetaminophen (NORCO) 5-325 mg per tablet Take 1 tablet by mouth every 6 (six) hours as needed for Pain.    insulin aspart U-100 (NOVOLOG) 100 unit/mL (3 mL) InPn pen Inject 4 Units into the skin 3 (three) times daily with meals.    insulin detemir U-100 (LEVEMIR FLEXTOUCH) 100 unit/mL (3 mL) SubQ InPn pen Inject " "8 Units into the skin every evening.    isosorbide dinitrate (ISORDIL) 20 MG tablet Take 2 tablets (40 mg total) by mouth 3 (three) times daily.    lancets 30 gauge Misc Use to test blood glucose four times daily before meals and nightly.    lancing device Misc Use to test blood glucose four times daily before meals and nightly.    levothyroxine (SYNTHROID) 175 MCG tablet Take 1 tablet (175 mcg total) by mouth before breakfast.    pen needle, diabetic 32 gauge x 5/32" Ndle Use to inject insulin four times daily before meals and nightly.    spironolactone (ALDACTONE) 25 MG tablet Take 1 tablet (25 mg total) by mouth once daily.    torsemide (DEMADEX) 100 MG Tab Take 1 tablet (100 mg total) by mouth once daily.     Family History     Problem Relation (Age of Onset)    Cancer Father        Tobacco Use    Smoking status: Never Smoker    Smokeless tobacco: Never Used    Tobacco comment: cigars "every other week or so"   Substance and Sexual Activity    Alcohol use: Not Currently     Alcohol/week: 3.0 standard drinks     Types: 3 Cans of beer per week    Drug use: Not Currently     Types: Marijuana    Sexual activity: Yes     Partners: Female     Review of Systems   Constitution: Negative. Negative for chills and fever.   HENT: Negative.    Eyes: Negative.    Cardiovascular: Negative for chest pain, claudication and paroxysmal nocturnal dyspnea.   Respiratory: Negative for cough, shortness of breath and wheezing.    Endocrine: Negative.    Hematologic/Lymphatic: Does not bruise/bleed easily.   Skin: Negative for nail changes and rash.   Musculoskeletal: Negative.  Negative for back pain.   Gastrointestinal: Negative for abdominal pain, melena, nausea and vomiting.   Neurological: Negative for dizziness and headaches.   Psychiatric/Behavioral: Negative for altered mental status, depression and substance abuse.   Allergic/Immunologic: Negative.      Objective:     Vital Signs (Most Recent):  Temp: 97.8 °F (36.6 " °C) (11/05/20 1603)  Pulse: 76 (11/05/20 1603)  Resp: 16 (11/05/20 1603)  BP: (!) 124/54 (11/05/20 1603)  SpO2: 98 % (11/05/20 1603) Vital Signs (24h Range):  Temp:  [97.8 °F (36.6 °C)] 97.8 °F (36.6 °C)  Pulse:  [76] 76  Resp:  [16] 16  SpO2:  [98 %] 98 %  BP: (124)/(54) 124/54     Weight: 66.2 kg (146 lb)  Body mass index is 20.36 kg/m².    SpO2: 98 %       No intake or output data in the 24 hours ending 11/05/20 2022    Lines/Drains/Airways     Central Venous Catheter Line            Tunneled Central Line Insertion/Assessment - Double Lumen  08/03/20 0825 right internal jugular 94 days                Physical Exam   Constitutional: He is oriented to person, place, and time. He appears well-developed and well-nourished.   HENT:   Head: Normocephalic and atraumatic.   Eyes: Pupils are equal, round, and reactive to light. Conjunctivae and EOM are normal.   Neck: JVD present.   Cardiovascular: Normal rate, regular rhythm, normal heart sounds and intact distal pulses. Exam reveals no gallop and no friction rub.   No murmur heard.  Pulmonary/Chest: Effort normal. No stridor. He has no wheezes. He has rales. He exhibits no tenderness.   Abdominal: Soft. Bowel sounds are normal. He exhibits no distension and no mass. There is no abdominal tenderness. There is no guarding.   Musculoskeletal:         General: No tenderness, deformity or edema.   Neurological: He is alert and oriented to person, place, and time.   Skin: Skin is warm and dry. No rash noted. No erythema.   Psychiatric: He has a normal mood and affect.       Significant Labs:   CMP   Recent Labs   Lab 11/05/20  1800   *   K 4.0      CO2 16*   *   BUN 65*   CREATININE 2.7*   CALCIUM 8.0*   PROT 7.3   ALBUMIN 3.3*   BILITOT 1.3*   ALKPHOS 176*   AST 19   ALT 18   ANIONGAP 15   ESTGFRAFRICA 29.1*   EGFRNONAA 25.2*    and CBC   Recent Labs   Lab 11/05/20  1800   WBC 4.02   HGB 9.4*   HCT 31.2*   PLT 96*       Significant Imaging: Echocardiogram:    Transthoracic echo (TTE) complete (Cupid Only):   Results for orders placed or performed during the hospital encounter of 06/07/20   Echo Color Flow Doppler? Yes; Bubble Contrast? No   Result Value Ref Range    Ascending aorta 2.75 cm    STJ 2.51 cm    AV mean gradient 2 mmHg    Ao peak demarcus 0.96 m/s    Ao VTI 15.86 cm    IVS 0.78 0.6 - 1.1 cm    LA size 5.11 cm    Left Atrium Major Axis 7.26 cm    Left Atrium Minor Axis 7.25 cm    LVIDd 5.68 3.5 - 6.0 cm    LVIDs 4.94 (A) 2.1 - 4.0 cm    LVOT diameter 2.21 cm    LVOT peak VTI 8.45 cm    Posterior Wall 0.98 0.6 - 1.1 cm    MV Peak A Demarcus 0.24 m/s    E wave decelartion time 107.85 msec    MV Peak E Demarcus 1.35 m/s    RA Major Axis 6.20 cm    RA Width 4.94 cm    RVDD 5.05 cm    Sinus 3.02 cm    TAPSE 1.14 cm    TR Max Demarcus 3.54 m/s    TDI LATERAL 0.08 m/s    TDI SEPTAL 0.06 m/s    LA WIDTH 4.47 cm    LV Diastolic Volume 158.87 mL    LV Systolic Volume 115.15 mL    LVOT peak demarcus 0.57 m/s    LV LATERAL E/E' RATIO 16.88 m/s    LV SEPTAL E/E' RATIO 22.50 m/s    FS 13 %    LA volume 140.86 cm3    LV mass 190.78 g    Left Ventricle Relative Wall Thickness 0.35 cm    AV valve area 2.04 cm2    AV Velocity Ratio 0.59     AV index (prosthetic) 0.53     E/A ratio 5.63     Mean e' 0.07 m/s    LVOT area 3.8 cm2    LVOT stroke volume 32.40 cm3    AV peak gradient 4 mmHg    E/E' ratio 19.29 m/s    Triscuspid Valve Regurgitation Peak Gradient 50 mmHg    BSA 1.96 m2    LV Systolic Volume Index 58.7 mL/m2    LV Diastolic Volume Index 80.92 mL/m2    LA Volume Index 71.7 mL/m2    LV Mass Index 97 g/m2    Right Atrial Pressure (from IVC) 15 mmHg    TV rest pulmonary artery pressure 65 mmHg    Narrative    · Severe left atrial enlargement.  · Mild left ventricular enlargement.  · Severely decreased left ventricular systolic function. The estimated   ejection fraction is 20%.  · Severe global hypokinetic wall motion.  · Grade III (severe) left ventricular diastolic dysfunction consistent   with  restrictive physiology.  · Severe right atrial enlargement.  · Moderate right ventricular enlargement.  · Moderately reduced right ventricular systolic function.  · Moderate-to-severe mitral regurgitation.  · Mild to moderate tricuspid regurgitation.  · Pulmonary hypertension present.  · The estimated PA systolic pressure is 65 mmHg.  · Elevated central venous pressure (15 mmHg).  · Trivial pericardial effusion.        Assessment and Plan:     Acute on chronic systolic heart failure  Mr. Mckeon is a 56 y/o M with stage D HFrEF 2/2 NICM on home dobutamine, HTN, HLD, DM2 who presents in setting of acute on chronic systolic heart failure.    LVEF 20%, secondary to nonischemic cardiomyopathy, Profile B  - IV Lasix bolus 80mg x 1 followed by lasix 10mg/hr  - continue home dobutamine 5 mcg/kg/min  - ACE/ARB contraindicated for renal function  - MRA: holding in setting of renal function  - Continue home bidil 75-40 tid (patient was taking only daily)  - maintain K>4 Mg>2  - admit to floor on [facility] hospitalist service        VTE Risk Mitigation (From admission, onward)    None          Thank you for your consult. I will follow-up with patient. Please contact us if you have any additional questions.    Chris Giles MD  Cardiology   [facility]

## 2020-11-06 NOTE — SUBJECTIVE & OBJECTIVE
"Past Medical History:   Diagnosis Date    CHF (congestive heart failure)     Diabetes mellitus type II     Essential hypertension, benign     Hyperlipidemia     Hypothyroidism     Pain and swelling of left wrist 8/5/2019    Ashish Mckeon is a 54 y.o. male with PMH of  presenting with CHF, IDDM, Hypothyroidism, HTN, HLD presenting with worsening L wrist pain for 1 day. Orthopedic surgery was consulted for septic joint r/o. Pt has been afebrile and has no elevated WBC. ESR 36, normal CRP. Xray shows no signs of trauma and pt has no hx of gout or septic arthritis. Due to atraumatic wrist pain and swelling w/out identifia    Stage 3 chronic kidney disease 6/9/2020    Undiagnosed cardiac murmurs      Past Surgical History:   Procedure Laterality Date    COLON SURGERY      "lower intestines removed"    ORTHOPEDIC SURGERY Left     wrist    RIGHT HEART CATHETERIZATION Right 6/17/2020    Procedure: INSERTION, CATHETER, RIGHT HEART;  Surgeon: Kimberly Rodgers MD;  Location: [facility] CATH LAB;  Service: Cardiology;  Laterality: Right;    THYROID SURGERY         Review of patient's allergies indicates:   Allergen Reactions    Lisinopril Other (See Comments)     acei cough      Cefazolin Nausea And Vomiting       Current Facility-Administered Medications   Medication    acetaminophen tablet 650 mg    [START ON 11/6/2020] aspirin chewable tablet 81 mg    [START ON 11/6/2020] atorvastatin tablet 40 mg    dextrose 50% injection 12.5 g    dextrose 50% injection 25 g    DOBUtamine 1000 mg in D5W 250 mL infusion    [START ON 11/6/2020] ferrous sulfate EC tablet 325 mg    furosemide (LASIX) 500 mg infusion (conc: 10 mg/mL)    heparin (porcine) injection 5,000 Units    [START ON 11/6/2020] hydrALAZINE tablet 75 mg    insulin aspart U-100 pen 1-10 Units    insulin detemir U-100 pen 8 Units    [START ON 11/6/2020] isosorbide dinitrate tablet 40 mg    [START ON 11/6/2020] levothyroxine tablet 175 mcg    [START ON " "11/6/2020] polyethylene glycol packet 17 g    sodium chloride 0.9% flush 10 mL     Family History     Problem Relation (Age of Onset)    Cancer Father        Tobacco Use    Smoking status: Never Smoker    Smokeless tobacco: Never Used    Tobacco comment: cigars "every other week or so"   Substance and Sexual Activity    Alcohol use: Not Currently     Alcohol/week: 3.0 standard drinks     Types: 3 Cans of beer per week    Drug use: Not Currently     Types: Marijuana    Sexual activity: Yes     Partners: Female     Review of Systems   Constitutional: Negative for appetite change, chills, fatigue and fever.   HENT: Negative.    Eyes: Negative.    Respiratory: Negative for chest tightness, shortness of breath and wheezing.    Cardiovascular: Positive for leg swelling. Negative for chest pain and palpitations.   Gastrointestinal: Negative for abdominal distention and abdominal pain.   Endocrine: Negative for cold intolerance and heat intolerance.   Genitourinary: Negative.    Musculoskeletal: Negative.    Skin: Negative.    Neurological: Negative.    Hematological: Negative.    Psychiatric/Behavioral: Negative for agitation and sleep disturbance.     Objective:     Vital Signs (Most Recent):  Temp: 97.5 °F (36.4 °C) (11/05/20 2200)  Pulse: 86 (11/05/20 2200)  Resp: 17 (11/05/20 2200)  BP: 109/87 (11/05/20 2200)  SpO2: 97 % (11/05/20 2200) Vital Signs (24h Range):  Temp:  [97.5 °F (36.4 °C)-97.8 °F (36.6 °C)] 97.5 °F (36.4 °C)  Pulse:  [76-86] 86  Resp:  [16-17] 17  SpO2:  [97 %-98 %] 97 %  BP: (109-124)/(54-87) 109/87     Patient Vitals for the past 72 hrs (Last 3 readings):   Weight   11/05/20 2100 63 kg (138 lb 14.2 oz)   11/05/20 1603 66.2 kg (146 lb)     Body mass index is 19.37 kg/m².    No intake or output data in the 24 hours ending 11/05/20 2309    Physical Exam  Vitals signs and nursing note reviewed.   Constitutional:       Appearance: He is ill-appearing.   HENT:      Head: Normocephalic and atraumatic. "      Nose: Nose normal.      Mouth/Throat:      Mouth: Mucous membranes are moist.   Eyes:      Extraocular Movements: Extraocular movements intact.      Pupils: Pupils are equal, round, and reactive to light.   Neck:      Musculoskeletal: Normal range of motion.      Comments: JVD to mid neck  Cardiovascular:      Rate and Rhythm: Normal rate.      Pulses: Normal pulses.      Comments: holosystolic murmur   Pulmonary:      Effort: Pulmonary effort is normal. No respiratory distress.   Abdominal:      General: There is distension.      Palpations: Abdomen is soft.   Musculoskeletal: Normal range of motion.         General: Swelling present.      Comments: 1+ pitting edema to knees bilaterally   Skin:     General: Skin is dry.      Comments: Cool bilateral LE   Neurological:      General: No focal deficit present.      Mental Status: He is alert and oriented to person, place, and time.   Psychiatric:         Mood and Affect: Mood normal.         Behavior: Behavior normal.       Significant Labs:  CBC:  Recent Labs   Lab 11/02/20  1640 11/05/20  1800   WBC 3.68* 4.02   RBC 4.22* 3.87*   HGB 10.1* 9.4*   HCT 33.3* 31.2*   * 96*   MCV 79* 81*   MCH 23.9* 24.3*   MCHC 30.3* 30.1*     BNP:  Recent Labs   Lab 11/02/20  1640 11/05/20  1800   BNP 1,434* 1,293*     CMP:  Recent Labs   Lab 11/02/20  1640 11/05/20  1800   * 188*   CALCIUM 8.2* 8.0*   ALBUMIN 3.6 3.3*   PROT 7.7 7.3   * 133*   K 4.3 4.0   CO2 16* 16*    102   BUN 79* 65*   CREATININE 3.2* 2.7*   ALKPHOS 191* 176*   ALT 21 18   AST 21 19   BILITOT 1.3* 1.3*      I have reviewed all pertinent labs within the past 24 hours.    Diagnostic Results:  I have reviewed and interpreted all pertinent imaging results/findings within the past 24 hours.

## 2020-11-06 NOTE — ASSESSMENT & PLAN NOTE
- Insulin dependent, last A1c 7.9  - Continue home Detemir 8 units PM  - Moderate intensity bolus SSI with meals  - Repeat A1c, may benefit from Endocrine consult this admission for better glycemic control

## 2020-11-06 NOTE — ASSESSMENT & PLAN NOTE
- sCr elevated to 3.2 on admit from baseline of 2.2-2.8. Was trending down with diuresis - sCr this morning pending  - Multi-factorial from stage D heart failure vs diabetic nephropathy  - Will diuresis, strict intake and out put  - Continue to monitor

## 2020-11-06 NOTE — PROGRESS NOTES
Admit Note     SWI met with patient to assess needs. Patient is a 55 y.o.  male, admitted for Elevated liver enzymes [R74.8] and Heart failure [I50.9], per medical record.      Patient admitted from ED on 11/5/2020 .  At this time, patient presents as alert and oriented x 4, pleasant, good eye contact, calm, communicative, cooperative and asking and answering questions appropriately.  At this time, patients caregiver is not present.    Household/Family Systems     Patient resides with patient's wife, at    [address] [place] [id number].     Pt's cell: [phone]  Laura Mckeon (pt's wife): [phone]    Support system includes wife and two adult sons (Ok 27 y/o and Ashish 24 y/o).  Patient does not have dependents that are need of being cared for.    Patients primary caregiver is self.    During admission, patient's caregiver plans to visit.  Confirmed patient and patients caregivers do have access to reliable transportation.    Cognitive Status/Learning     Patient reports reading ability as 9th grade and states patient does not have difficulty with reading, writing, seeing, hearing and memory. Pt wears reading glasses. Patient reports patient learns best by reading.   Needed: No.   Highest education level: High School (9-12) or GED    Vocation/Disability   .  Working for Income: No  If no, reason not working: Disability  Patient is disabled due to CHF since 2019.  Prior to disability, patient  was employed as a .    Adherence     Patient reports a high level of adherence to patients health care regimen.  Adherence counseling and education provided. Patient verbalizes understanding.    Substance Use    Patient reports the following substance usage.    Tobacco: none, patient denies any use.  Alcohol: none, patient denies any use.  Illicit Drugs/Non-prescribed Medications: none, patient denies any use.  Patient states clear understanding of the potential impact of  substance use.  Substance abstinence/cessation counseling, education and resources provided and reviewed.     Services Utilizing/ADLS    Infusion Service: Prior to admission, patient utilizing? yes, [facility] (ph: [phone], f: [phone])  Home Health: Prior to admission, patient utilizing? yes, [facility]  DME: Prior to admission, yes, BP cuff and scale   Pulmonary/Cardiac Rehab: Prior to admission, no  Dialysis:  Prior to admission, no  Transplant Specialty Pharmacy:  Prior to admission, no.    Prior to admission, patient reports patient was independent with ADLS and was driving.  Patient reports patient is able to care for self at this time..  Patient denies a willingness to care for self once medically cleared to do so.    Insurance/Medications    Insured by   Payor/Plan Subscr  Sex Relation Sub. Ins. ID Effective Group Num   1. [place] - [place]* ASHER KAUFFMAN 1965 Male  [id number]* 20 [id number]                                   [address]      Primary Insurance (for UNOS reporting): Public Insurance - Medicaid  Secondary Insurance (for UNOS reporting): None    Patient reports patient is able to obtain and afford medications at this time and at time of discharge.    Living Will/Healthcare Power of     Patient states patient does not have a LW and/or HCPA.   provided education regarding LW and HCPA and the completion of forms.    Coping/Mental Health    Patient is coping adequately with the aid of  family members. Patient denies mental health difficulties. General support provided.     Discharge Planning    At time of discharge, patient plans to return to patient's home under the care of self and wife.  Patients wife will transport patient.  Per rounds today, expected discharge date has not been medically determined at this time. Patient and patients caregiver  verbalize understanding and are involved in treatment planning and discharge process.    Additional  Concerns     providing ongoing psychosocial support, education, resources and d/c planning as needed.  SW remains available. Patient denies additional needs and/or concerns at this time. Patient verbalizes understanding and agreement with information reviewed, social work availability, and how to access available resources as needed.

## 2020-11-06 NOTE — ED TRIAGE NOTES
54 y/o M with stage D HFrEF 2/2 NICM, HTN, HLD, DM2 who  Presents for evaluation for abnormal liver enzymes.

## 2020-11-06 NOTE — ASSESSMENT & PLAN NOTE
Mr. Mckeon is a 56 y/o M with stage D HFrEF 2/2 NICM on home dobutamine, HTN, HLD, DM2 who presents in setting of acute on chronic systolic heart failure.    LVEF 20%, secondary to nonischemic cardiomyopathy, Profile B  - IV Lasix bolus 80mg x 1 followed by lasix 10mg/hr  - continue home dobutamine 5 mcg/kg/min  - ACE/ARB contraindicated for renal function  - MRA: holding in setting of renal function  - Continue home bidil 75-40 tid (patient was taking only daily)  - maintain K>4 Mg>2  - admit to floor on [facility] hospitalist service

## 2020-11-06 NOTE — PLAN OF CARE
Plan of care discussed with patient. Patient is free of fall/trauma/injury. Denies CP/SOB. PRN tylenol given for ankle pain. VSS. Patient is ACHS; BG protocol maintained per order. Echo completed; EF 20%. US right leg completed. Lasix gtt increased to 20 mg/hr, 2ml/hr.  gtt continued at 5mcg, 5ml/hr.  VBG completed. 1500 FR maintained; strict intake and output. All questions addressed. Will continue to monitor

## 2020-11-06 NOTE — HPI
Mr. Mckeon is a 56 y/o M with stage D HFrEF 2/2 NICM, HTN, HLD, DM2 who presents in setting of elevated Cr concerning for acute on chronic heart failure. Patient followed by Dr. Gardner, per last clinic note multiple admissions in last year, continued on home  (patient said 2.5 but appears to be on 5 mcg/kg/min as planned). Following routine labs with elevated Cr 3.2 (baseline 2.3-2.6).  LFTs, TBili at baseline. Continued NYHA class II symptoms, no PND or orthopnea. He reports intermittent med nonadherence, taking bidil only daily. He recently underwent a liver biopsy with no evidence of cirrhosis. Hepatology has cleared him for advanced HF options. Otherwise denies chest pain, palpitations or shortness of breath.

## 2020-11-06 NOTE — ASSESSMENT & PLAN NOTE
LVEF 20% on TTE 6/8/2020, LVIDD 5.6 cm, secondary to nonischemic cardiomyopathy, Profile B  - IV Lasix bolus 80mg x 1 followed by lasix 10mg/hr  - continue home dobutamine 5 mcg/kg/min  - ACE/ARB contraindicated for renal function  - Continue home bidil 75-40 tid (patient was taking only daily)  - Repeat TTE this admission, advanced options if patient is candidate   - maintain K>4 Mg>2

## 2020-11-06 NOTE — SUBJECTIVE & OBJECTIVE
"Past Medical History:   Diagnosis Date    CHF (congestive heart failure)     Diabetes mellitus type II     Essential hypertension, benign     Hyperlipidemia     Hypothyroidism     Pain and swelling of left wrist 8/5/2019    Ashish Mckeon is a 54 y.o. male with PMH of  presenting with CHF, IDDM, Hypothyroidism, HTN, HLD presenting with worsening L wrist pain for 1 day. Orthopedic surgery was consulted for septic joint r/o. Pt has been afebrile and has no elevated WBC. ESR 36, normal CRP. Xray shows no signs of trauma and pt has no hx of gout or septic arthritis. Due to atraumatic wrist pain and swelling w/out identifia    Stage 3 chronic kidney disease 6/9/2020    Undiagnosed cardiac murmurs        Past Surgical History:   Procedure Laterality Date    COLON SURGERY      "lower intestines removed"    ORTHOPEDIC SURGERY Left     wrist    RIGHT HEART CATHETERIZATION Right 6/17/2020    Procedure: INSERTION, CATHETER, RIGHT HEART;  Surgeon: Kimberly Rodgers MD;  Location: [facility] CATH LAB;  Service: Cardiology;  Laterality: Right;    THYROID SURGERY         Review of patient's allergies indicates:   Allergen Reactions    Lisinopril Other (See Comments)     acei cough      Cefazolin Nausea And Vomiting       No current facility-administered medications on file prior to encounter.      Current Outpatient Medications on File Prior to Encounter   Medication Sig    acetaminophen (TYLENOL) 325 MG tablet Take 2 tablets (650 mg total) by mouth every 6 to 8 hours as needed. (Patient taking differently: Take 325 mg by mouth daily as needed for Pain. )    aspirin 81 MG Chew Take 1 tablet (81 mg total) by mouth once daily.    atorvastatin (LIPITOR) 40 MG tablet Take 40 mg by mouth once daily.    blood sugar diagnostic Strp Use to test blood glucose four times daily before meals and nightly.    blood-glucose meter Misc Use as instructed    dextrose 5 % infusion     DOBUTamine (DOBUTREX) 250 mg/20 mL (12.5 mg/mL) " "injection     ferrous sulfate 325 (65 FE) MG EC tablet Take 1 tablet (325 mg total) by mouth every other day.    hydrALAZINE (APRESOLINE) 50 MG tablet Take 1.5 tablets (75 mg total) by mouth 3 (three) times daily.    HYDROcodone-acetaminophen (NORCO) 5-325 mg per tablet Take 1 tablet by mouth every 6 (six) hours as needed for Pain.    insulin aspart U-100 (NOVOLOG) 100 unit/mL (3 mL) InPn pen Inject 4 Units into the skin 3 (three) times daily with meals.    insulin detemir U-100 (LEVEMIR FLEXTOUCH) 100 unit/mL (3 mL) SubQ InPn pen Inject 8 Units into the skin every evening.    isosorbide dinitrate (ISORDIL) 20 MG tablet Take 2 tablets (40 mg total) by mouth 3 (three) times daily.    lancets 30 gauge Misc Use to test blood glucose four times daily before meals and nightly.    lancing device Misc Use to test blood glucose four times daily before meals and nightly.    levothyroxine (SYNTHROID) 175 MCG tablet Take 1 tablet (175 mcg total) by mouth before breakfast.    pen needle, diabetic 32 gauge x 5/32" Ndle Use to inject insulin four times daily before meals and nightly.    spironolactone (ALDACTONE) 25 MG tablet Take 1 tablet (25 mg total) by mouth once daily.    torsemide (DEMADEX) 100 MG Tab Take 1 tablet (100 mg total) by mouth once daily.     Family History     Problem Relation (Age of Onset)    Cancer Father        Tobacco Use    Smoking status: Never Smoker    Smokeless tobacco: Never Used    Tobacco comment: cigars "every other week or so"   Substance and Sexual Activity    Alcohol use: Not Currently     Alcohol/week: 3.0 standard drinks     Types: 3 Cans of beer per week    Drug use: Not Currently     Types: Marijuana    Sexual activity: Yes     Partners: Female     Review of Systems   Constitution: Negative. Negative for chills and fever.   HENT: Negative.    Eyes: Negative.    Cardiovascular: Negative for chest pain, claudication and paroxysmal nocturnal dyspnea.   Respiratory: Negative " for cough, shortness of breath and wheezing.    Endocrine: Negative.    Hematologic/Lymphatic: Does not bruise/bleed easily.   Skin: Negative for nail changes and rash.   Musculoskeletal: Negative.  Negative for back pain.   Gastrointestinal: Negative for abdominal pain, melena, nausea and vomiting.   Neurological: Negative for dizziness and headaches.   Psychiatric/Behavioral: Negative for altered mental status, depression and substance abuse.   Allergic/Immunologic: Negative.      Objective:     Vital Signs (Most Recent):  Temp: 97.8 °F (36.6 °C) (11/05/20 1603)  Pulse: 76 (11/05/20 1603)  Resp: 16 (11/05/20 1603)  BP: (!) 124/54 (11/05/20 1603)  SpO2: 98 % (11/05/20 1603) Vital Signs (24h Range):  Temp:  [97.8 °F (36.6 °C)] 97.8 °F (36.6 °C)  Pulse:  [76] 76  Resp:  [16] 16  SpO2:  [98 %] 98 %  BP: (124)/(54) 124/54     Weight: 66.2 kg (146 lb)  Body mass index is 20.36 kg/m².    SpO2: 98 %       No intake or output data in the 24 hours ending 11/05/20 2022    Lines/Drains/Airways     Central Venous Catheter Line            Tunneled Central Line Insertion/Assessment - Double Lumen  08/03/20 0825 right internal jugular 94 days                Physical Exam   Constitutional: He is oriented to person, place, and time. He appears well-developed and well-nourished.   HENT:   Head: Normocephalic and atraumatic.   Eyes: Pupils are equal, round, and reactive to light. Conjunctivae and EOM are normal.   Neck: JVD present.   Cardiovascular: Normal rate, regular rhythm, normal heart sounds and intact distal pulses. Exam reveals no gallop and no friction rub.   No murmur heard.  Pulmonary/Chest: Effort normal. No stridor. He has no wheezes. He has rales. He exhibits no tenderness.   Abdominal: Soft. Bowel sounds are normal. He exhibits no distension and no mass. There is no abdominal tenderness. There is no guarding.   Musculoskeletal:         General: No tenderness, deformity or edema.   Neurological: He is alert and  oriented to person, place, and time.   Skin: Skin is warm and dry. No rash noted. No erythema.   Psychiatric: He has a normal mood and affect.       Significant Labs:   CMP   Recent Labs   Lab 11/05/20  1800   *   K 4.0      CO2 16*   *   BUN 65*   CREATININE 2.7*   CALCIUM 8.0*   PROT 7.3   ALBUMIN 3.3*   BILITOT 1.3*   ALKPHOS 176*   AST 19   ALT 18   ANIONGAP 15   ESTGFRAFRICA 29.1*   EGFRNONAA 25.2*    and CBC   Recent Labs   Lab 11/05/20  1800   WBC 4.02   HGB 9.4*   HCT 31.2*   PLT 96*       Significant Imaging: Echocardiogram:   Transthoracic echo (TTE) complete (Cupid Only):   Results for orders placed or performed during the hospital encounter of 06/07/20   Echo Color Flow Doppler? Yes; Bubble Contrast? No   Result Value Ref Range    Ascending aorta 2.75 cm    STJ 2.51 cm    AV mean gradient 2 mmHg    Ao peak demarcus 0.96 m/s    Ao VTI 15.86 cm    IVS 0.78 0.6 - 1.1 cm    LA size 5.11 cm    Left Atrium Major Axis 7.26 cm    Left Atrium Minor Axis 7.25 cm    LVIDd 5.68 3.5 - 6.0 cm    LVIDs 4.94 (A) 2.1 - 4.0 cm    LVOT diameter 2.21 cm    LVOT peak VTI 8.45 cm    Posterior Wall 0.98 0.6 - 1.1 cm    MV Peak A Demarcus 0.24 m/s    E wave decelartion time 107.85 msec    MV Peak E Demarcus 1.35 m/s    RA Major Axis 6.20 cm    RA Width 4.94 cm    RVDD 5.05 cm    Sinus 3.02 cm    TAPSE 1.14 cm    TR Max Demarcus 3.54 m/s    TDI LATERAL 0.08 m/s    TDI SEPTAL 0.06 m/s    LA WIDTH 4.47 cm    LV Diastolic Volume 158.87 mL    LV Systolic Volume 115.15 mL    LVOT peak demarcus 0.57 m/s    LV LATERAL E/E' RATIO 16.88 m/s    LV SEPTAL E/E' RATIO 22.50 m/s    FS 13 %    LA volume 140.86 cm3    LV mass 190.78 g    Left Ventricle Relative Wall Thickness 0.35 cm    AV valve area 2.04 cm2    AV Velocity Ratio 0.59     AV index (prosthetic) 0.53     E/A ratio 5.63     Mean e' 0.07 m/s    LVOT area 3.8 cm2    LVOT stroke volume 32.40 cm3    AV peak gradient 4 mmHg    E/E' ratio 19.29 m/s    Triscuspid Valve Regurgitation Peak  Gradient 50 mmHg    BSA 1.96 m2    LV Systolic Volume Index 58.7 mL/m2    LV Diastolic Volume Index 80.92 mL/m2    LA Volume Index 71.7 mL/m2    LV Mass Index 97 g/m2    Right Atrial Pressure (from IVC) 15 mmHg    TV rest pulmonary artery pressure 65 mmHg    Narrative    · Severe left atrial enlargement.  · Mild left ventricular enlargement.  · Severely decreased left ventricular systolic function. The estimated   ejection fraction is 20%.  · Severe global hypokinetic wall motion.  · Grade III (severe) left ventricular diastolic dysfunction consistent   with restrictive physiology.  · Severe right atrial enlargement.  · Moderate right ventricular enlargement.  · Moderately reduced right ventricular systolic function.  · Moderate-to-severe mitral regurgitation.  · Mild to moderate tricuspid regurgitation.  · Pulmonary hypertension present.  · The estimated PA systolic pressure is 65 mmHg.  · Elevated central venous pressure (15 mmHg).  · Trivial pericardial effusion.

## 2020-11-06 NOTE — ASSESSMENT & PLAN NOTE
LVEF 20% on TTE 6/8/2020, LVIDD 5.6 cm, secondary to nonischemic cardiomyopathy  - Net -ve only 714 cc since admit. Will rebolus with Lasix 80 mg X 1, then increase gtt to 20 mg/hr  - continue home dobutamine 5 mcg/kg/min  - ACE/ARB contraindicated for renal function. Aldactone on hold  - Continue home Hydralazine 75 mg every 8 hours plus Isordil 40 mg every 8 hours (patient was taking only daily)  - Repeat TTE this admission  - maintain K>4 Mg>2  - Has no ICD as he is not currently a candidate for advanced options (smallish LV, non compliance)

## 2020-11-07 PROBLEM — R60.9 EDEMA: Status: ACTIVE | Noted: 2020-01-01

## 2020-11-07 PROBLEM — N64.4 NIPPLE PAIN: Status: ACTIVE | Noted: 2020-01-01

## 2020-11-07 NOTE — PROGRESS NOTES
[facility]  Heart Transplant  Progress Note    Patient Name: Ashish Mckeon  MRN: [id number]  Admission Date: 11/5/2020  Hospital Length of Stay: 2 days  Attending Physician: Gustavo Gardner MD  Primary Care Provider: [facility]  Principal Problem:Acute on chronic combined systolic and diastolic heart failure    Subjective:     **Interval History: Net -ve > 1.2L past 24 hours, but remains volume overloaded - will give 5 mg Metolazone today. Continues to feel better, but still with left nipple pain/slight swelling    Continuous Infusions:   DOBUTamine IV infusion (non-titrating) 5 mcg/kg/min (11/05/20 2231)    furosemide (LASIX) 10 mg/mL infusion (non-titrating) 20 mg/hr (11/07/20 0852)     Scheduled Meds:   aspirin  81 mg Oral Daily    atorvastatin  40 mg Oral Daily    ferrous sulfate  325 mg Oral Every other day    heparin (porcine)  5,000 Units Subcutaneous Q8H    hydrALAZINE  75 mg Oral TID    insulin detemir U-100  8 Units Subcutaneous QHS    isosorbide dinitrate  40 mg Oral TID    levothyroxine  175 mcg Oral Before breakfast    magnesium oxide  400 mg Oral Daily    metOLazone  5 mg Oral Once    polyethylene glycol  17 g Oral Daily    potassium chloride SA  20 mEq Oral BID     PRN Meds:acetaminophen, dextrose 50%, dextrose 50%, insulin aspart U-100, sodium chloride 0.9%    Review of patient's allergies indicates:   Allergen Reactions    Lisinopril Other (See Comments)     acei cough      Cefazolin Nausea And Vomiting     Objective:     Vital Signs (Most Recent):  Temp: 97.5 °F (36.4 °C) (11/07/20 0709)  Pulse: 91 (11/07/20 0800)  Resp: 14 (11/07/20 0709)  BP: 114/78 (11/07/20 0709)  SpO2: 98 % (11/07/20 0315) Vital Signs (24h Range):  Temp:  [97.4 °F (36.3 °C)-97.8 °F (36.6 °C)] 97.5 °F (36.4 °C)  Pulse:  [62-91] 91  Resp:  [11-20] 14  SpO2:  [97 %-99 %] 98 %  BP: ()/(65-83) 114/78     Patient Vitals for the past 72 hrs (Last 3 readings):   Weight  "  11/07/20 0500 60.8 kg (134 lb 0.6 oz)   11/06/20 1011 60.9 kg (134 lb 4.2 oz)   11/06/20 0600 60.9 kg (134 lb 4.2 oz)     Body mass index is 18.7 kg/m².      Intake/Output Summary (Last 24 hours) at 11/7/2020 0922  Last data filed at 11/7/2020 0900  Gross per 24 hour   Intake 1277.75 ml   Output 3740 ml   Net -2462.25 ml       Hemodynamic Parameters:       Telemetry: SR with NSVT    Physical Exam  Constitutional:       Appearance: Normal appearance.   HENT:      Head: Normocephalic and atraumatic.      Mouth/Throat:      Mouth: Mucous membranes are moist.      Pharynx: Oropharynx is clear.   Eyes:      Extraocular Movements: Extraocular movements intact.      Conjunctiva/sclera: Conjunctivae normal.      Pupils: Pupils are equal, round, and reactive to light.   Neck:      Musculoskeletal: Normal range of motion and neck supple.      Comments: + JVD, "v" waves radiating up to neck  Cardiovascular:      Rate and Rhythm: Normal rate and regular rhythm.      Comments: Grade III/VI systolic murmur at apex, + S3  Pulmonary:      Effort: Pulmonary effort is normal.      Breath sounds: Normal breath sounds.   Abdominal:      General: Bowel sounds are normal.      Palpations: Abdomen is soft.   Musculoskeletal: Normal range of motion.      Comments: Trace edema RLE, no edema LLE   Skin:     General: Skin is warm and dry.      Capillary Refill: Capillary refill takes 2 to 3 seconds.   Neurological:      General: No focal deficit present.      Mental Status: He is alert and oriented to person, place, and time.   Psychiatric:         Mood and Affect: Mood normal.         Behavior: Behavior normal.         Thought Content: Thought content normal.         Judgment: Judgment normal.         Significant Labs:  CBC:  Recent Labs   Lab 11/05/20  1800 11/06/20  0958 11/07/20  0711   WBC 4.02 3.50* 3.66*   RBC 3.87* 3.95* 3.76*   HGB 9.4* 9.7* 9.3*   HCT 31.2* 30.7* 29.1*   PLT 96* 108* 115*   MCV 81* 78* 77*   MCH 24.3* 24.6* 24.7* "   MCHC 30.1* 31.6* 32.0     BNP:  Recent Labs   Lab 11/02/20  1640 11/05/20  1800   BNP 1,434* 1,293*     CMP:  Recent Labs   Lab 11/02/20  1640 11/05/20  1800 11/06/20  0958 11/06/20  2323 11/07/20  0711   * 188* 152*  --  114*   CALCIUM 8.2* 8.0* 8.4*  --  8.1*   ALBUMIN 3.6 3.3* 3.3*  --   --    PROT 7.7 7.3 7.3  --   --    * 133* 136  --  135*   K 4.3 4.0 3.8 3.6 3.8   CO2 16* 16* 22*  --  24    102 102  --  99   BUN 79* 65* 64*  --  60*   CREATININE 3.2* 2.7* 2.5*  --  2.6*   ALKPHOS 191* 176* 187*  --   --    ALT 21 18 17  --   --    AST 21 19 19  --   --    BILITOT 1.3* 1.3* 1.4*  --   --       Coagulation:   No results for input(s): PT, INR, APTT in the last 168 hours.  LDH:  No results for input(s): LDH in the last 72 hours.  Microbiology:  Microbiology Results (last 7 days)     ** No results found for the last 168 hours. **          I have reviewed all pertinent labs within the past 24 hours.    Estimated Creatinine Clearance: 27.3 mL/min (A) (based on SCr of 2.6 mg/dL (H)).    Diagnostic Results:  I have reviewed all pertinent imaging results/findings within the past 24 hours.    Assessment and Plan:     Mr. Mckeon is a 56 y/o M with stage D HFrEF 2/2 NICM, HTN, HLD, DM2 who presents in setting of elevated Cr concerning for acute on chronic heart failure. Patient followed by Dr. Gardner, per last clinic note multiple admissions in last year, continued on home  (patient said 2.5 but appears to be on 5 mcg/kg/min as planned). Following routine labs with elevated Cr 3.2 (baseline 2.3-2.6).  LFTs, TBili at baseline. Continued NYHA class II symptoms, no PND or orthopnea. He reports intermittent med nonadherence, taking bidil only daily. He recently underwent a liver biopsy with no evidence of cirrhosis. Hepatology has cleared him for advanced HF options. Otherwise denies chest pain, palpitations or shortness of breath. Able to complete ADLs independently, ambulate approximately 50 yards before  symptoms of SOB, able to drive to grocery store.     TTE 6/8/2020  · Severe left atrial enlargement.  · Mild left ventricular enlargement.  · Severely decreased left ventricular systolic function. The estimated ejection fraction is 20%.  · Severe global hypokinetic wall motion.  · Grade III (severe) left ventricular diastolic dysfunction consistent with restrictive physiology.  · Severe right atrial enlargement.  · Moderate right ventricular enlargement.  · Moderately reduced right ventricular systolic function.  · Moderate-to-severe mitral regurgitation.  · Mild to moderate tricuspid regurgitation.  · Pulmonary hypertension present.  · The estimated PA systolic pressure is 65 mmHg.  · Elevated central venous pressure (15 mmHg).  Trivial pericardial effusion.    * Acute on chronic combined systolic and diastolic heart failure  LVEF 20% on TTE 6/8/2020, LVIDD 5.6 cm, secondary to nonischemic cardiomyopathy  - Net -ve only 1.5L past 24 hours. Continue Lasix 20 mg/hr, and give 5 mg Metolazone today  - continue home dobutamine 5 mcg/kg/min  - ACE/ARB contraindicated for renal function. Aldactone on hold  - Continue home Hydralazine 75 mg every 8 hours plus Isordil 40 mg every 8 hours (patient was taking only daily)  - TTE 11/6: LVEDD 5.83, LVEF 20%, grade 3 diastolic dysfunction, RV mildly dilated and severely depressed, trace AI, mod-severe MR, mod TR, PAS 64, IVC 15, small circumferential pericardial effusion  - maintain K>4 Mg>2  - Has no ICD as he is not currently a candidate for advanced options (smallish LV, non compliance)    Nipple pain  - Has left nipple pain/slight swelling. Aldactone on hold  - Awaiting US left nipple    Edema  - Has RLE edema, no LLE edema. Venous US 11/6 -ve for DVT    Elevated liver enzymes  - AST/ALT wnl; Tbili at baseline slightly elevated at 1.3  - Continue to monitor    Acute kidney injury superimposed on CKD3  - sCr elevated to 3.2 on admit from baseline of 2.2-2.8. Trending down with  diuresis at 2.6  - Multi-factorial from stage D heart failure vs diabetic nephropathy  - Continue diuresis, strict intake and out put  - Continue to monitor    Diabetes mellitus, type II  - Insulin dependent, last A1c 7.9  - Continue home Detemir 8 units PM  - Moderate intensity bolus SSI with meals  - Repeat A1c, may benefit from Endocrine consult this admission for better glycemic control     Hypothyroidism  - Continue home synthroid 175 mcg daily  - Repeat TSH 2.408    Thrombocytopenia  - Chronic, noted on chart review; pancytopenic likely 2/2 to anemia of chronic disease  - May warrant peripheral blood smear, out patient hematology evaluation   - Continue ASA  - Continue to monitor        Adriana Cortez NP [id number]  Heart Transplant  [facility]

## 2020-11-07 NOTE — NURSING
Nurse was informed by respiratory tech of daily VBG to be collected by patient. Checked with DAGO Cortez on whether staff was still collecting VBGs. NP said nurse did not have to collect VBG and to d/c the order. Nurse informed respiratory tech.

## 2020-11-07 NOTE — NURSING
Patient experienced 14-beat run of V. Tach; asymptomatic. HTS notified. STAT magnesium and potassium levels ordered. Will continue to monitor.

## 2020-11-07 NOTE — SUBJECTIVE & OBJECTIVE
**Interval History: Net -ve > 1.2L past 24 hours, but remains volume overloaded - will give 5 mg Metolazone today. Continues to feel better, but still with left nipple pain/slight swelling    Continuous Infusions:   DOBUTamine IV infusion (non-titrating) 5 mcg/kg/min (11/05/20 2231)    furosemide (LASIX) 10 mg/mL infusion (non-titrating) 20 mg/hr (11/07/20 0852)     Scheduled Meds:   aspirin  81 mg Oral Daily    atorvastatin  40 mg Oral Daily    ferrous sulfate  325 mg Oral Every other day    heparin (porcine)  5,000 Units Subcutaneous Q8H    hydrALAZINE  75 mg Oral TID    insulin detemir U-100  8 Units Subcutaneous QHS    isosorbide dinitrate  40 mg Oral TID    levothyroxine  175 mcg Oral Before breakfast    magnesium oxide  400 mg Oral Daily    metOLazone  5 mg Oral Once    polyethylene glycol  17 g Oral Daily    potassium chloride SA  20 mEq Oral BID     PRN Meds:acetaminophen, dextrose 50%, dextrose 50%, insulin aspart U-100, sodium chloride 0.9%    Review of patient's allergies indicates:   Allergen Reactions    Lisinopril Other (See Comments)     acei cough      Cefazolin Nausea And Vomiting     Objective:     Vital Signs (Most Recent):  Temp: 97.5 °F (36.4 °C) (11/07/20 0709)  Pulse: 91 (11/07/20 0800)  Resp: 14 (11/07/20 0709)  BP: 114/78 (11/07/20 0709)  SpO2: 98 % (11/07/20 0315) Vital Signs (24h Range):  Temp:  [97.4 °F (36.3 °C)-97.8 °F (36.6 °C)] 97.5 °F (36.4 °C)  Pulse:  [62-91] 91  Resp:  [11-20] 14  SpO2:  [97 %-99 %] 98 %  BP: ()/(65-83) 114/78     Patient Vitals for the past 72 hrs (Last 3 readings):   Weight   11/07/20 0500 60.8 kg (134 lb 0.6 oz)   11/06/20 1011 60.9 kg (134 lb 4.2 oz)   11/06/20 0600 60.9 kg (134 lb 4.2 oz)     Body mass index is 18.7 kg/m².      Intake/Output Summary (Last 24 hours) at 11/7/2020 0922  Last data filed at 11/7/2020 0900  Gross per 24 hour   Intake 1277.75 ml   Output 3740 ml   Net -2462.25 ml       Hemodynamic Parameters:       Telemetry:  "SR with NSVT    Physical Exam  Constitutional:       Appearance: Normal appearance.   HENT:      Head: Normocephalic and atraumatic.      Mouth/Throat:      Mouth: Mucous membranes are moist.      Pharynx: Oropharynx is clear.   Eyes:      Extraocular Movements: Extraocular movements intact.      Conjunctiva/sclera: Conjunctivae normal.      Pupils: Pupils are equal, round, and reactive to light.   Neck:      Musculoskeletal: Normal range of motion and neck supple.      Comments: + JVD, "v" waves radiating up to neck  Cardiovascular:      Rate and Rhythm: Normal rate and regular rhythm.      Comments: Grade III/VI systolic murmur at apex, + S3  Pulmonary:      Effort: Pulmonary effort is normal.      Breath sounds: Normal breath sounds.   Abdominal:      General: Bowel sounds are normal.      Palpations: Abdomen is soft.   Musculoskeletal: Normal range of motion.      Comments: Trace edema RLE, no edema LLE   Skin:     General: Skin is warm and dry.      Capillary Refill: Capillary refill takes 2 to 3 seconds.   Neurological:      General: No focal deficit present.      Mental Status: He is alert and oriented to person, place, and time.   Psychiatric:         Mood and Affect: Mood normal.         Behavior: Behavior normal.         Thought Content: Thought content normal.         Judgment: Judgment normal.         Significant Labs:  CBC:  Recent Labs   Lab 11/05/20  1800 11/06/20  0958 11/07/20  0711   WBC 4.02 3.50* 3.66*   RBC 3.87* 3.95* 3.76*   HGB 9.4* 9.7* 9.3*   HCT 31.2* 30.7* 29.1*   PLT 96* 108* 115*   MCV 81* 78* 77*   MCH 24.3* 24.6* 24.7*   MCHC 30.1* 31.6* 32.0     BNP:  Recent Labs   Lab 11/02/20  1640 11/05/20  1800   BNP 1,434* 1,293*     CMP:  Recent Labs   Lab 11/02/20  1640 11/05/20  1800 11/06/20  0958 11/06/20  2323 11/07/20  0711   * 188* 152*  --  114*   CALCIUM 8.2* 8.0* 8.4*  --  8.1*   ALBUMIN 3.6 3.3* 3.3*  --   --    PROT 7.7 7.3 7.3  --   --    * 133* 136  --  135*   K 4.3 " 4.0 3.8 3.6 3.8   CO2 16* 16* 22*  --  24    102 102  --  99   BUN 79* 65* 64*  --  60*   CREATININE 3.2* 2.7* 2.5*  --  2.6*   ALKPHOS 191* 176* 187*  --   --    ALT 21 18 17  --   --    AST 21 19 19  --   --    BILITOT 1.3* 1.3* 1.4*  --   --       Coagulation:   No results for input(s): PT, INR, APTT in the last 168 hours.  LDH:  No results for input(s): LDH in the last 72 hours.  Microbiology:  Microbiology Results (last 7 days)     ** No results found for the last 168 hours. **          I have reviewed all pertinent labs within the past 24 hours.    Estimated Creatinine Clearance: 27.3 mL/min (A) (based on SCr of 2.6 mg/dL (H)).    Diagnostic Results:  I have reviewed all pertinent imaging results/findings within the past 24 hours.

## 2020-11-07 NOTE — ASSESSMENT & PLAN NOTE
LVEF 20% on TTE 6/8/2020, LVIDD 5.6 cm, secondary to nonischemic cardiomyopathy  - Net -ve only 1.5L past 24 hours. Continue Lasix 20 mg/hr, and give 5 mg Metolazone today  - continue home dobutamine 5 mcg/kg/min  - ACE/ARB contraindicated for renal function. Aldactone on hold  - Continue home Hydralazine 75 mg every 8 hours plus Isordil 40 mg every 8 hours (patient was taking only daily)  - TTE 11/6: LVEDD 5.83, LVEF 20%, grade 3 diastolic dysfunction, RV mildly dilated and severely depressed, trace AI, mod-severe MR, mod TR, PAS 64, IVC 15, small circumferential pericardial effusion  - maintain K>4 Mg>2  - Has no ICD as he is not currently a candidate for advanced options (smallish LV, non compliance)

## 2020-11-07 NOTE — CARE UPDATE
Sent a message to the dayshift nurse regarding obtaining a blood sample for VBG today. She will notify respiratory therapy once sample is obtained to analyze.

## 2020-11-07 NOTE — PLAN OF CARE
Plan of care discussed with patient. Patient is free of fall/trauma/injury. Denies CP, or SOB. Patient c/o of pain on his feet. Upon assessment small spots were noted at the bottom of patients feet. HTS team is investigating if pain is possible gout related due to patient's history in his wrist. Supplemental potassium administered. Patient continues on  gtt 5mcg/kg. All questions addressed. Will continue to monitor

## 2020-11-07 NOTE — PLAN OF CARE
Plan of care reviewed with patient. Remains free from fall and injury. Does not complain of any pain or discomfort at this time. VSS on room air. NSR on tele; no S/S of cardiac distress noted. BG monitored and covered per sliding scale.  and Lasix gtt maintained; patient diuresing. Bed in lowest locked position. Call light within reach. Will continue to monitor.

## 2020-11-07 NOTE — ASSESSMENT & PLAN NOTE
- sCr elevated to 3.2 on admit from baseline of 2.2-2.8. Trending down with diuresis at 2.6  - Multi-factorial from stage D heart failure vs diabetic nephropathy  - Continue diuresis, strict intake and out put  - Continue to monitor

## 2020-11-08 PROBLEM — L81.9 HYPERPIGMENTED SKIN LESION: Status: ACTIVE | Noted: 2020-01-01

## 2020-11-08 PROBLEM — M10.9 GOUT: Status: ACTIVE | Noted: 2020-01-01

## 2020-11-08 NOTE — CONSULTS
[facility]  Rheumatology  Consult Note    Patient Name: Ashish Mckeon  MRN: [id number]  Admission Date: 11/5/2020  Hospital Length of Stay: 3 days  Code Status: Full Code   Attending Provider: Gustavo Gardner MD  Primary Care Physician: [facility]  Principal Problem:Acute on chronic combined systolic and diastolic heart failure    Consults  Subjective:     HPI: Mr. Mckeon is a 56 y/o M with stage D HFrEF 2/2 NICM, HTN, HLD, DM2 who presents in setting of elevated Cr concerning for acute on chronic heart failure. Patient followed by Dr. Gardner, per last clinic note multiple admissions in last year, continued on home  (patient said 2.5 but appears to be on 5 mcg/kg/min as planned). Following routine labs with elevated Cr 3.2 (baseline 2.3-2.6).  LFTs, TBili at baseline. Continued NYHA class II symptoms, no PND or orthopnea. He reports intermittent med nonadherence, taking bidil only daily. He recently underwent a liver biopsy with no evidence of cirrhosis. Hepatology has cleared him for advanced HF options. Otherwise denies chest pain, palpitations or shortness of breath. Able to complete ADLs independently, ambulate approximately 50 yards before symptoms of SOB, able to drive to grocery store.     Since admission, patient has been diuresed and doing well from that standpoint.  We were consulted as patient has been having ankle pain as well as some hyperpigmented papules on palms and soles of feet.  Patient states that he has been having pain in his right ankle for the past few days.  States he often has pains in his ankles that move from one to the other.  Also states that he has pain on the soles of both of his feet.  Has never had acute swelling of his 1st MTPs.  Does reports some pain and swelling occasionally of joints of his hands.  Of note, patient had an episode of left wrist pain and swelling in 8/2019 that was initially thought to be infectious but was positive for  "crystals on fluid analysis.  Has had evidence of CPPD on imaging previously.  He has not been on any ULT.  In regards to patient's rash, he was not aware of this until it was pointed out on this admission.  Has noticed the hyperpigmented lesions on his hands.  Patient denies any family history of autoimmune conditions such as lupus or vasculitis.    Past Medical History:   Diagnosis Date    CHF (congestive heart failure)     Diabetes mellitus type II     Essential hypertension, benign     Hyperlipidemia     Hypothyroidism     Pain and swelling of left wrist 8/5/2019    Ashish Mckeon is a 54 y.o. male with PMH of  presenting with CHF, IDDM, Hypothyroidism, HTN, HLD presenting with worsening L wrist pain for 1 day. Orthopedic surgery was consulted for septic joint r/o. Pt has been afebrile and has no elevated WBC. ESR 36, normal CRP. Xray shows no signs of trauma and pt has no hx of gout or septic arthritis. Due to atraumatic wrist pain and swelling w/out identifia    Stage 3 chronic kidney disease 6/9/2020    Undiagnosed cardiac murmurs        Past Surgical History:   Procedure Laterality Date    COLON SURGERY      "lower intestines removed"    ORTHOPEDIC SURGERY Left     wrist    RIGHT HEART CATHETERIZATION Right 6/17/2020    Procedure: INSERTION, CATHETER, RIGHT HEART;  Surgeon: Kimberly Rodgers MD;  Location: [facility] CATH LAB;  Service: Cardiology;  Laterality: Right;    THYROID SURGERY           There is no immunization history on file for this patient.    Review of patient's allergies indicates:   Allergen Reactions    Lisinopril Other (See Comments)     acei cough      Cefazolin Nausea And Vomiting     Current Facility-Administered Medications   Medication Frequency    acetaminophen tablet 650 mg Q6H PRN    aspirin chewable tablet 81 mg Daily    atorvastatin tablet 40 mg Daily    dextrose 50% injection 12.5 g PRN    dextrose 50% injection 25 g PRN    DOBUtamine 1000 mg in D5W 250 mL infusion " "Continuous    ferrous sulfate EC tablet 325 mg Every other day    furosemide (LASIX) 500 mg infusion (conc: 10 mg/mL) Continuous    heparin (porcine) injection 5,000 Units Q8H    hydrALAZINE tablet 75 mg TID    insulin aspart U-100 pen 1-10 Units QID (AC + HS) PRN    insulin aspart U-100 pen 3 Units TIDWM    insulin detemir U-100 pen 8 Units QHS    isosorbide dinitrate tablet 40 mg TID    levothyroxine tablet 175 mcg Before breakfast    magnesium oxide tablet 400 mg Daily    polyethylene glycol packet 17 g Daily    potassium chloride SA CR tablet 40 mEq BID    sodium chloride 0.9% flush 10 mL PRN     Family History     Problem Relation (Age of Onset)    Cancer Father        Tobacco Use    Smoking status: Never Smoker    Smokeless tobacco: Never Used    Tobacco comment: cigars "every other week or so"   Substance and Sexual Activity    Alcohol use: Not Currently     Alcohol/week: 3.0 standard drinks     Types: 3 Cans of beer per week    Drug use: Not Currently     Types: Marijuana    Sexual activity: Yes     Partners: Female     Review of Systems   Constitutional: Negative for chills and fever.   HENT: Negative for sore throat, trouble swallowing and voice change.    Eyes: Negative for pain and visual disturbance.   Respiratory: Negative for chest tightness, shortness of breath and wheezing.    Cardiovascular: Negative for chest pain and palpitations.   Gastrointestinal: Negative for abdominal pain, constipation, diarrhea, nausea and vomiting.   Endocrine: Negative for cold intolerance, heat intolerance and polyuria.   Genitourinary: Negative for dysuria, frequency, hematuria and urgency.   Musculoskeletal: Positive for arthralgias and joint swelling. Negative for back pain, myalgias and neck pain.   Skin: Positive for rash.   Neurological: Negative for tremors, seizures, syncope and headaches.   Psychiatric/Behavioral: Negative for confusion and sleep disturbance.     Objective:     Vital Signs " (Most Recent):  Temp: 97.9 °F (36.6 °C) (11/08/20 1120)  Pulse: 88 (11/08/20 1120)  Resp: 13 (11/08/20 1120)  BP: 95/69 (11/08/20 1120)  SpO2: 99 % (11/08/20 1120)  O2 Device (Oxygen Therapy): room air (11/08/20 0524) Vital Signs (24h Range):  Temp:  [97.9 °F (36.6 °C)-98.5 °F (36.9 °C)] 97.9 °F (36.6 °C)  Pulse:  [84-97] 88  Resp:  [12-20] 13  SpO2:  [98 %-99 %] 99 %  BP: ()/(68-84) 95/69     Weight: 60.8 kg (134 lb 0.6 oz) (11/07/20 0500)  Body mass index is 18.7 kg/m².  Body surface area is 1.75 meters squared.      Intake/Output Summary (Last 24 hours) at 11/8/2020 1237  Last data filed at 11/8/2020 1214  Gross per 24 hour   Intake 1395.26 ml   Output 4100 ml   Net -2704.74 ml       Physical Exam   Nursing note and vitals reviewed.  Constitutional: He is oriented to person, place, and time and well-developed, well-nourished, and in no distress. No distress.   HENT:   Head: Normocephalic and atraumatic.   Mouth/Throat: Oropharynx is clear and moist. No oropharyngeal exudate.   Eyes: EOM are normal. Pupils are equal, round, and reactive to light. Right eye exhibits no discharge. Left eye exhibits no discharge. No scleral icterus.   Neck: Normal range of motion. Neck supple.   Cardiovascular: Normal rate, regular rhythm and intact distal pulses.    Murmur heard.  Pulmonary/Chest: Effort normal and breath sounds normal. No respiratory distress.   Abdominal: Soft. He exhibits no distension. There is no abdominal tenderness. There is no rebound and no guarding.   Neurological: He is alert and oriented to person, place, and time.   Skin: Skin is warm and dry. No rash noted. He is not diaphoretic. No erythema.     Hyperpigmented papules on soles of feet primarily with some on palms as well   Psychiatric: Mood and affect normal.   Musculoskeletal: Tenderness present. No edema.      Comments: Pt with some swelling and tenderness of right ankle; Pain with palpation of both feet  - Patient with bony enlargement of PIP  and DIPs of hands         Significant Labs:  All pertinent lab results from the last 24 hours have been reviewed.   Results for ASHER KAUFFMAN (MRN [id number]) as of 11/8/2020 12:41   Ref. Range 6/9/2020 23:52   MONICA Screen Latest Ref Range: Negative <1:80  Positive (A)   MONICA Titer 1 Unknown 1:320   MONICA PATTERN 1 Unknown Homogeneous   ds DNA Ab Latest Ref Range: Negative 1:10  Negative 1:10   Anti-SSA Antibody Latest Ref Range: 0.00 - 0.99 Ratio 0.07   Anti-SSA Interpretation Latest Ref Range: Negative  Negative   Anti-SSB Antibody Latest Ref Range: 0.00 - 0.99 Ratio 0.10   Anti-SSB Interpretation Latest Ref Range: Negative  Negative   Anti Sm Antibody Latest Ref Range: 0.00 - 0.99 Ratio 0.09   Anti-Sm Interpretation Latest Ref Range: Negative  Negative   Anti Sm/RNP Antibody Latest Ref Range: 0.00 - 0.99 Ratio 0.07   Anti-Sm/RNP Interpretation Latest Ref Range: Negative  Negative   Smooth Muscle Ab Latest Ref Range: Negative  Positive 1:40 (A)       Results for ASHER KAUFFMAN (MRN [id number]) as of 11/8/2020 12:41   Ref. Range 8/5/2019 10:38 10/7/2019 09:31 11/8/2020 09:42   Uric Acid Latest Ref Range: 3.4 - 7.0 mg/dL 9.0 (H) 9.4 (H) 15.5 (H)       Significant Imaging:  Imaging results within the past 24 hours have been reviewed.    Assessment/Plan:     Hyperpigmented skin lesion  Pt with newly discovered hyperpigmented lesions on soles of feet and hands  - Rheumatology consulted for concern for vasculitis  - Lesions don't appear to be classical vasculitic lesions  - Would recommend checking C3, C4, DRVVT, Beta2 antibodies, Cardiolipin, ANCA, MPO, PR3, Cryoglobulins, RPR, ACE  - Consider Blood cultures  - Repeat UA with Urine Pr/Cr  - Consider dermatology consult for skin lesions    Gout  Pt with acute onset of right ankle pain  - Previous aspiration of left wrist with crystals (unclear which type)  - Uric acid significantly elevated at 15.5  - Also with note on previous imaging of possible CPPD  - Reports some pain in his  hands as well in the past with notable bony enlargement and deformity  - Would recommend Xrays of Hands bilaterally and Xrays of ankles/feet bilaterally (XR Arthritis Survey)  - Can give patient Colchicine 1.2mg x 1 then Colchicine 0.6mg 1 hour later followed by Colchicine 0.6mg daily  - Would avoid steroids in this patient  - Can consider urate lowering therapy after acute flare resolved      Thank you for your consult. I will follow-up with patient. Please contact us if you have any additional questions.     Patient discussed with attending Dr. Chavez.  See separate note.    Rogerio Chaudhary MD  Rheumatology  [facility]

## 2020-11-08 NOTE — SUBJECTIVE & OBJECTIVE
**Interval History: No complaints or overnight events. Net -ve > 2.7L past 24 hours, but remains volume overloaded by exam.    Continuous Infusions:   DOBUTamine IV infusion (non-titrating) 5 mcg/kg/min (11/08/20 0507)    furosemide (LASIX) 10 mg/mL infusion (non-titrating) 20 mg/hr (11/08/20 0508)     Scheduled Meds:   aspirin  81 mg Oral Daily    atorvastatin  40 mg Oral Daily    ferrous sulfate  325 mg Oral Every other day    heparin (porcine)  5,000 Units Subcutaneous Q8H    hydrALAZINE  75 mg Oral TID    insulin detemir U-100  8 Units Subcutaneous QHS    isosorbide dinitrate  40 mg Oral TID    levothyroxine  175 mcg Oral Before breakfast    magnesium oxide  400 mg Oral Daily    polyethylene glycol  17 g Oral Daily    potassium chloride SA  20 mEq Oral BID     PRN Meds:acetaminophen, dextrose 50%, dextrose 50%, insulin aspart U-100, sodium chloride 0.9%    Review of patient's allergies indicates:   Allergen Reactions    Lisinopril Other (See Comments)     acei cough      Cefazolin Nausea And Vomiting     Objective:     Vital Signs (Most Recent):  Temp: 98.5 °F (36.9 °C) (11/08/20 0706)  Pulse: 84 (11/08/20 0706)  Resp: 18 (11/08/20 0706)  BP: 94/68 (11/08/20 0706)  SpO2: 99 % (11/08/20 0706) Vital Signs (24h Range):  Temp:  [98 °F (36.7 °C)-98.5 °F (36.9 °C)] 98.5 °F (36.9 °C)  Pulse:  [83-97] 84  Resp:  [12-20] 18  SpO2:  [98 %-99 %] 99 %  BP: ()/(68-84) 94/68     Patient Vitals for the past 72 hrs (Last 3 readings):   Weight   11/07/20 0500 60.8 kg (134 lb 0.6 oz)   11/06/20 1011 60.9 kg (134 lb 4.2 oz)   11/06/20 0600 60.9 kg (134 lb 4.2 oz)     Body mass index is 18.7 kg/m².      Intake/Output Summary (Last 24 hours) at 11/8/2020 0748  Last data filed at 11/8/2020 0600  Gross per 24 hour   Intake 1635.26 ml   Output 3900 ml   Net -2264.74 ml       Hemodynamic Parameters:       Telemetry: SR with NSVT    Physical Exam  Constitutional:       Appearance: Normal appearance.   HENT:       "Head: Normocephalic and atraumatic.      Mouth/Throat:      Mouth: Mucous membranes are moist.      Pharynx: Oropharynx is clear.   Eyes:      Extraocular Movements: Extraocular movements intact.      Conjunctiva/sclera: Conjunctivae normal.      Pupils: Pupils are equal, round, and reactive to light.   Neck:      Musculoskeletal: Normal range of motion and neck supple.      Comments: + JVD, "v" waves radiating up to neck  Cardiovascular:      Rate and Rhythm: Normal rate and regular rhythm.      Comments: Grade III/VI systolic murmur at apex, + S3  Pulmonary:      Effort: Pulmonary effort is normal.      Breath sounds: Normal breath sounds.   Abdominal:      General: Bowel sounds are normal.      Palpations: Abdomen is soft.   Musculoskeletal: Normal range of motion.      Comments: Trace - 1+ edema RLE, no edema LLE   Skin:     General: Skin is warm and dry.      Capillary Refill: Capillary refill takes 2 to 3 seconds.   Neurological:      General: No focal deficit present.      Mental Status: He is alert and oriented to person, place, and time.   Psychiatric:         Mood and Affect: Mood normal.         Behavior: Behavior normal.         Thought Content: Thought content normal.         Judgment: Judgment normal.         Significant Labs:  CBC:  Recent Labs   Lab 11/05/20  1800 11/06/20  0958 11/07/20  0711   WBC 4.02 3.50* 3.66*   RBC 3.87* 3.95* 3.76*   HGB 9.4* 9.7* 9.3*   HCT 31.2* 30.7* 29.1*   PLT 96* 108* 115*   MCV 81* 78* 77*   MCH 24.3* 24.6* 24.7*   MCHC 30.1* 31.6* 32.0     BNP:  Recent Labs   Lab 11/02/20  1640 11/05/20  1800   BNP 1,434* 1,293*     CMP:  Recent Labs   Lab 11/02/20  1640 11/05/20  1800 11/06/20  0958 11/06/20  2323 11/07/20  0711   * 188* 152*  --  114*   CALCIUM 8.2* 8.0* 8.4*  --  8.1*   ALBUMIN 3.6 3.3* 3.3*  --   --    PROT 7.7 7.3 7.3  --   --    * 133* 136  --  135*   K 4.3 4.0 3.8 3.6 3.8   CO2 16* 16* 22*  --  24    102 102  --  99   BUN 79* 65* 64*  --  60* "   CREATININE 3.2* 2.7* 2.5*  --  2.6*   ALKPHOS 191* 176* 187*  --   --    ALT 21 18 17  --   --    AST 21 19 19  --   --    BILITOT 1.3* 1.3* 1.4*  --   --       Coagulation:   No results for input(s): PT, INR, APTT in the last 168 hours.  LDH:  No results for input(s): LDH in the last 72 hours.  Microbiology:  Microbiology Results (last 7 days)     ** No results found for the last 168 hours. **          I have reviewed all pertinent labs within the past 24 hours.    Estimated Creatinine Clearance: 27.3 mL/min (A) (based on SCr of 2.6 mg/dL (H)).    Diagnostic Results:  I have reviewed all pertinent imaging results/findings within the past 24 hours.

## 2020-11-08 NOTE — SUBJECTIVE & OBJECTIVE
"Past Medical History:   Diagnosis Date    CHF (congestive heart failure)     Diabetes mellitus type II     Essential hypertension, benign     Hyperlipidemia     Hypothyroidism     Pain and swelling of left wrist 8/5/2019    Ashish Mckeon is a 54 y.o. male with PMH of  presenting with CHF, IDDM, Hypothyroidism, HTN, HLD presenting with worsening L wrist pain for 1 day. Orthopedic surgery was consulted for septic joint r/o. Pt has been afebrile and has no elevated WBC. ESR 36, normal CRP. Xray shows no signs of trauma and pt has no hx of gout or septic arthritis. Due to atraumatic wrist pain and swelling w/out identifia    Stage 3 chronic kidney disease 6/9/2020    Undiagnosed cardiac murmurs        Past Surgical History:   Procedure Laterality Date    COLON SURGERY      "lower intestines removed"    ORTHOPEDIC SURGERY Left     wrist    RIGHT HEART CATHETERIZATION Right 6/17/2020    Procedure: INSERTION, CATHETER, RIGHT HEART;  Surgeon: Kimberly Rodgers MD;  Location: [facility] CATH LAB;  Service: Cardiology;  Laterality: Right;    THYROID SURGERY           There is no immunization history on file for this patient.    Review of patient's allergies indicates:   Allergen Reactions    Lisinopril Other (See Comments)     acei cough      Cefazolin Nausea And Vomiting     Current Facility-Administered Medications   Medication Frequency    acetaminophen tablet 650 mg Q6H PRN    aspirin chewable tablet 81 mg Daily    atorvastatin tablet 40 mg Daily    dextrose 50% injection 12.5 g PRN    dextrose 50% injection 25 g PRN    DOBUtamine 1000 mg in D5W 250 mL infusion Continuous    ferrous sulfate EC tablet 325 mg Every other day    furosemide (LASIX) 500 mg infusion (conc: 10 mg/mL) Continuous    heparin (porcine) injection 5,000 Units Q8H    hydrALAZINE tablet 75 mg TID    insulin aspart U-100 pen 1-10 Units QID (AC + HS) PRN    insulin aspart U-100 pen 3 Units TIDWM    insulin detemir U-100 pen 8 Units QHS " "   isosorbide dinitrate tablet 40 mg TID    levothyroxine tablet 175 mcg Before breakfast    magnesium oxide tablet 400 mg Daily    polyethylene glycol packet 17 g Daily    potassium chloride SA CR tablet 40 mEq BID    sodium chloride 0.9% flush 10 mL PRN     Family History     Problem Relation (Age of Onset)    Cancer Father        Tobacco Use    Smoking status: Never Smoker    Smokeless tobacco: Never Used    Tobacco comment: cigars "every other week or so"   Substance and Sexual Activity    Alcohol use: Not Currently     Alcohol/week: 3.0 standard drinks     Types: 3 Cans of beer per week    Drug use: Not Currently     Types: Marijuana    Sexual activity: Yes     Partners: Female     Review of Systems   Constitutional: Negative for chills and fever.   HENT: Negative for sore throat, trouble swallowing and voice change.    Eyes: Negative for pain and visual disturbance.   Respiratory: Negative for chest tightness, shortness of breath and wheezing.    Cardiovascular: Negative for chest pain and palpitations.   Gastrointestinal: Negative for abdominal pain, constipation, diarrhea, nausea and vomiting.   Endocrine: Negative for cold intolerance, heat intolerance and polyuria.   Genitourinary: Negative for dysuria, frequency, hematuria and urgency.   Musculoskeletal: Positive for arthralgias and joint swelling. Negative for back pain, myalgias and neck pain.   Skin: Positive for rash.   Neurological: Negative for tremors, seizures, syncope and headaches.   Psychiatric/Behavioral: Negative for confusion and sleep disturbance.     Objective:     Vital Signs (Most Recent):  Temp: 97.9 °F (36.6 °C) (11/08/20 1120)  Pulse: 88 (11/08/20 1120)  Resp: 13 (11/08/20 1120)  BP: 95/69 (11/08/20 1120)  SpO2: 99 % (11/08/20 1120)  O2 Device (Oxygen Therapy): room air (11/08/20 0524) Vital Signs (24h Range):  Temp:  [97.9 °F (36.6 °C)-98.5 °F (36.9 °C)] 97.9 °F (36.6 °C)  Pulse:  [84-97] 88  Resp:  [12-20] 13  SpO2:  [98 " %-99 %] 99 %  BP: ()/(68-84) 95/69     Weight: 60.8 kg (134 lb 0.6 oz) (11/07/20 0500)  Body mass index is 18.7 kg/m².  Body surface area is 1.75 meters squared.      Intake/Output Summary (Last 24 hours) at 11/8/2020 1237  Last data filed at 11/8/2020 1214  Gross per 24 hour   Intake 1395.26 ml   Output 4100 ml   Net -2704.74 ml       Physical Exam   Nursing note and vitals reviewed.  Constitutional: He is oriented to person, place, and time and well-developed, well-nourished, and in no distress. No distress.   HENT:   Head: Normocephalic and atraumatic.   Mouth/Throat: Oropharynx is clear and moist. No oropharyngeal exudate.   Eyes: EOM are normal. Pupils are equal, round, and reactive to light. Right eye exhibits no discharge. Left eye exhibits no discharge. No scleral icterus.   Neck: Normal range of motion. Neck supple.   Cardiovascular: Normal rate, regular rhythm and intact distal pulses.    Murmur heard.  Pulmonary/Chest: Effort normal and breath sounds normal. No respiratory distress.   Abdominal: Soft. He exhibits no distension. There is no abdominal tenderness. There is no rebound and no guarding.   Neurological: He is alert and oriented to person, place, and time.   Skin: Skin is warm and dry. No rash noted. He is not diaphoretic. No erythema.     Hyperpigmented papules on soles of feet primarily with some on palms as well   Psychiatric: Mood and affect normal.   Musculoskeletal: Tenderness present. No edema.      Comments: Pt with some swelling and tenderness of right ankle; Pain with palpation of both feet  - Patient with bony enlargement of PIP and DIPs of hands         Significant Labs:  All pertinent lab results from the last 24 hours have been reviewed.   Results for ASHER KAUFFMAN (MRN [id number]) as of 11/8/2020 12:41   Ref. Range 6/9/2020 23:52   MONICA Screen Latest Ref Range: Negative <1:80  Positive (A)   MONICA Titer 1 Unknown 1:320   MONICA PATTERN 1 Unknown Homogeneous   ds DNA Ab Latest Ref  Range: Negative 1:10  Negative 1:10   Anti-SSA Antibody Latest Ref Range: 0.00 - 0.99 Ratio 0.07   Anti-SSA Interpretation Latest Ref Range: Negative  Negative   Anti-SSB Antibody Latest Ref Range: 0.00 - 0.99 Ratio 0.10   Anti-SSB Interpretation Latest Ref Range: Negative  Negative   Anti Sm Antibody Latest Ref Range: 0.00 - 0.99 Ratio 0.09   Anti-Sm Interpretation Latest Ref Range: Negative  Negative   Anti Sm/RNP Antibody Latest Ref Range: 0.00 - 0.99 Ratio 0.07   Anti-Sm/RNP Interpretation Latest Ref Range: Negative  Negative   Smooth Muscle Ab Latest Ref Range: Negative  Positive 1:40 (A)       Results for ASHER KAUFFMAN (MRN [id number]) as of 11/8/2020 12:41   Ref. Range 8/5/2019 10:38 10/7/2019 09:31 11/8/2020 09:42   Uric Acid Latest Ref Range: 3.4 - 7.0 mg/dL 9.0 (H) 9.4 (H) 15.5 (H)       Significant Imaging:  Imaging results within the past 24 hours have been reviewed.

## 2020-11-08 NOTE — PLAN OF CARE
Problem: Adult Inpatient Plan of Care  Goal: Plan of Care Review  Outcome: Ongoing, Progressing   Pt free of falls/traumas/injuries. Skin remains clean, dry, and intact. Pt continued on  5 mcg/kg/min, and lasix gtt at 20 mg/hr. Pt diuresing well. Pt re-educated on importance of measuring accurate intake and out put; pt verbalized and demonstrates understanding. Reviewed plan of care with pt; and pt verbalized understanding.  Pt AAOX4, VSS, and in no distress will continue to monitor.

## 2020-11-08 NOTE — HPI
Reason for Consult: Management of T2DM, Hyperglycemia      Surgical Procedure and Date: n/a       Diabetes diagnosis year: 7 years ago     Home Diabetes Medications:   Levemir  8 units and novolog 4 units with meals.   Lab Results   Component Value Date    HGBA1C 8.1 (H) 11/07/2020            How often checking glucose at home? AM  BG readings on regimen: 100s  Hypoglycemia on the regimen?  No  Missed doses on regimen?  no     Diabetes Complications include:     Hyperglycemia     Complicating diabetes co morbidities:   CHF        HPI:   Patient is a 55 y.o. male with a diagnosis of HFrEF 2/2 NICM, HTN, HLD, and type 2 DM. Patient admitted in setting of elevated Cr concerning for acute on chronic heart failure. Endocrinology consulted for BG/ DM management.

## 2020-11-08 NOTE — HPI
Mr. Mckeon is a 54 y/o M with stage D HFrEF 2/2 NICM, HTN, HLD, DM2 who presents in setting of elevated Cr concerning for acute on chronic heart failure. Patient followed by Dr. Gardner, per last clinic note multiple admissions in last year, continued on home  (patient said 2.5 but appears to be on 5 mcg/kg/min as planned). Following routine labs with elevated Cr 3.2 (baseline 2.3-2.6).  LFTs, TBili at baseline. Continued NYHA class II symptoms, no PND or orthopnea. He reports intermittent med nonadherence, taking bidil only daily. He recently underwent a liver biopsy with no evidence of cirrhosis. Hepatology has cleared him for advanced HF options. Otherwise denies chest pain, palpitations or shortness of breath. Able to complete ADLs independently, ambulate approximately 50 yards before symptoms of SOB, able to drive to grocery store.     Since admission, patient has been diuresed and doing well from that standpoint.  We were consulted as patient has been having ankle pain as well as some hyperpigmented papules on palms and soles of feet.  Patient states that he has been having pain in his right ankle for the past few days.  States he often has pains in his ankles that move from one to the other.  Also states that he has pain on the soles of both of his feet.  Has never had acute swelling of his 1st MTPs.  Does reports some pain and swelling occasionally of joints of his hands.  Of note, patient had an episode of left wrist pain and swelling in 8/2019 that was initially thought to be infectious but was positive for crystals on fluid analysis.  Has had evidence of CPPD on imaging previously.  He has not been on any ULT.  In regards to patient's rash, he was not aware of this until it was pointed out on this admission.  Has noticed the hyperpigmented lesions on his hands.  Patient denies any family history of autoimmune conditions such as lupus or vasculitis.

## 2020-11-08 NOTE — ASSESSMENT & PLAN NOTE
Pt with acute onset of right ankle pain  - Previous aspiration of left wrist with crystals (unclear which type)  - Uric acid significantly elevated at 15.5  - Also with note on previous imaging of possible CPPD  - Reports some pain in his hands as well in the past with notable bony enlargement and deformity  - Would recommend Xrays of Hands bilaterally and Xrays of ankles/feet bilaterally (XR Arthritis Survey)  - Can give patient Colchicine 1.2mg x 1 then Colchicine 0.6mg 1 hour later followed by Colchicine 0.6mg daily  - Would avoid steroids in this patient  - Can consider urate lowering therapy after acute flare resolved

## 2020-11-08 NOTE — SUBJECTIVE & OBJECTIVE
Interval HPI:   Overnight events: Admitted to CSU. BG at or slightly above goal with basal insulin and prn correction scale Creatinine 2.5   Eatin% Diet diabetic [facility]; 2000 Calorie; Isolation Tray - Regular [place]; Fluid - 1500mL  Nausea: No  Hypoglycemia and intervention: No  Fever: No  TPN and/or TF: No    PMH, PSH, FH, SH  reviewed       Review of Systems   Constitutional: Negative for weight changes.  Eyes: Negative for visual disturbance.  Respiratory: Negative for cough.   Cardiovascular: Negative for chest pain.  Gastrointestinal: Negative for nausea.  Endocrine: Negative for polyuria, polydipsia.  Musculoskeletal: Negative for back pain.  Skin: Negative for rash.  Neurological: Negative for syncope.  Psychiatric/Behavioral: Negative for depression.      Current Medications and/or Treatments Impacting Glycemic Control  Immunotherapy:    Immunosuppressants     None        Steroids:   Hormones (From admission, onward)    None        Pressors:    Autonomic Drugs (From admission, onward)    None        Hyperglycemia/Diabetes Medications:   Antihyperglycemics (From admission, onward)    Start     Stop Route Frequency Ordered    20 2230  insulin detemir U-100 pen 8 Units      -- SubQ Nightly 20 2228  insulin aspart U-100 pen 1-10 Units      -- SubQ Before meals & nightly PRN 20             PHYSICAL EXAMINATION:  Vitals:    20 0706   BP: 94/68   Pulse: 84   Resp: 18   Temp: 98.5 °F (36.9 °C)     Body mass index is 18.7 kg/m².    Physical Exam   Constitutional: Well developed, well nourished, NAD.  ENT: External ears no masses with nose patent; normal hearing.  Neck: Supple; trachea midline.  Cardiovascular: Normal heart sounds, + LE edema. DP +2 bilaterally.  Lungs: Normal effort; lungs anterior bilaterally clear to auscultation.  Abdomen: Soft, no masses, no hernias.  MS: No clubbing or cyanosis of nails noted;  unable to assess gait.  Skin: No  rashes, lesions, or ulcers; no nodules. Injection sites are ok. No lipo hypertropthy or atrophy.  Psychiatric: Good judgement and insight; normal mood and affect.  Neurological: Cranial nerves are grossly intact.  Foot: nails in good condition, no amputations noted.

## 2020-11-08 NOTE — CONSULTS
[facility]  Endocrinology  Diabetes Consult Note    Consult Requested by: Gustavo Gardner MD   Reason for admit: Acute on chronic combined systolic and diastolic heart failure    HISTORY OF PRESENT ILLNESS:  Reason for Consult: Management of T2DM, Hyperglycemia      Surgical Procedure and Date: n/a       Diabetes diagnosis year: 7 years ago     Home Diabetes Medications:   Levemir  8 units and novolog 4 units with meals.   Lab Results   Component Value Date    HGBA1C 8.1 (H) 2020            How often checking glucose at home? AM  BG readings on regimen: 100s  Hypoglycemia on the regimen?  No  Missed doses on regimen?  no     Diabetes Complications include:     Hyperglycemia     Complicating diabetes co morbidities:   CHF        HPI:   Patient is a 55 y.o. male with a diagnosis of HFrEF 2/2 NICM, HTN, HLD, and type 2 DM. Patient admitted in setting of elevated Cr concerning for acute on chronic heart failure. Endocrinology consulted for BG/ DM management.         Interval HPI:   Overnight events: Admitted to CSU. BG at or slightly above goal with basal insulin and prn correction scale Creatinine 2.5   Eatin% Diet diabetic [facility]; 2000 Calorie; Isolation Tray - Regular [place]; Fluid - 1500mL  Nausea: No  Hypoglycemia and intervention: No  Fever: No  TPN and/or TF: No    PMH, PSH, FH, SH  reviewed       Review of Systems   Constitutional: Negative for weight changes.  Eyes: Negative for visual disturbance.  Respiratory: Negative for cough.   Cardiovascular: Negative for chest pain.  Gastrointestinal: Negative for nausea.  Endocrine: Negative for polyuria, polydipsia.  Musculoskeletal: Negative for back pain.  Skin: Negative for rash.  Neurological: Negative for syncope.  Psychiatric/Behavioral: Negative for depression.      Current Medications and/or Treatments Impacting Glycemic Control  Immunotherapy:    Immunosuppressants     None        Steroids:   Hormones (From admission,  onward)    None        Pressors:    Autonomic Drugs (From admission, onward)    None        Hyperglycemia/Diabetes Medications:   Antihyperglycemics (From admission, onward)    Start     Stop Route Frequency Ordered    11/05/20 2230  insulin detemir U-100 pen 8 Units      -- SubQ Nightly 11/05/20 2133 11/05/20 2228  insulin aspart U-100 pen 1-10 Units      -- SubQ Before meals & nightly PRN 11/05/20 2133             PHYSICAL EXAMINATION:  Vitals:    11/08/20 0706   BP: 94/68   Pulse: 84   Resp: 18   Temp: 98.5 °F (36.9 °C)     Body mass index is 18.7 kg/m².    Physical Exam   Constitutional: Well developed, well nourished, NAD.  ENT: External ears no masses with nose patent; normal hearing.  Neck: Supple; trachea midline.  Cardiovascular: Normal heart sounds, + LE edema. DP +2 bilaterally.  Lungs: Normal effort; lungs anterior bilaterally clear to auscultation.  Abdomen: Soft, no masses, no hernias.  MS: No clubbing or cyanosis of nails noted;  unable to assess gait.  Skin: No rashes, lesions, or ulcers; no nodules. Injection sites are ok. No lipo hypertropthy or atrophy.  Psychiatric: Good judgement and insight; normal mood and affect.  Neurological: Cranial nerves are grossly intact.  Foot: nails in good condition, no amputations noted.          Labs Reviewed and Include   Recent Labs   Lab 11/08/20  0644   *   CALCIUM 8.7   *   K 3.5   CO2 24   CL 95   BUN 61*   CREATININE 2.5*     Lab Results   Component Value Date    WBC 4.54 11/08/2020    HGB 9.5 (L) 11/08/2020    HCT 29.7 (L) 11/08/2020    MCV 77 (L) 11/08/2020     (L) 11/08/2020     Recent Labs   Lab 11/05/20  1800   TSH 2.408     Lab Results   Component Value Date    HGBA1C 8.1 (H) 11/07/2020       Nutritional status:   Body mass index is 18.7 kg/m².  Lab Results   Component Value Date    ALBUMIN 3.3 (L) 11/06/2020    ALBUMIN 3.3 (L) 11/05/2020    ALBUMIN 3.6 11/02/2020     No results found for: PREALBUMIN    Estimated Creatinine  Clearance: 28.4 mL/min (A) (based on SCr of 2.5 mg/dL (H)).    Accu-Checks  Recent Labs     11/05/20  2233 11/06/20  0744 11/06/20  1122 11/06/20  1627 11/06/20 2006 11/07/20  0731 11/07/20  1125 11/07/20  1626 11/07/20 2010 11/08/20  0734   POCTGLUCOSE 214* 124* 147* 173* 127* 140* 190* 139* 163* 148*        ASSESSMENT and PLAN    * Acute on chronic combined systolic and diastolic heart failure  Optimize glucose control and  avoid hypoglycemia    Managed per primary.         Diabetes mellitus, type II  BG goal 140-180    Continue levemir 8 units  Start novolog 3 units with meals   BG monitoring ac/hs and low dose correction scale.       Acute kidney injury superimposed on CKD3  Titrate insulin slowly to avoid hypoglycemia as the risk of hypoglycemia increases with decreased creatinine clearance.  Estimated Creatinine Clearance: 28.4 mL/min (A) (based on SCr of 2.5 mg/dL (H)).        Hypothyroidism  Managed per primary.   Levothyroxine 175 mcg daily           Plan discussed with patient at bedside.     Rosa Doty, NP  Endocrinology  [facility]

## 2020-11-08 NOTE — ASSESSMENT & PLAN NOTE
Pt with newly discovered hyperpigmented lesions on soles of feet and hands  - Rheumatology consulted for concern for vasculitis  - Lesions don't appear to be classical vasculitic lesions  - Would recommend checking C3, C4, DRVVT, Beta2 antibodies, Cardiolipin, ANCA, MPO, PR3, Cryoglobulins, RPR, ACE  - Consider Blood cultures  - Repeat UA with Urine Pr/Cr  - Consider dermatology consult for skin lesions

## 2020-11-08 NOTE — ASSESSMENT & PLAN NOTE
Titrate insulin slowly to avoid hypoglycemia as the risk of hypoglycemia increases with decreased creatinine clearance.  Estimated Creatinine Clearance: 28.4 mL/min (A) (based on SCr of 2.5 mg/dL (H)).

## 2020-11-08 NOTE — CONSULTS
I have seen the patient, reviewed the blood work, imaging and other studies.I have personally interviewed and examined the patient at bedside.    54 y/o M with  CHF  2/2 NICM on home dobutamine, HTN, type 2 DM admitted due to worsening creatinine and concern for CHF exacerbation.  Patient now with bilateral feet/ ankle pain and left second pip swelling.  He has history of left wrist swelling in 2019 and fluid showed crystals but didnt clarify what type. He has imaging of wrist from the past that was concerning for pseudogout but also has hyperuricemia.  We recommend additional xrays to look for chondrocalcinosis and treatment of his inflammatory arthritis flare  with colchicine. Given his CHF on dobutamine, would try to avoid steroids.  With regards to rash in palms and soles that he has had for a month, does not appear to look vasculitic but we will finish the work up given proteinuria and CHF.   Recommend dermatology consult and consideration of ID consult.      -colchicine 1.2 mg po x1 then 0. 6mg an hour later  -Arthritis survey   -dermatology consult for rash. Consider ID consult as well  -c3,c4, beta-2 glycoprotein, cardiolipin, lupus anticoag,, mpo, pr 3  - UA with  urine protein/creatinine ratio     10 or more

## 2020-11-08 NOTE — ASSESSMENT & PLAN NOTE
LVEF 20% on TTE 6/8/2020, LVIDD 5.6 cm, secondary to nonischemic cardiomyopathy  - Net -ve > 2.7L past 24 hours on Lasix 20 mg/hr plus he got Metolazone 5 mg X 1 yesterday. Will dose again with Metolazone pending AM labs  - continue home dobutamine 5 mcg/kg/min  - ACE/ARB contraindicated for renal function. Aldactone on hold  - Continue home Hydralazine 75 mg every 8 hours plus Isordil 40 mg every 8 hours (patient was taking only daily)  - TTE 11/6: LVEDD 5.83, LVEF 20%, grade 3 diastolic dysfunction, RV mildly dilated and severely depressed, trace AI, mod-severe MR, mod TR, PAS 64, IVC 15, small circumferential pericardial effusion  - maintain K>4 Mg>2  - Has no ICD as he is not currently a candidate for advanced options (smallish LV, non compliance)

## 2020-11-08 NOTE — ASSESSMENT & PLAN NOTE
- sCr elevated to 3.2 on admit from baseline of 2.2-2.8. Today's labs are pending  - Multi-factorial from stage D heart failure vs diabetic nephropathy  - Continue diuresis, strict intake and out put  - Continue to monitor

## 2020-11-08 NOTE — CARE UPDATE
Rheumatologic consult reviewed, appreciate recommendations; work up ordered including blood and urine testing and X rays. Dermatology consult requested. Colchicine ordered as well.

## 2020-11-08 NOTE — PROGRESS NOTES
[facility]  Heart Transplant  Progress Note    Patient Name: Ashish Mckeon  MRN: [id number]  Admission Date: 11/5/2020  Hospital Length of Stay: 3 days  Attending Physician: Gustavo Gardner MD  Primary Care Provider: [facility]  Principal Problem:Acute on chronic combined systolic and diastolic heart failure    Subjective:     **Interval History: No complaints or overnight events. Net -ve > 2.7L past 24 hours, but remains volume overloaded by exam.    Continuous Infusions:   DOBUTamine IV infusion (non-titrating) 5 mcg/kg/min (11/08/20 0507)    furosemide (LASIX) 10 mg/mL infusion (non-titrating) 20 mg/hr (11/08/20 0508)     Scheduled Meds:   aspirin  81 mg Oral Daily    atorvastatin  40 mg Oral Daily    ferrous sulfate  325 mg Oral Every other day    heparin (porcine)  5,000 Units Subcutaneous Q8H    hydrALAZINE  75 mg Oral TID    insulin detemir U-100  8 Units Subcutaneous QHS    isosorbide dinitrate  40 mg Oral TID    levothyroxine  175 mcg Oral Before breakfast    magnesium oxide  400 mg Oral Daily    polyethylene glycol  17 g Oral Daily    potassium chloride SA  20 mEq Oral BID     PRN Meds:acetaminophen, dextrose 50%, dextrose 50%, insulin aspart U-100, sodium chloride 0.9%    Review of patient's allergies indicates:   Allergen Reactions    Lisinopril Other (See Comments)     acei cough      Cefazolin Nausea And Vomiting     Objective:     Vital Signs (Most Recent):  Temp: 98.5 °F (36.9 °C) (11/08/20 0706)  Pulse: 84 (11/08/20 0706)  Resp: 18 (11/08/20 0706)  BP: 94/68 (11/08/20 0706)  SpO2: 99 % (11/08/20 0706) Vital Signs (24h Range):  Temp:  [98 °F (36.7 °C)-98.5 °F (36.9 °C)] 98.5 °F (36.9 °C)  Pulse:  [83-97] 84  Resp:  [12-20] 18  SpO2:  [98 %-99 %] 99 %  BP: ()/(68-84) 94/68     Patient Vitals for the past 72 hrs (Last 3 readings):   Weight   11/07/20 0500 60.8 kg (134 lb 0.6 oz)   11/06/20 1011 60.9 kg (134 lb 4.2 oz)   11/06/20 0600 60.9  "kg (134 lb 4.2 oz)     Body mass index is 18.7 kg/m².      Intake/Output Summary (Last 24 hours) at 11/8/2020 0748  Last data filed at 11/8/2020 0600  Gross per 24 hour   Intake 1635.26 ml   Output 3900 ml   Net -2264.74 ml       Hemodynamic Parameters:       Telemetry: SR with NSVT    Physical Exam  Constitutional:       Appearance: Normal appearance.   HENT:      Head: Normocephalic and atraumatic.      Mouth/Throat:      Mouth: Mucous membranes are moist.      Pharynx: Oropharynx is clear.   Eyes:      Extraocular Movements: Extraocular movements intact.      Conjunctiva/sclera: Conjunctivae normal.      Pupils: Pupils are equal, round, and reactive to light.   Neck:      Musculoskeletal: Normal range of motion and neck supple.      Comments: + JVD, "v" waves radiating up to neck  Cardiovascular:      Rate and Rhythm: Normal rate and regular rhythm.      Comments: Grade III/VI systolic murmur at apex, + S3  Pulmonary:      Effort: Pulmonary effort is normal.      Breath sounds: Normal breath sounds.   Abdominal:      General: Bowel sounds are normal.      Palpations: Abdomen is soft.   Musculoskeletal: Normal range of motion.      Comments: Trace - 1+ edema RLE, no edema LLE   Skin:     General: Skin is warm and dry.      Capillary Refill: Capillary refill takes 2 to 3 seconds.   Neurological:      General: No focal deficit present.      Mental Status: He is alert and oriented to person, place, and time.   Psychiatric:         Mood and Affect: Mood normal.         Behavior: Behavior normal.         Thought Content: Thought content normal.         Judgment: Judgment normal.         Significant Labs:  CBC:  Recent Labs   Lab 11/05/20  1800 11/06/20  0958 11/07/20  0711   WBC 4.02 3.50* 3.66*   RBC 3.87* 3.95* 3.76*   HGB 9.4* 9.7* 9.3*   HCT 31.2* 30.7* 29.1*   PLT 96* 108* 115*   MCV 81* 78* 77*   MCH 24.3* 24.6* 24.7*   MCHC 30.1* 31.6* 32.0     BNP:  Recent Labs   Lab 11/02/20  1640 11/05/20  1800   BNP 1,434* " 1,293*     CMP:  Recent Labs   Lab 11/02/20  1640 11/05/20  1800 11/06/20  0958 11/06/20  2323 11/07/20  0711   * 188* 152*  --  114*   CALCIUM 8.2* 8.0* 8.4*  --  8.1*   ALBUMIN 3.6 3.3* 3.3*  --   --    PROT 7.7 7.3 7.3  --   --    * 133* 136  --  135*   K 4.3 4.0 3.8 3.6 3.8   CO2 16* 16* 22*  --  24    102 102  --  99   BUN 79* 65* 64*  --  60*   CREATININE 3.2* 2.7* 2.5*  --  2.6*   ALKPHOS 191* 176* 187*  --   --    ALT 21 18 17  --   --    AST 21 19 19  --   --    BILITOT 1.3* 1.3* 1.4*  --   --       Coagulation:   No results for input(s): PT, INR, APTT in the last 168 hours.  LDH:  No results for input(s): LDH in the last 72 hours.  Microbiology:  Microbiology Results (last 7 days)     ** No results found for the last 168 hours. **          I have reviewed all pertinent labs within the past 24 hours.    Estimated Creatinine Clearance: 27.3 mL/min (A) (based on SCr of 2.6 mg/dL (H)).    Diagnostic Results:  I have reviewed all pertinent imaging results/findings within the past 24 hours.    Assessment and Plan:     Mr. Mckeon is a 54 y/o M with stage D HFrEF 2/2 NICM, HTN, HLD, DM2 who presents in setting of elevated Cr concerning for acute on chronic heart failure. Patient followed by Dr. Gardner, per last clinic note multiple admissions in last year, continued on home  (patient said 2.5 but appears to be on 5 mcg/kg/min as planned). Following routine labs with elevated Cr 3.2 (baseline 2.3-2.6).  LFTs, TBili at baseline. Continued NYHA class II symptoms, no PND or orthopnea. He reports intermittent med nonadherence, taking bidil only daily. He recently underwent a liver biopsy with no evidence of cirrhosis. Hepatology has cleared him for advanced HF options. Otherwise denies chest pain, palpitations or shortness of breath. Able to complete ADLs independently, ambulate approximately 50 yards before symptoms of SOB, able to drive to grocery store.     TTE 6/8/2020  · Severe left atrial  enlargement.  · Mild left ventricular enlargement.  · Severely decreased left ventricular systolic function. The estimated ejection fraction is 20%.  · Severe global hypokinetic wall motion.  · Grade III (severe) left ventricular diastolic dysfunction consistent with restrictive physiology.  · Severe right atrial enlargement.  · Moderate right ventricular enlargement.  · Moderately reduced right ventricular systolic function.  · Moderate-to-severe mitral regurgitation.  · Mild to moderate tricuspid regurgitation.  · Pulmonary hypertension present.  · The estimated PA systolic pressure is 65 mmHg.  · Elevated central venous pressure (15 mmHg).  Trivial pericardial effusion.    * Acute on chronic combined systolic and diastolic heart failure  LVEF 20% on TTE 6/8/2020, LVIDD 5.6 cm, secondary to nonischemic cardiomyopathy  - Net -ve > 2.7L past 24 hours on Lasix 20 mg/hr plus he got Metolazone 5 mg X 1 yesterday. Will dose again with Metolazone pending AM labs  - continue home dobutamine 5 mcg/kg/min  - ACE/ARB contraindicated for renal function. Aldactone on hold  - Continue home Hydralazine 75 mg every 8 hours plus Isordil 40 mg every 8 hours (patient was taking only daily)  - TTE 11/6: LVEDD 5.83, LVEF 20%, grade 3 diastolic dysfunction, RV mildly dilated and severely depressed, trace AI, mod-severe MR, mod TR, PAS 64, IVC 15, small circumferential pericardial effusion  - maintain K>4 Mg>2  - Has no ICD as he is not currently a candidate for advanced options (smallish LV, non compliance)    Nipple pain  - Has left nipple pain/slight swelling. Aldactone on hold  - Awaiting US left nipple    Edema  - Has RLE edema, no LLE edema. Venous US 11/6 -ve for DVT    Elevated liver enzymes  - AST/ALT wnl; Tbili pending  - Continue to monitor    Acute kidney injury superimposed on CKD3  - sCr elevated to 3.2 on admit from baseline of 2.2-2.8. Today's labs are pending  - Multi-factorial from stage D heart failure vs diabetic  nephropathy  - Continue diuresis, strict intake and out put  - Continue to monitor    Diabetes mellitus, type II  - Insulin dependent, last A1c 7.9  - Continue home Detemir 8 units PM  - Moderate intensity bolus SSI with meals  - Repeat A1c, may benefit from Endocrine consult this admission for better glycemic control     Hypothyroidism  - Continue home synthroid 175 mcg daily  - Repeat TSH 2.408    Thrombocytopenia  - Chronic, noted on chart review; pancytopenic likely 2/2 to anemia of chronic disease  - May warrant peripheral blood smear, out patient hematology evaluation   - Continue ASA  - Continue [place] [id number]  Heart Transplant  [facility]

## 2020-11-08 NOTE — ASSESSMENT & PLAN NOTE
BG goal 140-180    Continue levemir 8 units  Start novolog 3 units with meals   BG monitoring ac/hs and low dose correction scale.

## 2020-11-09 NOTE — PLAN OF CARE
Plan of care discussed with patient. Patient is free of fall/trauma/injury. Denies CP, SOB, or pain/discomfort. Rheumatology and Dermatology have been consulted to investigate pain and hyperpigmentation on the feet. Patient went down for Xray arthritis survey. Colchicine has been started. Patient continues diuresis on lasix gtt and metolazone. Supplemental potassium administered. All questions addressed. Will continue to monitor

## 2020-11-09 NOTE — ASSESSMENT & PLAN NOTE
- sCr elevated to 3.2 on admit from baseline of 2.2-2.8. sCr today has bumped from 2.5 to 2.8 - holding Lasix gtt today  - Multi-factorial from stage D heart failure vs diabetic nephropathy  - Strict intake and out put  - Continue to monitor

## 2020-11-09 NOTE — HPI
"54yo man with h/o HFrEF, CKD3, DM2, congestive hepatopathy, thrombocytopenia, hypothyroidism, gout, pernicious anemia, who is admitted for acute exacerbation of heart failure and associated KEYANNA. During course of admission, patient noted to have hyperpigmented macules to palms and soles for which dermatology is consulted.     Patient notes that the hyperpigmented spots to his palms have been present for a few months without symptoms or progression. He was unaware of the spots to the soles of feet until admission. Denies inflammation or pain to skin prior to onset of hyperpigmented macules. Denies mucosal involvement or other cutaneous involvement. Patient has had numerous hospital admissions in the past several months for heart failure with IR procedures and a number of new medications (see below timeline). No new sexual contacts, denies STDs, monogamous with wife.     Today the rash remains asymptomatic. Per patient, it has not worsened or spread. He additionally notes firm "bumps under his skin" to both posterior calves that have been present since he was a young man.     Timeline of new meds/procedures:  Dobutamine started 6/8/20  Started hydralazine and isosorbide dinitrate on 6/13/20  PICC line placement 6/18/2020 for home dobutamine infusions  Dobutamine home infusions 6/2020  IR liver biopsy (10/7/2020)  IR PICC replacement 8/2020     "

## 2020-11-09 NOTE — ASSESSMENT & PLAN NOTE
Pt with acute onset of right ankle pain  - Previous aspiration of left wrist with crystals (appearing to be CPPD)  - Uric acid significantly elevated at 15.5  - Also with note on previous imaging of possible CPPD  - Reports some pain in his hands as well in the past with notable bony enlargement and deformity  - Xrays without definite erosions or clear evidence of CPPD; Soft tissue calcifications  - Patient had very good response to colchicine  Would discontinue Colchicine at this time and resume PRN in light of patient's CHF/CKD  Can take a few doses PRN, but would be judicious and definitely discontinue immediately if diarrhea occurs  - Would avoid steroids in this patient unless CHF team approves  - Can consider urate lowering therapy after acute flare resolved

## 2020-11-09 NOTE — PLAN OF CARE
Plan of care discussed with patient. Patient is free of fall/trauma/injury. Denies CP, SOB, or pain/discomfort. Vitals WNL, pt awake alert and oriented X 4. Potassium not replaced today and lasix drip discontinued due to creatinine levels. BS addressed with medications per orders. All questions addressed. Will continue to monitor.

## 2020-11-09 NOTE — CONSULTS
"[facility]  Dermatology  Consult Note    Patient Name: Ashish Mckeon  MRN: [id number]  Admission Date: 11/5/2020  Hospital Length of Stay: 4 days  Attending Physician: Gustavo Gardner MD  Primary Care Provider: [facility]     Inpatient consult to Dermatology  Consult performed by: Fly Bautista MD  Consult ordered by: Mike Reno MD        Subjective:     Principal Problem:Acute on chronic combined systolic and diastolic heart failure    HPI:  56yo man with h/o HFrEF, CKD3, DM2, congestive hepatopathy, thrombocytopenia, hypothyroidism, gout, pernicious anemia, who is admitted for acute exacerbation of heart failure and associated KEYANNA. During course of admission, patient noted to have hyperpigmented macules to palms and soles for which dermatology is consulted.     Patient says the hyperpigmented spots to his palms have been present for a few months without symptoms or progression. He was unaware of the spots to the soles of feet until admission. Denies inflammation or pain to skin prior to onset of hyperpigmented macules-patches. Denies mucosal involvement or other cutaneous involvement. Patient has had numerous hospital admissions in the past several months for heart failure with IR procedures and a number of new medications (see below timeline). No new sexual contacts, denies STDs, monogamous with wife.     Today the rash remains asymptomatic. Per patient, it has not worsened or spread. He additionally notes firm "bumps under his skin" to both posterior calves that are asymptomatic and have been present since he was a young man.     Timeline of new meds/procedures:  Dobutamine started 6/8/20  Started hydralazine and isosorbide dinitrate on 6/13/20  PICC line placement 6/18/2020 for home dobutamine infusions  Dobutamine home infusions 6/2020  IR liver biopsy (10/7/2020)  IR PICC replacement 8/2020       Past Medical History:   Diagnosis Date    CHF " "(congestive heart failure)     Diabetes mellitus type II     Essential hypertension, benign     Hyperlipidemia     Hypothyroidism     Pain and swelling of left wrist 8/5/2019    Ashish Mckeon is a 54 y.o. male with PMH of  presenting with CHF, IDDM, Hypothyroidism, HTN, HLD presenting with worsening L wrist pain for 1 day. Orthopedic surgery was consulted for septic joint r/o. Pt has been afebrile and has no elevated WBC. ESR 36, normal CRP. Xray shows no signs of trauma and pt has no hx of gout or septic arthritis. Due to atraumatic wrist pain and swelling w/out identifia    Stage 3 chronic kidney disease 6/9/2020    Undiagnosed cardiac murmurs        Past Surgical History:   Procedure Laterality Date    COLON SURGERY      "lower intestines removed"    ORTHOPEDIC SURGERY Left     wrist    RIGHT HEART CATHETERIZATION Right 6/17/2020    Procedure: INSERTION, CATHETER, RIGHT HEART;  Surgeon: Kimberly Rodgers MD;  Location: [facility] CATH LAB;  Service: Cardiology;  Laterality: Right;    THYROID SURGERY       Family History     Problem Relation (Age of Onset)    Cancer Father        Tobacco Use    Smoking status: Never Smoker    Smokeless tobacco: Never Used    Tobacco comment: cigars "every other week or so"   Substance and Sexual Activity    Alcohol use: Not Currently     Alcohol/week: 3.0 standard drinks     Types: 3 Cans of beer per week    Drug use: Not Currently     Types: Marijuana    Sexual activity: Yes     Partners: Female       Review of patient's allergies indicates:   Allergen Reactions    Lisinopril Other (See Comments)     acei cough      Cefazolin Nausea And Vomiting       Medications:  Continuous Infusions:   DOBUTamine IV infusion (non-titrating) 5 mcg/kg/min (11/08/20 0507)     Scheduled Meds:   aspirin  81 mg Oral Daily    atorvastatin  40 mg Oral Daily    [START ON 11/10/2020] colchicine  0.6 mg Oral Every other day    ferrous sulfate  325 mg Oral Every other day    heparin " (porcine)  5,000 Units Subcutaneous Q8H    hydrALAZINE  75 mg Oral TID    insulin aspart U-100  3 Units Subcutaneous TIDWM    insulin detemir U-100  8 Units Subcutaneous QHS    isosorbide dinitrate  40 mg Oral TID    levothyroxine  175 mcg Oral Before breakfast    polyethylene glycol  17 g Oral Daily     PRN Meds:acetaminophen, dextrose 50%, dextrose 50%, insulin aspart U-100, simethicone, sodium chloride 0.9%    Review of Systems   Constitutional: Positive for fatigue. Negative for fever and chills.   HENT: Negative for mouth sores.    Eyes: Negative for eye irritation and visual change.   Respiratory: Positive for shortness of breath.    Gastrointestinal: Negative for diarrhea.   Genitourinary: Negative for hematuria.   Skin: Positive for rash and dry skin. Negative for itching.     Objective:     Vital Signs (Most Recent):  Temp: 97.8 °F (36.6 °C) (11/09/20 1101)  Pulse: 88 (11/09/20 1211)  Resp: 15 (11/09/20 1209)  BP: (!) 91/58 (11/09/20 1211)  SpO2: 96 % (11/09/20 1101) Vital Signs (24h Range):  Temp:  [97.2 °F (36.2 °C)-98.6 °F (37 °C)] 97.8 °F (36.6 °C)  Pulse:  [80-94] 88  Resp:  [11-17] 15  SpO2:  [96 %-99 %] 96 %  BP: ()/(57-73) 91/58     Weight: 57.4 kg (126 lb 8.7 oz)  Body mass index is 17.66 kg/m².    Physical Exam   Constitutional: He appears well-developed and well-nourished.   Neurological: He is alert and oriented to person, place, and time.   Skin:   Areas Examined (abnormalities noted in diagram):   Scalp / Hair Palpated and Inspected  Head / Face Inspection Performed  Neck Inspection Performed  Chest / Axilla Inspection Performed  Abdomen Inspection Performed  Back Inspection Performed  RUE Inspected  LUE Inspection Performed  RLE Inspected  LLE Inspection Performed  Nails and Digits Inspection Performed          2. Patient with very firm, palpable subcutaneous nodules/cords to posterior thigh; non tender, noninflamed                      Significant Labs:   Blood Culture: No  results for input(s): LABBLOO in the last 48 hours.  CBC:   Recent Labs   Lab 11/08/20  0644 11/09/20  0748   WBC 4.54 3.94   HGB 9.5* 10.7*   HCT 29.7* 32.7*   * 129*     CMP:   Recent Labs   Lab 11/08/20  0644 11/09/20  0748   * 131*   K 3.5 3.5   CL 95 91*   CO2 24 26   * 120*   BUN 61* 65*   CREATININE 2.5* 2.8*   CALCIUM 8.7 9.0   PROT  --  8.3   ALBUMIN  --  3.5   BILITOT  --  1.6*   ALKPHOS  --  195*   AST  --  16   ALT  --  15   ANIONGAP 14 14   EGFRNONAA 27.7* 24.1*     HIV 1/2 Antibodies: No results for input(s): [id number] in the last 48 hours.  RPR: No results for input(s): RPR in the last 48 hours.  TSH:   Recent Labs   Lab 06/08/20  0821 11/05/20  1800   TSH 8.449* 2.408         Assessment/Plan:     Hyperpigmented skin lesion  Subacute-chronic, asymptomatic, hyperpigmented macules to both palms and soles with scaling to b/l feet without other cutaneous or mucosal involvement.   Ddx: tinea manuum/pedis vs normal acral pigmentation variant;  less likely B12 deficiency vs medication-induced acral hyperpigmentation vs syphilis.    - Agree with Rheumatology, these lesions are not consistent with vasculitis.   - RPR pending, Blood cultures pending, patient clinically nontoxic.  - None of patient's medications associated with acral hyperpigmentation.   - No fungal hyphae seen on KOH prep of soles of feet.   - Fungal culture taken today from soles of feet.   - Recommend Ketoconazole 2% cream and Urea 20% cream BID x 1 month to palms and soles.   - Recommend checking folate/B12 levels as pt w/history of pernicious anemia and B12 deficiency can cause hyperpigmented macules to palms and soles.   - Additional clinical findings noted: palpable subcutaneous nodules to posterior calves possibly representative of past calcinosis; Ignacio's nails consistent with pt's known CHF and liver disease.  - Follow-up with Dermatology in 1 month.    Thank you for your consult. I will sign off. Please contact us  if you have any additional questions.    Fly Bautista MD  Dermatology  [facility]

## 2020-11-09 NOTE — PROGRESS NOTES
[facility]  Rheumatology  Progress Note    Patient Name: Ashish Mckeon  MRN: [id number]  Admission Date: 11/5/2020  Hospital Length of Stay: 4 days  Code Status: Full Code   Attending Provider: Ruben Butler Jr.,*  Primary Care Physician: [facility]  Principal Problem: Acute on chronic combined systolic and diastolic heart failure    Subjective:     HPI: Mr. Mckeon is a 54 y/o M with stage D HFrEF 2/2 NICM, HTN, HLD, DM2 who presents in setting of elevated Cr concerning for acute on chronic heart failure. Patient followed by Dr. Gardner, per last clinic note multiple admissions in last year, continued on home  (patient said 2.5 but appears to be on 5 mcg/kg/min as planned). Following routine labs with elevated Cr 3.2 (baseline 2.3-2.6).  LFTs, TBili at baseline. Continued NYHA class II symptoms, no PND or orthopnea. He reports intermittent med nonadherence, taking bidil only daily. He recently underwent a liver biopsy with no evidence of cirrhosis. Hepatology has cleared him for advanced HF options. Otherwise denies chest pain, palpitations or shortness of breath. Able to complete ADLs independently, ambulate approximately 50 yards before symptoms of SOB, able to drive to grocery store.     Since admission, patient has been diuresed and doing well from that standpoint.  We were consulted as patient has been having ankle pain as well as some hyperpigmented papules on palms and soles of feet.  Patient states that he has been having pain in his right ankle for the past few days.  States he often has pains in his ankles that move from one to the other.  Also states that he has pain on the soles of both of his feet.  Has never had acute swelling of his 1st MTPs.  Does reports some pain and swelling occasionally of joints of his hands.  Of note, patient had an episode of left wrist pain and swelling in 8/2019 that was initially thought to be infectious but was positive for  crystals on fluid analysis.  Has had evidence of CPPD on imaging previously.  He has not been on any ULT.  In regards to patient's rash, he was not aware of this until it was pointed out on this admission.  Has noticed the hyperpigmented lesions on his hands.  Patient denies any family history of autoimmune conditions such as lupus or vasculitis.    Interval History: Patient doing much better today in terms of his ankle and foot pain since receiving colchicine.  Xrays obtained and labs in process.  Dermatology consulted and saw patient this AM and took scraping for fungal cultures.  Patient denies any fever, chills, nausea/vomiting.  Complains of left nipple pain.    Current Facility-Administered Medications   Medication Frequency    acetaminophen tablet 650 mg Q6H PRN    aspirin chewable tablet 81 mg Daily    atorvastatin tablet 40 mg Daily    [START ON 11/10/2020] colchicine capsule/tablet 0.6 mg Every other day    dextrose 50% injection 12.5 g PRN    dextrose 50% injection 25 g PRN    DOBUtamine 1000 mg in D5W 250 mL infusion Continuous    ferrous sulfate EC tablet 325 mg Every other day    heparin (porcine) injection 5,000 Units Q8H    hydrALAZINE tablet 75 mg TID    insulin aspart U-100 pen 0-5 Units QID (AC + HS) PRN    insulin aspart U-100 pen 3 Units TIDWM    insulin detemir U-100 pen 8 Units QHS    isosorbide dinitrate tablet 40 mg TID    levothyroxine tablet 175 mcg Before breakfast    miconazole 2 % cream BID    NON FORMULARY MEDICATION BID    polyethylene glycol packet 17 g Daily    simethicone chewable tablet 160 mg TID PRN    sodium chloride 0.9% flush 10 mL PRN     Objective:     Vital Signs (Most Recent):  Temp: 97.8 °F (36.6 °C) (11/09/20 1101)  Pulse: 85 (11/09/20 1414)  Resp: 16 (11/09/20 1414)  BP: 108/66 (11/09/20 1414)  SpO2: 96 % (11/09/20 1101)  O2 Device (Oxygen Therapy): room air (11/09/20 1101) Vital Signs (24h Range):  Temp:  [97.2 °F (36.2 °C)-98.6 °F (37 °C)] 97.8 °F  (36.6 °C)  Pulse:  [80-94] 85  Resp:  [11-17] 16  SpO2:  [96 %-99 %] 96 %  BP: ()/(57-73) 108/66     Weight: 57.4 kg (126 lb 8.7 oz) (11/09/20 0941)  Body mass index is 17.66 kg/m².  Body surface area is 1.7 meters squared.      Intake/Output Summary (Last 24 hours) at 11/9/2020 1427  Last data filed at 11/9/2020 1400  Gross per 24 hour   Intake 1156.44 ml   Output 3825 ml   Net -2668.56 ml       Physical Exam   Nursing note and vitals reviewed.  Constitutional: He is oriented to person, place, and time and well-developed, well-nourished, and in no distress. No distress.   HENT:   Head: Normocephalic and atraumatic.   Eyes: EOM are normal. Pupils are equal, round, and reactive to light. Right eye exhibits no discharge. Left eye exhibits no discharge. No scleral icterus.   Neck: Normal range of motion. Neck supple.   Cardiovascular: Normal rate and intact distal pulses.    Pulmonary/Chest: Effort normal. No respiratory distress.   Abdominal: Soft. He exhibits no distension. There is no abdominal tenderness. There is no rebound and no guarding.   Neurological: He is alert and oriented to person, place, and time.   Skin: Skin is warm and dry. Rash noted. He is not diaphoretic. No erythema.     Hyperpigmented lesions on soles and palms   Psychiatric: Mood and affect normal.   Musculoskeletal: No edema.      Comments: Ankles now non-tender on palpation and appear less swollen         Significant Labs:  All pertinent lab results from the last 24 hours have been reviewed.     Results for ASHER KAUFFMAN (MRN [id number]) as of 11/9/2020 14:31   Ref. Range 11/9/2020 07:48   Complement (C-3) Latest Ref Range: 50 - 180 mg/dL 125   Complement (C-4) Latest Ref Range: 11 - 44 mg/dL 37         Significant Imaging:  Imaging results within the past 24 hours have been reviewed.     Xray Ankle 2 Views 11/8  Impression: No acute osseous abnormality of the right or left ankle. Soft tissue and vessel calcifications noted.    Xray  Arthritis Survey 11/8  Impression: No evidence of systemic erosive inflammatory arthritis.  No evidence of gout.      Assessment/Plan:     Hyperpigmented skin lesion  Pt with newly discovered hyperpigmented lesions on soles of feet and hands  - Rheumatology consulted for concern for vasculitis  - Lesions don't appear to be classical vasculitic lesions  - Follow-up C3, C4, DRVVT, Beta2 antibodies, Cardiolipin, ANCA, MPO, PR3, Cryoglobulins, RPR, ACE  - F/u Blood cultures  - Repeat UA with Urine Pr/Cr  - Dermatology has evaluated patient and do not feel this is vasculitis  - KOh prep with no hyphae visible and fungal cultures pending  - See Derm recs for treatment    Gout  Pt with acute onset of right ankle pain  - Previous aspiration of left wrist with crystals (appearing to be CPPD)  - Uric acid significantly elevated at 15.5  - Also with note on previous imaging of possible CPPD  - Reports some pain in his hands as well in the past with notable bony enlargement and deformity  - Xrays without definite erosions or clear evidence of CPPD; Soft tissue calcifications  - Patient had very good response to colchicine  Would discontinue Colchicine at this time and resume PRN in light of patient's CHF/CKD  Can take a few doses PRN, but would be judicious and definitely discontinue immediately if diarrhea occurs  - Would avoid steroids in this patient unless CHF team approves  - Can consider urate lowering therapy after acute flare resolved      We will continue to follow the patient.    Patient discussed with attending Dr. Sung Chaudhary MD  Rheumatology  [facility]

## 2020-11-09 NOTE — CARE UPDATE
BG goal 140-180    Remains in CSU, NAEON. BG reasonably well controlled with scheduled insulin. PO intake ~50%. Creatinine 2.5. Diet diabetic [facility]; 2000 Calorie; Isolation Tray - Regular [place]; Fluid - 1500mL    Plan:  Continue Levemir 8 units.   Continue novolog 3 units with meals.   BG monitoring ac/hs and change to low dose correction scale.     Discharge planning:  tbd  Endocrine to continue to follow    ** Please call Endocrine for any BG related issues **

## 2020-11-09 NOTE — PROGRESS NOTES
[facility]  Heart Transplant  Progress Note    Patient Name: Ashish Mckeon  MRN: [id number]  Admission Date: 11/5/2020  Hospital Length of Stay: 4 days  Attending Physician: Gustavo Gardner MD  Primary Care Provider: [facility]  Principal Problem:Acute on chronic combined systolic and diastolic heart failure    Subjective:     Interval History: Bilateral ankle pain has improved since starting Colchicine yesterday for gout. Rheumatology saw yesterday for gout as well as new areas of hyperpigmentation on palms and soles. W/U in progress, Derm consulted. Net -ve another 2.7L past 24 hours, and sCr has bumped to 2.8 - will hold Lasix gtt today    Continuous Infusions:   DOBUTamine IV infusion (non-titrating) 5 mcg/kg/min (11/08/20 0507)     Scheduled Meds:   aspirin  81 mg Oral Daily    atorvastatin  40 mg Oral Daily    [START ON 11/10/2020] colchicine  0.6 mg Oral Every other day    ferrous sulfate  325 mg Oral Every other day    heparin (porcine)  5,000 Units Subcutaneous Q8H    hydrALAZINE  75 mg Oral TID    insulin aspart U-100  3 Units Subcutaneous TIDWM    insulin detemir U-100  8 Units Subcutaneous QHS    isosorbide dinitrate  40 mg Oral TID    levothyroxine  175 mcg Oral Before breakfast    polyethylene glycol  17 g Oral Daily     PRN Meds:acetaminophen, dextrose 50%, dextrose 50%, insulin aspart U-100, simethicone, sodium chloride 0.9%    Review of patient's allergies indicates:   Allergen Reactions    Lisinopril Other (See Comments)     acei cough      Cefazolin Nausea And Vomiting     Objective:     Vital Signs (Most Recent):  Temp: 98 °F (36.7 °C) (11/09/20 0738)  Pulse: 80 (11/09/20 0855)  Resp: 15 (11/09/20 0738)  BP: (!) 103/57 (11/09/20 0738)  SpO2: 97 % (11/09/20 0738) Vital Signs (24h Range):  Temp:  [97.2 °F (36.2 °C)-98.6 °F (37 °C)] 98 °F (36.7 °C)  Pulse:  [80-94] 80  Resp:  [11-16] 15  SpO2:  [96 %-99 %] 97 %  BP: ()/(57-73) 103/57  "    Patient Vitals for the past 72 hrs (Last 3 readings):   Weight   11/09/20 0941 57.4 kg (126 lb 8.7 oz)   11/07/20 0500 60.8 kg (134 lb 0.6 oz)     Body mass index is 17.66 kg/m².      Intake/Output Summary (Last 24 hours) at 11/9/2020 1017  Last data filed at 11/9/2020 0948  Gross per 24 hour   Intake 681.44 ml   Output 3225 ml   Net -2543.56 ml       Hemodynamic Parameters:       Telemetry: SR with NSVT    Physical Exam  Constitutional:       Appearance: Normal appearance.   HENT:      Head: Normocephalic and atraumatic.      Mouth/Throat:      Mouth: Mucous membranes are moist.      Pharynx: Oropharynx is clear.   Eyes:      Extraocular Movements: Extraocular movements intact.      Conjunctiva/sclera: Conjunctivae normal.      Pupils: Pupils are equal, round, and reactive to light.   Neck:      Musculoskeletal: Normal range of motion and neck supple.      Comments:  "v" waves radiating up to neck  Cardiovascular:      Rate and Rhythm: Normal rate and regular rhythm.      Comments: Grade III/VI systolic murmur at apex, + S3  Pulmonary:      Effort: Pulmonary effort is normal.      Breath sounds: Normal breath sounds.   Abdominal:      General: Bowel sounds are normal.      Palpations: Abdomen is soft.   Musculoskeletal: Normal range of motion.      Comments: Trace - 1+ edema RLE, no edema LLE   Skin:     General: Skin is warm and dry.      Capillary Refill: Capillary refill takes 2 to 3 seconds.      Findings: Rash present.   Neurological:      General: No focal deficit present.      Mental Status: He is alert and oriented to person, place, and time.   Psychiatric:         Mood and Affect: Mood normal.         Behavior: Behavior normal.         Thought Content: Thought content normal.         Judgment: Judgment normal.         Significant Labs:  CBC:  Recent Labs   Lab 11/07/20  0711 11/08/20  0644 11/09/20  0748   WBC 3.66* 4.54 3.94   RBC 3.76* 3.88* 4.35*   HGB 9.3* 9.5* 10.7*   HCT 29.1* 29.7* 32.7*   PLT " 115* 119* 129*   MCV 77* 77* 75*   MCH 24.7* 24.5* 24.6*   MCHC 32.0 32.0 32.7     BNP:  Recent Labs   Lab 11/02/20  1640 11/05/20  1800   BNP 1,434* 1,293*     CMP:  Recent Labs   Lab 11/05/20  1800 11/06/20  0958  11/07/20  0711 11/08/20  0644 11/09/20  0748   * 152*  --  114* 166* 120*   CALCIUM 8.0* 8.4*  --  8.1* 8.7 9.0   ALBUMIN 3.3* 3.3*  --   --   --  3.5   PROT 7.3 7.3  --   --   --  8.3   * 136  --  135* 133* 131*   K 4.0 3.8   < > 3.8 3.5 3.5   CO2 16* 22*  --  24 24 26    102  --  99 95 91*   BUN 65* 64*  --  60* 61* 65*   CREATININE 2.7* 2.5*  --  2.6* 2.5* 2.8*   ALKPHOS 176* 187*  --   --   --  195*   ALT 18 17  --   --   --  15   AST 19 19  --   --   --  16   BILITOT 1.3* 1.4*  --   --   --  1.6*    < > = values in this interval not displayed.      Coagulation:   No results for input(s): PT, INR, APTT in the last 168 hours.  LDH:  No results for input(s): LDH in the last 72 hours.  Microbiology:  Microbiology Results (last 7 days)     Procedure Component Value Units Date/Time    Blood culture [[id number]] Collected: 11/09/20 0753    Order Status: Sent Specimen: Blood from Peripheral, Right Arm Updated: 11/09/20 0812    Blood culture [[id number]] Collected: 11/09/20 0748    Order Status: Sent Specimen: Blood from Antecubital, Right Arm Updated: 11/09/20 0758          I have reviewed all pertinent labs within the past 24 hours.    Estimated Creatinine Clearance: 23.9 mL/min (A) (based on SCr of 2.8 mg/dL (H)).    Diagnostic Results:  I have reviewed all pertinent imaging results/findings within the past 24 hours.    Assessment and Plan:     Mr. Mckeon is a 56 y/o M with stage D HFrEF 2/2 NICM, HTN, HLD, DM2 who presents in setting of elevated Cr concerning for acute on chronic heart failure. Patient followed by Dr. Gardner, per last clinic note multiple admissions in last year, continued on home  (patient said 2.5 but appears to be on 5 mcg/kg/min as planned). Following routine labs with  elevated Cr 3.2 (baseline 2.3-2.6).  LFTs, TBili at baseline. Continued NYHA class II symptoms, no PND or orthopnea. He reports intermittent med nonadherence, taking bidil only daily. He recently underwent a liver biopsy with no evidence of cirrhosis. Hepatology has cleared him for advanced HF options. Otherwise denies chest pain, palpitations or shortness of breath. Able to complete ADLs independently, ambulate approximately 50 yards before symptoms of SOB, able to drive to grocery store.     TTE 6/8/2020  · Severe left atrial enlargement.  · Mild left ventricular enlargement.  · Severely decreased left ventricular systolic function. The estimated ejection fraction is 20%.  · Severe global hypokinetic wall motion.  · Grade III (severe) left ventricular diastolic dysfunction consistent with restrictive physiology.  · Severe right atrial enlargement.  · Moderate right ventricular enlargement.  · Moderately reduced right ventricular systolic function.  · Moderate-to-severe mitral regurgitation.  · Mild to moderate tricuspid regurgitation.  · Pulmonary hypertension present.  · The estimated PA systolic pressure is 65 mmHg.  · Elevated central venous pressure (15 mmHg).  Trivial pericardial effusion.    * Acute on chronic combined systolic and diastolic heart failure  LVEF 20% on TTE 6/8/2020, LVIDD 5.6 cm, secondary to nonischemic cardiomyopathy  - Net -ve > 2.7L past 24 hours on Lasix 20 mg/hr plus he got Metolazone 5 mg X 1 again yesterday. sCr has bumped to 2.8 - will hold Lasix gtt for today  - continue home dobutamine 5 mcg/kg/min  - ACE/ARB contraindicated for renal function. Aldactone on hold  - Continue home Hydralazine 75 mg every 8 hours plus Isordil 40 mg every 8 hours (patient was taking only daily)  - TTE 11/6: LVEDD 5.83, LVEF 20%, grade 3 diastolic dysfunction, RV mildly dilated and severely depressed, trace AI, mod-severe MR, mod TR, PAS 64, IVC 15, small circumferential pericardial effusion  - maintain  K>4 Mg>2  - Has no ICD as he is not currently a candidate for advanced options (smallish LV, non compliance)    Hyperpigmented skin lesion  - Appreciate Rheumatology's help: does not appear to look vasculitic but will finish the work up given proteinuria and CHF.   - Dermatology consulted    Gout  - Appreciate Rheumatology's help. Bilateral ankle pain has improved with Colchicine. Will likely transition to renally dosed Allopurinol once acute gout flare resolved  - Arthritis Survey: No evidence of systemic erosive inflammatory arthritis.  No evidence of gout (however uric acid 15.5)    Nipple pain  - Has left nipple pain/slight swelling. Aldactone on hold  - Awaiting US left nipple    Edema  - Has RLE edema, no LLE edema. Venous US 11/6 -ve for DVT    Elevated liver enzymes  - AST/ALT wnl; Tbili 1.6  - Continue to monitor    Acute kidney injury superimposed on CKD3  - sCr elevated to 3.2 on admit from baseline of 2.2-2.8. sCr today has bumped from 2.5 to 2.8 - holding Lasix gtt today  - Multi-factorial from stage D heart failure vs diabetic nephropathy  - Strict intake and out put  - Continue to monitor    Diabetes mellitus, type II  - Insulin dependent, last A1c 7.9  - Continue home Detemir 8 units PM  - Moderate intensity bolus SSI with meals  - Repeat A1c, may benefit from Endocrine consult this admission for better glycemic control     Hypothyroidism  - Continue home synthroid 175 mcg daily  - Repeat TSH 2.408    Thrombocytopenia  - Chronic, noted on chart review; pancytopenic likely 2/2 to anemia of chronic disease  - May warrant peripheral blood smear, out patient hematology evaluation   - Continue ASA  - Continue [place] [id number]  Heart Transplant  [facility]

## 2020-11-09 NOTE — ASSESSMENT & PLAN NOTE
Pt with newly discovered hyperpigmented lesions on soles of feet and hands  - Rheumatology consulted for concern for vasculitis  - Lesions don't appear to be classical vasculitic lesions  - Follow-up C3, C4, DRVVT, Beta2 antibodies, Cardiolipin, ANCA, MPO, PR3, Cryoglobulins, RPR, ACE  - F/u Blood cultures  - Repeat UA with Urine Pr/Cr  - Dermatology has evaluated patient and do not feel this is vasculitis  - KOh prep with no hyphae visible and fungal cultures pending  - See Derm recs for treatment

## 2020-11-09 NOTE — SUBJECTIVE & OBJECTIVE
Interval History: Bilateral ankle pain has improved since starting Colchicine yesterday for gout. Rheumatology saw yesterday for gout as well as new areas of hyperpigmentation on palms and soles. W/U in progress, Derm consulted. Net -ve another 2.7L past 24 hours, and sCr has bumped to 2.8 - will hold Lasix gtt today    Continuous Infusions:   DOBUTamine IV infusion (non-titrating) 5 mcg/kg/min (11/08/20 0507)     Scheduled Meds:   aspirin  81 mg Oral Daily    atorvastatin  40 mg Oral Daily    [START ON 11/10/2020] colchicine  0.6 mg Oral Every other day    ferrous sulfate  325 mg Oral Every other day    heparin (porcine)  5,000 Units Subcutaneous Q8H    hydrALAZINE  75 mg Oral TID    insulin aspart U-100  3 Units Subcutaneous TIDWM    insulin detemir U-100  8 Units Subcutaneous QHS    isosorbide dinitrate  40 mg Oral TID    levothyroxine  175 mcg Oral Before breakfast    polyethylene glycol  17 g Oral Daily     PRN Meds:acetaminophen, dextrose 50%, dextrose 50%, insulin aspart U-100, simethicone, sodium chloride 0.9%    Review of patient's allergies indicates:   Allergen Reactions    Lisinopril Other (See Comments)     acei cough      Cefazolin Nausea And Vomiting     Objective:     Vital Signs (Most Recent):  Temp: 98 °F (36.7 °C) (11/09/20 0738)  Pulse: 80 (11/09/20 0855)  Resp: 15 (11/09/20 0738)  BP: (!) 103/57 (11/09/20 0738)  SpO2: 97 % (11/09/20 0738) Vital Signs (24h Range):  Temp:  [97.2 °F (36.2 °C)-98.6 °F (37 °C)] 98 °F (36.7 °C)  Pulse:  [80-94] 80  Resp:  [11-16] 15  SpO2:  [96 %-99 %] 97 %  BP: ()/(57-73) 103/57     Patient Vitals for the past 72 hrs (Last 3 readings):   Weight   11/09/20 0941 57.4 kg (126 lb 8.7 oz)   11/07/20 0500 60.8 kg (134 lb 0.6 oz)     Body mass index is 17.66 kg/m².      Intake/Output Summary (Last 24 hours) at 11/9/2020 1017  Last data filed at 11/9/2020 0948  Gross per 24 hour   Intake 681.44 ml   Output 3225 ml   Net -2543.56 ml       Hemodynamic  "Parameters:       Telemetry: SR with NSVT    Physical Exam  Constitutional:       Appearance: Normal appearance.   HENT:      Head: Normocephalic and atraumatic.      Mouth/Throat:      Mouth: Mucous membranes are moist.      Pharynx: Oropharynx is clear.   Eyes:      Extraocular Movements: Extraocular movements intact.      Conjunctiva/sclera: Conjunctivae normal.      Pupils: Pupils are equal, round, and reactive to light.   Neck:      Musculoskeletal: Normal range of motion and neck supple.      Comments:  "v" waves radiating up to neck  Cardiovascular:      Rate and Rhythm: Normal rate and regular rhythm.      Comments: Grade III/VI systolic murmur at apex, + S3  Pulmonary:      Effort: Pulmonary effort is normal.      Breath sounds: Normal breath sounds.   Abdominal:      General: Bowel sounds are normal.      Palpations: Abdomen is soft.   Musculoskeletal: Normal range of motion.      Comments: Trace - 1+ edema RLE, no edema LLE   Skin:     General: Skin is warm and dry.      Capillary Refill: Capillary refill takes 2 to 3 seconds.      Findings: Rash present.   Neurological:      General: No focal deficit present.      Mental Status: He is alert and oriented to person, place, and time.   Psychiatric:         Mood and Affect: Mood normal.         Behavior: Behavior normal.         Thought Content: Thought content normal.         Judgment: Judgment normal.         Significant Labs:  CBC:  Recent Labs   Lab 11/07/20  0711 11/08/20  0644 11/09/20  0748   WBC 3.66* 4.54 3.94   RBC 3.76* 3.88* 4.35*   HGB 9.3* 9.5* 10.7*   HCT 29.1* 29.7* 32.7*   * 119* 129*   MCV 77* 77* 75*   MCH 24.7* 24.5* 24.6*   MCHC 32.0 32.0 32.7     BNP:  Recent Labs   Lab 11/02/20  1640 11/05/20  1800   BNP 1,434* 1,293*     CMP:  Recent Labs   Lab 11/05/20  1800 11/06/20  0958  11/07/20  0711 11/08/20  0644 11/09/20  0748   * 152*  --  114* 166* 120*   CALCIUM 8.0* 8.4*  --  8.1* 8.7 9.0   ALBUMIN 3.3* 3.3*  --   --   --  " 3.5   PROT 7.3 7.3  --   --   --  8.3   * 136  --  135* 133* 131*   K 4.0 3.8   < > 3.8 3.5 3.5   CO2 16* 22*  --  24 24 26    102  --  99 95 91*   BUN 65* 64*  --  60* 61* 65*   CREATININE 2.7* 2.5*  --  2.6* 2.5* 2.8*   ALKPHOS 176* 187*  --   --   --  195*   ALT 18 17  --   --   --  15   AST 19 19  --   --   --  16   BILITOT 1.3* 1.4*  --   --   --  1.6*    < > = values in this interval not displayed.      Coagulation:   No results for input(s): PT, INR, APTT in the last 168 hours.  LDH:  No results for input(s): LDH in the last 72 hours.  Microbiology:  Microbiology Results (last 7 days)     Procedure Component Value Units Date/Time    Blood culture [[id number]] Collected: 11/09/20 0753    Order Status: Sent Specimen: Blood from Peripheral, Right Arm Updated: 11/09/20 0812    Blood culture [[id number]] Collected: 11/09/20 0748    Order Status: Sent Specimen: Blood from Antecubital, Right Arm Updated: 11/09/20 0758          I have reviewed all pertinent labs within the past 24 hours.    Estimated Creatinine Clearance: 23.9 mL/min (A) (based on SCr of 2.8 mg/dL (H)).    Diagnostic Results:  I have reviewed all pertinent imaging results/findings within the past 24 hours.

## 2020-11-09 NOTE — PLAN OF CARE
Problem: Adult Inpatient Plan of Care  Goal: Plan of Care Review  Outcome: Ongoing, Progressing    Pt free of falls/traumas/injuries. Skin remains clean, dry, and intact. Pt continued on  5 mcg/kg/min, and lasix gtt at 20 mg/hr. Pt continuing to diurese well. Pt re-educated on importance of measuring accurate intake and out put; pt verbalized and demonstrates understanding. Reviewed plan of care with pt; and pt verbalized understanding.  Pt AAOX4, VSS, and in no distress will continue to monitor.

## 2020-11-09 NOTE — PROGRESS NOTES
Notified DAGO Cortez of pt vitals. BP 89/67 map 77 HR 80. Pt not symptomatic. no orders given. Will continue to monitor.

## 2020-11-09 NOTE — ASSESSMENT & PLAN NOTE
- Appreciate Rheumatology's help: does not appear to look vasculitic but will finish the work up given proteinuria and CHF.   - Dermatology consulted

## 2020-11-09 NOTE — SUBJECTIVE & OBJECTIVE
"Past Medical History:   Diagnosis Date    CHF (congestive heart failure)     Diabetes mellitus type II     Essential hypertension, benign     Hyperlipidemia     Hypothyroidism     Pain and swelling of left wrist 8/5/2019    Ashish Mckeon is a 54 y.o. male with PMH of  presenting with CHF, IDDM, Hypothyroidism, HTN, HLD presenting with worsening L wrist pain for 1 day. Orthopedic surgery was consulted for septic joint r/o. Pt has been afebrile and has no elevated WBC. ESR 36, normal CRP. Xray shows no signs of trauma and pt has no hx of gout or septic arthritis. Due to atraumatic wrist pain and swelling w/out identifia    Stage 3 chronic kidney disease 6/9/2020    Undiagnosed cardiac murmurs        Past Surgical History:   Procedure Laterality Date    COLON SURGERY      "lower intestines removed"    ORTHOPEDIC SURGERY Left     wrist    RIGHT HEART CATHETERIZATION Right 6/17/2020    Procedure: INSERTION, CATHETER, RIGHT HEART;  Surgeon: Kimberly Rodgers MD;  [facility];  Service: Cardiology;  Laterality: Right;    THYROID SURGERY       Family History     Problem Relation (Age of Onset)    Cancer Father        Tobacco Use    Smoking status: Never Smoker    Smokeless tobacco: Never Used    Tobacco comment: cigars "every other week or so"   Substance and Sexual Activity    Alcohol use: Not Currently     Alcohol/week: 3.0 standard drinks     Types: 3 Cans of beer per week    Drug use: Not Currently     Types: Marijuana    Sexual activity: Yes     Partners: Female       Review of patient's allergies indicates:   Allergen Reactions    Lisinopril Other (See Comments)     acei cough      Cefazolin Nausea And Vomiting       Medications:  Continuous Infusions:   DOBUTamine IV infusion (non-titrating) 5 mcg/kg/min (11/08/20 0507)     Scheduled Meds:   aspirin  81 mg Oral Daily    atorvastatin  40 mg Oral Daily    [START ON 11/10/2020] colchicine  0.6 mg Oral Every other day    ferrous sulfate "  325 mg Oral Every other day    heparin (porcine)  5,000 Units Subcutaneous Q8H    hydrALAZINE  75 mg Oral TID    insulin aspart U-100  3 Units Subcutaneous TIDWM    insulin detemir U-100  8 Units Subcutaneous QHS    isosorbide dinitrate  40 mg Oral TID    levothyroxine  175 mcg Oral Before breakfast    polyethylene glycol  17 g Oral Daily     PRN Meds:acetaminophen, dextrose 50%, dextrose 50%, insulin aspart U-100, simethicone, sodium chloride 0.9%    Review of Systems   Constitutional: Positive for fatigue. Negative for fever and chills.   HENT: Negative for mouth sores.    Eyes: Negative for eye irritation and visual change.   Respiratory: Positive for shortness of breath.    Gastrointestinal: Negative for diarrhea.   Genitourinary: Negative for hematuria.   Skin: Positive for rash and dry skin. Negative for itching.     Objective:     Vital Signs (Most Recent):  Temp: 97.8 °F (36.6 °C) (11/09/20 1101)  Pulse: 88 (11/09/20 1211)  Resp: 15 (11/09/20 1209)  BP: (!) 91/58 (11/09/20 1211)  SpO2: 96 % (11/09/20 1101) Vital Signs (24h Range):  Temp:  [97.2 °F (36.2 °C)-98.6 °F (37 °C)] 97.8 °F (36.6 °C)  Pulse:  [80-94] 88  Resp:  [11-17] 15  SpO2:  [96 %-99 %] 96 %  BP: ()/(57-73) 91/58     Weight: 57.4 kg (126 lb 8.7 oz)  Body mass index is 17.66 kg/m².    Physical Exam   Constitutional: He appears well-developed and well-nourished.   Neurological: He is alert and oriented to person, place, and time.   Skin:   Areas Examined (abnormalities noted in diagram):   Scalp / Hair Palpated and Inspected  Head / Face Inspection Performed  Neck Inspection Performed  Chest / Axilla Inspection Performed  Abdomen Inspection Performed  Back Inspection Performed  RUE Inspected  LUE Inspection Performed  RLE Inspected  LLE Inspection Performed  Nails and Digits Inspection Performed              Significant Labs:   Blood Culture: No results for input(s): LABBLOO in the last 48 hours.  CBC:   Recent Labs   Lab  11/08/20  0644 11/09/20  0748   WBC 4.54 3.94   HGB 9.5* 10.7*   HCT 29.7* 32.7*   * 129*     CMP:   Recent Labs   Lab 11/08/20  0644 11/09/20  0748   * 131*   K 3.5 3.5   CL 95 91*   CO2 24 26   * 120*   BUN 61* 65*   CREATININE 2.5* 2.8*   CALCIUM 8.7 9.0   PROT  --  8.3   ALBUMIN  --  3.5   BILITOT  --  1.6*   ALKPHOS  --  195*   AST  --  16   ALT  --  15   ANIONGAP 14 14   EGFRNONAA 27.7* 24.1*     HIV 1/2 Antibodies: No results for input(s): [id number] in the last 48 hours.  RPR: No results for input(s): RPR in the last 48 hours.  TSH:   Recent Labs   Lab 06/08/20  0821 11/05/20  1800   TSH 8.449* 2.408

## 2020-11-09 NOTE — ASSESSMENT & PLAN NOTE
LVEF 20% on TTE 6/8/2020, LVIDD 5.6 cm, secondary to nonischemic cardiomyopathy  - Net -ve > 2.7L past 24 hours on Lasix 20 mg/hr plus he got Metolazone 5 mg X 1 again yesterday. sCr has bumped to 2.8 - will hold Lasix gtt for today  - continue home dobutamine 5 mcg/kg/min  - ACE/ARB contraindicated for renal function. Aldactone on hold  - Continue home Hydralazine 75 mg every 8 hours plus Isordil 40 mg every 8 hours (patient was taking only daily)  - TTE 11/6: LVEDD 5.83, LVEF 20%, grade 3 diastolic dysfunction, RV mildly dilated and severely depressed, trace AI, mod-severe MR, mod TR, PAS 64, IVC 15, small circumferential pericardial effusion  - maintain K>4 Mg>2  - Has no ICD as he is not currently a candidate for advanced options (smallish LV, non compliance)

## 2020-11-09 NOTE — PROGRESS NOTES
Spoke with DAGO Cortez about pt weight, stopping lasix drip due to creatinine increase and not giving potassium today. Will continue to monitor.

## 2020-11-09 NOTE — ASSESSMENT & PLAN NOTE
Asymptomatic, hyperpigmented macules to both palms and soles with noted scaling to b/l feet. Patient is without other cutaneous or mucosal involvement, Rheum work-up negative, patient clinically nontoxic, RPR pending.    Ddx: tinea manuum/pedis vs B12 deficiency vs much less likely medication induced acral hyperpigmentation vs syphillis vs variant of normal acral pigmentation.    - Agree with Rheumatology, these lesions are not consistent with a vasculitis.   - RPR pending, Blood cultures pending, patient clinically nontoxic.  - None of patient's medications associated specifically with acral hyperpigmentation.  - No fungal hyphae seen on KOH prep of soles of feet.   - Fungal culture taken today from b/l soles of feet.   - Recommend treating hands/feet with Ketoconazole 2% cream and Urea 20% cream BID x 1 month  - Recommend checking folate/B12 as B12 deficiency can cause hyperpigmented macules to palms and soles.  - Follow-up with Dermatology in 1 month.    Fly Bautista MD  [employer] [facility] PGY-III    Patient staffed with attending dermatologist, Dr. Toledo.

## 2020-11-09 NOTE — SUBJECTIVE & OBJECTIVE
Interval History: Patient doing much better today in terms of his ankle and foot pain since receiving colchicine.  Xrays obtained and labs in process.  Dermatology consulted and saw patient this AM and took scraping for fungal cultures.  Patient denies any fever, chills, nausea/vomiting.  Complains of left nipple pain.    Current Facility-Administered Medications   Medication Frequency    acetaminophen tablet 650 mg Q6H PRN    aspirin chewable tablet 81 mg Daily    atorvastatin tablet 40 mg Daily    [START ON 11/10/2020] colchicine capsule/tablet 0.6 mg Every other day    dextrose 50% injection 12.5 g PRN    dextrose 50% injection 25 g PRN    DOBUtamine 1000 mg in D5W 250 mL infusion Continuous    ferrous sulfate EC tablet 325 mg Every other day    heparin (porcine) injection 5,000 Units Q8H    hydrALAZINE tablet 75 mg TID    insulin aspart U-100 pen 0-5 Units QID (AC + HS) PRN    insulin aspart U-100 pen 3 Units TIDWM    insulin detemir U-100 pen 8 Units QHS    isosorbide dinitrate tablet 40 mg TID    levothyroxine tablet 175 mcg Before breakfast    miconazole 2 % cream BID    NON FORMULARY MEDICATION BID    polyethylene glycol packet 17 g Daily    simethicone chewable tablet 160 mg TID PRN    sodium chloride 0.9% flush 10 mL PRN     Objective:     Vital Signs (Most Recent):  Temp: 97.8 °F (36.6 °C) (11/09/20 1101)  Pulse: 85 (11/09/20 1414)  Resp: 16 (11/09/20 1414)  BP: 108/66 (11/09/20 1414)  SpO2: 96 % (11/09/20 1101)  O2 Device (Oxygen Therapy): room air (11/09/20 1101) Vital Signs (24h Range):  Temp:  [97.2 °F (36.2 °C)-98.6 °F (37 °C)] 97.8 °F (36.6 °C)  Pulse:  [80-94] 85  Resp:  [11-17] 16  SpO2:  [96 %-99 %] 96 %  BP: ()/(57-73) 108/66     Weight: 57.4 kg (126 lb 8.7 oz) (11/09/20 0941)  Body mass index is 17.66 kg/m².  Body surface area is 1.7 meters squared.      Intake/Output Summary (Last 24 hours) at 11/9/2020 1427  Last data filed at 11/9/2020 1400  Gross per 24 hour    Intake 1156.44 ml   Output 3825 ml   Net -2668.56 ml       Physical Exam   Nursing note and vitals reviewed.  Constitutional: He is oriented to person, place, and time and well-developed, well-nourished, and in no distress. No distress.   HENT:   Head: Normocephalic and atraumatic.   Eyes: EOM are normal. Pupils are equal, round, and reactive to light. Right eye exhibits no discharge. Left eye exhibits no discharge. No scleral icterus.   Neck: Normal range of motion. Neck supple.   Cardiovascular: Normal rate and intact distal pulses.    Pulmonary/Chest: Effort normal. No respiratory distress.   Abdominal: Soft. He exhibits no distension. There is no abdominal tenderness. There is no rebound and no guarding.   Neurological: He is alert and oriented to person, place, and time.   Skin: Skin is warm and dry. Rash noted. He is not diaphoretic. No erythema.     Hyperpigmented lesions on soles and palms   Psychiatric: Mood and affect normal.   Musculoskeletal: No edema.      Comments: Ankles now non-tender on palpation and appear less swollen         Significant Labs:  All pertinent lab results from the last 24 hours have been reviewed.     Results for ASHER KAUFFMAN (MRN [id number]) as of 11/9/2020 14:31   Ref. Range 11/9/2020 07:48   Complement (C-3) Latest Ref Range: 50 - 180 mg/dL 125   Complement (C-4) Latest Ref Range: 11 - 44 mg/dL 37         Significant Imaging:  Imaging results within the past 24 hours have been reviewed.     Xray Ankle 2 Views 11/8  Impression: No acute osseous abnormality of the right or left ankle. Soft tissue and vessel calcifications noted.    Xray Arthritis Survey 11/8  Impression: No evidence of systemic erosive inflammatory arthritis.  No evidence of gout.

## 2020-11-09 NOTE — PROGRESS NOTES
11/09/20 0514   Vital Signs   BP (!) 86/64   MAP (mmHg) 74   BP Location Right arm   BP Method cNIBP   Patient Position Lying   Notified Dr. De La Cruz, pt asymptomatic. No further orders at this time will continue to monitor.

## 2020-11-09 NOTE — ASSESSMENT & PLAN NOTE
- Appreciate Rheumatology's help. Bilateral ankle pain has improved with Colchicine. Will likely transition to renally dosed Allopurinol once acute gout flare resolved  - Arthritis Survey: No evidence of systemic erosive inflammatory arthritis.  No evidence of gout (however uric acid 15.5)

## 2020-11-10 PROBLEM — I42.0 DCM (DILATED CARDIOMYOPATHY): Status: ACTIVE | Noted: 2020-01-01

## 2020-11-10 NOTE — PLAN OF CARE
[facility]   Heart Transplant Clinic  [address] [place] [address]   [phone] after hours        HOME  HEALTH ORDERS      Admit to Home Health    Diagnosis:   Patient Active Problem List   Diagnosis    Thrombocytopenia    Megaloblastic anemia due to vitamin B12 deficiency    Hypothyroidism    Diabetes mellitus, type II    Anemia of chronic disease    Acute kidney injury superimposed on CKD3    Essential hypertension    Substance use disorder    NSVT (nonsustained ventricular tachycardia)    Other proteinuria    right upper lobe mass    Pulmonary nodules    Blurry vision, bilateral    Hyperlipidemia    Pseudogout of left wrist    Elevated serum creatinine    Septic arthritis    Crystals present in synovial fluid obtained by arthrocentesis    Pain in left wrist    Chronic combined systolic and diastolic heart failure    Anemia    Thrombocytopenia, unspecified    DCM (dilated cardiomyopathy)    SOB (shortness of breath)    Hyperbilirubinemia    CKD (chronic kidney disease) stage 3, GFR 30-59 ml/min    Palliative care encounter    Goals of care, counseling/discussion    Advance care planning    Abnormal liver ultrasound    Elevated liver enzymes    Acute on chronic combined systolic and diastolic heart failure    Edema    Nipple pain    Gout    Hyperpigmented skin lesion       Patient is homebound due to:   Diet: Low Sodium  Acitivities: As Tolerated    Nursing:   SN to complete comprehensive assessment including routine vital signs. Instruct on disease process and s/s of complications to report to MD. Review/verify medication list sent home with the patient at time of discharge  and instruct patient/caregiver as needed. Frequency may be adjusted depending on start of care date.    Notify MD if SBP > 160 or < 90; DBP > 90 or < 50; HR > 120 or < 50; Temp > 101; Weight gain >3lbs in 1 day or 5lbs in 1 week.    LABS:  SN to  perform labs: weekly cbc, bmp, mg, bnp  HOME INFUSION THERAPY:    SN to perform Infusion Therapy/Central Line Care.  Review Central Line Care & Central Line Flush with patient.    Administer (drug and dose): Dobutamine 5 mcg/kg/min x 66.2kg, intravenous, continuous    **For questions or concerns, please call [phone] and ask for Pre-Heart transplant clinic, M-F 8-5. After hours, weekends, call [phone] and ask for the Heart Transplant Cardiologist on call.**    Central line care:  Scrub the Hub: Prior to accessing the line, always perform a 30 second alcohol scrub  Each lumen of the central line is to be flushed at least daily with 10 mL Normal Saline and 3 mL Heparin flush (100 units/mL)  Skilled Nurse (SN) may draw blood from IV access  Blood Draw Procedure:   - Aspirate at least 5 mL of blood   - Discard   - Obtain specimen   - Change posiflow cap   - Flush with 20 mL Normal Saline followed by a                 3-5 mL Heparin flush (100 units/mL)  Central :   - Sterile dressing changes are done weekly and as needed.   - Use chlor-hexadine scrub to cleanse site, apply Biopatch to insertion site,       apply securement device dressing   - Posi-flow caps are changed weekly and after EVERY lab draw.   - If sterile gauze is under dressing to control oozing,                 dressing change must be performed every 24 hours until gauze is not needed.    CONSULTS:      Aide to provide assistance with personal care, ADLs, and vital signs    Send initial Home Health orders to HTS attending physician on call.  Send follow up questions to [phone] or fax:                      Heart Failure:      [phone]

## 2020-11-10 NOTE — PROGRESS NOTES
Pt had 8 beat run of vtach. Pt not symptomatic. BP 92/61 map 72 HR 82. AWAIS Dickens notified. No orders given. Ordered to give hydralazine and isorbiside. Will continue to monitor,

## 2020-11-10 NOTE — PLAN OF CARE
Plan of care discussed with patient. Patient is free of fall/trauma/injury. Denies CP, SOB, or pain/discomfort. Dobutmaine drip maintained per orders. Vitals WNL, pt awake alert and oriented X 4. Potassium replaced per orders. All questions addressed. Will continue to monitor.

## 2020-11-10 NOTE — ASSESSMENT & PLAN NOTE
Pt with newly discovered hyperpigmented lesions on soles of feet and hands  - Rheumatology consulted for concern for vasculitis  - Lesions don't appear to be classical vasculitic lesions  - Follow-up C3, C4, DRVVT, Beta2 antibodies, Cardiolipin, ANCA, MPO, PR3, Cryoglobulins, RPR, ACE [All labs resulted so far normal]  RPR negative  - F/u Blood cultures - NGTD  - Dermatology has evaluated patient and do not feel this is vasculitis  - KOh prep with no hyphae visible and fungal cultures pending  - See Derm recs for treatment

## 2020-11-10 NOTE — ASSESSMENT & PLAN NOTE
- sCr elevated to 3.2 on admit from baseline of 2.2-2.8.   - Multi-factorial from stage D heart failure vs diabetic nephropathy  - Strict intake and out put  - Continue to monitor

## 2020-11-10 NOTE — ASSESSMENT & PLAN NOTE
Pt with acute onset of right ankle pain  - Previous aspiration of left wrist with crystals (appearing to be CPPD)  - Uric acid significantly elevated at 15.5  - Also with note on previous imaging of possible CPPD  - Reports some pain in his hands as well in the past with notable bony enlargement and deformity  - Xrays without definite erosions or clear evidence of CPPD; Soft tissue calcifications  - Patient had very good response to colchicine  Would discontinue Colchicine at this time and resume PRN in light of patient's CHF/CKD  Can take a few doses PRN, but would be judicious and definitely discontinue immediately if diarrhea occurs  - Would avoid steroids in this patient unless CHF team approves  - Can consider urate lowering therapy after acute flare resolved  - Would discharge patient with prescription for 6 tablets of colchicine  We counseled him on use  We will establish follow-up with patient in our clinic

## 2020-11-10 NOTE — PROGRESS NOTES
[facility]  Rheumatology  Progress Note    Patient Name: Ashish Mckeon  MRN: [id number]  Admission Date: 11/5/2020  Hospital Length of Stay: 5 days  Code Status: Full Code   Attending Provider: Ruben Butler Jr.,*  Primary Care Physician: [facility]  Principal Problem: Acute on chronic combined systolic and diastolic heart failure    Subjective:     HPI: Mr. Mckeon is a 54 y/o M with stage D HFrEF 2/2 NICM, HTN, HLD, DM2 who presents in setting of elevated Cr concerning for acute on chronic heart failure. Patient followed by Dr. Gardner, per last clinic note multiple admissions in last year, continued on home  (patient said 2.5 but appears to be on 5 mcg/kg/min as planned). Following routine labs with elevated Cr 3.2 (baseline 2.3-2.6).  LFTs, TBili at baseline. Continued NYHA class II symptoms, no PND or orthopnea. He reports intermittent med nonadherence, taking bidil only daily. He recently underwent a liver biopsy with no evidence of cirrhosis. Hepatology has cleared him for advanced HF options. Otherwise denies chest pain, palpitations or shortness of breath. Able to complete ADLs independently, ambulate approximately 50 yards before symptoms of SOB, able to drive to grocery store.     Since admission, patient has been diuresed and doing well from that standpoint.  We were consulted as patient has been having ankle pain as well as some hyperpigmented papules on palms and soles of feet.  Patient states that he has been having pain in his right ankle for the past few days.  States he often has pains in his ankles that move from one to the other.  Also states that he has pain on the soles of both of his feet.  Has never had acute swelling of his 1st MTPs.  Does reports some pain and swelling occasionally of joints of his hands.  Of note, patient had an episode of left wrist pain and swelling in 8/2019 that was initially thought to be infectious but was positive for  crystals on fluid analysis.  Has had evidence of CPPD on imaging previously.  He has not been on any ULT.  In regards to patient's rash, he was not aware of this until it was pointed out on this admission.  Has noticed the hyperpigmented lesions on his hands.  Patient denies any family history of autoimmune conditions such as lupus or vasculitis.    Interval History: Patient doing well this morning.  States he had a slight return of pain in his ankle that was relieved with dose of colchicine this AM.  Otherwise pain free.  Denies any diarrhea.  No fever or chills.    Current Facility-Administered Medications   Medication Frequency    acetaminophen tablet 650 mg Q6H PRN    aspirin chewable tablet 81 mg Daily    atorvastatin tablet 40 mg Daily    colchicine capsule/tablet 0.6 mg Every other day    dextrose 50% injection 12.5 g PRN    dextrose 50% injection 25 g PRN    DOBUtamine 1000 mg in D5W 250 mL infusion Continuous    ferrous sulfate EC tablet 325 mg Every other day    heparin (porcine) injection 5,000 Units Q8H    hydrALAZINE tablet 75 mg TID    insulin aspart U-100 pen 0-5 Units QID (AC + HS) PRN    insulin aspart U-100 pen 3 Units TIDWM    insulin detemir U-100 pen 8 Units QHS    isosorbide dinitrate tablet 40 mg TID    levothyroxine tablet 175 mcg Before breakfast    miconazole 2 % cream BID    NON FORMULARY MEDICATION BID    polyethylene glycol packet 17 g Daily    simethicone chewable tablet 160 mg TID PRN    sodium chloride 0.9% flush 10 mL PRN    [START ON 11/11/2020] torsemide tablet 100 mg Daily     Objective:     Vital Signs (Most Recent):  Temp: 97.1 °F (36.2 °C) (11/10/20 1120)  Pulse: 88 (11/10/20 1513)  Resp: 14 (11/10/20 1500)  BP: 93/69 (11/10/20 1500)  SpO2: 97 % (11/10/20 1500)  O2 Device (Oxygen Therapy): room air (11/10/20 1500) Vital Signs (24h Range):  Temp:  [97.1 °F (36.2 °C)-98.5 °F (36.9 °C)] 97.1 °F (36.2 °C)  Pulse:  [68-96] 88  Resp:  [12-20] 14  SpO2:  [94 %-97  %] 97 %  BP: ()/(55-69) 93/69     Weight: 54.1 kg (119 lb 4.3 oz) (11/10/20 0500)  Body mass index is 16.64 kg/m².  Body surface area is 1.65 meters squared.      Intake/Output Summary (Last 24 hours) at 11/10/2020 1534  Last data filed at 11/10/2020 0742  Gross per 24 hour   Intake 782 ml   Output 2500 ml   Net -1718 ml       Physical Exam   Nursing note and vitals reviewed.  Constitutional: He is oriented to person, place, and time and well-developed, well-nourished, and in no distress. No distress.   HENT:   Head: Normocephalic and atraumatic.   Eyes: EOM are normal. Pupils are equal, round, and reactive to light. Right eye exhibits no discharge. Left eye exhibits no discharge. No scleral icterus.   Neck: Normal range of motion. Neck supple.   Cardiovascular: Normal rate and intact distal pulses.    Pulmonary/Chest: Effort normal. No respiratory distress.   Abdominal: Soft. He exhibits no distension. There is no abdominal tenderness. There is no rebound and no guarding.   Neurological: He is alert and oriented to person, place, and time.   Skin: Skin is warm and dry. Rash noted. He is not diaphoretic. No erythema.     Hyperpigmented lesions on soles and palms   Psychiatric: Mood and affect normal.   Musculoskeletal: No edema.      Comments: Ankles now non-tender on palpation and appear less swollen         Significant Labs:  All pertinent lab results from the last 24 hours have been reviewed.    Significant Imaging:  Imaging results within the past 24 hours have been reviewed.    Assessment/Plan:     Hyperpigmented skin lesion  Pt with newly discovered hyperpigmented lesions on soles of feet and hands  - Rheumatology consulted for concern for vasculitis  - Lesions don't appear to be classical vasculitic lesions  - Follow-up C3, C4, DRVVT, Beta2 antibodies, Cardiolipin, ANCA, MPO, PR3, Cryoglobulins, RPR, ACE [All labs resulted so far normal]  RPR negative  - F/u Blood cultures - NGTD  - Dermatology has  evaluated patient and do not feel this is vasculitis  - KOh prep with no hyphae visible and fungal cultures pending  - See Derm recs for treatment    Gout  Pt with acute onset of right ankle pain  - Previous aspiration of left wrist with crystals (appearing to be CPPD)  - Uric acid significantly elevated at 15.5  - Also with note on previous imaging of possible CPPD  - Reports some pain in his hands as well in the past with notable bony enlargement and deformity  - Xrays without definite erosions or clear evidence of CPPD; Soft tissue calcifications  - Patient had very good response to colchicine  Would discontinue Colchicine at this time and resume PRN in light of patient's CHF/CKD  Can take a few doses PRN, but would be judicious and definitely discontinue immediately if diarrhea occurs  - Would avoid steroids in this patient unless CHF team approves  - Can consider urate lowering therapy after acute flare resolved  - Would discharge patient with prescription for 6 tablets of colchicine  We counseled him on use  We will establish follow-up with patient in our clinic      We will signoff at this time, but please contact us with any further questions.    Patient discussed and seen with attending Dr. Sung Chaudhary MD  Rheumatology  [facility]

## 2020-11-10 NOTE — PLAN OF CARE
Admitted for acute on chronic combined systolic and diastolic HF    Endocrine consulted for management of DM2     Home meds: Levemir 8 units and Novolog 4 units with meals    BG goal 140-180    Remains in CSU, NAEON. BG reasonably well (120-158) controlled with scheduled insulin. PO intake ~100%. Creatinine 2.7. Diet diabetic [facility]; 2000 Calorie; Isolation Tray - Regular [place]; Fluid - 1500mL    Plan:  Continue Levemir 8 units.   Continue novolog 3 units with meals.   BG monitoring ac/hs and change to low dose correction scale.     Discharge planning:  tbd  Endocrine to continue to follow    ** Please call Endocrine for any BG related issues **

## 2020-11-10 NOTE — SUBJECTIVE & OBJECTIVE
Interval History: Patient doing well this morning.  States he had a slight return of pain in his ankle that was relieved with dose of colchicine this AM.  Otherwise pain free.  Denies any diarrhea.  No fever or chills.    Current Facility-Administered Medications   Medication Frequency    acetaminophen tablet 650 mg Q6H PRN    aspirin chewable tablet 81 mg Daily    atorvastatin tablet 40 mg Daily    colchicine capsule/tablet 0.6 mg Every other day    dextrose 50% injection 12.5 g PRN    dextrose 50% injection 25 g PRN    DOBUtamine 1000 mg in D5W 250 mL infusion Continuous    ferrous sulfate EC tablet 325 mg Every other day    heparin (porcine) injection 5,000 Units Q8H    hydrALAZINE tablet 75 mg TID    insulin aspart U-100 pen 0-5 Units QID (AC + HS) PRN    insulin aspart U-100 pen 3 Units TIDWM    insulin detemir U-100 pen 8 Units QHS    isosorbide dinitrate tablet 40 mg TID    levothyroxine tablet 175 mcg Before breakfast    miconazole 2 % cream BID    NON FORMULARY MEDICATION BID    polyethylene glycol packet 17 g Daily    simethicone chewable tablet 160 mg TID PRN    sodium chloride 0.9% flush 10 mL PRN    [START ON 11/11/2020] torsemide tablet 100 mg Daily     Objective:     Vital Signs (Most Recent):  Temp: 97.1 °F (36.2 °C) (11/10/20 1120)  Pulse: 88 (11/10/20 1513)  Resp: 14 (11/10/20 1500)  BP: 93/69 (11/10/20 1500)  SpO2: 97 % (11/10/20 1500)  O2 Device (Oxygen Therapy): room air (11/10/20 1500) Vital Signs (24h Range):  Temp:  [97.1 °F (36.2 °C)-98.5 °F (36.9 °C)] 97.1 °F (36.2 °C)  Pulse:  [68-96] 88  Resp:  [12-20] 14  SpO2:  [94 %-97 %] 97 %  BP: ()/(55-69) 93/69     Weight: 54.1 kg (119 lb 4.3 oz) (11/10/20 0500)  Body mass index is 16.64 kg/m².  Body surface area is 1.65 meters squared.      Intake/Output Summary (Last 24 hours) at 11/10/2020 1534  Last data filed at 11/10/2020 0742  Gross per 24 hour   Intake 782 ml   Output 2500 ml   Net -1718 ml       Physical Exam    Nursing note and vitals reviewed.  Constitutional: He is oriented to person, place, and time and well-developed, well-nourished, and in no distress. No distress.   HENT:   Head: Normocephalic and atraumatic.   Eyes: EOM are normal. Pupils are equal, round, and reactive to light. Right eye exhibits no discharge. Left eye exhibits no discharge. No scleral icterus.   Neck: Normal range of motion. Neck supple.   Cardiovascular: Normal rate and intact distal pulses.    Pulmonary/Chest: Effort normal. No respiratory distress.   Abdominal: Soft. He exhibits no distension. There is no abdominal tenderness. There is no rebound and no guarding.   Neurological: He is alert and oriented to person, place, and time.   Skin: Skin is warm and dry. Rash noted. He is not diaphoretic. No erythema.     Hyperpigmented lesions on soles and palms   Psychiatric: Mood and affect normal.   Musculoskeletal: No edema.      Comments: Ankles now non-tender on palpation and appear less swollen         Significant Labs:  All pertinent lab results from the last 24 hours have been reviewed.    Significant Imaging:  Imaging results within the past 24 hours have been reviewed.

## 2020-11-10 NOTE — PROGRESS NOTES
[facility]  Heart Transplant  Progress Note    Patient Name: Ashish Mckeon  MRN: [id number]  Admission Date: 11/5/2020  Hospital Length of Stay: 5 days  Attending Physician: Ruben Butler Jr.,*  Primary Care Provider: [facility]  Principal Problem:Acute on chronic combined systolic and diastolic heart failure    Subjective:     Interval History: No new complaints this morning.  Lasix drip was stopped.  K was low this morning; replaced and will plan to start oral Torsemide    Continuous Infusions:   DOBUTamine IV infusion (non-titrating) 5 mcg/kg/min (11/10/20 0600)     Scheduled Meds:   aspirin  81 mg Oral Daily    atorvastatin  40 mg Oral Daily    colchicine  0.6 mg Oral Every other day    ferrous sulfate  325 mg Oral Every other day    heparin (porcine)  5,000 Units Subcutaneous Q8H    hydrALAZINE  75 mg Oral TID    insulin aspart U-100  3 Units Subcutaneous TIDWM    insulin detemir U-100  8 Units Subcutaneous QHS    isosorbide dinitrate  40 mg Oral TID    levothyroxine  175 mcg Oral Before breakfast    miconazole   Topical (Top) BID    NON FORMULARY MEDICATION   Topical (Top) BID    polyethylene glycol  17 g Oral Daily    potassium chloride  40 mEq Oral Once    [START ON 11/11/2020] torsemide  100 mg Oral Daily     PRN Meds:acetaminophen, dextrose 50%, dextrose 50%, insulin aspart U-100, simethicone, sodium chloride 0.9%    Review of patient's allergies indicates:   Allergen Reactions    Lisinopril Other (See Comments)     acei cough      Cefazolin Nausea And Vomiting     Objective:     Vital Signs (Most Recent):  Temp: 97.1 °F (36.2 °C) (11/10/20 1120)  Pulse: 78 (11/10/20 1127)  Resp: 20 (11/10/20 1120)  BP: (!) 90/59 (11/10/20 1120)  SpO2: 95 % (11/10/20 1120) Vital Signs (24h Range):  Temp:  [97.1 °F (36.2 °C)-98.5 °F (36.9 °C)] 97.1 °F (36.2 °C)  Pulse:  [68-91] 78  Resp:  [12-20] 20  SpO2:  [94 %-98 %] 95 %  BP: ()/(55-76) 90/59  "    Patient Vitals for the past 72 hrs (Last 3 readings):   Weight   11/10/20 0500 54.1 kg (119 lb 4.3 oz)   11/09/20 0941 57.4 kg (126 lb 8.7 oz)     Body mass index is 16.64 kg/m².      Intake/Output Summary (Last 24 hours) at 11/10/2020 1309  Last data filed at 11/10/2020 0742  Gross per 24 hour   Intake 1022 ml   Output 2700 ml   Net -1678 ml       Hemodynamic Parameters:       Telemetry: SR with NSVT    Physical Exam  Constitutional:       Appearance: Normal appearance.   HENT:      Head: Normocephalic and atraumatic.      Mouth/Throat:      Mouth: Mucous membranes are moist.      Pharynx: Oropharynx is clear.   Eyes:      Extraocular Movements: Extraocular movements intact.      Conjunctiva/sclera: Conjunctivae normal.      Pupils: Pupils are equal, round, and reactive to light.   Neck:      Musculoskeletal: Normal range of motion and neck supple.      Comments:  "v" waves radiating up to neck  Cardiovascular:      Rate and Rhythm: Normal rate and regular rhythm.      Comments: Grade III/VI systolic murmur at apex, + S3  Pulmonary:      Effort: Pulmonary effort is normal.      Breath sounds: Normal breath sounds.   Abdominal:      General: Bowel sounds are normal.      Palpations: Abdomen is soft.   Musculoskeletal: Normal range of motion.      Comments: Trace - 1+ edema RLE, no edema LLE   Skin:     General: Skin is warm and dry.      Capillary Refill: Capillary refill takes 2 to 3 seconds.      Findings: Rash present.   Neurological:      General: No focal deficit present.      Mental Status: He is alert and oriented to person, place, and time.   Psychiatric:         Mood and Affect: Mood normal.         Behavior: Behavior normal.         Thought Content: Thought content normal.         Judgment: Judgment normal.         Significant Labs:  CBC:  Recent Labs   Lab 11/08/20  0644 11/09/20  0748 11/10/20  0521   WBC 4.54 3.94 3.67*   RBC 3.88* 4.35* 4.45*   HGB 9.5* 10.7* 10.8*   HCT 29.7* 32.7* 33.2*   PLT " 119* 129* 132*   MCV 77* 75* 75*   MCH 24.5* 24.6* 24.3*   MCHC 32.0 32.7 32.5     BNP:  Recent Labs   Lab 11/05/20  1800   BNP 1,293*     CMP:  Recent Labs   Lab 11/05/20  1800 11/06/20  0958  11/08/20  0644 11/09/20  0748 11/10/20  0521   * 152*   < > 166* 120* 117*   CALCIUM 8.0* 8.4*   < > 8.7 9.0 8.7   ALBUMIN 3.3* 3.3*  --   --  3.5  --    PROT 7.3 7.3  --   --  8.3  --    * 136   < > 133* 131* 129*   K 4.0 3.8   < > 3.5 3.5 3.3*   CO2 16* 22*   < > 24 26 27    102   < > 95 91* 88*   BUN 65* 64*   < > 61* 65* 70*   CREATININE 2.7* 2.5*   < > 2.5* 2.8* 2.7*   ALKPHOS 176* 187*  --   --  195*  --    ALT 18 17  --   --  15  --    AST 19 19  --   --  16  --    BILITOT 1.3* 1.4*  --   --  1.6*  --     < > = values in this interval not displayed.      Coagulation:   No results for input(s): PT, INR, APTT in the last 168 hours.  LDH:  No results for input(s): LDH in the last 72 hours.  Microbiology:  Microbiology Results (last 7 days)     Procedure Component Value Units Date/Time    Blood culture [[id number]] Collected: 11/09/20 0753    Order Status: Completed Specimen: Blood from Peripheral, Right Arm Updated: 11/10/20 1212     Blood Culture, Routine No Growth to date      No Growth to date    Fungus culture [[id number]] Collected: 11/09/20 1312    Order Status: Completed Specimen: Skin from Foot, Left Updated: 11/10/20 0950     Fungus (Mycology) Culture Culture in progress    Blood culture [[id number]] Collected: 11/09/20 0748    Order Status: Completed Specimen: Blood from Antecubital, Right Arm Updated: 11/10/20 0115     Blood Culture, Routine No Growth to date          I have reviewed all pertinent labs within the past 24 hours.    Estimated Creatinine Clearance: 23.4 mL/min (A) (based on SCr of 2.7 mg/dL (H)).    Diagnostic Results:  I have reviewed all pertinent imaging results/findings within the past 24 hours.    Assessment and Plan:     Mr. Mckeon is a 54 y/o M with stage D HFrEF 2/2 NICM, HTN,  HLD, DM2 who presents in setting of elevated Cr concerning for acute on chronic heart failure. Patient followed by Dr. Gardner, per last clinic note multiple admissions in last year, continued on home  (patient said 2.5 but appears to be on 5 mcg/kg/min as planned). Following routine labs with elevated Cr 3.2 (baseline 2.3-2.6).  LFTs, TBili at baseline. Continued NYHA class II symptoms, no PND or orthopnea. He reports intermittent med nonadherence, taking bidil only daily. He recently underwent a liver biopsy with no evidence of cirrhosis. Hepatology has cleared him for advanced HF options. Otherwise denies chest pain, palpitations or shortness of breath. Able to complete ADLs independently, ambulate approximately 50 yards before symptoms of SOB, able to drive to grocery store.     TTE 6/8/2020  · Severe left atrial enlargement.  · Mild left ventricular enlargement.  · Severely decreased left ventricular systolic function. The estimated ejection fraction is 20%.  · Severe global hypokinetic wall motion.  · Grade III (severe) left ventricular diastolic dysfunction consistent with restrictive physiology.  · Severe right atrial enlargement.  · Moderate right ventricular enlargement.  · Moderately reduced right ventricular systolic function.  · Moderate-to-severe mitral regurgitation.  · Mild to moderate tricuspid regurgitation.  · Pulmonary hypertension present.  · The estimated PA systolic pressure is 65 mmHg.  · Elevated central venous pressure (15 mmHg).  Trivial pericardial effusion.    * Acute on chronic combined systolic and diastolic heart failure  LVEF 20% on TTE 6/8/2020, LVIDD 5.6 cm, secondary to nonischemic cardiomyopathy  - Net -ve > 2.3L past 24 hours Lasix drip was stopped due to elevation in renal function.  Will transition to po Torsemide   - continue home dobutamine 5 mcg/kg/min  - ACE/ARB contraindicated for renal function. Aldactone on hold  - Continue home Hydralazine 75 mg every 8 hours plus  Isordil 40 mg every 8 hours (patient was taking only daily)  - TTE 11/6: LVEDD 5.83, LVEF 20%, grade 3 diastolic dysfunction, RV mildly dilated and severely depressed, trace AI, mod-severe MR, mod TR, PAS 64, IVC 15, small circumferential pericardial effusion  - maintain K>4 Mg>2  - Has no ICD as he is not currently a candidate for advanced options (smallish LV, non compliance)    Acute kidney injury superimposed on CKD3  - sCr elevated to 3.2 on admit from baseline of 2.2-2.8.   - Multi-factorial from stage D heart failure vs diabetic nephropathy  - Strict intake and out put  - Continue to monitor    Gout  - Appreciate Rheumatology's help. Bilateral ankle pain has improved with Colchicine. Will likely transition to renally dosed Allopurinol once acute gout flare resolved  - Arthritis Survey: No evidence of systemic erosive inflammatory arthritis.  No evidence of gout (however uric acid 15.5)    Diabetes mellitus, type II  - Insulin dependent, last A1c 7.9  - Continue home Detemir 8 units PM  - Moderate intensity bolus SSI with meals  - Repeat A1c, may benefit from Endocrine consult this admission for better glycemic control     Hypothyroidism  - Continue home synthroid 175 mcg daily  - Repeat TSH 2.408    Thrombocytopenia  - Chronic, noted on chart review; pancytopenic likely 2/2 to anemia of chronic disease  - May warrant peripheral blood smear, out patient hematology evaluation   - Continue ASA  - Continue to monitor    Nipple pain  - Has left nipple pain/slight swelling. Aldactone on hold  - Awaiting US left nipple    Hyperpigmented skin lesion  - Appreciate Rheumatology's help: does not appear to look vasculitic but will finish the work up given proteinuria and CHF.   - Dermatology consulted    Edema  - Has RLE edema, no LLE edema. Venous US 11/6 -ve for DVT    Elevated liver enzymes  - AST/ALT wnl; Tbili 1.6  - Continue to monitor      AWAIS Cochran  Heart Transplant  [facility]

## 2020-11-10 NOTE — SUBJECTIVE & OBJECTIVE
Interval History: No new complaints this morning.  Lasix drip was stopped.  K was low this morning; replaced and will plan to start oral Torsemide    Continuous Infusions:   DOBUTamine IV infusion (non-titrating) 5 mcg/kg/min (11/10/20 0600)     Scheduled Meds:   aspirin  81 mg Oral Daily    atorvastatin  40 mg Oral Daily    colchicine  0.6 mg Oral Every other day    ferrous sulfate  325 mg Oral Every other day    heparin (porcine)  5,000 Units Subcutaneous Q8H    hydrALAZINE  75 mg Oral TID    insulin aspart U-100  3 Units Subcutaneous TIDWM    insulin detemir U-100  8 Units Subcutaneous QHS    isosorbide dinitrate  40 mg Oral TID    levothyroxine  175 mcg Oral Before breakfast    miconazole   Topical (Top) BID    NON FORMULARY MEDICATION   Topical (Top) BID    polyethylene glycol  17 g Oral Daily    potassium chloride  40 mEq Oral Once    [START ON 11/11/2020] torsemide  100 mg Oral Daily     PRN Meds:acetaminophen, dextrose 50%, dextrose 50%, insulin aspart U-100, simethicone, sodium chloride 0.9%    Review of patient's allergies indicates:   Allergen Reactions    Lisinopril Other (See Comments)     acei cough      Cefazolin Nausea And Vomiting     Objective:     Vital Signs (Most Recent):  Temp: 97.1 °F (36.2 °C) (11/10/20 1120)  Pulse: 78 (11/10/20 1127)  Resp: 20 (11/10/20 1120)  BP: (!) 90/59 (11/10/20 1120)  SpO2: 95 % (11/10/20 1120) Vital Signs (24h Range):  Temp:  [97.1 °F (36.2 °C)-98.5 °F (36.9 °C)] 97.1 °F (36.2 °C)  Pulse:  [68-91] 78  Resp:  [12-20] 20  SpO2:  [94 %-98 %] 95 %  BP: ()/(55-76) 90/59     Patient Vitals for the past 72 hrs (Last 3 readings):   Weight   11/10/20 0500 54.1 kg (119 lb 4.3 oz)   11/09/20 0941 57.4 kg (126 lb 8.7 oz)     Body mass index is 16.64 kg/m².      Intake/Output Summary (Last 24 hours) at 11/10/2020 1309  Last data filed at 11/10/2020 0742  Gross per 24 hour   Intake 1022 ml   Output 2700 ml   Net -1678 ml       Hemodynamic Parameters:    "    Telemetry: SR with NSVT    Physical Exam  Constitutional:       Appearance: Normal appearance.   HENT:      Head: Normocephalic and atraumatic.      Mouth/Throat:      Mouth: Mucous membranes are moist.      Pharynx: Oropharynx is clear.   Eyes:      Extraocular Movements: Extraocular movements intact.      Conjunctiva/sclera: Conjunctivae normal.      Pupils: Pupils are equal, round, and reactive to light.   Neck:      Musculoskeletal: Normal range of motion and neck supple.      Comments:  "v" waves radiating up to neck  Cardiovascular:      Rate and Rhythm: Normal rate and regular rhythm.      Comments: Grade III/VI systolic murmur at apex, + S3  Pulmonary:      Effort: Pulmonary effort is normal.      Breath sounds: Normal breath sounds.   Abdominal:      General: Bowel sounds are normal.      Palpations: Abdomen is soft.   Musculoskeletal: Normal range of motion.      Comments: Trace - 1+ edema RLE, no edema LLE   Skin:     General: Skin is warm and dry.      Capillary Refill: Capillary refill takes 2 to 3 seconds.      Findings: Rash present.   Neurological:      General: No focal deficit present.      Mental Status: He is alert and oriented to person, place, and time.   Psychiatric:         Mood and Affect: Mood normal.         Behavior: Behavior normal.         Thought Content: Thought content normal.         Judgment: Judgment normal.         Significant Labs:  CBC:  Recent Labs   Lab 11/08/20  0644 11/09/20  0748 11/10/20  0521   WBC 4.54 3.94 3.67*   RBC 3.88* 4.35* 4.45*   HGB 9.5* 10.7* 10.8*   HCT 29.7* 32.7* 33.2*   * 129* 132*   MCV 77* 75* 75*   MCH 24.5* 24.6* 24.3*   MCHC 32.0 32.7 32.5     BNP:  Recent Labs   Lab 11/05/20  1800   BNP 1,293*     CMP:  Recent Labs   Lab 11/05/20  1800 11/06/20  0958  11/08/20  0644 11/09/20  0748 11/10/20  0521   * 152*   < > 166* 120* 117*   CALCIUM 8.0* 8.4*   < > 8.7 9.0 8.7   ALBUMIN 3.3* 3.3*  --   --  3.5  --    PROT 7.3 7.3  --   --  8.3  " --    * 136   < > 133* 131* 129*   K 4.0 3.8   < > 3.5 3.5 3.3*   CO2 16* 22*   < > 24 26 27    102   < > 95 91* 88*   BUN 65* 64*   < > 61* 65* 70*   CREATININE 2.7* 2.5*   < > 2.5* 2.8* 2.7*   ALKPHOS 176* 187*  --   --  195*  --    ALT 18 17  --   --  15  --    AST 19 19  --   --  16  --    BILITOT 1.3* 1.4*  --   --  1.6*  --     < > = values in this interval not displayed.      Coagulation:   No results for input(s): PT, INR, APTT in the last 168 hours.  LDH:  No results for input(s): LDH in the last 72 hours.  Microbiology:  Microbiology Results (last 7 days)     Procedure Component Value Units Date/Time    Blood culture [[id number]] Collected: 11/09/20 0753    Order Status: Completed Specimen: Blood from Peripheral, Right Arm Updated: 11/10/20 1212     Blood Culture, Routine No Growth to date      No Growth to date    Fungus culture [[id number]] Collected: 11/09/20 1312    Order Status: Completed Specimen: Skin from Foot, Left Updated: 11/10/20 0950     Fungus (Mycology) Culture Culture in progress    Blood culture [[id number]] Collected: 11/09/20 0748    Order Status: Completed Specimen: Blood from Antecubital, Right Arm Updated: 11/10/20 0115     Blood Culture, Routine No Growth to date          I have reviewed all pertinent labs within the past 24 hours.    Estimated Creatinine Clearance: 23.4 mL/min (A) (based on SCr of 2.7 mg/dL (H)).    Diagnostic Results:  I have reviewed all pertinent imaging results/findings within the past 24 hours.

## 2020-11-10 NOTE — ASSESSMENT & PLAN NOTE
LVEF 20% on TTE 6/8/2020, LVIDD 5.6 cm, secondary to nonischemic cardiomyopathy  - Net -ve > 2.3L past 24 hours Lasix drip was stopped due to elevation in renal function.  Will transition to po Torsemide   - continue home dobutamine 5 mcg/kg/min  - ACE/ARB contraindicated for renal function. Aldactone on hold  - Continue home Hydralazine 75 mg every 8 hours plus Isordil 40 mg every 8 hours (patient was taking only daily)  - TTE 11/6: LVEDD 5.83, LVEF 20%, grade 3 diastolic dysfunction, RV mildly dilated and severely depressed, trace AI, mod-severe MR, mod TR, PAS 64, IVC 15, small circumferential pericardial effusion  - maintain K>4 Mg>2  - Has no ICD as he is not currently a candidate for advanced options (smallish LV, non compliance)

## 2020-11-10 NOTE — PROGRESS NOTES
Spoke with AWAIS Dickens about potassium PO order. Pt refused PO pills and stated they are too big to swallow. NP stated okay to order potassium packets. Will continue to monitor.

## 2020-11-10 NOTE — PROGRESS NOTES
Discharge planning    Pt to discharge Wednesday or Friday. Orders faxed to [facility]  [phone], fax [phone]. Informed [facility] that pt discharging soon. Providing ongoing psychosocial and counseling support, education, resources, assistance and discharge planning as indicated. Following and available.

## 2020-11-11 NOTE — PLAN OF CARE
Admitted for acute on chronic combined systolic and diastolic HF    Endocrine consulted for management of DM2     Home meds: Levemir 8 units and Novolog 4 units with meals    BG goal 140-180    Remains in CSU, NAEON. BG reasonably well (125-203) controlled with scheduled insulin. PO intake ~100%. Creatinine 2.7. Diet diabetic [facility]; 2000 Calorie; Isolation Tray - Regular [place]; Fluid - 1500mL    Plan:  Continue Levemir 8 units.   Continue novolog 3 units with meals.   BG monitoring ac/hs and change to low dose correction scale.     Discharge planning:  tbd  Endocrine to continue to follow    ** Please call Endocrine for any BG related issues **

## 2020-11-11 NOTE — PROGRESS NOTES
Update    SWI to pt's room. Pt seen for potential discharge but was medically not cleared to d/c yet. Pt presents aaox4 with pleasant affect. Pt states understanding of plan of care. Pt reports coping well and denies further needs, questions, concerns at this time and none indicated. Providing ongoing psychosocial and counseling support, education, resources, assistance and discharge planning as indicated. Following and available.

## 2020-11-11 NOTE — DISCHARGE SUMMARY
[facility]  Heart Transplant  Discharge Summary      Patient Name: Ashish Mckeon  MRN: [id number]  Admission Date: 11/5/2020  Hospital Length of Stay: 7 days  Discharge Date and Time: 11/12/2020 2:20 PM  Attending Physician: Ruben Butler Jr.,*   Discharging Provider: Loyda Cintron PA-C  Primary Care Provider: [facility]     HPI: Mr. Mckeon is a 56 y/o M with stage D HFrEF 2/2 NICM, HTN, HLD, DM2 who presents in setting of elevated Cr concerning for acute on chronic heart failure. Patient followed by Dr. Gardner, per last clinic note multiple admissions in last year, continued on home  (patient said 2.5 but appears to be on 5 mcg/kg/min as planned). Following routine labs with elevated Cr 3.2 (baseline 2.3-2.6).  LFTs, TBili at baseline. Continued NYHA class II symptoms, no PND or orthopnea. He reports intermittent med nonadherence, taking bidil only daily. He recently underwent a liver biopsy with no evidence of cirrhosis. Hepatology has cleared him for advanced HF options. Otherwise denies chest pain, palpitations or shortness of breath. Able to complete ADLs independently, ambulate approximately 50 yards before symptoms of SOB, able to drive to grocery store.     TTE 6/8/2020  · Severe left atrial enlargement.  · Mild left ventricular enlargement.  · Severely decreased left ventricular systolic function. The estimated ejection fraction is 20%.  · Severe global hypokinetic wall motion.  · Grade III (severe) left ventricular diastolic dysfunction consistent with restrictive physiology.  · Severe right atrial enlargement.  · Moderate right ventricular enlargement.  · Moderately reduced right ventricular systolic function.  · Moderate-to-severe mitral regurgitation.  · Mild to moderate tricuspid regurgitation.  · Pulmonary hypertension present.  · The estimated PA systolic pressure is 65 mmHg.  · Elevated central venous pressure (15 mmHg).  Trivial pericardial  effusion.    * No surgery found *     Hospital Course: Admitted for ADHF and KEYANNA (Cr 3.2 on admit, BL 2.3-2.6). ECHO 11/6/20 with LVEF 20%, LVEDD 5.83, moderate TR, moderate to severe MR. Lasix gtt initiated with initial improvement in kidney function, but then was held due to bump in Cr to 2.8. Eventually resumed home diuretic of torsemide 100 mg daily with adequate response. Will plan for follow up in Heart Failure clinic in 2 weeks. Discharge postponed due to bleeding from abdominal wal injection site, which was then relieved with silver nitrate application. Of note, pt c/o bilateral ankle pain, for which Rheumatology was consulted and recommended initiating colchicine as treatment. He also developed hyperpigmentation to his palms/soles and was started on miconazole and urea cream per Dermatology recommendations.     Consults (From admission, onward)        Status Ordering Provider     Inpatient consult to Cardiology  Once     Provider:  (Not yet assigned)    Completed СЕРГЕЙ SPANN     Inpatient consult to Dermatology  Once     Provider:  (Not yet assigned)    Completed MARLENY PALACIOS     Inpatient consult to Endocrinology  Once     Provider:  (Not yet assigned)    Completed LORA LIGHT     Inpatient consult to Rheumatology  Once     Provider:  (Not yet assigned)    Completed LORA LIGHT          Significant Diagnostic Studies: Labs:   BMP:   Recent Labs   Lab 11/11/20  0201 11/12/20  0248   * 105   * 133*   K 3.7 3.7   CL 93* 94*   CO2 25 23   BUN 74* 76*   CREATININE 2.8* 2.8*   CALCIUM 8.6* 8.9   MG 2.3 2.4       Pending Diagnostic Studies:     Procedure Component Value Units Date/Time    Anti-neutrophilic cytoplasmic antibody [[id number]] Collected: 11/09/20 1303    Order Status: Sent Lab Status: In process Updated: 11/09/20 1405    Specimen: Blood     Narrative:      Collection has been rescheduled by [facility] at 11/09/2020 11:46 Reason:   patient is eating and asked lab to  come back in a hr. notified nurse   Marnie     Beta-2 glycoprotein antibodies [[id number]] Collected: 11/09/20 0749    Order Status: Sent Lab Status: In process Updated: 11/09/20 0757    Specimen: Blood     Narrative:      Collection has been rescheduled by [facility] at 11/09/2020 05:14 Reason:   Patient Refused   RN Yesika    Cryoglobulin [[id number]] Collected: 11/09/20 0749    Order Status: Sent Lab Status: In process Updated: 11/09/20 0756    Specimen: Blood     Narrative:      Collection has been rescheduled by [facility] at 11/09/2020 05:14 Reason:   Patient Refused   RN Yesika  Collection has been rescheduled by [facility] at 11/09/2020 05:14 Reason:   Patient Refused   RN Yesika    DRVVT [[id number]] Collected: 11/09/20 0748    Order Status: Sent Lab Status: In process Updated: 11/09/20 0757    Specimen: Blood     Narrative:      Collection has been rescheduled by [facility] at 11/09/2020 05:14 Reason:   Patient Refused   RN Yesika  Collection has been rescheduled by [facility] at 11/09/2020 05:14 Reason:   Patient Refused   RN Yesika  Collection has been rescheduled by [facility] at 11/09/2020 05:14 Reason:   Patient Refused   RN Yesika  Collection has been rescheduled by [facility] at 11/09/2020 05:14 Reason:   Patient Refused   RN Yesika    Hemoglobin A1c if not done in past 3 months [[id number]]     Order Status: Sent Lab Status: No result     Specimen: Blood         Final Active Diagnoses:    Diagnosis Date Noted POA    PRINCIPAL PROBLEM:  Acute on chronic combined systolic and diastolic heart failure [I50.43] 11/05/2020 Yes    Hyperpigmented skin lesion [L81.9] 11/08/2020 Yes    Gout [M10.9] 11/08/2020 Yes    Edema [R60.9] 11/07/2020 Unknown    Nipple pain [N64.4] 11/07/2020 Unknown    Elevated liver enzymes [R74.8] 11/05/2020 Yes    DCM (dilated cardiomyopathy) [I42.0] 02/29/2020 Unknown    Acute kidney injury superimposed on CKD3 [N17.9, N18.9] 07/17/2018 Yes    Thrombocytopenia [D69.6] 09/06/2013 Yes    Hypothyroidism [E03.9]  Yes    Diabetes  mellitus, type II [E11.9]  Yes      Problems Resolved During this Admission:      Discharged Condition: stable    [place] [facility]    Follow Up:  Follow-up Information     [facility] In 2 weeks.    Specialty: Emergency Medicine  Contact information:  Radha Castellanos  [facility] [address] [phone]               Patient Instructions:      Notify your health care provider if you experience any of the following:  temperature >100.4     Notify your health care provider if you experience any of the following:  persistent nausea and vomiting or diarrhea     Notify your health care provider if you experience any of the following:  severe uncontrolled pain     Notify your health care provider if you experience any of the following:  redness, tenderness, or signs of infection (pain, swelling, redness, odor or green/yellow discharge around incision site)     Notify your health care provider if you experience any of the following:  difficulty breathing or increased cough     Notify your health care provider if you experience any of the following:  severe persistent headache     Notify your health care provider if you experience any of the following:  worsening rash     Notify your health care provider if you experience any of the following:  persistent dizziness, light-headedness, or visual disturbances     Notify your health care provider if you experience any of the following:  increased confusion or weakness     Notify your health care provider if you experience any of the following:     Activity as tolerated     Medications:  Reconciled Home Medications:      Medication List      START taking these medications    AntifungaL Cream (miconazole) 2 % cream  Generic drug: miconazole  Apply topically 2 (two) times daily. To palms and soles     colchicine 0.6 mg Cap  Commonly known as: MITIGARE  Take 1 capsule (0.6 mg total) by mouth once daily. for 6 days     digoxin 125  "mcg tablet  Commonly known as: LANOXIN  Take 1 tablet (125 mcg total) by mouth every Mon, Wed, Fri.  Start taking on: November 13, 2020     potassium chloride 20 mEq Pack  Commonly known as: KLOR-CON  Mix 1 packet (20 mEq) with liquid and take by mouth once daily.     urea 20 % Crea  Apply 1 application topically 2 (two) times a day.        CHANGE how you take these medications    acetaminophen 325 MG tablet  Commonly known as: TYLENOL  Take 2 tablets (650 mg total) by mouth every 6 to 8 hours as needed.  What changed:   · how much to take  · when to take this  · reasons to take this        CONTINUE taking these medications    aspirin 81 MG Chew  Take 1 tablet (81 mg total) by mouth once daily.     atorvastatin 40 MG tablet  Commonly known as: LIPITOR  Take 40 mg by mouth once daily.     BD ULTRA-FINE CONNIE PEN NEEDLE 32 gauge x 5/32" Ndle  Generic drug: pen needle, diabetic  Use to inject insulin four times daily before meals and nightly.     DOBUTamine 250 mg/20 mL (12.5 mg/mL) injection  Commonly known as: DOBUTREX     ferrous sulfate 325 (65 FE) MG EC tablet  Take 1 tablet (325 mg total) by mouth every other day.     hydrALAZINE 50 MG tablet  Commonly known as: APRESOLINE  Take 1.5 tablets (75 mg total) by mouth 3 (three) times daily.     HYDROcodone-acetaminophen 5-325 mg per tablet  Commonly known as: NORCO  Take 1 tablet by mouth every 6 (six) hours as needed for Pain.     insulin aspart U-100 100 unit/mL (3 mL) Inpn pen  Commonly known as: NovoLOG  Inject 4 Units into the skin 3 (three) times daily with meals.     isosorbide dinitrate 20 MG tablet  Commonly known as: ISORDIL  Take 2 tablets (40 mg total) by mouth 3 (three) times daily.     lancing device Misc  Use to test blood glucose four times daily before meals and nightly.     LEVEMIR FLEXTOUCH U-100 INSULN 100 unit/mL (3 mL) Inpn pen  Generic drug: insulin detemir U-100  Inject 8 Units into the skin every evening.     levothyroxine 175 MCG " tablet  Commonly known as: SYNTHROID  Take 1 tablet (175 mcg total) by mouth before breakfast.     torsemide 100 MG Tab  Commonly known as: DEMADEX  Take 1 tablet (100 mg total) by mouth once daily.     TRUE METRIX GLUCOSE METER Misc  Generic drug: blood-glucose meter  Use as instructed     TRUE METRIX GLUCOSE TEST STRIP Strp  Generic drug: blood sugar diagnostic  Use to test blood glucose four times daily before meals and nightly.     TRUEPLUS LANCETS 30 gauge Misc  Generic drug: lancets  Use to test blood glucose four times daily before meals and nightly.        STOP taking these medications    dextrose 5 % infusion     spironolactone 25 MG tablet  Commonly known as: ALDACTONE            Loyda Cintron PA-C  Heart Transplant  [facility]

## 2020-11-11 NOTE — PROGRESS NOTES
[facility]  Heart Transplant  Progress Note    Patient Name: Ashish Mckeon  MRN: [id number]  Admission Date: 11/5/2020  Hospital Length of Stay: 6 days  Attending Physician: Ruben Butler Jr.,*  Primary Care Provider: [facility]  Principal Problem:Acute on chronic combined systolic and diastolic heart failure    Subjective:     Interval History: Lasix gtt discontinued yesterday for elevated Cr (2.8 today). Transitioned to home diuretic regimen of PO torsemide 100 mg daily, first dose this morning. Will monitor response and plan for possible discharge tomorrow.     Continuous Infusions:   DOBUTamine IV infusion (non-titrating) 5 mcg/kg/min (11/10/20 0600)     Scheduled Meds:   aspirin  81 mg Oral Daily    atorvastatin  40 mg Oral Daily    colchicine  0.6 mg Oral Every other day    ferrous sulfate  325 mg Oral Every other day    heparin (porcine)  5,000 Units Subcutaneous Q8H    hydrALAZINE  75 mg Oral TID    insulin aspart U-100  3 Units Subcutaneous TIDWM    insulin detemir U-100  8 Units Subcutaneous QHS    isosorbide dinitrate  40 mg Oral TID    levothyroxine  175 mcg Oral Before breakfast    miconazole   Topical (Top) BID    NON FORMULARY MEDICATION   Topical (Top) BID    polyethylene glycol  17 g Oral Daily    potassium chloride  20 mEq Oral Once    torsemide  100 mg Oral Daily     PRN Meds:acetaminophen, dextrose 50%, dextrose 50%, insulin aspart U-100, simethicone, sodium chloride 0.9%    Review of patient's allergies indicates:   Allergen Reactions    Lisinopril Other (See Comments)     acei cough      Cefazolin Nausea And Vomiting     Objective:     Vital Signs (Most Recent):  Temp: 98.2 °F (36.8 °C) (11/11/20 0740)  Pulse: 89 (11/11/20 0740)  Resp: 15 (11/11/20 0740)  BP: 101/63 (11/11/20 0740)  SpO2: 98 % (11/11/20 0740) Vital Signs (24h Range):  Temp:  [97.1 °F (36.2 °C)-98.2 °F (36.8 °C)] 98.2 °F (36.8 °C)  Pulse:  [] 89  Resp:   [14-20] 15  SpO2:  [95 %-99 %] 98 %  BP: ()/(59-74) 101/63     Patient Vitals for the past 72 hrs (Last 3 readings):   Weight   11/10/20 0500 54.1 kg (119 lb 4.3 oz)   11/09/20 0941 57.4 kg (126 lb 8.7 oz)     Body mass index is 16.64 kg/m².      Intake/Output Summary (Last 24 hours) at 11/11/2020 1054  Last data filed at 11/11/2020 0522  Gross per 24 hour   Intake 582 ml   Output 600 ml   Net -18 ml       Hemodynamic Parameters:           Physical Exam  Vitals signs and nursing note reviewed.   HENT:      Head: Normocephalic.      Right Ear: Tympanic membrane normal.      Nose: Nose normal.      Mouth/Throat:      Mouth: Mucous membranes are moist.   Eyes:      Conjunctiva/sclera: Conjunctivae normal.   Neck:      Comments: +JVP  Cardiovascular:      Rate and Rhythm: Normal rate and regular rhythm.   Pulmonary:      Effort: Pulmonary effort is normal.   Abdominal:      General: Abdomen is flat.   Musculoskeletal: Normal range of motion.      Right lower leg: No edema.      Left lower leg: No edema.   Skin:     General: Skin is warm.   Neurological:      General: No focal deficit present.      Mental Status: He is alert.   Psychiatric:         Mood and Affect: Mood normal.         Behavior: Behavior normal.         Thought Content: Thought content normal.         Judgment: Judgment normal.         Significant Labs:  CBC:  Recent Labs   Lab 11/09/20  0748 11/10/20  0521 11/11/20  0201   WBC 3.94 3.67* 3.72*   RBC 4.35* 4.45* 4.12*   HGB 10.7* 10.8* 10.0*   HCT 32.7* 33.2* 31.1*   * 132* 123*   MCV 75* 75* 76*   MCH 24.6* 24.3* 24.3*   MCHC 32.7 32.5 32.2     BNP:  Recent Labs   Lab 11/05/20  1800   BNP 1,293*     CMP:  Recent Labs   Lab 11/06/20  0958  11/09/20  0748 11/10/20  0521 11/11/20  0201   *   < > 120* 117* 168*   CALCIUM 8.4*   < > 9.0 8.7 8.6*   ALBUMIN 3.3*  --  3.5  --  3.3*   PROT 7.3  --  8.3  --  7.7      < > 131* 129* 131*   K 3.8   < > 3.5 3.3* 3.7   CO2 22*   < > 26 27 25       < > 91* 88* 93*   BUN 64*   < > 65* 70* 74*   CREATININE 2.5*   < > 2.8* 2.7* 2.8*   ALKPHOS 187*  --  195*  --  218*   ALT 17  --  15  --  69*   AST 19  --  16  --  82*   BILITOT 1.4*  --  1.6*  --  1.4*    < > = values in this interval not displayed.      Coagulation:   No results for input(s): PT, INR, APTT in the last 168 hours.  LDH:  No results for input(s): LDH in the last 72 hours.  Microbiology:  Microbiology Results (last 7 days)     Procedure Component Value Units Date/Time    Blood culture [[id number]] Collected: 11/09/20 0748    Order Status: Completed Specimen: Blood from Antecubital, Right Arm Updated: 11/10/20 1622     Blood Culture, Routine No Growth to date      No Growth to date    Blood culture [[id number]] Collected: 11/09/20 0753    Order Status: Completed Specimen: Blood from Peripheral, Right Arm Updated: 11/10/20 1212     Blood Culture, Routine No Growth to date      No Growth to date    Fungus culture [[id number]] Collected: 11/09/20 1312    Order Status: Completed Specimen: Skin from Foot, Left Updated: 11/10/20 0950     Fungus (Mycology) Culture Culture in progress          I have reviewed all pertinent labs within the past 24 hours.    Estimated Creatinine Clearance: 22.5 mL/min (A) (based on SCr of 2.8 mg/dL (H)).    Diagnostic Results:  I have reviewed all pertinent imaging results/findings within the past 24 hours.    Assessment and Plan:     Mr. Mckeon is a 56 y/o M with stage D HFrEF 2/2 NICM, HTN, HLD, DM2 who presents in setting of elevated Cr concerning for acute on chronic heart failure. Patient followed by Dr. Gardner, per last clinic note multiple admissions in last year, continued on home  (patient said 2.5 but appears to be on 5 mcg/kg/min as planned). Following routine labs with elevated Cr 3.2 (baseline 2.3-2.6).  LFTs, TBili at baseline. Continued NYHA class II symptoms, no PND or orthopnea. He reports intermittent med nonadherence, taking bidil only daily. He  recently underwent a liver biopsy with no evidence of cirrhosis. Hepatology has cleared him for advanced HF options. Otherwise denies chest pain, palpitations or shortness of breath. Able to complete ADLs independently, ambulate approximately 50 yards before symptoms of SOB, able to drive to grocery store.     TTE 6/8/2020  · Severe left atrial enlargement.  · Mild left ventricular enlargement.  · Severely decreased left ventricular systolic function. The estimated ejection fraction is 20%.  · Severe global hypokinetic wall motion.  · Grade III (severe) left ventricular diastolic dysfunction consistent with restrictive physiology.  · Severe right atrial enlargement.  · Moderate right ventricular enlargement.  · Moderately reduced right ventricular systolic function.  · Moderate-to-severe mitral regurgitation.  · Mild to moderate tricuspid regurgitation.  · Pulmonary hypertension present.  · The estimated PA systolic pressure is 65 mmHg.  · Elevated central venous pressure (15 mmHg).  Trivial pericardial effusion.    * Acute on chronic combined systolic and diastolic heart failure  - LVEF 20% on TTE 6/8/2020, LVIDD 5.6 cm, secondary to nonischemic cardiomyopathy  - Lasix gtt discontinued due to elevation in renal function.  Will transition to PO Torsemide   - Continue home dobutamine 5 mcg/kg/min  - ACE/ARB contraindicated for renal function. Aldactone on hold.  - Continue home Hydralazine 75 mg every 8 hours plus Isordil 40 mg every 8 hours (patient was taking only daily)  - TTE 11/6: LVEDD 5.83, LVEF 20%, grade 3 diastolic dysfunction, RV mildly dilated and severely depressed, trace AI, mod-severe MR, mod TR, PAS 64, IVC 15, small circumferential pericardial effusion  - maintain K >4 Mg >2  - Has no ICD as he is not currently a candidate for advanced options (smallish LV, non compliance)    Hyperpigmented skin lesion  - Appreciate Rheumatology's help: does not appear to look vasculitic but will finish the work up  given proteinuria and CHF.   - Dermatology consulted    Gout  - Appreciate Rheumatology's help. Bilateral ankle pain has improved with Colchicine. Will likely transition to renally dosed Allopurinol once acute gout flare resolved  - Arthritis Survey: No evidence of systemic erosive inflammatory arthritis.  No evidence of gout (however uric acid 15.5)    Nipple pain  - Has left nipple pain/slight swelling. Aldactone on hold  - Awaiting US left nipple    Edema  - Has RLE edema, no LLE edema. Venous US 11/6 -ve for DVT    Elevated liver enzymes  - AST/ALT wnl; Tbili 1.6  - Continue to monitor    Acute kidney injury superimposed on CKD3  - sCr elevated to 3.2 on admit from baseline of 2.2-2.8.   - Multi-factorial from stage D heart failure vs diabetic nephropathy  - Strict intake and out put  - Continue to monitor    Diabetes mellitus, type II  - Insulin dependent, last A1c 7.9  - Continue home Detemir 8 units PM  - Moderate intensity bolus SSI with meals  - Repeat A1c, may benefit from Endocrine consult this admission for better glycemic control     Hypothyroidism  - Continue home synthroid 175 mcg daily  - Repeat TSH 2.408    Thrombocytopenia  - Chronic, noted on chart review; pancytopenic likely 2/2 to anemia of chronic disease  - May warrant peripheral blood smear, out patient hematology evaluation   - Continue ASA  - Continue to monitor        Loyda Cintron PA-C  Heart Transplant  [facility]

## 2020-11-11 NOTE — HOSPITAL COURSE
Admitted for ADHF and KEYANNA (Cr 3.2 on admit, BL 2.3-2.6). ECHO 11/6/20 with LVEF 20%, LVEDD 5.83, moderate TR, moderate to severe MR. Lasix gtt initiated with initial improvement in kidney function, but then was held due to bump in Cr to 2.8. Eventually resumed home diuretic of torsemide 100 mg daily. Recommended monitoring response to PO diuretics over 24 hours, however, pt is requesting discharge at this time. Will plan on optimizing home diuretic regimen during follow up in Heart Failure clinic in 2 weeks. Of note, pt c/o bilateral ankle pain, for which Rheumatology was consulted and recommended initiating colchicine as treatment. He also developed hyperpigmentation to his palms/soles and was started on miconazole and urea cream per Dermatology recommendations.

## 2020-11-11 NOTE — PROGRESS NOTES
AWAIS Cintron at bedside and looking at skin tear on pt abdomen. Skin tear still bleeding, PA ordered nitrate swabs to be placed by her and for the pt to stay another night for monitoring. Will Continue to monitor.

## 2020-11-11 NOTE — NURSING
Pt. Had 25 runs of VT, asymptomatic, no c/o chest discomforts.  on call notifed, ordered stat labs, and potassium tablet 40meq, will continue to monitor , v/s stable

## 2020-11-11 NOTE — PLAN OF CARE
Plan of care discussed with patient. Patient is free of fall/trauma/injury. Denies CP, SOB, or pain/discomfort. Dobutamine drip maintained per orders. Potassium replaced per orders. All questions addressed. Will continue to monitor

## 2020-11-11 NOTE — PROGRESS NOTES
Spoke with AWAIS Cintron about pt discharge. Notified PA of pt skin tear still bleeding. PA stated to hold heparin injection. Will continue to monitor.

## 2020-11-11 NOTE — PROGRESS NOTES
Notified AWAIS Cintron of pt skin tear on abdomen. Pt states it was caused by electrode being pulled off last night. Area bleeding and red. No orders given. Will continue to monitor.

## 2020-11-11 NOTE — SUBJECTIVE & OBJECTIVE
Interval History: Lasix gtt discontinued yesterday for elevated Cr (2.8 today). Transitioned to home diuretic regimen of PO torsemide 100 mg daily, first dose this morning. Will monitor response and plan for possible discharge tomorrow.     Continuous Infusions:   DOBUTamine IV infusion (non-titrating) 5 mcg/kg/min (11/10/20 0600)     Scheduled Meds:   aspirin  81 mg Oral Daily    atorvastatin  40 mg Oral Daily    colchicine  0.6 mg Oral Every other day    ferrous sulfate  325 mg Oral Every other day    heparin (porcine)  5,000 Units Subcutaneous Q8H    hydrALAZINE  75 mg Oral TID    insulin aspart U-100  3 Units Subcutaneous TIDWM    insulin detemir U-100  8 Units Subcutaneous QHS    isosorbide dinitrate  40 mg Oral TID    levothyroxine  175 mcg Oral Before breakfast    miconazole   Topical (Top) BID    NON FORMULARY MEDICATION   Topical (Top) BID    polyethylene glycol  17 g Oral Daily    potassium chloride  20 mEq Oral Once    torsemide  100 mg Oral Daily     PRN Meds:acetaminophen, dextrose 50%, dextrose 50%, insulin aspart U-100, simethicone, sodium chloride 0.9%    Review of patient's allergies indicates:   Allergen Reactions    Lisinopril Other (See Comments)     acei cough      Cefazolin Nausea And Vomiting     Objective:     Vital Signs (Most Recent):  Temp: 98.2 °F (36.8 °C) (11/11/20 0740)  Pulse: 89 (11/11/20 0740)  Resp: 15 (11/11/20 0740)  BP: 101/63 (11/11/20 0740)  SpO2: 98 % (11/11/20 0740) Vital Signs (24h Range):  Temp:  [97.1 °F (36.2 °C)-98.2 °F (36.8 °C)] 98.2 °F (36.8 °C)  Pulse:  [] 89  Resp:  [14-20] 15  SpO2:  [95 %-99 %] 98 %  BP: ()/(59-74) 101/63     Patient Vitals for the past 72 hrs (Last 3 readings):   Weight   11/10/20 0500 54.1 kg (119 lb 4.3 oz)   11/09/20 0941 57.4 kg (126 lb 8.7 oz)     Body mass index is 16.64 kg/m².      Intake/Output Summary (Last 24 hours) at 11/11/2020 1054  Last data filed at 11/11/2020 0522  Gross per 24 hour   Intake 582 ml    Output 600 ml   Net -18 ml       Hemodynamic Parameters:           Physical Exam  Vitals signs and nursing note reviewed.   HENT:      Head: Normocephalic.      Right Ear: Tympanic membrane normal.      Nose: Nose normal.      Mouth/Throat:      Mouth: Mucous membranes are moist.   Eyes:      Conjunctiva/sclera: Conjunctivae normal.   Neck:      Comments: +JVP  Cardiovascular:      Rate and Rhythm: Normal rate and regular rhythm.   Pulmonary:      Effort: Pulmonary effort is normal.   Abdominal:      General: Abdomen is flat.   Musculoskeletal: Normal range of motion.      Right lower leg: No edema.      Left lower leg: No edema.   Skin:     General: Skin is warm.   Neurological:      General: No focal deficit present.      Mental Status: He is alert.   Psychiatric:         Mood and Affect: Mood normal.         Behavior: Behavior normal.         Thought Content: Thought content normal.         Judgment: Judgment normal.         Significant Labs:  CBC:  Recent Labs   Lab 11/09/20  0748 11/10/20  0521 11/11/20  0201   WBC 3.94 3.67* 3.72*   RBC 4.35* 4.45* 4.12*   HGB 10.7* 10.8* 10.0*   HCT 32.7* 33.2* 31.1*   * 132* 123*   MCV 75* 75* 76*   MCH 24.6* 24.3* 24.3*   MCHC 32.7 32.5 32.2     BNP:  Recent Labs   Lab 11/05/20  1800   BNP 1,293*     CMP:  Recent Labs   Lab 11/06/20  0958  11/09/20  0748 11/10/20  0521 11/11/20  0201   *   < > 120* 117* 168*   CALCIUM 8.4*   < > 9.0 8.7 8.6*   ALBUMIN 3.3*  --  3.5  --  3.3*   PROT 7.3  --  8.3  --  7.7      < > 131* 129* 131*   K 3.8   < > 3.5 3.3* 3.7   CO2 22*   < > 26 27 25      < > 91* 88* 93*   BUN 64*   < > 65* 70* 74*   CREATININE 2.5*   < > 2.8* 2.7* 2.8*   ALKPHOS 187*  --  195*  --  218*   ALT 17  --  15  --  69*   AST 19  --  16  --  82*   BILITOT 1.4*  --  1.6*  --  1.4*    < > = values in this interval not displayed.      Coagulation:   No results for input(s): PT, INR, APTT in the last 168 hours.  LDH:  No results for input(s): LDH  in the last 72 hours.  Microbiology:  Microbiology Results (last 7 days)     Procedure Component Value Units Date/Time    Blood culture [[id number]] Collected: 11/09/20 0748    Order Status: Completed Specimen: Blood from Antecubital, Right Arm Updated: 11/10/20 1622     Blood Culture, Routine No Growth to date      No Growth to date    Blood culture [[id number]] Collected: 11/09/20 0753    Order Status: Completed Specimen: Blood from Peripheral, Right Arm Updated: 11/10/20 1212     Blood Culture, Routine No Growth to date      No Growth to date    Fungus culture [[id number]] Collected: 11/09/20 1312    Order Status: Completed Specimen: Skin from Foot, Left Updated: 11/10/20 0950     Fungus (Mycology) Culture Culture in progress          I have reviewed all pertinent labs within the past 24 hours.    Estimated Creatinine Clearance: 22.5 mL/min (A) (based on SCr of 2.8 mg/dL (H)).    Diagnostic Results:  I have reviewed all pertinent imaging results/findings within the past 24 hours.

## 2020-11-11 NOTE — ASSESSMENT & PLAN NOTE
- LVEF 20% on TTE 6/8/2020, LVIDD 5.6 cm, secondary to nonischemic cardiomyopathy  - Lasix gtt discontinued due to elevation in renal function.  Will transition to PO Torsemide   - Continue home dobutamine 5 mcg/kg/min  - ACE/ARB contraindicated for renal function. Aldactone on hold.  - Continue home Hydralazine 75 mg every 8 hours plus Isordil 40 mg every 8 hours (patient was taking only daily)  - TTE 11/6: LVEDD 5.83, LVEF 20%, grade 3 diastolic dysfunction, RV mildly dilated and severely depressed, trace AI, mod-severe MR, mod TR, PAS 64, IVC 15, small circumferential pericardial effusion  - maintain K >4 Mg >2  - Has no ICD as he is not currently a candidate for advanced options (smallish LV, non compliance)

## 2020-11-11 NOTE — PROGRESS NOTES
Discharge     SWI to pt's room. Pt presents aaox4 with pleasant affect. Pt states understanding of plan of care to discharge home today with [facility] (ph: [phone], f: [phone]) and [facility] (ph: [phone]). Pt will be transported home by wife. Pt reports coping well and denies further needs, questions, concerns at this time and none indicated. Providing psychosocial and counseling support, education, resources, assistance and discharge planning as indicated. SW remains available.

## 2020-11-12 NOTE — NURSING
Patient is ready for discharge. Patient stable alert and oriented. IVs removed. Telemonitor removed. No complaints of pain. Discussed discharge plan. Reviewed medications and side effects, appointments, and answered questions with patient and family. Prescription meds delivered to bedside by pharmacy. Patient hooked up to home  pump.

## 2020-11-12 NOTE — PLAN OF CARE
Pt rested quietly with eyes closed, NADN, VS within normal limits, pt on dobutamine 5mcg, central venous cath in right IJ flushes but does not aspirate

## 2020-11-12 NOTE — PROGRESS NOTES
[facility]  Heart Transplant  Progress Note    Patient Name: Ashish Mckeon  MRN: [id number]  Admission Date: 11/5/2020  Hospital Length of Stay: 7 days  Attending Physician: Ruben Butler Jr.,*  Primary Care Provider: [facility]  Principal Problem:Acute on chronic combined systolic and diastolic heart failure    Subjective:     Interval History: Continues to diurese well with home dose of torsemide 100 mg daily. Bleeding from superficial abrasion to abdominal wall resolved after application of silver nitrate. Will discharge today.     Continuous Infusions:   DOBUTamine IV infusion (non-titrating) 5 mcg/kg/min (11/12/20 0719)     Scheduled Meds:   aspirin  81 mg Oral Daily    atorvastatin  40 mg Oral Daily    colchicine  0.6 mg Oral Every other day    ferrous sulfate  325 mg Oral Every other day    hydrALAZINE  75 mg Oral TID    insulin aspart U-100  3 Units Subcutaneous TIDWM    insulin detemir U-100  8 Units Subcutaneous QHS    isosorbide dinitrate  40 mg Oral TID    levothyroxine  175 mcg Oral Before breakfast    miconazole   Topical (Top) BID    NON FORMULARY MEDICATION   Topical (Top) BID    polyethylene glycol  17 g Oral Daily    potassium chloride  20 mEq Oral Once    torsemide  100 mg Oral Daily     PRN Meds:acetaminophen, dextrose 50%, dextrose 50%, insulin aspart U-100, simethicone, sodium chloride 0.9%    Review of patient's allergies indicates:   Allergen Reactions    Lisinopril Other (See Comments)     acei cough      Cefazolin Nausea And Vomiting     Objective:     Vital Signs (Most Recent):  Temp: 97.9 °F (36.6 °C) (11/12/20 0734)  Pulse: 90 (11/12/20 0737)  Resp: 16 (11/12/20 0734)  BP: 107/67 (11/12/20 0734)  SpO2: 98 % (11/12/20 0734) Vital Signs (24h Range):  Temp:  [97.7 °F (36.5 °C)-98.2 °F (36.8 °C)] 97.9 °F (36.6 °C)  Pulse:  [81-92] 90  Resp:  [16-18] 16  SpO2:  [95 %-99 %] 98 %  BP: ()/(53-67) 107/67     Patient Vitals  for the past 72 hrs (Last 3 readings):   Weight   11/12/20 0400 57 kg (125 lb 9.6 oz)   11/10/20 0500 54.1 kg (119 lb 4.3 oz)     Body mass index is 17.53 kg/m².      Intake/Output Summary (Last 24 hours) at 11/12/2020 1034  Last data filed at 11/12/2020 0600  Gross per 24 hour   Intake 660 ml   Output 1850 ml   Net -1190 ml                  Physical Exam  Vitals signs and nursing note reviewed.   HENT:      Head: Normocephalic.      Right Ear: Tympanic membrane normal.      Nose: Nose normal.      Mouth/Throat:      Mouth: Mucous membranes are moist.   Eyes:      Conjunctiva/sclera: Conjunctivae normal.   Cardiovascular:      Rate and Rhythm: Normal rate and regular rhythm.   Pulmonary:      Effort: Pulmonary effort is normal.   Abdominal:      General: Abdomen is flat.   Musculoskeletal: Normal range of motion.      Right lower leg: No edema.      Left lower leg: No edema.   Skin:     General: Skin is warm.   Neurological:      General: No focal deficit present.      Mental Status: He is alert.   Psychiatric:         Mood and Affect: Mood normal.         Behavior: Behavior normal.         Thought Content: Thought content normal.         Judgment: Judgment normal.         Significant Labs:  CBC:  Recent Labs   Lab 11/10/20  0521 11/11/20  0201 11/12/20  0248   WBC 3.67* 3.72* 3.91   RBC 4.45* 4.12* 4.11*   HGB 10.8* 10.0* 9.8*   HCT 33.2* 31.1* 31.2*   * 123* 120*   MCV 75* 76* 76*   MCH 24.3* 24.3* 23.8*   MCHC 32.5 32.2 31.4*     BNP:  Recent Labs   Lab 11/05/20  1800   BNP 1,293*     CMP:  Recent Labs   Lab 11/06/20  0958  11/09/20  0748 11/10/20  0521 11/11/20  0201 11/12/20  0248   *   < > 120* 117* 168* 105   CALCIUM 8.4*   < > 9.0 8.7 8.6* 8.9   ALBUMIN 3.3*  --  3.5  --  3.3*  --    PROT 7.3  --  8.3  --  7.7  --       < > 131* 129* 131* 133*   K 3.8   < > 3.5 3.3* 3.7 3.7   CO2 22*   < > 26 27 25 23      < > 91* 88* 93* 94*   BUN 64*   < > 65* 70* 74* 76*   CREATININE 2.5*   < >  2.8* 2.7* 2.8* 2.8*   ALKPHOS 187*  --  195*  --  218*  --    ALT 17  --  15  --  69*  --    AST 19  --  16  --  82*  --    BILITOT 1.4*  --  1.6*  --  1.4*  --     < > = values in this interval not displayed.      Coagulation:   No results for input(s): PT, INR, APTT in the last 168 hours.  LDH:  No results for input(s): LDH in the last 72 hours.  Microbiology:  Microbiology Results (last 7 days)     Procedure Component Value Units Date/Time    Blood culture [[id number]] Collected: 11/09/20 0748    Order Status: Completed Specimen: Blood from Antecubital, Right Arm Updated: 11/11/20 1622     Blood Culture, Routine No Growth to date      No Growth to date      No Growth to date    Blood culture [[id number]] Collected: 11/09/20 0753    Order Status: Completed Specimen: Blood from Peripheral, Right Arm Updated: 11/11/20 1212     Blood Culture, Routine No Growth to date      No Growth to date      No Growth to date    Fungus culture [[id number]] Collected: 11/09/20 1312    Order Status: Completed Specimen: Skin from Foot, Left Updated: 11/10/20 0950     Fungus (Mycology) Culture Culture in progress          I have reviewed all pertinent labs within the past 24 hours.    Estimated Creatinine Clearance: 23.8 mL/min (A) (based on SCr of 2.8 mg/dL (H)).    Diagnostic Results:  I have reviewed all pertinent imaging results/findings within the past 24 hours.    Assessment and Plan:     Mr. Mckeon is a 56 y/o M with stage D HFrEF 2/2 NICM, HTN, HLD, DM2 who presents in setting of elevated Cr concerning for acute on chronic heart failure. Patient followed by Dr. Gardner, per last clinic note multiple admissions in last year, continued on home  (patient said 2.5 but appears to be on 5 mcg/kg/min as planned). Following routine labs with elevated Cr 3.2 (baseline 2.3-2.6).  LFTs, TBili at baseline. Continued NYHA class II symptoms, no PND or orthopnea. He reports intermittent med nonadherence, taking bidil only daily. He recently  underwent a liver biopsy with no evidence of cirrhosis. Hepatology has cleared him for advanced HF options. Otherwise denies chest pain, palpitations or shortness of breath. Able to complete ADLs independently, ambulate approximately 50 yards before symptoms of SOB, able to drive to grocery store.     TTE 6/8/2020  · Severe left atrial enlargement.  · Mild left ventricular enlargement.  · Severely decreased left ventricular systolic function. The estimated ejection fraction is 20%.  · Severe global hypokinetic wall motion.  · Grade III (severe) left ventricular diastolic dysfunction consistent with restrictive physiology.  · Severe right atrial enlargement.  · Moderate right ventricular enlargement.  · Moderately reduced right ventricular systolic function.  · Moderate-to-severe mitral regurgitation.  · Mild to moderate tricuspid regurgitation.  · Pulmonary hypertension present.  · The estimated PA systolic pressure is 65 mmHg.  · Elevated central venous pressure (15 mmHg).  Trivial pericardial effusion.    * Acute on chronic combined systolic and diastolic heart failure  - LVEF 20% on TTE 6/8/2020, LVIDD 5.6 cm, secondary to nonischemic cardiomyopathy  - Lasix gtt discontinued due to elevation in renal function.  Will transition to PO Torsemide   - Continue home dobutamine 5 mcg/kg/min  - ACE/ARB contraindicated for renal function. Aldactone on hold.  - Continue home Hydralazine 75 mg every 8 hours plus Isordil 40 mg every 8 hours (patient was taking only daily)  - Add digoxin 125 mcg MWF  - TTE 11/6: LVEDD 5.83, LVEF 20%, grade 3 diastolic dysfunction, RV mildly dilated and severely depressed, trace AI, mod-severe MR, mod TR, PAS 64, IVC 15, small circumferential pericardial effusion  - maintain K >4 Mg >2  - Has no ICD as he is not currently a candidate for advanced options (smallish LV, non compliance)    Hyperpigmented skin lesion  - Appreciate Rheumatology's help: does not appear to look vasculitic but will  finish the work up given proteinuria and CHF.   - Dermatology consulted    Gout  - Appreciate Rheumatology's help. Bilateral ankle pain has improved with Colchicine. Will likely transition to renally dosed Allopurinol once acute gout flare resolved  - Arthritis Survey: No evidence of systemic erosive inflammatory arthritis.  No evidence of gout (however uric acid 15.5)    Nipple pain  - Has left nipple pain/slight swelling. Aldactone on hold  - Awaiting US left nipple    Edema  - Has RLE edema, no LLE edema. Venous US 11/6 -ve for DVT    Elevated liver enzymes  - AST/ALT wnl; Tbili 1.6  - Continue to monitor    Acute kidney injury superimposed on CKD3  - sCr elevated to 3.2 on admit from baseline of 2.2-2.8.   - Multi-factorial from stage D heart failure vs diabetic nephropathy  - Strict intake and out put  - Continue to monitor    Diabetes mellitus, type II  - Insulin dependent, last A1c 7.9  - Continue home Detemir 8 units PM  - Moderate intensity bolus SSI with meals  - Repeat A1c, may benefit from Endocrine consult this admission for better glycemic control     Hypothyroidism  - Continue home synthroid 175 mcg daily  - Repeat TSH 2.408    Thrombocytopenia  - Chronic, noted on chart review; pancytopenic likely 2/2 to anemia of chronic disease  - Continue AS  - Continue to monitor        Loyda Cintron PA-C  Heart Transplant  [facility]

## 2020-11-12 NOTE — PROGRESS NOTES
Care of patient is being transferred to CHF section from Preht section, per Dr. Butler.    Dx: Nonischemic Cardiomyopathy  Reason: Medical Management  Pt is on home inotrope therapy.  Med/Dose: Dobutamine 5 mcg/kg/min  Infusion Company: [facility] [phone]/ fax [phone]   agency: [facility]  Phone: [phone]  Lab Schedule: Weekly on Monday  Labs ordered:CMP, BNP, MG  Outstanding orders scheduled: AHF clinic visit with labs 11/27/20    Spoke with Cee QUINONEZ

## 2020-11-12 NOTE — PROGRESS NOTES
Discharge    Pt presents in room alone, aaox4 with neutral affect. Pt states in agreement with plan to discharge to home today with resumption of care with [facility] (ph: [phone], f: [phone]) and [facility] (ph: [phone]). Pt's wife will transport. Pt reports coping well and denies further needs, questions, concerns at this time and none indicated. Providing psychosocial and counseling support, education, resources, assistance and discharge planning as indicated. SW remains available.

## 2020-11-12 NOTE — SUBJECTIVE & OBJECTIVE
Interval History: Continues to diurese well with home dose of torsemide 100 mg daily. Bleeding from superficial abrasion to abdominal wall resolved after application of silver nitrate. Will discharge today.     Continuous Infusions:   DOBUTamine IV infusion (non-titrating) 5 mcg/kg/min (11/12/20 0719)     Scheduled Meds:   aspirin  81 mg Oral Daily    atorvastatin  40 mg Oral Daily    colchicine  0.6 mg Oral Every other day    ferrous sulfate  325 mg Oral Every other day    hydrALAZINE  75 mg Oral TID    insulin aspart U-100  3 Units Subcutaneous TIDWM    insulin detemir U-100  8 Units Subcutaneous QHS    isosorbide dinitrate  40 mg Oral TID    levothyroxine  175 mcg Oral Before breakfast    miconazole   Topical (Top) BID    NON FORMULARY MEDICATION   Topical (Top) BID    polyethylene glycol  17 g Oral Daily    potassium chloride  20 mEq Oral Once    torsemide  100 mg Oral Daily     PRN Meds:acetaminophen, dextrose 50%, dextrose 50%, insulin aspart U-100, simethicone, sodium chloride 0.9%    Review of patient's allergies indicates:   Allergen Reactions    Lisinopril Other (See Comments)     acei cough      Cefazolin Nausea And Vomiting     Objective:     Vital Signs (Most Recent):  Temp: 97.9 °F (36.6 °C) (11/12/20 0734)  Pulse: 90 (11/12/20 0737)  Resp: 16 (11/12/20 0734)  BP: 107/67 (11/12/20 0734)  SpO2: 98 % (11/12/20 0734) Vital Signs (24h Range):  Temp:  [97.7 °F (36.5 °C)-98.2 °F (36.8 °C)] 97.9 °F (36.6 °C)  Pulse:  [81-92] 90  Resp:  [16-18] 16  SpO2:  [95 %-99 %] 98 %  BP: ()/(53-67) 107/67     Patient Vitals for the past 72 hrs (Last 3 readings):   Weight   11/12/20 0400 57 kg (125 lb 9.6 oz)   11/10/20 0500 54.1 kg (119 lb 4.3 oz)     Body mass index is 17.53 kg/m².      Intake/Output Summary (Last 24 hours) at 11/12/2020 1034  Last data filed at 11/12/2020 0600  Gross per 24 hour   Intake 660 ml   Output 1850 ml   Net -1190 ml                  Physical Exam  Vitals signs and  nursing note reviewed.   HENT:      Head: Normocephalic.      Right Ear: Tympanic membrane normal.      Nose: Nose normal.      Mouth/Throat:      Mouth: Mucous membranes are moist.   Eyes:      Conjunctiva/sclera: Conjunctivae normal.   Cardiovascular:      Rate and Rhythm: Normal rate and regular rhythm.   Pulmonary:      Effort: Pulmonary effort is normal.   Abdominal:      General: Abdomen is flat.   Musculoskeletal: Normal range of motion.      Right lower leg: No edema.      Left lower leg: No edema.   Skin:     General: Skin is warm.   Neurological:      General: No focal deficit present.      Mental Status: He is alert.   Psychiatric:         Mood and Affect: Mood normal.         Behavior: Behavior normal.         Thought Content: Thought content normal.         Judgment: Judgment normal.         Significant Labs:  CBC:  Recent Labs   Lab 11/10/20  0521 11/11/20 0201 11/12/20  0248   WBC 3.67* 3.72* 3.91   RBC 4.45* 4.12* 4.11*   HGB 10.8* 10.0* 9.8*   HCT 33.2* 31.1* 31.2*   * 123* 120*   MCV 75* 76* 76*   MCH 24.3* 24.3* 23.8*   MCHC 32.5 32.2 31.4*     BNP:  Recent Labs   Lab 11/05/20  1800   BNP 1,293*     CMP:  Recent Labs   Lab 11/06/20  0958  11/09/20  0748 11/10/20  0521 11/11/20 0201 11/12/20  0248   *   < > 120* 117* 168* 105   CALCIUM 8.4*   < > 9.0 8.7 8.6* 8.9   ALBUMIN 3.3*  --  3.5  --  3.3*  --    PROT 7.3  --  8.3  --  7.7  --       < > 131* 129* 131* 133*   K 3.8   < > 3.5 3.3* 3.7 3.7   CO2 22*   < > 26 27 25 23      < > 91* 88* 93* 94*   BUN 64*   < > 65* 70* 74* 76*   CREATININE 2.5*   < > 2.8* 2.7* 2.8* 2.8*   ALKPHOS 187*  --  195*  --  218*  --    ALT 17  --  15  --  69*  --    AST 19  --  16  --  82*  --    BILITOT 1.4*  --  1.6*  --  1.4*  --     < > = values in this interval not displayed.      Coagulation:   No results for input(s): PT, INR, APTT in the last 168 hours.  LDH:  No results for input(s): LDH in the last 72  hours.  Microbiology:  Microbiology Results (last 7 days)     Procedure Component Value Units Date/Time    Blood culture [[id number]] Collected: 11/09/20 0748    Order Status: Completed Specimen: Blood from Antecubital, Right Arm Updated: 11/11/20 1622     Blood Culture, Routine No Growth to date      No Growth to date      No Growth to date    Blood culture [[id number]] Collected: 11/09/20 0753    Order Status: Completed Specimen: Blood from Peripheral, Right Arm Updated: 11/11/20 1212     Blood Culture, Routine No Growth to date      No Growth to date      No Growth to date    Fungus culture [[id number]] Collected: 11/09/20 1312    Order Status: Completed Specimen: Skin from Foot, Left Updated: 11/10/20 0950     Fungus (Mycology) Culture Culture in progress          I have reviewed all pertinent labs within the past 24 hours.    Estimated Creatinine Clearance: 23.8 mL/min (A) (based on SCr of 2.8 mg/dL (H)).    Diagnostic Results:  I have reviewed all pertinent imaging results/findings within the past 24 hours.

## 2020-11-12 NOTE — ASSESSMENT & PLAN NOTE
- Chronic, noted on chart review; pancytopenic likely 2/2 to anemia of chronic disease  - Continue AS  - Continue to monitor

## 2020-11-13 NOTE — PROGRESS NOTES
C3 nurse attempted to contact patient. No answer. The following message was left for the patient to return the call:  Good morning, I am a nurse calling on behalf of [facility] from the [facility]ination [facility].  This is a Transitional Care Call for Ashish Mckeon. When you have a moment please contact us at [phone] or [phone] Monday through Friday, between the hours of 8 am to 4 pm. We look forward to speaking with you. On behalf of [facility] have a nice day.    The patient does not have a scheduled HOSFU appointment within 7-14 days post hospital discharge date 11/12/20. Non [facility] PCP

## 2020-11-16 NOTE — PATIENT INSTRUCTIONS
Left- or Right- Side Congestive Heart Failure (CHF)    The heart is a large muscle. It is a pump that circulates blood throughout the body. Blood carries oxygen to all of the organs, including the brain, muscles, and skin. After your body takes the oxygen out of the blood, the blood returns to the heart. The right side of the heart collects the blood from the body and pumps it to the lungs. In the lungs, it gets fresh oxygen and gives up carbon dioxide. The oxygen-rich blood from the lungs then returns to the left side of the heart, where it is pumped back out to the rest of your body, starting the process all over.  Congestive heart failure (CHF) occurs when the heart muscle is weakened. This affects the pumping action of the heart. Heart failure can affect the right side of the heart or the left side. But heart failure may affect not only the right side of the heart or only the left side. Although it may have started on one side, it can and often eventually does affect both sides.  Right-side heart failure  When the right side of the heart is weakened, it cant handle the blood it is getting from the rest of the body. This blood returns to the heart through veins. When too much pressure builds up in the veins, fluid leaks out into the tissues. Gravity then causes that fluid to move to those parts of the body that are the lowest. So one of the first symptoms of right-side CHF can include swelling in the feet and ankles. If the condition gets worse, the swelling can even go up past the knees. Sometimes it gets so severe, the liver can get congested as well.  Left-side heart failure  When the left side of the heart is weakened, it cant handle the blood it gets from the lungs. Pressure then builds up in the veins of the lungs, causing fluid to leak into the lung tissues. This may cause CHF and pulmonary edema. This causes you to feel short of breath, weak, or dizzy. These symptoms are often worse with exertion,  such as when climbing stairs or walking up hills. Lying with your head flat is uncomfortable and can make your breathing worse. This may make sleeping difficult. You may need to use extra pillows to elevate your upper body to sleep well. The same is true when just resting during the daytime.  There are many causes of heart failure including:  · Coronary artery disease  · Past heart attack (also known as acute myocardial infarction, or AMI)  · High blood pressure  · Damaged heart valve  · Diabetes  · Obesity  · Cigarette smoking  · Alcohol abuse  Heart failure is a chronic condition. There is no cure. The purpose of medical treatment is to improve the pumping action of the heart. The main way to do this is to remove excess water from the body. A number of medicines can help reach this goal, improve symptoms, and prevent the heart from becoming weaker. Sometimes, heart failure can become so severe that a device is placed in the heart to help with pumping. Another major goal is to better treat the causes of heart failure, such as diabetes and high blood pressure, by making changes in your lifestyle and maximizing medical control when needed.  Home care  Follow these guidelines when caring for yourself at home:  · Check your weight every day. This is very important because a sudden increase in weight gain could mean worsening heart failure. Keep these things in mind:  ¨ Use the same scale every day.  ¨ Weigh yourself at the same time every day.  ¨ Make sure the scale is on a hard floor surface, not on a rug or carpet.  ¨ Keep a record of your weight every day so your healthcare provider can see it. If you are not given a log sheet for this, keep a separate journal for this purpose.   · Cut back on the amount of salt (sodium) you eat. Follow your healthcare provider's recommendation on how much salt or sodium you should have each day.  ¨ Avoid high-salt foods. These include olives, pickles, smoked meats, salted potato  chips, and most prepared foods.  ¨ Don't add salt to your food at the table. Use only small amounts of salt when cooking.  ¨ Read the labels carefully on food packages to learn how much salt or sodium is in each serving in the package. Remember, a can or package of food may contain more than 1 serving. So if you eat all the food in the package, you may be getting more salt than you think.  · Follow your healthcare provider's recommendations about how much fluid you should have. Be aware that some foods, such as soup, pudding, and juicy fruits like oranges or melons, contain liquid. You'll need to count the liquid in those foods as part of your daily fluid intake. Your provider can help you with this.  · Stop smoking.  · Cut back on how much alcohol you drink.  · Lose weight if you are overweight. The excess weight adds a lot of stress on the workload of the heart.  · Stay active. Talk with your provider about an exercise program that is safe for your heart.  · Keep your feet elevated to reduce swelling. Ask your provider about support hose as a preventive treatment for daytime leg swelling.  Besides taking your medicine as instructed, an important part of treatment is lifestyle changes. These include diet, physical activity, stopping smoking, and weight control.  Improve your diet by including more fresh foods, cutting back on how much sugar and saturated fat you eat, and eating fewer processed foods and less salt.  Follow-up care  Follow up with your healthcare provider, or as advised.  Make sure to keep any appointments that were made for you. These can help better control your congestive heart failure. You will need to follow up with your provider on a routine basis to make sure your heart failure is well managed.  If an X-ray, ECG, or other tests were done, you will be told of any new findings that may affect your care.  Call 911  Call 911 if you:  · Become severely short of breath  · Feel lightheaded, or feel  like you might pass out or faint  · Have chest pain or discomfort that is different than usual, the medicines your doctor told you to use for this don't help, or the pain lasts longer than 10 to 15 minutes  · You suddenly develop a rapid heart rate  When to seek medical advice  The following may be signs that your heart failure is getting worse. Call your healthcare provider right away if any of these happen:  · Sudden weight gain. This means 3 or more pounds in one day, or 5 or more pounds in 1 week.  · Trouble breathing not related to being active  · New or increased swelling of your legs or ankles  · Swelling or pain in your abdomen  · Breathing trouble at night. This means waking up short of breath or needing more pillows to breathe.  · Frequent coughing that doesnt go away  · Feeling much more tired than usual  Date Last Reviewed: 1/4/2016  © 1149-6170 [employer]. [address] [place] [address], [place] [address]. All rights reserved. This information is not intended as a substitute for professional medical care. Always follow your healthcare professional's instructions.

## 2020-11-18 NOTE — TELEPHONE ENCOUNTER
Reviewed routine [facility] labs yesterday. K 3.3, bun/cr 75/2.5, pts norm. LVM for pt and spouse yesterday with instructions to take 1 extra packet of potassium yesterday. Called pt to day to see if he received my call pt says he tried to call back at number on caller ID but he didn't listen to vm. Pt did not take any K as of today, and confirmed he takes 1 (20meq) packet daily. I instructed pt to take  2 packets of 20meq K now, and pt will drink a glass of OJ. Pt verbalized understanding, and gets routine labs weekly.

## 2020-11-25 NOTE — PROGRESS NOTES
11/24/20 pm:  F/U on 11/23 weekly, routine, [facility] lab results:  CMP, BNP and Mg  All results in epic and are stable for pts norm.

## 2020-11-27 NOTE — PROGRESS NOTES
"Subjective:     HPI:  Mr. Mckeon is a very pleasant 54 y/o M with stage D HFrEF 2/2 NICM, HTN, HLD, DM2 who was recently  admitted for ADHF (discharged last week). On admission was found to have significant volume overload and KEYANNA (cardiorenal syndrome) (Cr 3.2 on admit, BL 2.3-2.6). ECHO 11/6/20 with LVEF 20%, LVEDD 5.83, moderate TR, moderate to severe MR. Lasix gtt initiated with initial improvement in kidney function, but then was held due to bump in Cr to 2.8. Eventually resumed home diuretic of torsemide 100 mg daily with adequate response. Today comes for a follow-up visit. Doing well since hospital discharge. NYHA class II symptoms. No PND or orthopnea. Today's labs were reviewed; creatinine is 2.6 but his LFTs including T bili have significantly improved. Of note, he is on home  but does not have a ICD or lifeVest.     Past Medical History:   Diagnosis Date    CHF (congestive heart failure)     Diabetes mellitus type II     Essential hypertension, benign     Hyperlipidemia     Hypothyroidism     Pain and swelling of left wrist 8/5/2019    Ashish Mckeon is a 54 y.o. male with PMH of  presenting with CHF, IDDM, Hypothyroidism, HTN, HLD presenting with worsening L wrist pain for 1 day. Orthopedic surgery was consulted for septic joint r/o. Pt has been afebrile and has no elevated WBC. ESR 36, normal CRP. Xray shows no signs of trauma and pt has no hx of gout or septic arthritis. Due to atraumatic wrist pain and swelling w/out identifia    Stage 3 chronic kidney disease 6/9/2020    Undiagnosed cardiac murmurs      Past Surgical History:   Procedure Laterality Date    COLON SURGERY      "lower intestines removed"    ORTHOPEDIC SURGERY Left     wrist    RIGHT HEART CATHETERIZATION Right 6/17/2020    Procedure: INSERTION, CATHETER, RIGHT HEART;  Surgeon: Kimberly Rodgers MD;  [facility] C[facility] LAB;  Service: Cardiology;  Laterality: Right;    THYROID SURGERY         Review of Systems   Constitution: " "Negative. Negative for chills, decreased appetite, diaphoresis, fever, malaise/fatigue, night sweats, weight gain and weight loss.   Eyes: Negative.    Cardiovascular: Positive for dyspnea on exertion. Negative for chest pain, claudication, cyanosis, irregular heartbeat, leg swelling, near-syncope, orthopnea, palpitations, paroxysmal nocturnal dyspnea and syncope.   Respiratory: Negative for cough, hemoptysis and shortness of breath.    Endocrine: Negative.    Hematologic/Lymphatic: Negative.    Skin: Negative for color change, dry skin and nail changes.   Musculoskeletal: Negative.    Gastrointestinal: Negative.    Genitourinary: Negative.    Neurological: Negative for weakness.       Objective:   Blood pressure 117/76, pulse 82, height 5' 11" (1.803 m), weight 62.6 kg (138 lb 0.1 oz).body mass index is 19.25 kg/m².  Physical Exam   Constitutional: He appears well-developed.   /76 (BP Location: Right arm, Patient Position: Sitting, BP Method: Medium (Automatic))   Pulse 82   Ht 5' 11" (1.803 m)   Wt 62.6 kg (138 lb 0.1 oz)   BMI 19.25 kg/m²      HENT:   Head: Normocephalic.   Neck: No JVD present. Carotid bruit is not present.   Cardiovascular: Regular rhythm and normal heart sounds. PMI is displaced.   No murmur heard.  Pulmonary/Chest: Effort normal and breath sounds normal. No respiratory distress. He has no wheezes. He has no rales.   Abdominal: Soft. Bowel sounds are normal. He exhibits no distension. There is no abdominal tenderness. There is no rebound.   Musculoskeletal:         General: No edema.   Neurological: He is alert.   Skin: Skin is warm.   Vitals reviewed.      Labs:    Chemistry        Component Value Date/Time     11/27/2020 0929    K 4.1 11/27/2020 0929    CL 97 11/27/2020 0929    CO2 29 11/27/2020 0929    BUN 69 (H) 11/27/2020 0929    CREATININE 2.6 (H) 11/27/2020 0929     (H) 11/27/2020 0929        Component Value Date/Time    CALCIUM 8.3 (L) 11/27/2020 0929    ALKPHOS " 210 (H) 11/27/2020 0929    AST 17 11/27/2020 0929    ALT 32 11/27/2020 0929    BILITOT 1.0 11/27/2020 0929    ESTGFRAFRICA 30.5 (A) 11/27/2020 0929    EGFRNONAA 26.4 (A) 11/27/2020 0929          Magnesium   Date Value Ref Range Status   11/23/2020 2.0 1.6 - 2.6 mg/dL Final     Lab Results   Component Value Date    WBC 3.92 11/23/2020    HGB 9.4 (L) 11/23/2020    HCT 31.3 (L) 11/23/2020     (L) 11/23/2020     Lab Results   Component Value Date    INR 1.2 10/07/2020    INR 1.2 08/03/2020    INR 1.3 (H) 06/10/2020     BNP   Date Value Ref Range Status   11/23/2020 1,514 (H) 0 - 99 pg/mL Final     Comment:     Values of less than 100 pg/ml are consistent with non-CHF populations.   11/17/2020 1,765 (H) 0 - 99 pg/mL Final     Comment:     Values of less than 100 pg/ml are consistent with non-CHF populations.   11/05/2020 1,293 (H) 0 - 99 pg/mL Final     Comment:     Values of less than 100 pg/ml are consistent with non-CHF populations.     LD   Date Value Ref Range Status   09/05/2013 4,914 (H) 110 - 260 U/L Final     Comment:     Results are increased in hemolyzed samples.       Assessment:      1. Chronic combined systolic and diastolic congestive heart failure    2. DCM (dilated cardiomyopathy)    3. Essential hypertension    4. Mixed hyperlipidemia    5. Stage 3b chronic kidney disease        Plan:   Stage D HFrEF  on home . Doing well today.   He does not have an ICD or a lifeVest. He agreed to wear a Lifevest so will arrange for that today.   Not a candidate for MCS at this time due to normal LV size (5.8 cm)  He did meet with our  last month and he is currently not a candidate for Tx as he does not have a secondary caregiver.  Continue current HF regimen.   Recommend 2 gram sodium restriction and 1500cc fluid restriction.  Encourage physical activity with graded exercise program.  Requested patient to weigh themselves daily, and to notify us if their weight increases by more than 3 lbs in 1  day or 5 lbs in 1 week.   RTC in 1 month with labs     Gustavo Gardner MD

## 2020-11-27 NOTE — LETTER
November 27, 2020        Damion Whitman  [address] [facility] [address]  Phone: [phone]  Fax: [phone]             [id number]  [address]  [place] [address]  Phone: [phone]   Patient: Ashish Mckeon   MR Number: [id number]   YOB: 1965   Date of Visit: 11/27/2020       Dear Dr. Damion Whitman    Thank you for referring Ashish Mckeon to me for evaluation. Attached you will find relevant portions of my assessment and plan of care.    If you have questions, please do not hesitate to call me. I look forward to following Ashish Mckeon along with you.    Sincerely,    Gustavo Gardner MD    Enclosure    If you would like to receive this communication electronically, please contact [email or web address] or [phone] to request [employer] Link access.    [employer] Link is a tool which provides read-only access to select patient information with whom you have a relationship. Its easy to use and provides real time access to review your patients record including encounter summaries, notes, results, and demographic information.    If you feel you have received this communication in error or would no longer like to receive these types of communications, please e-mail [email or web address]

## 2020-11-30 NOTE — TELEPHONE ENCOUNTER
3;30 pm:  Just received call on [email or web address] from Marva montelongo/ Jocelyne    Said lab called and only has 1 purple top tube so can run a CBC or a BNP , but not both   Looking at CBC and BNP resutls in [facility] seems cbc has been stable , but cnp has been rising, there fore told her to do the BNP and no cbc this week is ok    Also noted her orders as she told me is : cbc/bmp/mg and cbc  Our phone review says: cmp/bnp and mg    Will ask HF Luis JADE to clarify orderswhen she returns later this week or next week.  Epic results have bmp/bnp mg and cbc for numerous weeks

## 2020-12-01 NOTE — PROGRESS NOTES
1:00 pm:  F/U on yesterdays weekly [facility] nurse lab phone review  Labs resulted after hours and just reviewed  CBC/BMP and BNP all stable for pts norm  BNP improved some

## 2020-12-07 NOTE — TELEPHONE ENCOUNTER
----- Message from Tete Aguirre RN sent at 12/7/2020  2:56 PM CST -----  Regarding: FW: refill  Contact: patient    ----- Message -----  From: Rosalie Ruiz  Sent: 12/7/2020   2:43 PM CST  To: [facility] Pre-Heart Transplant Clinical  Subject: refill                                           The pt is calling to get a refill on his colchicine (MITIGARE) 0.6 mg Cap and [facility] [place] [address] [facility] [phone] (Phone)  [phone] (Fax). Thanks, Rosalie

## 2020-12-10 NOTE — TELEPHONE ENCOUNTER
Routine [facility] labs drawn by home health 12/8 resulted  yesterday afternoon. K 5.8 no visible hemolysis. I was able to reach pt today. Had him hold K until further instructed, and stop drinking OJ for now. Pts been drinking OJ daily. Pt going to labs now vipul repeat K. We'll see what it looks like in am. Pt denies any chest pain discomfort or any complaints at this time. Please advise if this is ok. Thanks     Pt called back and said he wasn't able to make it to lab today and he's need home health to draw them. V.O stat bmp in the am to be dropped off at [facility] called to Marva Bobby case manager at [facility]. Lucy to add pt to phone review.     Plan:  Per Dr Gustavo Gardner  We should go ahead and DC his K (he only takes 20 meq).will see what his repeat labs look like and decide if he needs kayexalate. LVM for pt informing him to stop potassium completely , not just holding it.Will fu with lab results and pt tomorrow to confirm he received message and confirm plan.

## 2020-12-15 NOTE — TELEPHONE ENCOUNTER
LVM for pt requesting call. Pts bnp is up significantly from last week. Of note H& H trending down. LVM for alba Nurse CM at [facility]  Requesting call back to get info on field nurses assessment of pt yesterday.

## 2020-12-15 NOTE — TELEPHONE ENCOUNTER
Spoke to Marva Madrigal CM at [facility] Jocelyne to get update on pts assessment from field nurse yesterday. Marva says field nurse Joselito hadnt entered her note yet but she text her while we were on the phone requesting info and asked me to call her back in the am for update. Mean while I will try to reach pt again.

## 2020-12-15 NOTE — TELEPHONE ENCOUNTER
Called pt back was able to reach him. Pt says he's feeling fine and has been fasting trying to watch his wt. Reports wt 135lbs, denies wt gain. Says wt same as last week. Denies, swelling or sob at this time or dietary discretion. Instructed pt to please call if symptoms change. Pt will be seen by HHN next week with routine labs.

## 2020-12-16 NOTE — TELEPHONE ENCOUNTER
Routine [facility] labs reviewed from 12/14/20  P up from 1350 to 2520.     Spoke to Marva with HH this am who spoke to field nurse Joselito for update. Joselito reports stable weight, neg CHF assessment, pt pleasant, and watching diet. HH will see early next week with routine labs.    Per my previous note. Pt currently reports feeling well and same as above

## 2020-12-22 NOTE — TELEPHONE ENCOUNTER
Reviewed labs with Dr Bustos. Pts cr is up slightly from 2.5 to 2.8. bnp is up from 2520 to 2934. Pt states weight is stable at 135lbs, Denies feet swelling and beely swelling today but says he does have a prod cough clear phlegm and increased sob when laying down at night over the past few days. Pt denies any immediate distress at this time. Per Dr Bustos pt  Is to take metolazone 2.5mg x1 today and x1 tomorrow. K 20meq today only. I was unable to reach pt. LVM for pt requesting call back ,  Will continue to try to reach pt.

## 2020-12-22 NOTE — TELEPHONE ENCOUNTER
Called [facility] for Corey nurse  requesting call at [facility] informing him of need for stat bmp and bnp on Friday am, and plan per Dr Bustos for Metolazone 2.5 today and tomorrow  K 20meq today.

## 2020-12-22 NOTE — TELEPHONE ENCOUNTER
Left another vm for pt requesting call back in regards to needing to know where to call in metolazone and need to give him instructions.

## 2020-12-23 NOTE — TELEPHONE ENCOUNTER
12/22/20 Received a call from [facility] Nurse  reminding me that Friday that  was closed on Christmas so pt will have   Routine labs drawn STAT 12/28/20.Monday.    12/23/20 Spoke to pt this am, and explained instructions and how to call CHF MD on call after hours if needed.  Script called to [facility] per pt request. Pt verbalized understanding of use of metolazone.

## 2021-01-01 ENCOUNTER — LAB VISIT (OUTPATIENT)
Dept: LAB | Facility: HOSPITAL | Age: 56
End: 2021-01-01
Attending: INTERNAL MEDICINE
Payer: MEDICAID

## 2021-01-01 ENCOUNTER — TELEPHONE (OUTPATIENT)
Dept: TRANSPLANT | Facility: CLINIC | Age: 56
End: 2021-01-01

## 2021-01-01 ENCOUNTER — HOSPITAL ENCOUNTER (INPATIENT)
Facility: HOSPITAL | Age: 56
LOS: 5 days | DRG: 292 | End: 2021-01-22
Attending: EMERGENCY MEDICINE | Admitting: INTERNAL MEDICINE
Payer: MEDICAID

## 2021-01-01 VITALS
DIASTOLIC BLOOD PRESSURE: 60 MMHG | RESPIRATION RATE: 22 BRPM | HEART RATE: 87 BPM | OXYGEN SATURATION: 99 % | SYSTOLIC BLOOD PRESSURE: 90 MMHG | BODY MASS INDEX: 17.62 KG/M2 | TEMPERATURE: 97 F | HEIGHT: 71 IN | WEIGHT: 125.88 LBS

## 2021-01-01 DIAGNOSIS — I13.0 HYPERTENSIVE HEART AND RENAL DISEASE WITH CONGESTIVE HEART FAILURE: ICD-10-CM

## 2021-01-01 DIAGNOSIS — R79.89 ELEVATED SERUM CREATININE: ICD-10-CM

## 2021-01-01 DIAGNOSIS — R79.89 ABNORMAL CBC: Primary | ICD-10-CM

## 2021-01-01 DIAGNOSIS — R06.00 DYSPNEA, UNSPECIFIED TYPE: ICD-10-CM

## 2021-01-01 DIAGNOSIS — Z51.5 PALLIATIVE CARE ENCOUNTER: ICD-10-CM

## 2021-01-01 DIAGNOSIS — Z45.2 ENCOUNTER FOR CENTRAL LINE PLACEMENT: ICD-10-CM

## 2021-01-01 DIAGNOSIS — R06.02 SHORTNESS OF BREATH: ICD-10-CM

## 2021-01-01 DIAGNOSIS — R57.0 CARDIOGENIC SHOCK: Primary | ICD-10-CM

## 2021-01-01 DIAGNOSIS — Z71.89 ADVANCE CARE PLANNING: ICD-10-CM

## 2021-01-01 DIAGNOSIS — Z71.89 GOALS OF CARE, COUNSELING/DISCUSSION: ICD-10-CM

## 2021-01-01 DIAGNOSIS — I50.42 CHRONIC COMBINED SYSTOLIC AND DIASTOLIC HEART FAILURE: ICD-10-CM

## 2021-01-01 DIAGNOSIS — I13.0 HYPERTENSIVE HEART AND RENAL DISEASE WITH CONGESTIVE HEART FAILURE: Primary | ICD-10-CM

## 2021-01-01 DIAGNOSIS — I50.9 CHF (CONGESTIVE HEART FAILURE): ICD-10-CM

## 2021-01-01 DIAGNOSIS — R07.9 CHEST PAIN: ICD-10-CM

## 2021-01-01 LAB
ALBUMIN SERPL BCP-MCNC: 3 G/DL (ref 3.5–5.2)
ALBUMIN SERPL BCP-MCNC: 3.1 G/DL (ref 3.5–5.2)
ALBUMIN SERPL BCP-MCNC: 3.2 G/DL (ref 3.5–5.2)
ALBUMIN SERPL BCP-MCNC: 3.3 G/DL (ref 3.5–5.2)
ALLENS TEST: ABNORMAL
ALP SERPL-CCNC: 160 U/L (ref 55–135)
ALP SERPL-CCNC: 180 U/L (ref 55–135)
ALP SERPL-CCNC: 182 U/L (ref 55–135)
ALP SERPL-CCNC: 207 U/L (ref 55–135)
ALT SERPL W/O P-5'-P-CCNC: 11 U/L (ref 10–44)
ALT SERPL W/O P-5'-P-CCNC: 14 U/L (ref 10–44)
ALT SERPL W/O P-5'-P-CCNC: 157 U/L (ref 10–44)
ALT SERPL W/O P-5'-P-CCNC: 163 U/L (ref 10–44)
ANION GAP SERPL CALC-SCNC: 15 MMOL/L (ref 8–16)
ANION GAP SERPL CALC-SCNC: 15 MMOL/L (ref 8–16)
ANION GAP SERPL CALC-SCNC: 16 MMOL/L (ref 8–16)
ANION GAP SERPL CALC-SCNC: 17 MMOL/L (ref 8–16)
ANION GAP SERPL CALC-SCNC: 17 MMOL/L (ref 8–16)
ANION GAP SERPL CALC-SCNC: 19 MMOL/L (ref 8–16)
ANION GAP SERPL CALC-SCNC: 19 MMOL/L (ref 8–16)
ANION GAP SERPL CALC-SCNC: 22 MMOL/L (ref 8–16)
ANION GAP SERPL CALC-SCNC: 27 MMOL/L (ref 8–16)
ANION GAP SERPL CALC-SCNC: 9 MMOL/L (ref 8–16)
AST SERPL-CCNC: 10 U/L (ref 10–40)
AST SERPL-CCNC: 107 U/L (ref 10–40)
AST SERPL-CCNC: 13 U/L (ref 10–40)
AST SERPL-CCNC: 91 U/L (ref 10–40)
BASOPHILS # BLD AUTO: 0.02 K/UL (ref 0–0.2)
BASOPHILS # BLD AUTO: 0.05 K/UL (ref 0–0.2)
BASOPHILS # BLD AUTO: 0.05 K/UL (ref 0–0.2)
BASOPHILS NFR BLD: 0.4 % (ref 0–1.9)
BASOPHILS NFR BLD: 0.5 % (ref 0–1.9)
BASOPHILS NFR BLD: 1.1 % (ref 0–1.9)
BASOPHILS NFR BLD: 1.2 % (ref 0–1.9)
BILIRUB SERPL-MCNC: 1.7 MG/DL (ref 0.1–1)
BILIRUB SERPL-MCNC: 2.1 MG/DL (ref 0.1–1)
BILIRUB SERPL-MCNC: 5.5 MG/DL (ref 0.1–1)
BILIRUB SERPL-MCNC: 5.6 MG/DL (ref 0.1–1)
BNP SERPL-MCNC: 2216 PG/ML (ref 0–99)
BNP SERPL-MCNC: 2725 PG/ML (ref 0–99)
BNP SERPL-MCNC: 3126 PG/ML (ref 0–99)
BUN SERPL-MCNC: 107 MG/DL (ref 6–20)
BUN SERPL-MCNC: 109 MG/DL (ref 6–20)
BUN SERPL-MCNC: 113 MG/DL (ref 6–20)
BUN SERPL-MCNC: 113 MG/DL (ref 6–20)
BUN SERPL-MCNC: 116 MG/DL (ref 6–20)
BUN SERPL-MCNC: 118 MG/DL (ref 6–20)
BUN SERPL-MCNC: 120 MG/DL (ref 6–20)
BUN SERPL-MCNC: 125 MG/DL (ref 6–20)
BUN SERPL-MCNC: 125 MG/DL (ref 6–20)
BUN SERPL-MCNC: 131 MG/DL (ref 6–20)
BUN SERPL-MCNC: 81 MG/DL (ref 6–20)
BUN SERPL-MCNC: 99 MG/DL (ref 6–20)
CALCIUM SERPL-MCNC: 4.9 MG/DL (ref 8.7–10.5)
CALCIUM SERPL-MCNC: 7.7 MG/DL (ref 8.7–10.5)
CALCIUM SERPL-MCNC: 7.8 MG/DL (ref 8.7–10.5)
CALCIUM SERPL-MCNC: 8 MG/DL (ref 8.7–10.5)
CALCIUM SERPL-MCNC: 8 MG/DL (ref 8.7–10.5)
CALCIUM SERPL-MCNC: 8.1 MG/DL (ref 8.7–10.5)
CALCIUM SERPL-MCNC: 8.1 MG/DL (ref 8.7–10.5)
CALCIUM SERPL-MCNC: 8.3 MG/DL (ref 8.7–10.5)
CALCIUM SERPL-MCNC: 8.6 MG/DL (ref 8.7–10.5)
CALCIUM SERPL-MCNC: 8.6 MG/DL (ref 8.7–10.5)
CHLORIDE SERPL-SCNC: 112 MMOL/L (ref 95–110)
CHLORIDE SERPL-SCNC: 88 MMOL/L (ref 95–110)
CHLORIDE SERPL-SCNC: 89 MMOL/L (ref 95–110)
CHLORIDE SERPL-SCNC: 91 MMOL/L (ref 95–110)
CHLORIDE SERPL-SCNC: 92 MMOL/L (ref 95–110)
CHLORIDE SERPL-SCNC: 93 MMOL/L (ref 95–110)
CHLORIDE SERPL-SCNC: 93 MMOL/L (ref 95–110)
CO2 SERPL-SCNC: 17 MMOL/L (ref 23–29)
CO2 SERPL-SCNC: 19 MMOL/L (ref 23–29)
CO2 SERPL-SCNC: 21 MMOL/L (ref 23–29)
CO2 SERPL-SCNC: 22 MMOL/L (ref 23–29)
CO2 SERPL-SCNC: 24 MMOL/L (ref 23–29)
CO2 SERPL-SCNC: 25 MMOL/L (ref 23–29)
CO2 SERPL-SCNC: 25 MMOL/L (ref 23–29)
CO2 SERPL-SCNC: 26 MMOL/L (ref 23–29)
CO2 SERPL-SCNC: 26 MMOL/L (ref 23–29)
CO2 SERPL-SCNC: 27 MMOL/L (ref 23–29)
CO2 SERPL-SCNC: 27 MMOL/L (ref 23–29)
CO2 SERPL-SCNC: 28 MMOL/L (ref 23–29)
CREAT SERPL-MCNC: 2.3 MG/DL (ref 0.5–1.4)
CREAT SERPL-MCNC: 2.7 MG/DL (ref 0.5–1.4)
CREAT SERPL-MCNC: 3.3 MG/DL (ref 0.5–1.4)
CREAT SERPL-MCNC: 3.4 MG/DL (ref 0.5–1.4)
CREAT SERPL-MCNC: 3.4 MG/DL (ref 0.5–1.4)
CREAT SERPL-MCNC: 3.5 MG/DL (ref 0.5–1.4)
CREAT SERPL-MCNC: 3.9 MG/DL (ref 0.5–1.4)
CREAT SERPL-MCNC: 4 MG/DL (ref 0.5–1.4)
CREAT SERPL-MCNC: 4.1 MG/DL (ref 0.5–1.4)
CREAT SERPL-MCNC: 4.2 MG/DL (ref 0.5–1.4)
CREAT SERPL-MCNC: 4.8 MG/DL (ref 0.5–1.4)
CREAT SERPL-MCNC: 4.8 MG/DL (ref 0.5–1.4)
CTP QC/QA: YES
DELSYS: ABNORMAL
DIFFERENTIAL METHOD: ABNORMAL
EOSINOPHIL # BLD AUTO: 0 K/UL (ref 0–0.5)
EOSINOPHIL # BLD AUTO: 0.1 K/UL (ref 0–0.5)
EOSINOPHIL # BLD AUTO: 0.2 K/UL (ref 0–0.5)
EOSINOPHIL NFR BLD: 0.9 % (ref 0–8)
EOSINOPHIL NFR BLD: 1.4 % (ref 0–8)
EOSINOPHIL NFR BLD: 1.6 % (ref 0–8)
EOSINOPHIL NFR BLD: 2 % (ref 0–8)
EOSINOPHIL NFR BLD: 2.3 % (ref 0–8)
EOSINOPHIL NFR BLD: 2.8 % (ref 0–8)
EOSINOPHIL NFR BLD: 3.1 % (ref 0–8)
EOSINOPHIL NFR BLD: 4.1 % (ref 0–8)
ERYTHROCYTE [DISTWIDTH] IN BLOOD BY AUTOMATED COUNT: 19.9 % (ref 11.5–14.5)
ERYTHROCYTE [DISTWIDTH] IN BLOOD BY AUTOMATED COUNT: 20.3 % (ref 11.5–14.5)
ERYTHROCYTE [DISTWIDTH] IN BLOOD BY AUTOMATED COUNT: 20.7 % (ref 11.5–14.5)
ERYTHROCYTE [DISTWIDTH] IN BLOOD BY AUTOMATED COUNT: 20.8 % (ref 11.5–14.5)
ERYTHROCYTE [DISTWIDTH] IN BLOOD BY AUTOMATED COUNT: 20.9 % (ref 11.5–14.5)
ERYTHROCYTE [DISTWIDTH] IN BLOOD BY AUTOMATED COUNT: 21.2 % (ref 11.5–14.5)
EST. GFR  (AFRICAN AMERICAN): 14.5 ML/MIN/1.73 M^2
EST. GFR  (AFRICAN AMERICAN): 14.5 ML/MIN/1.73 M^2
EST. GFR  (AFRICAN AMERICAN): 17.1 ML/MIN/1.73 M^2
EST. GFR  (AFRICAN AMERICAN): 17.6 ML/MIN/1.73 M^2
EST. GFR  (AFRICAN AMERICAN): 18.1 ML/MIN/1.73 M^2
EST. GFR  (AFRICAN AMERICAN): 18.7 ML/MIN/1.73 M^2
EST. GFR  (AFRICAN AMERICAN): 21.3 ML/MIN/1.73 M^2
EST. GFR  (AFRICAN AMERICAN): 22.1 ML/MIN/1.73 M^2
EST. GFR  (AFRICAN AMERICAN): 22.1 ML/MIN/1.73 M^2
EST. GFR  (AFRICAN AMERICAN): 22.9 ML/MIN/1.73 M^2
EST. GFR  (AFRICAN AMERICAN): 29.1 ML/MIN/1.73 M^2
EST. GFR  (AFRICAN AMERICAN): 35.4 ML/MIN/1.73 M^2
EST. GFR  (NON AFRICAN AMERICAN): 12.6 ML/MIN/1.73 M^2
EST. GFR  (NON AFRICAN AMERICAN): 12.6 ML/MIN/1.73 M^2
EST. GFR  (NON AFRICAN AMERICAN): 14.8 ML/MIN/1.73 M^2
EST. GFR  (NON AFRICAN AMERICAN): 15.2 ML/MIN/1.73 M^2
EST. GFR  (NON AFRICAN AMERICAN): 15.7 ML/MIN/1.73 M^2
EST. GFR  (NON AFRICAN AMERICAN): 16.2 ML/MIN/1.73 M^2
EST. GFR  (NON AFRICAN AMERICAN): 18.4 ML/MIN/1.73 M^2
EST. GFR  (NON AFRICAN AMERICAN): 19.1 ML/MIN/1.73 M^2
EST. GFR  (NON AFRICAN AMERICAN): 19.1 ML/MIN/1.73 M^2
EST. GFR  (NON AFRICAN AMERICAN): 19.8 ML/MIN/1.73 M^2
EST. GFR  (NON AFRICAN AMERICAN): 25.2 ML/MIN/1.73 M^2
EST. GFR  (NON AFRICAN AMERICAN): 30.6 ML/MIN/1.73 M^2
FIO2: 21
FIO2: 21
FIO2: 36
FLOW: 4
FLOW: 5
GLUCOSE SERPL-MCNC: 128 MG/DL (ref 70–110)
GLUCOSE SERPL-MCNC: 166 MG/DL (ref 70–110)
GLUCOSE SERPL-MCNC: 171 MG/DL (ref 70–110)
GLUCOSE SERPL-MCNC: 174 MG/DL (ref 70–110)
GLUCOSE SERPL-MCNC: 204 MG/DL (ref 70–110)
GLUCOSE SERPL-MCNC: 218 MG/DL (ref 70–110)
GLUCOSE SERPL-MCNC: 240 MG/DL (ref 70–110)
GLUCOSE SERPL-MCNC: 242 MG/DL (ref 70–110)
GLUCOSE SERPL-MCNC: 263 MG/DL (ref 70–110)
GLUCOSE SERPL-MCNC: 268 MG/DL (ref 70–110)
GLUCOSE SERPL-MCNC: 478 MG/DL (ref 70–110)
GLUCOSE SERPL-MCNC: 88 MG/DL (ref 70–110)
HCO3 UR-SCNC: 21.7 MMOL/L (ref 24–28)
HCO3 UR-SCNC: 23.1 MMOL/L (ref 24–28)
HCO3 UR-SCNC: 23.6 MMOL/L (ref 24–28)
HCO3 UR-SCNC: 24.4 MMOL/L (ref 24–28)
HCO3 UR-SCNC: 25.4 MMOL/L (ref 24–28)
HCO3 UR-SCNC: 25.7 MMOL/L (ref 24–28)
HCO3 UR-SCNC: 26.3 MMOL/L (ref 24–28)
HCO3 UR-SCNC: 26.9 MMOL/L (ref 24–28)
HCO3 UR-SCNC: 27.1 MMOL/L (ref 24–28)
HCO3 UR-SCNC: 27.6 MMOL/L (ref 24–28)
HCO3 UR-SCNC: 28.8 MMOL/L (ref 24–28)
HCO3 UR-SCNC: 29.6 MMOL/L (ref 24–28)
HCO3 UR-SCNC: 29.7 MMOL/L (ref 24–28)
HCO3 UR-SCNC: 29.7 MMOL/L (ref 24–28)
HCO3 UR-SCNC: 29.9 MMOL/L (ref 24–28)
HCO3 UR-SCNC: 30.8 MMOL/L (ref 24–28)
HCO3 UR-SCNC: 32.2 MMOL/L (ref 24–28)
HCT VFR BLD AUTO: 29.4 % (ref 40–54)
HCT VFR BLD AUTO: 31.2 % (ref 40–54)
HCT VFR BLD AUTO: 31.5 % (ref 40–54)
HCT VFR BLD AUTO: 31.6 % (ref 40–54)
HCT VFR BLD AUTO: 32.4 % (ref 40–54)
HCT VFR BLD AUTO: 32.4 % (ref 40–54)
HCT VFR BLD AUTO: 34.5 % (ref 40–54)
HCT VFR BLD AUTO: 35 % (ref 40–54)
HGB BLD-MCNC: 10.2 G/DL (ref 14–18)
HGB BLD-MCNC: 10.2 G/DL (ref 14–18)
HGB BLD-MCNC: 9 G/DL (ref 14–18)
HGB BLD-MCNC: 9.4 G/DL (ref 14–18)
HGB BLD-MCNC: 9.4 G/DL (ref 14–18)
HGB BLD-MCNC: 9.5 G/DL (ref 14–18)
HGB BLD-MCNC: 9.8 G/DL (ref 14–18)
HGB BLD-MCNC: 9.9 G/DL (ref 14–18)
IMM GRANULOCYTES # BLD AUTO: 0.01 K/UL (ref 0–0.04)
IMM GRANULOCYTES # BLD AUTO: 0.02 K/UL (ref 0–0.04)
IMM GRANULOCYTES # BLD AUTO: 0.02 K/UL (ref 0–0.04)
IMM GRANULOCYTES # BLD AUTO: 0.03 K/UL (ref 0–0.04)
IMM GRANULOCYTES NFR BLD AUTO: 0.2 % (ref 0–0.5)
IMM GRANULOCYTES NFR BLD AUTO: 0.3 % (ref 0–0.5)
IMM GRANULOCYTES NFR BLD AUTO: 0.5 % (ref 0–0.5)
IMM GRANULOCYTES NFR BLD AUTO: 0.5 % (ref 0–0.5)
IMM GRANULOCYTES NFR BLD AUTO: 0.7 % (ref 0–0.5)
LACTATE SERPL-SCNC: 1.6 MMOL/L (ref 0.5–2.2)
LACTATE SERPL-SCNC: 1.9 MMOL/L (ref 0.5–2.2)
LDH SERPL L TO P-CCNC: 3.75 MMOL/L (ref 0.5–2.2)
LDH SERPL L TO P-CCNC: 5.57 MMOL/L (ref 0.5–2.2)
LYMPHOCYTES # BLD AUTO: 0.2 K/UL (ref 1–4.8)
LYMPHOCYTES # BLD AUTO: 0.2 K/UL (ref 1–4.8)
LYMPHOCYTES # BLD AUTO: 0.3 K/UL (ref 1–4.8)
LYMPHOCYTES # BLD AUTO: 0.4 K/UL (ref 1–4.8)
LYMPHOCYTES # BLD AUTO: 0.5 K/UL (ref 1–4.8)
LYMPHOCYTES NFR BLD: 11.8 % (ref 18–48)
LYMPHOCYTES NFR BLD: 4.4 % (ref 18–48)
LYMPHOCYTES NFR BLD: 5.1 % (ref 18–48)
LYMPHOCYTES NFR BLD: 6.1 % (ref 18–48)
LYMPHOCYTES NFR BLD: 6.7 % (ref 18–48)
LYMPHOCYTES NFR BLD: 7.8 % (ref 18–48)
LYMPHOCYTES NFR BLD: 8.8 % (ref 18–48)
LYMPHOCYTES NFR BLD: 9.6 % (ref 18–48)
MAGNESIUM SERPL-MCNC: 2 MG/DL (ref 1.6–2.6)
MAGNESIUM SERPL-MCNC: 2 MG/DL (ref 1.6–2.6)
MAGNESIUM SERPL-MCNC: 2.1 MG/DL (ref 1.6–2.6)
MAGNESIUM SERPL-MCNC: 2.2 MG/DL (ref 1.6–2.6)
MAGNESIUM SERPL-MCNC: 2.4 MG/DL (ref 1.6–2.6)
MAGNESIUM SERPL-MCNC: 2.4 MG/DL (ref 1.6–2.6)
MCH RBC QN AUTO: 19.6 PG (ref 27–31)
MCH RBC QN AUTO: 19.9 PG (ref 27–31)
MCH RBC QN AUTO: 19.9 PG (ref 27–31)
MCH RBC QN AUTO: 20 PG (ref 27–31)
MCH RBC QN AUTO: 20.7 PG (ref 27–31)
MCH RBC QN AUTO: 20.8 PG (ref 27–31)
MCHC RBC AUTO-ENTMCNC: 29.1 G/DL (ref 32–36)
MCHC RBC AUTO-ENTMCNC: 29.6 G/DL (ref 32–36)
MCHC RBC AUTO-ENTMCNC: 29.7 G/DL (ref 32–36)
MCHC RBC AUTO-ENTMCNC: 29.8 G/DL (ref 32–36)
MCHC RBC AUTO-ENTMCNC: 30.2 G/DL (ref 32–36)
MCHC RBC AUTO-ENTMCNC: 30.4 G/DL (ref 32–36)
MCHC RBC AUTO-ENTMCNC: 30.6 G/DL (ref 32–36)
MCHC RBC AUTO-ENTMCNC: 30.6 G/DL (ref 32–36)
MCV RBC AUTO: 65 FL (ref 82–98)
MCV RBC AUTO: 65 FL (ref 82–98)
MCV RBC AUTO: 66 FL (ref 82–98)
MCV RBC AUTO: 67 FL (ref 82–98)
MCV RBC AUTO: 69 FL (ref 82–98)
MCV RBC AUTO: 70 FL (ref 82–98)
MODE: ABNORMAL
MONOCYTES # BLD AUTO: 0.5 K/UL (ref 0.3–1)
MONOCYTES # BLD AUTO: 0.6 K/UL (ref 0.3–1)
MONOCYTES # BLD AUTO: 0.7 K/UL (ref 0.3–1)
MONOCYTES NFR BLD: 11.7 % (ref 4–15)
MONOCYTES NFR BLD: 11.9 % (ref 4–15)
MONOCYTES NFR BLD: 12.3 % (ref 4–15)
MONOCYTES NFR BLD: 13.5 % (ref 4–15)
MONOCYTES NFR BLD: 13.5 % (ref 4–15)
MONOCYTES NFR BLD: 14 % (ref 4–15)
MONOCYTES NFR BLD: 14.2 % (ref 4–15)
MONOCYTES NFR BLD: 15.4 % (ref 4–15)
NEUTROPHILS # BLD AUTO: 2.6 K/UL (ref 1.8–7.7)
NEUTROPHILS # BLD AUTO: 2.9 K/UL (ref 1.8–7.7)
NEUTROPHILS # BLD AUTO: 3.1 K/UL (ref 1.8–7.7)
NEUTROPHILS # BLD AUTO: 3.2 K/UL (ref 1.8–7.7)
NEUTROPHILS # BLD AUTO: 3.3 K/UL (ref 1.8–7.7)
NEUTROPHILS # BLD AUTO: 3.5 K/UL (ref 1.8–7.7)
NEUTROPHILS NFR BLD: 69.2 % (ref 38–73)
NEUTROPHILS NFR BLD: 72.3 % (ref 38–73)
NEUTROPHILS NFR BLD: 73 % (ref 38–73)
NEUTROPHILS NFR BLD: 74.9 % (ref 38–73)
NEUTROPHILS NFR BLD: 78.1 % (ref 38–73)
NEUTROPHILS NFR BLD: 79.4 % (ref 38–73)
NEUTROPHILS NFR BLD: 79.6 % (ref 38–73)
NEUTROPHILS NFR BLD: 80.5 % (ref 38–73)
NRBC BLD-RTO: 1 /100 WBC
NRBC BLD-RTO: 2 /100 WBC
NRBC BLD-RTO: 3 /100 WBC
NRBC BLD-RTO: 5 /100 WBC
NRBC BLD-RTO: 7 /100 WBC
PCO2 BLDA: 35.9 MMHG (ref 35–45)
PCO2 BLDA: 36.3 MMHG (ref 35–45)
PCO2 BLDA: 37.8 MMHG (ref 35–45)
PCO2 BLDA: 39 MMHG (ref 35–45)
PCO2 BLDA: 40.5 MMHG (ref 35–45)
PCO2 BLDA: 41.1 MMHG (ref 35–45)
PCO2 BLDA: 41.1 MMHG (ref 35–45)
PCO2 BLDA: 42.5 MMHG (ref 35–45)
PCO2 BLDA: 43.3 MMHG (ref 35–45)
PCO2 BLDA: 43.6 MMHG (ref 35–45)
PCO2 BLDA: 43.8 MMHG (ref 35–45)
PCO2 BLDA: 44.4 MMHG (ref 35–45)
PCO2 BLDA: 46.4 MMHG (ref 35–45)
PCO2 BLDA: 47.2 MMHG (ref 35–45)
PCO2 BLDA: 47.8 MMHG (ref 35–45)
PCO2 BLDA: 50.1 MMHG (ref 35–45)
PCO2 BLDA: 53.4 MMHG (ref 35–45)
PH SMN: 7.34 [PH] (ref 7.35–7.45)
PH SMN: 7.38 [PH] (ref 7.35–7.45)
PH SMN: 7.39 [PH] (ref 7.35–7.45)
PH SMN: 7.4 [PH] (ref 7.35–7.45)
PH SMN: 7.41 [PH] (ref 7.35–7.45)
PH SMN: 7.41 [PH] (ref 7.35–7.45)
PH SMN: 7.42 [PH] (ref 7.35–7.45)
PH SMN: 7.43 [PH] (ref 7.35–7.45)
PH SMN: 7.44 [PH] (ref 7.35–7.45)
PH SMN: 7.44 [PH] (ref 7.35–7.45)
PHOSPHATE SERPL-MCNC: 4.8 MG/DL (ref 2.7–4.5)
PHOSPHATE SERPL-MCNC: 5 MG/DL (ref 2.7–4.5)
PHOSPHATE SERPL-MCNC: 5 MG/DL (ref 2.7–4.5)
PHOSPHATE SERPL-MCNC: 6.1 MG/DL (ref 2.7–4.5)
PHOSPHATE SERPL-MCNC: 6.6 MG/DL (ref 2.7–4.5)
PLATELET # BLD AUTO: 123 K/UL (ref 150–350)
PLATELET # BLD AUTO: 124 K/UL (ref 150–350)
PLATELET # BLD AUTO: 124 K/UL (ref 150–350)
PLATELET # BLD AUTO: 126 K/UL (ref 150–350)
PLATELET # BLD AUTO: 128 K/UL (ref 150–350)
PLATELET # BLD AUTO: 152 K/UL (ref 150–350)
PLATELET # BLD AUTO: 163 K/UL (ref 150–350)
PLATELET # BLD AUTO: 164 K/UL (ref 150–350)
PMV BLD AUTO: ABNORMAL FL (ref 9.2–12.9)
PO2 BLDA: 180 MMHG (ref 80–100)
PO2 BLDA: 19 MMHG (ref 40–60)
PO2 BLDA: 20 MMHG (ref 40–60)
PO2 BLDA: 23 MMHG (ref 40–60)
PO2 BLDA: 23 MMHG (ref 40–60)
PO2 BLDA: 24 MMHG (ref 40–60)
PO2 BLDA: 24 MMHG (ref 40–60)
PO2 BLDA: 25 MMHG (ref 40–60)
PO2 BLDA: 26 MMHG (ref 40–60)
PO2 BLDA: 27 MMHG (ref 40–60)
PO2 BLDA: 28 MMHG (ref 40–60)
PO2 BLDA: 29 MMHG (ref 40–60)
PO2 BLDA: 33 MMHG (ref 40–60)
POC BE: -1 MMOL/L
POC BE: -2 MMOL/L
POC BE: -3 MMOL/L
POC BE: 0 MMOL/L
POC BE: 1 MMOL/L
POC BE: 2 MMOL/L
POC BE: 3 MMOL/L
POC BE: 3 MMOL/L
POC BE: 4 MMOL/L
POC BE: 5 MMOL/L
POC BE: 6 MMOL/L
POC BE: 6 MMOL/L
POC BE: 7 MMOL/L
POC SATURATED O2: 100 % (ref 95–100)
POC SATURATED O2: 27 % (ref 95–100)
POC SATURATED O2: 30 % (ref 95–100)
POC SATURATED O2: 39 % (ref 95–100)
POC SATURATED O2: 40 % (ref 95–100)
POC SATURATED O2: 42 % (ref 95–100)
POC SATURATED O2: 43 % (ref 95–100)
POC SATURATED O2: 43 % (ref 95–100)
POC SATURATED O2: 44 % (ref 95–100)
POC SATURATED O2: 45 % (ref 95–100)
POC SATURATED O2: 48 % (ref 95–100)
POC SATURATED O2: 53 % (ref 95–100)
POC SATURATED O2: 54 % (ref 95–100)
POC SATURATED O2: 56 % (ref 95–100)
POC SATURATED O2: 56 % (ref 95–100)
POC SATURATED O2: 57 % (ref 95–100)
POC SATURATED O2: 65 % (ref 95–100)
POC TCO2: 23 MMOL/L (ref 24–29)
POC TCO2: 24 MMOL/L (ref 24–29)
POC TCO2: 25 MMOL/L (ref 24–29)
POC TCO2: 25 MMOL/L (ref 24–29)
POC TCO2: 27 MMOL/L (ref 24–29)
POC TCO2: 27 MMOL/L (ref 24–29)
POC TCO2: 28 MMOL/L (ref 24–29)
POC TCO2: 29 MMOL/L (ref 23–27)
POC TCO2: 30 MMOL/L (ref 24–29)
POC TCO2: 31 MMOL/L (ref 24–29)
POC TCO2: 32 MMOL/L (ref 24–29)
POC TCO2: 34 MMOL/L (ref 24–29)
POCT GLUCOSE: 102 MG/DL (ref 70–110)
POCT GLUCOSE: 143 MG/DL (ref 70–110)
POCT GLUCOSE: 163 MG/DL (ref 70–110)
POCT GLUCOSE: 166 MG/DL (ref 70–110)
POCT GLUCOSE: 168 MG/DL (ref 70–110)
POCT GLUCOSE: 172 MG/DL (ref 70–110)
POCT GLUCOSE: 192 MG/DL (ref 70–110)
POCT GLUCOSE: 211 MG/DL (ref 70–110)
POCT GLUCOSE: 216 MG/DL (ref 70–110)
POCT GLUCOSE: 226 MG/DL (ref 70–110)
POCT GLUCOSE: 236 MG/DL (ref 70–110)
POCT GLUCOSE: 249 MG/DL (ref 70–110)
POCT GLUCOSE: 264 MG/DL (ref 70–110)
POCT GLUCOSE: 74 MG/DL (ref 70–110)
POTASSIUM SERPL-SCNC: 2.2 MMOL/L (ref 3.5–5.1)
POTASSIUM SERPL-SCNC: 3.4 MMOL/L (ref 3.5–5.1)
POTASSIUM SERPL-SCNC: 3.4 MMOL/L (ref 3.5–5.1)
POTASSIUM SERPL-SCNC: 3.5 MMOL/L (ref 3.5–5.1)
POTASSIUM SERPL-SCNC: 3.6 MMOL/L (ref 3.5–5.1)
POTASSIUM SERPL-SCNC: 3.8 MMOL/L (ref 3.5–5.1)
POTASSIUM SERPL-SCNC: 3.9 MMOL/L (ref 3.5–5.1)
POTASSIUM SERPL-SCNC: 4.1 MMOL/L (ref 3.5–5.1)
POTASSIUM SERPL-SCNC: 4.2 MMOL/L (ref 3.5–5.1)
POTASSIUM SERPL-SCNC: 4.3 MMOL/L (ref 3.5–5.1)
POTASSIUM SERPL-SCNC: 4.6 MMOL/L (ref 3.5–5.1)
POTASSIUM SERPL-SCNC: 5.1 MMOL/L (ref 3.5–5.1)
PREALB SERPL-MCNC: 10 MG/DL (ref 20–43)
PROT SERPL-MCNC: 6.6 G/DL (ref 6–8.4)
PROT SERPL-MCNC: 7.4 G/DL (ref 6–8.4)
PROT SERPL-MCNC: 7.4 G/DL (ref 6–8.4)
PROT SERPL-MCNC: 7.6 G/DL (ref 6–8.4)
RBC # BLD AUTO: 4.5 M/UL (ref 4.6–6.2)
RBC # BLD AUTO: 4.71 M/UL (ref 4.6–6.2)
RBC # BLD AUTO: 4.72 M/UL (ref 4.6–6.2)
RBC # BLD AUTO: 4.73 M/UL (ref 4.6–6.2)
RBC # BLD AUTO: 4.76 M/UL (ref 4.6–6.2)
RBC # BLD AUTO: 4.92 M/UL (ref 4.6–6.2)
RBC # BLD AUTO: 4.96 M/UL (ref 4.6–6.2)
RBC # BLD AUTO: 5.2 M/UL (ref 4.6–6.2)
SAMPLE: ABNORMAL
SARS-COV-2 RDRP RESP QL NAA+PROBE: NEGATIVE
SITE: ABNORMAL
SODIUM SERPL-SCNC: 128 MMOL/L (ref 136–145)
SODIUM SERPL-SCNC: 130 MMOL/L (ref 136–145)
SODIUM SERPL-SCNC: 131 MMOL/L (ref 136–145)
SODIUM SERPL-SCNC: 132 MMOL/L (ref 136–145)
SODIUM SERPL-SCNC: 134 MMOL/L (ref 136–145)
SODIUM SERPL-SCNC: 134 MMOL/L (ref 136–145)
SODIUM SERPL-SCNC: 136 MMOL/L (ref 136–145)
SODIUM SERPL-SCNC: 137 MMOL/L (ref 136–145)
SODIUM SERPL-SCNC: 140 MMOL/L (ref 136–145)
SP02: 100
SP02: 100
SP02: 97
SP02: 97
SP02: 99
T4 FREE SERPL-MCNC: 0.78 NG/DL (ref 0.71–1.51)
TROPONIN I SERPL DL<=0.01 NG/ML-MCNC: 0.15 NG/ML (ref 0–0.03)
TROPONIN I SERPL DL<=0.01 NG/ML-MCNC: 0.16 NG/ML (ref 0–0.03)
TROPONIN I SERPL DL<=0.01 NG/ML-MCNC: 0.16 NG/ML (ref 0–0.03)
TROPONIN I SERPL DL<=0.01 NG/ML-MCNC: 0.17 NG/ML (ref 0–0.03)
TROPONIN I SERPL DL<=0.01 NG/ML-MCNC: 0.19 NG/ML (ref 0–0.03)
TSH SERPL DL<=0.005 MIU/L-ACNC: 6.3 UIU/ML (ref 0.4–4)
WBC # BLD AUTO: 3.65 K/UL (ref 3.9–12.7)
WBC # BLD AUTO: 4.07 K/UL (ref 3.9–12.7)
WBC # BLD AUTO: 4.22 K/UL (ref 3.9–12.7)
WBC # BLD AUTO: 4.23 K/UL (ref 3.9–12.7)
WBC # BLD AUTO: 4.31 K/UL (ref 3.9–12.7)
WBC # BLD AUTO: 4.38 K/UL (ref 3.9–12.7)
WBC # BLD AUTO: 4.43 K/UL (ref 3.9–12.7)
WBC # BLD AUTO: 4.45 K/UL (ref 3.9–12.7)

## 2021-01-01 PROCEDURE — 36620 ARTERIAL LINE: ICD-10-PCS | Mod: ,,, | Performed by: STUDENT IN AN ORGANIZED HEALTH CARE EDUCATION/TRAINING PROGRAM

## 2021-01-01 PROCEDURE — 80048 BASIC METABOLIC PNL TOTAL CA: CPT | Mod: 91

## 2021-01-01 PROCEDURE — 83735 ASSAY OF MAGNESIUM: CPT

## 2021-01-01 PROCEDURE — 63600175 PHARM REV CODE 636 W HCPCS: Performed by: STUDENT IN AN ORGANIZED HEALTH CARE EDUCATION/TRAINING PROGRAM

## 2021-01-01 PROCEDURE — 85025 COMPLETE CBC W/AUTO DIFF WBC: CPT

## 2021-01-01 PROCEDURE — 84484 ASSAY OF TROPONIN QUANT: CPT

## 2021-01-01 PROCEDURE — 83605 ASSAY OF LACTIC ACID: CPT

## 2021-01-01 PROCEDURE — 84443 ASSAY THYROID STIM HORMONE: CPT

## 2021-01-01 PROCEDURE — 99233 PR SUBSEQUENT HOSPITAL CARE,LEVL III: ICD-10-PCS | Mod: ,,, | Performed by: INTERNAL MEDICINE

## 2021-01-01 PROCEDURE — 99497 PR ADVNCD CARE PLAN 30 MIN: ICD-10-PCS | Mod: 25,,, | Performed by: CLINICAL NURSE SPECIALIST

## 2021-01-01 PROCEDURE — 63600367 HC NITRIC OXIDE PER HOUR

## 2021-01-01 PROCEDURE — 25000003 PHARM REV CODE 250: Performed by: STUDENT IN AN ORGANIZED HEALTH CARE EDUCATION/TRAINING PROGRAM

## 2021-01-01 PROCEDURE — 82803 BLOOD GASES ANY COMBINATION: CPT

## 2021-01-01 PROCEDURE — 99900035 HC TECH TIME PER 15 MIN (STAT)

## 2021-01-01 PROCEDURE — 99497 ADVNCD CARE PLAN 30 MIN: CPT | Mod: ,,, | Performed by: CLINICAL NURSE SPECIALIST

## 2021-01-01 PROCEDURE — 20000000 HC ICU ROOM

## 2021-01-01 PROCEDURE — 83605 ASSAY OF LACTIC ACID: CPT | Mod: 91

## 2021-01-01 PROCEDURE — 63600175 PHARM REV CODE 636 W HCPCS: Performed by: INTERNAL MEDICINE

## 2021-01-01 PROCEDURE — 93005 ELECTROCARDIOGRAM TRACING: CPT

## 2021-01-01 PROCEDURE — 93010 ELECTROCARDIOGRAM REPORT: CPT | Mod: ,,, | Performed by: INTERNAL MEDICINE

## 2021-01-01 PROCEDURE — 27000221 HC OXYGEN, UP TO 24 HOURS

## 2021-01-01 PROCEDURE — 84100 ASSAY OF PHOSPHORUS: CPT

## 2021-01-01 PROCEDURE — 83880 ASSAY OF NATRIURETIC PEPTIDE: CPT

## 2021-01-01 PROCEDURE — 94761 N-INVAS EAR/PLS OXIMETRY MLT: CPT

## 2021-01-01 PROCEDURE — 25000003 PHARM REV CODE 250: Performed by: INTERNAL MEDICINE

## 2021-01-01 PROCEDURE — 99233 SBSQ HOSP IP/OBS HIGH 50: CPT | Mod: ,,, | Performed by: INTERNAL MEDICINE

## 2021-01-01 PROCEDURE — 99497 PR ADVNCD CARE PLAN 30 MIN: ICD-10-PCS | Mod: ,,, | Performed by: CLINICAL NURSE SPECIALIST

## 2021-01-01 PROCEDURE — 80048 BASIC METABOLIC PNL TOTAL CA: CPT

## 2021-01-01 PROCEDURE — C9399 UNCLASSIFIED DRUGS OR BIOLOG: HCPCS | Performed by: STUDENT IN AN ORGANIZED HEALTH CARE EDUCATION/TRAINING PROGRAM

## 2021-01-01 PROCEDURE — 99233 SBSQ HOSP IP/OBS HIGH 50: CPT | Mod: 95,,, | Performed by: CLINICAL NURSE SPECIALIST

## 2021-01-01 PROCEDURE — 93010 EKG 12-LEAD: ICD-10-PCS | Mod: ,,, | Performed by: INTERNAL MEDICINE

## 2021-01-01 PROCEDURE — 99233 PR SUBSEQUENT HOSPITAL CARE,LEVL III: ICD-10-PCS | Mod: ,,, | Performed by: CLINICAL NURSE SPECIALIST

## 2021-01-01 PROCEDURE — 99223 1ST HOSP IP/OBS HIGH 75: CPT | Mod: ,,, | Performed by: CLINICAL NURSE SPECIALIST

## 2021-01-01 PROCEDURE — 80053 COMPREHEN METABOLIC PANEL: CPT

## 2021-01-01 PROCEDURE — 99233 PR SUBSEQUENT HOSPITAL CARE,LEVL III: ICD-10-PCS | Mod: 95,,, | Performed by: CLINICAL NURSE SPECIALIST

## 2021-01-01 PROCEDURE — 84439 ASSAY OF FREE THYROXINE: CPT

## 2021-01-01 PROCEDURE — 96374 THER/PROPH/DIAG INJ IV PUSH: CPT

## 2021-01-01 PROCEDURE — 12000002 HC ACUTE/MED SURGE SEMI-PRIVATE ROOM

## 2021-01-01 PROCEDURE — U0002 COVID-19 LAB TEST NON-CDC: HCPCS | Performed by: EMERGENCY MEDICINE

## 2021-01-01 PROCEDURE — 99233 SBSQ HOSP IP/OBS HIGH 50: CPT | Mod: ,,, | Performed by: CLINICAL NURSE SPECIALIST

## 2021-01-01 PROCEDURE — 36620 INSERTION CATHETER ARTERY: CPT | Mod: ,,, | Performed by: STUDENT IN AN ORGANIZED HEALTH CARE EDUCATION/TRAINING PROGRAM

## 2021-01-01 PROCEDURE — 84484 ASSAY OF TROPONIN QUANT: CPT | Mod: 91

## 2021-01-01 PROCEDURE — 93010 EKG 12-LEAD: ICD-10-PCS | Mod: 76,,, | Performed by: INTERNAL MEDICINE

## 2021-01-01 PROCEDURE — 99497 ADVNCD CARE PLAN 30 MIN: CPT | Mod: 25,,, | Performed by: CLINICAL NURSE SPECIALIST

## 2021-01-01 PROCEDURE — 63600175 PHARM REV CODE 636 W HCPCS

## 2021-01-01 PROCEDURE — 83735 ASSAY OF MAGNESIUM: CPT | Mod: 91

## 2021-01-01 PROCEDURE — 99223 PR INITIAL HOSPITAL CARE,LEVL III: ICD-10-PCS | Mod: ,,, | Performed by: CLINICAL NURSE SPECIALIST

## 2021-01-01 PROCEDURE — 51702 INSERT TEMP BLADDER CATH: CPT

## 2021-01-01 PROCEDURE — 99284 PR EMERGENCY DEPT VISIT,LEVEL IV: ICD-10-PCS | Mod: ,,, | Performed by: EMERGENCY MEDICINE

## 2021-01-01 PROCEDURE — 99284 EMERGENCY DEPT VISIT MOD MDM: CPT | Mod: ,,, | Performed by: EMERGENCY MEDICINE

## 2021-01-01 PROCEDURE — 82330 ASSAY OF CALCIUM: CPT

## 2021-01-01 PROCEDURE — 99285 EMERGENCY DEPT VISIT HI MDM: CPT | Mod: 25

## 2021-01-01 PROCEDURE — 84134 ASSAY OF PREALBUMIN: CPT

## 2021-01-01 PROCEDURE — 25000003 PHARM REV CODE 250

## 2021-01-01 PROCEDURE — 93010 ELECTROCARDIOGRAM REPORT: CPT | Mod: 76,,, | Performed by: INTERNAL MEDICINE

## 2021-01-01 RX ORDER — GLUCAGON 1 MG
1 KIT INJECTION
Status: DISCONTINUED | OUTPATIENT
Start: 2021-01-01 | End: 2021-01-01 | Stop reason: HOSPADM

## 2021-01-01 RX ORDER — PROCHLORPERAZINE EDISYLATE 5 MG/ML
10 INJECTION INTRAMUSCULAR; INTRAVENOUS EVERY 6 HOURS PRN
Status: DISCONTINUED | OUTPATIENT
Start: 2021-01-01 | End: 2021-01-01 | Stop reason: HOSPADM

## 2021-01-01 RX ORDER — SCOLOPAMINE TRANSDERMAL SYSTEM 1 MG/1
1 PATCH, EXTENDED RELEASE TRANSDERMAL
Status: DISCONTINUED | OUTPATIENT
Start: 2021-01-01 | End: 2021-01-01 | Stop reason: HOSPADM

## 2021-01-01 RX ORDER — INSULIN ASPART 100 [IU]/ML
0-5 INJECTION, SOLUTION INTRAVENOUS; SUBCUTANEOUS
Status: DISCONTINUED | OUTPATIENT
Start: 2021-01-01 | End: 2021-01-01 | Stop reason: HOSPADM

## 2021-01-01 RX ORDER — FUROSEMIDE 10 MG/ML
40 INJECTION INTRAMUSCULAR; INTRAVENOUS ONCE
Status: COMPLETED | OUTPATIENT
Start: 2021-01-01 | End: 2021-01-01

## 2021-01-01 RX ORDER — LIDOCAINE HYDROCHLORIDE 10 MG/ML
INJECTION INFILTRATION; PERINEURAL
Status: DISPENSED
Start: 2021-01-01 | End: 2021-01-01

## 2021-01-01 RX ORDER — IBUPROFEN 200 MG
24 TABLET ORAL
Status: DISCONTINUED | OUTPATIENT
Start: 2021-01-01 | End: 2021-01-01 | Stop reason: HOSPADM

## 2021-01-01 RX ORDER — FUROSEMIDE 10 MG/ML
80 INJECTION INTRAMUSCULAR; INTRAVENOUS ONCE
Status: COMPLETED | OUTPATIENT
Start: 2021-01-01 | End: 2021-01-01

## 2021-01-01 RX ORDER — MILRINONE LACTATE 0.2 MG/ML
0.25 INJECTION, SOLUTION INTRAVENOUS CONTINUOUS
Status: DISCONTINUED | OUTPATIENT
Start: 2021-01-01 | End: 2021-01-01

## 2021-01-01 RX ORDER — MORPHINE SULFATE 2 MG/ML
4 INJECTION, SOLUTION INTRAMUSCULAR; INTRAVENOUS
Status: DISCONTINUED | OUTPATIENT
Start: 2021-01-01 | End: 2021-01-01

## 2021-01-01 RX ORDER — IBUPROFEN 200 MG
16 TABLET ORAL
Status: DISCONTINUED | OUTPATIENT
Start: 2021-01-01 | End: 2021-01-01 | Stop reason: HOSPADM

## 2021-01-01 RX ORDER — FUROSEMIDE 10 MG/ML
120 INJECTION INTRAMUSCULAR; INTRAVENOUS ONCE
Status: DISCONTINUED | OUTPATIENT
Start: 2021-01-01 | End: 2021-01-01

## 2021-01-01 RX ORDER — DOBUTAMINE HYDROCHLORIDE 400 MG/100ML
5 INJECTION INTRAVENOUS CONTINUOUS
Status: DISCONTINUED | OUTPATIENT
Start: 2021-01-01 | End: 2021-01-01

## 2021-01-01 RX ORDER — LIDOCAINE HYDROCHLORIDE 20 MG/ML
JELLY TOPICAL ONCE
Status: COMPLETED | OUTPATIENT
Start: 2021-01-01 | End: 2021-01-01

## 2021-01-01 RX ORDER — POTASSIUM CHLORIDE 20 MEQ/1
20 TABLET, EXTENDED RELEASE ORAL ONCE
Status: COMPLETED | OUTPATIENT
Start: 2021-01-01 | End: 2021-01-01

## 2021-01-01 RX ORDER — MUPIROCIN 20 MG/G
OINTMENT TOPICAL 2 TIMES DAILY
Status: DISCONTINUED | OUTPATIENT
Start: 2021-01-01 | End: 2021-01-01 | Stop reason: HOSPADM

## 2021-01-01 RX ORDER — POTASSIUM CHLORIDE 14.9 MG/ML
20 INJECTION INTRAVENOUS
Status: COMPLETED | OUTPATIENT
Start: 2021-01-01 | End: 2021-01-01

## 2021-01-01 RX ORDER — MORPHINE SULFATE 1 MG/ML
0-10 INJECTION, SOLUTION INTRAVENOUS CONTINUOUS
Status: DISCONTINUED | OUTPATIENT
Start: 2021-01-01 | End: 2021-01-01 | Stop reason: HOSPADM

## 2021-01-01 RX ORDER — POTASSIUM CHLORIDE 29.8 MG/ML
40 INJECTION INTRAVENOUS ONCE
Status: COMPLETED | OUTPATIENT
Start: 2021-01-01 | End: 2021-01-01

## 2021-01-01 RX ORDER — MORPHINE SULFATE 2 MG/ML
2 INJECTION, SOLUTION INTRAMUSCULAR; INTRAVENOUS EVERY 4 HOURS PRN
Status: DISCONTINUED | OUTPATIENT
Start: 2021-01-01 | End: 2021-01-01 | Stop reason: HOSPADM

## 2021-01-01 RX ORDER — FUROSEMIDE 10 MG/ML
80 INJECTION INTRAMUSCULAR; INTRAVENOUS
Status: COMPLETED | OUTPATIENT
Start: 2021-01-01 | End: 2021-01-01

## 2021-01-01 RX ORDER — ACETAMINOPHEN 325 MG/1
650 TABLET ORAL EVERY 6 HOURS PRN
Status: DISCONTINUED | OUTPATIENT
Start: 2021-01-01 | End: 2021-01-01 | Stop reason: HOSPADM

## 2021-01-01 RX ORDER — LIDOCAINE HYDROCHLORIDE 20 MG/ML
INJECTION INTRAVENOUS
Status: DISCONTINUED
Start: 2021-01-01 | End: 2021-01-01 | Stop reason: WASHOUT

## 2021-01-01 RX ORDER — POTASSIUM CHLORIDE 20 MEQ/1
40 TABLET, EXTENDED RELEASE ORAL ONCE
Status: COMPLETED | OUTPATIENT
Start: 2021-01-01 | End: 2021-01-01

## 2021-01-01 RX ORDER — POTASSIUM CHLORIDE 29.8 MG/ML
20 INJECTION INTRAVENOUS ONCE
Status: DISCONTINUED | OUTPATIENT
Start: 2021-01-01 | End: 2021-01-01

## 2021-01-01 RX ORDER — POTASSIUM CHLORIDE 20 MEQ/1
40 TABLET, EXTENDED RELEASE ORAL ONCE
Status: DISCONTINUED | OUTPATIENT
Start: 2021-01-01 | End: 2021-01-01

## 2021-01-01 RX ORDER — POTASSIUM CHLORIDE 14.9 MG/ML
20 INJECTION INTRAVENOUS ONCE
Status: COMPLETED | OUTPATIENT
Start: 2021-01-01 | End: 2021-01-01

## 2021-01-01 RX ORDER — LORAZEPAM 2 MG/ML
0.5 INJECTION INTRAMUSCULAR EVERY 30 MIN PRN
Status: DISCONTINUED | OUTPATIENT
Start: 2021-01-01 | End: 2021-01-01 | Stop reason: HOSPADM

## 2021-01-01 RX ORDER — TALC
9 POWDER (GRAM) TOPICAL NIGHTLY PRN
Status: DISCONTINUED | OUTPATIENT
Start: 2021-01-01 | End: 2021-01-01 | Stop reason: HOSPADM

## 2021-01-01 RX ADMIN — FUROSEMIDE 80 MG: 10 INJECTION, SOLUTION INTRAMUSCULAR; INTRAVENOUS at 05:01

## 2021-01-01 RX ADMIN — INSULIN DETEMIR 6 UNITS: 100 INJECTION, SOLUTION SUBCUTANEOUS at 01:01

## 2021-01-01 RX ADMIN — MUPIROCIN: 20 OINTMENT TOPICAL at 08:01

## 2021-01-01 RX ADMIN — MILRINONE LACTATE 0.25 MCG/KG/MIN: 200 INJECTION, SOLUTION INTRAVENOUS at 07:01

## 2021-01-01 RX ADMIN — ACETAMINOPHEN 650 MG: 325 TABLET ORAL at 03:01

## 2021-01-01 RX ADMIN — FUROSEMIDE 40 MG/HR: 10 INJECTION, SOLUTION INTRAVENOUS at 09:01

## 2021-01-01 RX ADMIN — FUROSEMIDE 80 MG: 10 INJECTION, SOLUTION INTRAMUSCULAR; INTRAVENOUS at 02:01

## 2021-01-01 RX ADMIN — INSULIN ASPART 1 UNITS: 100 INJECTION, SOLUTION INTRAVENOUS; SUBCUTANEOUS at 09:01

## 2021-01-01 RX ADMIN — POTASSIUM CHLORIDE 20 MEQ: 200 INJECTION, SOLUTION INTRAVENOUS at 12:01

## 2021-01-01 RX ADMIN — DOBUTAMINE HYDROCHLORIDE 7.5 MCG/KG/MIN: 400 INJECTION INTRAVENOUS at 04:01

## 2021-01-01 RX ADMIN — INSULIN ASPART 2 UNITS: 100 INJECTION, SOLUTION INTRAVENOUS; SUBCUTANEOUS at 05:01

## 2021-01-01 RX ADMIN — FUROSEMIDE 40 MG/HR: 10 INJECTION, SOLUTION INTRAVENOUS at 12:01

## 2021-01-01 RX ADMIN — ACETAMINOPHEN 650 MG: 325 TABLET ORAL at 04:01

## 2021-01-01 RX ADMIN — EPINEPHRINE 0.03 MCG/KG/MIN: 1 INJECTION PARENTERAL at 08:01

## 2021-01-01 RX ADMIN — INSULIN DETEMIR 6 UNITS: 100 INJECTION, SOLUTION SUBCUTANEOUS at 09:01

## 2021-01-01 RX ADMIN — POTASSIUM BICARBONATE 40 MEQ: 391 TABLET, EFFERVESCENT ORAL at 07:01

## 2021-01-01 RX ADMIN — EPINEPHRINE 0.06 MCG/KG/MIN: 1 INJECTION PARENTERAL at 11:01

## 2021-01-01 RX ADMIN — INSULIN ASPART 1 UNITS: 100 INJECTION, SOLUTION INTRAVENOUS; SUBCUTANEOUS at 01:01

## 2021-01-01 RX ADMIN — INSULIN DETEMIR 6 UNITS: 100 INJECTION, SOLUTION SUBCUTANEOUS at 08:01

## 2021-01-01 RX ADMIN — LORAZEPAM 0.5 MG: 2 INJECTION, SOLUTION INTRAMUSCULAR; INTRAVENOUS at 02:01

## 2021-01-01 RX ADMIN — PROCHLORPERAZINE EDISYLATE 10 MG: 5 INJECTION INTRAMUSCULAR; INTRAVENOUS at 02:01

## 2021-01-01 RX ADMIN — MELATONIN TAB 3 MG 9 MG: 3 TAB at 09:01

## 2021-01-01 RX ADMIN — POTASSIUM CHLORIDE 20 MEQ: 200 INJECTION, SOLUTION INTRAVENOUS at 09:01

## 2021-01-01 RX ADMIN — INSULIN ASPART 2 UNITS: 100 INJECTION, SOLUTION INTRAVENOUS; SUBCUTANEOUS at 11:01

## 2021-01-01 RX ADMIN — FUROSEMIDE 40 MG: 10 INJECTION, SOLUTION INTRAMUSCULAR; INTRAVENOUS at 05:01

## 2021-01-01 RX ADMIN — POTASSIUM CHLORIDE 40 MEQ: 1500 TABLET, EXTENDED RELEASE ORAL at 09:01

## 2021-01-01 RX ADMIN — MORPHINE SULFATE 2 MG: 2 INJECTION, SOLUTION INTRAMUSCULAR; INTRAVENOUS at 02:01

## 2021-01-01 RX ADMIN — DOBUTAMINE HYDROCHLORIDE 5 MCG/KG/MIN: 400 INJECTION INTRAVENOUS at 04:01

## 2021-01-01 RX ADMIN — MUPIROCIN: 20 OINTMENT TOPICAL at 09:01

## 2021-01-01 RX ADMIN — POTASSIUM CHLORIDE 40 MEQ: 29.8 INJECTION, SOLUTION INTRAVENOUS at 09:01

## 2021-01-01 RX ADMIN — CHLOROTHIAZIDE SODIUM 500 MG: 500 INJECTION, POWDER, LYOPHILIZED, FOR SOLUTION INTRAVENOUS at 05:01

## 2021-01-01 RX ADMIN — POTASSIUM CHLORIDE 20 MEQ: 1500 TABLET, EXTENDED RELEASE ORAL at 08:01

## 2021-01-01 RX ADMIN — LIDOCAINE HYDROCHLORIDE 10 ML: 20 JELLY TOPICAL at 09:01

## 2021-01-01 RX ADMIN — FUROSEMIDE 40 MG/HR: 10 INJECTION, SOLUTION INTRAVENOUS at 07:01

## 2021-01-01 RX ADMIN — MORPHINE SULFATE 1 MG/HR: 10 INJECTION, SOLUTION INTRAMUSCULAR; INTRAVENOUS at 02:01

## 2021-01-01 RX ADMIN — FUROSEMIDE 120 MG: 10 INJECTION, SOLUTION INTRAMUSCULAR; INTRAVENOUS at 11:01

## 2021-01-01 RX ADMIN — EPINEPHRINE 0.05 MCG/KG/MIN: 1 INJECTION PARENTERAL at 12:01

## 2021-01-01 RX ADMIN — MELATONIN TAB 3 MG 9 MG: 3 TAB at 08:01

## 2021-01-01 RX ADMIN — DOBUTAMINE HYDROCHLORIDE 7.5 MCG/KG/MIN: 400 INJECTION INTRAVENOUS at 07:01

## 2021-01-01 RX ADMIN — FUROSEMIDE 30 MG/HR: 10 INJECTION, SOLUTION INTRAVENOUS at 11:01

## 2021-01-01 RX ADMIN — FUROSEMIDE 40 MG/HR: 10 INJECTION, SOLUTION INTRAVENOUS at 06:01

## 2021-01-01 RX ADMIN — INSULIN ASPART 2 UNITS: 100 INJECTION, SOLUTION INTRAVENOUS; SUBCUTANEOUS at 07:01

## 2021-01-01 RX ADMIN — FUROSEMIDE 30 MG/HR: 10 INJECTION, SOLUTION INTRAVENOUS at 08:01

## 2021-01-01 RX ADMIN — POTASSIUM CHLORIDE 20 MEQ: 14.9 INJECTION, SOLUTION INTRAVENOUS at 11:01

## 2021-01-01 RX ADMIN — CHLOROTHIAZIDE SODIUM 500 MG: 500 INJECTION, POWDER, LYOPHILIZED, FOR SOLUTION INTRAVENOUS at 09:01

## 2021-01-17 PROBLEM — R57.0 CARDIOGENIC SHOCK: Status: ACTIVE | Noted: 2021-01-01

## 2021-01-17 PROBLEM — I50.9 CHF (CONGESTIVE HEART FAILURE): Status: ACTIVE | Noted: 2021-01-01

## 2021-01-22 PROBLEM — Z51.5 COMFORT MEASURES ONLY STATUS: Status: ACTIVE | Noted: 2021-01-01

## 2022-02-04 NOTE — ED NOTES
Pt OOB resting in chair in hallway.     

## 2022-02-17 NOTE — PROGRESS NOTES
AWAIS Cintron at bedside doing Silver nitrate. Will continue to monitor.   [Dear  ___] : Dear  [unfilled], [Consult Letter:] : I had the pleasure of evaluating your patient, [unfilled]. [Sincerely,] : Sincerely, [DrRefugio  ___] : Dr. CREWS

## 2025-02-15 NOTE — PLAN OF CARE
-Pt AAOx4  -Vital signs stable  -Telemetry monitoring NSR with occasional PVCs and 5-6 beat runs of vtach (see previous note).  -BG monitoring ACHS; BG HS was 159.  No SSI needed.  -IV lasix continued per order.  See flowsheet for output values.  -Fall precautions maintained, non skid socks worn  -No acute events/falls/injuries this shift  -Pt aware to call for help with ambulating.    -Bed lowered, locked, siderails up x2, and call bell in reach.    See flowsheet for assessment findings.  Will continue to monitor pt.   
AAOx3, afebrile, w/o c/o pain. BG monitored achs and tx per orders. Telemetry monitor in place. Pt with frequent PVCs. Mg and K lab ordered. MG 1.9. K 3.5. Pt refused K replacement tonight. PA aware. PO K ordered for the morning. Lopressor reordered to help with PVCs. Pt blood pressure monitored closely. Plan for possible d/c tomorrow. Pt understands 1200 ml FR. Pt able to position self independently.  Pt in lowest position, side rails up x2, non-skid foot wear in place, call light within reach, pt verbalized understanding to call RN when needed.  Hand hygiene practiced per protocol.  Will continue to monitor.        
Cr=2.9 decreased from 3.3. Lasix IV continues. K+=3.9. Has good appetite. Glucoses monitored. Insulin given as ordered. Reinforced to wear slippers when ambulating to prevent falling. Verbalized understanding. Skin intact. Sat in chair this afternoon.  
K+=3.5 last pm. Replacement given. Pt felt full after drinking water with K-Dur this am. Did not eat BF and ate lunch. Supper served. UOP adequate. No runs of V-tach today. Lopressor and Lasix given. Afebrile. Glucoses monitored. Novolog held this am due to pt not eating BF. Novolog given for lunch. Plan to discharge home today. RX for Lasix delivered to room.   
No SW needs identified at this time.     SW spoke with patient's nurse who confirmed patient has transportation home.        05/13/20 1615   Post-Acute Status   Post-Acute Authorization Other   Other Status No Post-Acute Service Needs     Lay Isals LMSW   - Case Management    
Pt aao x 4. VSS. Bed in low locked pos call light within reach. Pt voids per urinal, 1700 cc uop o/n. Bs monitored ac/hs, ss insulin given.   
Pt aao x 4. VSS. Bed in low locked pos call light within reach. Pt voids per urinal, great uop o/n. Bs monitored ac/hs, ss insulin given.   
Pt remains AAO x 4 with VSS throughout shift  Complaint of abdominal distention and discomfort; x 2 BM  LFTs/ Cr elevated; Lasix administered  Output documented in flow sheets  Cardiac Diet/ 1200 cc FR maintained  PO intake encouraged as tolerable  BG monitored and controlled with SS insulin  Poor appetite noted; boost supplements provided  X 4 runs of vtach on tele; visi monitor secure and intact  Fall precautions maintained; instructed to call staff for mobility  Pt remains free from falls and injuries  Will continue to monitor     
Pt remains AAO x 4 with VSS throughout shift  No new complaints or concerns  LFTs/ Cr elevated; Lasix administered  Output documented in flow sheets  Cardiac Diet/ 1200 cc FR maintained  PO intake encouraged as tolerable  Better appetite; boost supplements provided  Occasional PVCs on tele; visi monitor secure and intact  BG monitored and controlled; endocrine consulted for BG management  Fall precautions maintained; instructed to call staff for mobility  Pt remains free from falls and injuries  Will continue to monitor  
Pt remains AAO x 4 with VSS throughout shift  X 1 episode of hypoglycemia; BG monitored and controlled through rest of shift  Complaint of abdominal distention and discomfort; x 1 BM  LFTs/ Cr elevated; Lasix administered  Output 850 documented in flow sheets  Cardiac Diet/ 1200 cc FR maintained  PO intake encouraged as tolerable  Poor appetite noted; boost supplements provided  NSR on tele/visi monitor remains in place  Fall precautions maintained; instructed to call staff for mobility  Pt remains free from falls and injuries  Will continue to monitor    
Pt. AAO x 4, WBC 3.52 afebrile  Tele in place- SR HR 80's -- 5 beat run of vtach this AM, pt. Asymptomatic  K 3.3 on AM labs-- K supplement given  Lasix 120 mg BID given-- 700 cc of OUP for this shift  , 259, 214 -- insulin given as indicated   1.2L FR-- 630 cc consumed- patient is aware of how much he has available  No BM today and the patient states he is feeling constipated-- schedule Miralax given but patient did not want to take the suppository  No c/o pain, N/V/D, SOB  Safety precautions maintained throughout the shift, call light within reach, and nonslip socks when out of bed.  
SW is following this Pt for DC planning needs. There are no identified needs at this time. Patient likely to DC home tomorrow.     Lay Islas LMSW   - Case Management      
3 = A little assistance

## 2025-04-16 NOTE — PHYSICIAN QUERY
----- Message from ASHUTOSH Velásquez sent at 4/16/2025  7:03 AM CDT -----  CBC, CMP WNL.       Elevated PSA level which has trended this way over a few years. Recommend follow-up with urology.     Patient's HDL which is the good cholesterol is low at 33.  I like to see this level above 60 as it becomes effective against the heart against heart attack and stroke. In order to increase this, I recommend walking at least 30 minutes a day, as activity is helps increase the good cholesterol. Try utilizing extra virgin olive oil as it is more helpful than processed olive oils. Weight loss can also increase your good cholesterol.        Writer informed Anderson of results. Anderson verbalized understanding. Referral entered for urology   PT Name: Ashish Mckeon  MR #: [id number]  Physician Query Form - CKD Clarification     CDS/: Devora Reveles               Contact information: [email or web address]    This form is a permanent document in the medical record.     Query Date: July 23, 2018    By submitting this query, we are merely seeking further clarification of documentation. Please utilize your independent clinical judgment when addressing the question(s) below.    The Medical record contains the following:     Indicators   Supporting Clinical Findings   Location in Medical Record   x CKD or Chronic Kidney (Renal) Failure / Disease   KEYANNA (acute kidney injury)    - admission SCr 2.3 >> now 1.6-2.0  - baseline from chart review, although data limited appears 1.3-1.5  - renal US and urine studies c/w medical renal disease and proteinuria  - needs f/u with PCP and nephrologist      Discharge summary    x BUN/Creatinine                          GFR BUN 33 - 41   Creatinine 1.6 - 2.3   GFR 35.9 - 55.6  Labs 7/16 - 7/21    Dehydration      Nausea / Vomiting      Dialysis / CRRT      Medication      Treatment     x Other Chronic Conditions * Acute on chronic combined systolic and diastolic congestive heart failure    Diabetes mellitus, type II    Essential hypertension   Discharge summary     Other       Provider, please further specify the stage of CKD.      [  ] Chronic Kidney Disease (CKD) (please specify stage* below)       [employer] Definitions  Stage Description  eGFR (mL/min)   [  ]     I Slight kidney damage with normal or increased filtration 90+   [  ]     II Mildly reduced kidney function 60-89   [ x ]     III Moderately reduced kidney function 30-59       [  ] Other (please specify): ________________________  [  ] Clinically Undetermined    Please document in your progress notes daily for the duration of treatment until resolved and include in your discharge summary.

## (undated) DEVICE — APPLICATOR CHLORAPREP ORN 26ML

## (undated) DEVICE — DRESSING N ADH OIL EMUL 3X8

## (undated) DEVICE — SEE MEDLINE ITEM 152529

## (undated) DEVICE — DRAPE STERI-DRAPE 1000 17X11IN

## (undated) DEVICE — DRAPE STERI U-SHAPED 47X51IN

## (undated) DEVICE — SHEET DRAPE FAN-FOLDED 3/4

## (undated) DEVICE — TRAY MINOR ORTHO

## (undated) DEVICE — SEE MEDLINE ITEM 157173

## (undated) DEVICE — SUT ETHILON 4-0 PS2 18 BLK

## (undated) DEVICE — CATH SUCTION 10FR

## (undated) DEVICE — ELECTRODE REM PLYHSV RETURN 9

## (undated) DEVICE — TOURNIQUET SB QC DP 18X4IN

## (undated) DEVICE — DRAPE STERI INSTRUMENT 1018

## (undated) DEVICE — SEE MEDLINE ITEM 156894

## (undated) DEVICE — SEE MEDLINE ITEM 146347

## (undated) DEVICE — TAPE SURG DURAPORE 2 X10YD

## (undated) DEVICE — SUT CTD VICRYL 0 UND BR CT

## (undated) DEVICE — KIT MICROINTRODUCE MINI 5X10CM

## (undated) DEVICE — SEE MEDLINE ITEM 157150

## (undated) DEVICE — PAD CAST SPECIALIST STRL 4

## (undated) DEVICE — CORD BIPOLAR 12 FOOT

## (undated) DEVICE — FORCEP CURVED DISP

## (undated) DEVICE — SHEATH INTRODUCER 7FR 11CM

## (undated) DEVICE — SEE MEDLINE ITEM 152515

## (undated) DEVICE — BLADE SURG CARBON STEEL #10

## (undated) DEVICE — CATH SWAN GANZ STND 7FR

## (undated) DEVICE — SUT VICRYL PLUS 0 CT1 18IN

## (undated) DEVICE — KIT COLLECTION E SWAB REGULAR

## (undated) DEVICE — SPONGE LAP 18X18 PREWASHED

## (undated) DEVICE — DRAPE PLASTIC U 60X72

## (undated) DEVICE — KIT PROBE COVER WITH GEL

## (undated) DEVICE — IMMOBILIZER SHOULDER RAINBOW M

## (undated) DEVICE — SEE MEDLINE ITEM 146417

## (undated) DEVICE — SEE MEDLINE ITEM 152622

## (undated) DEVICE — GAUZE SPONGE 4X4 12PLY